# Patient Record
Sex: MALE | Race: WHITE | Employment: OTHER
[De-identification: names, ages, dates, MRNs, and addresses within clinical notes are randomized per-mention and may not be internally consistent; named-entity substitution may affect disease eponyms.]

---

## 2017-01-05 ENCOUNTER — TELEPHONE (OUTPATIENT)
Dept: FAMILY MEDICINE CLINIC | Facility: CLINIC | Age: 57
End: 2017-01-05

## 2017-01-05 DIAGNOSIS — F43.20 ADJUSTMENT DISORDER, UNSPECIFIED TYPE: ICD-10-CM

## 2017-01-05 RX ORDER — LORAZEPAM 0.5 MG/1
0.5 TABLET ORAL EVERY 8 HOURS PRN
Qty: 30 TABLET | Refills: 0 | Status: SHIPPED | OUTPATIENT
Start: 2017-01-05 | End: 2017-09-29 | Stop reason: ALTCHOICE

## 2017-02-03 DIAGNOSIS — I10 ESSENTIAL HYPERTENSION: ICD-10-CM

## 2017-02-03 RX ORDER — AMLODIPINE BESYLATE 10 MG/1
TABLET ORAL
Qty: 30 TABLET | Refills: 0 | Status: SHIPPED | OUTPATIENT
Start: 2017-02-03 | End: 2017-03-31 | Stop reason: SDUPTHER

## 2017-03-31 DIAGNOSIS — I10 ESSENTIAL HYPERTENSION: ICD-10-CM

## 2017-03-31 RX ORDER — AMLODIPINE BESYLATE 10 MG/1
TABLET ORAL
Qty: 30 TABLET | Refills: 0 | Status: SHIPPED | OUTPATIENT
Start: 2017-03-31 | End: 2017-05-17 | Stop reason: SDUPTHER

## 2017-04-04 ENCOUNTER — OFFICE VISIT (OUTPATIENT)
Dept: FAMILY MEDICINE CLINIC | Age: 57
End: 2017-04-04
Payer: COMMERCIAL

## 2017-04-04 VITALS
SYSTOLIC BLOOD PRESSURE: 161 MMHG | WEIGHT: 277 LBS | HEART RATE: 85 BPM | RESPIRATION RATE: 16 BRPM | DIASTOLIC BLOOD PRESSURE: 114 MMHG | BODY MASS INDEX: 38.63 KG/M2

## 2017-04-04 DIAGNOSIS — F41.9 ANXIETY AND DEPRESSION: Primary | ICD-10-CM

## 2017-04-04 DIAGNOSIS — F32.A ANXIETY AND DEPRESSION: Primary | ICD-10-CM

## 2017-04-04 DIAGNOSIS — I10 ESSENTIAL HYPERTENSION: ICD-10-CM

## 2017-04-04 DIAGNOSIS — E11.9 TYPE 2 DIABETES MELLITUS WITHOUT COMPLICATION, WITHOUT LONG-TERM CURRENT USE OF INSULIN (HCC): ICD-10-CM

## 2017-04-04 PROCEDURE — 99214 OFFICE O/P EST MOD 30 MIN: CPT | Performed by: FAMILY MEDICINE

## 2017-04-04 PROCEDURE — G8417 CALC BMI ABV UP PARAM F/U: HCPCS | Performed by: FAMILY MEDICINE

## 2017-04-04 PROCEDURE — 3044F HG A1C LEVEL LT 7.0%: CPT | Performed by: FAMILY MEDICINE

## 2017-04-04 PROCEDURE — G8427 DOCREV CUR MEDS BY ELIG CLIN: HCPCS | Performed by: FAMILY MEDICINE

## 2017-04-04 PROCEDURE — 3017F COLORECTAL CA SCREEN DOC REV: CPT | Performed by: FAMILY MEDICINE

## 2017-04-04 PROCEDURE — 4004F PT TOBACCO SCREEN RCVD TLK: CPT | Performed by: FAMILY MEDICINE

## 2017-04-04 RX ORDER — FLUOXETINE HYDROCHLORIDE 20 MG/1
20 CAPSULE ORAL DAILY
Qty: 30 CAPSULE | Refills: 1 | Status: SHIPPED | OUTPATIENT
Start: 2017-04-04 | End: 2017-04-26 | Stop reason: ALTCHOICE

## 2017-04-25 ENCOUNTER — TELEPHONE (OUTPATIENT)
Dept: FAMILY MEDICINE CLINIC | Age: 57
End: 2017-04-25

## 2017-04-26 ENCOUNTER — HOSPITAL ENCOUNTER (OUTPATIENT)
Age: 57
Discharge: HOME | End: 2017-04-26
Payer: COMMERCIAL

## 2017-04-26 ENCOUNTER — HOSPITAL ENCOUNTER (OUTPATIENT)
Dept: ULTRASOUND IMAGING | Age: 57
Discharge: HOME | End: 2017-04-26
Payer: COMMERCIAL

## 2017-04-26 ENCOUNTER — HOSPITAL ENCOUNTER (OUTPATIENT)
Age: 57
Setting detail: SPECIMEN
Discharge: HOME | End: 2017-04-26
Payer: COMMERCIAL

## 2017-04-26 ENCOUNTER — OFFICE VISIT (OUTPATIENT)
Dept: FAMILY MEDICINE CLINIC | Age: 57
End: 2017-04-26
Payer: COMMERCIAL

## 2017-04-26 VITALS
BODY MASS INDEX: 36.68 KG/M2 | DIASTOLIC BLOOD PRESSURE: 85 MMHG | RESPIRATION RATE: 16 BRPM | SYSTOLIC BLOOD PRESSURE: 115 MMHG | HEART RATE: 76 BPM | WEIGHT: 263 LBS

## 2017-04-26 DIAGNOSIS — R82.2 BILIRUBINURIA: ICD-10-CM

## 2017-04-26 DIAGNOSIS — Z13.31 POSITIVE DEPRESSION SCREENING: ICD-10-CM

## 2017-04-26 DIAGNOSIS — E11.9 TYPE 2 DIABETES MELLITUS WITHOUT COMPLICATION, WITHOUT LONG-TERM CURRENT USE OF INSULIN (HCC): ICD-10-CM

## 2017-04-26 DIAGNOSIS — F32.A ANXIETY AND DEPRESSION: Primary | ICD-10-CM

## 2017-04-26 DIAGNOSIS — Z87.442 HISTORY OF KIDNEY STONES: ICD-10-CM

## 2017-04-26 DIAGNOSIS — R39.9 SYMPTOMS INVOLVING URINARY SYSTEM: ICD-10-CM

## 2017-04-26 DIAGNOSIS — R31.9 HEMATURIA: ICD-10-CM

## 2017-04-26 DIAGNOSIS — Z11.59 NEED FOR HEPATITIS C SCREENING TEST: ICD-10-CM

## 2017-04-26 DIAGNOSIS — Z11.4 SCREENING FOR HIV (HUMAN IMMUNODEFICIENCY VIRUS): ICD-10-CM

## 2017-04-26 DIAGNOSIS — F41.9 ANXIETY AND DEPRESSION: Primary | ICD-10-CM

## 2017-04-26 LAB
-: ABNORMAL
ALBUMIN SERPL-MCNC: 4.2 G/DL (ref 3.5–5.2)
ALBUMIN/GLOBULIN RATIO: 1.6 (ref 1–2.5)
ALP BLD-CCNC: 85 U/L (ref 40–129)
ALT SERPL-CCNC: 29 U/L (ref 5–41)
AMORPHOUS: ABNORMAL
AST SERPL-CCNC: 23 U/L
BACTERIA: ABNORMAL
BILIRUB SERPL-MCNC: 0.46 MG/DL (ref 0.3–1.2)
BILIRUBIN DIRECT: 0.12 MG/DL
BILIRUBIN URINE: NEGATIVE
BILIRUBIN, INDIRECT: 0.34 MG/DL (ref 0–1)
BILIRUBIN, POC: NORMAL
BLOOD URINE, POC: NORMAL
CASTS UA: ABNORMAL /LPF (ref 0–2)
CLARITY, POC: NORMAL
COLOR, POC: NORMAL
COLOR: ABNORMAL
COMMENT UA: ABNORMAL
CREATININE URINE: 326.9 MG/DL (ref 39–259)
CRYSTALS, UA: ABNORMAL /HPF
EPITHELIAL CELLS UA: ABNORMAL /HPF (ref 0–5)
GLOBULIN: NORMAL G/DL (ref 1.5–3.8)
GLUCOSE URINE, POC: NORMAL
GLUCOSE URINE: NEGATIVE
HEPATITIS C ANTIBODY: NONREACTIVE
HIV AG/AB: NONREACTIVE
KETONES, POC: NORMAL
KETONES, URINE: ABNORMAL
LEUKOCYTE EST, POC: NORMAL
LEUKOCYTE ESTERASE, URINE: ABNORMAL
MICROALBUMIN/CREAT 24H UR: 55 MG/L
MICROALBUMIN/CREAT UR-RTO: 17 MCG/MG CREAT
MUCUS: ABNORMAL
NITRITE, POC: NORMAL
NITRITE, URINE: NEGATIVE
OTHER OBSERVATIONS UA: ABNORMAL
PH UA: 5.5 (ref 5–8)
PH, POC: 5.5
PROSTATE SPECIFIC ANTIGEN: 0.59 UG/L
PROTEIN UA: ABNORMAL
PROTEIN, POC: 30
RBC UA: ABNORMAL /HPF (ref 0–2)
RENAL EPITHELIAL, UA: ABNORMAL /HPF
SPECIFIC GRAVITY UA: 1.03 (ref 1–1.03)
SPECIFIC GRAVITY, POC: 1.03
TOTAL PROTEIN: 6.9 G/DL (ref 6.4–8.3)
TRICHOMONAS: ABNORMAL
TURBIDITY: ABNORMAL
URINE HGB: ABNORMAL
UROBILINOGEN, POC: 0.2
UROBILINOGEN, URINE: NORMAL
WBC UA: ABNORMAL /HPF (ref 0–5)
YEAST: ABNORMAL

## 2017-04-26 PROCEDURE — 80076 HEPATIC FUNCTION PANEL: CPT

## 2017-04-26 PROCEDURE — 99214 OFFICE O/P EST MOD 30 MIN: CPT | Performed by: NURSE PRACTITIONER

## 2017-04-26 PROCEDURE — 81002 URINALYSIS NONAUTO W/O SCOPE: CPT | Performed by: NURSE PRACTITIONER

## 2017-04-26 PROCEDURE — G8427 DOCREV CUR MEDS BY ELIG CLIN: HCPCS | Performed by: NURSE PRACTITIONER

## 2017-04-26 PROCEDURE — G8431 POS CLIN DEPRES SCRN F/U DOC: HCPCS | Performed by: NURSE PRACTITIONER

## 2017-04-26 PROCEDURE — G8417 CALC BMI ABV UP PARAM F/U: HCPCS | Performed by: NURSE PRACTITIONER

## 2017-04-26 PROCEDURE — 4004F PT TOBACCO SCREEN RCVD TLK: CPT | Performed by: NURSE PRACTITIONER

## 2017-04-26 PROCEDURE — 36415 COLL VENOUS BLD VENIPUNCTURE: CPT

## 2017-04-26 PROCEDURE — 83036 HEMOGLOBIN GLYCOSYLATED A1C: CPT

## 2017-04-26 PROCEDURE — 76770 US EXAM ABDO BACK WALL COMP: CPT

## 2017-04-26 PROCEDURE — 86803 HEPATITIS C AB TEST: CPT

## 2017-04-26 PROCEDURE — 82043 UR ALBUMIN QUANTITATIVE: CPT

## 2017-04-26 PROCEDURE — 87389 HIV-1 AG W/HIV-1&-2 AB AG IA: CPT

## 2017-04-26 PROCEDURE — 82570 ASSAY OF URINE CREATININE: CPT

## 2017-04-26 PROCEDURE — 84153 ASSAY OF PSA TOTAL: CPT

## 2017-04-26 PROCEDURE — 3017F COLORECTAL CA SCREEN DOC REV: CPT | Performed by: NURSE PRACTITIONER

## 2017-04-26 RX ORDER — GABAPENTIN 300 MG/1
300 CAPSULE ORAL 2 TIMES DAILY
COMMUNITY
Start: 2017-04-21 | End: 2018-01-09 | Stop reason: ALTCHOICE

## 2017-04-26 RX ORDER — ALBUTEROL SULFATE 90 UG/1
2 AEROSOL, METERED RESPIRATORY (INHALATION) EVERY 6 HOURS PRN
COMMUNITY
End: 2017-06-09 | Stop reason: SDUPTHER

## 2017-04-26 RX ORDER — ESCITALOPRAM OXALATE 10 MG/1
10 TABLET ORAL DAILY
Qty: 30 TABLET | Refills: 1 | Status: SHIPPED | OUTPATIENT
Start: 2017-04-26 | End: 2018-01-09 | Stop reason: ALTCHOICE

## 2017-04-26 ASSESSMENT — PATIENT HEALTH QUESTIONNAIRE - PHQ9
3. TROUBLE FALLING OR STAYING ASLEEP: 3
1. LITTLE INTEREST OR PLEASURE IN DOING THINGS: 3
10. IF YOU CHECKED OFF ANY PROBLEMS, HOW DIFFICULT HAVE THESE PROBLEMS MADE IT FOR YOU TO DO YOUR WORK, TAKE CARE OF THINGS AT HOME, OR GET ALONG WITH OTHER PEOPLE: 2
4. FEELING TIRED OR HAVING LITTLE ENERGY: 3
5. POOR APPETITE OR OVEREATING: 3
2. FEELING DOWN, DEPRESSED OR HOPELESS: 3
8. MOVING OR SPEAKING SO SLOWLY THAT OTHER PEOPLE COULD HAVE NOTICED. OR THE OPPOSITE, BEING SO FIGETY OR RESTLESS THAT YOU HAVE BEEN MOVING AROUND A LOT MORE THAN USUAL: 3
6. FEELING BAD ABOUT YOURSELF - OR THAT YOU ARE A FAILURE OR HAVE LET YOURSELF OR YOUR FAMILY DOWN: 0
SUM OF ALL RESPONSES TO PHQ9 QUESTIONS 1 & 2: 6
9. THOUGHTS THAT YOU WOULD BE BETTER OFF DEAD, OR OF HURTING YOURSELF: 0
7. TROUBLE CONCENTRATING ON THINGS, SUCH AS READING THE NEWSPAPER OR WATCHING TELEVISION: 3
SUM OF ALL RESPONSES TO PHQ QUESTIONS 1-9: 21

## 2017-04-26 ASSESSMENT — ENCOUNTER SYMPTOMS
VOMITING: 0
NAUSEA: 1
DIARRHEA: 0
SHORTNESS OF BREATH: 0
ABDOMINAL PAIN: 1
CONSTIPATION: 0

## 2017-04-27 ENCOUNTER — TELEPHONE (OUTPATIENT)
Dept: FAMILY MEDICINE CLINIC | Age: 57
End: 2017-04-27

## 2017-04-27 DIAGNOSIS — N39.0 URINARY TRACT INFECTION WITH HEMATURIA, SITE UNSPECIFIED: Primary | ICD-10-CM

## 2017-04-27 DIAGNOSIS — R80.9 MICROALBUMINURIA: ICD-10-CM

## 2017-04-27 DIAGNOSIS — R31.9 URINARY TRACT INFECTION WITH HEMATURIA, SITE UNSPECIFIED: Primary | ICD-10-CM

## 2017-04-27 LAB
CULTURE: NORMAL
CULTURE: NORMAL
ESTIMATED AVERAGE GLUCOSE: 128 MG/DL
HBA1C MFR BLD: 6.1 % (ref 4–6)
Lab: NORMAL
SPECIMEN DESCRIPTION: NORMAL
STATUS: NORMAL

## 2017-04-27 RX ORDER — CIPROFLOXACIN 500 MG/1
500 TABLET, FILM COATED ORAL 2 TIMES DAILY
Qty: 14 TABLET | Refills: 0 | Status: SHIPPED | OUTPATIENT
Start: 2017-04-27 | End: 2017-05-04

## 2017-04-30 ENCOUNTER — APPOINTMENT (OUTPATIENT)
Dept: CT IMAGING | Age: 57
End: 2017-04-30
Payer: COMMERCIAL

## 2017-04-30 ENCOUNTER — HOSPITAL ENCOUNTER (EMERGENCY)
Age: 57
Discharge: HOME | End: 2017-04-30
Attending: EMERGENCY MEDICINE
Payer: COMMERCIAL

## 2017-04-30 VITALS
SYSTOLIC BLOOD PRESSURE: 100 MMHG | HEART RATE: 65 BPM | OXYGEN SATURATION: 96 % | RESPIRATION RATE: 18 BRPM | DIASTOLIC BLOOD PRESSURE: 82 MMHG | TEMPERATURE: 97.8 F | HEIGHT: 71 IN | BODY MASS INDEX: 37.1 KG/M2 | WEIGHT: 265 LBS

## 2017-04-30 DIAGNOSIS — R10.9 RIGHT FLANK PAIN: Primary | ICD-10-CM

## 2017-04-30 LAB
-: ABNORMAL
ABSOLUTE EOS #: 0.2 K/UL (ref 0–0.4)
ABSOLUTE LYMPH #: 1.4 K/UL (ref 1–4.8)
ABSOLUTE MONO #: 0.5 K/UL (ref 0.1–1.2)
AMORPHOUS: ABNORMAL
ANION GAP SERPL CALCULATED.3IONS-SCNC: 13 MMOL/L (ref 9–17)
BACTERIA: ABNORMAL
BASOPHILS # BLD: 1 %
BASOPHILS ABSOLUTE: 0 K/UL (ref 0–0.2)
BILIRUBIN URINE: ABNORMAL
BUN BLDV-MCNC: 11 MG/DL (ref 6–20)
BUN/CREAT BLD: ABNORMAL (ref 9–20)
CALCIUM SERPL-MCNC: 9.1 MG/DL (ref 8.6–10.4)
CASTS UA: ABNORMAL /LPF
CHLORIDE BLD-SCNC: 105 MMOL/L (ref 98–107)
CO2: 24 MMOL/L (ref 20–31)
COLOR: YELLOW
COMMENT UA: ABNORMAL
CREAT SERPL-MCNC: 0.78 MG/DL (ref 0.7–1.2)
CRYSTALS, UA: ABNORMAL /HPF
DIFFERENTIAL TYPE: NORMAL
EOSINOPHILS RELATIVE PERCENT: 4 %
EPITHELIAL CELLS UA: ABNORMAL /HPF (ref 0–5)
GFR AFRICAN AMERICAN: >60 ML/MIN
GFR NON-AFRICAN AMERICAN: >60 ML/MIN
GFR SERPL CREATININE-BSD FRML MDRD: ABNORMAL ML/MIN/{1.73_M2}
GFR SERPL CREATININE-BSD FRML MDRD: ABNORMAL ML/MIN/{1.73_M2}
GLUCOSE BLD-MCNC: 123 MG/DL (ref 70–99)
GLUCOSE URINE: NEGATIVE
HCT VFR BLD CALC: 47.7 % (ref 41–53)
HEMOGLOBIN: 16.1 G/DL (ref 13.5–17.5)
KETONES, URINE: ABNORMAL
LEUKOCYTE ESTERASE, URINE: NEGATIVE
LYMPHOCYTES # BLD: 25 %
MCH RBC QN AUTO: 30.1 PG (ref 26–34)
MCHC RBC AUTO-ENTMCNC: 33.8 G/DL (ref 31–37)
MCV RBC AUTO: 88.8 FL (ref 80–100)
MONOCYTES # BLD: 8 %
MUCUS: ABNORMAL
NITRITE, URINE: NEGATIVE
OTHER OBSERVATIONS UA: ABNORMAL
PDW BLD-RTO: 13.5 % (ref 12.5–15.4)
PH UA: 5 (ref 5–8)
PLATELET # BLD: 183 K/UL (ref 140–450)
PLATELET ESTIMATE: NORMAL
PMV BLD AUTO: 9.6 FL (ref 6–12)
POTASSIUM SERPL-SCNC: 4 MMOL/L (ref 3.7–5.3)
PROTEIN UA: ABNORMAL
RBC # BLD: 5.37 M/UL (ref 4.5–5.9)
RBC # BLD: NORMAL 10*6/UL
RBC UA: ABNORMAL /HPF (ref 0–2)
RENAL EPITHELIAL, UA: ABNORMAL /HPF
SEG NEUTROPHILS: 62 %
SEGMENTED NEUTROPHILS ABSOLUTE COUNT: 3.4 K/UL (ref 1.8–7.7)
SODIUM BLD-SCNC: 142 MMOL/L (ref 135–144)
SPECIFIC GRAVITY UA: 1.03 (ref 1–1.03)
TRICHOMONAS: ABNORMAL
TURBIDITY: ABNORMAL
URINE HGB: ABNORMAL
UROBILINOGEN, URINE: NORMAL
WBC # BLD: 5.4 K/UL (ref 3.5–11)
WBC # BLD: NORMAL 10*3/UL
WBC UA: ABNORMAL /HPF (ref 0–5)
YEAST: ABNORMAL

## 2017-04-30 PROCEDURE — 81001 URINALYSIS AUTO W/SCOPE: CPT

## 2017-04-30 PROCEDURE — 99284 EMERGENCY DEPT VISIT MOD MDM: CPT

## 2017-04-30 PROCEDURE — 36415 COLL VENOUS BLD VENIPUNCTURE: CPT

## 2017-04-30 PROCEDURE — 80048 BASIC METABOLIC PNL TOTAL CA: CPT

## 2017-04-30 PROCEDURE — 74176 CT ABD & PELVIS W/O CONTRAST: CPT

## 2017-04-30 PROCEDURE — 85025 COMPLETE CBC W/AUTO DIFF WBC: CPT

## 2017-04-30 RX ORDER — HYDROCODONE BITARTRATE AND ACETAMINOPHEN 5; 325 MG/1; MG/1
1 TABLET ORAL EVERY 6 HOURS PRN
Qty: 10 TABLET | Refills: 0 | Status: SHIPPED | OUTPATIENT
Start: 2017-04-30 | End: 2017-05-07

## 2017-04-30 ASSESSMENT — PAIN SCALES - GENERAL: PAINLEVEL_OUTOF10: 4

## 2017-04-30 ASSESSMENT — PAIN DESCRIPTION - DESCRIPTORS: DESCRIPTORS: SHARP;PRESSURE

## 2017-05-03 ENCOUNTER — OFFICE VISIT (OUTPATIENT)
Dept: FAMILY MEDICINE CLINIC | Age: 57
End: 2017-05-03
Payer: COMMERCIAL

## 2017-05-03 VITALS
WEIGHT: 266 LBS | HEART RATE: 74 BPM | DIASTOLIC BLOOD PRESSURE: 84 MMHG | SYSTOLIC BLOOD PRESSURE: 125 MMHG | BODY MASS INDEX: 37.1 KG/M2 | RESPIRATION RATE: 16 BRPM

## 2017-05-03 DIAGNOSIS — R80.9 MICROALBUMINURIA: ICD-10-CM

## 2017-05-03 DIAGNOSIS — R10.9 RIGHT FLANK PAIN: ICD-10-CM

## 2017-05-03 DIAGNOSIS — R31.9 HEMATURIA: Primary | ICD-10-CM

## 2017-05-03 PROCEDURE — 3017F COLORECTAL CA SCREEN DOC REV: CPT | Performed by: NURSE PRACTITIONER

## 2017-05-03 PROCEDURE — 4004F PT TOBACCO SCREEN RCVD TLK: CPT | Performed by: NURSE PRACTITIONER

## 2017-05-03 PROCEDURE — G8417 CALC BMI ABV UP PARAM F/U: HCPCS | Performed by: NURSE PRACTITIONER

## 2017-05-03 PROCEDURE — G8427 DOCREV CUR MEDS BY ELIG CLIN: HCPCS | Performed by: NURSE PRACTITIONER

## 2017-05-03 PROCEDURE — 99214 OFFICE O/P EST MOD 30 MIN: CPT | Performed by: NURSE PRACTITIONER

## 2017-05-17 DIAGNOSIS — L70.9 ACNE, UNSPECIFIED ACNE TYPE: ICD-10-CM

## 2017-05-17 DIAGNOSIS — I10 ESSENTIAL HYPERTENSION: ICD-10-CM

## 2017-05-17 RX ORDER — AMLODIPINE BESYLATE 10 MG/1
TABLET ORAL
Qty: 30 TABLET | Refills: 2 | Status: SHIPPED | OUTPATIENT
Start: 2017-05-17 | End: 2017-10-11 | Stop reason: SDUPTHER

## 2017-05-17 RX ORDER — MINOCYCLINE HYDROCHLORIDE 50 MG/1
CAPSULE ORAL
Qty: 60 CAPSULE | Refills: 0 | Status: SHIPPED | OUTPATIENT
Start: 2017-05-17 | End: 2017-08-14 | Stop reason: SDUPTHER

## 2017-08-07 ENCOUNTER — HOSPITAL ENCOUNTER (EMERGENCY)
Age: 57
Discharge: HOME | End: 2017-08-08
Attending: EMERGENCY MEDICINE
Payer: COMMERCIAL

## 2017-08-07 ENCOUNTER — APPOINTMENT (OUTPATIENT)
Dept: CT IMAGING | Age: 57
End: 2017-08-07
Payer: COMMERCIAL

## 2017-08-07 DIAGNOSIS — R10.31 ABDOMINAL PAIN, RIGHT LOWER QUADRANT: Primary | ICD-10-CM

## 2017-08-07 PROCEDURE — 99284 EMERGENCY DEPT VISIT MOD MDM: CPT

## 2017-08-07 PROCEDURE — 85025 COMPLETE CBC W/AUTO DIFF WBC: CPT

## 2017-08-07 PROCEDURE — 83690 ASSAY OF LIPASE: CPT

## 2017-08-07 PROCEDURE — 80053 COMPREHEN METABOLIC PANEL: CPT

## 2017-08-07 PROCEDURE — 36415 COLL VENOUS BLD VENIPUNCTURE: CPT

## 2017-08-07 PROCEDURE — 74177 CT ABD & PELVIS W/CONTRAST: CPT

## 2017-08-07 RX ORDER — SODIUM CHLORIDE 0.9 % (FLUSH) 0.9 %
10 SYRINGE (ML) INJECTION PRN
Status: DISCONTINUED | OUTPATIENT
Start: 2017-08-07 | End: 2017-08-08 | Stop reason: HOSPADM

## 2017-08-07 RX ORDER — 0.9 % SODIUM CHLORIDE 0.9 %
100 INTRAVENOUS SOLUTION INTRAVENOUS ONCE
Status: COMPLETED | OUTPATIENT
Start: 2017-08-08 | End: 2017-08-08

## 2017-08-07 ASSESSMENT — PAIN DESCRIPTION - LOCATION: LOCATION: ABDOMEN

## 2017-08-07 ASSESSMENT — PAIN DESCRIPTION - DESCRIPTORS: DESCRIPTORS: STABBING;SHARP

## 2017-08-07 ASSESSMENT — PAIN DESCRIPTION - PAIN TYPE: TYPE: ACUTE PAIN

## 2017-08-07 ASSESSMENT — PAIN DESCRIPTION - ORIENTATION: ORIENTATION: RIGHT;LOWER

## 2017-08-07 ASSESSMENT — PAIN SCALES - GENERAL: PAINLEVEL_OUTOF10: 8

## 2017-08-08 VITALS
RESPIRATION RATE: 16 BRPM | DIASTOLIC BLOOD PRESSURE: 118 MMHG | OXYGEN SATURATION: 97 % | SYSTOLIC BLOOD PRESSURE: 160 MMHG | WEIGHT: 265 LBS | HEIGHT: 71 IN | BODY MASS INDEX: 37.1 KG/M2 | TEMPERATURE: 98.8 F | HEART RATE: 84 BPM

## 2017-08-08 LAB
ABSOLUTE EOS #: 0.2 K/UL (ref 0–0.4)
ABSOLUTE LYMPH #: 1.9 K/UL (ref 1–4.8)
ABSOLUTE MONO #: 0.4 K/UL (ref 0.1–1.2)
ALBUMIN SERPL-MCNC: 4.1 G/DL (ref 3.5–5.2)
ALBUMIN/GLOBULIN RATIO: 1.4 (ref 1–2.5)
ALP BLD-CCNC: 95 U/L (ref 40–129)
ALT SERPL-CCNC: 37 U/L (ref 5–41)
ANION GAP SERPL CALCULATED.3IONS-SCNC: 18 MMOL/L (ref 9–17)
AST SERPL-CCNC: 25 U/L
BASOPHILS # BLD: 1 %
BASOPHILS ABSOLUTE: 0.1 K/UL (ref 0–0.2)
BILIRUB SERPL-MCNC: 0.33 MG/DL (ref 0.3–1.2)
BILIRUBIN URINE: NEGATIVE
BUN BLDV-MCNC: 18 MG/DL (ref 6–20)
BUN/CREAT BLD: ABNORMAL (ref 9–20)
CALCIUM SERPL-MCNC: 9.4 MG/DL (ref 8.6–10.4)
CHLORIDE BLD-SCNC: 104 MMOL/L (ref 98–107)
CO2: 21 MMOL/L (ref 20–31)
COLOR: YELLOW
COMMENT UA: ABNORMAL
CREAT SERPL-MCNC: 0.85 MG/DL (ref 0.7–1.2)
DIFFERENTIAL TYPE: NORMAL
EOSINOPHILS RELATIVE PERCENT: 2 %
GFR AFRICAN AMERICAN: >60 ML/MIN
GFR NON-AFRICAN AMERICAN: >60 ML/MIN
GFR SERPL CREATININE-BSD FRML MDRD: ABNORMAL ML/MIN/{1.73_M2}
GFR SERPL CREATININE-BSD FRML MDRD: ABNORMAL ML/MIN/{1.73_M2}
GLUCOSE BLD-MCNC: 170 MG/DL (ref 70–99)
GLUCOSE URINE: NEGATIVE
HCT VFR BLD CALC: 49.9 % (ref 41–53)
HEMOGLOBIN: 16.6 G/DL (ref 13.5–17.5)
KETONES, URINE: NEGATIVE
LEUKOCYTE ESTERASE, URINE: NEGATIVE
LIPASE: 20 U/L (ref 13–60)
LYMPHOCYTES # BLD: 23 %
MCH RBC QN AUTO: 30 PG (ref 26–34)
MCHC RBC AUTO-ENTMCNC: 33.3 G/DL (ref 31–37)
MCV RBC AUTO: 90.1 FL (ref 80–100)
MONOCYTES # BLD: 5 %
NITRITE, URINE: NEGATIVE
PDW BLD-RTO: 14 % (ref 12.5–15.4)
PH UA: 5.5 (ref 5–8)
PLATELET # BLD: 192 K/UL (ref 140–450)
PLATELET ESTIMATE: NORMAL
PMV BLD AUTO: 8.5 FL (ref 6–12)
POTASSIUM SERPL-SCNC: 3.5 MMOL/L (ref 3.7–5.3)
PROTEIN UA: NEGATIVE
RBC # BLD: 5.54 M/UL (ref 4.5–5.9)
RBC # BLD: NORMAL 10*6/UL
SEG NEUTROPHILS: 69 %
SEGMENTED NEUTROPHILS ABSOLUTE COUNT: 5.7 K/UL (ref 1.8–7.7)
SODIUM BLD-SCNC: 143 MMOL/L (ref 135–144)
SPECIFIC GRAVITY UA: 1.03 (ref 1–1.03)
TOTAL PROTEIN: 7 G/DL (ref 6.4–8.3)
TURBIDITY: CLEAR
URINE HGB: NEGATIVE
UROBILINOGEN, URINE: NORMAL
WBC # BLD: 8.3 K/UL (ref 3.5–11)
WBC # BLD: NORMAL 10*3/UL

## 2017-08-08 PROCEDURE — 6360000004 HC RX CONTRAST MEDICATION: Performed by: EMERGENCY MEDICINE

## 2017-08-08 PROCEDURE — 2580000003 HC RX 258: Performed by: EMERGENCY MEDICINE

## 2017-08-08 RX ORDER — 0.9 % SODIUM CHLORIDE 0.9 %
1000 INTRAVENOUS SOLUTION INTRAVENOUS ONCE
Status: COMPLETED | OUTPATIENT
Start: 2017-08-08 | End: 2017-08-08

## 2017-08-08 RX ADMIN — SODIUM CHLORIDE 1000 ML: 9 INJECTION, SOLUTION INTRAVENOUS at 00:13

## 2017-08-08 RX ADMIN — SODIUM CHLORIDE 100 ML: 9 INJECTION, SOLUTION INTRAVENOUS at 00:20

## 2017-08-08 RX ADMIN — Medication 10 ML: at 00:25

## 2017-08-08 RX ADMIN — IOVERSOL 130 ML: 741 INJECTION INTRA-ARTERIAL; INTRAVENOUS at 00:24

## 2017-08-08 ASSESSMENT — ENCOUNTER SYMPTOMS
CHEST TIGHTNESS: 0
EYE PAIN: 0
COUGH: 0
CONSTIPATION: 0
SORE THROAT: 0
RHINORRHEA: 0
NAUSEA: 0
ABDOMINAL PAIN: 1
EYE REDNESS: 0
WHEEZING: 0
SHORTNESS OF BREATH: 0
DIARRHEA: 1
COLOR CHANGE: 0
EYE DISCHARGE: 0
BACK PAIN: 0

## 2017-08-14 DIAGNOSIS — L70.9 ACNE, UNSPECIFIED ACNE TYPE: ICD-10-CM

## 2017-08-14 RX ORDER — MINOCYCLINE HYDROCHLORIDE 50 MG/1
CAPSULE ORAL
Qty: 60 CAPSULE | Refills: 0 | Status: SHIPPED | OUTPATIENT
Start: 2017-08-14 | End: 2017-10-27 | Stop reason: SDUPTHER

## 2017-08-29 ENCOUNTER — HOSPITAL ENCOUNTER (OUTPATIENT)
Age: 57
Discharge: HOME | End: 2017-08-29
Payer: COMMERCIAL

## 2017-08-29 ENCOUNTER — OFFICE VISIT (OUTPATIENT)
Dept: FAMILY MEDICINE CLINIC | Age: 57
End: 2017-08-29
Payer: COMMERCIAL

## 2017-08-29 VITALS
HEART RATE: 79 BPM | WEIGHT: 254 LBS | RESPIRATION RATE: 16 BRPM | DIASTOLIC BLOOD PRESSURE: 92 MMHG | SYSTOLIC BLOOD PRESSURE: 139 MMHG | BODY MASS INDEX: 35.43 KG/M2

## 2017-08-29 DIAGNOSIS — R41.3 MEMORY DIFFICULTIES: Primary | ICD-10-CM

## 2017-08-29 DIAGNOSIS — R41.3 MEMORY DIFFICULTIES: ICD-10-CM

## 2017-08-29 LAB
TSH SERPL DL<=0.05 MIU/L-ACNC: 2.06 MIU/L (ref 0.3–5)
VITAMIN B-12: 321 PG/ML (ref 211–946)

## 2017-08-29 PROCEDURE — 4004F PT TOBACCO SCREEN RCVD TLK: CPT | Performed by: NURSE PRACTITIONER

## 2017-08-29 PROCEDURE — 3017F COLORECTAL CA SCREEN DOC REV: CPT | Performed by: NURSE PRACTITIONER

## 2017-08-29 PROCEDURE — G8427 DOCREV CUR MEDS BY ELIG CLIN: HCPCS | Performed by: NURSE PRACTITIONER

## 2017-08-29 PROCEDURE — 99213 OFFICE O/P EST LOW 20 MIN: CPT | Performed by: NURSE PRACTITIONER

## 2017-08-29 PROCEDURE — 36415 COLL VENOUS BLD VENIPUNCTURE: CPT

## 2017-08-29 PROCEDURE — G8417 CALC BMI ABV UP PARAM F/U: HCPCS | Performed by: NURSE PRACTITIONER

## 2017-08-29 PROCEDURE — 84443 ASSAY THYROID STIM HORMONE: CPT

## 2017-08-29 PROCEDURE — 82607 VITAMIN B-12: CPT

## 2017-08-29 RX ORDER — DIAZEPAM 5 MG/1
TABLET ORAL
Refills: 0 | COMMUNITY
Start: 2017-08-16 | End: 2017-08-29 | Stop reason: ALTCHOICE

## 2017-08-29 RX ORDER — TRAMADOL HYDROCHLORIDE 50 MG/1
TABLET ORAL
Refills: 0 | COMMUNITY
Start: 2017-08-16 | End: 2017-09-29 | Stop reason: ALTCHOICE

## 2017-08-29 ASSESSMENT — ENCOUNTER SYMPTOMS
COUGH: 0
SWOLLEN GLANDS: 0
NAUSEA: 0
SORE THROAT: 0
ABDOMINAL PAIN: 0
VISUAL CHANGE: 0

## 2017-08-30 ENCOUNTER — HOSPITAL ENCOUNTER (OUTPATIENT)
Dept: MRI IMAGING | Age: 57
Discharge: HOME | End: 2017-08-30
Payer: COMMERCIAL

## 2017-08-30 DIAGNOSIS — M54.16 LUMBAR RADICULITIS: ICD-10-CM

## 2017-08-30 DIAGNOSIS — R41.3 MEMORY DIFFICULTIES: ICD-10-CM

## 2017-08-30 PROCEDURE — 72148 MRI LUMBAR SPINE W/O DYE: CPT

## 2017-08-30 PROCEDURE — 70551 MRI BRAIN STEM W/O DYE: CPT

## 2017-08-31 ENCOUNTER — TELEPHONE (OUTPATIENT)
Dept: FAMILY MEDICINE CLINIC | Age: 57
End: 2017-08-31

## 2017-09-24 ENCOUNTER — HOSPITAL ENCOUNTER (EMERGENCY)
Age: 57
Discharge: HOME | End: 2017-09-24
Attending: EMERGENCY MEDICINE
Payer: COMMERCIAL

## 2017-09-24 VITALS
OXYGEN SATURATION: 96 % | SYSTOLIC BLOOD PRESSURE: 146 MMHG | WEIGHT: 253 LBS | HEIGHT: 70 IN | RESPIRATION RATE: 18 BRPM | BODY MASS INDEX: 36.22 KG/M2 | TEMPERATURE: 97.5 F | HEART RATE: 79 BPM | DIASTOLIC BLOOD PRESSURE: 105 MMHG

## 2017-09-24 DIAGNOSIS — R25.2 MUSCLE CRAMP: Primary | ICD-10-CM

## 2017-09-24 PROCEDURE — 99282 EMERGENCY DEPT VISIT SF MDM: CPT

## 2017-09-29 ENCOUNTER — OFFICE VISIT (OUTPATIENT)
Dept: NEUROLOGY | Age: 57
End: 2017-09-29
Payer: COMMERCIAL

## 2017-09-29 VITALS
HEIGHT: 70 IN | HEART RATE: 74 BPM | WEIGHT: 257.2 LBS | BODY MASS INDEX: 36.82 KG/M2 | SYSTOLIC BLOOD PRESSURE: 156 MMHG | DIASTOLIC BLOOD PRESSURE: 102 MMHG

## 2017-09-29 DIAGNOSIS — R41.3 MEMORY DIFFICULTIES: Primary | ICD-10-CM

## 2017-09-29 PROCEDURE — 3017F COLORECTAL CA SCREEN DOC REV: CPT | Performed by: PSYCHIATRY & NEUROLOGY

## 2017-09-29 PROCEDURE — G8417 CALC BMI ABV UP PARAM F/U: HCPCS | Performed by: PSYCHIATRY & NEUROLOGY

## 2017-09-29 PROCEDURE — 99204 OFFICE O/P NEW MOD 45 MIN: CPT | Performed by: PSYCHIATRY & NEUROLOGY

## 2017-09-29 PROCEDURE — 4004F PT TOBACCO SCREEN RCVD TLK: CPT | Performed by: PSYCHIATRY & NEUROLOGY

## 2017-09-29 PROCEDURE — G8427 DOCREV CUR MEDS BY ELIG CLIN: HCPCS | Performed by: PSYCHIATRY & NEUROLOGY

## 2017-10-11 DIAGNOSIS — I10 ESSENTIAL HYPERTENSION: ICD-10-CM

## 2017-10-11 RX ORDER — AMLODIPINE BESYLATE 10 MG/1
TABLET ORAL
Qty: 30 TABLET | Refills: 3 | Status: SHIPPED | OUTPATIENT
Start: 2017-10-11 | End: 2018-01-09 | Stop reason: ALTCHOICE

## 2017-10-13 ENCOUNTER — HOSPITAL ENCOUNTER (OUTPATIENT)
Dept: NEUROLOGY | Age: 57
Discharge: HOME | End: 2017-10-13
Payer: COMMERCIAL

## 2017-10-13 DIAGNOSIS — R41.3 MEMORY DIFFICULTIES: ICD-10-CM

## 2017-10-13 PROCEDURE — 95816 EEG AWAKE AND DROWSY: CPT

## 2017-10-27 DIAGNOSIS — L70.9 ACNE, UNSPECIFIED ACNE TYPE: ICD-10-CM

## 2017-10-27 RX ORDER — MINOCYCLINE HYDROCHLORIDE 50 MG/1
CAPSULE ORAL
Qty: 60 CAPSULE | Refills: 0 | Status: SHIPPED | OUTPATIENT
Start: 2017-10-27 | End: 2017-11-27 | Stop reason: SDUPTHER

## 2017-11-27 DIAGNOSIS — L70.9 ACNE, UNSPECIFIED ACNE TYPE: ICD-10-CM

## 2017-11-27 RX ORDER — MINOCYCLINE HYDROCHLORIDE 50 MG/1
CAPSULE ORAL
Qty: 60 CAPSULE | Refills: 0 | Status: SHIPPED | OUTPATIENT
Start: 2017-11-27 | End: 2018-01-09 | Stop reason: ALTCHOICE

## 2017-12-06 ENCOUNTER — OFFICE VISIT (OUTPATIENT)
Dept: NEUROLOGY | Age: 57
End: 2017-12-06
Payer: COMMERCIAL

## 2017-12-06 VITALS
DIASTOLIC BLOOD PRESSURE: 96 MMHG | HEIGHT: 70 IN | SYSTOLIC BLOOD PRESSURE: 135 MMHG | WEIGHT: 266 LBS | HEART RATE: 72 BPM | BODY MASS INDEX: 38.08 KG/M2

## 2017-12-06 DIAGNOSIS — F41.9 ANXIETY AND DEPRESSION: ICD-10-CM

## 2017-12-06 DIAGNOSIS — G47.00 INSOMNIA, UNSPECIFIED TYPE: ICD-10-CM

## 2017-12-06 DIAGNOSIS — M79.604 DIFFUSE PAIN IN RIGHT LOWER EXTREMITY: ICD-10-CM

## 2017-12-06 DIAGNOSIS — R41.3 MEMORY DIFFICULTIES: Primary | ICD-10-CM

## 2017-12-06 DIAGNOSIS — R20.8 DYSESTHESIA: ICD-10-CM

## 2017-12-06 DIAGNOSIS — F32.A ANXIETY AND DEPRESSION: ICD-10-CM

## 2017-12-06 PROCEDURE — G8417 CALC BMI ABV UP PARAM F/U: HCPCS | Performed by: PSYCHIATRY & NEUROLOGY

## 2017-12-06 PROCEDURE — 3017F COLORECTAL CA SCREEN DOC REV: CPT | Performed by: PSYCHIATRY & NEUROLOGY

## 2017-12-06 PROCEDURE — 4004F PT TOBACCO SCREEN RCVD TLK: CPT | Performed by: PSYCHIATRY & NEUROLOGY

## 2017-12-06 PROCEDURE — 99214 OFFICE O/P EST MOD 30 MIN: CPT | Performed by: PSYCHIATRY & NEUROLOGY

## 2017-12-06 PROCEDURE — G8484 FLU IMMUNIZE NO ADMIN: HCPCS | Performed by: PSYCHIATRY & NEUROLOGY

## 2017-12-06 PROCEDURE — G8427 DOCREV CUR MEDS BY ELIG CLIN: HCPCS | Performed by: PSYCHIATRY & NEUROLOGY

## 2017-12-06 RX ORDER — TIZANIDINE HYDROCHLORIDE 2 MG/1
1 CAPSULE, GELATIN COATED ORAL DAILY
COMMUNITY
Start: 2017-12-04 | End: 2020-12-03

## 2017-12-06 RX ORDER — AMITRIPTYLINE HYDROCHLORIDE 10 MG/1
TABLET, FILM COATED ORAL
Qty: 70 TABLET | Refills: 1 | Status: SHIPPED | OUTPATIENT
Start: 2017-12-06 | End: 2018-01-10 | Stop reason: ALTCHOICE

## 2017-12-06 RX ORDER — OXYCODONE HYDROCHLORIDE AND ACETAMINOPHEN 5; 325 MG/1; MG/1
1 TABLET ORAL 3 TIMES DAILY PRN
Refills: 0 | COMMUNITY
Start: 2017-11-27 | End: 2018-01-09 | Stop reason: ALTCHOICE

## 2017-12-06 NOTE — PROGRESS NOTES
NEUROLOGY FOLLOW-UP  Patient Name:  Hugo Salinas  :   1960  Clinic Visit Date: 2017    I saw Mr. Hugo Salinas in follow-up in the office today in continuation of neurologic care. He is a 65 y.o.  male evaluated on 17 for evaluation of memory loss. Today he came to the clinic to follow up on EEG testing and also stated \"trouble falling asleep; loss of appetite and irritability with getting agitated for trivial reasons\" he is also requesting \"give me something to help my attention problems. During initial visit; most of the history is obtained from the patient and also from his son who accompanied the patient to the clinic. Patient stated that he has been having memory problems \"for many years\". He is concerned that this memory loss might affect his function at work. He has family history of dementia in his mom. He is much concerned about it also. He reports that he has been having increasing forgetfulness and trouble remembering recent conversations. He is repeating himself for \"more and more\" and increasing problems with recall. At times it takes \"10-15 minutes to recall\". He is getting extremely frustrated with this. He has been having difficulties remembering things occurred in remote past also. He also stated that he is having some problems with \"getting anger outbursts for trivial reasons\" while dealing with his colleagues and subordinates at work. He further added \"I misplace items such as watch, etc and it takes at least few minutes to find them\". He also has trouble finding his parked car stating \" Where did I parkc my car\". It takes \"some time\" to find his parked car. His son also stated that he has noticed his dad repeating some of the conversations again. Patient has trouble remembering names. Patient also stated that he had history of \"mini stroke\" 3-4 years ago with transient right-sided numbness.   He has had IV TPA at [facility] in the Sig Dispense Refill    minocycline (MINOCIN;DYNACIN) 50 MG capsule TAKE 1 CAPSULE BY MOUTH TWICE DAILY 60 capsule 0    amLODIPine (NORVASC) 10 MG tablet TAKE 1 TABLET BY MOUTH EVERY DAY 30 tablet 3    PROAIR  (90 BASE) MCG/ACT inhaler INHALE 1-2 PUFFS BY INHALATION ROUTE EVERY 4-6 HOURS FOR 72 HOURS, THEN AS NEEDED. 8.5 g 3    gabapentin (NEURONTIN) 300 MG capsule Take 300 mg by mouth 2 times daily       escitalopram (LEXAPRO) 10 MG tablet Take 1 tablet by mouth daily 30 tablet 1    sildenafil (VIAGRA) 100 MG tablet Take 1 tablet by mouth as needed for Erectile Dysfunction 6 tablet 2    metFORMIN (GLUCOPHAGE XR) 500 MG extended release tablet Take 1 tablet by mouth 2 times daily (with meals) 60 tablet 2    carvedilol (COREG) 25 MG tablet 25 mg 2 times daily   6    aspirin 81 MG tablet Take 81 mg by mouth daily. No current facility-administered medications on file prior to visit. Allergies: Charl Lefort is allergic to lisinopril and ibuprofen. Past Medical History:   Diagnosis Date    ADHD (attention deficit hyperactivity disorder)     Arthritis     knees, hands, back.  Asthma     as child.  Back pain     CAD (coronary artery disease)     Cataract     Depression     pt. denies.  ED (erectile dysfunction)     Heart attack     2011    Hypertension     Mini stroke ([facility] [address]     2014    Osteoarthritis     Seasonal allergies     Sinus problem        Past Surgical History:   Procedure Laterality Date    ABDOMEN SURGERY      CHOLECYSTECTOMY      2016    COLONOSCOPY      EYE SURGERY      HERNIA REPAIR      1995, rt. inguinal    INGUINAL HERNIA REPAIR Left 08/19/2016    UPPER GASTROINTESTINAL ENDOSCOPY      VASECTOMY       Social History: Charl Lefort  reports that he has been smoking. He has a 7.00 pack-year smoking history. He has never used smokeless tobacco. He reports that he drinks alcohol. He reports that he does not use drugs.     Family History Problem Relation Age of Onset    Cancer Father      throat    Cirrhosis Father     High Blood Pressure Sister     Asthma Sister     Arthritis Sister     High Blood Pressure Mother     Heart Disease Mother     Alzheimer's Disease Mother        On exam: Blood pressure (!) 135/96, pulse 72, height 5' 10\" (1.778 m), weight 266 lb (120.7 kg). GENERAL  Appears comfortable and in no distress   HEENT  NC/ AT   NECK & cardiovascular  Supple and no bruits heard; S1 and S2 heard; radial pulse intact   MENTAL STATUS:  Alert, oriented, intact memory, no confusion, normal speech, normal language, no hallucination or delusion   CRANIAL NERVES: II     -      VA 20/20 OU; fundi reveal intact venous pulsations; Visual fields intact to confrontation  III,IV,VI -  EOMs full, no afferent defect, no KATIA, no ptosis  V     -     Normal facial sensation  VII    -     Normal facial symmetry  VIII   -     Intact hearing  IX,X -     Symmetrical palate  XI    -     Symmetrical shoulder shrug  XII   -     Midline tongue, no atrophy    MOTOR FUNCTION:  significant for good strength of grade 5/5 in bilateral proximal and distal muscle groups of both upper and lower extremities with normal bulk, normal tone and no involuntary movements, no tremor   SENSORY FUNCTION:  Normal touch, normal pin, normal vibration, normal proprioception   CEREBELLAR FUNCTION:  Intact fine motor control over upper limbs   REFLEX FUNCTION:  Symmetric, no perverted reflex, no Babinski sign   STATION and GAIT  Normal station, normal gait        12/6/2017  Maximum score 5 Score 5 What is the year, season, date, day, month   Maximum score 5 Score 5 Where are we: State, county, town, hospital, floor? Maximum score 3 Score 3 Name 3 objects: ball, pen, car. Ask patient to repeat 3 objects. Maximum score 5 Score 5 Serial sevens from 100:93, 86, 79, 72, 65   Maximum score 3 Score 3 Recall 3 objects   Maximum score 2 Score 2 Name a pencil and watch.        Maximum score 1 Score 1 Repeat the following: No ifs ands or buts.      Maximum score 3 Score 3 Follow 3 stage command take a paper in your hand, fold it in half, and put it on the floor. Maximum score 1  Score 1 Read and obey the following: Close your eyes     Maximum score 1 Score 1 Write a sentence. Maximum score 1 Score 1 Copy a design below. Max 30   Patients score 30            Diagnostic data reviewed with the patient:   MRI Brain (8/30/17): No acute abnormality. ,  It demonstrated minimal nonspecific white matter disease. No results found for: SEDRATE  Lab Results   Component Value Date    [id number] 520 08/29/2017      No results found for: FOLATE  No results found for: ANUSHKA  Lab Results   Component Value Date    TSH 2.06 08/29/2017       No results found for: RPR   No components found for: [facility]  Lab Results   Component Value Date    LABA1C 6.1 (H) 04/26/2017     EEG (10/21/17): Unremarkable. Impression and Plan: Mr. Héctor Selby is a 65 y.o. male with   Subjective memory loss with normal MMSE;  Possible pseudodementia; normal EEG; patient refuses neuro pscych testing at this point of time  Regarding depression: on PHQ-9; Patient Health questionnaire; patient admits to having little interest or pleasure in doing things on nearly every day and trouble falling asleep on a nightly basis for the past couple of weeks. Denies snoring. Admits to feeling tired or having little energy on nearly every day. Also with poor appetite. Denies suicidal ideations. Admits to having trouble concentrating on more than half of the days. He also feels fidgety and restless. He scored 14-15. These are reviewed in detail with the patient. Patient was on Lexapro as per list of medications. But patient has never started taking it. Therefore discussion done about initiation of amitriptyline and he is agreeable to take nightly with 10 mg increments.   Medication Counseling:

## 2018-01-08 ENCOUNTER — TELEPHONE (OUTPATIENT)
Dept: NEUROLOGY | Age: 58
End: 2018-01-08

## 2018-01-08 NOTE — TELEPHONE ENCOUNTER
Mr. Yobani Bahena called in today. He apparently has been waiting for a hernia surgery to be scheduled and they called him and told him they can do it tomorrow if he can get clearance. I told him Dr. Deric Becerra is not in the office and I most likely can not get an answer and I won't be able to have anything signed by Dr Deric Becerra even if he does say he is clear.  I told him I would write up a message but I was not at all sure I would get an answer

## 2018-01-09 ENCOUNTER — HOSPITAL ENCOUNTER (OUTPATIENT)
Dept: PREADMISSION TESTING | Age: 58
Discharge: HOME | End: 2018-01-09
Payer: COMMERCIAL

## 2018-01-09 VITALS
WEIGHT: 253 LBS | BODY MASS INDEX: 35.42 KG/M2 | HEIGHT: 71 IN | TEMPERATURE: 98.4 F | RESPIRATION RATE: 16 BRPM | DIASTOLIC BLOOD PRESSURE: 94 MMHG | SYSTOLIC BLOOD PRESSURE: 117 MMHG | HEART RATE: 86 BPM | OXYGEN SATURATION: 97 %

## 2018-01-09 LAB
ABSOLUTE EOS #: 0.1 K/UL (ref 0–0.4)
ABSOLUTE IMMATURE GRANULOCYTE: ABNORMAL K/UL (ref 0–0.3)
ABSOLUTE LYMPH #: 1.4 K/UL (ref 1–4.8)
ABSOLUTE MONO #: 0.4 K/UL (ref 0.1–1.3)
ANION GAP SERPL CALCULATED.3IONS-SCNC: 14 MMOL/L (ref 9–17)
BASOPHILS # BLD: 1 % (ref 0–2)
BASOPHILS ABSOLUTE: 0.1 K/UL (ref 0–0.2)
BUN BLDV-MCNC: 13 MG/DL (ref 6–20)
BUN/CREAT BLD: ABNORMAL (ref 9–20)
CALCIUM SERPL-MCNC: 9.1 MG/DL (ref 8.6–10.4)
CHLORIDE BLD-SCNC: 104 MMOL/L (ref 98–107)
CO2: 23 MMOL/L (ref 20–31)
CREAT SERPL-MCNC: 0.76 MG/DL (ref 0.7–1.2)
DIFFERENTIAL TYPE: ABNORMAL
EKG ATRIAL RATE: 79 BPM
EKG P AXIS: 58 DEGREES
EKG P-R INTERVAL: 154 MS
EKG Q-T INTERVAL: 386 MS
EKG QRS DURATION: 88 MS
EKG QTC CALCULATION (BAZETT): 442 MS
EKG R AXIS: 31 DEGREES
EKG T AXIS: 66 DEGREES
EKG VENTRICULAR RATE: 79 BPM
EOSINOPHILS RELATIVE PERCENT: 3 % (ref 0–4)
GFR AFRICAN AMERICAN: >60 ML/MIN
GFR NON-AFRICAN AMERICAN: >60 ML/MIN
GFR SERPL CREATININE-BSD FRML MDRD: ABNORMAL ML/MIN/{1.73_M2}
GFR SERPL CREATININE-BSD FRML MDRD: ABNORMAL ML/MIN/{1.73_M2}
GLUCOSE BLD-MCNC: 141 MG/DL (ref 70–99)
HCT VFR BLD CALC: 49.1 % (ref 41–53)
HEMOGLOBIN: 16.4 G/DL (ref 13.5–17.5)
IMMATURE GRANULOCYTES: ABNORMAL %
LYMPHOCYTES # BLD: 28 % (ref 24–44)
MCH RBC QN AUTO: 30.6 PG (ref 26–34)
MCHC RBC AUTO-ENTMCNC: 33.4 G/DL (ref 31–37)
MCV RBC AUTO: 91.5 FL (ref 80–100)
MONOCYTES # BLD: 9 % (ref 1–7)
PDW BLD-RTO: 12.8 % (ref 11.5–14.9)
PLATELET # BLD: 222 K/UL (ref 150–450)
PLATELET ESTIMATE: ABNORMAL
PMV BLD AUTO: 9.1 FL (ref 6–12)
POTASSIUM SERPL-SCNC: 3.6 MMOL/L (ref 3.7–5.3)
RBC # BLD: 5.37 M/UL (ref 4.5–5.9)
RBC # BLD: ABNORMAL 10*6/UL
SEG NEUTROPHILS: 59 % (ref 36–66)
SEGMENTED NEUTROPHILS ABSOLUTE COUNT: 2.9 K/UL (ref 1.3–9.1)
SODIUM BLD-SCNC: 141 MMOL/L (ref 135–144)
WBC # BLD: 4.8 K/UL (ref 3.5–11)
WBC # BLD: ABNORMAL 10*3/UL

## 2018-01-09 PROCEDURE — 85025 COMPLETE CBC W/AUTO DIFF WBC: CPT

## 2018-01-09 PROCEDURE — 36415 COLL VENOUS BLD VENIPUNCTURE: CPT

## 2018-01-09 PROCEDURE — 93005 ELECTROCARDIOGRAM TRACING: CPT

## 2018-01-09 PROCEDURE — 80048 BASIC METABOLIC PNL TOTAL CA: CPT

## 2018-01-09 ASSESSMENT — PAIN DESCRIPTION - PAIN TYPE: TYPE: CHRONIC PAIN

## 2018-01-09 ASSESSMENT — PAIN SCALES - GENERAL: PAINLEVEL_OUTOF10: 7

## 2018-01-09 ASSESSMENT — PAIN DESCRIPTION - LOCATION: LOCATION: BACK;ABDOMEN

## 2018-01-09 ASSESSMENT — PAIN DESCRIPTION - ORIENTATION: ORIENTATION: LEFT;RIGHT

## 2018-01-09 NOTE — H&P
History Main Topics    Smoking status: Current Every Day Smoker     Packs/day: 0.25     Years: 28.00    Smokeless tobacco: Never Used    Alcohol use Yes      Comment: Social    Drug use: No    Sexual activity: Not Asked     Other Topics Concern    None     Social History Narrative    None           REVIEW OF SYSTEMS      Allergies   Allergen Reactions    Lisinopril      States he is allergic to the generic forms of medication, can take lisinopril    Ibuprofen Other (See Comments)     \"hurts my stomach. \"       Current Outpatient Prescriptions on File Prior to Encounter   Medication Sig Dispense Refill    tiZANidine (ZANAFLEX) 2 MG capsule Take 1 capsule by mouth daily      amitriptyline (ELAVIL) 10 MG tablet Wk 1- one tab nightly; Wk 2- two tab nightly; Wk 3- three tab nightly; Wk 4- four tab nightly 70 tablet 1    PROAIR  (90 BASE) MCG/ACT inhaler INHALE 1-2 PUFFS BY INHALATION ROUTE EVERY 4-6 HOURS FOR 72 HOURS, THEN AS NEEDED. 8.5 g 3    metFORMIN (GLUCOPHAGE XR) 500 MG extended release tablet Take 1 tablet by mouth 2 times daily (with meals) 60 tablet 2    carvedilol (COREG) 25 MG tablet 25 mg 2 times daily   6    aspirin 81 MG tablet Take 81 mg by mouth daily.  sildenafil (VIAGRA) 100 MG tablet Take 1 tablet by mouth as needed for Erectile Dysfunction 6 tablet 2     No current facility-administered medications on file prior to encounter. General health:  Fairly good. No fever or chills. Skin:  No itching, redness or rash. HEENT:  No headache, epistaxis or sore throat. Glasses,              Neck:  No pain, stiffness or masses. Cardiovascular/Respiratory system:  No chest pain, palpitation or shortness of breath. Gastrointestinal tract: No abdominal pain, nausea, vomiting, diarrhea or constipation. Genitourinary:  No burning on micturition.   No hesitancy, urgency, frequency or discoloration of

## 2018-01-10 ENCOUNTER — ANESTHESIA EVENT (OUTPATIENT)
Dept: OPERATING ROOM | Age: 58
End: 2018-01-10
Payer: COMMERCIAL

## 2018-01-10 ENCOUNTER — OFFICE VISIT (OUTPATIENT)
Dept: FAMILY MEDICINE CLINIC | Age: 58
End: 2018-01-10
Payer: COMMERCIAL

## 2018-01-10 VITALS
DIASTOLIC BLOOD PRESSURE: 88 MMHG | HEIGHT: 71 IN | TEMPERATURE: 98.4 F | BODY MASS INDEX: 34.54 KG/M2 | SYSTOLIC BLOOD PRESSURE: 146 MMHG | OXYGEN SATURATION: 97 % | WEIGHT: 246.7 LBS | HEART RATE: 85 BPM

## 2018-01-10 DIAGNOSIS — Z01.818 PRE-OPERATIVE CLEARANCE: ICD-10-CM

## 2018-01-10 DIAGNOSIS — K42.9 UMBILICAL HERNIA WITHOUT OBSTRUCTION AND WITHOUT GANGRENE: Primary | ICD-10-CM

## 2018-01-10 DIAGNOSIS — E11.9 TYPE 2 DIABETES MELLITUS WITHOUT COMPLICATION, WITHOUT LONG-TERM CURRENT USE OF INSULIN (HCC): ICD-10-CM

## 2018-01-10 PROCEDURE — G8417 CALC BMI ABV UP PARAM F/U: HCPCS | Performed by: NURSE PRACTITIONER

## 2018-01-10 PROCEDURE — G8484 FLU IMMUNIZE NO ADMIN: HCPCS | Performed by: NURSE PRACTITIONER

## 2018-01-10 PROCEDURE — 4004F PT TOBACCO SCREEN RCVD TLK: CPT | Performed by: NURSE PRACTITIONER

## 2018-01-10 PROCEDURE — G8427 DOCREV CUR MEDS BY ELIG CLIN: HCPCS | Performed by: NURSE PRACTITIONER

## 2018-01-10 PROCEDURE — 3017F COLORECTAL CA SCREEN DOC REV: CPT | Performed by: NURSE PRACTITIONER

## 2018-01-10 PROCEDURE — 3046F HEMOGLOBIN A1C LEVEL >9.0%: CPT | Performed by: NURSE PRACTITIONER

## 2018-01-10 PROCEDURE — 99213 OFFICE O/P EST LOW 20 MIN: CPT | Performed by: NURSE PRACTITIONER

## 2018-01-10 RX ORDER — METFORMIN HYDROCHLORIDE 500 MG/1
500 TABLET, EXTENDED RELEASE ORAL 2 TIMES DAILY WITH MEALS
Qty: 60 TABLET | Refills: 2 | Status: SHIPPED | OUTPATIENT
Start: 2018-01-10 | End: 2018-11-06 | Stop reason: ALTCHOICE

## 2018-01-10 RX ORDER — SODIUM CHLORIDE, SODIUM LACTATE, POTASSIUM CHLORIDE, CALCIUM CHLORIDE 600; 310; 30; 20 MG/100ML; MG/100ML; MG/100ML; MG/100ML
INJECTION, SOLUTION INTRAVENOUS CONTINUOUS
Status: CANCELLED | OUTPATIENT
Start: 2018-01-10

## 2018-01-10 RX ORDER — ISOTRETINOIN 40 MG/1
CAPSULE ORAL
Refills: 0 | COMMUNITY
Start: 2017-12-11 | End: 2018-02-26

## 2018-01-10 RX ORDER — LIDOCAINE HYDROCHLORIDE 10 MG/ML
1 INJECTION, SOLUTION EPIDURAL; INFILTRATION; INTRACAUDAL; PERINEURAL
Status: CANCELLED | OUTPATIENT
Start: 2018-01-10 | End: 2018-01-10

## 2018-01-10 RX ORDER — AMITRIPTYLINE HYDROCHLORIDE 10 MG/1
10 TABLET, FILM COATED ORAL NIGHTLY
COMMUNITY
Start: 2017-12-06 | End: 2018-02-03 | Stop reason: SDUPTHER

## 2018-01-10 RX ORDER — SODIUM CHLORIDE 0.9 % (FLUSH) 0.9 %
10 SYRINGE (ML) INJECTION PRN
Status: CANCELLED | OUTPATIENT
Start: 2018-01-10

## 2018-01-10 RX ORDER — SODIUM CHLORIDE 0.9 % (FLUSH) 0.9 %
10 SYRINGE (ML) INJECTION EVERY 12 HOURS SCHEDULED
Status: CANCELLED | OUTPATIENT
Start: 2018-01-10

## 2018-01-10 NOTE — PROGRESS NOTES
Subjective:    Chief Complaint:     Ankit Henley is a 65 y.o. male who presents for a preoperative physical examination. He is scheduled to have hernia repair on the left and scoping the right inguinal area done by Dr. Robert Cornejo at [address] [place] [address] on 1/16/18. History of Present Illness:      Previously had bilateral inguinal hernias with repair. 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 He states he is scheduled to have the left side repaired and there is an issue with the right side he states will be addressed in the OR. Any recent illnesses that will interfere with the upcoming surgery? No  Last surgical procedure- left hernia repair 2016. Type of anesthesia used general.    Any problems with the procedure or anesthesia? No    Conditioning (equivalent to 4 METs) --  1. Can you walk up 2 flights of stairs? Yes  2. Can you run a 'short distance'? Yes  3. Can you walk up a hill 1-2 blocks? yes  4. Can you denis 2 bags of groceries up 1 flight of stairs? Yes  5. Can you walk 2-4 blocks (flat)? Yes        Past Medical History:   Diagnosis Date    ADHD (attention deficit hyperactivity disorder)     Arthritis     knees, hands, back.  Asthma     as child.  Back pain     CAD (coronary artery disease)     Cataract     Depression     pt. denies.     Diabetes mellitus ([address]     Heart attack     2011    Hypertension     Kidney stones     Mini stroke ([facility] [address]     2014    Osteoarthritis     Seasonal allergies     Sinus problem            Past Surgical History:   Procedure Laterality Date    CHOLECYSTECTOMY      2016    COLONOSCOPY      HERNIA REPAIR      1995, rt. inguinal    INGUINAL HERNIA REPAIR Left 08/19/2016    UPPER GASTROINTESTINAL ENDOSCOPY      VASECTOMY                                                     Current Outpatient Prescriptions   Medication Sig Dispense Refill    AMNESTEEM 40 MG chemo capsule TK 2 CS PO QD  0    amitriptyline (ELAVIL) 10 MG tablet Take 10 mg by mouth nightly       tapentadol (NUCYNTA) 75 MG TABS Take 75 mg by mouth three times daily.  tiZANidine (ZANAFLEX) 2 MG capsule Take 1 capsule by mouth daily      PROAIR  (90 BASE) MCG/ACT inhaler INHALE 1-2 PUFFS BY INHALATION ROUTE EVERY 4-6 HOURS FOR 72 HOURS, THEN AS NEEDED. 8.5 g 3    carvedilol (COREG) 25 MG tablet 25 mg 2 times daily   6    sildenafil (VIAGRA) 100 MG tablet Take 1 tablet by mouth as needed for Erectile Dysfunction 6 tablet 2    metFORMIN (GLUCOPHAGE XR) 500 MG extended release tablet Take 1 tablet by mouth 2 times daily (with meals) 60 tablet 2    aspirin 81 MG tablet Take 81 mg by mouth daily. No current facility-administered medications for this visit. Allergies   Allergen Reactions    Lisinopril      States he is allergic to the generic forms of medication, can take lisinopril    Ibuprofen Other (See Comments)     \"hurts my stomach. \"       Social History   Substance Use Topics    Smoking status: Current Every Day Smoker     Packs/day: 0.25     Years: 28.00    Smokeless tobacco: Never Used    Alcohol use Yes      Comment: Social        Family History   Problem Relation Age of Onset    Cancer Father      throat    Cirrhosis Father     High Blood Pressure Sister     Asthma Sister     Arthritis Sister     High Blood Pressure Mother     Heart Disease Mother     Alzheimer's Disease Mother         Review of Systems:  · General: no significant weight changes. · Respiratory: no cough, pleuritic chest pain, dyspnea, or wheezing  · Cardiovascular: no pain, REESE, orthopnea, palpitations, or claudication  · Gastrointestinal: no chronic nausea, vomiting, heartburn, diarrhea, constipation, bloating, or abdominal pain. No bloody or black stools. · Genitourinary: no urinary urgency, frequency, dysuria, nocturia, hesitancy, incontinence, or pain. No hematuria. · Hematologic/Lymphatic/Immunologic: no abnormal bleeding/bruising;  No recurrent fever, chills, night sweats or swollen glands. Objective:   Physical Exam   Vitals: Blood pressure (!) 141/94, pulse 85, temperature 98.4 °F (36.9 °C), temperature source Oral, height 5' 11\" (1.803 m), weight 246 lb 11.2 oz (111.9 kg), SpO2 97 %. Constitutional: He is oriented to person, place, and time. He appears well-developed and well-nourished and in no acute distress. Answers all my questions appropriately. Head: Normocephalic and atraumatic. Eyes:   Conjunctiva appear normal.  Right Ear: External ear normal. TM is clear  Left Ear: External ear normal. TM view is obstructed by cerumen. Nose: pink, non-edematous mucosa. No polyps. No septal deviation  Throat: no erythema, tonsillar hypertrophy or exudate. No ulcerations noted. Lips/Teeth/Gums all appear normal.  Neck: Normal range of motion. Neck supple. No tracheal deviation present. No abnormal lymphadenopathy. No JVD noted. Carotids are clear bilaterally. No thyroid masses noted. Heart: RRR without murmur. No S3, S4, or gallop noted. Chest: Clear to auscultation bilaterally. Good breath sounds noted. No rales, wheezes, or rhonchi noted. No respiratory retractions noted. Wall has symmetrical movement with respirations. Abdomen: No distension noted.  + bowel sounds in all quadrants which are normoactive. Musculoskeletal: Normal gait and station  Extremities: no clubbing, cyanosis, erythema, muscle atrophy, deformity or edema noted. Skin: Skin is warm and dry. No obvious lesion on exposed skin. Psychiatric: mood appears euthymic, affect appears normal.         Assessment and Plan:   1. Umbilical hernia without obstruction and without gangrene    2. Pre-operative clearance    3. Type 2 diabetes mellitus without complication, without long-term current use of insulin (HCC)  - metFORMIN (GLUCOPHAGE XR) 500 MG extended release tablet; Take 1 tablet by mouth 2 times daily (with meals)  Dispense: 60 tablet;  Refill: 2

## 2018-01-10 NOTE — PROGRESS NOTES
Visit Information    Have you changed or started any medications since your last visit including any over-the-counter medicines, vitamins, or herbal medicines? yes - Elavil, Nucenta   Have you stopped taking any of your medications? Is so, why? -  no  Are you having any side effects from any of your medications? - no    Have you seen any other physician or provider since your last visit? yes - [facility] testing   Have you had any other diagnostic tests since your last visit? yes - EKG and labs   Have you been seen in the emergency room and/or had an admission in a hospital since we last saw you?  no   Have you had your routine dental cleaning in the past 6 months?  no     Do you have an active MyChart account? If no, what is the barrier?   No: pt declined    Patient Care Team:  Saritha Franco MD as PCP - General    Medical History Review  Past Medical, Family, and Social History reviewed and does contribute to the patient presenting condition    Health Maintenance   Topic Date Due    Lipid screen  09/14/1971    Colon cancer screen colonoscopy  09/14/2011    Flu vaccine (1) 09/01/2017    Diabetic foot exam  11/17/2017    Diabetic retinal exam  11/18/2017    A1C test (Diabetic or Prediabetic)  04/26/2018    Diabetic microalbuminuria test  04/26/2018    DTaP/Tdap/Td vaccine (2 - Td) 08/02/2026    Pneumococcal med risk  Completed    Hepatitis C screen  Completed    HIV screen  Completed

## 2018-01-10 NOTE — PATIENT INSTRUCTIONS
Patient Education        Hernia: Care Instructions  Your Care Instructions    A hernia develops when tissue bulges through a weak spot in the wall of your belly. The groin area and the navel are common areas for a hernia. A hernia can also develop near the area of a surgery you had before. Pressure from lifting, straining, or coughing can tear the weak area, causing the hernia to bulge and be painful. If you cannot push a hernia back into place, the tissue may become trapped outside the belly wall. If the hernia gets twisted and loses its blood supply, it will swell and die. This is called a strangulated hernia. It usually causes a lot of pain. It needs treatment right away. Some hernias need to be repaired to prevent a strangulated hernia. If your hernia causes symptoms or is large, you may need surgery. Follow-up care is a key part of your treatment and safety. Be sure to make and go to all appointments, and call your doctor if you are having problems. It's also a good idea to know your test results and keep a list of the medicines you take. How can you care for yourself at home? · Take care when lifting heavy objects. · Stay at a healthy weight. · Do not smoke. Smoking can cause coughing, which can cause your hernia to bulge. If you need help quitting, talk to your doctor about stop-smoking programs and medicines. These can increase your chances of quitting for good. · Talk with your doctor before wearing a corset or truss for a hernia. These devices are not recommended for treating hernias and sometimes can do more harm than good. There may be certain situations when your doctor thinks a truss would work, but these are rare. When should you call for help? Call your doctor now or seek immediate medical care if:  ? · You have new or worse belly pain. ? · You are vomiting. ? · You cannot pass stools or gas. ? · You cannot push the hernia back into place with gentle pressure when you are lying down.

## 2018-01-16 ENCOUNTER — HOSPITAL ENCOUNTER (OUTPATIENT)
Age: 58
Setting detail: OUTPATIENT SURGERY
Discharge: HOME | End: 2018-01-16
Attending: SURGERY | Admitting: SURGERY
Payer: COMMERCIAL

## 2018-01-16 ENCOUNTER — ANESTHESIA (OUTPATIENT)
Dept: OPERATING ROOM | Age: 58
End: 2018-01-16
Payer: COMMERCIAL

## 2018-01-16 VITALS
HEART RATE: 65 BPM | SYSTOLIC BLOOD PRESSURE: 152 MMHG | TEMPERATURE: 98.2 F | DIASTOLIC BLOOD PRESSURE: 98 MMHG | BODY MASS INDEX: 34.02 KG/M2 | WEIGHT: 243 LBS | HEIGHT: 71 IN | RESPIRATION RATE: 16 BRPM | OXYGEN SATURATION: 95 %

## 2018-01-16 VITALS — TEMPERATURE: 96.1 F | DIASTOLIC BLOOD PRESSURE: 114 MMHG | SYSTOLIC BLOOD PRESSURE: 193 MMHG | OXYGEN SATURATION: 95 %

## 2018-01-16 DIAGNOSIS — R10.31 GROIN PAIN, CHRONIC, RIGHT: Primary | ICD-10-CM

## 2018-01-16 DIAGNOSIS — G89.29 GROIN PAIN, CHRONIC, RIGHT: Primary | ICD-10-CM

## 2018-01-16 LAB — GLUCOSE BLD-MCNC: 95 MG/DL (ref 75–110)

## 2018-01-16 PROCEDURE — 3700000000 HC ANESTHESIA ATTENDED CARE: Performed by: SURGERY

## 2018-01-16 PROCEDURE — 2580000003 HC RX 258: Performed by: NURSE ANESTHETIST, CERTIFIED REGISTERED

## 2018-01-16 PROCEDURE — 3600000002 HC SURGERY LEVEL 2 BASE: Performed by: SURGERY

## 2018-01-16 PROCEDURE — 7100000000 HC PACU RECOVERY - FIRST 15 MIN: Performed by: SURGERY

## 2018-01-16 PROCEDURE — 7100000001 HC PACU RECOVERY - ADDTL 15 MIN: Performed by: SURGERY

## 2018-01-16 PROCEDURE — 2580000003 HC RX 258: Performed by: ANESTHESIOLOGY

## 2018-01-16 PROCEDURE — 2500000003 HC RX 250 WO HCPCS: Performed by: SURGERY

## 2018-01-16 PROCEDURE — 2580000003 HC RX 258: Performed by: SURGERY

## 2018-01-16 PROCEDURE — 3700000001 HC ADD 15 MINUTES (ANESTHESIA): Performed by: SURGERY

## 2018-01-16 PROCEDURE — 6360000002 HC RX W HCPCS: Performed by: NURSE ANESTHETIST, CERTIFIED REGISTERED

## 2018-01-16 PROCEDURE — 7100000030 HC ASPR PHASE II RECOVERY - FIRST 15 MIN: Performed by: SURGERY

## 2018-01-16 PROCEDURE — 6360000002 HC RX W HCPCS: Performed by: SURGERY

## 2018-01-16 PROCEDURE — 6360000002 HC RX W HCPCS: Performed by: ANESTHESIOLOGY

## 2018-01-16 PROCEDURE — 3600000012 HC SURGERY LEVEL 2 ADDTL 15MIN: Performed by: SURGERY

## 2018-01-16 PROCEDURE — 7100000031 HC ASPR PHASE II RECOVERY - ADDTL 15 MIN: Performed by: SURGERY

## 2018-01-16 PROCEDURE — 6370000000 HC RX 637 (ALT 250 FOR IP): Performed by: ANESTHESIOLOGY

## 2018-01-16 PROCEDURE — 82947 ASSAY GLUCOSE BLOOD QUANT: CPT

## 2018-01-16 PROCEDURE — 2500000003 HC RX 250 WO HCPCS: Performed by: NURSE ANESTHETIST, CERTIFIED REGISTERED

## 2018-01-16 RX ORDER — DEXAMETHASONE SODIUM PHOSPHATE 4 MG/ML
INJECTION, SOLUTION INTRA-ARTICULAR; INTRALESIONAL; INTRAMUSCULAR; INTRAVENOUS; SOFT TISSUE PRN
Status: DISCONTINUED | OUTPATIENT
Start: 2018-01-16 | End: 2018-01-16 | Stop reason: SDUPTHER

## 2018-01-16 RX ORDER — PROPOFOL 10 MG/ML
INJECTION, EMULSION INTRAVENOUS PRN
Status: DISCONTINUED | OUTPATIENT
Start: 2018-01-16 | End: 2018-01-16 | Stop reason: SDUPTHER

## 2018-01-16 RX ORDER — DIPHENHYDRAMINE HYDROCHLORIDE 50 MG/ML
12.5 INJECTION INTRAMUSCULAR; INTRAVENOUS
Status: DISCONTINUED | OUTPATIENT
Start: 2018-01-16 | End: 2018-01-16 | Stop reason: HOSPADM

## 2018-01-16 RX ORDER — MORPHINE SULFATE 8 MG/ML
2 INJECTION, SOLUTION INTRAMUSCULAR; INTRAVENOUS EVERY 5 MIN PRN
Status: DISCONTINUED | OUTPATIENT
Start: 2018-01-16 | End: 2018-01-16 | Stop reason: HOSPADM

## 2018-01-16 RX ORDER — NEOSTIGMINE METHYLSULFATE 1 MG/ML
INJECTION, SOLUTION INTRAVENOUS PRN
Status: DISCONTINUED | OUTPATIENT
Start: 2018-01-16 | End: 2018-01-16 | Stop reason: SDUPTHER

## 2018-01-16 RX ORDER — CEPHALEXIN 500 MG/1
CAPSULE ORAL
Qty: 21 CAPSULE | Refills: 0 | Status: SHIPPED | OUTPATIENT
Start: 2018-01-16 | End: 2018-02-26

## 2018-01-16 RX ORDER — PROMETHAZINE HYDROCHLORIDE 25 MG/ML
6.25 INJECTION, SOLUTION INTRAMUSCULAR; INTRAVENOUS
Status: DISCONTINUED | OUTPATIENT
Start: 2018-01-16 | End: 2018-01-16 | Stop reason: HOSPADM

## 2018-01-16 RX ORDER — FENTANYL CITRATE 50 UG/ML
25 INJECTION, SOLUTION INTRAMUSCULAR; INTRAVENOUS EVERY 5 MIN PRN
Status: DISCONTINUED | OUTPATIENT
Start: 2018-01-16 | End: 2018-01-16 | Stop reason: HOSPADM

## 2018-01-16 RX ORDER — GLYCOPYRROLATE 0.2 MG/ML
INJECTION INTRAMUSCULAR; INTRAVENOUS PRN
Status: DISCONTINUED | OUTPATIENT
Start: 2018-01-16 | End: 2018-01-16 | Stop reason: SDUPTHER

## 2018-01-16 RX ORDER — SODIUM CHLORIDE, SODIUM LACTATE, POTASSIUM CHLORIDE, CALCIUM CHLORIDE 600; 310; 30; 20 MG/100ML; MG/100ML; MG/100ML; MG/100ML
INJECTION, SOLUTION INTRAVENOUS CONTINUOUS
Status: DISCONTINUED | OUTPATIENT
Start: 2018-01-16 | End: 2018-01-16 | Stop reason: HOSPADM

## 2018-01-16 RX ORDER — OXYCODONE HYDROCHLORIDE AND ACETAMINOPHEN 5; 325 MG/1; MG/1
1 TABLET ORAL EVERY 6 HOURS PRN
Qty: 28 TABLET | Refills: 0 | Status: SHIPPED | OUTPATIENT
Start: 2018-01-16 | End: 2018-01-23

## 2018-01-16 RX ORDER — OXYCODONE HYDROCHLORIDE AND ACETAMINOPHEN 5; 325 MG/1; MG/1
2 TABLET ORAL ONCE
Status: COMPLETED | OUTPATIENT
Start: 2018-01-16 | End: 2018-01-16

## 2018-01-16 RX ORDER — HYDROCODONE BITARTRATE AND ACETAMINOPHEN 5; 325 MG/1; MG/1
1 TABLET ORAL
Status: DISCONTINUED | OUTPATIENT
Start: 2018-01-16 | End: 2018-01-16 | Stop reason: HOSPADM

## 2018-01-16 RX ORDER — HYDRALAZINE HYDROCHLORIDE 20 MG/ML
5 INJECTION INTRAMUSCULAR; INTRAVENOUS EVERY 10 MIN PRN
Status: DISCONTINUED | OUTPATIENT
Start: 2018-01-16 | End: 2018-01-16 | Stop reason: HOSPADM

## 2018-01-16 RX ORDER — ONDANSETRON 4 MG/1
TABLET, FILM COATED ORAL
Qty: 20 TABLET | Refills: 0 | Status: SHIPPED | OUTPATIENT
Start: 2018-01-16 | End: 2018-02-26

## 2018-01-16 RX ORDER — LIDOCAINE HYDROCHLORIDE 10 MG/ML
1 INJECTION, SOLUTION EPIDURAL; INFILTRATION; INTRACAUDAL; PERINEURAL
Status: DISCONTINUED | OUTPATIENT
Start: 2018-01-16 | End: 2018-01-16 | Stop reason: HOSPADM

## 2018-01-16 RX ORDER — LIDOCAINE HYDROCHLORIDE 10 MG/ML
INJECTION, SOLUTION EPIDURAL; INFILTRATION; INTRACAUDAL; PERINEURAL PRN
Status: DISCONTINUED | OUTPATIENT
Start: 2018-01-16 | End: 2018-01-16 | Stop reason: SDUPTHER

## 2018-01-16 RX ORDER — SODIUM CHLORIDE 0.9 % (FLUSH) 0.9 %
10 SYRINGE (ML) INJECTION EVERY 12 HOURS SCHEDULED
Status: DISCONTINUED | OUTPATIENT
Start: 2018-01-16 | End: 2018-01-16 | Stop reason: HOSPADM

## 2018-01-16 RX ORDER — MEPERIDINE HYDROCHLORIDE 50 MG/ML
12.5 INJECTION INTRAMUSCULAR; INTRAVENOUS; SUBCUTANEOUS EVERY 5 MIN PRN
Status: DISCONTINUED | OUTPATIENT
Start: 2018-01-16 | End: 2018-01-16 | Stop reason: HOSPADM

## 2018-01-16 RX ORDER — SODIUM CHLORIDE 0.9 % (FLUSH) 0.9 %
10 SYRINGE (ML) INJECTION PRN
Status: DISCONTINUED | OUTPATIENT
Start: 2018-01-16 | End: 2018-01-16 | Stop reason: HOSPADM

## 2018-01-16 RX ORDER — MIDAZOLAM HYDROCHLORIDE 1 MG/ML
INJECTION INTRAMUSCULAR; INTRAVENOUS PRN
Status: DISCONTINUED | OUTPATIENT
Start: 2018-01-16 | End: 2018-01-16 | Stop reason: SDUPTHER

## 2018-01-16 RX ORDER — SODIUM CHLORIDE, SODIUM LACTATE, POTASSIUM CHLORIDE, CALCIUM CHLORIDE 600; 310; 30; 20 MG/100ML; MG/100ML; MG/100ML; MG/100ML
INJECTION, SOLUTION INTRAVENOUS CONTINUOUS PRN
Status: DISCONTINUED | OUTPATIENT
Start: 2018-01-16 | End: 2018-01-16 | Stop reason: SDUPTHER

## 2018-01-16 RX ORDER — ROCURONIUM BROMIDE 10 MG/ML
INJECTION, SOLUTION INTRAVENOUS PRN
Status: DISCONTINUED | OUTPATIENT
Start: 2018-01-16 | End: 2018-01-16 | Stop reason: SDUPTHER

## 2018-01-16 RX ORDER — 0.9 % SODIUM CHLORIDE 0.9 %
500 INTRAVENOUS SOLUTION INTRAVENOUS
Status: DISCONTINUED | OUTPATIENT
Start: 2018-01-16 | End: 2018-01-16 | Stop reason: HOSPADM

## 2018-01-16 RX ORDER — BUPIVACAINE HYDROCHLORIDE 2.5 MG/ML
INJECTION, SOLUTION EPIDURAL; INFILTRATION; INTRACAUDAL PRN
Status: DISCONTINUED | OUTPATIENT
Start: 2018-01-16 | End: 2018-01-16 | Stop reason: HOSPADM

## 2018-01-16 RX ORDER — FENTANYL CITRATE 50 UG/ML
INJECTION, SOLUTION INTRAMUSCULAR; INTRAVENOUS PRN
Status: DISCONTINUED | OUTPATIENT
Start: 2018-01-16 | End: 2018-01-16 | Stop reason: SDUPTHER

## 2018-01-16 RX ORDER — ONDANSETRON 2 MG/ML
INJECTION INTRAMUSCULAR; INTRAVENOUS PRN
Status: DISCONTINUED | OUTPATIENT
Start: 2018-01-16 | End: 2018-01-16 | Stop reason: SDUPTHER

## 2018-01-16 RX ADMIN — SODIUM CHLORIDE, POTASSIUM CHLORIDE, SODIUM LACTATE AND CALCIUM CHLORIDE: 600; 310; 30; 20 INJECTION, SOLUTION INTRAVENOUS at 12:34

## 2018-01-16 RX ADMIN — FENTANYL CITRATE 50 MCG: 50 INJECTION, SOLUTION INTRAMUSCULAR; INTRAVENOUS at 13:05

## 2018-01-16 RX ADMIN — MORPHINE SULFATE 2 MG: 8 INJECTION, SOLUTION INTRAMUSCULAR; INTRAVENOUS at 14:30

## 2018-01-16 RX ADMIN — ROCURONIUM BROMIDE 10 MG: 10 INJECTION INTRAVENOUS at 13:05

## 2018-01-16 RX ADMIN — SODIUM CHLORIDE, POTASSIUM CHLORIDE, SODIUM LACTATE AND CALCIUM CHLORIDE: 600; 310; 30; 20 INJECTION, SOLUTION INTRAVENOUS at 12:51

## 2018-01-16 RX ADMIN — DEXAMETHASONE SODIUM PHOSPHATE 4 MG: 4 INJECTION, SOLUTION INTRAMUSCULAR; INTRAVENOUS at 13:40

## 2018-01-16 RX ADMIN — MIDAZOLAM 2 MG: 1 INJECTION INTRAMUSCULAR; INTRAVENOUS at 12:51

## 2018-01-16 RX ADMIN — LIDOCAINE HYDROCHLORIDE 40 MG: 10 INJECTION, SOLUTION EPIDURAL; INFILTRATION; INTRACAUDAL; PERINEURAL at 12:56

## 2018-01-16 RX ADMIN — CEFAZOLIN 2 G: 1 INJECTION, POWDER, FOR SOLUTION INTRAMUSCULAR; INTRAVENOUS at 12:51

## 2018-01-16 RX ADMIN — NEOSTIGMINE METHYLSULFATE 5 MG: 1 INJECTION, SOLUTION INTRAVENOUS at 13:47

## 2018-01-16 RX ADMIN — ONDANSETRON 4 MG: 2 INJECTION INTRAMUSCULAR; INTRAVENOUS at 13:40

## 2018-01-16 RX ADMIN — MORPHINE SULFATE 2 MG: 8 INJECTION, SOLUTION INTRAMUSCULAR; INTRAVENOUS at 14:20

## 2018-01-16 RX ADMIN — GLYCOPYRROLATE 0.6 MG: 0.2 INJECTION, SOLUTION INTRAMUSCULAR; INTRAVENOUS at 13:47

## 2018-01-16 RX ADMIN — MORPHINE SULFATE 2 MG: 8 INJECTION, SOLUTION INTRAMUSCULAR; INTRAVENOUS at 14:40

## 2018-01-16 RX ADMIN — FENTANYL CITRATE 50 MCG: 50 INJECTION, SOLUTION INTRAMUSCULAR; INTRAVENOUS at 13:56

## 2018-01-16 RX ADMIN — OXYCODONE HYDROCHLORIDE AND ACETAMINOPHEN 2 TABLET: 5; 325 TABLET ORAL at 15:37

## 2018-01-16 RX ADMIN — ROCURONIUM BROMIDE 50 MG: 10 INJECTION INTRAVENOUS at 12:56

## 2018-01-16 RX ADMIN — FENTANYL CITRATE 100 MCG: 50 INJECTION, SOLUTION INTRAMUSCULAR; INTRAVENOUS at 12:56

## 2018-01-16 RX ADMIN — SODIUM CHLORIDE, POTASSIUM CHLORIDE, SODIUM LACTATE AND CALCIUM CHLORIDE: 600; 310; 30; 20 INJECTION, SOLUTION INTRAVENOUS at 13:25

## 2018-01-16 RX ADMIN — PROPOFOL 150 MG: 10 INJECTION, EMULSION INTRAVENOUS at 12:56

## 2018-01-16 ASSESSMENT — PAIN DESCRIPTION - FREQUENCY
FREQUENCY: CONTINUOUS

## 2018-01-16 ASSESSMENT — PAIN SCALES - GENERAL
PAINLEVEL_OUTOF10: 8
PAINLEVEL_OUTOF10: 5
PAINLEVEL_OUTOF10: 8
PAINLEVEL_OUTOF10: 7
PAINLEVEL_OUTOF10: 6
PAINLEVEL_OUTOF10: 7
PAINLEVEL_OUTOF10: 7

## 2018-01-16 ASSESSMENT — PULMONARY FUNCTION TESTS
PIF_VALUE: 18
PIF_VALUE: 17
PIF_VALUE: 20
PIF_VALUE: 13
PIF_VALUE: 20
PIF_VALUE: 17
PIF_VALUE: 16
PIF_VALUE: 16
PIF_VALUE: 34
PIF_VALUE: 2
PIF_VALUE: 17
PIF_VALUE: 16
PIF_VALUE: 18
PIF_VALUE: 31
PIF_VALUE: 20
PIF_VALUE: 27
PIF_VALUE: 1
PIF_VALUE: 15
PIF_VALUE: 16
PIF_VALUE: 1
PIF_VALUE: 16
PIF_VALUE: 21
PIF_VALUE: 1
PIF_VALUE: 18
PIF_VALUE: 18
PIF_VALUE: 17
PIF_VALUE: 22
PIF_VALUE: 18
PIF_VALUE: 17
PIF_VALUE: 17
PIF_VALUE: 21
PIF_VALUE: 17
PIF_VALUE: 18
PIF_VALUE: 16
PIF_VALUE: 17
PIF_VALUE: 3
PIF_VALUE: 16
PIF_VALUE: 16
PIF_VALUE: 18
PIF_VALUE: 16
PIF_VALUE: 17
PIF_VALUE: 21
PIF_VALUE: 28
PIF_VALUE: 17
PIF_VALUE: 32
PIF_VALUE: 1
PIF_VALUE: 16
PIF_VALUE: 1
PIF_VALUE: 18
PIF_VALUE: 17
PIF_VALUE: 19
PIF_VALUE: 17
PIF_VALUE: 30
PIF_VALUE: 18
PIF_VALUE: 18
PIF_VALUE: 16
PIF_VALUE: 16
PIF_VALUE: 1
PIF_VALUE: 27
PIF_VALUE: 19
PIF_VALUE: 19
PIF_VALUE: 31
PIF_VALUE: 20

## 2018-01-16 ASSESSMENT — PAIN DESCRIPTION - PAIN TYPE
TYPE: SURGICAL PAIN

## 2018-01-16 ASSESSMENT — PAIN DESCRIPTION - PROGRESSION
CLINICAL_PROGRESSION: GRADUALLY IMPROVING
CLINICAL_PROGRESSION: NOT CHANGED
CLINICAL_PROGRESSION: NOT CHANGED

## 2018-01-16 ASSESSMENT — PAIN DESCRIPTION - ONSET
ONSET: AWAKENED FROM SLEEP

## 2018-01-16 ASSESSMENT — PAIN DESCRIPTION - LOCATION
LOCATION: ABDOMEN;UMBILICUS

## 2018-01-16 ASSESSMENT — PAIN DESCRIPTION - DESCRIPTORS
DESCRIPTORS: ACHING;CONSTANT;JABBING
DESCRIPTORS: JABBING
DESCRIPTORS: ACHING;CONSTANT
DESCRIPTORS: ACHING;CONSTANT;JABBING

## 2018-01-16 ASSESSMENT — PAIN - FUNCTIONAL ASSESSMENT: PAIN_FUNCTIONAL_ASSESSMENT: 0-10

## 2018-01-16 NOTE — H&P
Intravenous Q5 Min PRN Hernan Cota MD        fentaNYL (SUBLIMAZE) injection 25 mcg  25 mcg Intravenous Q5 Min PRCRISTOBAL Cota MD        morphine injection 2 mg  2 mg Intravenous Q5 Min PRCRISTOBAL Cota MD        HYDROcodone-acetaminophen (NORCO) 5-325 MG per tablet 1 tablet  1 tablet Oral Once PRN Hernan Cota MD        diphenhydrAMINE (BENADRYL) injection 12.5 mg  12.5 mg Intravenous Once PRN Hernan Cota MD        0.9 % sodium chloride bolus  500 mL Intravenous Once PRN Hernan Cota MD        promethazine (PHENERGAN) injection 6.25 mg  6.25 mg Intramuscular Once PRN Hernan Cota MD        hydrALAZINE (APRESOLINE) injection 5 mg  5 mg Intravenous Q10 Min PRCRISTOBAL Cota MD        meperidine (DEMEROL) injection 12.5 mg  12.5 mg Intravenous Q5 Min PRN Hernan Cota MD           Allergies: Allergies   Allergen Reactions    Lisinopril      States he is allergic to the generic forms of medication, can take lisinopril    Ibuprofen Other (See Comments)     \"hurts my stomach. \"               Problems with Sedation/Anesthesia in the past? no    REVIEW OF SYSTEMS:  12 point review of systems negative other than mentioned above.       PHYSICAL EXAM:    Vitals:  BP (!) 141/99   Pulse 81   Temp 97.2 °F (36.2 °C) (Oral)   Resp 18   Ht 5' 11\" (1.803 m)   Wt 243 lb (110.2 kg)   SpO2 98%   BMI 33.89 kg/m²     Focused Exam related to procedure:    General appearance: NAD, conversant   Eyes: anicteric sclerae, moist conjunctivae; no lid-lag; PERRLA   Lungs: CTA, with normal respiratory effort and no intercostal retractions   CV: RRR, no MRGs   Abdomen: Soft, non-tender; no masses or HSM No definite clinical evidence of hernia   Skin: Normal temperature, turgor and texture; no rash, ulcers or subcutaneous nodules     DATA:  CBC:   Lab Results   Component Value Date    WBC 4.8 01/09/2018    HGB 16.4 01/09/2018    HCT 49.1 01/09/2018    MCV 91.5 01/09/2018     01/09/2018     BUN/Cr:   Lab
INHALE 1-2 PUFFS BY INHALATION ROUTE EVERY 4-6 HOURS FOR 72 HOURS, THEN AS NEEDED. 8.5 g 3    metFORMIN (GLUCOPHAGE XR) 500 MG extended release tablet Take 1 tablet by mouth 2 times daily (with meals) 60 tablet 2    carvedilol (COREG) 25 MG tablet 25 mg 2 times daily    6    aspirin 81 MG tablet Take 81 mg by mouth daily.        sildenafil (VIAGRA) 100 MG tablet Take 1 tablet by mouth as needed for Erectile Dysfunction 6 tablet 2      No current facility-administered medications on file prior to encounter.          General health:  Fairly good. No fever or chills. Skin:  No itching, redness or rash. HEENT:  No headache, epistaxis or sore throat. Glasses,              Neck:  No pain, stiffness or masses. Cardiovascular/Respiratory system:  No chest pain, palpitation or shortness of breath. Gastrointestinal tract: No abdominal pain, nausea, vomiting, diarrhea or constipation. Genitourinary:  No burning on micturition. No hesitancy, urgency, frequency or discoloration of urine. Locomotor:  No bone or joint pains. No swelling. Joint pain R/T weather change. Torn right bicep                Neuropsychiatric:  No referable complaints. TIA 2014, no residual           See HPI.     GENERAL PHYSICAL EXAM:      Vitals: BP (!) 117/94   Pulse 86   Temp 98.4 °F (36.9 °C) (Oral)   Resp 16   Ht 5' 11\" (1.803 m)   Wt 253 lb (114.8 kg)   SpO2 97%   BMI 35.29 kg/m²  Body mass index is 35.29 kg/m².         GENERAL APPEARANCE:   Raya Calix is 65 y.o.,  male, mildly obese, nourished, conscious, alert. Does not appear to be distress or pain at this time. SKIN:  Warm, dry, no cyanosis or jaundice. HEAD:  Normocephalic, atraumatic, no swelling or tenderness. EYES:  Pupils equal, reactive to light.            EARS:  No discharge, no marked hearing

## 2018-01-16 NOTE — OP NOTE
Preoperative diagnosis: Persistent right groin pain previous history of open right inguinal hernia repair with mesh/no clinical evidence of right inguinal hernia  Postoperative diagnosis: No definite evidence of a right recurrent inguinal hernia  Procedure: Diagnostic laparoscopy  Surgeon: Dr. Pawel Rust  Asst.: None  Anesthesia: Gen. Preparation: ChloraPrep  EBL: Less than 5 ML  Specimen: None  Procedure: 81-year-old male with persistent right groin pain had extensive workup including clinical examination ultrasound and a CT scan. No definite evidence of recurrence seen. Patient was scheduled for a diagnostic laparoscopy possible robotic laparoscopic right recurrent inguinal hernia repair with mesh if a hernia was found. Informed consent was obtained. No guarantees were given in terms of surgery with the outcome. Patient wanted to proceed. Preoperative antibiotics were given. Informed consent was obtained. He was taken to the operating room. Gen. anesthesia was given. Bond catheter was placed. Abdomen was prepped and draped usual sterile fashion. Timeout was done. Subumbilical incision was made. Irina Marydel port was introduced using the open technique without any difficulty. Patient was placed in the Trendelenburg position. Scope was introduced. I did not see any obvious evidence of recurrence in the right groin. Patient's right inguinal hernia repair was done in the past by another surgeon. I have been patient's left inguinal hernia repair. I did not see any obvious recurrence on either side. At this point scope was removed. Fascia and the skin was approximated in usual fashion. Local anesthetic was infiltrated. Clean dressing was applied. Patient tolerated procedure well and was transferred to the recovery room in a stable condition.     Recommendations: Findings are discussed with the patient's family at length. Postoperative course recovery restrictions follow-up that all discussed.

## 2018-01-16 NOTE — ANESTHESIA PRE PROCEDURE
Department of Anesthesiology  Preprocedure Note       Name:  Ines Boss   Age:  65 y.o.  :  1960                                          MRN:  [id number]         Date:  2018      Surgeon: Pérez Schwartz):  Shanon Beasley MD    Procedure: Procedure(s):  DIAGNOSTIC LAPAROSCOPY POSSIBLE ROBOTIC  LAPAROSCOPIC RIGHT RECURRENT INGUINAL HERNIA W/MESH    Medications prior to admission:   Prior to Admission medications    Medication Sig Start Date End Date Taking? Authorizing Provider   AMNESTEEM 40 MG chemo capsule TK 2 CS PO QD 17   Historical Provider, MD   metFORMIN (GLUCOPHAGE XR) 500 MG extended release tablet Take 1 tablet by mouth 2 times daily (with meals) 1/10/18   Obed Good CNP   amitriptyline (ELAVIL) 10 MG tablet Take 10 mg by mouth nightly  17   Historical Provider, MD   tapentadol (NUCYNTA) 75 MG TABS Take 75 mg by mouth three times daily. Historical Provider, MD   tiZANidine (ZANAFLEX) 2 MG capsule Take 1 capsule by mouth daily 17   Historical Provider, MD   PROAIR  (90 BASE) MCG/ACT inhaler INHALE 1-2 PUFFS BY INHALATION ROUTE EVERY 4-6 HOURS FOR 72 HOURS, THEN AS NEEDED. 17   Poli Crooks CNP   sildenafil (VIAGRA) 100 MG tablet Take 1 tablet by mouth as needed for Erectile Dysfunction 16   Estella Snyder MD   carvedilol (COREG) 25 MG tablet 25 mg 2 times daily  3/10/16   Historical Provider, MD   aspirin 81 MG tablet Take 81 mg by mouth daily. Historical Provider, MD       Current medications:    No current facility-administered medications for this encounter. Allergies: Allergies   Allergen Reactions    Lisinopril      States he is allergic to the generic forms of medication, can take lisinopril    Ibuprofen Other (See Comments)     \"hurts my stomach. \"       Problem List:    Patient Active Problem List   Diagnosis Code    Hypertension I10    Anxiety and depression F41.8    Attention deficit disorder F98.8    Left

## 2018-01-17 PROBLEM — K40.90 RIGHT INGUINAL HERNIA: Status: ACTIVE | Noted: 2018-01-17

## 2018-02-07 RX ORDER — AMITRIPTYLINE HYDROCHLORIDE 10 MG/1
TABLET, FILM COATED ORAL
Qty: 120 TABLET | Refills: 1 | Status: SHIPPED | OUTPATIENT
Start: 2018-02-07 | End: 2018-04-11 | Stop reason: SDUPTHER

## 2018-02-26 ENCOUNTER — OFFICE VISIT (OUTPATIENT)
Dept: FAMILY MEDICINE CLINIC | Age: 58
End: 2018-02-26
Payer: MEDICAID

## 2018-02-26 VITALS
RESPIRATION RATE: 16 BRPM | WEIGHT: 242 LBS | DIASTOLIC BLOOD PRESSURE: 89 MMHG | HEART RATE: 78 BPM | BODY MASS INDEX: 33.75 KG/M2 | SYSTOLIC BLOOD PRESSURE: 136 MMHG | TEMPERATURE: 98.4 F

## 2018-02-26 DIAGNOSIS — R10.9 FLANK PAIN: ICD-10-CM

## 2018-02-26 DIAGNOSIS — F32.A ANXIETY AND DEPRESSION: Primary | ICD-10-CM

## 2018-02-26 DIAGNOSIS — F41.9 ANXIETY AND DEPRESSION: Primary | ICD-10-CM

## 2018-02-26 DIAGNOSIS — I10 ESSENTIAL HYPERTENSION: ICD-10-CM

## 2018-02-26 DIAGNOSIS — R31.9 HEMATURIA, UNSPECIFIED TYPE: ICD-10-CM

## 2018-02-26 LAB
BILIRUBIN, POC: NORMAL
BLOOD URINE, POC: NORMAL
CLARITY, POC: CLEAR
COLOR, POC: YELLOW
GLUCOSE URINE, POC: NORMAL
KETONES, POC: NORMAL
LEUKOCYTE EST, POC: NORMAL
NITRITE, POC: NORMAL
PH, POC: 6.5
PROTEIN, POC: NORMAL
SPECIFIC GRAVITY, POC: 1.02
UROBILINOGEN, POC: 0.2

## 2018-02-26 PROCEDURE — 81002 URINALYSIS NONAUTO W/O SCOPE: CPT | Performed by: FAMILY MEDICINE

## 2018-02-26 PROCEDURE — 99214 OFFICE O/P EST MOD 30 MIN: CPT | Performed by: FAMILY MEDICINE

## 2018-02-26 RX ORDER — DULOXETIN HYDROCHLORIDE 30 MG/1
30 CAPSULE, DELAYED RELEASE ORAL DAILY
Qty: 30 CAPSULE | Refills: 1 | Status: SHIPPED | OUTPATIENT
Start: 2018-02-26 | End: 2018-04-23 | Stop reason: SDUPTHER

## 2018-02-26 RX ORDER — ALBUTEROL SULFATE 90 UG/1
2 AEROSOL, METERED RESPIRATORY (INHALATION) EVERY 4 HOURS PRN
Qty: 8.5 G | Refills: 3 | Status: SHIPPED | OUTPATIENT
Start: 2018-02-26 | End: 2018-09-13

## 2018-02-26 NOTE — PATIENT INSTRUCTIONS
side effects if you stop taking this medicine suddenly. Store at room temperature away from moisture and heat. What happens if I miss a dose? Take the missed dose as soon as you remember. Skip the missed dose if it is almost time for your next scheduled dose. Do not  take extra medicine to make up the missed dose. What happens if I overdose? Seek emergency medical attention or call the Poison Help line at 1-617.225.4698. What should I avoid while taking duloxetine? Avoid drinking alcohol. It may increase your risk of liver damage. Ask your doctor before taking a nonsteroidal anti-inflammatory drug (NSAID) for pain, arthritis, fever, or swelling. This includes aspirin, ibuprofen (Advil, Motrin), naproxen (Aleve), celecoxib (Celebrex), diclofenac, indomethacin, meloxicam, and others. Using an NSAID with duloxetine may cause you to bruise or bleed easily. Duloxetine may impair your thinking or reactions. Be careful if you drive or do anything that requires you to be alert. Avoid getting up too fast from a sitting or lying position, or you may feel dizzy. Get up slowly and steady yourself to prevent a fall. Severe dizziness or fainting can cause falls, accidents, or severe injuries. What are the possible side effects of duloxetine? Get emergency medical help if you have signs of an allergic reaction: skin rash or hives; difficulty breathing; swelling of your face, lips, tongue, or throat. Report any new or worsening symptoms to your doctor, such as: mood or behavior changes, anxiety, panic attacks, trouble sleeping, or if you feel impulsive, irritable, agitated, hostile, aggressive, restless, hyperactive (mentally or physically), more depressed, or have thoughts about suicide or hurting yourself.   Call your doctor at once if you have:  · a light-headed feeling, like you might pass out;  · vision changes, eye pain or swelling, eye redness;  · easy bruising, unusual bleeding;  · painful or difficult urination;  · a seizure;  · a manic episode --racing thoughts, increased energy, reckless behavior, feeling extremely happy or irritable, talking more than usual, severe problems with sleep;  · liver problems --right-sided upper stomach pain, itching, dark urine, jaundice (yellowing of the skin or eyes);  · low levels of sodium in the body --headache, confusion, slurred speech, severe weakness, vomiting, loss of coordination, feeling unsteady; or  · severe skin reaction --fever, sore throat, swelling in your face or tongue, burning in your eyes, skin pain, followed by a red or purple skin rash that spreads (especially in the face or upper body) and causes blistering and peeling. Seek medical attention right away if you have symptoms of serotonin syndrome, such as: agitation, hallucinations, fever, sweating, shivering, fast heart rate, muscle stiffness, twitching, loss of coordination, nausea, vomiting, or diarrhea. Older adults may be more sensitive to the side effects of this medicine. Common side effects may include:  · dry mouth;  · drowsiness, dizziness;  · tired feeling;  · nausea, constipation, loss of appetite, weight loss; or  · increased sweating. This is not a complete list of side effects and others may occur. Call your doctor for medical advice about side effects. You may report side effects to FDA at 5-640-FDA-0034. What other drugs will affect duloxetine? Many drugs can interact with duloxetine. Not all possible interactions are listed here.  Tell your doctor about all your current medicines and any you start or stop using, especially:  · any other antidepressant;  · cimetidine;  · Central's wort;  · theophylline;  · tryptophan (sometimes called L-tryptophan);  · an amphetamine --Adderall, Focalin, Vyvanse, Ritalin, Concerta, Strattera, and others;  · an antibiotic --ciprofloxacin, enoxacin;  · a blood thinner --warfarin, Coumadin, Jantoven;  · heart rhythm medication --flecainide, propafenone, administered with the aid of information [employer] provides. The information contained herein is not intended to cover all possible uses, directions, precautions, warnings, drug interactions, allergic reactions, or adverse effects. If you have questions about the drugs you are taking, check with your doctor, nurse or pharmacist.  Copyright 7957-9630 [address]: 11.01. Revision date: 7/10/2017. Care instructions adapted under license by [facility] ([facility]). If you have questions about a medical condition or this instruction, always ask your healthcare professional. [place] [address] any warranty or liability for your use of this information.

## 2018-02-26 NOTE — PROGRESS NOTES
COLORU Yellow 02/26/2018    COLORU YELLOW 08/07/2017    NITRU NEGATIVE 08/07/2017    GLUCOSEU Neg 02/26/2018    GLUCOSEU NEGATIVE 08/07/2017    KETUA Trace 02/26/2018    KETUA NEGATIVE 08/07/2017    UROBILINOGEN Normal 08/07/2017    BILIRUBINUR Neg 02/26/2018         Assessment/ Plan / Medical Decision Making  1. Anxiety and depression  DULoxetine (CYMBALTA) 30 MG extended release capsule   2. Essential hypertension     3. Flank pain  POCT Urinalysis no Micro    US RENAL COMPLETE    Chyna Smith MD, [facility] [place]*    Basic Metabolic Panel   4. Hematuria, unspecified type  [address], MD, [facility] [place]*    Basic Metabolic Panel       Medications, laboratory testing, imaging, consultation, and follow up as documented in this encounter. I have recommended starting Cymbalta and advised him of potential adverse effects associated with these medications. I've indicated that I like to see him back in approximately 4 weeks and see how he is doing. His blood pressure is well controlled. Continue current treatment. With respect to his flank pain and hematuria, check renal ultrasound and refer to urology. Follow up in our office in approximately one month or as needed. Dev Palma received counseling on the following healthy behaviors: medication adherence    Patient given educational materials on duloxetine. Was a self-tracking handout given in paper form or via [employer]? No  If yes, see orders or list here. Discussed use, benefit, and side effects of prescribed medications. Barriers to medication compliance addressed. All patient questions answered. Pt voiced understanding. This note is created with the assistance of a speech-recognition program.  While intending to generate a document that actually reflects the content of the visit, no guarantees can be provided that every mistake has been identified and corrected by editing.

## 2018-03-01 ENCOUNTER — HOSPITAL ENCOUNTER (OUTPATIENT)
Age: 58
Discharge: HOME | End: 2018-03-01
Payer: MEDICAID

## 2018-03-01 ENCOUNTER — HOSPITAL ENCOUNTER (OUTPATIENT)
Dept: ULTRASOUND IMAGING | Age: 58
Discharge: HOME | End: 2018-03-03
Payer: MEDICAID

## 2018-03-01 DIAGNOSIS — R10.9 FLANK PAIN: ICD-10-CM

## 2018-03-01 DIAGNOSIS — R31.9 HEMATURIA, UNSPECIFIED TYPE: ICD-10-CM

## 2018-03-01 LAB
ANION GAP SERPL CALCULATED.3IONS-SCNC: 12 MMOL/L (ref 9–17)
BUN BLDV-MCNC: 21 MG/DL (ref 6–20)
BUN/CREAT BLD: ABNORMAL (ref 9–20)
CALCIUM SERPL-MCNC: 9.3 MG/DL (ref 8.6–10.4)
CHLORIDE BLD-SCNC: 101 MMOL/L (ref 98–107)
CO2: 28 MMOL/L (ref 20–31)
CREAT SERPL-MCNC: 0.86 MG/DL (ref 0.7–1.2)
GFR AFRICAN AMERICAN: >60 ML/MIN
GFR NON-AFRICAN AMERICAN: >60 ML/MIN
GFR SERPL CREATININE-BSD FRML MDRD: ABNORMAL ML/MIN/{1.73_M2}
GFR SERPL CREATININE-BSD FRML MDRD: ABNORMAL ML/MIN/{1.73_M2}
GLUCOSE BLD-MCNC: 97 MG/DL (ref 70–99)
POTASSIUM SERPL-SCNC: 3.7 MMOL/L (ref 3.7–5.3)
SODIUM BLD-SCNC: 141 MMOL/L (ref 135–144)

## 2018-03-01 PROCEDURE — 76770 US EXAM ABDO BACK WALL COMP: CPT

## 2018-03-01 PROCEDURE — 36415 COLL VENOUS BLD VENIPUNCTURE: CPT

## 2018-03-01 PROCEDURE — 80048 BASIC METABOLIC PNL TOTAL CA: CPT

## 2018-03-09 ENCOUNTER — TELEPHONE (OUTPATIENT)
Dept: UROLOGY | Age: 58
End: 2018-03-09

## 2018-03-16 DIAGNOSIS — I10 ESSENTIAL HYPERTENSION: ICD-10-CM

## 2018-03-16 RX ORDER — AMLODIPINE BESYLATE 10 MG/1
TABLET ORAL
Qty: 30 TABLET | Refills: 0 | Status: SHIPPED | OUTPATIENT
Start: 2018-03-16 | End: 2018-04-12 | Stop reason: SDUPTHER

## 2018-03-26 ENCOUNTER — OFFICE VISIT (OUTPATIENT)
Dept: FAMILY MEDICINE CLINIC | Age: 58
End: 2018-03-26
Payer: MEDICAID

## 2018-03-26 VITALS
DIASTOLIC BLOOD PRESSURE: 92 MMHG | BODY MASS INDEX: 33.45 KG/M2 | SYSTOLIC BLOOD PRESSURE: 128 MMHG | RESPIRATION RATE: 16 BRPM | WEIGHT: 239.8 LBS | HEART RATE: 81 BPM

## 2018-03-26 DIAGNOSIS — F41.9 ANXIETY AND DEPRESSION: Primary | ICD-10-CM

## 2018-03-26 DIAGNOSIS — F32.A ANXIETY AND DEPRESSION: Primary | ICD-10-CM

## 2018-03-26 PROCEDURE — 99214 OFFICE O/P EST MOD 30 MIN: CPT | Performed by: FAMILY MEDICINE

## 2018-03-26 NOTE — PATIENT INSTRUCTIONS
Patient Education        Recovering From Depression: Care Instructions  Your Care Instructions    Taking good care of yourself is important as you recover from depression. In time, your symptoms will fade as your treatment takes hold. Do not give up. Instead, focus your energy on getting better. Your mood will improve. It just takes some time. Focus on things that can help you feel better, such as being with friends and family, eating well, and getting enough rest. But take things slowly. Do not do too much too soon. You will begin to feel better gradually. Follow-up care is a key part of your treatment and safety. Be sure to make and go to all appointments, and call your doctor if you are having problems. It's also a good idea to know your test results and keep a list of the medicines you take. How can you care for yourself at home? Be realistic  · If you have a large task to do, break it up into smaller steps you can handle, and just do what you can. · You may want to put off important decisions until your depression has lifted. If you have plans that will have a major impact on your life, such as marriage, divorce, or a job change, try to wait a bit. Talk it over with friends and loved ones who can help you look at the overall picture first.  · Reaching out to people for help is important. Do not isolate yourself. Let your family and friends help you. Find someone you can trust and confide in, and talk to that person. · Be patient, and be kind to yourself. Remember that depression is not your fault and is not something you can overcome with willpower alone. Treatment is necessary for depression, just like for any other illness. Feeling better takes time, and your mood will improve little by little. Stay active  · Stay busy and get outside. Take a walk, or try some other light exercise. · Talk with your doctor about an exercise program. Exercise can help with mild depression. · Go to a movie or concert.

## 2018-04-12 DIAGNOSIS — I10 ESSENTIAL HYPERTENSION: ICD-10-CM

## 2018-04-12 RX ORDER — AMITRIPTYLINE HYDROCHLORIDE 10 MG/1
TABLET, FILM COATED ORAL
Qty: 120 TABLET | Refills: 1 | Status: SHIPPED | OUTPATIENT
Start: 2018-04-12 | End: 2018-08-04 | Stop reason: ALTCHOICE

## 2018-04-12 RX ORDER — AMLODIPINE BESYLATE 10 MG/1
TABLET ORAL
Qty: 30 TABLET | Refills: 3 | Status: SHIPPED | OUTPATIENT
Start: 2018-04-12 | End: 2018-08-10 | Stop reason: SDUPTHER

## 2018-04-23 DIAGNOSIS — F41.9 ANXIETY AND DEPRESSION: ICD-10-CM

## 2018-04-23 DIAGNOSIS — F32.A ANXIETY AND DEPRESSION: ICD-10-CM

## 2018-04-24 RX ORDER — DULOXETIN HYDROCHLORIDE 30 MG/1
30 CAPSULE, DELAYED RELEASE ORAL DAILY
Qty: 30 CAPSULE | Refills: 3 | Status: SHIPPED | OUTPATIENT
Start: 2018-04-24 | End: 2018-09-10 | Stop reason: SDUPTHER

## 2018-05-08 ENCOUNTER — TELEPHONE (OUTPATIENT)
Dept: PRIMARY CARE CLINIC | Age: 58
End: 2018-05-08

## 2018-05-08 DIAGNOSIS — K62.5 RECTAL BLEEDING: Primary | ICD-10-CM

## 2018-05-10 ENCOUNTER — TELEPHONE (OUTPATIENT)
Dept: NEUROLOGY | Age: 58
End: 2018-05-10

## 2018-05-22 ENCOUNTER — OFFICE VISIT (OUTPATIENT)
Dept: NEUROLOGY | Age: 58
End: 2018-05-22
Payer: COMMERCIAL

## 2018-05-22 ENCOUNTER — TELEPHONE (OUTPATIENT)
Dept: NEUROLOGY | Age: 58
End: 2018-05-22

## 2018-05-22 VITALS
HEIGHT: 70 IN | HEART RATE: 62 BPM | WEIGHT: 242 LBS | DIASTOLIC BLOOD PRESSURE: 84 MMHG | BODY MASS INDEX: 34.65 KG/M2 | SYSTOLIC BLOOD PRESSURE: 129 MMHG

## 2018-05-22 DIAGNOSIS — G47.00 INSOMNIA, UNSPECIFIED TYPE: ICD-10-CM

## 2018-05-22 DIAGNOSIS — R41.3 MEMORY LOSS: Primary | ICD-10-CM

## 2018-05-22 DIAGNOSIS — R20.8 DYSESTHESIA: ICD-10-CM

## 2018-05-22 DIAGNOSIS — F32.A DEPRESSION, UNSPECIFIED DEPRESSION TYPE: ICD-10-CM

## 2018-05-22 PROCEDURE — G8427 DOCREV CUR MEDS BY ELIG CLIN: HCPCS | Performed by: PSYCHIATRY & NEUROLOGY

## 2018-05-22 PROCEDURE — 99214 OFFICE O/P EST MOD 30 MIN: CPT | Performed by: PSYCHIATRY & NEUROLOGY

## 2018-05-22 PROCEDURE — 4004F PT TOBACCO SCREEN RCVD TLK: CPT | Performed by: PSYCHIATRY & NEUROLOGY

## 2018-05-22 PROCEDURE — G8417 CALC BMI ABV UP PARAM F/U: HCPCS | Performed by: PSYCHIATRY & NEUROLOGY

## 2018-05-22 PROCEDURE — 3017F COLORECTAL CA SCREEN DOC REV: CPT | Performed by: PSYCHIATRY & NEUROLOGY

## 2018-06-15 RX ORDER — AMITRIPTYLINE HYDROCHLORIDE 10 MG/1
TABLET, FILM COATED ORAL
Qty: 120 TABLET | Refills: 0 | OUTPATIENT
Start: 2018-06-15

## 2018-06-15 RX ORDER — AMITRIPTYLINE HYDROCHLORIDE 50 MG/1
50 TABLET, FILM COATED ORAL NIGHTLY
Qty: 30 TABLET | Refills: 3 | Status: SHIPPED | OUTPATIENT
Start: 2018-06-15 | End: 2018-09-07 | Stop reason: ALTCHOICE

## 2018-07-10 ENCOUNTER — TELEPHONE (OUTPATIENT)
Dept: NEUROLOGY | Age: 58
End: 2018-07-10

## 2018-07-24 ENCOUNTER — OFFICE VISIT (OUTPATIENT)
Dept: PRIMARY CARE CLINIC | Age: 58
End: 2018-07-24
Payer: COMMERCIAL

## 2018-07-24 VITALS
DIASTOLIC BLOOD PRESSURE: 84 MMHG | HEIGHT: 70 IN | SYSTOLIC BLOOD PRESSURE: 126 MMHG | OXYGEN SATURATION: 96 % | WEIGHT: 242 LBS | HEART RATE: 99 BPM | BODY MASS INDEX: 34.65 KG/M2

## 2018-07-24 DIAGNOSIS — I10 ESSENTIAL HYPERTENSION: Primary | ICD-10-CM

## 2018-07-24 DIAGNOSIS — R53.83 FATIGUE, UNSPECIFIED TYPE: ICD-10-CM

## 2018-07-24 PROCEDURE — 99213 OFFICE O/P EST LOW 20 MIN: CPT | Performed by: NURSE PRACTITIONER

## 2018-07-24 RX ORDER — SILDENAFIL 100 MG/1
100 TABLET, FILM COATED ORAL PRN
COMMUNITY
End: 2018-11-06 | Stop reason: ALTCHOICE

## 2018-07-24 RX ORDER — CARVEDILOL 6.25 MG/1
6.25 TABLET ORAL 2 TIMES DAILY WITH MEALS
Qty: 60 TABLET | Refills: 5 | Status: SHIPPED | OUTPATIENT
Start: 2018-07-24 | End: 2018-09-04 | Stop reason: ALTCHOICE

## 2018-07-24 RX ORDER — DIPHENHYDRAMINE HCL 25 MG
25 CAPSULE ORAL EVERY 6 HOURS PRN
COMMUNITY
End: 2019-09-24 | Stop reason: ALTCHOICE

## 2018-07-24 RX ORDER — CARVEDILOL 25 MG/1
25 TABLET ORAL 2 TIMES DAILY
Qty: 60 TABLET | Refills: 5 | Status: SHIPPED | OUTPATIENT
Start: 2018-07-24 | End: 2018-07-24 | Stop reason: DRUGHIGH

## 2018-07-24 ASSESSMENT — ENCOUNTER SYMPTOMS
CHEST TIGHTNESS: 0
SHORTNESS OF BREATH: 0

## 2018-07-24 NOTE — PATIENT INSTRUCTIONS
at least 30 minutes of exercise on most days of the week. Walking is a good choice. You also may want to do other activities, such as running, swimming, cycling, or playing tennis or team sports. · Avoid or limit alcohol. Talk to your doctor about whether you can drink any alcohol. · Eat plenty of fruits, vegetables, and low-fat dairy products. Eat less saturated and total fats. · Learn how to check your blood pressure at home. When should you call for help? Call your doctor now or seek immediate medical care if:    · Your blood pressure is much higher than normal (such as 180/110 or higher).     · You think high blood pressure is causing symptoms such as:  ¨ Severe headache. ¨ Blurry vision.    Watch closely for changes in your health, and be sure to contact your doctor if:    · You do not get better as expected. Where can you learn more? Go to https://SulfagenixpeiSSimpleeb[employer]. org and sign in to your [employer] account. Enter W721 in the [employer] box to learn more about \"Elevated Blood Pressure: Care Instructions. \"     If you do not have an account, please click on the \"Sign Up Now\" link. Current as of: May 10, 2017  Content Version: 11.6  © 7587-7483 [employer]. Care instructions adapted under license by [facility] ([facility]). If you have questions about a medical condition or this instruction, always ask your healthcare professional. [place] [address] any warranty or liability for your use of this information. carvedilol  Pronunciation:  BEATRIZ ve dil ole  Brand:  Coreg, Coreg CR  What is the most important information I should know about carvedilol? You should not take carvedilol if you have asthma, bronchitis, emphysema, severe liver disease, or a serious heart condition such as heart block, \"sick sinus syndrome,\" or slow heart rate (unless you have a pacemaker). What is carvedilol? Carvedilol is a beta-blocker.  Beta-blockers affect the heart and this and all other medicines out of the reach of children, never share your medicines with others, and use this medication only for the indication prescribed. Every effort has been made to ensure that the information provided by [address] is accurate, up-to-date, and complete, but no guarantee is made to that effect. Drug information contained herein may be time sensitive. [facility] information has been compiled for use by healthcare practitioners and consumers in the [place] and therefore [facility] does not warrant that uses outside of the [place] are appropriate, unless specifically indicated otherwise. [facility]'s drug information does not endorse drugs, diagnose patients or recommend therapy. [facility]'s drug information is an informational resource designed to assist licensed healthcare practitioners in caring for their patients and/or to serve consumers viewing this service as a supplement to, and not a substitute for, the expertise, skill, knowledge and judgment of healthcare practitioners. The absence of a warning for a given drug or drug combination in no way should be construed to indicate that the drug or drug combination is safe, effective or appropriate for any given patient. [facility] does not assume any responsibility for any aspect of healthcare administered with the aid of information [facility] provides. The information contained herein is not intended to cover all possible uses, directions, precautions, warnings, drug interactions, allergic reactions, or adverse effects. If you have questions about the drugs you are taking, check with your doctor, nurse or pharmacist.  Copyright 3057-6893 [address]: 15.01. Revision date: 1/20/2014. Care instructions adapted under license by [facility] ([facility]).  If you have questions about a medical condition or this instruction, always ask your healthcare professional. [place] [address] any warranty or liability for your

## 2018-07-24 NOTE — PROGRESS NOTES
Viridiana Quinn is a 65 y.o. male who presents today for his medical conditions/complaints as noted below. Viridiana Quinn is here today c/o Hypertension (pt states woke up this AM with BP at 201/114. Took amlodopine )      HPI:     HPI    Kisha Zhang presents today with complaints of increased BP and fatigue. He states he is working a highly stressful job and states he can feel tingling in his fingertips and \"feel like my blood is boiling\". He states when he gets the feeling of high blood pressure he will check his BP and take a few baby aspirin and an additional BP medication. He states this morning 201/114 so he took an additional amlodipine 10mg to total 20mg and states he feels better. He states he is unsure how or why his coreg was discontinued. He states he called for a refill and was told he needed to be seen. He states he gets so exhausted and he is not getting restful sleep. He states he is having a really hard time focusing and he states it is starting to affect his job. He is requesting a letter from this office stating he cannot work third shift due to his medical conditions. He continues to work with neurology for memory difficulties. Subjective:   Review of Systems   Constitutional: Positive for fatigue. Negative for chills and fever. Eyes: Negative for visual disturbance. Respiratory: Negative for chest tightness and shortness of breath. Cardiovascular: Positive for chest pain. Negative for palpitations. Neurological: Negative for dizziness. Psychiatric/Behavioral: Positive for decreased concentration. The patient is nervous/anxious. Objective:   /84 (Site: Left Arm, Position: Sitting, Cuff Size: Medium Adult)   Pulse 99   Ht 5' 10\" (1.778 m)   Wt 242 lb (109.8 kg)   SpO2 96%   BMI 34.72 kg/m²     Physical Exam   Constitutional: He is oriented to person, place, and time. He appears well-developed and well-nourished. No distress.    HENT:   Head:

## 2018-07-31 ENCOUNTER — OFFICE VISIT (OUTPATIENT)
Dept: NEUROLOGY | Age: 58
End: 2018-07-31
Payer: COMMERCIAL

## 2018-07-31 VITALS
BODY MASS INDEX: 33.74 KG/M2 | WEIGHT: 241 LBS | HEIGHT: 71 IN | HEART RATE: 74 BPM | SYSTOLIC BLOOD PRESSURE: 128 MMHG | DIASTOLIC BLOOD PRESSURE: 89 MMHG

## 2018-07-31 DIAGNOSIS — R41.3 MEMORY LOSS: Primary | ICD-10-CM

## 2018-07-31 DIAGNOSIS — R73.09 ELEVATED HEMOGLOBIN A1C: ICD-10-CM

## 2018-07-31 DIAGNOSIS — R20.8 DYSESTHESIA: ICD-10-CM

## 2018-07-31 DIAGNOSIS — F32.A ANXIETY AND DEPRESSION: ICD-10-CM

## 2018-07-31 DIAGNOSIS — F41.9 ANXIETY AND DEPRESSION: ICD-10-CM

## 2018-07-31 PROCEDURE — 99214 OFFICE O/P EST MOD 30 MIN: CPT | Performed by: PSYCHIATRY & NEUROLOGY

## 2018-07-31 RX ORDER — DONEPEZIL HYDROCHLORIDE 5 MG/1
5 TABLET, FILM COATED ORAL NIGHTLY
Qty: 30 TABLET | Refills: 0 | Status: SHIPPED | OUTPATIENT
Start: 2018-07-31 | End: 2018-11-06 | Stop reason: ALTCHOICE

## 2018-07-31 RX ORDER — DONEPEZIL HYDROCHLORIDE 10 MG/1
10 TABLET, FILM COATED ORAL NIGHTLY
Qty: 30 TABLET | Refills: 3 | Status: SHIPPED | OUTPATIENT
Start: 2018-07-31 | End: 2020-05-19 | Stop reason: SDUPTHER

## 2018-07-31 NOTE — PROGRESS NOTES
NEUROLOGY FOLLOW-UP  Patient Name:  Bhargav Novoa  :   1960  Clinic Visit Date: 2018    I saw Mr. Bhargav Novoa in follow-up in the office today in continuation of neurologic care. He is a 65 y.o.  male  with complaints of memory loss and difficulty organizing due to memory difficulties. He has upcoming appointment with the neuropsychiatrists because he could not get any sooner appointment due to lack of insurance coverage. He has neuropsych testing scheduled in 6 months. He does not want to wait until then. He wants to be initiated on medication to help for his memory difficulties. He has had MRI brain (17) -ve. EEG (10/21/17) -ve. B12, TSH ()  and those tests are unremarkable. He also stated that he was on amitriptyline and his depression has been stable but his memory problems have not improved. Amitriptyline also has been helping for diabetic neuropathy pain. Patient feels that it helps for sleep and pain and depression. He also stated that he was started on Cymbalta in January. He did not like Cymbalta and he wants to stop it but he does not want to stop amitriptyline. He also stated that as discussed int he past; he is supposed to take Elavil 50 mg nightly from  onwards. He further added that amitriptyline has helped for agitation and irritability. Able to sleep much better on amitriptyline. During prior visits; he stated that he has been having memory problems \"for many years\". He has family history of dementia in his mom. He is much concerned about it also. He reports that he has been having increasing forgetfulness and trouble remembering recent conversations. He is repeating himself for \"more and more\" and increasing problems with recall. At times it takes \"10-15 minutes to recall\". He is getting extremely frustrated with this. He has been having difficulties remembering things occurred in remote past also.    He further added \"I misplace items such as watch, etc and it takes at least few minutes to find them\". He also has trouble finding his parked car stating \" Where did I parkc my car\". It takes \"some time\" to find his parked car. His son also stated that he has noticed his dad repeating some of the conversations again. Patient has trouble remembering names. Patient also stated that he had history of \"mini stroke\" 3-4 years ago with transient right-sided numbness. He has had IV TPA at [facility] in the past.  He also has a history of myocardial infarction in 2011. Regarding functional status; patient has been completely independent of all ADLs. He also has been able to do the shopping, driving, managing checkbook etc. without any difficulties. He admits to having occasional depression. He also stated that \"I have OCD\". He elaborates it as \"I'm so concerned about this appointment and I came so early and I get so much frustrated that you didn't come to see me right away\". He also stated that he might be repeating some stuff repeatedly and not feeling quite until he repeats those. He also is concerned about obsessions and compulsions such as checking on appointment time \"again and again\". He always fidgety stating \"I want to get it just right\". Review of systems done by staff, Brittany Arthur reviewed and pertinent positives include memory problems as stated above.          Current Outpatient Prescriptions on File Prior to Visit   Medication Sig Dispense Refill    diphenhydrAMINE (BENADRYL) 25 MG capsule Take 25 mg by mouth every 6 hours as needed for Itching      sildenafil (VIAGRA) 100 MG tablet Take 100 mg by mouth as needed for Erectile Dysfunction      carvedilol (COREG) 6.25 MG tablet Take 1 tablet by mouth 2 times daily (with meals) 60 tablet 5    amitriptyline (ELAVIL) 50 MG tablet Take 1 tablet by mouth nightly 30 tablet 3    DULoxetine (CYMBALTA) 30 MG extended release capsule TAKE 1 CAPSULE BY MOUTH DAILY 30 capsule 3    amitriptyline (ELAVIL) 10 MG tablet TAKE 1 TABLET BY MOUTH NIGHTLY FOR WEEK 1, TAKE 2 NIGHTLY FOR WEEK 2, TAKE 3 NIGHTLY FOR WEEK 3, THEN TAKE 4 TABLETS NIGHTLY 120 tablet 1    amLODIPine (NORVASC) 10 MG tablet TAKE 1 TABLET BY MOUTH EVERY DAY 30 tablet 3    albuterol sulfate HFA (PROAIR HFA) 108 (90 Base) MCG/ACT inhaler Inhale 2 puffs into the lungs every 4 hours as needed for Wheezing 8.5 g 3    metFORMIN (GLUCOPHAGE XR) 500 MG extended release tablet Take 1 tablet by mouth 2 times daily (with meals) 60 tablet 2    tiZANidine (ZANAFLEX) 2 MG capsule Take 1 capsule by mouth daily      aspirin 81 MG tablet Take 81 mg by mouth daily. No current facility-administered medications on file prior to visit. Allergies: Lance Armendariz is allergic to lisinopril and ibuprofen. Past Medical History:   Diagnosis Date    ADHD (attention deficit hyperactivity disorder)     Arthritis     knees, hands, back.  Asthma     as child.  Back pain     CAD (coronary artery disease)     Cataract     Depression     pt. denies.  Diabetes mellitus ([facility] [facility]     Heart attack     2011    Hypertension     Kidney stones     Mini stroke ([facility] [facility]     2014    Osteoarthritis     Seasonal allergies     Sinus problem        Past Surgical History:   Procedure Laterality Date    CHOLECYSTECTOMY      2016    COLONOSCOPY      HERNIA REPAIR      1995, rt. inguinal    HERNIA REPAIR Left 01/2018    repair    HERNIA REPAIR Right 01/2018    looked at at same time they did left repair    INGUINAL HERNIA REPAIR Left 08/19/2016    LAPAROSCOPY N/A 1/16/2018    DIAGNOSTIC LAPAROSCOPY performed by Dennis Russo MD at [address] [place] [address],[address]       Social History: Lance Armendariz  reports that he has been smoking. He has a 7.00 pack-year smoking history. He has never used smokeless tobacco. He reports that he drinks alcohol.  He reports that he does not use drugs. Family History   Problem Relation Age of Onset    Cancer Father         throat    Cirrhosis Father     High Blood Pressure Sister     Asthma Sister     Arthritis Sister     High Blood Pressure Mother     Heart Disease Mother     Alzheimer's Disease Mother        On exam: Blood pressure 128/89, pulse 74, height 5' 11\" (1.803 m), weight 241 lb (109.3 kg). GENERAL  Appears comfortable and in no distress   HEENT  NC/ AT   NECK & cardiovascular  Supple and no bruits heard; S1 and S2 heard; radial pulse intact   MENTAL STATUS:  Alert, oriented, intact memory, no confusion, normal speech, normal language, no hallucination or delusion   CRANIAL NERVES: II     -      VA 20/20 OU; fundi reveal intact venous pulsations; Visual fields intact to confrontation  III,IV,VI -  EOMs full, no afferent defect, no KATIA, no ptosis  V     -     Normal facial sensation  VII    -     Normal facial symmetry  VIII   -     Intact hearing  IX,X -     Symmetrical palate  XI    -     Symmetrical shoulder shrug  XII   -     Midline tongue, no atrophy    MOTOR FUNCTION:  significant for good strength of grade 5/5 in bilateral proximal and distal muscle groups of both upper and lower extremities with normal bulk, normal tone and no involuntary movements, no tremor   SENSORY FUNCTION:  Normal touch, normal pin, normal vibration, normal proprioception   CEREBELLAR FUNCTION:  Intact fine motor control over upper limbs   REFLEX FUNCTION:  Symmetric, no perverted reflex, no Babinski sign   STATION and GAIT  Normal station, normal gait        7/31/2018  Maximum score 5 Score 5 What is the year, season, date, day, month   Maximum score 5 Score 5 Where are we: State, county, town, hospital, floor? Maximum score 3 Score 3 Name 3 objects: ball, pen, car. Ask patient to repeat 3 objects.    Maximum score 5 Score 5 Serial sevens from 100:93, 86, 79, 72, 65   Maximum score 3 Score 3 Recall 3 objects   Maximum score 2 Score 2 Name a pencil above and he scored 20 out of 30. That indicates mild cognitive impairment. Will start him on cholinergic enhancer therapy. He will take Aricept 5 mg every evening during the month of August and then Aricept 10 mg every evening starting September 2018. Comorbid depression: He has upcoming appt with psychiatrist.   Comorbid diabetic neuropathy: Amitriptyline has been helpful. Comorbid hypertension and diabetes  Family history of dementia (mom in her 76s)  Hx of \"mini stroke\" 3-4 years ago    Follow-up in 4 months or sooner for further questions and concerns.

## 2018-08-04 ENCOUNTER — HOSPITAL ENCOUNTER (EMERGENCY)
Age: 58
Discharge: HOME | End: 2018-08-04
Attending: EMERGENCY MEDICINE
Payer: COMMERCIAL

## 2018-08-04 VITALS
TEMPERATURE: 98.3 F | HEIGHT: 71 IN | HEART RATE: 78 BPM | BODY MASS INDEX: 33.74 KG/M2 | RESPIRATION RATE: 16 BRPM | WEIGHT: 241 LBS | DIASTOLIC BLOOD PRESSURE: 87 MMHG | SYSTOLIC BLOOD PRESSURE: 131 MMHG | OXYGEN SATURATION: 98 %

## 2018-08-04 DIAGNOSIS — I10 HYPERTENSION, UNSPECIFIED TYPE: Primary | ICD-10-CM

## 2018-08-04 DIAGNOSIS — R31.9 HEMATURIA, UNSPECIFIED TYPE: ICD-10-CM

## 2018-08-04 LAB
ANION GAP SERPL CALCULATED.3IONS-SCNC: 13 MMOL/L (ref 9–17)
BILIRUBIN URINE: NEGATIVE
BUN BLDV-MCNC: 17 MG/DL (ref 6–20)
BUN/CREAT BLD: NORMAL (ref 9–20)
CALCIUM SERPL-MCNC: 9.1 MG/DL (ref 8.6–10.4)
CHLORIDE BLD-SCNC: 101 MMOL/L (ref 98–107)
CO2: 26 MMOL/L (ref 20–31)
COLOR: YELLOW
COMMENT UA: NORMAL
CREAT SERPL-MCNC: 0.85 MG/DL (ref 0.7–1.2)
GFR AFRICAN AMERICAN: >60 ML/MIN
GFR NON-AFRICAN AMERICAN: >60 ML/MIN
GFR SERPL CREATININE-BSD FRML MDRD: NORMAL ML/MIN/{1.73_M2}
GFR SERPL CREATININE-BSD FRML MDRD: NORMAL ML/MIN/{1.73_M2}
GLUCOSE BLD-MCNC: 86 MG/DL (ref 70–99)
GLUCOSE URINE: NEGATIVE
KETONES, URINE: NEGATIVE
LEUKOCYTE ESTERASE, URINE: NEGATIVE
NITRITE, URINE: NEGATIVE
PH UA: 5.5 (ref 5–8)
POTASSIUM SERPL-SCNC: 3.7 MMOL/L (ref 3.7–5.3)
PROTEIN UA: NEGATIVE
SODIUM BLD-SCNC: 140 MMOL/L (ref 135–144)
SPECIFIC GRAVITY UA: 1.03 (ref 1–1.03)
TURBIDITY: CLEAR
URINE HGB: NEGATIVE
UROBILINOGEN, URINE: NORMAL

## 2018-08-04 PROCEDURE — 87591 N.GONORRHOEAE DNA AMP PROB: CPT

## 2018-08-04 PROCEDURE — 80048 BASIC METABOLIC PNL TOTAL CA: CPT

## 2018-08-04 PROCEDURE — 93005 ELECTROCARDIOGRAM TRACING: CPT

## 2018-08-04 PROCEDURE — 87491 CHLMYD TRACH DNA AMP PROBE: CPT

## 2018-08-04 PROCEDURE — 81003 URINALYSIS AUTO W/O SCOPE: CPT

## 2018-08-04 PROCEDURE — 99283 EMERGENCY DEPT VISIT LOW MDM: CPT

## 2018-08-04 PROCEDURE — 36415 COLL VENOUS BLD VENIPUNCTURE: CPT

## 2018-08-04 ASSESSMENT — ENCOUNTER SYMPTOMS
SHORTNESS OF BREATH: 0
EYE PAIN: 0
CONSTIPATION: 0
COUGH: 0
ABDOMINAL PAIN: 0
SORE THROAT: 0
NAUSEA: 0
VOMITING: 0
DIARRHEA: 0
BACK PAIN: 0
CHEST TIGHTNESS: 0

## 2018-08-04 ASSESSMENT — PAIN DESCRIPTION - PAIN TYPE: TYPE: CHRONIC PAIN

## 2018-08-04 ASSESSMENT — PAIN DESCRIPTION - ORIENTATION: ORIENTATION: LOWER

## 2018-08-04 ASSESSMENT — PAIN DESCRIPTION - LOCATION: LOCATION: BACK

## 2018-08-04 ASSESSMENT — PAIN SCALES - GENERAL: PAINLEVEL_OUTOF10: 5

## 2018-08-04 ASSESSMENT — PAIN - FUNCTIONAL ASSESSMENT: PAIN_FUNCTIONAL_ASSESSMENT: 0-10

## 2018-08-05 NOTE — ED PROVIDER NOTES
(memory issues, follows with neuro ). PAST MEDICAL HISTORY     Past Medical History:   Diagnosis Date    ADHD (attention deficit hyperactivity disorder)     Arthritis     knees, hands, back.  Asthma     as child.  Back pain     CAD (coronary artery disease)     Cataract     Depression     pt. denies.  Diabetes mellitus ([address]     Heart attack ([address]     2011    Hypertension     Kidney stones     [facility] stroke ([facility] [address]     2014    Osteoarthritis     Seasonal allergies     Sinus problem      SURGICAL HISTORY       Past Surgical History:   Procedure Laterality Date    CHOLECYSTECTOMY      2016    COLONOSCOPY      HERNIA REPAIR      1995, rt. inguinal    HERNIA REPAIR Left 01/2018    repair    HERNIA REPAIR Right 01/2018    looked at at same time they did left repair    INGUINAL HERNIA REPAIR Left 08/19/2016    LAPAROSCOPY N/A 1/16/2018    DIAGNOSTIC LAPAROSCOPY performed by Matthew Rodriguez MD at [address]       Previous Medications    ALBUTEROL SULFATE HFA (PROAIR HFA) 108 (90 BASE) MCG/ACT INHALER    Inhale 2 puffs into the lungs every 4 hours as needed for Wheezing    AMITRIPTYLINE (ELAVIL) 50 MG TABLET    Take 1 tablet by mouth nightly    AMLODIPINE (NORVASC) 10 MG TABLET    TAKE 1 TABLET BY MOUTH EVERY DAY    ASPIRIN 81 MG TABLET    Take 81 mg by mouth daily.     CARVEDILOL (COREG) 6.25 MG TABLET    Take 1 tablet by mouth 2 times daily (with meals)    DIPHENHYDRAMINE (BENADRYL) 25 MG CAPSULE    Take 25 mg by mouth every 6 hours as needed for Itching    DONEPEZIL (ARICEPT) 10 MG TABLET    Take 1 tablet by mouth nightly    DONEPEZIL (ARICEPT) 5 MG TABLET    Take 1 tablet by mouth nightly    DULOXETINE (CYMBALTA) 30 MG EXTENDED RELEASE CAPSULE    TAKE 1 CAPSULE BY MOUTH DAILY    METFORMIN (GLUCOPHAGE XR) 500 MG EXTENDED RELEASE TABLET    Take 1 tablet by mouth 2 times daily (with meals)    SILDENAFIL (VIAGRA) 89 MG y.o. male who presents with high blood pressure and hypertension. Differential Diagnosis: essential htn, anxiety, uti, urinary malignancy, ckd     Plan:  Chem 7  EKG  UA and GC/chlamydia     ED Course/MDM:   Patient arrived hemodynamically stable and in no acute distress. Blood pressure only mildly elevated here, wnl on repeat measurement. Patient's previous imaging and studies reviewed. Established with PCP regarding HTN, recent office visit with dose adjustment and sees Neurology for memory issues. Regarding patient's blood pressure, I discussed with him that while his blood pressure is slightly above goal at this time, it is not at a dangerous level requiring acute intervention. I would defer dose adjustment to his PCP and encouraged him to follow up with them next week and bring his blood pressure diary with him. He feels comfortable with that plan. The patient refused EKG, as he has no chest pain or shortness of breath I do not think that is unreasonable. He is requesting discharge at this time and plans to follow up with his PCP. Regarding hematuria, no signs of UTI and no bloo din UA here. Urine GC/chlamydia pending but patient has low level of concern for STD exposure. I discussed extensively with him the importance of follow up with Urology given chronic intermittent hematuria and he understands this and will make an appointment with them as an outpatient. CRITICAL CARE:       PROCEDURES:    Procedures    DIAGNOSTIC RESULTS   EKG: All EKG's are interpreted by the Emergency Department Physician who either signs or Co-signs this chart in the absence of a cardiologist.      RADIOLOGY:All plain film, CT, MRI, and formal ultrasound images (except ED bedside ultrasound) are read by the radiologist, see reports below, unless otherwise noted in MDM or here. No orders to display     LABS: All lab results were reviewed by myself, and all abnormals are listed below.   Labs Reviewed C. TRACHOMATIS N.GONORRHOEAE DNA, URINE   BASIC METABOLIC PANEL   URINALYSIS     EMERGENCY DEPARTMENT COURSE:   Vitals:    Vitals:    08/04/18 1900 08/04/18 2000   BP: (!) 148/95 131/87   Pulse: 76 78   Resp: 16 16   Temp: 98.3 °F (36.8 °C)    TempSrc: Oral    SpO2: 97% 98%   Weight: 241 lb (109.3 kg)    Height: 5' 11\" (1.803 m)        The patient was given the following medications while in the emergency department:  No orders of the defined types were placed in this encounter. CONSULTS:  None    FINAL IMPRESSION      1. Hypertension, unspecified type    2.  Hematuria, unspecified type          DISPOSITION/PLAN   DISPOSITION Decision To Discharge 08/04/2018 08:13:46 PM      PATIENT REFERRED TO:  Flaco Huffman MD  [address] [address]  [phone]          [facility], [address] [place], [place] [id number] [phone]    Schedule an appointment as soon as possible for a visit   For painless recurrent hematuria    DISCHARGE MEDICATIONS:  New Prescriptions    No medications on file     Gomes MD Magaly  Attending Emergency Physician                       Coni Anaya MD  08/04/18 [id number]

## 2018-08-06 ENCOUNTER — OFFICE VISIT (OUTPATIENT)
Dept: PRIMARY CARE CLINIC | Age: 58
End: 2018-08-06
Payer: COMMERCIAL

## 2018-08-06 ENCOUNTER — TELEPHONE (OUTPATIENT)
Dept: PRIMARY CARE CLINIC | Age: 58
End: 2018-08-06

## 2018-08-06 VITALS
WEIGHT: 242 LBS | SYSTOLIC BLOOD PRESSURE: 156 MMHG | HEART RATE: 76 BPM | BODY MASS INDEX: 33.88 KG/M2 | DIASTOLIC BLOOD PRESSURE: 92 MMHG | OXYGEN SATURATION: 96 % | HEIGHT: 71 IN

## 2018-08-06 DIAGNOSIS — I10 ESSENTIAL HYPERTENSION: Primary | ICD-10-CM

## 2018-08-06 DIAGNOSIS — R53.83 FATIGUE, UNSPECIFIED TYPE: ICD-10-CM

## 2018-08-06 LAB
C. TRACHOMATIS DNA ,URINE: NEGATIVE
N. GONORRHOEAE DNA, URINE: NEGATIVE

## 2018-08-06 PROCEDURE — 99213 OFFICE O/P EST LOW 20 MIN: CPT | Performed by: NURSE PRACTITIONER

## 2018-08-06 RX ORDER — CLONIDINE HYDROCHLORIDE 0.1 MG/1
0.1 TABLET ORAL 2 TIMES DAILY
Qty: 60 TABLET | Refills: 0 | Status: SHIPPED | OUTPATIENT
Start: 2018-08-06 | End: 2018-09-10 | Stop reason: SDUPTHER

## 2018-08-06 ASSESSMENT — ENCOUNTER SYMPTOMS
SHORTNESS OF BREATH: 0
CHEST TIGHTNESS: 1

## 2018-08-06 NOTE — PROGRESS NOTES
Viridiana Quinn is a 65 y.o. male who presents today for his medical conditions/complaints as noted below. Viridiana Quinn is here today c/o Hypertension (pt states extremely fatigued after working. BP has been high went to the ED on Saturday. Increase in meds have not helped. Having headache and blurred vision.)      HPI:     NATHALY Zhang presents today for an ER follow-up for hypertension and hematuria. He went to the [facility]/4/18 for high blood pressure and hematuria. He was provided reassurance that while his BP was high it was not alarmingly high and was told to follow up in this office. He was told also in the ER that hematuria is abnormal and he should be following urology for that complaint. HTN- he states he has extreme fatigue that is inhibiting his work. He is taking carvedilol daily he is unsure the dosage btu believes he is taking 6.25mg TID. He denies following up with cardiology regularly, he has previously see Dr. Liu Adair in . He states he is allergic to lisinopril and losartan was discontinued by cardiology some time ago. He was previously on a diuretic as well but that was discontinued by cardiology. He states he has nitroglycerin at home and when he has chest tightness and chest pain he takes a few baby aspirin and checks his BP. He is very concerned about his BP. He states his BP machine at home has similar readings to the ER and our office readings. He states his symptoms of high BP are affecting his working and he is so fatigued he cannot function. Hematuria- he continues to have hematuria with every urination and states he has had hematuria for the past few months. He was previously scheuled to see urology but cancelled his appointment because his symptoms had improved. His recent urine for GC/CHL was negative. He states he is not sexually active. Subjective:   Review of Systems   Constitutional: Positive for fatigue.  Negative for chills and fever.   Respiratory: Positive for chest tightness. Negative for shortness of breath. Cardiovascular: Positive for chest pain. Negative for palpitations and leg swelling. Genitourinary: Positive for hematuria. Negative for difficulty urinating, dysuria and flank pain. Neurological: Positive for dizziness, light-headedness and headaches. Objective:   BP (!) 156/92 (Site: Right Arm, Position: Sitting, Cuff Size: Medium Adult)   Pulse 76   Ht 5' 11\" (1.803 m)   Wt 242 lb (109.8 kg)   SpO2 96%   BMI 33.75 kg/m²     Physical Exam   Constitutional: He is oriented to person, place, and time. He appears well-developed and well-nourished. No distress. HENT:   Head: Normocephalic. Eyes: Conjunctivae are normal.   Neck: Normal range of motion. Neck supple. Cardiovascular: Normal rate, regular rhythm and normal heart sounds. Pulmonary/Chest: Effort normal and breath sounds normal.   Abdominal: Soft. Bowel sounds are normal.   Neurological: He is alert and oriented to person, place, and time. No cranial nerve deficit. Skin: Skin is warm and dry. He is not diaphoretic. Psychiatric: His speech is normal. His mood appears anxious. Vitals reviewed. Assessment & Plan:      1. Essential hypertension  Advised to return to cardiology for BP management. Will treat with clonidine while awaiting cardiology consult. Treat with clonidine if BP >140/90. He would like referral to different cardiologist.   - cloNIDine (CATAPRES) 0.1 MG tablet; Take 1 tablet by mouth 2 times daily If BP reading is greater than 140/90  Dispense: 60 tablet; Refill: 0  - AFL Timothy Aviles M.D., Kathryn Mccarty MD    2. Fatigue, unspecified type  Most likely fatigue related to carvedilol. Will provide few days off work to recover and schedule an appointment with cardiology. Return in 4 weeks or sooner if needed. Silvano Flores is agreeable to this plan of care.   Electronically signed by ASHLEY Parker - CNP on 8/6/2018 at 4:16 PM

## 2018-08-06 NOTE — PATIENT INSTRUCTIONS
Patient Education   Patient Education        Elevated Blood Pressure: Care Instructions  Your Care Instructions    Blood pressure is a measure of how hard the blood pushes against the walls of your arteries. It's normal for blood pressure to go up and down throughout the day. But if it stays up over time, you have high blood pressure. Two numbers tell you your blood pressure. The first number is the systolic pressure. It shows how hard the blood pushes when your heart is pumping. The second number is the diastolic pressure. It shows how hard the blood pushes between heartbeats, when your heart is relaxed and filling with blood. An ideal blood pressure in adults is less than 120/80 (say \"120 over 80\"). High blood pressure is 140/90 or higher. You have high blood pressure if your top number is 140 or higher or your bottom number is 90 or higher, or both. The main test for high blood pressure is simple, fast, and painless. To diagnose high blood pressure, your doctor will test your blood pressure at different times. After testing your blood pressure, your doctor may ask you to test it again when you are home. If you are diagnosed with high blood pressure, you can work with your doctor to make a long-term plan to manage it. Follow-up care is a key part of your treatment and safety. Be sure to make and go to all appointments, and call your doctor if you are having problems. It's also a good idea to know your test results and keep a list of the medicines you take. How can you care for yourself at home? · Do not smoke. Smoking increases your risk for heart attack and stroke. If you need help quitting, talk to your doctor about stop-smoking programs and medicines. These can increase your chances of quitting for good. · Stay at a healthy weight. · Try to limit how much sodium you eat to less than 2,300 milligrams (mg) a day. Your doctor may ask you to try to eat less than 1,500 mg a day. · Be physically active.  Get vessels to relax and your heart to beat more slowly and easily. Clonidine is used to treat hypertension (high blood pressure). The Kapvay brand of clonidine is used to treat attention deficit hyperactivity disorder (ADHD). Clonidine is sometimes given with other medications. Clonidine may also be used for purposes not listed in this medication guide. What should I discuss with my healthcare provider before taking clonidine? You should not take this medicine if you are allergic to clonidine. To make sure clonidine is safe for you, tell your doctor if you have:  · heart disease or severe coronary artery disease;  · heart rhythm disorder, slow heartbeats;  · low blood pressure, or a history of fainting spells;  · a history of heart attack or stroke;  · pheochromocytoma (tumor of the adrenal gland);  · kidney disease; or  · if you have ever had an allergic reaction to a clonidine transdermal skin patch (Catapres TTS). Older adults may be more sensitive to the effects of this medicine. It is not known whether this medicine will harm an unborn baby. Tell your doctor if you are pregnant or plan to become pregnant while taking clonidine. Clonidine can pass into breast milk and may harm a nursing baby. Tell your doctor if you are breast-feeding a baby. Catapres is not approved for use by anyone younger than 25years old. Do not give Shannon Shubham to a child younger than 10years old. How should I take clonidine? Follow all directions on your prescription label. Your doctor may occasionally change your dose to make sure you get the best results. Do not take this medicine in larger or smaller amounts or for longer than recommended. Clonidine is usually taken in the morning and at bedtime. If you take different doses of this medicine at each dosing time, it may be best to take the larger dose at bedtime. Clonidine may be taken with or without food. Do not use two forms of clonidine at the same time.  This medicine is also available as a transdermal patch worn on the skin. Do not crush, chew, or break an extended-release tablet. Swallow it whole. Tell your doctor if you have trouble swallowing the tablet. If you need surgery, tell the surgeon ahead of time that you are using clonidine. You may need to stop using the medicine for a short time. Do not stop using clonidine suddenly, or you could have unpleasant withdrawal symptoms. Ask your doctor how to safely stop using clonidine. Call your doctor if you are sick with vomiting. Prolonged illness can make it harder for your body to absorb clonidine, which may lead to withdrawal symptoms. This is especially important for a child taking clonidine. If you are being treated for high blood pressure, keep using this medication even if you feel well. High blood pressure often has no symptoms. You may need to use blood pressure medication for the rest of your life. Store at room temperature away from moisture, heat, and light. What happens if I miss a dose? Take the missed dose as soon as you remember. Skip the missed dose if it is almost time for your next scheduled dose. Do not take extra medicine to make up the missed dose. What happens if I overdose? Seek emergency medical attention or call the Poison Help line at 1-934.224.7090. Overdose symptoms may include dangerously high blood pressure (severe headache, pounding in your neck or ears, nosebleed, anxiety, chest pain, shortness of breath) followed by low blood pressure (feeling like you might pass out). Other overdose symptoms may include feeling cold, extreme weakness or drowsiness, weak or shallow breathing, pinpoint pupils, fainting, or seizure (convulsions). What should I avoid while taking clonidine? Avoid drinking alcohol. It may increase certain side effects of clonidine. Clonidine may impair your thinking or reactions. Avoid driving or operating machinery until you know how this medicine will affect you.  Dizziness or severe drowsiness can cause falls or other accidents. What are the possible side effects of clonidine? Get emergency medical help if you have signs of an allergic reaction: hives; difficult breathing; swelling of your face, lips, tongue, or throat. Call your doctor at once if you have:  · severe chest pain, shortness of breath, irregular heartbeats;  · a very slow heart rate;  · severe headache, pounding in your neck or ears, blurred vision;  · nosebleeds;  · anxiety, confusion; or  · a light-headed feeling, like you might pass out. Serious side effects may be more likely in older adults. Common side effects may include:  · drowsiness, dizziness;  · feeling tired or irritable;  · dry mouth, loss of appetite;  · constipation;  · dry eyes, contact lens discomfort; or  · sleep problems (insomnia), nightmares. This is not a complete list of side effects and others may occur. Call your doctor for medical advice about side effects. You may report side effects to FDA at 4-993-FDA-2530. What other drugs will affect clonidine? Taking this medicine with other drugs that make you sleepy can worsen this effect. Ask your doctor before taking clonidine with a sleeping pill, narcotic pain medicine, muscle relaxer, or medicine for anxiety, depression, or seizures. Tell your doctor about all your current medicines and any you start or stop using, especially:  · other heart or blood pressure medications;  · an antidepressant; or  · any other medicine that contains clonidine. This list is not complete. Other drugs may interact with clonidine, including prescription and over-the-counter medicines, vitamins, and herbal products. Not all possible interactions are listed in this medication guide. Where can I get more information? Your pharmacist can provide more information about clonidine.     Remember, keep this and all other medicines out of the reach of children, never share your medicines with others, and use this medication only for the indication prescribed. Every effort has been made to ensure that the information provided by [address] is accurate, up-to-date, and complete, but no guarantee is made to that effect. Drug information contained herein may be time sensitive. [facility] information has been compiled for use by healthcare practitioners and consumers in the [place] and therefore [facility] does not warrant that uses outside of the [place] are appropriate, unless specifically indicated otherwise. [facility]'s drug information does not endorse drugs, diagnose patients or recommend therapy. [facility] drug information is an informational resource designed to assist licensed healthcare practitioners in caring for their patients and/or to serve consumers viewing this service as a supplement to, and not a substitute for, the expertise, skill, knowledge and judgment of healthcare practitioners. The absence of a warning for a given drug or drug combination in no way should be construed to indicate that the drug or drug combination is safe, effective or appropriate for any given patient. [facility] does not assume any responsibility for any aspect of healthcare administered with the aid of information [facility] provides. The information contained herein is not intended to cover all possible uses, directions, precautions, warnings, drug interactions, allergic reactions, or adverse effects. If you have questions about the drugs you are taking, check with your doctor, nurse or pharmacist.  Copyright 1112-2268 [address]. Version: 9.01. Revision date: 3/23/2016. Care instructions adapted under license by [facility] [place] ([facility]). If you have questions about a medical condition or this instruction, always ask your healthcare professional. [place] [address] any warranty or liability for your use of this information.      Patient Education        Blood in the Urine: Care Instructions  Your Care Instructions    Blood in the urine, or hematuria, may make the urine look red, brown, or pink. There may be blood every time you urinate or just from time to time. You cannot always see blood in the urine, but it will show up in a urine test.  Blood in the urine may be serious. It should always be checked by a doctor. Your doctor may recommend more tests, including an X-ray, a CT scan, or a cystoscopy (which lets a doctor look inside the urethra and bladder). Blood in the urine can be a sign of another problem. Common causes are bladder infections and kidney stones. An injury to your groin or your genital area can also cause bleeding in the urinary tract. Very hard exercise-such as running a marathon-can cause blood in the urine. Blood in the urine can also be a sign of kidney disease or cancer in the bladder or kidney. Many cases of blood in the urine are caused by a harmless condition that runs in families. This is called benign familial hematuria. It does not need any treatment. Sometimes your urine may look red or brown even though it does not contain blood. For example, not getting enough fluids (dehydration), taking certain medicines, or having a liver problem can change the color of your urine. Eating foods such as beets, rhubarb, or blackberries or foods with red food coloring can make your urine look red or pink. Follow-up care is a key part of your treatment and safety. Be sure to make and go to all appointments, and call your doctor if you are having problems. It's also a good idea to know your test results and keep a list of the medicines you take. When should you call for help? Call your doctor now or seek immediate medical care if:    · You have symptoms of a urinary infection. For example:  ¨ You have pus in your urine. ¨ You have pain in your back just below your rib cage. This is called flank pain. ¨ You have a fever, chills, or body aches. ¨ It hurts to urinate.   ¨ You have groin or belly pain.     · You have more blood in your urine.    Watch closely for changes in your health, and be sure to contact your doctor if:    · You have new urination problems.     · You do not get better as expected. Where can you learn more? Go to [email or web address] and sign in to your [employer] account. Enter E116 in the [employer] box to learn more about \"Blood in the Urine: Care Instructions. \"     If you do not have an account, please click on the \"Sign Up Now\" link. Current as of: May 12, 2017  Content Version: 11.6  © 6841-2554 [employer]. Care instructions adapted under license by [facility] ([facility]). If you have questions about a medical condition or this instruction, always ask your healthcare professional. [address] any warranty or liability for your use of this information.

## 2018-08-13 ENCOUNTER — OFFICE VISIT (OUTPATIENT)
Dept: PRIMARY CARE CLINIC | Age: 58
End: 2018-08-13
Payer: COMMERCIAL

## 2018-08-13 VITALS
SYSTOLIC BLOOD PRESSURE: 116 MMHG | OXYGEN SATURATION: 94 % | HEIGHT: 71 IN | DIASTOLIC BLOOD PRESSURE: 80 MMHG | HEART RATE: 76 BPM | WEIGHT: 243 LBS | BODY MASS INDEX: 34.02 KG/M2

## 2018-08-13 DIAGNOSIS — R53.83 FATIGUE, UNSPECIFIED TYPE: ICD-10-CM

## 2018-08-13 DIAGNOSIS — I10 ESSENTIAL HYPERTENSION: Primary | ICD-10-CM

## 2018-08-13 PROCEDURE — 99213 OFFICE O/P EST LOW 20 MIN: CPT | Performed by: NURSE PRACTITIONER

## 2018-08-13 ASSESSMENT — ENCOUNTER SYMPTOMS
SHORTNESS OF BREATH: 0
CHEST TIGHTNESS: 0
CONSTIPATION: 0

## 2018-08-13 NOTE — PROGRESS NOTES
Marisa Greer is a 65 y.o. male who presents today for his medical conditions/complaints as noted below. Marisa Greer is here today c/o Hypertension (pt states took bp this morning was 160/118. Cardiology would like to do an Echocardiogram.) and Fatigue (extremely tired all of the time. Saw Pain Management rx'ed Morphine patches. Insurance would not approve, cardiology said do not take due to BP. )      HPI:     HPI    Raj Capellan presents today for a follow-up on hypertension and fatigue. HTN- He was seen last week by Dr. Daria Willingham, cardiology. He states some of his medications were adjusted and he was started on atorvastatin but states the pharmacy called him and informed him he has an allergy/intolerance to atorvastatin and he has not started that medication. He continues to get high blood pressure readings at home with normal readings in office. He is to have an echo and follow-up with cardiology. Fatigue- He has seen pain management and is having issues getting morphine patches due to insurance, he states he most likely will go back to [facility] for pain control. Cardiology told him not to sleep with the morphine patches on. He states neurology told him the fatigue is related to his depression. He states he does not feel depressed and denies any suicidal or homicidal ideations. He states he has gotten off work early and spends most of his free time sleeping. He states the fatigue is debilitating. He states he has been told that he snores loudly and has been told that he has apneic episodes. He denies sleep study in the past. He states he often tries to push himself to do things around the house but ultimately wants to sleep all of the time. He continues to smoke. Subjective:   Review of Systems   Constitutional: Positive for activity change and fatigue. Respiratory: Negative for chest tightness and shortness of breath. Cardiovascular: Negative for chest pain and palpitations. Gastrointestinal: Negative for constipation. Endocrine: Negative for cold intolerance and heat intolerance. Psychiatric/Behavioral: Negative for dysphoric mood, sleep disturbance and suicidal ideas. Objective:   /80 (Site: Left Arm, Position: Sitting, Cuff Size: Medium Adult)   Pulse 76   Ht 5' 11\" (1.803 m)   Wt 243 lb (110.2 kg)   SpO2 94%   BMI 33.89 kg/m²     Physical Exam   Constitutional: He is oriented to person, place, and time. He appears well-developed and well-nourished. No distress. HENT:   Head: Normocephalic. Eyes: Conjunctivae are normal.   Neck: Normal range of motion. Cardiovascular: Normal rate, regular rhythm and normal heart sounds. Pulmonary/Chest: Effort normal and breath sounds normal.   Abdominal: Soft. Bowel sounds are normal.   Neurological: He is alert and oriented to person, place, and time. No cranial nerve deficit. Skin: Skin is warm and dry. He is not diaphoretic. Psychiatric: He has a normal mood and affect. His behavior is normal.   Vitals reviewed. Assessment & Plan:      1. Essential hypertension  Stable; his BP reading in office is well WNL. Continue medications as prescribed and follow-up with cardiology. 2. Fatigue, unspecified type  Discussed sleep apnea as the leading cause of excessive daytime sleepiness. Will obtain labs and sleep link study.   - TSH with Reflex; Future  - Vitamin D 25 Hydroxy; Future    Comorbidities: hypertension, heart disease, depression, obesity/overweight, snoring and witnessed apneic events    [place]. Do you snore often (3 or more nights per week)? yes yes = 1  2. Is your snoring loud enough to be heard through a closed door or annoy any people? yes yes = 1  3. Have you noticed or been told that during sleep, you frequently stop breathing or gasp for air? yes yes = 2    Sum of answers above: 4    [place] Sleepiness Scale:   The following questions are answered as never, slight chance, moderate chance, or high chance:  1. Do you get sleepy, or doze off, while sitting reading? high chance of dozing=3  2. Do you get sleepy, or doze off, while watching TV? high chance of dozing off=3  3. While sitting or inactive in a public place such as a meeting or theater do you ever doze? high chance of dozing=3  4. As a passenger in a car for an hour without a break do you doze? high chance of dozing=3  5. Lying down to rest in the afternoon how likely are you to doze off? high chance of dozing=3  6. Sitting and talking to someone how likely are you to doze off? never would doze off=0  7. Sitting quietly after lunch without alcohol how likely are you to doze off? moderate chance of dozing=2  8. In a car, while stopped for a few minutes at a traffic light how likely are you to doze off? never would doze off=0    Total score for [place] Sleepiness Scale: 17    CPAP:   Are you currently using a CPAP, BiPAP, or VPAP device? no If yes for how long? NA    Return in 3 months or sooner if needed. Will get results and order further testing if needed. Bhargav Novoa is agreeable to this plan of care.   Electronically signed by ASHLEY Haile CNP on 8/13/2018 at 8:51 AM

## 2018-08-13 NOTE — PATIENT INSTRUCTIONS
Patient Education   Patient Education        Elevated Blood Pressure: Care Instructions  Your Care Instructions    Blood pressure is a measure of how hard the blood pushes against the walls of your arteries. It's normal for blood pressure to go up and down throughout the day. But if it stays up over time, you have high blood pressure. Two numbers tell you your blood pressure. The first number is the systolic pressure. It shows how hard the blood pushes when your heart is pumping. The second number is the diastolic pressure. It shows how hard the blood pushes between heartbeats, when your heart is relaxed and filling with blood. An ideal blood pressure in adults is less than 120/80 (say \"120 over 80\"). High blood pressure is 140/90 or higher. You have high blood pressure if your top number is 140 or higher or your bottom number is 90 or higher, or both. The main test for high blood pressure is simple, fast, and painless. To diagnose high blood pressure, your doctor will test your blood pressure at different times. After testing your blood pressure, your doctor may ask you to test it again when you are home. If you are diagnosed with high blood pressure, you can work with your doctor to make a long-term plan to manage it. Follow-up care is a key part of your treatment and safety. Be sure to make and go to all appointments, and call your doctor if you are having problems. It's also a good idea to know your test results and keep a list of the medicines you take. How can you care for yourself at home? · Do not smoke. Smoking increases your risk for heart attack and stroke. If you need help quitting, talk to your doctor about stop-smoking programs and medicines. These can increase your chances of quitting for good. · Stay at a healthy weight. · Try to limit how much sodium you eat to less than 2,300 milligrams (mg) a day. Your doctor may ask you to try to eat less than 1,500 mg a day. · Be physically active.  Get

## 2018-08-21 ENCOUNTER — APPOINTMENT (OUTPATIENT)
Dept: CT IMAGING | Age: 58
End: 2018-08-21
Payer: COMMERCIAL

## 2018-08-21 ENCOUNTER — HOSPITAL ENCOUNTER (EMERGENCY)
Age: 58
Discharge: HOME | End: 2018-08-22
Attending: EMERGENCY MEDICINE
Payer: COMMERCIAL

## 2018-08-21 DIAGNOSIS — R42 DIZZINESS: Primary | ICD-10-CM

## 2018-08-21 PROCEDURE — 2580000003 HC RX 258: Performed by: STUDENT IN AN ORGANIZED HEALTH CARE EDUCATION/TRAINING PROGRAM

## 2018-08-21 PROCEDURE — 6360000004 HC RX CONTRAST MEDICATION: Performed by: STUDENT IN AN ORGANIZED HEALTH CARE EDUCATION/TRAINING PROGRAM

## 2018-08-21 PROCEDURE — 70496 CT ANGIOGRAPHY HEAD: CPT

## 2018-08-21 PROCEDURE — 6370000000 HC RX 637 (ALT 250 FOR IP): Performed by: EMERGENCY MEDICINE

## 2018-08-21 PROCEDURE — 70498 CT ANGIOGRAPHY NECK: CPT

## 2018-08-21 PROCEDURE — 99284 EMERGENCY DEPT VISIT MOD MDM: CPT

## 2018-08-21 PROCEDURE — 6360000002 HC RX W HCPCS: Performed by: EMERGENCY MEDICINE

## 2018-08-21 PROCEDURE — 2580000003 HC RX 258: Performed by: EMERGENCY MEDICINE

## 2018-08-21 PROCEDURE — 96374 THER/PROPH/DIAG INJ IV PUSH: CPT

## 2018-08-21 RX ORDER — 0.9 % SODIUM CHLORIDE 0.9 %
1000 INTRAVENOUS SOLUTION INTRAVENOUS ONCE
Status: COMPLETED | OUTPATIENT
Start: 2018-08-21 | End: 2018-08-22

## 2018-08-21 RX ORDER — ACETAMINOPHEN 325 MG/1
650 TABLET ORAL ONCE
Status: COMPLETED | OUTPATIENT
Start: 2018-08-21 | End: 2018-08-21

## 2018-08-21 RX ORDER — OXYCODONE HYDROCHLORIDE AND ACETAMINOPHEN 5; 325 MG/1; MG/1
1 TABLET ORAL 3 TIMES DAILY
COMMUNITY
End: 2019-06-26 | Stop reason: ALTCHOICE

## 2018-08-21 RX ORDER — 0.9 % SODIUM CHLORIDE 0.9 %
80 INTRAVENOUS SOLUTION INTRAVENOUS ONCE
Status: COMPLETED | OUTPATIENT
Start: 2018-08-21 | End: 2018-08-21

## 2018-08-21 RX ORDER — SODIUM CHLORIDE 0.9 % (FLUSH) 0.9 %
10 SYRINGE (ML) INJECTION PRN
Status: DISCONTINUED | OUTPATIENT
Start: 2018-08-21 | End: 2018-08-22 | Stop reason: HOSPADM

## 2018-08-21 RX ADMIN — Medication 10 ML: at 21:31

## 2018-08-21 RX ADMIN — ACETAMINOPHEN 650 MG: 325 TABLET, FILM COATED ORAL at 22:06

## 2018-08-21 RX ADMIN — SODIUM CHLORIDE 80 ML: 9 INJECTION, SOLUTION INTRAVENOUS at 21:30

## 2018-08-21 RX ADMIN — SODIUM CHLORIDE 1000 ML: 9 INJECTION, SOLUTION INTRAVENOUS at 22:06

## 2018-08-21 RX ADMIN — IOPAMIDOL 75 ML: 755 INJECTION, SOLUTION INTRAVENOUS at 21:31

## 2018-08-21 RX ADMIN — PROCHLORPERAZINE EDISYLATE 10 MG: 5 INJECTION INTRAMUSCULAR; INTRAVENOUS at 22:10

## 2018-08-21 ASSESSMENT — PAIN DESCRIPTION - PAIN TYPE: TYPE: CHRONIC PAIN

## 2018-08-21 ASSESSMENT — PAIN DESCRIPTION - LOCATION: LOCATION: BACK

## 2018-08-21 ASSESSMENT — PAIN SCALES - GENERAL
PAINLEVEL_OUTOF10: 10
PAINLEVEL_OUTOF10: 10

## 2018-08-22 ENCOUNTER — OFFICE VISIT (OUTPATIENT)
Dept: PRIMARY CARE CLINIC | Age: 58
End: 2018-08-22
Payer: COMMERCIAL

## 2018-08-22 VITALS
RESPIRATION RATE: 16 BRPM | TEMPERATURE: 98.3 F | SYSTOLIC BLOOD PRESSURE: 156 MMHG | HEART RATE: 67 BPM | BODY MASS INDEX: 34.36 KG/M2 | OXYGEN SATURATION: 97 % | HEIGHT: 70 IN | DIASTOLIC BLOOD PRESSURE: 99 MMHG | WEIGHT: 240 LBS

## 2018-08-22 VITALS
BODY MASS INDEX: 34.01 KG/M2 | RESPIRATION RATE: 18 BRPM | HEART RATE: 74 BPM | OXYGEN SATURATION: 94 % | SYSTOLIC BLOOD PRESSURE: 110 MMHG | DIASTOLIC BLOOD PRESSURE: 78 MMHG | WEIGHT: 237 LBS

## 2018-08-22 DIAGNOSIS — I95.1 ORTHOSTATIC HYPOTENSION: ICD-10-CM

## 2018-08-22 DIAGNOSIS — R42 DIZZINESS: Primary | ICD-10-CM

## 2018-08-22 PROCEDURE — 99213 OFFICE O/P EST LOW 20 MIN: CPT | Performed by: FAMILY MEDICINE

## 2018-08-22 ASSESSMENT — ENCOUNTER SYMPTOMS
ABDOMINAL PAIN: 0
EYE ITCHING: 0
RHINORRHEA: 0
NAUSEA: 0
CONSTIPATION: 0
SHORTNESS OF BREATH: 0
COUGH: 0
EYE REDNESS: 0
VOMITING: 0
NAUSEA: 0
DIARRHEA: 0
CHEST TIGHTNESS: 0
BACK PAIN: 0
SHORTNESS OF BREATH: 0
VOMITING: 0

## 2018-08-22 NOTE — ED PROVIDER NOTES
range of motion. Neck supple. Cardiovascular: Normal rate, regular rhythm and normal heart sounds. Pulmonary/Chest: Effort normal and breath sounds normal. No respiratory distress. He has no wheezes. Abdominal: Soft. Bowel sounds are normal. He exhibits no distension. There is no tenderness. Musculoskeletal: Normal range of motion. He exhibits no edema or deformity. Neurological: He is alert and oriented to person, place, and time. Coordination normal.   Normal HINTS exam without nystagmus, normal test of skew, normal head impulse testing   Skin: Skin is warm and dry. No rash noted. DIFFERENTIAL  DIAGNOSIS     PLAN (LABS / IMAGING / EKG):  Orders Placed This Encounter   Procedures    CTA NECK W CONTRAST    CTA HEAD W [address] [facility] Inpatient consult to Vascular Surgery       MEDICATIONS ORDERED:  Orders Placed This Encounter   Medications    iopamidol (ISOVUE-370) 76 % injection 75 mL    0.9 % sodium chloride bolus    sodium chloride flush 0.9 % injection 10 mL    prochlorperazine (COMPAZINE) injection 10 mg    0.9 % sodium chloride bolus    acetaminophen (TYLENOL) tablet 650 mg       DDX: posterior stroke, migraine, BPPV, vestibular neuritis    DIAGNOSTIC RESULTS / EMERGENCY DEPARTMENT COURSE / MDM     LABS:  No results found for this visit on 08/21/18. IMPRESSION: Ruiz Abbott is a 65 y.o. presenting with dizziness upon waking up this morning. Patient is outside the TPA window, no stroke alert has been called. Patient transferred to our hospital for CT scan of the head and neck. Patient will receive a CTA of the head and neck for evaluation of posterior stroke. Patient's vital signs indicate slight hypertension with otherwise normal signs. Patient does not have abnormal cerebellar testing, no abnormal or focal neurologic signs.   We'll treat patient's migraine with Compazine and Benadryl, likely discharge if negative CTA and symptomatically improvement of with a voice recognition program.  Efforts were made to edit the dictations but occasionally words are mis-transcribed.)       Tiffanie Luque MD  08/22/18 0106

## 2018-08-22 NOTE — PROGRESS NOTES
[address] PRIMARY CARE  [address] [facility] [address]  [place] [address]  Dept: [phone]  Dept Fax: [phone]    Karsten Baca is a 65 y.o. male who presents today for his medical conditions/complaints as noted below. Karsten Baca is c/o of   Chief Complaint   Patient presents with    Hypertension     patient thought his BP was high    Dizziness     patient said he went to the ER yesterday because he was dizzy, patient is feeling the same way today    Discuss Labs     CT    Other     patient said he had a hard time driving here        HPI:     HPI     Pt is a 66 y/o male here due to dizziness. Woke up yesterday with dizziness and a headache and went to [facility] ED at that time. Was later transferred to [facility] due to malfunctioning CT scanner. Pt has hx of stroke and MI. Denies any chest pain, SOB, fever, chills, nausea, vomiting, numbness, tingling, weakness. Feels best when he is sitting or laying down, but worse when he stands up. 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 Treated with compazine and benadryl. It was also recommended he follow up with vascular surgery/neurology. States he got out of bed today feeling the dizziness again. Does have some nasal congestion but states this is not new. No ear pain or hearing loss, or recent cold symptoms. Sees cardiology for his HTN and saw one recently. Had ECHO ordered as well for murmur/SOB. Hemoglobin A1C (%)   Date Value   04/26/2017 6.1 (H)   10/18/2016 6.8             ( goal A1C is < 7)   Microalb/Crt.  Ratio (mcg/mg creat)   Date Value   04/26/2017 17 (H)     No results found for: LDLCHOLESTEROL, LDLCALC    (goal LDL is <100)   AST (U/L)   Date Value   08/07/2017 25     ALT (U/L)   Date Value   08/07/2017 37 BUN (mg/dL)   Date Value   08/04/2018 17     BP Readings from Last 3 Encounters:   08/22/18 110/78   08/22/18 (!) 156/99   08/13/18 116/80          (goal 120/80)    Past Medical History:   Diagnosis Date    ADHD (attention deficit hyperactivity disorder)     Arthritis     knees, hands, back.  Asthma     as child.  Back pain     CAD (coronary artery disease)     Cataract     Depression     pt. denies.  Diabetes [facility]     Heart attack ([facility]     2011    Hypertension     Kidney stones     Mini stroke ([facility]     2014    Osteoarthritis     Seasonal allergies     Sinus problem       Past Surgical History:   Procedure Laterality Date    CHOLECYSTECTOMY      2016    COLONOSCOPY      HERNIA REPAIR      1995, rt. inguinal    HERNIA REPAIR Left 01/2018    repair    HERNIA REPAIR Right 01/2018    looked at at same time they did left repair    INGUINAL HERNIA REPAIR Left 08/19/2016    LAPAROSCOPY N/A 1/16/2018    DIAGNOSTIC LAPAROSCOPY performed by Ronnie Weaver MD at [address] [place] [address],[address]         Family History   Problem Relation Age of Onset    Cancer Father         throat    Cirrhosis Father     High Blood Pressure Sister     Asthma Sister     Arthritis Sister     High Blood Pressure Mother     Heart Disease Mother     Alzheimer's Disease Mother        Social History   Substance Use Topics    Smoking status: Current Every Day Smoker     Packs/day: 0.25     Years: 28.00    Smokeless tobacco: Never Used    Alcohol use Yes      Comment: Social, rarely      Current Outpatient Prescriptions   Medication Sig Dispense Refill    oxyCODONE-acetaminophen (PERCOCET) 5-325 MG per tablet Take 1 tablet by mouth 3 times daily. Orval Opitz amLODIPine (NORVASC) 5 MG tablet Take 1 tablet by mouth daily 90 tablet 3    atorvastatin (LIPITOR) 40 MG tablet Take 1 tablet by mouth daily 30 tablet 3    cloNIDine (CATAPRES) 0.1 MG tablet Take 1 tablet by mouth 2 seeing vascular surgery, and rec neurology follow up too. Neuro exam normal today. Dizziness could possibly be due to orthostatic hypotension as his vitals were + and he gets dizzy when he gets up from laying down or sitting. Recommend to cut amlodipine dose to 2.5 mg and follow up with cardiology. Also recommend compression stockings and getting up slowly and staying hydrated. Recommend he get someone to drive him home. Work note provided. 2. Orthostatic hypotension  As noted above. Plan:      Return if symptoms worsen or fail to improve. No orders of the defined types were placed in this encounter. No orders of the defined types were placed in this encounter.         Electronically signed by Jhon Bond DO on 8/22/2018 at 11:09 AM

## 2018-08-22 NOTE — LETTER
[address]  [place] [address]  Phone: [phone]  Fax: [phone] [facility] Ninfa Jeffery DO        August 22, 2018     Patient: Lance Armendariz   YOB: 1960   Date of Visit: 8/22/2018       To Whom It May Concern:    Please excuse  Lance Armendariz from work until further notice. He was seen in our clinic today. If you have any questions or concerns, please don't hesitate to call.     Sincerely,        [place]-Lindsay Squibb, DO

## 2018-08-29 DIAGNOSIS — R53.83 FATIGUE, UNSPECIFIED TYPE: Primary | ICD-10-CM

## 2018-08-31 ENCOUNTER — TELEPHONE (OUTPATIENT)
Dept: NEUROLOGY | Age: 58
End: 2018-08-31

## 2018-09-04 ENCOUNTER — OFFICE VISIT (OUTPATIENT)
Dept: NEUROLOGY | Age: 58
End: 2018-09-04
Payer: MEDICAID

## 2018-09-04 ENCOUNTER — TELEPHONE (OUTPATIENT)
Dept: PRIMARY CARE CLINIC | Age: 58
End: 2018-09-04

## 2018-09-04 VITALS
DIASTOLIC BLOOD PRESSURE: 87 MMHG | HEART RATE: 65 BPM | HEIGHT: 69 IN | WEIGHT: 238.4 LBS | SYSTOLIC BLOOD PRESSURE: 128 MMHG | BODY MASS INDEX: 35.31 KG/M2

## 2018-09-04 DIAGNOSIS — I67.1 CEREBRAL ANEURYSM, NONRUPTURED: Primary | ICD-10-CM

## 2018-09-04 DIAGNOSIS — R41.3 MEMORY LOSS: ICD-10-CM

## 2018-09-04 DIAGNOSIS — M79.604 DIFFUSE PAIN IN RIGHT LOWER EXTREMITY: ICD-10-CM

## 2018-09-04 DIAGNOSIS — F32.A ANXIETY AND DEPRESSION: ICD-10-CM

## 2018-09-04 DIAGNOSIS — F41.9 ANXIETY AND DEPRESSION: ICD-10-CM

## 2018-09-04 PROCEDURE — G8417 CALC BMI ABV UP PARAM F/U: HCPCS | Performed by: PSYCHIATRY & NEUROLOGY

## 2018-09-04 PROCEDURE — 99214 OFFICE O/P EST MOD 30 MIN: CPT | Performed by: PSYCHIATRY & NEUROLOGY

## 2018-09-04 PROCEDURE — G8427 DOCREV CUR MEDS BY ELIG CLIN: HCPCS | Performed by: PSYCHIATRY & NEUROLOGY

## 2018-09-04 PROCEDURE — G8598 ASA/ANTIPLAT THER USED: HCPCS | Performed by: PSYCHIATRY & NEUROLOGY

## 2018-09-04 PROCEDURE — 4004F PT TOBACCO SCREEN RCVD TLK: CPT | Performed by: PSYCHIATRY & NEUROLOGY

## 2018-09-04 PROCEDURE — 3017F COLORECTAL CA SCREEN DOC REV: CPT | Performed by: PSYCHIATRY & NEUROLOGY

## 2018-09-04 NOTE — PROGRESS NOTES
NEUROLOGY FOLLOW-UP  Patient Name:  Viridiana Quinn  :   1960  Clinic Visit Date: 2018    I saw Mr. Viridiana Quinn in follow-up in the office today in continuation of neurologic care. REVIEW OF SYSTEMS  Constitutional Weight changes: present, Fevers : present, Fatigue: present; Any recent hospitalizations:  absent.    HEENT Ears: normal, Eyes: glasses, Visual disturbance: absent   Reespiratory Shortness of breath: present, Cough: absent   Cardivascular Chest pain: absent, Leg swelling :absent   GI Constipation: absent, Diarrhea: absent, Swallowing change: absent    Urinary frequency: present, Urinary urgency: absent, Urinary incontinence: absent   Musculoskeletal Neck pain: absent, Back pain: present, Stiffness: absent, Muscle pain: present, Joint pain: present, Restless legs: present   Dermatological Hair loss: absent, Skin changes: absent   Neurological Memory loss: present, Confusion: absent, Seizures: absent; Trouble walking or imbalance: present, Dizziness: present, Weakness: absent, Numbness absent, Tremor: absent, Spasm: absent, Speech difficulty: absent, Headache: present, Light sensitivity: absent   Psychiatric Anxiety: present, Depression  present, Suicidal ideations absent   Hematologic Abnormal bleeding: absent, Anemia: absent, Clotting disorder: absent, Lymph gland changes: absent

## 2018-09-07 PROBLEM — E78.2 MIXED HYPERLIPIDEMIA: Status: ACTIVE | Noted: 2018-09-07

## 2018-09-10 DIAGNOSIS — F41.9 ANXIETY AND DEPRESSION: ICD-10-CM

## 2018-09-10 DIAGNOSIS — F32.A ANXIETY AND DEPRESSION: ICD-10-CM

## 2018-09-10 RX ORDER — DULOXETIN HYDROCHLORIDE 30 MG/1
30 CAPSULE, DELAYED RELEASE ORAL DAILY
Qty: 30 CAPSULE | Refills: 0 | Status: SHIPPED | OUTPATIENT
Start: 2018-09-10 | End: 2018-11-06 | Stop reason: SDUPTHER

## 2018-09-13 ENCOUNTER — INITIAL CONSULT (OUTPATIENT)
Dept: VASCULAR SURGERY | Age: 58
End: 2018-09-13
Payer: COMMERCIAL

## 2018-09-13 VITALS — HEART RATE: 65 BPM | OXYGEN SATURATION: 95 % | DIASTOLIC BLOOD PRESSURE: 92 MMHG | SYSTOLIC BLOOD PRESSURE: 131 MMHG

## 2018-09-13 DIAGNOSIS — I70.90 ARTERIAL ATHEROSCLEROSIS: Primary | ICD-10-CM

## 2018-09-13 PROCEDURE — 3017F COLORECTAL CA SCREEN DOC REV: CPT | Performed by: SURGERY

## 2018-09-13 PROCEDURE — G8427 DOCREV CUR MEDS BY ELIG CLIN: HCPCS | Performed by: SURGERY

## 2018-09-13 PROCEDURE — 4004F PT TOBACCO SCREEN RCVD TLK: CPT | Performed by: SURGERY

## 2018-09-13 PROCEDURE — 99204 OFFICE O/P NEW MOD 45 MIN: CPT | Performed by: SURGERY

## 2018-09-13 PROCEDURE — G8598 ASA/ANTIPLAT THER USED: HCPCS | Performed by: SURGERY

## 2018-09-13 PROCEDURE — G8417 CALC BMI ABV UP PARAM F/U: HCPCS | Performed by: SURGERY

## 2018-09-13 RX ORDER — ALBUTEROL SULFATE 90 UG/1
2 AEROSOL, METERED RESPIRATORY (INHALATION) EVERY 6 HOURS PRN
Qty: 1 INHALER | Refills: 3 | Status: SHIPPED | OUTPATIENT
Start: 2018-09-13 | End: 2018-10-25 | Stop reason: SDUPTHER

## 2018-09-14 ENCOUNTER — OFFICE VISIT (OUTPATIENT)
Dept: PRIMARY CARE CLINIC | Age: 58
End: 2018-09-14
Payer: MEDICAID

## 2018-09-14 VITALS
SYSTOLIC BLOOD PRESSURE: 130 MMHG | HEART RATE: 68 BPM | OXYGEN SATURATION: 98 % | DIASTOLIC BLOOD PRESSURE: 86 MMHG | WEIGHT: 239.8 LBS | BODY MASS INDEX: 35.41 KG/M2

## 2018-09-14 DIAGNOSIS — I10 ESSENTIAL HYPERTENSION: ICD-10-CM

## 2018-09-14 DIAGNOSIS — I70.90 ATHEROSCLEROSIS: Primary | ICD-10-CM

## 2018-09-14 DIAGNOSIS — Z13.1 DIABETES MELLITUS SCREENING: ICD-10-CM

## 2018-09-14 DIAGNOSIS — E78.5 DYSLIPIDEMIA: ICD-10-CM

## 2018-09-14 LAB
CREATININE URINE POCT: 300
HBA1C MFR BLD: 5.7 %
MICROALBUMIN/CREAT 24H UR: 30 MG/G{CREAT}
MICROALBUMIN/CREAT UR-RTO: >30

## 2018-09-14 PROCEDURE — 82044 UR ALBUMIN SEMIQUANTITATIVE: CPT | Performed by: FAMILY MEDICINE

## 2018-09-14 PROCEDURE — G8598 ASA/ANTIPLAT THER USED: HCPCS | Performed by: FAMILY MEDICINE

## 2018-09-14 PROCEDURE — 3017F COLORECTAL CA SCREEN DOC REV: CPT | Performed by: FAMILY MEDICINE

## 2018-09-14 PROCEDURE — 4004F PT TOBACCO SCREEN RCVD TLK: CPT | Performed by: FAMILY MEDICINE

## 2018-09-14 PROCEDURE — 99214 OFFICE O/P EST MOD 30 MIN: CPT | Performed by: FAMILY MEDICINE

## 2018-09-14 PROCEDURE — G8417 CALC BMI ABV UP PARAM F/U: HCPCS | Performed by: FAMILY MEDICINE

## 2018-09-14 PROCEDURE — G8427 DOCREV CUR MEDS BY ELIG CLIN: HCPCS | Performed by: FAMILY MEDICINE

## 2018-09-14 PROCEDURE — 83036 HEMOGLOBIN GLYCOSYLATED A1C: CPT | Performed by: FAMILY MEDICINE

## 2018-09-25 ENCOUNTER — TELEPHONE (OUTPATIENT)
Dept: PRIMARY CARE CLINIC | Age: 58
End: 2018-09-25

## 2018-09-25 NOTE — TELEPHONE ENCOUNTER
Patient called stating letter that was written and faxed on Sept 14th stated no restrictions but he states needs to say he cannot work 3rd shift due to his medications cause sleepiness. Fax to [employer] #[phone]. Please advise if letter will be completed.

## 2018-10-04 ENCOUNTER — HOSPITAL ENCOUNTER (OUTPATIENT)
Dept: SLEEP CENTER | Age: 58
Discharge: HOME | End: 2018-10-06
Payer: MEDICAID

## 2018-10-04 VITALS
DIASTOLIC BLOOD PRESSURE: 96 MMHG | HEIGHT: 71 IN | SYSTOLIC BLOOD PRESSURE: 144 MMHG | WEIGHT: 239 LBS | BODY MASS INDEX: 33.46 KG/M2

## 2018-10-04 DIAGNOSIS — R53.83 FATIGUE, UNSPECIFIED TYPE: ICD-10-CM

## 2018-10-04 PROCEDURE — 95810 POLYSOM 6/> YRS 4/> PARAM: CPT

## 2018-10-04 ASSESSMENT — SLEEP AND FATIGUE QUESTIONNAIRES
HOW LIKELY ARE YOU TO NOD OFF OR FALL ASLEEP WHILE SITTING INACTIVE IN A PUBLIC PLACE: 3
HOW LIKELY ARE YOU TO NOD OFF OR FALL ASLEEP WHILE SITTING QUIETLY AFTER LUNCH WITHOUT ALCOHOL: 2
HOW LIKELY ARE YOU TO NOD OFF OR FALL ASLEEP WHILE SITTING AND TALKING TO SOMEONE: 0
HOW LIKELY ARE YOU TO NOD OFF OR FALL ASLEEP WHILE WATCHING TV: 3
HOW LIKELY ARE YOU TO NOD OFF OR FALL ASLEEP WHILE SITTING AND READING: 3
HOW LIKELY ARE YOU TO NOD OFF OR FALL ASLEEP WHILE LYING DOWN TO REST IN THE AFTERNOON WHEN CIRCUMSTANCES PERMIT: 3
HOW LIKELY ARE YOU TO NOD OFF OR FALL ASLEEP IN A CAR, WHILE STOPPED FOR A FEW MINUTES IN TRAFFIC: 0
ESS TOTAL SCORE: 17
HOW LIKELY ARE YOU TO NOD OFF OR FALL ASLEEP WHEN YOU ARE A PASSENGER IN A CAR FOR AN HOUR WITHOUT A BREAK: 3

## 2018-10-23 ENCOUNTER — TELEPHONE (OUTPATIENT)
Dept: PRIMARY CARE CLINIC | Age: 58
End: 2018-10-23

## 2018-10-23 NOTE — TELEPHONE ENCOUNTER
[employer] called, they have questions regarding patient's actual return to work status. One note states she can return 09/14/18. Another dated for 09/25/18 states patient cannot work 3rd shift. They would like someone to call from the office to verify.     [phone] claim number [id number]    Dione bruner to speak to any member in the call center if Austin Solis is not available

## 2018-10-25 ENCOUNTER — OFFICE VISIT (OUTPATIENT)
Dept: PRIMARY CARE CLINIC | Age: 58
End: 2018-10-25
Payer: MEDICAID

## 2018-10-25 VITALS
HEART RATE: 77 BPM | BODY MASS INDEX: 35.37 KG/M2 | WEIGHT: 253.6 LBS | SYSTOLIC BLOOD PRESSURE: 126 MMHG | DIASTOLIC BLOOD PRESSURE: 82 MMHG | OXYGEN SATURATION: 96 % | RESPIRATION RATE: 18 BRPM

## 2018-10-25 DIAGNOSIS — Z23 NEED FOR INFLUENZA VACCINATION: ICD-10-CM

## 2018-10-25 DIAGNOSIS — E78.5 DYSLIPIDEMIA: ICD-10-CM

## 2018-10-25 DIAGNOSIS — F98.8 ATTENTION DEFICIT DISORDER, UNSPECIFIED HYPERACTIVITY PRESENCE: Primary | ICD-10-CM

## 2018-10-25 DIAGNOSIS — R35.1 NOCTURIA: ICD-10-CM

## 2018-10-25 DIAGNOSIS — R41.3 MEMORY LOSS: ICD-10-CM

## 2018-10-25 PROCEDURE — 99214 OFFICE O/P EST MOD 30 MIN: CPT | Performed by: FAMILY MEDICINE

## 2018-10-25 PROCEDURE — G8598 ASA/ANTIPLAT THER USED: HCPCS | Performed by: FAMILY MEDICINE

## 2018-10-25 PROCEDURE — 4004F PT TOBACCO SCREEN RCVD TLK: CPT | Performed by: FAMILY MEDICINE

## 2018-10-25 PROCEDURE — G8482 FLU IMMUNIZE ORDER/ADMIN: HCPCS | Performed by: FAMILY MEDICINE

## 2018-10-25 PROCEDURE — G8417 CALC BMI ABV UP PARAM F/U: HCPCS | Performed by: FAMILY MEDICINE

## 2018-10-25 PROCEDURE — 3017F COLORECTAL CA SCREEN DOC REV: CPT | Performed by: FAMILY MEDICINE

## 2018-10-25 PROCEDURE — G8427 DOCREV CUR MEDS BY ELIG CLIN: HCPCS | Performed by: FAMILY MEDICINE

## 2018-10-25 PROCEDURE — 90471 IMMUNIZATION ADMIN: CPT | Performed by: FAMILY MEDICINE

## 2018-10-25 PROCEDURE — 90688 IIV4 VACCINE SPLT 0.5 ML IM: CPT | Performed by: FAMILY MEDICINE

## 2018-10-25 RX ORDER — ALBUTEROL SULFATE 90 UG/1
2 AEROSOL, METERED RESPIRATORY (INHALATION) EVERY 6 HOURS PRN
Qty: 1 INHALER | Refills: 3 | Status: SHIPPED | OUTPATIENT
Start: 2018-10-25 | End: 2019-05-29 | Stop reason: SDUPTHER

## 2018-10-25 NOTE — PATIENT INSTRUCTIONS
Take part in a Jain activity or other social gathering. Go to a [employer] game. · Ask a friend to have dinner with you. Take care of yourself  · Eat a balanced diet with plenty of fresh fruits and vegetables, whole grains, and lean protein. If you have lost your appetite, eat small snacks rather than large meals. · Avoid drinking alcohol or using illegal drugs. Do not take medicines that have not been prescribed for you. They may interfere with medicines you may be taking for depression, or they may make your depression worse. · Take your medicines exactly as they are prescribed. You may start to feel better within 1 to 3 weeks of taking antidepressant medicine. But it can take as many as 6 to 8 weeks to see more improvement. If you have questions or concerns about your medicines, or if you do not notice any improvement by 3 weeks, talk to your doctor. · If you have any side effects from your medicine, tell your doctor. Antidepressants can make you feel tired, dizzy, or nervous. Some people have dry mouth, constipation, headaches, sexual problems, or diarrhea. Many of these side effects are mild and will go away on their own after you have been taking the medicine for a few weeks. Some may last longer. Talk to your doctor if side effects are bothering you too much. You might be able to try a different medicine. · Get enough sleep. If you have problems sleeping:  ¨ Go to bed at the same time every night, and get up at the same time every morning. ¨ Keep your bedroom dark and quiet. ¨ Do not exercise after 5:00 p.m. ¨ Avoid drinks with caffeine after 5:00 p.m. · Avoid sleeping pills unless they are prescribed by the doctor treating your depression. Sleeping pills may make you groggy during the day, and they may interact with other medicine you are taking. · If you have any other illnesses, such as diabetes, heart disease, or high blood pressure, make sure to continue with your treatment.  Tell your doctor about

## 2018-10-26 NOTE — PROGRESS NOTES
Vaccine Information Sheet, \"Influenza - Inactivated\"  given to Ovi Mosqueda, or parent/legal guardian of  Ovi Mosqueda and verbalized understanding. Patient responses:    Have you ever had a reaction to a flu vaccine? No  Are you able to eat eggs without adverse effects? No  Do you have any current illness? No  Have you ever had [place] Syndrome? No    Flu vaccine given per order. Please see immunization tab.
agitation      Assessment/ Plan / Medical Decision Making   Diagnosis Orders   1. Attention deficit disorder, unspecified hyperactivity presence  [facility] Locke, PhD   2. Dyslipidemia     3. Memory loss     4. Nocturia  Psa screening    Hemoglobin A1C   5. Need for influenza vaccination  INFLUENZA, QUADV, 3 YRS AND OLDER, IM, MDV, 0.5ML (FLUZONE QUADV)       Medications, laboratory testing, imaging, consultation, and follow up as documented in this encounter. Refer to psychology for psychometric testing. Continue current lipid-lowering treatment. Follow-up with neurology for continued evaluation and management of memory loss. With respect to his nocturia, check labs as indicated above. Update immunizations. Follow up in our office in approximately one month or as needed. Rosie Arboleda received counseling on the following healthy behaviors: medication adherence    Patient given educational materials on depression self care. Was a self-tracking handout given in paper form or via [employer]? No  If yes, see orders or list here. Discussed use, benefit, and side effects of prescribed medications. Barriers to medication compliance addressed. All patient questions answered. Pt voiced understanding. This note is created with the assistance of a speech-recognition program.  While intending to generate a document that actually reflects the content of the visit, no guarantees can be provided that every mistake has been identified and corrected by editing.

## 2018-10-29 ENCOUNTER — TELEPHONE (OUTPATIENT)
Dept: PRIMARY CARE CLINIC | Age: 58
End: 2018-10-29

## 2018-11-02 ENCOUNTER — TELEPHONE (OUTPATIENT)
Dept: PRIMARY CARE CLINIC | Age: 58
End: 2018-11-02

## 2018-11-06 ENCOUNTER — OFFICE VISIT (OUTPATIENT)
Dept: NEUROLOGY | Age: 58
End: 2018-11-06
Payer: MEDICAID

## 2018-11-06 VITALS
WEIGHT: 256 LBS | DIASTOLIC BLOOD PRESSURE: 92 MMHG | BODY MASS INDEX: 36.65 KG/M2 | HEIGHT: 70 IN | SYSTOLIC BLOOD PRESSURE: 131 MMHG | HEART RATE: 67 BPM

## 2018-11-06 DIAGNOSIS — R41.3 MEMORY LOSS: Primary | ICD-10-CM

## 2018-11-06 DIAGNOSIS — F32.A ANXIETY AND DEPRESSION: ICD-10-CM

## 2018-11-06 DIAGNOSIS — F41.9 ANXIETY AND DEPRESSION: ICD-10-CM

## 2018-11-06 DIAGNOSIS — M79.604 DIFFUSE PAIN IN RIGHT LOWER EXTREMITY: ICD-10-CM

## 2018-11-06 DIAGNOSIS — I67.1 CEREBRAL ANEURYSM, NONRUPTURED: ICD-10-CM

## 2018-11-06 DIAGNOSIS — R20.8 DYSESTHESIA: ICD-10-CM

## 2018-11-06 PROCEDURE — 4004F PT TOBACCO SCREEN RCVD TLK: CPT | Performed by: PSYCHIATRY & NEUROLOGY

## 2018-11-06 PROCEDURE — G8427 DOCREV CUR MEDS BY ELIG CLIN: HCPCS | Performed by: PSYCHIATRY & NEUROLOGY

## 2018-11-06 PROCEDURE — 3017F COLORECTAL CA SCREEN DOC REV: CPT | Performed by: PSYCHIATRY & NEUROLOGY

## 2018-11-06 PROCEDURE — G8417 CALC BMI ABV UP PARAM F/U: HCPCS | Performed by: PSYCHIATRY & NEUROLOGY

## 2018-11-06 PROCEDURE — G8598 ASA/ANTIPLAT THER USED: HCPCS | Performed by: PSYCHIATRY & NEUROLOGY

## 2018-11-06 PROCEDURE — G8482 FLU IMMUNIZE ORDER/ADMIN: HCPCS | Performed by: PSYCHIATRY & NEUROLOGY

## 2018-11-06 PROCEDURE — 99214 OFFICE O/P EST MOD 30 MIN: CPT | Performed by: PSYCHIATRY & NEUROLOGY

## 2018-11-06 RX ORDER — DULOXETINE 40 MG/1
CAPSULE, DELAYED RELEASE ORAL
Qty: 30 CAPSULE | Refills: 0 | Status: SHIPPED | OUTPATIENT
Start: 2018-11-06 | End: 2018-12-27 | Stop reason: SDUPTHER

## 2018-11-06 NOTE — PROGRESS NOTES
MOUTH DAILY 30 capsule 0    carvedilol (COREG) 25 MG tablet Take 25 mg by mouth 2 times daily (with meals)      oxyCODONE-acetaminophen (PERCOCET) 5-325 MG per tablet Take 1 tablet by mouth 3 times daily. Allegra Salvage amLODIPine (NORVASC) 5 MG tablet Take 1 tablet by mouth daily 90 tablet 3    donepezil (ARICEPT) 10 MG tablet Take 1 tablet by mouth nightly 30 tablet 3    diphenhydrAMINE (BENADRYL) 25 MG capsule Take 25 mg by mouth every 6 hours as needed for Itching      tiZANidine (ZANAFLEX) 2 MG capsule Take 1 capsule by mouth daily      aspirin 81 MG tablet Take 81 mg by mouth daily. No current facility-administered medications on file prior to visit. Allergies: Krystyna Arana is allergic to lisinopril; ibuprofen; and lipitor [atorvastatin]. Past Medical History:   Diagnosis Date    ADHD (attention deficit hyperactivity disorder)     Arthritis     knees, hands, back.  Asthma     as child.  Back pain     CAD (coronary artery disease)     Cataract     Depression     pt. denies.  Diabetes mellitus ([address]     Heart attack ([address]     2011    Hypertension     Kidney stones     Mini stroke ([facility]     2014    Osteoarthritis     Seasonal allergies     Sinus problem        Past Surgical History:   Procedure Laterality Date    CHOLECYSTECTOMY      2016    COLONOSCOPY      HERNIA REPAIR      1995, rt. inguinal    HERNIA REPAIR Left 01/2018    repair    HERNIA REPAIR Right 01/2018    looked at at same time they did left repair    INGUINAL HERNIA REPAIR Left 08/19/2016    LAPAROSCOPY N/A 1/16/2018    DIAGNOSTIC LAPAROSCOPY performed by Kwame Jackson MD at [address] [place] [address],[address]       Social History: Krsytyna Arana  reports that he has been smoking. He has a 7.00 pack-year smoking history. He has never used smokeless tobacco. He reports that he drinks alcohol. He reports that he does not use drugs.     Family History   Problem Relation

## 2018-11-08 ENCOUNTER — APPOINTMENT (OUTPATIENT)
Dept: GENERAL RADIOLOGY | Age: 58
End: 2018-11-08
Payer: MEDICAID

## 2018-11-08 ENCOUNTER — OFFICE VISIT (OUTPATIENT)
Dept: NEUROLOGY | Age: 58
End: 2018-11-08
Payer: MEDICAID

## 2018-11-08 ENCOUNTER — TELEPHONE (OUTPATIENT)
Dept: NEUROLOGY | Age: 58
End: 2018-11-08

## 2018-11-08 ENCOUNTER — HOSPITAL ENCOUNTER (OUTPATIENT)
Age: 58
Setting detail: OBSERVATION
Discharge: HOME | End: 2018-11-10
Attending: EMERGENCY MEDICINE | Admitting: EMERGENCY MEDICINE
Payer: MEDICAID

## 2018-11-08 VITALS
HEART RATE: 71 BPM | SYSTOLIC BLOOD PRESSURE: 139 MMHG | WEIGHT: 258 LBS | DIASTOLIC BLOOD PRESSURE: 89 MMHG | HEIGHT: 70 IN | BODY MASS INDEX: 36.94 KG/M2

## 2018-11-08 DIAGNOSIS — I67.1 CEREBRAL ANEURYSM, NONRUPTURED: Primary | ICD-10-CM

## 2018-11-08 DIAGNOSIS — R07.9 CHEST PAIN, UNSPECIFIED TYPE: Primary | ICD-10-CM

## 2018-11-08 DIAGNOSIS — I67.1 ANEURYSM OF INTRACRANIAL PORTION OF LEFT INTERNAL CAROTID ARTERY: ICD-10-CM

## 2018-11-08 LAB
ABSOLUTE EOS #: 0.36 K/UL (ref 0–0.44)
ABSOLUTE IMMATURE GRANULOCYTE: <0.03 K/UL (ref 0–0.3)
ABSOLUTE LYMPH #: 3.13 K/UL (ref 1.1–3.7)
ABSOLUTE MONO #: 0.63 K/UL (ref 0.1–1.2)
ANION GAP SERPL CALCULATED.3IONS-SCNC: 8 MMOL/L (ref 9–17)
BASOPHILS # BLD: 0 % (ref 0–2)
BASOPHILS ABSOLUTE: 0.03 K/UL (ref 0–0.2)
BUN BLDV-MCNC: 13 MG/DL (ref 6–20)
BUN/CREAT BLD: ABNORMAL (ref 9–20)
CALCIUM SERPL-MCNC: 9 MG/DL (ref 8.6–10.4)
CHLORIDE BLD-SCNC: 100 MMOL/L (ref 98–107)
CO2: 29 MMOL/L (ref 20–31)
CREAT SERPL-MCNC: 0.98 MG/DL (ref 0.7–1.2)
DIFFERENTIAL TYPE: ABNORMAL
EOSINOPHILS RELATIVE PERCENT: 5 % (ref 1–4)
GFR AFRICAN AMERICAN: >60 ML/MIN
GFR NON-AFRICAN AMERICAN: >60 ML/MIN
GFR SERPL CREATININE-BSD FRML MDRD: ABNORMAL ML/MIN/{1.73_M2}
GFR SERPL CREATININE-BSD FRML MDRD: ABNORMAL ML/MIN/{1.73_M2}
GLUCOSE BLD-MCNC: 113 MG/DL (ref 70–99)
HCT VFR BLD CALC: 47.3 % (ref 40.7–50.3)
HEMOGLOBIN: 15.7 G/DL (ref 13–17)
IMMATURE GRANULOCYTES: 0 %
LYMPHOCYTES # BLD: 41 % (ref 24–43)
MAGNESIUM: 2 MG/DL (ref 1.6–2.6)
MCH RBC QN AUTO: 30.3 PG (ref 25.2–33.5)
MCHC RBC AUTO-ENTMCNC: 33.2 G/DL (ref 28.4–34.8)
MCV RBC AUTO: 91.3 FL (ref 82.6–102.9)
MONOCYTES # BLD: 8 % (ref 3–12)
NRBC AUTOMATED: 0 PER 100 WBC
PDW BLD-RTO: 12.5 % (ref 11.8–14.4)
PLATELET # BLD: 209 K/UL (ref 138–453)
PLATELET ESTIMATE: ABNORMAL
PMV BLD AUTO: 10.8 FL (ref 8.1–13.5)
POC TROPONIN I: 0 NG/ML (ref 0–0.1)
POC TROPONIN I: 0 NG/ML (ref 0–0.1)
POC TROPONIN INTERP: NORMAL
POC TROPONIN INTERP: NORMAL
POTASSIUM SERPL-SCNC: 4 MMOL/L (ref 3.7–5.3)
RBC # BLD: 5.18 M/UL (ref 4.21–5.77)
RBC # BLD: ABNORMAL 10*6/UL
SEG NEUTROPHILS: 46 % (ref 36–65)
SEGMENTED NEUTROPHILS ABSOLUTE COUNT: 3.43 K/UL (ref 1.5–8.1)
SODIUM BLD-SCNC: 137 MMOL/L (ref 135–144)
WBC # BLD: 7.6 K/UL (ref 3.5–11.3)
WBC # BLD: ABNORMAL 10*3/UL

## 2018-11-08 PROCEDURE — 3017F COLORECTAL CA SCREEN DOC REV: CPT | Performed by: PSYCHIATRY & NEUROLOGY

## 2018-11-08 PROCEDURE — 99285 EMERGENCY DEPT VISIT HI MDM: CPT

## 2018-11-08 PROCEDURE — 71046 X-RAY EXAM CHEST 2 VIEWS: CPT

## 2018-11-08 PROCEDURE — 84484 ASSAY OF TROPONIN QUANT: CPT

## 2018-11-08 PROCEDURE — 4004F PT TOBACCO SCREEN RCVD TLK: CPT | Performed by: PSYCHIATRY & NEUROLOGY

## 2018-11-08 PROCEDURE — 99204 OFFICE O/P NEW MOD 45 MIN: CPT | Performed by: PSYCHIATRY & NEUROLOGY

## 2018-11-08 PROCEDURE — 93005 ELECTROCARDIOGRAM TRACING: CPT

## 2018-11-08 PROCEDURE — G0378 HOSPITAL OBSERVATION PER HR: HCPCS

## 2018-11-08 PROCEDURE — 83735 ASSAY OF MAGNESIUM: CPT

## 2018-11-08 PROCEDURE — 85025 COMPLETE CBC W/AUTO DIFF WBC: CPT

## 2018-11-08 PROCEDURE — 80048 BASIC METABOLIC PNL TOTAL CA: CPT

## 2018-11-08 PROCEDURE — G8598 ASA/ANTIPLAT THER USED: HCPCS | Performed by: PSYCHIATRY & NEUROLOGY

## 2018-11-08 PROCEDURE — G8427 DOCREV CUR MEDS BY ELIG CLIN: HCPCS | Performed by: PSYCHIATRY & NEUROLOGY

## 2018-11-08 PROCEDURE — G8482 FLU IMMUNIZE ORDER/ADMIN: HCPCS | Performed by: PSYCHIATRY & NEUROLOGY

## 2018-11-08 PROCEDURE — G8417 CALC BMI ABV UP PARAM F/U: HCPCS | Performed by: PSYCHIATRY & NEUROLOGY

## 2018-11-08 PROCEDURE — 2580000003 HC RX 258: Performed by: STUDENT IN AN ORGANIZED HEALTH CARE EDUCATION/TRAINING PROGRAM

## 2018-11-08 RX ORDER — DULOXETIN HYDROCHLORIDE 30 MG/1
30 CAPSULE, DELAYED RELEASE ORAL DAILY
Status: DISCONTINUED | OUTPATIENT
Start: 2018-11-09 | End: 2018-11-10 | Stop reason: HOSPADM

## 2018-11-08 RX ORDER — DIPHENHYDRAMINE HCL 25 MG
25 TABLET ORAL EVERY 6 HOURS PRN
Status: DISCONTINUED | OUTPATIENT
Start: 2018-11-08 | End: 2018-11-10 | Stop reason: HOSPADM

## 2018-11-08 RX ORDER — CARVEDILOL 25 MG/1
25 TABLET ORAL 2 TIMES DAILY WITH MEALS
Status: DISCONTINUED | OUTPATIENT
Start: 2018-11-09 | End: 2018-11-10 | Stop reason: HOSPADM

## 2018-11-08 RX ORDER — ACETAMINOPHEN 325 MG/1
650 TABLET ORAL EVERY 4 HOURS PRN
Status: DISCONTINUED | OUTPATIENT
Start: 2018-11-08 | End: 2018-11-10 | Stop reason: HOSPADM

## 2018-11-08 RX ORDER — TIZANIDINE 2 MG/1
2 TABLET ORAL DAILY
Status: DISCONTINUED | OUTPATIENT
Start: 2018-11-09 | End: 2018-11-10 | Stop reason: HOSPADM

## 2018-11-08 RX ORDER — AMLODIPINE BESYLATE 5 MG/1
5 TABLET ORAL DAILY
Status: DISCONTINUED | OUTPATIENT
Start: 2018-11-09 | End: 2018-11-10 | Stop reason: HOSPADM

## 2018-11-08 RX ORDER — SIMVASTATIN 20 MG
20 TABLET ORAL NIGHTLY
Status: DISCONTINUED | OUTPATIENT
Start: 2018-11-08 | End: 2018-11-10 | Stop reason: HOSPADM

## 2018-11-08 RX ORDER — ALBUTEROL SULFATE 90 UG/1
2 AEROSOL, METERED RESPIRATORY (INHALATION) EVERY 6 HOURS PRN
Status: DISCONTINUED | OUTPATIENT
Start: 2018-11-08 | End: 2018-11-10 | Stop reason: HOSPADM

## 2018-11-08 RX ORDER — SODIUM CHLORIDE 0.9 % (FLUSH) 0.9 %
10 SYRINGE (ML) INJECTION EVERY 12 HOURS SCHEDULED
Status: DISCONTINUED | OUTPATIENT
Start: 2018-11-08 | End: 2018-11-10 | Stop reason: HOSPADM

## 2018-11-08 RX ORDER — CLONIDINE HYDROCHLORIDE 0.1 MG/1
0.1 TABLET ORAL 2 TIMES DAILY PRN
Status: DISCONTINUED | OUTPATIENT
Start: 2018-11-08 | End: 2018-11-10 | Stop reason: HOSPADM

## 2018-11-08 RX ORDER — SIMVASTATIN 20 MG
20 TABLET ORAL NIGHTLY
Status: DISCONTINUED | OUTPATIENT
Start: 2018-11-08 | End: 2018-11-08

## 2018-11-08 RX ORDER — ASPIRIN 81 MG/1
81 TABLET ORAL DAILY
Status: DISCONTINUED | OUTPATIENT
Start: 2018-11-09 | End: 2018-11-10 | Stop reason: HOSPADM

## 2018-11-08 RX ORDER — SODIUM CHLORIDE 0.9 % (FLUSH) 0.9 %
10 SYRINGE (ML) INJECTION PRN
Status: DISCONTINUED | OUTPATIENT
Start: 2018-11-08 | End: 2018-11-10 | Stop reason: HOSPADM

## 2018-11-08 RX ORDER — DONEPEZIL HYDROCHLORIDE 10 MG/1
10 TABLET, FILM COATED ORAL NIGHTLY
Status: DISCONTINUED | OUTPATIENT
Start: 2018-11-08 | End: 2018-11-10 | Stop reason: HOSPADM

## 2018-11-08 RX ADMIN — Medication 10 ML: at 23:19

## 2018-11-08 ASSESSMENT — ENCOUNTER SYMPTOMS
EYE REDNESS: 0
VOMITING: 0
NAUSEA: 0
VOMITING: 0
SHORTNESS OF BREATH: 0
EYE DISCHARGE: 0
COUGH: 0
COLOR CHANGE: 0
VOICE CHANGE: 0
NAUSEA: 0
COLOR CHANGE: 0
SHORTNESS OF BREATH: 0
CHEST TIGHTNESS: 0
PHOTOPHOBIA: 0
ABDOMINAL PAIN: 0

## 2018-11-08 ASSESSMENT — PAIN SCALES - GENERAL: PAINLEVEL_OUTOF10: 5

## 2018-11-08 ASSESSMENT — PAIN DESCRIPTION - ONSET: ONSET: ON-GOING

## 2018-11-08 ASSESSMENT — PAIN DESCRIPTION - FREQUENCY: FREQUENCY: CONTINUOUS

## 2018-11-08 ASSESSMENT — PAIN DESCRIPTION - LOCATION: LOCATION: CHEST

## 2018-11-08 ASSESSMENT — PAIN DESCRIPTION - ORIENTATION: ORIENTATION: LEFT;MID

## 2018-11-08 ASSESSMENT — HEART SCORE
ECG: 0
ECG: 1

## 2018-11-08 ASSESSMENT — PAIN DESCRIPTION - PROGRESSION: CLINICAL_PROGRESSION: GRADUALLY WORSENING

## 2018-11-08 ASSESSMENT — PAIN DESCRIPTION - DESCRIPTORS: DESCRIPTORS: ACHING;SHARP

## 2018-11-08 ASSESSMENT — PAIN DESCRIPTION - PAIN TYPE: TYPE: ACUTE PAIN

## 2018-11-08 NOTE — PROGRESS NOTES
Upper Arm, Position: Sitting, Cuff Size: Large Adult)   Pulse 71   Ht 5' 10\" (1.778 m)   Wt 258 lb (117 kg)   BMI 37.02 kg/m²   Physical Exam  Gen: Lying in bed   CV;RRR  NEURO: alert and oriented x 3 intact language attention and knowledge  CN: eomi, perrl, v1-v3, midline tongue, nl ss, vff  MOTOR 5/5 bue/ble  Sensory: intact lt  COORD: no dysmetria    Right subclavian soft plaque   and       Assessment:        Melisa Cabezas is a 63 y.o. male who had incidental left ica supraclinoid blister aneurysm. Diagnosis Orders   1. Cerebral aneurysm, nonruptured  MRA Head WO Contrast   2. 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 Orders Placed This Encounter   Procedures    MRA Head WO Contrast     Standing Status:   Future     Standing Expiration Date:   2020     Scheduling Instructions:      One year followup     Order Specific Question:   Reason for exam:     Answer:   left ica aneurysm       1. Smoking cessation  2. Annual followup with mra head of the left ica blister aneurysm. 3. Cont aspirin and statins due to right subclavian soft plaque with stenosis    New Patient Visit Time:  45 minutes  Time Spent in Counselin minutes  Greater than 50% of the time in this visit was spent counseling and coordinating care of this patient. Patient given educational materials - see patient instructions. Discussed use, benefit, and side effects of prescribed medications. Personally reviewed imagingwith patients and all questions answered. Pt voiced understanding. Patient agreedwith treatment plan. Follow up as directed above. If you have any questions, please do not hesitate to call me.   I look forward [address]      Sincerely,        Christoph Zapata MD  Electronically signed by My Hernandez MD on 2018 at

## 2018-11-09 ENCOUNTER — APPOINTMENT (OUTPATIENT)
Dept: NUCLEAR MEDICINE | Age: 58
End: 2018-11-09
Payer: MEDICAID

## 2018-11-09 LAB
TROPONIN INTERP: NORMAL
TROPONIN INTERP: NORMAL
TROPONIN T: <0.03 NG/ML
TROPONIN T: <0.03 NG/ML

## 2018-11-09 PROCEDURE — 6370000000 HC RX 637 (ALT 250 FOR IP): Performed by: STUDENT IN AN ORGANIZED HEALTH CARE EDUCATION/TRAINING PROGRAM

## 2018-11-09 PROCEDURE — G0378 HOSPITAL OBSERVATION PER HR: HCPCS

## 2018-11-09 PROCEDURE — 78452 HT MUSCLE IMAGE SPECT MULT: CPT

## 2018-11-09 PROCEDURE — 2580000003 HC RX 258: Performed by: STUDENT IN AN ORGANIZED HEALTH CARE EDUCATION/TRAINING PROGRAM

## 2018-11-09 PROCEDURE — 3430000000 HC RX DIAGNOSTIC RADIOPHARMACEUTICAL: Performed by: INTERNAL MEDICINE

## 2018-11-09 PROCEDURE — 84484 ASSAY OF TROPONIN QUANT: CPT

## 2018-11-09 PROCEDURE — 96372 THER/PROPH/DIAG INJ SC/IM: CPT

## 2018-11-09 PROCEDURE — 93005 ELECTROCARDIOGRAM TRACING: CPT

## 2018-11-09 PROCEDURE — 2580000003 HC RX 258: Performed by: INTERNAL MEDICINE

## 2018-11-09 PROCEDURE — 6360000002 HC RX W HCPCS: Performed by: STUDENT IN AN ORGANIZED HEALTH CARE EDUCATION/TRAINING PROGRAM

## 2018-11-09 PROCEDURE — 36415 COLL VENOUS BLD VENIPUNCTURE: CPT

## 2018-11-09 PROCEDURE — A9500 TC99M SESTAMIBI: HCPCS | Performed by: INTERNAL MEDICINE

## 2018-11-09 RX ORDER — OXYCODONE HYDROCHLORIDE AND ACETAMINOPHEN 5; 325 MG/1; MG/1
1 TABLET ORAL EVERY 6 HOURS PRN
Status: COMPLETED | OUTPATIENT
Start: 2018-11-09 | End: 2018-11-10

## 2018-11-09 RX ADMIN — CARVEDILOL 25 MG: 25 TABLET, FILM COATED ORAL at 09:52

## 2018-11-09 RX ADMIN — CARVEDILOL 25 MG: 25 TABLET, FILM COATED ORAL at 18:07

## 2018-11-09 RX ADMIN — Medication 10 ML: at 22:04

## 2018-11-09 RX ADMIN — Medication 10 ML: at 09:53

## 2018-11-09 RX ADMIN — AMLODIPINE BESYLATE 5 MG: 5 TABLET ORAL at 09:52

## 2018-11-09 RX ADMIN — DONEPEZIL HYDROCHLORIDE 10 MG: 10 TABLET, FILM COATED ORAL at 22:04

## 2018-11-09 RX ADMIN — TIZANIDINE 2 MG: 2 TABLET ORAL at 09:51

## 2018-11-09 RX ADMIN — TETRAKIS(2-METHOXYISOBUTYLISOCYANIDE)COPPER(I) TETRAFLUOROBORATE 29.2 MILLICURIE: 1 INJECTION, POWDER, LYOPHILIZED, FOR SOLUTION INTRAVENOUS at 15:14

## 2018-11-09 RX ADMIN — SODIUM CHLORIDE, PRESERVATIVE FREE 10 ML: 5 INJECTION INTRAVENOUS at 15:14

## 2018-11-09 RX ADMIN — DULOXETINE HYDROCHLORIDE 30 MG: 30 CAPSULE, DELAYED RELEASE ORAL at 09:52

## 2018-11-09 RX ADMIN — ACETAMINOPHEN 650 MG: 325 TABLET ORAL at 09:51

## 2018-11-09 RX ADMIN — ASPIRIN 81 MG: 81 TABLET ORAL at 09:52

## 2018-11-09 RX ADMIN — OXYCODONE HYDROCHLORIDE AND ACETAMINOPHEN 1 TABLET: 5; 325 TABLET ORAL at 04:49

## 2018-11-09 RX ADMIN — ENOXAPARIN SODIUM 40 MG: 40 INJECTION SUBCUTANEOUS at 09:51

## 2018-11-09 RX ADMIN — CLONIDINE HYDROCHLORIDE 0.1 MG: 0.1 TABLET ORAL at 22:04

## 2018-11-09 RX ADMIN — OXYCODONE HYDROCHLORIDE AND ACETAMINOPHEN 1 TABLET: 5; 325 TABLET ORAL at 18:08

## 2018-11-09 ASSESSMENT — PAIN DESCRIPTION - PROGRESSION
CLINICAL_PROGRESSION: NOT CHANGED
CLINICAL_PROGRESSION: NOT CHANGED
CLINICAL_PROGRESSION: GRADUALLY WORSENING
CLINICAL_PROGRESSION: NOT CHANGED

## 2018-11-09 ASSESSMENT — PAIN SCALES - GENERAL
PAINLEVEL_OUTOF10: 10
PAINLEVEL_OUTOF10: 10
PAINLEVEL_OUTOF10: 7
PAINLEVEL_OUTOF10: 3
PAINLEVEL_OUTOF10: 6

## 2018-11-09 ASSESSMENT — PAIN DESCRIPTION - ONSET: ONSET: ON-GOING

## 2018-11-09 ASSESSMENT — PAIN DESCRIPTION - ORIENTATION
ORIENTATION: RIGHT;LEFT;LOWER
ORIENTATION: LEFT
ORIENTATION: RIGHT

## 2018-11-09 ASSESSMENT — PAIN DESCRIPTION - DESCRIPTORS
DESCRIPTORS: SORE;SHARP
DESCRIPTORS: HEADACHE

## 2018-11-09 ASSESSMENT — PAIN DESCRIPTION - LOCATION
LOCATION: ARM;HEAD
LOCATION: ARM;LEG;HEAD
LOCATION: HEAD
LOCATION: BACK;LEG

## 2018-11-09 ASSESSMENT — PAIN DESCRIPTION - DIRECTION: RADIATING_TOWARDS: TO LEGS

## 2018-11-09 ASSESSMENT — PAIN DESCRIPTION - PAIN TYPE
TYPE: ACUTE PAIN

## 2018-11-09 ASSESSMENT — PAIN DESCRIPTION - FREQUENCY
FREQUENCY: CONTINUOUS
FREQUENCY: CONTINUOUS

## 2018-11-09 NOTE — ED NOTES
Second troponin in process. Report called to 3c RN, Ritchie Davey. Pt to floor after second troponin resulted.      Fanta Miles RN  11/08/18 5640

## 2018-11-09 NOTE — FLOWSHEET NOTE
Visit: Cathi Natarajan in room and asked if a short visit would be welcomed. Patient said yes. Patient seemed calm and relaxing in bed. When  asked what caused him to be here he said chest pains. Said he had experienced these symptoms before and a couple of nitro pills cleared it up. This time the nitro did nothing, so he came to the hospital.    Talked to patient about his family, four grown boys and two grown girls.  asked if they would be available to help patient and he said yes. Patient said he was anxious to be discharged.  said that was not an unusual feeling. Explained that the [address] [place] [address] is here to help in any way we can. Offered prayer which was accepted.  prayed with patient and excused himself. Chaplains will remain available to offer spiritual and emotional support as needed. 11/09/18 1155   Encounter Summary   Services provided to: Patient   Referral/Consult From: [address] [facility] (none)   Contact [facility] No   Continue Visiting (11/09)   Complexity of Encounter Low   Spiritual Assessment Completed Yes   Spiritual/Protestant   Type Spiritual support   Assessment Calm; Approachable   Intervention Active listening;Prayer;Discussed illness/injury and it's impact   Outcome Comfort;Expressed gratitude

## 2018-11-09 NOTE — ED NOTES
Labeled blood specimen collected and sent to lab via tube system.      Julieta Dodson RN  11/08/18 0570

## 2018-11-09 NOTE — PROGRESS NOTES
[address] [facility]  CDU / OBSERVATION eNCOUnter  Attending NOte       I performed a history and physical examination of the patient and discussed management with the resident. I reviewed the residents note and agree with the documented findings and plan of care. Any areas of disagreement are noted on the chart. I was personally present for the key portions of any procedures. I have documented in the chart those procedures where I was not present during the key portions. I have reviewed the nurses notes. I agree with the chief complaint, past medical history, past surgical history, allergies, medications, social and family history as documented unless otherwise noted below. The Family history, social history, and ROS are effectively unchanged since admission unless noted elsewhere in the chart. 60-year-old male admitted for chest pain. Heart score is 6. EKG unchanged from prior study. Patient smokes 5 cigarettes per day for 28 years. Smoking cessation counseling for 3 minutes. Discussed the effects of smoking in regards to cardiac disease. I explained how this negatively effects their condition, will contribute to increased recovery time, and possible lead to further health problems in the future. The patient is diabetic. I have reviewed the patient's chart and found the most recent A1c is 5.7. This indicates reasonable control.  Will continue to follow-up with PCP          Justino Siddiqui MD  Attending Emergency  Physician

## 2018-11-09 NOTE — CONSULTS
Consults- Cardiac    Cardiac evaluation and management. Admitted with chest pain radiating to left arm and SOB. Nitro did help some. Past History:   Hypertension  Hyperlipidemia  Diabetes mellitus    On multiple medication. No smoking  ETOH- socialy  F/H- +ve    ROS- as above    P/E:    VS- stable  HEENT-ok  Heart-Reg. Lung-Clear  Abd-obese  Ext-Ok  Neuro-WNL    IMPRESSION:  1. Chest pain-Troponin and EKG wnl  2. H/O-HBP,DM,Hyperlipidemia    PLAN:     NM stress test  Continue present meds    Thanks.

## 2018-11-09 NOTE — ED PROVIDER NOTES
cardiomediastinal silhouette is within normal limits. There is no consolidation, pneumothorax or evidence for edema. No evidence for effusion. No acute osseous abnormality is identified. No acute airspace disease identified. RECENT VITALS:     Temp: 98.3 °F (36.8 °C),  Pulse: 66, Resp: 12, BP: 113/82, SpO2: 96 %    This patient is a 63 y.o. Male with chest pain starting today. History of MI a few years ago, cardiac cath w/o stent placement. No interval improvement in symptoms following SL nitro. Work up negative. Awaiting second trop. Heart score of 5. Admitted to OBS for Cardiology consultation. OUTSTANDING TASKS / RECOMMENDATIONS:    1. Monitor second troponin, remainder of lab results  2. Monitor until transport to the floor. FINAL IMPRESSION:     1. Chest pain, unspecified type        DISPOSITION:         DISPOSITION:  []  Discharge   []  Transfer -    [x]  Admission -  OBS   []  Against Medical Advice   []  Eloped   FOLLOW-UP: No follow-up provider specified.    DISCHARGE MEDICATIONS: New Prescriptions    No medications on file          Susanne Olson MD  Emergency Medicine Resident  [facility]     Susanne Olson MD  11/09/18 4585
limit  Heart Score Total: 6    Score 0 - 3 = 2.5%  MACE over next 6 wks = Discharge home  Score 4 - 6 = 20.3%  MACE over next 6 wks = Obs admit  Score 7 - 10 = 72.7%  MACE over next 6 wks = Early invasive Rx    EMERGENCY DEPARTMENT COURSE:  Laboratory testing as well as imaging and 2 troponins were negative in the emergency room. Patient will be admitted to the observation unit for cardiology consultation the morning as well as stress test.  Patient is understanding of plan and agreeable to stay. Patient is awaiting bed in ETU signout was given to Dr. Roderick Zamorano. PROCEDURES:  None    CONSULTS:  IP CONSULT TO CARDIOLOGY    CRITICAL CARE:  Please seeattending note    FINAL IMPRESSION      1.  Chest pain, unspecified type          DISPOSITION / PLAN     DISPOSITION Admitted 11/08/2018 10:16:22 PM    Admitted to [address] [place] [address]:  Chaim Andrew MD  [place] [address] [address]  [phone]            DISCHARGE MEDICATIONS:  Current Discharge Medication List          Mariella Campa DO  EmergencyMedicine Resident    (Please note that portions of this note were completed with a voice recognition program.  Efforts were made to edit the dictations but occasionally words are mis-transcribed.)     Mariella Campa DO  Resident  11/08/18 62 Trish Reeder DO  Resident  11/08/18 8311

## 2018-11-09 NOTE — CONSULTS
Cataract; Depression; Diabetes mellitus ([facility] [address] Heart attack ([facility] [address] Hypertension; Kidney stones; Mini stroke ([facility] [address] Osteoarthritis; Seasonal allergies; and Sinus problem. Past Surgical History:   has a past surgical history that includes Cholecystectomy; Vasectomy; Colonoscopy; Upper gastrointestinal endoscopy; laparoscopy (N/A, 1/16/2018); hernia repair; Inguinal hernia repair (Left, 08/19/2016); hernia repair (Left, 01/2018); and hernia repair (Right, 01/2018). Home Medications:    Prior to Admission medications    Medication Sig Start Date End Date Taking? Authorizing Provider   DULoxetine 40 MG CPEP Take one cap qpm 11/6/18 12/6/18 Yes Aletha Oritz MD   albuterol sulfate HFA (VENTOLIN HFA) 108 (90 Base) MCG/ACT inhaler Inhale 2 puffs into the lungs every 6 hours as needed for Wheezing 10/25/18  Yes Shaina Finn MD   lovastatin (MEVACOR) 40 MG tablet Take 1 tablet by mouth daily 10/12/18  Yes Bonifacio Cabrera MD   cloNIDine (CATAPRES) 0.1 MG tablet TAKE 1 TABLET BY MOUTH TWICE DAILY IF BLOOD PRESSURE READING IS GREATER THAN 140/90 9/11/18  Yes ASHLEY Reed - CNP   carvedilol (COREG) 25 MG tablet Take 25 mg by mouth 2 times daily (with meals)   Yes Historical Provider, MD   oxyCODONE-acetaminophen (PERCOCET) 5-325 MG per tablet Take 1 tablet by mouth 3 times daily. .   Yes Historical Provider, MD   amLODIPine (NORVASC) 5 MG tablet Take 1 tablet by mouth daily 8/10/18  Yes Bonifacio Cabrera MD   donepezil (ARICEPT) 10 MG tablet Take 1 tablet by mouth nightly 7/31/18  Yes Aletha Ortiz MD   diphenhydrAMINE (BENADRYL) 25 MG capsule Take 25 mg by mouth every 6 hours as needed for Itching   Yes Historical Provider, MD   tiZANidine (ZANAFLEX) 2 MG capsule Take 1 capsule by mouth daily 12/4/17  Yes Historical Provider, MD   aspirin 81 MG tablet Take 81 mg by mouth daily.    Yes Historical Provider, MD     Scheduled Meds:   aspirin  81 mg Oral Daily    tiZANidine  2 mg Oral cooperative  · HEENT: Head: Normocephalic, no lesions, without obvious abnormality. · Lungs: clear to auscultation bilaterally  · Heart: regular rate and rhythm, S1, S2 normal, no murmur  · Abdomen: soft, non-tender; bowel sounds normal; no masses,  no organomegaly  · Extremities: extremities normal, atraumatic, no cyanosis or edema  · Neurological:  Awake, alert, oriented to name, place and time. Cranial nerves II-XII are grossly intact. Reflexes normal and symmetric. Sensation grossly normal    DATA:    Diagnostics:    EKG: . Normal sinus rhythm  Normal ECG  When compared with ECG of 09-NOV-2018 01:07,  No significant change was found    ECHO:Last one done in 9/17/2013     Stress Test: 10/1/2013      Labs:     CBC:   Recent Labs      11/08/18 2050   WBC  7.6   HGB  15.7   HCT  47.3   PLT  209     BMP: Recent Labs      11/08/18 2050   NA  137   K  4.0   CO2  29   BUN  13   CREATININE  0.98   LABGLOM  >60   GLUCOSE  113*     BNP: No results for input(s): BNP in the last 72 hours. PT/INR: No results for input(s): PROTIME, INR in the last 72 hours. APTT:No results for input(s): APTT in the last 72 hours. CARDIAC ENZYMES:  Recent Labs      11/08/18 2043 11/08/18 2230   TROPONINI  0.00  0.00     FASTING LIPID PANEL:No results found for: HDL, LDLDIRECT, LDLCALC, TRIG  LIVER PROFILE:No results for input(s): AST, ALT, LABALBU in the last 72 hours. IMPRESSION:    1. Chest pain-unspecified type  2. Hypertension  3. DM  4. Heart attack- 2011  5. Depression  6. CAD  7. Asthma  8. ADHD  9. Mini stroke- 2014      RECOMMENDATIONS:  1. Continue monitoring vitals  2.  EKG  3. Stress test

## 2018-11-10 VITALS
DIASTOLIC BLOOD PRESSURE: 89 MMHG | BODY MASS INDEX: 36.58 KG/M2 | WEIGHT: 255.5 LBS | SYSTOLIC BLOOD PRESSURE: 142 MMHG | TEMPERATURE: 98.1 F | OXYGEN SATURATION: 97 % | HEIGHT: 70 IN | HEART RATE: 71 BPM | RESPIRATION RATE: 18 BRPM

## 2018-11-10 LAB
EKG ATRIAL RATE: 61 BPM
EKG ATRIAL RATE: 65 BPM
EKG P AXIS: 56 DEGREES
EKG P AXIS: 58 DEGREES
EKG P-R INTERVAL: 160 MS
EKG P-R INTERVAL: 168 MS
EKG Q-T INTERVAL: 436 MS
EKG Q-T INTERVAL: 436 MS
EKG QRS DURATION: 88 MS
EKG QRS DURATION: 90 MS
EKG QTC CALCULATION (BAZETT): 438 MS
EKG QTC CALCULATION (BAZETT): 453 MS
EKG R AXIS: 41 DEGREES
EKG R AXIS: 41 DEGREES
EKG T AXIS: 58 DEGREES
EKG T AXIS: 62 DEGREES
EKG VENTRICULAR RATE: 61 BPM
EKG VENTRICULAR RATE: 65 BPM
LV EF: 64 %
LVEF MODALITY: NORMAL

## 2018-11-10 PROCEDURE — 78452 HT MUSCLE IMAGE SPECT MULT: CPT

## 2018-11-10 PROCEDURE — 6370000000 HC RX 637 (ALT 250 FOR IP): Performed by: STUDENT IN AN ORGANIZED HEALTH CARE EDUCATION/TRAINING PROGRAM

## 2018-11-10 PROCEDURE — 3430000000 HC RX DIAGNOSTIC RADIOPHARMACEUTICAL: Performed by: INTERNAL MEDICINE

## 2018-11-10 PROCEDURE — A9500 TC99M SESTAMIBI: HCPCS | Performed by: INTERNAL MEDICINE

## 2018-11-10 PROCEDURE — 6360000002 HC RX W HCPCS: Performed by: INTERNAL MEDICINE

## 2018-11-10 PROCEDURE — 2580000003 HC RX 258: Performed by: INTERNAL MEDICINE

## 2018-11-10 PROCEDURE — G0378 HOSPITAL OBSERVATION PER HR: HCPCS

## 2018-11-10 PROCEDURE — 96372 THER/PROPH/DIAG INJ SC/IM: CPT

## 2018-11-10 PROCEDURE — 93017 CV STRESS TEST TRACING ONLY: CPT

## 2018-11-10 PROCEDURE — 6360000002 HC RX W HCPCS: Performed by: STUDENT IN AN ORGANIZED HEALTH CARE EDUCATION/TRAINING PROGRAM

## 2018-11-10 RX ORDER — SODIUM CHLORIDE 0.9 % (FLUSH) 0.9 %
10 SYRINGE (ML) INJECTION PRN
Status: DISCONTINUED | OUTPATIENT
Start: 2018-11-10 | End: 2018-11-10 | Stop reason: HOSPADM

## 2018-11-10 RX ORDER — METOPROLOL TARTRATE 5 MG/5ML
2.5 INJECTION INTRAVENOUS PRN
Status: DISCONTINUED | OUTPATIENT
Start: 2018-11-10 | End: 2018-11-10 | Stop reason: ALTCHOICE

## 2018-11-10 RX ORDER — SODIUM CHLORIDE 0.9 % (FLUSH) 0.9 %
10 SYRINGE (ML) INJECTION PRN
Status: DISCONTINUED | OUTPATIENT
Start: 2018-11-10 | End: 2018-11-10 | Stop reason: ALTCHOICE

## 2018-11-10 RX ORDER — SODIUM CHLORIDE 9 MG/ML
INJECTION, SOLUTION INTRAVENOUS ONCE
Status: COMPLETED | OUTPATIENT
Start: 2018-11-10 | End: 2018-11-10

## 2018-11-10 RX ORDER — NITROGLYCERIN 0.4 MG/1
0.4 TABLET SUBLINGUAL EVERY 5 MIN PRN
Status: DISCONTINUED | OUTPATIENT
Start: 2018-11-10 | End: 2018-11-10 | Stop reason: ALTCHOICE

## 2018-11-10 RX ORDER — OXYCODONE HYDROCHLORIDE AND ACETAMINOPHEN 5; 325 MG/1; MG/1
1 TABLET ORAL EVERY 8 HOURS PRN
Status: DISCONTINUED | OUTPATIENT
Start: 2018-11-10 | End: 2018-11-10 | Stop reason: HOSPADM

## 2018-11-10 RX ADMIN — SODIUM CHLORIDE, PRESERVATIVE FREE 10 ML: 5 INJECTION INTRAVENOUS at 11:02

## 2018-11-10 RX ADMIN — TETRAKIS(2-METHOXYISOBUTYLISOCYANIDE)COPPER(I) TETRAFLUOROBORATE 29 MILLICURIE: 1 INJECTION, POWDER, LYOPHILIZED, FOR SOLUTION INTRAVENOUS at 11:02

## 2018-11-10 RX ADMIN — TIZANIDINE 2 MG: 2 TABLET ORAL at 08:56

## 2018-11-10 RX ADMIN — CARVEDILOL 25 MG: 25 TABLET, FILM COATED ORAL at 08:55

## 2018-11-10 RX ADMIN — ENOXAPARIN SODIUM 40 MG: 40 INJECTION SUBCUTANEOUS at 08:55

## 2018-11-10 RX ADMIN — OXYCODONE HYDROCHLORIDE AND ACETAMINOPHEN 1 TABLET: 5; 325 TABLET ORAL at 04:11

## 2018-11-10 RX ADMIN — AMLODIPINE BESYLATE 5 MG: 5 TABLET ORAL at 08:56

## 2018-11-10 RX ADMIN — DULOXETINE HYDROCHLORIDE 30 MG: 30 CAPSULE, DELAYED RELEASE ORAL at 08:55

## 2018-11-10 RX ADMIN — Medication 10 ML: at 10:55

## 2018-11-10 RX ADMIN — SODIUM CHLORIDE: 9 INJECTION, SOLUTION INTRAVENOUS at 08:59

## 2018-11-10 RX ADMIN — ASPIRIN 81 MG: 81 TABLET ORAL at 08:55

## 2018-11-10 RX ADMIN — OXYCODONE HYDROCHLORIDE AND ACETAMINOPHEN 1 TABLET: 5; 325 TABLET ORAL at 14:18

## 2018-11-10 RX ADMIN — REGADENOSON 0.4 MG: 0.08 INJECTION, SOLUTION INTRAVENOUS at 11:01

## 2018-11-10 ASSESSMENT — PAIN SCALES - GENERAL
PAINLEVEL_OUTOF10: 10
PAINLEVEL_OUTOF10: 5
PAINLEVEL_OUTOF10: 8
PAINLEVEL_OUTOF10: 8

## 2018-11-10 ASSESSMENT — PAIN DESCRIPTION - PROGRESSION

## 2018-11-10 NOTE — PROCEDURES
[address] [facility] [address]                              CARDIAC STRESS TEST    PATIENT NAME: Tino Ortiz              :        1960  MED REC NO:   [id number]                             ROOM:       [id number]  ACCOUNT NO:   [de-identified]                           ADMIT DATE: 2018  PROVIDER:     Dariusz Garcia      CARDIOLITE TREADMILL STRESS (with Lexiscan)    DATE OF STUDY:  11/10/2018  ORDERING PROVIDER:  SUKH Vinson  PRIMARY CARE PROVIDER:  Max Keller  INDICATION: Chest pain  CONSENT:  The test was explained and consent was signed. PROTOCOL: Anton, 13.16 METS, duration of 13:16 minutes. RESTING EKG:  Normal.  RESTING HR:  71 bpm, maximum HR, 122 bpm which is 74% of the age  predicted HR. HR response to exercise/Lexiscan was Normal.  RESTING BP: 122/90 mmHg, infusion BP, 122/90 mmHg. BP response to  Lexiscan was appropriate. CHEST PAIN:  No chest discomfort with exercise/Lexiscan nor in recovery. EXERCISE/LEXISCAN EKG: m Normal.  ISCHEMIC EKG CHANGES: None. IMPRESSION:  Electrocardiographically negative Lexiscan stress study.   **Cardiolite report issued from the department of Nuclear Medicine**      Audrey Betancur    D: 11/10/2018 13:42:15       T: 11/10/2018 13:44:28     AURELIA/ERICH  Job#: [id number]     Doc#: Unknown

## 2018-11-12 LAB
EKG ATRIAL RATE: 76 BPM
EKG P AXIS: 68 DEGREES
EKG P-R INTERVAL: 154 MS
EKG Q-T INTERVAL: 402 MS
EKG QRS DURATION: 90 MS
EKG QTC CALCULATION (BAZETT): 452 MS
EKG R AXIS: 58 DEGREES
EKG T AXIS: 66 DEGREES
EKG VENTRICULAR RATE: 76 BPM

## 2018-11-20 NOTE — TELEPHONE ENCOUNTER
Insurance denied the 40 mg dose of Cymbalta stating formulary is the 20 mg, 30 mg or 60 mg. Please advise, thanks.

## 2018-11-27 ENCOUNTER — OFFICE VISIT (OUTPATIENT)
Dept: PRIMARY CARE CLINIC | Age: 58
End: 2018-11-27
Payer: MEDICAID

## 2018-11-27 VITALS
OXYGEN SATURATION: 98 % | DIASTOLIC BLOOD PRESSURE: 84 MMHG | WEIGHT: 260.6 LBS | BODY MASS INDEX: 37.39 KG/M2 | RESPIRATION RATE: 16 BRPM | SYSTOLIC BLOOD PRESSURE: 128 MMHG | HEART RATE: 85 BPM

## 2018-11-27 DIAGNOSIS — F41.9 ANXIETY AND DEPRESSION: ICD-10-CM

## 2018-11-27 DIAGNOSIS — F32.A ANXIETY AND DEPRESSION: ICD-10-CM

## 2018-11-27 DIAGNOSIS — I67.1 ANEURYSM OF INTRACRANIAL PORTION OF LEFT INTERNAL CAROTID ARTERY: Primary | ICD-10-CM

## 2018-11-27 DIAGNOSIS — R41.3 MEMORY DIFFICULTIES: ICD-10-CM

## 2018-11-27 PROCEDURE — G8417 CALC BMI ABV UP PARAM F/U: HCPCS | Performed by: FAMILY MEDICINE

## 2018-11-27 PROCEDURE — 99214 OFFICE O/P EST MOD 30 MIN: CPT | Performed by: FAMILY MEDICINE

## 2018-11-27 PROCEDURE — G8482 FLU IMMUNIZE ORDER/ADMIN: HCPCS | Performed by: FAMILY MEDICINE

## 2018-11-27 PROCEDURE — G8427 DOCREV CUR MEDS BY ELIG CLIN: HCPCS | Performed by: FAMILY MEDICINE

## 2018-11-27 PROCEDURE — G8598 ASA/ANTIPLAT THER USED: HCPCS | Performed by: FAMILY MEDICINE

## 2018-11-27 PROCEDURE — 4004F PT TOBACCO SCREEN RCVD TLK: CPT | Performed by: FAMILY MEDICINE

## 2018-11-27 PROCEDURE — 3017F COLORECTAL CA SCREEN DOC REV: CPT | Performed by: FAMILY MEDICINE

## 2018-11-27 RX ORDER — PRAVASTATIN SODIUM 20 MG
20 TABLET ORAL DAILY
Qty: 90 TABLET | Refills: 1 | Status: SHIPPED | OUTPATIENT
Start: 2018-11-27 | End: 2019-06-19

## 2018-11-27 NOTE — TELEPHONE ENCOUNTER
Shelbie  Please let the patient know that insurance company is denying cymbalta 40 mg dose. Therefore we need to send 60 mg Cymbalta dose prescription. Before; I send that script; I want to know how is he doing; he can use up his present supply of Cymbalta 30mg by taking two of them every evening and let us know in couple of days. If he is tolerating it; then I can send a new script for it.      Thank you.   -dr. Radha Luz

## 2018-11-27 NOTE — PROGRESS NOTES
agitation    Correspondence from Dr. Santy Cuellar reviewed and discussed with the patient. Correspondence from Dr. Javier Syed reviewed and discussed with the patient. Assessment/ Plan / Medical Decision Making   Diagnosis Orders   1. 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 Memory difficulties     3. Anxiety and depression         Medications, laboratory testing, imaging, consultation, and follow up as documented in this encounter. Discontinue lovastatin. Start pravastatin. Recheck transaminases in 6 weeks. Follow up with neuropsychiatry for psychometric testing. Follow up with neurology. Increased dose of duloxetine as recommended by neurology. Follow up in our office in approximately 3 months or as needed. Ricky Byers received counseling on the following healthy behaviors: medication adherence    Patient given educational materials on lovastatin. Was a self-tracking handout given in paper form or via [employer]? No  If yes, see orders or list here. Discussed use, benefit, and side effects of prescribed medications. Barriers to medication compliance addressed. All patient questions answered. Pt voiced understanding. This note is created with the assistance of a speech-recognition program.  While intending to generate a document that actually reflects the content of the visit, no guarantees can be provided that every mistake has been identified and corrected by editing.

## 2018-11-27 NOTE — PATIENT INSTRUCTIONS
Patient Education          pravastatin  Pronunciation:  PRAV a STAT in  Brand:  Pravachol  What is the most important information I should know about pravastatin? You should not take pravastatin if you are pregnant or breast-feeding, or if you have liver disease. Stop taking this medication and tell your doctor right away if you become pregnant. Serious drug interactions can occur when certain medicines are used together with pravastatin. Tell each of your healthcare providers about all medicines you use now, and any medicine you start or stop using. In rare cases, pravastatin can cause a condition that results in the breakdown of skeletal muscle tissue, leading to kidney failure. Call your doctor right away if you have unexplained muscle pain, tenderness, or weakness especially if you also have fever, unusual tiredness, and dark colored urine. What is pravastatin? Pravastatin is in a group of drugs called HMG CoA reductase inhibitors, or \"statins. \" Pravastatin reduces levels of \"bad\" cholesterol (low-density lipoprotein, or LDL) and triglycerides in the blood, while increasing levels of \"good\" cholesterol (high-density lipoprotein, or HDL). Pravastatin is used to lower cholesterol and triglycerides (types of fat) in the blood. Pravastatin is also used to lower the risk of stroke, heart attack, and other heart complications in people with or without coronary heart disease or other risk factors. Pravastatin is used in adults and children who are at least 6years old. Pravastatin may also be used for purposes not listed in this medication guide. What should I discuss with my healthcare provider before taking pravastatin? You should not use pravastatin if you are allergic to it, or if you have:  · liver disease; or  · if you are pregnant or breast-feeding.   To make sure pravastatin is safe for you, tell your doctor if you have:  · kidney disease;  · abnormal liver function tests;  · a thyroid disorder; construed to indicate that the drug or drug combination is safe, effective or appropriate for any given patient. [facility] does not assume any responsibility for any aspect of healthcare administered with the aid of information [facility] provides. The information contained herein is not intended to cover all possible uses, directions, precautions, warnings, drug interactions, allergic reactions, or adverse effects. If you have questions about the drugs you are taking, check with your doctor, nurse or pharmacist.  Copyright 1066-0291 [address]: 13.01. Revision date: 8/17/2016. Care instructions adapted under license by [facility] ([facility]). If you have questions about a medical condition or this instruction, always ask your healthcare professional. [place] [address] any warranty or liability for your use of this information.

## 2018-12-03 ENCOUNTER — OFFICE VISIT (OUTPATIENT)
Dept: NEUROLOGY | Age: 58
End: 2018-12-03
Payer: MEDICAID

## 2018-12-03 VITALS
HEART RATE: 72 BPM | WEIGHT: 258 LBS | BODY MASS INDEX: 36.94 KG/M2 | HEIGHT: 70 IN | DIASTOLIC BLOOD PRESSURE: 91 MMHG | SYSTOLIC BLOOD PRESSURE: 138 MMHG

## 2018-12-03 DIAGNOSIS — R20.8 DYSESTHESIA: ICD-10-CM

## 2018-12-03 DIAGNOSIS — I67.1 ANEURYSM OF INTRACRANIAL PORTION OF LEFT INTERNAL CAROTID ARTERY: ICD-10-CM

## 2018-12-03 DIAGNOSIS — F41.9 ANXIETY AND DEPRESSION: ICD-10-CM

## 2018-12-03 DIAGNOSIS — F32.A ANXIETY AND DEPRESSION: ICD-10-CM

## 2018-12-03 DIAGNOSIS — R41.3 MEMORY LOSS: Primary | ICD-10-CM

## 2018-12-03 PROCEDURE — G8482 FLU IMMUNIZE ORDER/ADMIN: HCPCS | Performed by: PSYCHIATRY & NEUROLOGY

## 2018-12-03 PROCEDURE — G8417 CALC BMI ABV UP PARAM F/U: HCPCS | Performed by: PSYCHIATRY & NEUROLOGY

## 2018-12-03 PROCEDURE — G8427 DOCREV CUR MEDS BY ELIG CLIN: HCPCS | Performed by: PSYCHIATRY & NEUROLOGY

## 2018-12-03 PROCEDURE — 3017F COLORECTAL CA SCREEN DOC REV: CPT | Performed by: PSYCHIATRY & NEUROLOGY

## 2018-12-03 PROCEDURE — G8598 ASA/ANTIPLAT THER USED: HCPCS | Performed by: PSYCHIATRY & NEUROLOGY

## 2018-12-03 PROCEDURE — 99214 OFFICE O/P EST MOD 30 MIN: CPT | Performed by: PSYCHIATRY & NEUROLOGY

## 2018-12-03 PROCEDURE — 4004F PT TOBACCO SCREEN RCVD TLK: CPT | Performed by: PSYCHIATRY & NEUROLOGY

## 2018-12-03 NOTE — PROGRESS NOTES
limbs   REFLEX FUNCTION:  Symmetric, no perverted reflex, no Babinski sign   STATION and GAIT  Normal station, normal gait        12/3/2018  Maximum score 5 Score 5 What is the year, season, date, day, month   Maximum score 5 Score 5 Where are we: State, county, town, hospital, floor? Maximum score 3 Score 3 Name 3 objects: ball, pen, car. Ask patient to repeat 3 objects. Maximum score 5 Score 5 Serial sevens from 100:93, 86, 79, 72, 65   Maximum score 3 Score 2 Recall 3 objects   Maximum score 2 Score 2 Name a pencil and watch. Maximum score 1 Score 1 Repeat the following: No ifs ands or buts.      Maximum score 3 Score 3 Follow 3 stage command take a paper in your hand, fold it in half, and put it on the floor. Maximum score 1  Score 1 Read and obey the following: Close your eyes     Maximum score 1 Score 1 Write a sentence. Maximum score 1 Score 1 Copy a design below. Max 30   Patients score 29            Diagnostic data reviewed with the patient:   MRI Brain (8/30/17): No acute abnormality. ,  It demonstrated minimal nonspecific white matter disease. EEG (10/21/17): Unremarkable.     No results found for: SEDRATE  Lab Results   Component Value Date    [id number] 654 08/29/2017      No results found for: FOLATE  No results found for: ANUSHKA  Lab Results   Component Value Date    TSH 2.06 08/29/2017       No results found for: RPR   No components found for: TREPONEMAPALLIDUM  Lab Results   Component Value Date    LABA1C 5.7 09/14/2018       MOCA testing (7/31/18):   patient scored 20/30.,   Patient is unable to do the trail making test, able to copy the cube, able to draw the clock and put the numbers and able to put the hands; able to name 3 out of 3 animals; able to do digit backwards but not digit forward; unable to do target detection;  unable to do the serial subtraction; scored 2/2 on verbal abstraction; could not do phonemic fluency; able to repeat only 1 out of 2 sentences;

## 2018-12-05 ENCOUNTER — TELEPHONE (OUTPATIENT)
Dept: PRIMARY CARE CLINIC | Age: 58
End: 2018-12-05

## 2018-12-05 RX ORDER — BENZONATATE 100 MG/1
100 CAPSULE ORAL 2 TIMES DAILY PRN
Qty: 20 CAPSULE | Refills: 0 | Status: SHIPPED | OUTPATIENT
Start: 2018-12-05 | End: 2018-12-11 | Stop reason: SDUPTHER

## 2018-12-05 RX ORDER — FLUTICASONE PROPIONATE 50 MCG
2 SPRAY, SUSPENSION (ML) NASAL DAILY
Qty: 1 BOTTLE | Refills: 5 | Status: SHIPPED | OUTPATIENT
Start: 2018-12-05 | End: 2019-09-25 | Stop reason: SDUPTHER

## 2018-12-11 RX ORDER — BENZONATATE 100 MG/1
CAPSULE ORAL
Qty: 20 CAPSULE | Refills: 0 | Status: SHIPPED | OUTPATIENT
Start: 2018-12-11 | End: 2018-12-23 | Stop reason: SDUPTHER

## 2018-12-24 RX ORDER — BENZONATATE 100 MG/1
CAPSULE ORAL
Qty: 20 CAPSULE | Refills: 0 | Status: SHIPPED | OUTPATIENT
Start: 2018-12-24 | End: 2019-01-10 | Stop reason: SDUPTHER

## 2018-12-27 ENCOUNTER — TELEPHONE (OUTPATIENT)
Dept: NEUROLOGY | Age: 58
End: 2018-12-27

## 2018-12-27 DIAGNOSIS — F41.9 ANXIETY AND DEPRESSION: ICD-10-CM

## 2018-12-27 DIAGNOSIS — M79.604 DIFFUSE PAIN IN RIGHT LOWER EXTREMITY: ICD-10-CM

## 2018-12-27 DIAGNOSIS — F32.A ANXIETY AND DEPRESSION: ICD-10-CM

## 2018-12-27 RX ORDER — DULOXETINE 40 MG/1
CAPSULE, DELAYED RELEASE ORAL
Qty: 30 CAPSULE | Refills: 0 | Status: SHIPPED | OUTPATIENT
Start: 2018-12-27 | End: 2019-06-26 | Stop reason: ALTCHOICE

## 2018-12-27 RX ORDER — AMITRIPTYLINE HYDROCHLORIDE 50 MG/1
50 TABLET, FILM COATED ORAL NIGHTLY
Qty: 30 TABLET | Refills: 3 | Status: SHIPPED | OUTPATIENT
Start: 2018-12-27 | End: 2019-04-28 | Stop reason: SDUPTHER

## 2018-12-27 RX ORDER — DULOXETIN HYDROCHLORIDE 20 MG/1
40 CAPSULE, DELAYED RELEASE ORAL DAILY
Qty: 30 CAPSULE | Refills: 2 | Status: SHIPPED | OUTPATIENT
Start: 2018-12-27 | End: 2018-12-27 | Stop reason: SDUPTHER

## 2019-01-10 RX ORDER — BENZONATATE 100 MG/1
CAPSULE ORAL
Qty: 20 CAPSULE | Refills: 0 | Status: SHIPPED | OUTPATIENT
Start: 2019-01-10 | End: 2019-01-21 | Stop reason: SDUPTHER

## 2019-01-22 RX ORDER — BENZONATATE 100 MG/1
CAPSULE ORAL
Qty: 20 CAPSULE | Refills: 0 | Status: SHIPPED | OUTPATIENT
Start: 2019-01-22 | End: 2019-02-03 | Stop reason: SDUPTHER

## 2019-02-04 RX ORDER — BENZONATATE 100 MG/1
CAPSULE ORAL
Qty: 20 CAPSULE | Refills: 0 | Status: SHIPPED | OUTPATIENT
Start: 2019-02-04 | End: 2019-03-18 | Stop reason: SDUPTHER

## 2019-02-04 RX ORDER — DULOXETIN HYDROCHLORIDE 20 MG/1
CAPSULE, DELAYED RELEASE ORAL
Qty: 60 CAPSULE | Refills: 1 | Status: SHIPPED | OUTPATIENT
Start: 2019-02-04 | End: 2019-04-15 | Stop reason: SDUPTHER

## 2019-02-14 DIAGNOSIS — I10 ESSENTIAL HYPERTENSION: ICD-10-CM

## 2019-02-14 RX ORDER — CLONIDINE HYDROCHLORIDE 0.1 MG/1
TABLET ORAL
Qty: 60 TABLET | Refills: 3 | Status: SHIPPED | OUTPATIENT
Start: 2019-02-14 | End: 2019-09-19 | Stop reason: SDUPTHER

## 2019-02-14 RX ORDER — CARVEDILOL 25 MG/1
25 TABLET ORAL 2 TIMES DAILY WITH MEALS
Qty: 60 TABLET | Refills: 3 | Status: SHIPPED | OUTPATIENT
Start: 2019-02-14 | End: 2019-06-18 | Stop reason: SDUPTHER

## 2019-03-19 RX ORDER — BENZONATATE 100 MG/1
CAPSULE ORAL
Qty: 20 CAPSULE | Refills: 0 | Status: SHIPPED | OUTPATIENT
Start: 2019-03-19 | End: 2019-06-21 | Stop reason: SDUPTHER

## 2019-03-20 ENCOUNTER — TELEPHONE (OUTPATIENT)
Dept: PRIMARY CARE CLINIC | Age: 59
End: 2019-03-20

## 2019-03-20 RX ORDER — AZITHROMYCIN 250 MG/1
TABLET, FILM COATED ORAL
Qty: 6 TABLET | Refills: 0 | Status: SHIPPED | OUTPATIENT
Start: 2019-03-20 | End: 2019-03-30

## 2019-04-18 RX ORDER — DULOXETIN HYDROCHLORIDE 20 MG/1
CAPSULE, DELAYED RELEASE ORAL
Qty: 60 CAPSULE | Refills: 0 | Status: SHIPPED | OUTPATIENT
Start: 2019-04-18 | End: 2019-05-17 | Stop reason: SDUPTHER

## 2019-04-18 NOTE — TELEPHONE ENCOUNTER
Please let the patient know that she needs to be followed up to continue her meds. Please call the patient and make sure that she does have follow-up appointment.   Thank you.   -dr. Karen Dias

## 2019-04-18 NOTE — TELEPHONE ENCOUNTER
A call was placed to the patient. It went to voice mail. A message was left asking for a return call, letting him know that he needs to call an make an appointment.

## 2019-04-29 RX ORDER — AMITRIPTYLINE HYDROCHLORIDE 50 MG/1
TABLET, FILM COATED ORAL
Qty: 30 TABLET | Refills: 3 | Status: SHIPPED | OUTPATIENT
Start: 2019-04-29 | End: 2019-08-25 | Stop reason: SDUPTHER

## 2019-05-07 ENCOUNTER — TELEPHONE (OUTPATIENT)
Dept: PRIMARY CARE CLINIC | Age: 59
End: 2019-05-07

## 2019-05-17 RX ORDER — DULOXETIN HYDROCHLORIDE 20 MG/1
CAPSULE, DELAYED RELEASE ORAL
Qty: 60 CAPSULE | Refills: 0 | Status: SHIPPED | OUTPATIENT
Start: 2019-05-17 | End: 2019-06-26 | Stop reason: ALTCHOICE

## 2019-05-30 RX ORDER — ALBUTEROL SULFATE 90 UG/1
2 AEROSOL, METERED RESPIRATORY (INHALATION) EVERY 6 HOURS PRN
Qty: 1 INHALER | Refills: 0 | Status: SHIPPED | OUTPATIENT
Start: 2019-05-30 | End: 2019-08-16 | Stop reason: SDUPTHER

## 2019-05-30 NOTE — TELEPHONE ENCOUNTER
Last ov 11-27-18  Health Maintenance   Topic Date Due    Shingles Vaccine (1 of 2) 09/14/2011    Colon cancer screen colonoscopy  09/14/2011    A1C test (Diabetic or Prediabetic)  09/14/2019    Lipid screen  12/07/2022    DTaP/Tdap/Td vaccine (2 - Td) 08/02/2026    Flu vaccine  Completed    Pneumococcal 0-64 years Vaccine  Completed    Hepatitis C screen  Completed    HIV screen  Completed             (applicable per patient's age: Cancer Screenings, Depression Screening, Fall Risk Screening, Immunizations)    Hemoglobin A1C (%)   Date Value   09/14/2018 5.7   04/26/2017 6.1 (H)   10/18/2016 6.8     Microalb/Crt.  Ratio (mcg/mg creat)   Date Value   04/26/2017 17 (H)     AST (U/L)   Date Value   08/07/2017 25     ALT (U/L)   Date Value   08/07/2017 37     BUN (mg/dL)   Date Value   11/08/2018 13      (goal A1C is < 7)   (goal LDL is <100) need 30-50% reduction from baseline     BP Readings from Last 3 Encounters:   12/03/18 (!) 138/91   11/27/18 128/84   11/21/18 110/84    (goal /80)      All Future Testing planned in [facility]:  Lab Frequency Next Occurrence   Comprehensive Metabolic Panel Once 19/43/0775   Lipid Panel Once 08/10/2018   Hepatic Function Panel Once 08/10/2018   Magnesium Once 08/10/2018   CBC Auto Differential Once 08/10/2018   Lipid Panel Once 09/07/2018   Magnesium Once 09/07/2018   ALT Once 09/07/2018   CBC Auto Differential Once 09/07/2018   MRA Head WO Contrast Once 10/09/2019   Lipid Panel Once 11/21/2018   ALT Once 69/72/9068   Basic Metabolic Panel Once 32/65/4151   CBC Auto Differential Once 11/21/2018   Magnesium Once 11/21/2018   AST Once 01/08/2019   ALT Once 01/08/2019       Next Visit Date:  Future Appointments   Date Time Provider Colleen Reece   10/10/2019  1:00 PM Solis Bowman MD Neuro Endo [id number]            Patient Active Problem List:     Hypertension     Anxiety and depression     Attention deficit disorder     Left ventricular hypertrophy     Low back pain

## 2019-06-19 ENCOUNTER — TELEPHONE (OUTPATIENT)
Dept: PRIMARY CARE CLINIC | Age: 59
End: 2019-06-19

## 2019-06-19 ENCOUNTER — OFFICE VISIT (OUTPATIENT)
Dept: PRIMARY CARE CLINIC | Age: 59
End: 2019-06-19
Payer: MEDICARE

## 2019-06-19 VITALS
BODY MASS INDEX: 40.03 KG/M2 | HEART RATE: 89 BPM | SYSTOLIC BLOOD PRESSURE: 130 MMHG | RESPIRATION RATE: 16 BRPM | OXYGEN SATURATION: 98 % | WEIGHT: 279 LBS | DIASTOLIC BLOOD PRESSURE: 88 MMHG

## 2019-06-19 DIAGNOSIS — G89.29 CHRONIC BILATERAL LOW BACK PAIN, WITH SCIATICA PRESENCE UNSPECIFIED: ICD-10-CM

## 2019-06-19 DIAGNOSIS — F32.1 CURRENT MODERATE EPISODE OF MAJOR DEPRESSIVE DISORDER WITHOUT PRIOR EPISODE (HCC): Primary | ICD-10-CM

## 2019-06-19 DIAGNOSIS — M54.5 CHRONIC BILATERAL LOW BACK PAIN, WITH SCIATICA PRESENCE UNSPECIFIED: ICD-10-CM

## 2019-06-19 PROCEDURE — 4004F PT TOBACCO SCREEN RCVD TLK: CPT | Performed by: INTERNAL MEDICINE

## 2019-06-19 PROCEDURE — 99214 OFFICE O/P EST MOD 30 MIN: CPT | Performed by: INTERNAL MEDICINE

## 2019-06-19 PROCEDURE — 3017F COLORECTAL CA SCREEN DOC REV: CPT | Performed by: INTERNAL MEDICINE

## 2019-06-19 PROCEDURE — G8417 CALC BMI ABV UP PARAM F/U: HCPCS | Performed by: INTERNAL MEDICINE

## 2019-06-19 PROCEDURE — 96160 PT-FOCUSED HLTH RISK ASSMT: CPT | Performed by: INTERNAL MEDICINE

## 2019-06-19 PROCEDURE — G8427 DOCREV CUR MEDS BY ELIG CLIN: HCPCS | Performed by: INTERNAL MEDICINE

## 2019-06-19 RX ORDER — CARVEDILOL 25 MG/1
TABLET ORAL
Qty: 60 TABLET | Refills: 2 | Status: SHIPPED | OUTPATIENT
Start: 2019-06-19 | End: 2019-09-19 | Stop reason: SDUPTHER

## 2019-06-19 RX ORDER — FLUOXETINE HYDROCHLORIDE 20 MG/1
20 CAPSULE ORAL DAILY
Qty: 30 CAPSULE | Refills: 3 | Status: SHIPPED | OUTPATIENT
Start: 2019-06-19 | End: 2019-07-19

## 2019-06-19 RX ORDER — LOVASTATIN 40 MG/1
40 TABLET ORAL NIGHTLY
COMMUNITY
End: 2019-10-23

## 2019-06-19 ASSESSMENT — ENCOUNTER SYMPTOMS
ABDOMINAL PAIN: 0
SHORTNESS OF BREATH: 0
SORE THROAT: 0
EYE REDNESS: 0
VOMITING: 0
BACK PAIN: 1
COUGH: 0
NAUSEA: 0
EYE DISCHARGE: 0
TROUBLE SWALLOWING: 0

## 2019-06-19 ASSESSMENT — PATIENT HEALTH QUESTIONNAIRE - PHQ9
SUM OF ALL RESPONSES TO PHQ QUESTIONS 1-9: 19
7. TROUBLE CONCENTRATING ON THINGS, SUCH AS READING THE NEWSPAPER OR WATCHING TELEVISION: 3
SUM OF ALL RESPONSES TO PHQ QUESTIONS 1-9: 19
1. LITTLE INTEREST OR PLEASURE IN DOING THINGS: 3
6. FEELING BAD ABOUT YOURSELF - OR THAT YOU ARE A FAILURE OR HAVE LET YOURSELF OR YOUR FAMILY DOWN: 1
4. FEELING TIRED OR HAVING LITTLE ENERGY: 3
2. FEELING DOWN, DEPRESSED OR HOPELESS: 3
9. THOUGHTS THAT YOU WOULD BE BETTER OFF DEAD, OR OF HURTING YOURSELF: 0
SUM OF ALL RESPONSES TO PHQ9 QUESTIONS 1 & 2: 6
8. MOVING OR SPEAKING SO SLOWLY THAT OTHER PEOPLE COULD HAVE NOTICED. OR THE OPPOSITE, BEING SO FIGETY OR RESTLESS THAT YOU HAVE BEEN MOVING AROUND A LOT MORE THAN USUAL: 0
10. IF YOU CHECKED OFF ANY PROBLEMS, HOW DIFFICULT HAVE THESE PROBLEMS MADE IT FOR YOU TO DO YOUR WORK, TAKE CARE OF THINGS AT HOME, OR GET ALONG WITH OTHER PEOPLE: 2
5. POOR APPETITE OR OVEREATING: 3
3. TROUBLE FALLING OR STAYING ASLEEP: 3

## 2019-06-19 NOTE — PATIENT INSTRUCTIONS
Patient Education        fluoxetine  Pronunciation:  jonathon UNDERWOOD e teen  Brand:  PROzac, PROzac Weekly, Sarafem  What is the most important information I should know about fluoxetine? You should not use fluoxetine if you also take pimozide or thioridazine, or if you are being treated with methylene blue injection. Do not use this medicine if you have used an MAO inhibitor in the past 14 days, such as isocarboxazid, linezolid, methylene blue injection, phenelzine, rasagiline, selegiline, or tranylcypromine. You must wait at least 14 days after stopping an MAO inhibitor before you can take fluoxetine. You must wait 5 weeks after stopping fluoxetine before you can take thioridazine or an MAOI. Some young people have thoughts about suicide when first taking an antidepressant. Stay alert to changes in your mood or symptoms. Report any new or worsening symptoms to your doctor. What is fluoxetine? Fluoxetine is a selective serotonin reuptake inhibitors (SSRI) antidepressant. Fluoxetine affects chemicals in the brain that may be unbalanced in people with depression, panic, anxiety, or obsessive-compulsive symptoms. Fluoxetine is used to treat major depressive disorder, bulimia nervosa (an eating disorder) obsessive-compulsive disorder, panic disorder, and premenstrual dysphoric disorder (PMDD). Fluoxetine is sometimes used together with another medication called olanzapine (Zyprexa) to treat manic depression caused by bipolar disorder. This combination is also used to treat depression after at least 2 other medications have been tried without successful treatment of symptoms. If you also take olanzapine (Zyprexa), read the Zyprexa medication guide and all patient warnings and instructions provided with that medication. Fluoxetine may also be used for purposes not listed in this medication guide. What should I discuss with my healthcare provider before taking fluoxetine?   Do not use fluoxetine if you have taken an MAO inhibitor in the past 14 days. A dangerous drug interaction could occur. MAO inhibitors include isocarboxazid, linezolid, phenelzine, rasagiline, selegiline, and tranylcypromine. You must wait at least 14 days after stopping an MAO inhibitor before you can take fluoxetine. You must wait 5 weeks after stopping fluoxetine before you can take thioridazine or an MAOI. You should not use fluoxetine if you are allergic to it, if you also take pimozide or thioridazine, or if you are being treated with methylene blue injection. Tell your doctor about all other antidepressants you take, especially Celexa, Cymbalta, Desyrel, Effexor, Lexapro, Luvox, Oleptro, Paxil, Pexeva, Symbyax, Viibryd, or Zoloft. Some medicines can interact with fluoxetine and cause a serious condition called serotonin syndrome. Be sure your doctor knows about all other medicines you use. Ask your doctor before making any changes in how or when you take your medications. To make sure fluoxetine is safe for you, tell your doctor if you have:  · cirrhosis of the liver;  · kidney disease;  · diabetes;  · narrow-angle glaucoma;  · seizures or epilepsy;  · bipolar disorder (manic depression);  · a history of drug abuse or suicidal thoughts; or  · if you are being treated with electroconvulsive therapy (ECT). Some young people have thoughts about suicide when first taking an antidepressant. Your doctor should check your progress at regular visits. Your family or other caregivers should also be alert to changes in your mood or symptoms. Taking an SSRI antidepressant during pregnancy may cause serious lung problems or other complications in the baby. However, you may have a relapse of depression if you stop taking your antidepressant. Tell your doctor right away if you become pregnant. Do not start or stop taking this medicine during pregnancy without your doctor's advice. Fluoxetine can pass into breast milk and may harm a nursing baby.  Tell your doctor if you are breast-feeding a baby. Fluoxetine is not approved for use by anyone younger than 25years old. How should I take fluoxetine? Follow all directions on your prescription label. Your doctor may occasionally change your dose. Do not take this medicine in larger or smaller amounts or for longer than recommended. Do not crush, chew, break, or open a delayed-release capsule. Swallow it whole. Measure liquid medicine with the dosing syringe provided, or with a special dose-measuring spoon or medicine cup. If you do not have a dose-measuring device, ask your pharmacist for one. To treat premenstrual dysphoric disorder, the usual dose of fluoxetine is once daily while you are having your period, or 14 days before you expect your period to start. Follow your doctor's instructions. It may take up to 4 weeks before your symptoms improve. Keep using the medication as directed and tell your doctor if your symptoms do not improve. Do not stop using fluoxetine suddenly, or you could have unpleasant withdrawal symptoms. Ask your doctor how to safely stop using fluoxetine. Store at room temperature away from moisture and heat. What happens if I miss a dose? Take the missed dose as soon as you remember. Skip the missed dose if it is almost time for your next scheduled dose. Do not take extra medicine to make up the missed dose. If you miss a dose of Prozac Weekly, take the missed dose as soon as you remember and take the next dose 7 days later. However, if it is almost time for the next regularly scheduled weekly dose, skip the missed dose and take the next one as directed. Do not take extra medicine to make up the missed dose. What happens if I overdose? Seek emergency medical attention or call the Poison Help line at 1-124.267.1758. What should I avoid while taking fluoxetine? Drinking alcohol can increase certain side effects of fluoxetine.   Ask your doctor before taking a nonsteroidal anti-inflammatory drug (NSAID) for pain, arthritis, fever, or swelling. This includes aspirin, ibuprofen (Advil, Motrin), naproxen (Aleve), celecoxib (Celebrex), diclofenac, indomethacin, meloxicam, and others. Using an NSAID with fluoxetine may cause you to bruise or bleed easily. This medication may impair your thinking or reactions. Be careful if you drive or do anything that requires you to be alert. What are the possible side effects of fluoxetine? Get emergency medical help if you have signs of an allergic reaction: skin rash or hives; difficulty breathing; swelling of your face, lips, tongue, or throat. Report any new or worsening symptoms to your doctor, such as: mood or behavior changes, anxiety, panic attacks, trouble sleeping, or if you feel impulsive, irritable, agitated, hostile, aggressive, restless, hyperactive (mentally or physically), more depressed, or have thoughts about suicide or hurting yourself. Call your doctor at once if you have:  · blurred vision, tunnel vision, eye pain or swelling, or seeing halos around lights;  · high levels of serotonin in the body --agitation, hallucinations, fever, fast heart rate, overactive reflexes, nausea, vomiting, diarrhea, loss of coordination, fainting;  · low levels of sodium in the body --headache, confusion, slurred speech, severe weakness, vomiting, loss of coordination, feeling unsteady;  · severe nervous system reaction --very stiff (rigid) muscles, high fever, sweating, confusion, fast or uneven heartbeats, tremors, feeling like you might pass out; or  · severe skin reaction --fever, sore throat, swelling in your face or tongue, burning in your eyes, skin pain, followed by a red or purple skin rash that spreads (especially in the face or upper body) and causes blistering and peeling.   Common side effects may include:  · sleep problems (insomnia), strange dreams;  · headache, dizziness, vision changes;  · tremors or shaking, feeling anxious or nervous;  · pain, weakness, yawning, tired feeling;  · upset stomach, loss of appetite, nausea, vomiting, diarrhea;  · dry mouth, sweating, hot flashes;  · changes in weight or appetite;  · stuffy nose, sinus pain, sore throat, flu symptoms; or  · decreased sex drive, impotence, or difficulty having an orgasm. This is not a complete list of side effects and others may occur. Call your doctor for medical advice about side effects. You may report side effects to FDA at 7-164-FDA-0261. What other drugs will affect fluoxetine? Taking fluoxetine with other drugs that make you sleepy or slow your breathing can cause dangerous side effects or death. Ask your doctor before taking a sleeping pill, narcotic pain medicine, prescription cough medicine, a muscle relaxer, or medicine for anxiety, depression, or seizures. Many drugs can interact with fluoxetine. Not all possible interactions are listed here. Tell your doctor about all your current medicines and any you start or stop using, especially:  · any other antidepressant;  · Kellyton's Wort;  · tryptophan (sometimes called L-tryptophan);  · a blood thinner --warfarin, Coumadin, Debbi Drape;  · medicine to treat anxiety, mood disorders, thought disorders, or mental illness --amitriptyline, buspirone, desipramine, lithium, nortriptyline, and many others;  · medicine to treat ADHD or narcolepsy --Adderall, Concerta, Ritalin, Vyvanse, Zenzedi, and others;  · migraine headache medicine --rizatriptan, sumatriptan, zolmitriptan, and others; or  · narcotic pain medicine --fentanyl, tramadol. This list is not complete and many other drugs can interact with fluoxetine. This includes prescription and over-the-counter medicines, vitamins, and herbal products. Give a list of all your medicines to any healthcare provider who treats you. Where can I get more information? Your pharmacist can provide more information about fluoxetine.   Remember, keep this and all other medicines out of the reach of

## 2019-06-19 NOTE — PROGRESS NOTES
[facility] PRIMARY CARE  [address] [place] [address] [address]  [place] [address]  Dept: [phone]  Dept Fax: [phone]    Pelon Garcias is a 63 y.o. male who presents today for his medical conditions/complaints as noted below. Pelon Garcias is c/o of  Chief Complaint   Patient presents with    Follow-up     pt c/o depression getting worse     HPI:     HPI  1st visit with me   He had 2 concerns today     1. Major depression - he feels less motivated to do things , feels depressed . Lays on couch all the time . Non suicidal . Low dose cymbalta not working well. 2. Chronic onset low back pain on percocets - getting  from Pain management . He asks tramadol fill today as his appointment with them on July 17th . He denied focal muscle weakness , loss of bladder /bowel control . Hemoglobin A1C (%)   Date Value   09/14/2018 5.7   04/26/2017 6.1 (H)   10/18/2016 6.8             ( goal A1C is < 7)   Microalb/Crt. Ratio (mcg/mg creat)   Date Value   04/26/2017 17 (H)     No results found for: LDLCHOLESTEROL, LDLCALC    (goal LDL is <100)   AST (U/L)   Date Value   08/07/2017 25     ALT (U/L)   Date Value   08/07/2017 37     BUN (mg/dL)   Date Value   11/08/2018 13     BP Readings from Last 3 Encounters:   06/19/19 130/88   12/03/18 (!) 138/91   11/27/18 128/84          (goal 120/80)    Past Medical History:   Diagnosis Date    ADHD (attention deficit hyperactivity disorder)     Arthritis     knees, hands, back.  Asthma     as child.  Back pain     CAD (coronary artery disease)     Cataract     Depression     pt. denies.  Diabetes mellitus ([address]     Heart attack ([address]     2011    Hypertension     Kidney stones     Mini stroke ([address]     2014    Osteoarthritis     Seasonal allergies     Sinus problem       Past Surgical History:   Procedure Laterality Date    CHOLECYSTECTOMY      2016    COLONOSCOPY      HERNIA REPAIR      1995, rt. inguinal    HERNIA REPAIR Left 01/2018    repair    HERNIA REPAIR Right 01/2018    looked at at same time they did left repair    INGUINAL HERNIA REPAIR Left 08/19/2016    LAPAROSCOPY N/A 1/16/2018    DIAGNOSTIC LAPAROSCOPY performed by Rachel Trejo MD at [address] [place] [address],[address]         Family History   Problem Relation Age of Onset    Cancer Father         throat    Cirrhosis Father     High Blood Pressure Sister     Asthma Sister     Arthritis Sister     High Blood Pressure Mother     Heart Disease Mother     Alzheimer's Disease Mother        Social History     Tobacco Use    Smoking status: Current Every Day Smoker     Packs/day: 0.25     Years: 28.00     Pack years: 7.00    Smokeless tobacco: Never Used    Tobacco comment: less than quarter pack   Substance Use Topics    Alcohol use: Yes     Comment: rarely      Current Outpatient Medications   Medication Sig Dispense Refill    carvedilol (COREG) 25 MG tablet TAKE ONE TABLET BY MOUTH TWICE A DAY WITH MEALS 60 tablet 2    lovastatin (MEVACOR) 40 MG tablet Take 40 mg by mouth nightly      FLUoxetine (PROZAC) 20 MG capsule Take 1 capsule by mouth daily 30 capsule 3    albuterol sulfate  (90 Base) MCG/ACT inhaler Inhale 2 puffs into the lungs every 6 hours as needed for Wheezing 1 Inhaler 0    DULoxetine (CYMBALTA) 20 MG extended release capsule TAKE TWO CAPSULES BY MOUTH DAILY **MUST MAKE AN APPOINTMENT** 60 capsule 0    amLODIPine (NORVASC) 5 MG tablet TAKE ONE TABLET BY MOUTH DAILY 90 tablet 1    amitriptyline (ELAVIL) 50 MG tablet TAKE ONE TABLET BY MOUTH EVERY NIGHT AT BEDTIME 30 tablet 3    benzonatate (TESSALON) 100 MG capsule TAKE ONE CAPSULE BY MOUTH TWICE A DAY IF NEEDED FOR COUGH 20 capsule 0    cloNIDine (CATAPRES) 0.1 MG tablet TAKE 1 TABLET BY MOUTH TWICE DAILY IF BLOOD PRESSURE READING IS GREATER THAN 140/90 60 tablet 3    DULoxetine HCl 40 MG CPEP Take one cap qpm 30 capsule 0  fluticasone (FLONASE) 50 MCG/ACT nasal spray 2 sprays by Nasal route daily 1 Bottle 5    nitroGLYCERIN (NITROSTAT) 0.3 MG SL tablet place 1 tablet under the tongue if needed every 5 minutes for chest pain for 3 doses IF NO RELIEF AF 25 tablet 3    oxyCODONE-acetaminophen (PERCOCET) 5-325 MG per tablet Take 1 tablet by mouth 3 times daily. Ophelia Glatter diphenhydrAMINE (BENADRYL) 25 MG capsule Take 25 mg by mouth every 6 hours as needed for Itching      tiZANidine (ZANAFLEX) 2 MG capsule Take 1 capsule by mouth daily      aspirin 81 MG tablet Take 81 mg by mouth daily.  donepezil (ARICEPT) 10 MG tablet Take 1 tablet by mouth nightly 30 tablet 3     No current facility-administered medications for this visit. Allergies   Allergen Reactions    Lisinopril      States he is allergic to the generic forms of medication, can take lisinopril    Ibuprofen Other (See Comments)     \"hurts my stomach. \"    Lipitor [Atorvastatin] Hives       Health Maintenance   Topic Date Due    Shingles Vaccine (1 of 2) 09/14/2011    Colon cancer screen colonoscopy  09/14/2011    A1C test (Diabetic or Prediabetic)  09/14/2019    Lipid screen  12/07/2022    DTaP/Tdap/Td vaccine (2 - Td) 08/02/2026    Flu vaccine  Completed    Pneumococcal 0-64 years Vaccine  Completed    Hepatitis C screen  Completed    HIV screen  Completed       Subjective:      Review of Systems   Constitutional: Negative for activity change, appetite change, fatigue, fever and unexpected weight change. HENT: Negative for postnasal drip, sore throat and trouble swallowing. Eyes: Negative for discharge and redness. Respiratory: Negative for cough and shortness of breath. Cardiovascular: Negative for chest pain and palpitations. Gastrointestinal: Negative for abdominal pain, nausea and vomiting. Endocrine: Negative for cold intolerance and heat intolerance. Genitourinary: Negative for difficulty urinating and dysuria.    Musculoskeletal: symptoms worsen or fail to improve, for hypertension. Encouraged healthy diet and Physically active life style. Orders Placed This Encounter   Medications    FLUoxetine (PROZAC) 20 MG capsule     Sig: Take 1 capsule by mouth daily     Dispense:  30 capsule     Refill:  3        Patient given educational materials - see patient instructions. Discussed use, benefit, and side effects of prescribed medications. All patient questions answered. Pt voiced understanding. Reviewed healthmaintenance. Instructed to continue current medications, diet and exercise. Patient agreed with treatment plan. Follow up as directed.      Electronically signed by Usama Burnett MD on 6/20/2019 at 8:34 AM

## 2019-06-19 NOTE — TELEPHONE ENCOUNTER
Patient states he just saw your for an appointment, and he forgot to tell you that he would like to switch his inhaler. He states the one he is currently using makes his wheezing worse, he would like to try something different. Would also like a script sent to his pharmacy for [address] for his cough. Please advise.

## 2019-06-21 ENCOUNTER — OFFICE VISIT (OUTPATIENT)
Dept: PRIMARY CARE CLINIC | Age: 59
End: 2019-06-21
Payer: MEDICARE

## 2019-06-21 VITALS
SYSTOLIC BLOOD PRESSURE: 128 MMHG | DIASTOLIC BLOOD PRESSURE: 88 MMHG | HEIGHT: 70 IN | HEART RATE: 80 BPM | RESPIRATION RATE: 16 BRPM | OXYGEN SATURATION: 98 % | BODY MASS INDEX: 39.96 KG/M2 | WEIGHT: 279.1 LBS

## 2019-06-21 DIAGNOSIS — J45.40 MODERATE PERSISTENT ASTHMA, UNSPECIFIED WHETHER COMPLICATED: Primary | ICD-10-CM

## 2019-06-21 DIAGNOSIS — H53.9 VISION CHANGES: ICD-10-CM

## 2019-06-21 PROCEDURE — 99214 OFFICE O/P EST MOD 30 MIN: CPT | Performed by: FAMILY MEDICINE

## 2019-06-21 PROCEDURE — G8417 CALC BMI ABV UP PARAM F/U: HCPCS | Performed by: FAMILY MEDICINE

## 2019-06-21 PROCEDURE — 4004F PT TOBACCO SCREEN RCVD TLK: CPT | Performed by: FAMILY MEDICINE

## 2019-06-21 PROCEDURE — G8427 DOCREV CUR MEDS BY ELIG CLIN: HCPCS | Performed by: FAMILY MEDICINE

## 2019-06-21 PROCEDURE — 3017F COLORECTAL CA SCREEN DOC REV: CPT | Performed by: FAMILY MEDICINE

## 2019-06-21 RX ORDER — PREDNISONE 20 MG/1
40 TABLET ORAL DAILY
Qty: 10 TABLET | Refills: 0 | Status: SHIPPED | OUTPATIENT
Start: 2019-06-21 | End: 2019-06-26

## 2019-06-21 RX ORDER — BENZONATATE 100 MG/1
100 CAPSULE ORAL 3 TIMES DAILY PRN
Qty: 30 CAPSULE | Refills: 0 | Status: SHIPPED | OUTPATIENT
Start: 2019-06-21 | End: 2019-07-15 | Stop reason: SDUPTHER

## 2019-06-21 ASSESSMENT — ENCOUNTER SYMPTOMS
EYE PAIN: 0
NAUSEA: 0
PHOTOPHOBIA: 0
WHEEZING: 1
SHORTNESS OF BREATH: 1
COUGH: 1

## 2019-06-21 NOTE — PROGRESS NOTES
[facility] PRIMARY CARE  [address] [place] [address] Dr Shahbaz Hanson [address] [place] [id number]  Dept: [phone]  Dept Fax: [phone]    Tianna Saleh is a 63 y.o. male who presents today for his medical conditions/complaints as noted below. Tianna Saleh is c/o of  Chief Complaint   Patient presents with    Discuss Medications     inhaler    Other     thinks he has something in his eye       HPI:     HPI    Pt here due to cough and vision change. Has hx of asthma and states over past six months noted more coughing and gets wheezing as well, some SOB. Uses albuterol and states has not helped and sometimes feels it makes his wheezing worse. Cough is productive clear phlegm, but not more than usual and denies fevers or chills. Had few episodes over past 6 months of passing out few seconds after coughing fit. Tessalon perles have helped. For past three weeks sees a black spot in his vision . Denies any pain or discharge. Black spot has not changed in size, states it is very small. Hemoglobin A1C (%)   Date Value   09/14/2018 5.7   04/26/2017 6.1 (H)   10/18/2016 6.8             ( goal A1C is < 7)   Microalb/Crt. Ratio (mcg/mg creat)   Date Value   04/26/2017 17 (H)     No results found for: LDLCHOLESTEROL, LDLCALC    (goal LDL is <100)   AST (U/L)   Date Value   08/07/2017 25     ALT (U/L)   Date Value   08/07/2017 37     BUN (mg/dL)   Date Value   11/08/2018 13     BP Readings from Last 3 Encounters:   06/21/19 128/88   06/19/19 130/88   12/03/18 (!) 138/91          (goal 120/80)    Past Medical History:   Diagnosis Date    ADHD (attention deficit hyperactivity disorder)     Arthritis     knees, hands, back.  Asthma     as child.  Back pain     CAD (coronary artery disease)     Cataract     Depression     pt. denies.     Diabetes mellitus ([address]     Heart attack ([address]     2011    Hypertension     Kidney stones     Mini stroke ([address]     2014    Osteoarthritis     Seasonal allergies     Sinus problem       Past Surgical History:   Procedure Laterality Date    CHOLECYSTECTOMY      2016    COLONOSCOPY      HERNIA REPAIR      1995, rt. inguinal    HERNIA REPAIR Left 01/2018    repair    HERNIA REPAIR Right 01/2018    looked at at same time they did left repair    INGUINAL HERNIA REPAIR Left 08/19/2016    LAPAROSCOPY N/A 1/16/2018    DIAGNOSTIC LAPAROSCOPY performed by Meryl Reese MD at [address] [place] [address],[address]         Family History   Problem Relation Age of Onset    Cancer Father         throat    Cirrhosis Father     High Blood Pressure Sister     Asthma Sister     Arthritis Sister     High Blood Pressure Mother     Heart Disease Mother     Alzheimer's Disease Mother        Social History     Tobacco Use    Smoking status: Current Every Day Smoker     Packs/day: 0.25     Years: 28.00     Pack years: 7.00    Smokeless tobacco: Never Used    Tobacco comment: less than quarter pack   Substance Use Topics    Alcohol use: Yes     Comment: rarely      Current Outpatient Medications   Medication Sig Dispense Refill    predniSONE (DELTASONE) 20 MG tablet Take 2 tablets by mouth daily for 5 days 10 tablet 0    budesonide (PULMICORT FLEXHALER) 90 MCG/ACT AEPB inhaler Inhale 2 puffs into the lungs 2 times daily 1 each 3    benzonatate (TESSALON) 100 MG capsule Take 1 capsule by mouth 3 times daily as needed for Cough 30 capsule 0    carvedilol (COREG) 25 MG tablet TAKE ONE TABLET BY MOUTH TWICE A DAY WITH MEALS 60 tablet 2    lovastatin (MEVACOR) 40 MG tablet Take 40 mg by mouth nightly      FLUoxetine (PROZAC) 20 MG capsule Take 1 capsule by mouth daily 30 capsule 3    albuterol sulfate  (90 Base) MCG/ACT inhaler Inhale 2 puffs into the lungs every 6 hours as needed for Wheezing 1 Inhaler 0    DULoxetine (CYMBALTA) 20 MG extended release capsule TAKE TWO CAPSULES BY MOUTH DAILY **MUST MAKE AN APPOINTMENT** 60 capsule 0    amLODIPine (NORVASC) 5 MG tablet TAKE ONE TABLET BY MOUTH DAILY 90 tablet 1    amitriptyline (ELAVIL) 50 MG tablet TAKE ONE TABLET BY MOUTH EVERY NIGHT AT BEDTIME 30 tablet 3    cloNIDine (CATAPRES) 0.1 MG tablet TAKE 1 TABLET BY MOUTH TWICE DAILY IF BLOOD PRESSURE READING IS GREATER THAN 140/90 60 tablet 3    fluticasone (FLONASE) 50 MCG/ACT nasal spray 2 sprays by Nasal route daily 1 Bottle 5    nitroGLYCERIN (NITROSTAT) 0.3 MG SL tablet place 1 tablet under the tongue if needed every 5 minutes for chest pain for 3 doses IF NO RELIEF AF 25 tablet 3    oxyCODONE-acetaminophen (PERCOCET) 5-325 MG per tablet Take 1 tablet by mouth 3 times daily. .      donepezil (ARICEPT) 10 MG tablet Take 1 tablet by mouth nightly 30 tablet 3    diphenhydrAMINE (BENADRYL) 25 MG capsule Take 25 mg by mouth every 6 hours as needed for Itching      tiZANidine (ZANAFLEX) 2 MG capsule Take 1 capsule by mouth daily      aspirin 81 MG tablet Take 81 mg by mouth daily.  DULoxetine HCl 40 MG CPEP Take one cap qpm 30 capsule 0     No current facility-administered medications for this visit. Allergies   Allergen Reactions    Lisinopril      States he is allergic to the generic forms of medication, can take lisinopril    Ibuprofen Other (See Comments)     \"hurts my stomach. \"    Lipitor [Atorvastatin] Hives       Health Maintenance   Topic Date Due    Shingles Vaccine (1 of 2) 09/14/2011    Colon cancer screen colonoscopy  09/14/2011    A1C test (Diabetic or Prediabetic)  09/14/2019    Lipid screen  12/07/2022    DTaP/Tdap/Td vaccine (2 - Td) 08/02/2026    Flu vaccine  Completed    Pneumococcal 0-64 years Vaccine  Completed    Hepatitis C screen  Completed    HIV screen  Completed       Subjective:      Review of Systems   Constitutional: Negative for chills and fever. Eyes: Positive for visual disturbance. Negative for photophobia and pain.    Respiratory: Positive for cough, shortness of breath and wheezing. Gastrointestinal: Negative for nausea. Objective:     Physical Exam   Constitutional: He is oriented to person, place, and time. He appears well-developed and well-nourished. No distress. HENT:   Head: Normocephalic and atraumatic. Mouth/Throat: Oropharynx is clear and moist.   Eyes: Pupils are equal, round, and reactive to light. Conjunctivae and EOM are normal. Right eye exhibits no discharge. Left eye exhibits no discharge. Neck: Neck supple. Cardiovascular: Normal rate, regular rhythm and normal heart sounds. No murmur heard. Pulmonary/Chest: Effort normal and breath sounds normal. No stridor. No respiratory distress. He has no wheezes. Neurological: He is alert and oriented to person, place, and time. Skin: Skin is warm and dry. He is not diaphoretic. Psychiatric: He has a normal mood and affect. His behavior is normal.     /88 (Site: Left Upper Arm, Position: Sitting, Cuff Size: Large Adult)   Pulse 80   Resp 16   Ht 5' 10\" (1.778 m)   Wt 279 lb 1.6 oz (126.6 kg)   SpO2 98%   BMI 40.05 kg/m²     Assessment:      1. Moderate persistent asthma, unspecified whether complicated  - recommend starting maintenance inhaler and also prednisone burst. Tessalon perles refilled as this has helped his cough as well before. Follow up in two months as previously scheduled. - budesonide (PULMICORT FLEXHALER) 90 MCG/ACT AEPB inhaler; Inhale 2 puffs into the lungs 2 times daily  Dispense: 1 each; Refill: 3    2. Vision changes  - has small spot in his vision R eye but no pain. Recommend seeing ophthalmology. - Jono Pelayo MD, Ophthalmology, [facility]:      Return in about 2 months (around 8/21/2019) for follow up.     Orders Placed This Encounter   Procedures   Berhane Olivares MD, Ophthalmology, [facility]     Referral Priority:   Routine     Referral Type:   Eval and Treat     Referral Reason:   Specialty Services Required Referred to Provider:   Irvin Sanchez MD     Requested Specialty:   Ophthalmology     Number of Visits Requested:   1     Orders Placed This Encounter   Medications    predniSONE (DELTASONE) 20 MG tablet     Sig: Take 2 tablets by mouth daily for 5 days     Dispense:  10 tablet     Refill:  0    budesonide (PULMICORT FLEXHALER) 90 MCG/ACT AEPB inhaler     Sig: Inhale 2 puffs into the lungs 2 times daily     Dispense:  1 each     Refill:  3    benzonatate (TESSALON) 100 MG capsule     Sig: Take 1 capsule by mouth 3 times daily as needed for Cough     Dispense:  30 capsule     Refill:  0            Electronically signed by Lia Breaux DO on 6/21/2019 at 11:24 PM

## 2019-06-26 ENCOUNTER — OFFICE VISIT (OUTPATIENT)
Dept: NEUROLOGY | Age: 59
End: 2019-06-26
Payer: MEDICARE

## 2019-06-26 VITALS
SYSTOLIC BLOOD PRESSURE: 137 MMHG | DIASTOLIC BLOOD PRESSURE: 98 MMHG | BODY MASS INDEX: 40.69 KG/M2 | WEIGHT: 284.2 LBS | HEIGHT: 70 IN | HEART RATE: 71 BPM

## 2019-06-26 DIAGNOSIS — I67.1 ANEURYSM OF INTRACRANIAL PORTION OF LEFT INTERNAL CAROTID ARTERY: ICD-10-CM

## 2019-06-26 DIAGNOSIS — R41.3 MEMORY LOSS: Primary | ICD-10-CM

## 2019-06-26 DIAGNOSIS — R20.8 DYSESTHESIA: ICD-10-CM

## 2019-06-26 DIAGNOSIS — F32.A DEPRESSION, UNSPECIFIED DEPRESSION TYPE: ICD-10-CM

## 2019-06-26 PROCEDURE — G8417 CALC BMI ABV UP PARAM F/U: HCPCS | Performed by: PSYCHIATRY & NEUROLOGY

## 2019-06-26 PROCEDURE — 4004F PT TOBACCO SCREEN RCVD TLK: CPT | Performed by: PSYCHIATRY & NEUROLOGY

## 2019-06-26 PROCEDURE — 3017F COLORECTAL CA SCREEN DOC REV: CPT | Performed by: PSYCHIATRY & NEUROLOGY

## 2019-06-26 PROCEDURE — G8427 DOCREV CUR MEDS BY ELIG CLIN: HCPCS | Performed by: PSYCHIATRY & NEUROLOGY

## 2019-06-26 PROCEDURE — 99214 OFFICE O/P EST MOD 30 MIN: CPT | Performed by: PSYCHIATRY & NEUROLOGY

## 2019-06-26 NOTE — PROGRESS NOTES
NEUROLOGY FOLLOW-UP  Patient Name:  Monica Gastelum  :   1960  Clinic Visit Date: 2019        REVIEW OF SYSTEMS  Constitutional Weight changes: present, Fevers : absent, Fatigue: present; Any recent hospitalizations:  Absent.  Change in Appetite: present   HEENT Ears: normal, Eyes: no correction, Visual disturbance: present   Reespiratory Shortness of breath: present, Cough: present   Cardivascular Chest pain: absent, Leg swelling :absent   GI Constipation: absent, Diarrhea: absent, Swallowing change: absent    Urinary frequency: present, Urinary urgency: absent, Urinary incontinence: absent   Musculoskeletal Neck pain: absent, Back pain: present, Stiffness: present, Muscle pain: absent, Joint pain: present  Restless Legs: present   Dermatological Hair loss: present, Skin changes: absent   Neurological Memory loss: present, Confusion: present, Seizures: absent; Trouble walking or imbalance: absent, Dizziness: present, Weakness: absent, Numbness absent, Tremor: absent, Spasm: absent, Speech difficulty: absent, Headache: present, Light sensitivity: absent   Psychiatric Anxiety: present, Depression  present, Suicidal ideations absent   Hematologic Abnormal bleeding: absent, Anemia: absent, Clotting disorder: absent, Lymph gland changes: absent

## 2019-06-26 NOTE — PROGRESS NOTES
NEUROLOGY FOLLOW-UP  Patient Name:  Rand Pringle  :   1960  Clinic Visit Date: 2019    I saw Mr. Rand Pringle in follow-up in the office today in continuation of neurologic care. He is a 63 y.o.  male  with complaints of memory loss and depression. He comes to clinic stating that he was on Seroquel and it caused significant weight gain. He has been feeling extremely depressed but he denies suicidal ideations. He is presently on Prozac. He also stated that he could not get consultation with neuropsychologist,  Dr. Marbella Hu due to insurance reasons. Now he had a new insurance and he wants a referral to neuropsychologist.  His memory loss has been getting worse along with the depression. He could not tolerate Aricept \" because extreme loss of appetite\" and he does not want any medication until neuropsychology testing done. His family members and friends told him that he has been repeating multiple times forgetting what he told earlier. Regarding cerebral aneurysm; he was evaluated by Dr Maikel Zarate and he was given a yearly follow up visit and he has upcoming follow-up visit with him in 2019. He also stated that he has had one recent ED visit for chest pain and then he went to see his cardiologist and afterwards \"feeling okay\". Since the last visit he has not had any episodes of dizziness with orthostatic hypotension and his antihypertensive meds were adjusted. With that, dizziness is \"better\" with orthostatic changes. He has been having neuropathic pain in lower extremities with the pins and needles and he wason Cymbalta but it was stopped and he is presently on amitriptyline. Denies any medication related side effects. Review of systems done by staff reviewed and pertinent positives include confusion, memory loss, occasional shaking, depression, back pain, stiffness, joint pain, gait difficulties restless legs, etc. as above.   Denies suicidal ideations. Current Outpatient Medications on File Prior to Visit   Medication Sig Dispense Refill    predniSONE (DELTASONE) 20 MG tablet Take 2 tablets by mouth daily for 5 days 10 tablet 0    budesonide (PULMICORT FLEXHALER) 90 MCG/ACT AEPB inhaler Inhale 2 puffs into the lungs 2 times daily 1 each 3    benzonatate (TESSALON) 100 MG capsule Take 1 capsule by mouth 3 times daily as needed for Cough 30 capsule 0    carvedilol (COREG) 25 MG tablet TAKE ONE TABLET BY MOUTH TWICE A DAY WITH MEALS 60 tablet 2    lovastatin (MEVACOR) 40 MG tablet Take 40 mg by mouth nightly      FLUoxetine (PROZAC) 20 MG capsule Take 1 capsule by mouth daily 30 capsule 3    albuterol sulfate  (90 Base) MCG/ACT inhaler Inhale 2 puffs into the lungs every 6 hours as needed for Wheezing 1 Inhaler 0    amLODIPine (NORVASC) 5 MG tablet TAKE ONE TABLET BY MOUTH DAILY 90 tablet 1    amitriptyline (ELAVIL) 50 MG tablet TAKE ONE TABLET BY MOUTH EVERY NIGHT AT BEDTIME 30 tablet 3    cloNIDine (CATAPRES) 0.1 MG tablet TAKE 1 TABLET BY MOUTH TWICE DAILY IF BLOOD PRESSURE READING IS GREATER THAN 140/90 60 tablet 3    fluticasone (FLONASE) 50 MCG/ACT nasal spray 2 sprays by Nasal route daily 1 Bottle 5    nitroGLYCERIN (NITROSTAT) 0.3 MG SL tablet place 1 tablet under the tongue if needed every 5 minutes for chest pain for 3 doses IF NO RELIEF AF 25 tablet 3    donepezil (ARICEPT) 10 MG tablet Take 1 tablet by mouth nightly 30 tablet 3    diphenhydrAMINE (BENADRYL) 25 MG capsule Take 25 mg by mouth every 6 hours as needed for Itching      tiZANidine (ZANAFLEX) 2 MG capsule Take 1 capsule by mouth daily      aspirin 81 MG tablet Take 81 mg by mouth daily. No current facility-administered medications on file prior to visit. Allergies: Karyn Haile is allergic to lisinopril; ibuprofen; and lipitor [atorvastatin].     Past Medical History:   Diagnosis Date    ADHD (attention deficit hyperactivity disorder)     Arthritis     knees, hands, back.  Asthma     as child.  Back pain     CAD (coronary artery disease)     Cataract     Depression     pt. denies.  Diabetes mellitus ([address]     Heart attack ([address]     2011    Hypertension     Kidney stones     [facility] stroke [facility]     2014    Osteoarthritis     Seasonal allergies     Sinus problem        Past Surgical History:   Procedure Laterality Date    CHOLECYSTECTOMY      2016    COLONOSCOPY      HERNIA REPAIR      1995, rt. inguinal    HERNIA REPAIR Left 01/2018    repair    HERNIA REPAIR Right 01/2018    looked at at same time they did left repair    INGUINAL HERNIA REPAIR Left 08/19/2016    LAPAROSCOPY N/A 1/16/2018    DIAGNOSTIC LAPAROSCOPY performed by Sandhya Alexander MD at [address] [place] [address],[address]       Social History: Precious Gupta  reports that he has been smoking cigarettes. He has a 14.00 pack-year smoking history. He has never used smokeless tobacco. He reports that he drinks alcohol. He reports that he does not use drugs. Family History   Problem Relation Age of Onset    Cancer Father         throat    Cirrhosis Father     High Blood Pressure Sister     Asthma Sister     Arthritis Sister     High Blood Pressure Mother     Heart Disease Mother     Alzheimer's Disease Mother        On exam: Blood pressure (!) 137/98, pulse 71, height 5' 10\" (1.778 m), weight 284 lb 3.2 oz (128.9 kg). GENERAL  Appears comfortable and in no distress   HEENT  NC/ AT   NECK & cardiovascular  Supple and no bruits heard; S1 and S2 heard; radial pulse intact   MENTAL STATUS:  Alert, oriented, intact memory, no confusion, normal speech, normal language, no hallucination or delusion. ,  He scored 29/30 on MMSE. CRANIAL NERVES: II     -      VA 20/20 OU; fundi reveal intact venous pulsations;  Visual fields intact to confrontation  III,IV,VI -  EOMs full, no afferent defect, no KATIA, no ptosis  V -     Normal facial sensation  VII    -     Normal facial symmetry  VIII   -     Intact hearing  IX,X -     Symmetrical palate  XI    -     Symmetrical shoulder shrug  XII   -     Midline tongue, no atrophy    MOTOR FUNCTION:  significant for good strength of grade 5/5 in bilateral proximal and distal muscle groups of both upper and lower extremities with normal bulk, normal tone and no involuntary movements, no tremor   SENSORY FUNCTION:  Normal touch, normal pin, normal vibration, normal proprioception   CEREBELLAR FUNCTION:  Intact fine motor control over upper limbs   REFLEX FUNCTION:  Symmetric, no perverted reflex, no Babinski sign   STATION and GAIT  Normal station, normal gait        6/26/2019  Maximum score 5 Score 5 What is the year, season, date, day, month   Maximum score 5 Score 5 Where are we: State, county, town, hospital, floor? Maximum score 3 Score 3 Name 3 objects: ball, pen, car. Ask patient to repeat 3 objects. Maximum score 5 Score 5 Serial sevens from 100:93, 86, 79, 72, 65   Maximum score 3 Score 2 Recall 3 objects   Maximum score 2 Score 2 Name a pencil and watch. Maximum score 1 Score 1 Repeat the following: No ifs ands or buts.      Maximum score 3 Score 3 Follow 3 stage command take a paper in your hand, fold it in half, and put it on the floor. Maximum score 1  Score 1 Read and obey the following: Close your eyes     Maximum score 1 Score 1 Write a sentence. Maximum score 1 Score 1 Copy a design below. Max 30   Patients score 29            Diagnostic data reviewed with the patient:   MRI Brain (8/30/17): No acute abnormality. ,  It demonstrated minimal nonspecific white matter disease. EEG (10/21/17): Unremarkable.     No results found for: Neena Double  Lab Results   Component Value Date    [id number] 749 08/29/2017      No results found for: FOLATE  No results found for: ANUSHKA  Lab Results   Component Value Date    TSH 2.06 08/29/2017       No results found for: RPR   No components found for: TREPONEMAPALLIDUM  Lab Results   Component Value Date    LABA1C 5.7 09/14/2018       MOCA testing (7/31/18):   patient scored 20/30.,   Patient is unable to do the trail making test, able to copy the cube, able to draw the clock and put the numbers and able to put the hands; able to name 3 out of 3 animals; able to do digit backwards but not digit forward; unable to do target detection;  unable to do the serial subtraction; scored 2/2 on verbal abstraction; could not do phonemic fluency; able to repeat only 1 out of 2 sentences; scored 2 out of 5 on delayed recall and oriented to place and city but not to the date, day of the week month and year. MRI Brain (8/30/17): No acute abnormality. 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 Rt Subclavian artery origin occlusive plaque versus intramural hematoma measuring 11 mm resulting in mild stenosis. Impression and Plan: Mr. Karyn Haile is a 63 y.o. 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 Soo Zavala. On 11/4/18; consultation done; patient was advised smoking cessation and was given yearly follow-up after MRA head. Patient has upcoming follow up appointment on 10/10/19. Dizziness with orthostatic hypotension: resolved. Subjective memory loss with depression: Neuropsych testing with Dr. Mike Starks ; earlier could not be done due to insurance reasons. Patient was referred [facility]; he could not see them for similar reasons; now he had new insurance, hopefully, he can have the testing locally. Hx of Aricept intolerance with loss of appetite; he stopped it on 11/6/18. Depression: Patient was on amitriptyline and it was later switched to Cymbalta 30mg qpm as per his PCP, Dr. Tavo Francois.  Then later switched to Prozac because of weight gain and uncontrolled depression. Comorbid diabetic neuropathy: presently tolerable on Elavil; has upcoming appt with pain clinic. Used to be on Cymbalta in the past for neuropathic pain relief but it was stopped due to weight gain. Hx of statin intolerance: Stopped atorvastatin. Presently on Lovastatin. Comorbid hypertension and diabetes  Family history of dementia (mom in her 76s)  Hx of \"mini stroke\" 3-4 years ago    Follow-up in 3-4 months. Please note that portions of this note were completed with a voice recognition program.  Although every effort was made to ensure the accuracy of this  automated transcription, some errors in transcription may have occurred, occasionally words are mis-transcribed.

## 2019-07-15 RX ORDER — BENZONATATE 100 MG/1
CAPSULE ORAL
Qty: 30 CAPSULE | Refills: 0 | Status: SHIPPED | OUTPATIENT
Start: 2019-07-15 | End: 2019-09-13 | Stop reason: SDUPTHER

## 2019-07-15 NOTE — TELEPHONE ENCOUNTER
depression     Attention deficit disorder     Left ventricular hypertrophy     Low back pain     Fatigue     Microalbuminuria     Memory loss     Memory difficulties     Right inguinal hernia     Elevated hemoglobin A1c     Dysesthesia     Cerebral aneurysm, nonruptured     Dyslipidemia     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

## 2019-07-18 ENCOUNTER — TELEPHONE (OUTPATIENT)
Dept: PRIMARY CARE CLINIC | Age: 59
End: 2019-07-18

## 2019-07-19 ENCOUNTER — OFFICE VISIT (OUTPATIENT)
Dept: PRIMARY CARE CLINIC | Age: 59
End: 2019-07-19
Payer: MEDICARE

## 2019-07-19 VITALS
DIASTOLIC BLOOD PRESSURE: 88 MMHG | OXYGEN SATURATION: 97 % | WEIGHT: 284.17 LBS | SYSTOLIC BLOOD PRESSURE: 130 MMHG | RESPIRATION RATE: 18 BRPM | BODY MASS INDEX: 40.68 KG/M2 | HEART RATE: 85 BPM | HEIGHT: 70 IN

## 2019-07-19 DIAGNOSIS — N52.9 ERECTILE DYSFUNCTION, UNSPECIFIED ERECTILE DYSFUNCTION TYPE: ICD-10-CM

## 2019-07-19 DIAGNOSIS — Z12.11 SCREENING FOR COLON CANCER: ICD-10-CM

## 2019-07-19 DIAGNOSIS — F32.A ANXIETY AND DEPRESSION: Primary | ICD-10-CM

## 2019-07-19 DIAGNOSIS — F41.9 ANXIETY AND DEPRESSION: Primary | ICD-10-CM

## 2019-07-19 PROCEDURE — G8428 CUR MEDS NOT DOCUMENT: HCPCS | Performed by: FAMILY MEDICINE

## 2019-07-19 PROCEDURE — G8417 CALC BMI ABV UP PARAM F/U: HCPCS | Performed by: FAMILY MEDICINE

## 2019-07-19 PROCEDURE — 99213 OFFICE O/P EST LOW 20 MIN: CPT | Performed by: FAMILY MEDICINE

## 2019-07-19 PROCEDURE — 3017F COLORECTAL CA SCREEN DOC REV: CPT | Performed by: FAMILY MEDICINE

## 2019-07-19 PROCEDURE — 4004F PT TOBACCO SCREEN RCVD TLK: CPT | Performed by: FAMILY MEDICINE

## 2019-07-19 RX ORDER — TADALAFIL 10 MG/1
10 TABLET ORAL DAILY PRN
Qty: 10 TABLET | Refills: 1 | Status: SHIPPED | OUTPATIENT
Start: 2019-07-19 | End: 2019-09-24 | Stop reason: ALTCHOICE

## 2019-07-19 RX ORDER — FLUOXETINE HYDROCHLORIDE 40 MG/1
40 CAPSULE ORAL DAILY
Qty: 30 CAPSULE | Refills: 3 | Status: SHIPPED | OUTPATIENT
Start: 2019-07-19 | End: 2019-10-23

## 2019-07-19 ASSESSMENT — ENCOUNTER SYMPTOMS
VOMITING: 0
SHORTNESS OF BREATH: 0
NAUSEA: 0

## 2019-07-19 NOTE — PROGRESS NOTES
nitroGLYCERIN (NITROSTAT) 0.3 MG SL tablet place 1 tablet under the tongue if needed every 5 minutes for chest pain for 3 doses IF NO RELIEF AF 25 tablet 3    donepezil (ARICEPT) 10 MG tablet Take 1 tablet by mouth nightly 30 tablet 3    diphenhydrAMINE (BENADRYL) 25 MG capsule Take 25 mg by mouth every 6 hours as needed for Itching      tiZANidine (ZANAFLEX) 2 MG capsule Take 1 capsule by mouth daily      aspirin 81 MG tablet Take 81 mg by mouth daily. No current facility-administered medications for this visit. Allergies   Allergen Reactions    Lisinopril      States he is allergic to the generic forms of medication, can take lisinopril    Ibuprofen Other (See Comments)     \"hurts my stomach. \"    Lipitor [Atorvastatin] Hives       Health Maintenance   Topic Date Due    Shingles Vaccine (1 of 2) 09/14/2011    Colon cancer screen colonoscopy  09/14/2011    Flu vaccine (1) 09/01/2019    A1C test (Diabetic or Prediabetic)  09/14/2019    Lipid screen  12/07/2022    DTaP/Tdap/Td vaccine (2 - Td) 08/02/2026    Pneumococcal 0-64 years Vaccine  Completed    Hepatitis C screen  Completed    HIV screen  Completed       Subjective:      Review of Systems   Constitutional: Negative for chills and fever. Respiratory: Negative for shortness of breath. Cardiovascular: Negative for chest pain. Gastrointestinal: Negative for nausea and vomiting. Genitourinary:        Erectile dysfunction   Psychiatric/Behavioral: Positive for dysphoric mood. Negative for suicidal ideas. Objective:     Physical Exam   Constitutional: He is oriented to person, place, and time. He appears well-developed and well-nourished. No distress. HENT:   Head: Normocephalic and atraumatic. Mouth/Throat: Oropharynx is clear and moist. No oropharyngeal exudate. Eyes: Pupils are equal, round, and reactive to light. Neck: Neck supple. Cardiovascular: Normal rate, regular rhythm and normal heart sounds.    No murmur

## 2019-07-22 ENCOUNTER — TELEPHONE (OUTPATIENT)
Dept: PRIMARY CARE CLINIC | Age: 59
End: 2019-07-22

## 2019-07-31 ENCOUNTER — TELEPHONE (OUTPATIENT)
Dept: PRIMARY CARE CLINIC | Age: 59
End: 2019-07-31

## 2019-08-14 ENCOUNTER — TELEPHONE (OUTPATIENT)
Dept: NEUROLOGY | Age: 59
End: 2019-08-14

## 2019-08-16 ENCOUNTER — OFFICE VISIT (OUTPATIENT)
Dept: PRIMARY CARE CLINIC | Age: 59
End: 2019-08-16
Payer: MEDICARE

## 2019-08-16 VITALS
BODY MASS INDEX: 40.68 KG/M2 | SYSTOLIC BLOOD PRESSURE: 116 MMHG | WEIGHT: 284.17 LBS | OXYGEN SATURATION: 98 % | HEART RATE: 88 BPM | DIASTOLIC BLOOD PRESSURE: 84 MMHG | RESPIRATION RATE: 18 BRPM | HEIGHT: 70 IN

## 2019-08-16 DIAGNOSIS — M54.5 CHRONIC BILATERAL LOW BACK PAIN, WITH SCIATICA PRESENCE UNSPECIFIED: ICD-10-CM

## 2019-08-16 DIAGNOSIS — F41.9 ANXIETY AND DEPRESSION: Primary | ICD-10-CM

## 2019-08-16 DIAGNOSIS — F32.A ANXIETY AND DEPRESSION: Primary | ICD-10-CM

## 2019-08-16 DIAGNOSIS — R41.3 MEMORY DIFFICULTIES: ICD-10-CM

## 2019-08-16 DIAGNOSIS — G89.29 CHRONIC BILATERAL LOW BACK PAIN, WITH SCIATICA PRESENCE UNSPECIFIED: ICD-10-CM

## 2019-08-16 PROCEDURE — 3017F COLORECTAL CA SCREEN DOC REV: CPT | Performed by: FAMILY MEDICINE

## 2019-08-16 PROCEDURE — G8427 DOCREV CUR MEDS BY ELIG CLIN: HCPCS | Performed by: FAMILY MEDICINE

## 2019-08-16 PROCEDURE — 4004F PT TOBACCO SCREEN RCVD TLK: CPT | Performed by: FAMILY MEDICINE

## 2019-08-16 PROCEDURE — 99214 OFFICE O/P EST MOD 30 MIN: CPT | Performed by: FAMILY MEDICINE

## 2019-08-16 PROCEDURE — G8417 CALC BMI ABV UP PARAM F/U: HCPCS | Performed by: FAMILY MEDICINE

## 2019-08-16 RX ORDER — ALBUTEROL SULFATE 90 UG/1
AEROSOL, METERED RESPIRATORY (INHALATION)
Qty: 18 G | Refills: 3 | Status: SHIPPED | OUTPATIENT
Start: 2019-08-16 | End: 2020-01-31

## 2019-08-16 RX ORDER — FLUOXETINE 10 MG/1
10 CAPSULE ORAL DAILY
Qty: 30 CAPSULE | Refills: 3 | Status: SHIPPED | OUTPATIENT
Start: 2019-08-16 | End: 2019-10-23

## 2019-08-16 NOTE — PROGRESS NOTES
[facility] CARE  [facility] [address]  [address] [address]  Dept: [phone]  Dept Fax: [phone]    Uche Christianson is a 63 y.o. male who presents today for his medical conditions/complaints as noted below. Uche Christianson is c/o of  Chief Complaint   Patient presents with   [facility]     2 mon fu - previously seen by Dr. Margi Jauregui Other     fill out paperwork       HPI:     HPI       Pt here for follow up on depression/ anxiety . Previous visit prozac dose was increased to 40 mg. States when he was initially restarted on prozac in June he noticed improvement and felt more motivation. Denies any suicidal or homicidal ideation. However has not seen improvement with increased dose. Pt in process of setting up appt with a therapist. He also agreeable to seeing a psychiatrist. Silvano Lui like still has lack of motivation, and states has been sleeping more. Pt also states he suffers from memory issues, has been following up with neurology. Will be trying to also set up neuro psych testing, was unable before due to insurance. Pt also follows up with pain management for his back pain. Hemoglobin A1C (%)   Date Value   09/14/2018 5.7   04/26/2017 6.1 (H)   10/18/2016 6.8             ( goal A1C is < 7)   Microalb/Crt. Ratio (mcg/mg creat)   Date Value   04/26/2017 17 (H)     No results found for: LDLCHOLESTEROL, LDLCALC    (goal LDL is <100)   AST (U/L)   Date Value   08/07/2017 25     ALT (U/L)   Date Value   08/07/2017 37     BUN (mg/dL)   Date Value   11/08/2018 13     BP Readings from Last 3 Encounters:   08/16/19 116/84   07/19/19 130/88   06/26/19 (!) 137/98          (goal 120/80)    Past Medical History:   Diagnosis Date    ADHD (attention deficit hyperactivity disorder)     Arthritis     knees, hands, back.  Asthma     as child.     Back pain     CAD (coronary artery disease)     Cataract     Depression pt. denies.     Diabetes mellitus [facility]     Heart attack ([facility]     2011    Hypertension     Kidney stones     Mini stroke ([facility]     2014    Osteoarthritis     Seasonal allergies     Sinus problem       Past Surgical History:   Procedure Laterality Date    CHOLECYSTECTOMY      2016    COLONOSCOPY      HERNIA REPAIR      1995, rt. inguinal    HERNIA REPAIR Left 01/2018    repair    HERNIA REPAIR Right 01/2018    looked at at same time they did left repair    INGUINAL HERNIA REPAIR Left 08/19/2016    LAPAROSCOPY N/A 1/16/2018    DIAGNOSTIC LAPAROSCOPY performed by Kathi Grover MD at [address] [place] [address],[address]         Family History   Problem Relation Age of Onset    Cancer Father         throat    Cirrhosis Father     High Blood Pressure Sister     Asthma Sister     Arthritis Sister     High Blood Pressure Mother     Heart Disease Mother     Alzheimer's Disease Mother        Social History     Tobacco Use    Smoking status: Current Every Day Smoker     Packs/day: 0.50     Years: 28.00     Pack years: 14.00     Types: Cigarettes    Smokeless tobacco: Never Used   Substance Use Topics    Alcohol use: Yes     Comment: rarely      Current Outpatient Medications   Medication Sig Dispense Refill    FLUoxetine (PROZAC) 10 MG capsule Take 1 capsule by mouth daily 30 capsule 3    FLUoxetine (PROZAC) 40 MG capsule Take 1 capsule by mouth daily 30 capsule 3    tadalafil (CIALIS) 10 MG tablet Take 1 tablet by mouth daily as needed for Erectile Dysfunction 10 tablet 1    benzonatate (TESSALON) 100 MG capsule take 1 capsule by mouth three times a day if needed for cough 30 capsule 0    budesonide (PULMICORT FLEXHALER) 90 MCG/ACT AEPB inhaler Inhale 2 puffs into the lungs 2 times daily 1 each 3    carvedilol (COREG) 25 MG tablet TAKE ONE TABLET BY MOUTH TWICE A DAY WITH MEALS 60 tablet 2    lovastatin (MEVACOR) 40 MG tablet Take 40 mg by mouth nightly     

## 2019-08-18 ASSESSMENT — ENCOUNTER SYMPTOMS
BACK PAIN: 1
VOMITING: 0
NAUSEA: 0
ABDOMINAL PAIN: 0

## 2019-08-30 ENCOUNTER — TELEPHONE (OUTPATIENT)
Dept: PRIMARY CARE CLINIC | Age: 59
End: 2019-08-30

## 2019-08-30 RX ORDER — FEXOFENADINE HCL 180 MG/1
180 TABLET ORAL DAILY
Qty: 30 TABLET | Refills: 5 | Status: SHIPPED | OUTPATIENT
Start: 2019-08-30 | End: 2020-07-07 | Stop reason: SDUPTHER

## 2019-09-13 RX ORDER — BENZONATATE 100 MG/1
CAPSULE ORAL
Qty: 30 CAPSULE | Refills: 0 | Status: SHIPPED | OUTPATIENT
Start: 2019-09-13 | End: 2019-10-08 | Stop reason: SDUPTHER

## 2019-09-19 ENCOUNTER — TELEPHONE (OUTPATIENT)
Dept: PRIMARY CARE CLINIC | Age: 59
End: 2019-09-19

## 2019-09-24 ENCOUNTER — OFFICE VISIT (OUTPATIENT)
Dept: NEUROLOGY | Age: 59
End: 2019-09-24
Payer: MEDICARE

## 2019-09-24 VITALS
HEART RATE: 81 BPM | DIASTOLIC BLOOD PRESSURE: 89 MMHG | WEIGHT: 274.4 LBS | SYSTOLIC BLOOD PRESSURE: 136 MMHG | BODY MASS INDEX: 39.28 KG/M2 | HEIGHT: 70 IN

## 2019-09-24 DIAGNOSIS — R20.8 DYSESTHESIA: ICD-10-CM

## 2019-09-24 DIAGNOSIS — G44.221 CHRONIC TENSION-TYPE HEADACHE, INTRACTABLE: Primary | ICD-10-CM

## 2019-09-24 DIAGNOSIS — F32.A DEPRESSION, UNSPECIFIED DEPRESSION TYPE: ICD-10-CM

## 2019-09-24 DIAGNOSIS — I67.1 ANEURYSM OF INTRACRANIAL PORTION OF LEFT INTERNAL CAROTID ARTERY: ICD-10-CM

## 2019-09-24 DIAGNOSIS — R41.3 MEMORY LOSS: ICD-10-CM

## 2019-09-24 PROCEDURE — 99214 OFFICE O/P EST MOD 30 MIN: CPT | Performed by: PSYCHIATRY & NEUROLOGY

## 2019-09-24 PROCEDURE — 3017F COLORECTAL CA SCREEN DOC REV: CPT | Performed by: PSYCHIATRY & NEUROLOGY

## 2019-09-24 PROCEDURE — G8417 CALC BMI ABV UP PARAM F/U: HCPCS | Performed by: PSYCHIATRY & NEUROLOGY

## 2019-09-24 PROCEDURE — G8427 DOCREV CUR MEDS BY ELIG CLIN: HCPCS | Performed by: PSYCHIATRY & NEUROLOGY

## 2019-09-24 PROCEDURE — 4004F PT TOBACCO SCREEN RCVD TLK: CPT | Performed by: PSYCHIATRY & NEUROLOGY

## 2019-09-24 RX ORDER — TOPIRAMATE 25 MG/1
TABLET ORAL
Qty: 60 TABLET | Refills: 1 | Status: SHIPPED | OUTPATIENT
Start: 2019-09-24 | End: 2019-11-30 | Stop reason: SDUPTHER

## 2019-09-24 NOTE — PROGRESS NOTES
25 MG tablet take 1 tablet by mouth twice a day with meals (Patient not taking: Reported on 9/24/2019) 60 tablet 0     No current facility-administered medications on file prior to visit. Allergies: Claudina Bernheim is allergic to lisinopril; ibuprofen; and lipitor [atorvastatin]. Past Medical History:   Diagnosis Date    ADHD (attention deficit hyperactivity disorder)     Arthritis     knees, hands, back.  Asthma     as child.  Back pain     CAD (coronary artery disease)     Cataract     Depression     pt. denies.  Diabetes [facility] ([facility]     Heart attack ([facility]     2011    Hypertension     Kidney stones     Mini stroke ([facility]     2014    Osteoarthritis     Seasonal allergies     Sinus problem        Past Surgical History:   Procedure Laterality Date    CHOLECYSTECTOMY      2016    COLONOSCOPY      HERNIA REPAIR      1995, rt. inguinal    HERNIA REPAIR Left 01/2018    repair    HERNIA REPAIR Right 01/2018    looked at at same time they did left repair    INGUINAL HERNIA REPAIR Left 08/19/2016    LAPAROSCOPY N/A 1/16/2018    DIAGNOSTIC LAPAROSCOPY performed by Angela Caballero MD at [address] [place] [address],[address]       Social History: Claudina Bernheim  reports that he has been smoking cigarettes. He has a 14.00 pack-year smoking history. He has never used smokeless tobacco. He reports that he drinks alcohol. He reports that he does not use drugs. Family History   Problem Relation Age of Onset    Cancer Father         throat    Cirrhosis Father     High Blood Pressure Sister     Asthma Sister     Arthritis Sister     High Blood Pressure Mother     Heart Disease Mother     Alzheimer's Disease Mother        On exam: Blood pressure 136/89, pulse 81, height 5' 10\" (1.778 m), weight 274 lb 6.4 oz (124.5 kg).     GENERAL  Appears comfortable and in no distress   HEENT  NC/ AT   NECK & cardiovascular  Supple and no bruits heard; S1 and S2 heard; radial pulse intact   MENTAL STATUS:  Alert, oriented, intact memory, no confusion, normal speech, normal language, no hallucination or delusion. ,  He scored 29/30 on MMSE. CRANIAL NERVES: II     -      VA 20/20 OU; fundi reveal intact venous pulsations; Visual fields intact to confrontation  III,IV,VI -  EOMs full, no afferent defect, no KATIA, no ptosis  V     -     Normal facial sensation  VII    -     Normal facial symmetry  VIII   -     Intact hearing  IX,X -     Symmetrical palate  XI    -     Symmetrical shoulder shrug  XII   -     Midline tongue, no atrophy    MOTOR FUNCTION:  significant for good strength of grade 5/5 in bilateral proximal and distal muscle groups of both upper and lower extremities with normal bulk, normal tone and no involuntary movements, no tremor   SENSORY FUNCTION:  Normal touch, normal pin, normal vibration, normal proprioception   CEREBELLAR FUNCTION:  Intact fine motor control over upper limbs   REFLEX FUNCTION:  Symmetric, no perverted reflex, no Babinski sign   STATION and GAIT  Normal station, normal gait        9/24/2019  Maximum score 5 Score 5 What is the year, season, date, day, month   Maximum score 5 Score 5 Where are we: State, county, town, hospital, floor? Maximum score 3 Score 3 Name 3 objects: ball, pen, car. Ask patient to repeat 3 objects. Maximum score 5 Score 5 Serial sevens from 100:93, 86, 79, 72, 65   Maximum score 3 Score 2 Recall 3 objects   Maximum score 2 Score 2 Name a pencil and watch. Maximum score 1 Score 1 Repeat the following: No ifs ands or buts.      Maximum score 3 Score 3 Follow 3 stage command take a paper in your hand, fold it in half, and put it on the floor. Maximum score 1  Score 1 Read and obey the following: Close your eyes     Maximum score 1 Score 1 Write a sentence. Maximum score 1 Score 1 Copy a design below.        Max 30   Patients score 29            Diagnostic data reviewed with the patient:   MRI

## 2019-09-24 NOTE — PROGRESS NOTES
NEUROLOGY FOLLOW-UP  Patient Name:  Severiano Russell  :   1960  Clinic Visit Date: 2019        REVIEW OF SYSTEMS  Constitutional Weight changes: absent, Fevers : absent, Fatigue: present; Any recent hospitalizations:  Absent.    Change in Appetite: Present   HEENT Ears: normal, Eyes: N/A, Visual disturbance: absent   Reespiratory Shortness of breath: present, Cough: present   Cardivascular Chest pain: absent, Leg swelling :absent   GI Constipation: absent, Diarrhea: absent, Swallowing change: absent    Urinary frequency: present, Urinary urgency: absent, Urinary incontinence: absent   Musculoskeletal Neck pain: absent, Back pain: present, Stiffness: present, Muscle pain: present, Joint pain: present   Dermatological Hair loss: absent, Skin changes: absent   Neurological Memory loss: present, Confusion: absent, Seizures: absent; Trouble walking or imbalance: present, Dizziness: absent, Weakness: present, Numbness absent, Tremor: absent, Spasm: absent, Speech difficulty: absent, Headache: present, Light sensitivity: absent   Psychiatric Anxiety: absent, Depression  present, Suicidal ideations absent   Hematologic Abnormal bleeding: absent, Anemia: absent, Clotting disorder: absent, Lymph gland changes: absent

## 2019-09-25 ENCOUNTER — OFFICE VISIT (OUTPATIENT)
Dept: PRIMARY CARE CLINIC | Age: 59
End: 2019-09-25
Payer: MEDICARE

## 2019-09-25 VITALS
HEART RATE: 83 BPM | BODY MASS INDEX: 39.22 KG/M2 | RESPIRATION RATE: 18 BRPM | WEIGHT: 274 LBS | SYSTOLIC BLOOD PRESSURE: 122 MMHG | DIASTOLIC BLOOD PRESSURE: 98 MMHG | OXYGEN SATURATION: 97 % | HEIGHT: 70 IN

## 2019-09-25 DIAGNOSIS — F41.9 ANXIETY AND DEPRESSION: Primary | ICD-10-CM

## 2019-09-25 DIAGNOSIS — Z13.220 SCREENING FOR HYPERLIPIDEMIA: ICD-10-CM

## 2019-09-25 DIAGNOSIS — R73.03 PREDIABETES: ICD-10-CM

## 2019-09-25 DIAGNOSIS — F32.A ANXIETY AND DEPRESSION: Primary | ICD-10-CM

## 2019-09-25 DIAGNOSIS — J45.40 MODERATE PERSISTENT ASTHMA, UNSPECIFIED WHETHER COMPLICATED: ICD-10-CM

## 2019-09-25 DIAGNOSIS — I10 ESSENTIAL HYPERTENSION: ICD-10-CM

## 2019-09-25 DIAGNOSIS — Z23 FLU VACCINE NEED: ICD-10-CM

## 2019-09-25 PROCEDURE — G8417 CALC BMI ABV UP PARAM F/U: HCPCS | Performed by: FAMILY MEDICINE

## 2019-09-25 PROCEDURE — 3017F COLORECTAL CA SCREEN DOC REV: CPT | Performed by: FAMILY MEDICINE

## 2019-09-25 PROCEDURE — 99214 OFFICE O/P EST MOD 30 MIN: CPT | Performed by: FAMILY MEDICINE

## 2019-09-25 PROCEDURE — 4004F PT TOBACCO SCREEN RCVD TLK: CPT | Performed by: FAMILY MEDICINE

## 2019-09-25 PROCEDURE — G8427 DOCREV CUR MEDS BY ELIG CLIN: HCPCS | Performed by: FAMILY MEDICINE

## 2019-09-25 PROCEDURE — 96160 PT-FOCUSED HLTH RISK ASSMT: CPT | Performed by: FAMILY MEDICINE

## 2019-09-25 RX ORDER — DIPHENHYDRAMINE HCL 25 MG
25 TABLET ORAL EVERY 6 HOURS PRN
COMMUNITY
End: 2021-07-12

## 2019-09-25 RX ORDER — PREDNISONE 20 MG/1
40 TABLET ORAL DAILY
Qty: 10 TABLET | Refills: 0 | Status: SHIPPED | OUTPATIENT
Start: 2019-09-25 | End: 2019-09-30

## 2019-09-25 RX ORDER — AZITHROMYCIN 250 MG/1
250 TABLET, FILM COATED ORAL SEE ADMIN INSTRUCTIONS
Qty: 6 TABLET | Refills: 0 | Status: SHIPPED | OUTPATIENT
Start: 2019-09-25 | End: 2019-11-25 | Stop reason: SDUPTHER

## 2019-09-25 RX ORDER — VENLAFAXINE HYDROCHLORIDE 75 MG/1
75 CAPSULE, EXTENDED RELEASE ORAL DAILY
Qty: 30 CAPSULE | Refills: 3 | Status: SHIPPED | OUTPATIENT
Start: 2019-09-25 | End: 2019-12-23 | Stop reason: SDUPTHER

## 2019-09-25 RX ORDER — AMLODIPINE BESYLATE 10 MG/1
10 TABLET ORAL DAILY
Qty: 90 TABLET | Refills: 1 | Status: SHIPPED | OUTPATIENT
Start: 2019-09-25 | End: 2020-02-25

## 2019-09-25 RX ORDER — FLUTICASONE PROPIONATE 110 UG/1
2 AEROSOL, METERED RESPIRATORY (INHALATION) 2 TIMES DAILY
Qty: 1 INHALER | Refills: 3 | Status: SHIPPED | OUTPATIENT
Start: 2019-09-25 | End: 2020-06-17

## 2019-09-25 RX ORDER — FLUTICASONE PROPIONATE 50 MCG
2 SPRAY, SUSPENSION (ML) NASAL DAILY
Qty: 1 BOTTLE | Refills: 5 | Status: SHIPPED | OUTPATIENT
Start: 2019-09-25 | End: 2020-11-06

## 2019-09-25 ASSESSMENT — PATIENT HEALTH QUESTIONNAIRE - PHQ9
8. MOVING OR SPEAKING SO SLOWLY THAT OTHER PEOPLE COULD HAVE NOTICED. OR THE OPPOSITE, BEING SO FIGETY OR RESTLESS THAT YOU HAVE BEEN MOVING AROUND A LOT MORE THAN USUAL: 3
2. FEELING DOWN, DEPRESSED OR HOPELESS: 2
SUM OF ALL RESPONSES TO PHQ QUESTIONS 1-9: 19
7. TROUBLE CONCENTRATING ON THINGS, SUCH AS READING THE NEWSPAPER OR WATCHING TELEVISION: 3
3. TROUBLE FALLING OR STAYING ASLEEP: 2
9. THOUGHTS THAT YOU WOULD BE BETTER OFF DEAD, OR OF HURTING YOURSELF: 0
SUM OF ALL RESPONSES TO PHQ9 QUESTIONS 1 & 2: 5
5. POOR APPETITE OR OVEREATING: 3
4. FEELING TIRED OR HAVING LITTLE ENERGY: 3
10. IF YOU CHECKED OFF ANY PROBLEMS, HOW DIFFICULT HAVE THESE PROBLEMS MADE IT FOR YOU TO DO YOUR WORK, TAKE CARE OF THINGS AT HOME, OR GET ALONG WITH OTHER PEOPLE: 3
SUM OF ALL RESPONSES TO PHQ QUESTIONS 1-9: 19
1. LITTLE INTEREST OR PLEASURE IN DOING THINGS: 3
6. FEELING BAD ABOUT YOURSELF - OR THAT YOU ARE A FAILURE OR HAVE LET YOURSELF OR YOUR FAMILY DOWN: 0

## 2019-09-25 NOTE — PROGRESS NOTES
 Heart attack ([facility]     2011    Hypertension     Kidney stones     Mini stroke ([facility]     2014    Osteoarthritis     Seasonal allergies     Sinus problem       Past Surgical History:   Procedure Laterality Date    CHOLECYSTECTOMY      2016    COLONOSCOPY      HERNIA REPAIR      1995, rt. inguinal    HERNIA REPAIR Left 01/2018    repair    HERNIA REPAIR Right 01/2018    looked at at same time they did left repair    INGUINAL HERNIA REPAIR Left 08/19/2016    LAPAROSCOPY N/A 1/16/2018    DIAGNOSTIC LAPAROSCOPY performed by Viktor Knight MD at [address] [place] [address],[address]         Family History   Problem Relation Age of Onset    Cancer Father         throat    Cirrhosis Father     High Blood Pressure Sister     Asthma Sister     Arthritis Sister     High Blood Pressure Mother     Heart Disease Mother     Alzheimer's Disease Mother        Social History     Tobacco Use    Smoking status: Current Every Day Smoker     Packs/day: 0.50     Years: 28.00     Pack years: 14.00     Types: Cigarettes    Smokeless tobacco: Never Used   Substance Use Topics    Alcohol use: Yes     Comment: rarely      Current Outpatient Medications   Medication Sig Dispense Refill    diphenhydrAMINE (BENADRYL) 25 MG tablet Take 25 mg by mouth every 6 hours as needed for Itching      fluticasone (FLONASE) 50 MCG/ACT nasal spray 2 sprays by Nasal route daily 1 Bottle 5    azithromycin (ZITHROMAX) 250 MG tablet Take 1 tablet by mouth See Admin Instructions for 5 days 500mg on day 1 followed by 250mg on days 2 - 5 6 tablet 0    predniSONE (DELTASONE) 20 MG tablet Take 2 tablets by mouth daily for 5 days 10 tablet 0    venlafaxine (EFFEXOR XR) 75 MG extended release capsule Take 1 capsule by mouth daily 30 capsule 3    fluticasone (FLOVENT HFA) 110 MCG/ACT inhaler Inhale 2 puffs into the lungs 2 times daily 1 Inhaler 3    amLODIPine (NORVASC) 10 MG tablet Take 1 tablet by mouth daily 90 daily     Dispense:  1 Inhaler     Refill:  3    amLODIPine (NORVASC) 10 MG tablet     Sig: Take 1 tablet by mouth daily     Dispense:  90 tablet     Refill:  1        Patient given educational materials - see patient instructions. Discussed use, benefit, and side effects of prescribed medications. All patient questions answered. Pt voiced understanding. Reviewed healthmaintenance. Instructed to continue current medications, diet and exercise. Patient agreed with treatment plan. Follow up as directed.      Electronically signed by Claudine Olszewski, DO on 9/26/2019 at 1:19 PM

## 2019-09-26 ASSESSMENT — ENCOUNTER SYMPTOMS
NAUSEA: 0
VOMITING: 0
WHEEZING: 1
SHORTNESS OF BREATH: 1

## 2019-09-30 PROCEDURE — 90686 IIV4 VACC NO PRSV 0.5 ML IM: CPT | Performed by: FAMILY MEDICINE

## 2019-09-30 PROCEDURE — 90471 IMMUNIZATION ADMIN: CPT | Performed by: FAMILY MEDICINE

## 2019-10-09 RX ORDER — BENZONATATE 100 MG/1
CAPSULE ORAL
Qty: 30 CAPSULE | Refills: 0 | Status: SHIPPED | OUTPATIENT
Start: 2019-10-09 | End: 2019-10-20 | Stop reason: SDUPTHER

## 2019-10-21 RX ORDER — BENZONATATE 100 MG/1
100 CAPSULE ORAL 3 TIMES DAILY PRN
Qty: 30 CAPSULE | Refills: 0 | Status: SHIPPED | OUTPATIENT
Start: 2019-10-21 | End: 2019-11-04 | Stop reason: SDUPTHER

## 2019-10-23 ENCOUNTER — OFFICE VISIT (OUTPATIENT)
Dept: PRIMARY CARE CLINIC | Age: 59
End: 2019-10-23
Payer: MEDICARE

## 2019-10-23 VITALS
WEIGHT: 263 LBS | OXYGEN SATURATION: 99 % | HEIGHT: 70 IN | SYSTOLIC BLOOD PRESSURE: 120 MMHG | RESPIRATION RATE: 18 BRPM | BODY MASS INDEX: 37.65 KG/M2 | DIASTOLIC BLOOD PRESSURE: 88 MMHG | HEART RATE: 83 BPM

## 2019-10-23 DIAGNOSIS — I10 ESSENTIAL HYPERTENSION: ICD-10-CM

## 2019-10-23 DIAGNOSIS — F41.9 ANXIETY AND DEPRESSION: Primary | ICD-10-CM

## 2019-10-23 DIAGNOSIS — J45.40 MODERATE PERSISTENT ASTHMA, UNSPECIFIED WHETHER COMPLICATED: ICD-10-CM

## 2019-10-23 DIAGNOSIS — F32.A ANXIETY AND DEPRESSION: Primary | ICD-10-CM

## 2019-10-23 PROCEDURE — G8482 FLU IMMUNIZE ORDER/ADMIN: HCPCS | Performed by: FAMILY MEDICINE

## 2019-10-23 PROCEDURE — 99214 OFFICE O/P EST MOD 30 MIN: CPT | Performed by: FAMILY MEDICINE

## 2019-10-23 PROCEDURE — G8427 DOCREV CUR MEDS BY ELIG CLIN: HCPCS | Performed by: FAMILY MEDICINE

## 2019-10-23 PROCEDURE — 4004F PT TOBACCO SCREEN RCVD TLK: CPT | Performed by: FAMILY MEDICINE

## 2019-10-23 PROCEDURE — 3017F COLORECTAL CA SCREEN DOC REV: CPT | Performed by: FAMILY MEDICINE

## 2019-10-23 PROCEDURE — G8417 CALC BMI ABV UP PARAM F/U: HCPCS | Performed by: FAMILY MEDICINE

## 2019-10-23 ASSESSMENT — ENCOUNTER SYMPTOMS
VOMITING: 0
SHORTNESS OF BREATH: 0
NAUSEA: 0

## 2019-10-30 ENCOUNTER — TELEPHONE (OUTPATIENT)
Dept: PRIMARY CARE CLINIC | Age: 59
End: 2019-10-30

## 2019-11-01 ENCOUNTER — TELEPHONE (OUTPATIENT)
Dept: PRIMARY CARE CLINIC | Age: 59
End: 2019-11-01

## 2019-11-01 LAB
AVERAGE GLUCOSE: 128
CHOLESTEROL, TOTAL: 198 MG/DL
CHOLESTEROL/HDL RATIO: 6.6
HBA1C MFR BLD: 6.1 %
HDLC SERPL-MCNC: 30 MG/DL (ref 35–70)
LDL CHOLESTEROL CALCULATED: 95 MG/DL (ref 0–160)
TRIGL SERPL-MCNC: 366 MG/DL
VLDLC SERPL CALC-MCNC: 73 MG/DL

## 2019-11-04 DIAGNOSIS — I10 ESSENTIAL HYPERTENSION: ICD-10-CM

## 2019-11-04 DIAGNOSIS — R73.03 PREDIABETES: ICD-10-CM

## 2019-11-04 DIAGNOSIS — Z13.220 SCREENING FOR HYPERLIPIDEMIA: ICD-10-CM

## 2019-11-04 RX ORDER — BENZONATATE 100 MG/1
CAPSULE ORAL
Qty: 30 CAPSULE | Refills: 0 | Status: SHIPPED | OUTPATIENT
Start: 2019-11-04 | End: 2019-11-18 | Stop reason: SDUPTHER

## 2019-11-18 RX ORDER — BENZONATATE 100 MG/1
CAPSULE ORAL
Qty: 30 CAPSULE | Refills: 0 | Status: SHIPPED | OUTPATIENT
Start: 2019-11-18 | End: 2019-11-25 | Stop reason: SDUPTHER

## 2019-11-25 RX ORDER — AZITHROMYCIN 250 MG/1
250 TABLET, FILM COATED ORAL SEE ADMIN INSTRUCTIONS
Qty: 6 TABLET | Refills: 0 | Status: SHIPPED | OUTPATIENT
Start: 2019-11-25 | End: 2019-11-30

## 2019-11-25 RX ORDER — BENZONATATE 100 MG/1
CAPSULE ORAL
Qty: 30 CAPSULE | Refills: 0 | Status: SHIPPED | OUTPATIENT
Start: 2019-11-25 | End: 2019-12-13 | Stop reason: SDUPTHER

## 2019-11-30 DIAGNOSIS — G44.221 CHRONIC TENSION-TYPE HEADACHE, INTRACTABLE: ICD-10-CM

## 2019-12-02 RX ORDER — AMITRIPTYLINE HYDROCHLORIDE 50 MG/1
TABLET, FILM COATED ORAL
Qty: 30 TABLET | Refills: 2 | Status: SHIPPED | OUTPATIENT
Start: 2019-12-02 | End: 2020-01-06 | Stop reason: SDUPTHER

## 2019-12-05 RX ORDER — TOPIRAMATE 25 MG/1
TABLET ORAL
Qty: 60 TABLET | Refills: 1 | Status: SHIPPED | OUTPATIENT
Start: 2019-12-05 | End: 2020-01-06 | Stop reason: SDUPTHER

## 2019-12-13 RX ORDER — BENZONATATE 100 MG/1
CAPSULE ORAL
Qty: 30 CAPSULE | Refills: 0 | Status: SHIPPED | OUTPATIENT
Start: 2019-12-13 | End: 2019-12-23 | Stop reason: SDUPTHER

## 2019-12-23 RX ORDER — VENLAFAXINE HYDROCHLORIDE 75 MG/1
75 CAPSULE, EXTENDED RELEASE ORAL DAILY
Qty: 30 CAPSULE | Refills: 5 | Status: SHIPPED | OUTPATIENT
Start: 2019-12-23 | End: 2020-05-11 | Stop reason: SDUPTHER

## 2019-12-23 RX ORDER — BENZONATATE 100 MG/1
100 CAPSULE ORAL 3 TIMES DAILY PRN
Qty: 30 CAPSULE | Refills: 0 | Status: SHIPPED | OUTPATIENT
Start: 2019-12-23 | End: 2019-12-23

## 2020-01-01 NOTE — TELEPHONE ENCOUNTER
Viji Brush  Yes please if he can't comment 8:30 AM on Tuesday, 9/4/18;  I can see him in the clinic and then go to [facility] [address]  Please let the patient know that   Thank you.   -dr. Farzad Toro 37.2

## 2020-01-03 RX ORDER — BENZONATATE 100 MG/1
CAPSULE ORAL
Qty: 30 CAPSULE | Refills: 0 | Status: SHIPPED | OUTPATIENT
Start: 2020-01-03 | End: 2020-01-17

## 2020-01-06 ENCOUNTER — OFFICE VISIT (OUTPATIENT)
Dept: NEUROLOGY | Age: 60
End: 2020-01-06
Payer: MEDICARE

## 2020-01-06 VITALS
DIASTOLIC BLOOD PRESSURE: 89 MMHG | SYSTOLIC BLOOD PRESSURE: 135 MMHG | BODY MASS INDEX: 38.02 KG/M2 | WEIGHT: 265.6 LBS | HEART RATE: 82 BPM | HEIGHT: 70 IN

## 2020-01-06 PROCEDURE — 99214 OFFICE O/P EST MOD 30 MIN: CPT | Performed by: PSYCHIATRY & NEUROLOGY

## 2020-01-06 PROCEDURE — G8417 CALC BMI ABV UP PARAM F/U: HCPCS | Performed by: PSYCHIATRY & NEUROLOGY

## 2020-01-06 PROCEDURE — 3017F COLORECTAL CA SCREEN DOC REV: CPT | Performed by: PSYCHIATRY & NEUROLOGY

## 2020-01-06 PROCEDURE — G8427 DOCREV CUR MEDS BY ELIG CLIN: HCPCS | Performed by: PSYCHIATRY & NEUROLOGY

## 2020-01-06 PROCEDURE — 4004F PT TOBACCO SCREEN RCVD TLK: CPT | Performed by: PSYCHIATRY & NEUROLOGY

## 2020-01-06 PROCEDURE — G8482 FLU IMMUNIZE ORDER/ADMIN: HCPCS | Performed by: PSYCHIATRY & NEUROLOGY

## 2020-01-06 RX ORDER — TOPIRAMATE 50 MG/1
TABLET, FILM COATED ORAL
Qty: 60 TABLET | Refills: 3 | Status: SHIPPED | OUTPATIENT
Start: 2020-01-06 | End: 2020-05-19

## 2020-01-06 RX ORDER — AMITRIPTYLINE HYDROCHLORIDE 50 MG/1
TABLET, FILM COATED ORAL
Qty: 30 TABLET | Refills: 4 | Status: SHIPPED | OUTPATIENT
Start: 2020-01-06 | End: 2020-12-03 | Stop reason: CLARIF

## 2020-01-06 NOTE — PROGRESS NOTES
mouth at bedtime 30 tablet 2    cloNIDine (CATAPRES) 0.1 MG tablet take 1 tablet by mouth twice a day IF BLOOD PRESSURE IS GREATER THAN 140/90 30 tablet 0    diphenhydrAMINE (BENADRYL) 25 MG tablet Take 25 mg by mouth every 6 hours as needed for Itching      fluticasone (FLONASE) 50 MCG/ACT nasal spray 2 sprays by Nasal route daily 1 Bottle 5    fluticasone (FLOVENT HFA) 110 MCG/ACT inhaler Inhale 2 puffs into the lungs 2 times daily 1 Inhaler 3    amLODIPine (NORVASC) 10 MG tablet Take 1 tablet by mouth daily 90 tablet 1    albuterol sulfate  (90 Base) MCG/ACT inhaler inhale 2 puffs by mouth INTO THE LUNGS every 6 hours if needed for wheezing 18 g 3    nitroGLYCERIN (NITROSTAT) 0.3 MG SL tablet place 1 tablet under the tongue if needed every 5 minutes for chest pain for 3 doses IF NO RELIEF AF 25 tablet 3    donepezil (ARICEPT) 10 MG tablet Take 1 tablet by mouth nightly 30 tablet 3    tiZANidine (ZANAFLEX) 2 MG capsule Take 1 capsule by mouth daily      aspirin 81 MG tablet Take 81 mg by mouth daily.  fexofenadine (ALLEGRA) 180 MG tablet Take 1 tablet by mouth daily (Patient not taking: Reported on 1/6/2020) 30 tablet 5     No current facility-administered medications on file prior to visit. Allergies: Terry Nelson is allergic to lisinopril; ibuprofen; and lipitor [atorvastatin]. Past Medical History:   Diagnosis Date    ADHD (attention deficit hyperactivity disorder)     Arthritis     knees, hands, back.  Asthma     as child.  Back pain     CAD (coronary artery disease)     Cataract     Depression     pt. denies.     Diabetes mellitus ([facility]     Heart attack ([facility]     2011    Hypertension     Kidney stones     Mini stroke ([facility]     2014    Osteoarthritis     Seasonal allergies     Sinus problem        Past Surgical History:   Procedure Laterality Date    CHOLECYSTECTOMY      2016    COLONOSCOPY      HERNIA REPAIR      1995, rt. inguinal    HERNIA REPAIR Left 01/2018    repair    HERNIA REPAIR Right 01/2018    looked at at same time they did left repair    INGUINAL HERNIA REPAIR Left 08/19/2016    LAPAROSCOPY N/A 1/16/2018    DIAGNOSTIC LAPAROSCOPY performed by Juan Bob MD at [address] [place] [address],[address]       Social History: Carrol Ernst  reports that he has been smoking cigarettes. He has a 14.00 pack-year smoking history. He has never used smokeless tobacco. He reports previous alcohol use. He reports that he does not use drugs. Family History   Problem Relation Age of Onset    Cancer Father         throat    Cirrhosis Father     High Blood Pressure Sister     Asthma Sister     Arthritis Sister     High Blood Pressure Mother     Heart Disease Mother     Alzheimer's Disease Mother        On exam: Blood pressure 135/89, pulse 82, height 5' 10\" (1.778 m), weight 265 lb 9.6 oz (120.5 kg). GENERAL  Appears comfortable and in no distress   HEENT  NC/ AT   NECK & cardiovascular  Supple and no bruits heard; S1 and S2 heard; radial pulse intact   MENTAL STATUS:  Alert, oriented, intact memory, no confusion, normal speech, normal language, no hallucination or delusion. ,  He scored 29/30 on MMSE. CRANIAL NERVES: II     -      VA 20/20 OU; fundi reveal intact venous pulsations;  Visual fields intact to confrontation  III,IV,VI -  EOMs full, no afferent defect, no KATIA, no ptosis  V     -     Normal facial sensation  VII    -     Normal facial symmetry  VIII   -     Intact hearing  IX,X -     Symmetrical palate  XI    -     Symmetrical shoulder shrug  XII   -     Midline tongue, no atrophy    MOTOR FUNCTION:  significant for good strength of grade 5/5 in bilateral proximal and distal muscle groups of both upper and lower extremities with normal bulk, normal tone and no involuntary movements, no tremor   SENSORY FUNCTION:  Normal touch, normal pin, normal vibration, normal proprioception   CEREBELLAR FUNCTION:

## 2020-01-17 RX ORDER — BENZONATATE 100 MG/1
CAPSULE ORAL
Qty: 30 CAPSULE | Refills: 0 | Status: SHIPPED | OUTPATIENT
Start: 2020-01-17 | End: 2020-02-06 | Stop reason: SDUPTHER

## 2020-01-27 ENCOUNTER — TELEPHONE (OUTPATIENT)
Dept: UROLOGY | Age: 60
End: 2020-01-27

## 2020-01-27 ENCOUNTER — HOSPITAL ENCOUNTER (OUTPATIENT)
Age: 60
Setting detail: SPECIMEN
Discharge: HOME | End: 2020-01-27
Payer: MEDICARE

## 2020-01-27 ENCOUNTER — OFFICE VISIT (OUTPATIENT)
Dept: UROLOGY | Age: 60
End: 2020-01-27
Payer: MEDICARE

## 2020-01-27 VITALS
WEIGHT: 267.6 LBS | TEMPERATURE: 98.3 F | BODY MASS INDEX: 38.31 KG/M2 | HEIGHT: 70 IN | DIASTOLIC BLOOD PRESSURE: 74 MMHG | SYSTOLIC BLOOD PRESSURE: 104 MMHG

## 2020-01-27 LAB
BILIRUBIN, POC: ABNORMAL
BLOOD URINE, POC: ABNORMAL
CLARITY, POC: CLEAR
COLOR, POC: ABNORMAL
GLUCOSE URINE, POC: ABNORMAL
KETONES, POC: ABNORMAL
LEUKOCYTE EST, POC: ABNORMAL
NITRITE, POC: ABNORMAL
PH, POC: ABNORMAL
PROTEIN, POC: ABNORMAL
SPECIFIC GRAVITY, POC: ABNORMAL
UROBILINOGEN, POC: ABNORMAL

## 2020-01-27 PROCEDURE — G8482 FLU IMMUNIZE ORDER/ADMIN: HCPCS | Performed by: UROLOGY

## 2020-01-27 PROCEDURE — 81002 URINALYSIS NONAUTO W/O SCOPE: CPT | Performed by: UROLOGY

## 2020-01-27 PROCEDURE — 4004F PT TOBACCO SCREEN RCVD TLK: CPT | Performed by: UROLOGY

## 2020-01-27 PROCEDURE — G8417 CALC BMI ABV UP PARAM F/U: HCPCS | Performed by: UROLOGY

## 2020-01-27 PROCEDURE — 99204 OFFICE O/P NEW MOD 45 MIN: CPT | Performed by: UROLOGY

## 2020-01-27 PROCEDURE — G8427 DOCREV CUR MEDS BY ELIG CLIN: HCPCS | Performed by: UROLOGY

## 2020-01-27 PROCEDURE — 3017F COLORECTAL CA SCREEN DOC REV: CPT | Performed by: UROLOGY

## 2020-01-27 RX ORDER — DIAZEPAM 5 MG/1
TABLET ORAL
Refills: 0 | COMMUNITY
Start: 2019-11-04 | End: 2020-10-21 | Stop reason: ALTCHOICE

## 2020-01-27 RX ORDER — DULOXETIN HYDROCHLORIDE 30 MG/1
30 CAPSULE, DELAYED RELEASE ORAL
COMMUNITY
End: 2021-04-05 | Stop reason: ALTCHOICE

## 2020-01-27 RX ORDER — OXYCODONE HYDROCHLORIDE AND ACETAMINOPHEN 5; 325 MG/1; MG/1
TABLET ORAL
Status: ON HOLD | COMMUNITY
Start: 2019-12-19 | End: 2020-02-05 | Stop reason: SDUPTHER

## 2020-01-27 RX ORDER — TAMSULOSIN HYDROCHLORIDE 0.4 MG/1
0.4 CAPSULE ORAL
Status: ON HOLD | COMMUNITY
Start: 2020-01-23 | End: 2020-02-05 | Stop reason: SDUPTHER

## 2020-01-27 RX ORDER — TIZANIDINE 2 MG/1
TABLET ORAL
Refills: 0 | COMMUNITY
Start: 2019-12-06 | End: 2020-01-27 | Stop reason: SDUPTHER

## 2020-01-27 ASSESSMENT — ENCOUNTER SYMPTOMS
BACK PAIN: 1
NAUSEA: 0
VOMITING: 0
CONSTIPATION: 0
ABDOMINAL PAIN: 0
COUGH: 0
SHORTNESS OF BREATH: 1
DIARRHEA: 0
EYE REDNESS: 0
WHEEZING: 1
EYE PAIN: 0

## 2020-01-27 NOTE — TELEPHONE ENCOUNTER
Cysto, (RT) URS, HLL, (RT) stent placement @ ST 2/5/20 3:00pm   PAT same day             Spoke with patient in office, procedure info given to patient.

## 2020-01-27 NOTE — PROGRESS NOTES
Review of Systems   Constitutional: Positive for appetite change. Negative for chills and fatigue. Eyes: Positive for visual disturbance. Negative for pain and redness. Respiratory: Positive for shortness of breath and wheezing. Negative for cough. Cardiovascular: Negative for chest pain and leg swelling. Gastrointestinal: Negative for abdominal pain, constipation, diarrhea, nausea and vomiting. Genitourinary: Positive for hematuria. Negative for difficulty urinating, dysuria, flank pain, frequency and urgency. Musculoskeletal: Positive for back pain, joint swelling and myalgias. Skin: Negative for rash and wound. Neurological: Negative for dizziness, weakness and numbness. Hematological: Does not bruise/bleed easily.
urs, hll, stent st v's in one week (I suggested trial of passage; pt would like stone treated due to disruption in his routine). Prescriptions Ordered:  No orders of the defined types were placed in this encounter. Orders Placed:  Orders Placed This Encounter   Procedures    POCT Urinalysis no Micro           So Couch MD    Agree with the ROS entered by the MA.

## 2020-01-28 LAB
CULTURE: NORMAL
Lab: NORMAL
SPECIMEN DESCRIPTION: NORMAL

## 2020-01-31 NOTE — TELEPHONE ENCOUNTER
Last OV 10/23/2019    Health Maintenance   Topic Date Due    Shingles Vaccine (1 of 2) 09/14/2011    Colon cancer screen colonoscopy  09/14/2011    A1C test (Diabetic or Prediabetic)  11/01/2020    Lipid screen  11/01/2020    DTaP/Tdap/Td vaccine (2 - Td) 08/02/2026    Flu vaccine  Completed    Pneumococcal 0-64 years Vaccine  Completed    Hepatitis C screen  Completed    HIV screen  Completed             (applicable per patient's age: Cancer Screenings, Depression Screening, Fall Risk Screening, Immunizations)    Hemoglobin A1C (%)   Date Value   11/01/2019 6.1   09/14/2018 5.7   04/26/2017 6.1 (H)     Microalb/Crt.  Ratio (mcg/mg creat)   Date Value   04/26/2017 17 (H)     LDL Calculated (mg/dL)   Date Value   11/01/2019 95     AST (U/L)   Date Value   08/07/2017 25     ALT (U/L)   Date Value   08/07/2017 37     BUN (mg/dL)   Date Value   11/08/2018 13      (goal A1C is < 7)   (goal LDL is <100) need 30-50% reduction from baseline     BP Readings from Last 3 Encounters:   01/27/20 104/74   01/06/20 135/89   12/23/19 128/72    (goal /80)      All Future Testing planned in [facility]:  Lab Frequency Next Occurrence   Lipid Panel Once 12/23/2019   ALT Once 12/23/2019   CBC Auto Differential Once 12/23/2019       Next Visit Date:  Future Appointments   Date Time Provider Colleen Reece   4/21/2020 12:30 PM Carmine Hyde MD [facility] RADHA Malone   5/19/2020  9:40 AM Stef Cortez MD Neuro Spec [facility]            Patient Active Problem List:     Hypertension     Anxiety and depression     Attention deficit disorder     Left ventricular hypertrophy     Low back pain     Fatigue     Microalbuminuria     Memory loss     Memory difficulties     Right inguinal hernia     Elevated hemoglobin A1c     Dysesthesia     Cerebral aneurysm, nonruptured     Dyslipidemia     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

## 2020-02-04 ENCOUNTER — ANESTHESIA EVENT (OUTPATIENT)
Dept: OPERATING ROOM | Age: 60
End: 2020-02-04
Payer: MEDICARE

## 2020-02-04 ENCOUNTER — TELEPHONE (OUTPATIENT)
Dept: UROLOGY | Age: 60
End: 2020-02-04

## 2020-02-05 ENCOUNTER — ANESTHESIA (OUTPATIENT)
Dept: OPERATING ROOM | Age: 60
End: 2020-02-05
Payer: MEDICARE

## 2020-02-05 ENCOUNTER — HOSPITAL ENCOUNTER (OUTPATIENT)
Age: 60
Setting detail: OUTPATIENT SURGERY
Discharge: HOME | End: 2020-02-05
Attending: UROLOGY | Admitting: UROLOGY
Payer: MEDICARE

## 2020-02-05 ENCOUNTER — APPOINTMENT (OUTPATIENT)
Dept: GENERAL RADIOLOGY | Age: 60
End: 2020-02-05
Attending: UROLOGY
Payer: MEDICARE

## 2020-02-05 VITALS
BODY MASS INDEX: 37.22 KG/M2 | OXYGEN SATURATION: 96 % | HEIGHT: 70 IN | RESPIRATION RATE: 16 BRPM | SYSTOLIC BLOOD PRESSURE: 148 MMHG | TEMPERATURE: 97.9 F | HEART RATE: 78 BPM | WEIGHT: 260 LBS | DIASTOLIC BLOOD PRESSURE: 90 MMHG

## 2020-02-05 VITALS — TEMPERATURE: 97 F | DIASTOLIC BLOOD PRESSURE: 113 MMHG | OXYGEN SATURATION: 100 % | SYSTOLIC BLOOD PRESSURE: 160 MMHG

## 2020-02-05 LAB — GLUCOSE BLD-MCNC: 108 MG/DL (ref 75–110)

## 2020-02-05 PROCEDURE — C2617 STENT, NON-COR, TEM W/O DEL: HCPCS | Performed by: UROLOGY

## 2020-02-05 PROCEDURE — 3600000003 HC SURGERY LEVEL 3 BASE: Performed by: UROLOGY

## 2020-02-05 PROCEDURE — 7100000010 HC PHASE II RECOVERY - FIRST 15 MIN: Performed by: UROLOGY

## 2020-02-05 PROCEDURE — 2580000003 HC RX 258: Performed by: UROLOGY

## 2020-02-05 PROCEDURE — 3600000013 HC SURGERY LEVEL 3 ADDTL 15MIN: Performed by: UROLOGY

## 2020-02-05 PROCEDURE — 2500000003 HC RX 250 WO HCPCS: Performed by: NURSE ANESTHETIST, CERTIFIED REGISTERED

## 2020-02-05 PROCEDURE — C1758 CATHETER, URETERAL: HCPCS | Performed by: UROLOGY

## 2020-02-05 PROCEDURE — 6360000004 HC RX CONTRAST MEDICATION: Performed by: UROLOGY

## 2020-02-05 PROCEDURE — 3700000001 HC ADD 15 MINUTES (ANESTHESIA): Performed by: UROLOGY

## 2020-02-05 PROCEDURE — 6360000002 HC RX W HCPCS: Performed by: ANESTHESIOLOGY

## 2020-02-05 PROCEDURE — 2580000003 HC RX 258: Performed by: NURSE ANESTHETIST, CERTIFIED REGISTERED

## 2020-02-05 PROCEDURE — 3700000000 HC ANESTHESIA ATTENDED CARE: Performed by: UROLOGY

## 2020-02-05 PROCEDURE — 6360000002 HC RX W HCPCS: Performed by: NURSE ANESTHETIST, CERTIFIED REGISTERED

## 2020-02-05 PROCEDURE — 7100000000 HC PACU RECOVERY - FIRST 15 MIN: Performed by: UROLOGY

## 2020-02-05 PROCEDURE — 74018 RADEX ABDOMEN 1 VIEW: CPT

## 2020-02-05 PROCEDURE — 82947 ASSAY GLUCOSE BLOOD QUANT: CPT

## 2020-02-05 PROCEDURE — 7100000001 HC PACU RECOVERY - ADDTL 15 MIN: Performed by: UROLOGY

## 2020-02-05 PROCEDURE — 6370000000 HC RX 637 (ALT 250 FOR IP): Performed by: ANESTHESIOLOGY

## 2020-02-05 PROCEDURE — 7100000011 HC PHASE II RECOVERY - ADDTL 15 MIN: Performed by: UROLOGY

## 2020-02-05 PROCEDURE — 2709999900 HC NON-CHARGEABLE SUPPLY: Performed by: UROLOGY

## 2020-02-05 PROCEDURE — C1769 GUIDE WIRE: HCPCS | Performed by: UROLOGY

## 2020-02-05 DEVICE — STENT URET 6FR L26CM PERCFLX HYDR+ TAPR TIP GRAD: Type: IMPLANTABLE DEVICE | Status: FUNCTIONAL

## 2020-02-05 RX ORDER — SODIUM CHLORIDE, SODIUM LACTATE, POTASSIUM CHLORIDE, CALCIUM CHLORIDE 600; 310; 30; 20 MG/100ML; MG/100ML; MG/100ML; MG/100ML
INJECTION, SOLUTION INTRAVENOUS CONTINUOUS PRN
Status: DISCONTINUED | OUTPATIENT
Start: 2020-02-05 | End: 2020-02-05 | Stop reason: SDUPTHER

## 2020-02-05 RX ORDER — ONDANSETRON 2 MG/ML
INJECTION INTRAMUSCULAR; INTRAVENOUS PRN
Status: DISCONTINUED | OUTPATIENT
Start: 2020-02-05 | End: 2020-02-05 | Stop reason: SDUPTHER

## 2020-02-05 RX ORDER — FENTANYL CITRATE 50 UG/ML
50 INJECTION, SOLUTION INTRAMUSCULAR; INTRAVENOUS EVERY 5 MIN PRN
Status: DISCONTINUED | OUTPATIENT
Start: 2020-02-05 | End: 2020-02-05 | Stop reason: HOSPADM

## 2020-02-05 RX ORDER — FENTANYL CITRATE 50 UG/ML
100 INJECTION, SOLUTION INTRAMUSCULAR; INTRAVENOUS ONCE
Status: DISCONTINUED | OUTPATIENT
Start: 2020-02-05 | End: 2020-02-05 | Stop reason: HOSPADM

## 2020-02-05 RX ORDER — FENTANYL CITRATE 50 UG/ML
INJECTION, SOLUTION INTRAMUSCULAR; INTRAVENOUS PRN
Status: DISCONTINUED | OUTPATIENT
Start: 2020-02-05 | End: 2020-02-05 | Stop reason: SDUPTHER

## 2020-02-05 RX ORDER — TAMSULOSIN HYDROCHLORIDE 0.4 MG/1
0.4 CAPSULE ORAL DAILY
Qty: 30 CAPSULE | Refills: 1 | Status: SHIPPED | OUTPATIENT
Start: 2020-02-05 | End: 2021-04-26 | Stop reason: ALTCHOICE

## 2020-02-05 RX ORDER — FENTANYL CITRATE 50 UG/ML
25 INJECTION, SOLUTION INTRAMUSCULAR; INTRAVENOUS EVERY 5 MIN PRN
Status: DISCONTINUED | OUTPATIENT
Start: 2020-02-05 | End: 2020-02-05 | Stop reason: HOSPADM

## 2020-02-05 RX ORDER — MAGNESIUM HYDROXIDE 1200 MG/15ML
LIQUID ORAL CONTINUOUS PRN
Status: COMPLETED | OUTPATIENT
Start: 2020-02-05 | End: 2020-02-05

## 2020-02-05 RX ORDER — CEFAZOLIN SODIUM 1 G/3ML
INJECTION, POWDER, FOR SOLUTION INTRAMUSCULAR; INTRAVENOUS PRN
Status: DISCONTINUED | OUTPATIENT
Start: 2020-02-05 | End: 2020-02-05 | Stop reason: SDUPTHER

## 2020-02-05 RX ORDER — MIDAZOLAM HYDROCHLORIDE 1 MG/ML
INJECTION INTRAMUSCULAR; INTRAVENOUS PRN
Status: DISCONTINUED | OUTPATIENT
Start: 2020-02-05 | End: 2020-02-05

## 2020-02-05 RX ORDER — CIPROFLOXACIN 500 MG/1
500 TABLET, FILM COATED ORAL 2 TIMES DAILY
Qty: 6 TABLET | Refills: 0 | Status: SHIPPED | OUTPATIENT
Start: 2020-02-05 | End: 2020-02-08

## 2020-02-05 RX ORDER — SODIUM CHLORIDE, SODIUM LACTATE, POTASSIUM CHLORIDE, CALCIUM CHLORIDE 600; 310; 30; 20 MG/100ML; MG/100ML; MG/100ML; MG/100ML
INJECTION, SOLUTION INTRAVENOUS CONTINUOUS
Status: CANCELLED | OUTPATIENT
Start: 2020-02-05

## 2020-02-05 RX ORDER — DOCUSATE SODIUM 100 MG/1
100 CAPSULE, LIQUID FILLED ORAL 2 TIMES DAILY
Qty: 60 CAPSULE | Refills: 0 | Status: SHIPPED | OUTPATIENT
Start: 2020-02-05 | End: 2020-02-09

## 2020-02-05 RX ORDER — HYDROCODONE BITARTRATE AND ACETAMINOPHEN 5; 325 MG/1; MG/1
1 TABLET ORAL PRN
Status: COMPLETED | OUTPATIENT
Start: 2020-02-05 | End: 2020-02-05

## 2020-02-05 RX ORDER — LIDOCAINE HYDROCHLORIDE 10 MG/ML
INJECTION, SOLUTION EPIDURAL; INFILTRATION; INTRACAUDAL; PERINEURAL PRN
Status: DISCONTINUED | OUTPATIENT
Start: 2020-02-05 | End: 2020-02-05 | Stop reason: SDUPTHER

## 2020-02-05 RX ORDER — METOPROLOL TARTRATE 5 MG/5ML
INJECTION INTRAVENOUS PRN
Status: DISCONTINUED | OUTPATIENT
Start: 2020-02-05 | End: 2020-02-05 | Stop reason: SDUPTHER

## 2020-02-05 RX ORDER — PROPOFOL 10 MG/ML
INJECTION, EMULSION INTRAVENOUS PRN
Status: DISCONTINUED | OUTPATIENT
Start: 2020-02-05 | End: 2020-02-05 | Stop reason: SDUPTHER

## 2020-02-05 RX ORDER — OXYCODONE HYDROCHLORIDE AND ACETAMINOPHEN 5; 325 MG/1; MG/1
1 TABLET ORAL EVERY 6 HOURS PRN
Qty: 16 TABLET | Refills: 0 | Status: SHIPPED | OUTPATIENT
Start: 2020-02-05 | End: 2020-02-10

## 2020-02-05 RX ORDER — MAGNESIUM HYDROXIDE 1200 MG/15ML
LIQUID ORAL PRN
Status: DISCONTINUED | OUTPATIENT
Start: 2020-02-05 | End: 2020-02-05 | Stop reason: ALTCHOICE

## 2020-02-05 RX ORDER — DEXAMETHASONE SODIUM PHOSPHATE 10 MG/ML
INJECTION INTRAMUSCULAR; INTRAVENOUS PRN
Status: DISCONTINUED | OUTPATIENT
Start: 2020-02-05 | End: 2020-02-05 | Stop reason: SDUPTHER

## 2020-02-05 RX ORDER — HYDROCODONE BITARTRATE AND ACETAMINOPHEN 5; 325 MG/1; MG/1
2 TABLET ORAL PRN
Status: COMPLETED | OUTPATIENT
Start: 2020-02-05 | End: 2020-02-05

## 2020-02-05 RX ADMIN — FENTANYL CITRATE 100 MCG: 50 INJECTION INTRAMUSCULAR; INTRAVENOUS at 09:40

## 2020-02-05 RX ADMIN — HYDROCODONE BITARTRATE AND ACETAMINOPHEN 2 TABLET: 5; 325 TABLET ORAL at 11:02

## 2020-02-05 RX ADMIN — FENTANYL CITRATE 50 MCG: 50 INJECTION, SOLUTION INTRAMUSCULAR; INTRAVENOUS at 10:45

## 2020-02-05 RX ADMIN — PROPOFOL 200 MG: 10 INJECTION, EMULSION INTRAVENOUS at 09:40

## 2020-02-05 RX ADMIN — DEXAMETHASONE SODIUM PHOSPHATE 5 MG: 10 INJECTION INTRAMUSCULAR; INTRAVENOUS at 10:05

## 2020-02-05 RX ADMIN — ONDANSETRON 4 MG: 2 INJECTION, SOLUTION INTRAMUSCULAR; INTRAVENOUS at 10:05

## 2020-02-05 RX ADMIN — CEFAZOLIN 2000 MG: 330 INJECTION, POWDER, FOR SOLUTION INTRAMUSCULAR; INTRAVENOUS at 09:49

## 2020-02-05 RX ADMIN — METOPROLOL TARTRATE 1 MG: 1 INJECTION, SOLUTION INTRAVENOUS at 10:00

## 2020-02-05 RX ADMIN — FENTANYL CITRATE 50 MCG: 50 INJECTION, SOLUTION INTRAMUSCULAR; INTRAVENOUS at 10:56

## 2020-02-05 RX ADMIN — METOPROLOL TARTRATE 1 MG: 1 INJECTION, SOLUTION INTRAVENOUS at 10:05

## 2020-02-05 RX ADMIN — LIDOCAINE HYDROCHLORIDE 50 MG: 10 INJECTION, SOLUTION EPIDURAL; INFILTRATION; INTRACAUDAL; PERINEURAL at 09:40

## 2020-02-05 RX ADMIN — SODIUM CHLORIDE, POTASSIUM CHLORIDE, SODIUM LACTATE AND CALCIUM CHLORIDE: 600; 310; 30; 20 INJECTION, SOLUTION INTRAVENOUS at 09:30

## 2020-02-05 RX ADMIN — FENTANYL CITRATE 50 MCG: 50 INJECTION, SOLUTION INTRAMUSCULAR; INTRAVENOUS at 08:46

## 2020-02-05 ASSESSMENT — PULMONARY FUNCTION TESTS
PIF_VALUE: 11
PIF_VALUE: 4
PIF_VALUE: 13
PIF_VALUE: 4
PIF_VALUE: 2
PIF_VALUE: 4
PIF_VALUE: 13
PIF_VALUE: 3
PIF_VALUE: 8
PIF_VALUE: 9
PIF_VALUE: 11
PIF_VALUE: 3
PIF_VALUE: 5
PIF_VALUE: 1
PIF_VALUE: 11
PIF_VALUE: 1
PIF_VALUE: 10
PIF_VALUE: 4
PIF_VALUE: 26
PIF_VALUE: 13
PIF_VALUE: 3
PIF_VALUE: 4
PIF_VALUE: 1
PIF_VALUE: 1
PIF_VALUE: 11
PIF_VALUE: 10
PIF_VALUE: 0
PIF_VALUE: 4
PIF_VALUE: 4
PIF_VALUE: 3
PIF_VALUE: 1
PIF_VALUE: 3
PIF_VALUE: 32
PIF_VALUE: 3
PIF_VALUE: 12
PIF_VALUE: 12
PIF_VALUE: 14

## 2020-02-05 ASSESSMENT — PAIN SCALES - GENERAL
PAINLEVEL_OUTOF10: 8
PAINLEVEL_OUTOF10: 7
PAINLEVEL_OUTOF10: 8
PAINLEVEL_OUTOF10: 0
PAINLEVEL_OUTOF10: 7
PAINLEVEL_OUTOF10: 0

## 2020-02-05 ASSESSMENT — PAIN - FUNCTIONAL ASSESSMENT: PAIN_FUNCTIONAL_ASSESSMENT: 0-10

## 2020-02-05 ASSESSMENT — PAIN DESCRIPTION - PAIN TYPE: TYPE: SURGICAL PAIN

## 2020-02-05 ASSESSMENT — PAIN DESCRIPTION - ORIENTATION: ORIENTATION: RIGHT

## 2020-02-05 ASSESSMENT — LIFESTYLE VARIABLES: SMOKING_STATUS: 1

## 2020-02-05 ASSESSMENT — PAIN DESCRIPTION - LOCATION: LOCATION: PELVIS

## 2020-02-05 NOTE — ANESTHESIA POSTPROCEDURE EVALUATION
Department of Anesthesiology  Postprocedure Note    Patient: Tari Soria  MRN: [id number]  YOB: 1960  Date of evaluation: 2/5/2020  Time:  12:37 PM     Procedure Summary     Date:  02/05/20 Room / Location:  [address]    Anesthesia Start:  0930 Anesthesia Stop:  9730    Procedure:  CYSTO, URETEROSCOPY, RIGHT URETERAL STENT PLACEMENT, RETROGRADE PYELOGRAM (Right ) Diagnosis:  (URETERAL STONE)    Surgeon:  Cal Anderson MD Responsible Provider:  Bhavani Galicia MD    Anesthesia Type:  general ASA Status:  3          Anesthesia Type: general    Ale Phase I: Ale Score: 10    Ale Phase II: Ale Score: 10    Last vitals: Reviewed and per EMR flowsheets.        Anesthesia Post Evaluation    Patient location during evaluation: PACU  Patient participation: complete - patient participated  Level of consciousness: awake and alert  Pain score: 2  Airway patency: patent  Nausea & Vomiting: no nausea and no vomiting  Complications: no  Cardiovascular status: hemodynamically stable  Respiratory status: acceptable  Hydration status: euvolemic

## 2020-02-05 NOTE — H&P
Pre-op History and Physical  Dinah Grady PA-C    Patient:  Ray Jarvis  MRN: [id number]  YOB: 1960    HISTORY OF PRESENT ILLNESS:     The patient is a 63 y.o. male who presents with proximal 1mm ureteral stone. Here for cystoscopy, right ureteroscopy, holmium laser lithotripsy, stent placement (right). Patient's old records, notes and chart reviewed and summarized above. Dinah Grady PA-C independently reviewed the images and verified the radiology reports from:    No results found. Past Medical History:    Past Medical History:   Diagnosis Date    ADHD (attention deficit hyperactivity disorder)     Arthritis     knees, hands, back.  Asthma     as child.  Back pain     CAD (coronary artery disease)     Cataract     Depression     pt. denies.  Diabetes mellitus ([address]     Heart attack ([address]     2011    Hypertension     Kidney stones     [facility] stroke ([facility]     2014    Osteoarthritis     Seasonal allergies     Sinus problem        Past Surgical History:    Past Surgical History:   Procedure Laterality Date    CHOLECYSTECTOMY      2016    COLONOSCOPY      HERNIA REPAIR      1995, rt. inguinal    HERNIA REPAIR Left 01/2018    repair    HERNIA REPAIR Right 01/2018    looked at at same time they did left repair    INGUINAL HERNIA REPAIR Left 08/19/2016    LAPAROSCOPY N/A 1/16/2018    DIAGNOSTIC LAPAROSCOPY performed by Shahbaz Perez MD at [address] [place] [address],[address]         Medications Prior to Admission:    Prior to Admission medications    Medication Sig Start Date End Date Taking?  Authorizing Provider   VENTOLIN  (90 Base) MCG/ACT inhaler inhale 2 puffs by mouth every 6 hours if needed for wheezing 1/31/20   Sandra Medsasha, DO   carvedilol (COREG) 25 MG tablet take 1 tablet by mouth twice a day with meals 1/31/20   Lola Dias MD   oxyCODONE-acetaminophen (PERCOCET) 5-325 MG per tablet  12/19/19 Historical Provider, MD   diazepam (VALIUM) 5 MG tablet take 1-2 tablets by mouth if needed 40 MINUTES PRIOR TO PROCEDURE 11/4/19   Historical Provider, MD   DULoxetine (CYMBALTA) 30 MG extended release capsule Take 30 mg by mouth    Historical Provider, MD   tamsulosin (FLOMAX) 0.4 MG capsule Take 0.4 mg by mouth 1/23/20 2/2/20  Historical Provider, MD   benzonatate (TESSALON) 100 MG capsule take 1 capsule by mouth three times a day if needed for cough 1/17/20   Sandra Roberson, DO   amitriptyline (ELAVIL) 50 MG tablet take 1 tablet by mouth at bedtime 1/6/20   Vero Bowden MD   topiramate (TOPAMAX) 50 MG tablet take 1 tablet by mouth twice a day 1/6/20   Vero Bowden MD   venlafaxine (EFFEXOR XR) 75 MG extended release capsule Take 1 capsule by mouth daily 12/23/19   Sandra Roberson, DO   atorvastatin (LIPITOR) 10 MG tablet Take 1 tablet by mouth daily 12/23/19   Pb Bustos MD   cloNIDine (CATAPRES) 0.1 MG tablet take 1 tablet by mouth twice a day IF BLOOD PRESSURE IS GREATER THAN 140/90 10/21/19   Pb Bustos MD   diphenhydrAMINE (BENADRYL) 25 MG tablet Take 25 mg by mouth every 6 hours as needed for Itching    Historical Provider, MD   fluticasone (FLONASE) 50 MCG/ACT nasal spray 2 sprays by Nasal route daily 9/25/19 9/24/20  Sandra Da, DO   fluticasone (FLOVENT HFA) 110 MCG/ACT inhaler Inhale 2 puffs into the lungs 2 times daily 9/25/19 9/24/20  Sandra Medhkoalma, DO   amLODIPine (NORVASC) 10 MG tablet Take 1 tablet by mouth daily 9/25/19   Sandra Medhkoalma, DO   fexofenadine (ALLEGRA) 180 MG tablet Take 1 tablet by mouth daily 8/30/19   Sandra Medhsandro, DO   nitroGLYCERIN (NITROSTAT) 0.3 MG SL tablet place 1 tablet under the tongue if needed every 5 minutes for chest pain for 3 doses IF NO RELIEF AF 11/28/18   Tiera Stanley MD   donepezil (ARICEPT) 10 MG tablet Take 1 tablet by mouth nightly 7/31/18   Vero Bowden MD   tiZANidine (ZANAFLEX) 2 MG capsule Take 1 capsule by mouth daily 12/4/17   Historical Provider, MD   aspirin 81 MG tablet Take 81 mg by mouth daily.     Historical Provider, MD       Allergies:  Lisinopril; Ibuprofen; and Lipitor [atorvastatin]    Social History:    Social History     Socioeconomic History    Marital status: Single     Spouse name: Not on file    Number of children: Not on file    Years of education: Not on file    Highest education level: Not on file   Occupational History    Not on file   Social Needs    Financial resource strain: Not on file    Food insecurity:     Worry: Not on file     Inability: Not on file    Transportation needs:     Medical: Not on file     Non-medical: Not on file   Tobacco Use    Smoking status: Current Every Day Smoker     Packs/day: 0.50     Years: 28.00     Pack years: 14.00     Types: Cigarettes    Smokeless tobacco: Never Used   Substance and Sexual Activity    Alcohol use: Not Currently     Comment: rarely    Drug use: No    Sexual activity: Not on file   Lifestyle    Physical activity:     Days per week: Not on file     Minutes per session: Not on file    Stress: Not on file   Relationships    Social connections:     Talks on phone: Not on file     Gets together: Not on file     Attends Yazidi service: Not on file     Active member of club or organization: Not on file     Attends meetings of clubs or organizations: Not on file     Relationship status: Not on file    Intimate partner violence:     Fear of current or ex partner: Not on file     Emotionally abused: Not on file     Physically abused: Not on file     Forced sexual activity: Not on file   Other Topics Concern    Not on file   Social History Narrative    Not on file       Family History:    Family History   Problem Relation Age of Onset    Cancer Father         throat    Cirrhosis Father     High Blood Pressure Sister     Asthma Sister     Arthritis Sister     High Blood Pressure Mother     Heart Disease Mother    Shearon August laser lithotripsy, stent placement (right) in OR today.     Consent obtained    Jessica Crabtree PA-C  7:42 AM 2/5/2020

## 2020-02-05 NOTE — ANESTHESIA PRE PROCEDURE
Historical Provider, MD   fluticasone Sherleen Sox) 50 MCG/ACT nasal spray 2 sprays by Nasal route daily 9/25/19 9/24/20  Sandra Medhkour, DO   fluticasone (FLOVENT HFA) 110 MCG/ACT inhaler Inhale 2 puffs into the lungs 2 times daily 9/25/19 9/24/20  Sandra Medhkour, DO   amLODIPine (NORVASC) 10 MG tablet Take 1 tablet by mouth daily 9/25/19   Sandra Medhkour, DO   fexofenadine (ALLEGRA) 180 MG tablet Take 1 tablet by mouth daily 8/30/19   Sandra Medhkour, DO   nitroGLYCERIN (NITROSTAT) 0.3 MG SL tablet place 1 tablet under the tongue if needed every 5 minutes for chest pain for 3 doses IF NO RELIEF AF 11/28/18   Yumiko Singer MD   donepezil (ARICEPT) 10 MG tablet Take 1 tablet by mouth nightly 7/31/18   Sedrick Rahman MD   tiZANidine (ZANAFLEX) 2 MG capsule Take 1 capsule by mouth daily 12/4/17   Historical Provider, MD   aspirin 81 MG tablet Take 81 mg by mouth daily. Historical Provider, MD       Current medications:    No current facility-administered medications for this visit. No current outpatient medications on file. Facility-Administered Medications Ordered in Other Visits   Medication Dose Route Frequency Provider Last Rate Last Dose    ceFAZolin (ANCEF) 3 g in dextrose 5 % 100 mL IVPB  3 g Intravenous On Call to [address] [facility],[address], [place]-           Allergies: Allergies   Allergen Reactions    Lisinopril      States he is allergic to the generic forms of medication, can take lisinopril    Ibuprofen Other (See Comments)     \"hurts my stomach. 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  Asthma     as child.  Back pain     CAD (coronary artery disease)     Cataract     Depression     pt. denies.  Diabetes mellitus ([facility]     Heart attack ([facility]     2011    Hypertension     Kidney stones     Mini stroke ([facility]     2014    Osteoarthritis     Seasonal allergies     Sinus problem        Past Surgical History:        Procedure Laterality Date    CHOLECYSTECTOMY      2016    COLONOSCOPY      HERNIA REPAIR      1995, rt. inguinal    HERNIA REPAIR Left 01/2018    repair    HERNIA REPAIR Right 01/2018    looked at at same time they did left repair    INGUINAL HERNIA REPAIR Left 08/19/2016    LAPAROSCOPY N/A 1/16/2018    DIAGNOSTIC LAPAROSCOPY performed by Remigio Herron MD at [address] [place] [address],[address]         Social History:    Social History     Tobacco Use    Smoking status: Current Every Day Smoker     Packs/day: 0.50     Years: 28.00     Pack years: 14.00     Types: Cigarettes    Smokeless tobacco: Never Used   Substance Use Topics    Alcohol use: Not Currently     Comment: rarely                                Ready to quit: Not Answered  Counseling given: Not Answered      Vital Signs (Current): There were no vitals filed for this visit.                                            BP Readings from Last 3 Encounters:   01/27/20 104/74   01/06/20 135/89   12/23/19 128/72       NPO Status:                                                                                 BMI:   Wt Readings from Last 3 Encounters:   01/27/20 267 lb 9.6 oz (121.4 kg)   01/06/20 265 lb 9.6 oz (120.5 kg)   12/23/19 272 lb (123.4 kg)     There is no height or weight on file to calculate BMI.    CBC:   Lab Results   Component Value Date    WBC 7.6 11/08/2018    RBC 5.18 11/08/2018    HGB 15.7 11/08/2018    HCT 47.3 11/08/2018    MCV 91.3 11/08/2018    RDW 12.5 11/08/2018     11/08/2018       CMP:   Lab Results   Component Value Date     11/08/2018    K 4.0 11/08/2018     11/08/2018    CO2 29 11/08/2018    BUN 13 11/08/2018    CREATININE 0.98 11/08/2018    GFRAA >60 11/08/2018    LABGLOM >60 11/08/2018    GLUCOSE 113 11/08/2018    PROT 7.0 08/07/2017    CALCIUM 9.0 11/08/2018    BILITOT 0.33 08/07/2017    ALKPHOS 95 08/07/2017    AST 25 08/07/2017    ALT 37 08/07/2017       POC Tests: No results for input(s): POCGLU, POCNA, POCK, POCCL, POCBUN, POCHEMO, POCHCT in the last 72 hours. Coags:   Lab Results   Component Value Date    PROTIME 10.3 10/07/2016    INR 1.0 10/07/2016    APTT 23.7 10/07/2016       HCG (If Applicable): No results found for: PREGTESTUR, PREGSERUM, HCG, HCGQUANT     ABGs: No results found for: PHART, PO2ART, SZQ9ERU, EWR1JMU, BEART, M6PXIONK     Type & Screen (If Applicable):  No results found for: LABABO, [address]    Anesthesia Evaluation  Patient summary reviewed and Nursing notes reviewed  Airway: Mallampati: II  TM distance: >3 FB   Neck ROM: limited  Mouth opening: > = 3 FB Dental: normal exam         Pulmonary:Negative Pulmonary ROS and normal exam    (+) current smoker    (-) asthma                           Cardiovascular:    (+) hypertension:, past MI: > 6 months, CAD:,       ECG reviewed        Stress test reviewed                Neuro/Psych:   (+) TIA, psychiatric history:depression/anxiety             GI/Hepatic/Renal:   (+) renal disease: kidney stones,           Endo/Other:    (+) Diabetes, . Abdominal:   (+) obese,         Vascular:                                        Anesthesia Plan      general     ASA 3       Induction: intravenous.     MIPS: Postoperative opioids intended and Prophylactic antiemetics administered. Anesthetic plan and risks discussed with patient. Plan discussed with CRNA.                   Melissa Avilez MD   2/5/2020

## 2020-02-05 NOTE — PROGRESS NOTES
Discharge instruction and prescriptions reviewed with patient and son, including stent string removal.  Both acknowledged understanding. Prescriptions for Flomax, Cipro, and colace called to [address] [place] [address] [address] per patient request.  Percocet script given to patient.

## 2020-02-06 ENCOUNTER — TELEPHONE (OUTPATIENT)
Dept: UROLOGY | Age: 60
End: 2020-02-06

## 2020-02-06 RX ORDER — BENZONATATE 100 MG/1
CAPSULE ORAL
Qty: 30 CAPSULE | Refills: 0 | Status: SHIPPED | OUTPATIENT
Start: 2020-02-06 | End: 2020-02-20

## 2020-02-06 NOTE — TELEPHONE ENCOUNTER
Last OV 10/23/2019    Health Maintenance   Topic Date Due    Shingles Vaccine (1 of 2) 09/14/2011    Colon cancer screen colonoscopy  09/14/2011    A1C test (Diabetic or Prediabetic)  11/01/2020    Lipid screen  11/01/2020    DTaP/Tdap/Td vaccine (2 - Td) 08/02/2026    Flu vaccine  Completed    Pneumococcal 0-64 years Vaccine  Completed    Hepatitis C screen  Completed    HIV screen  Completed    Hepatitis A vaccine  Aged Out    Hepatitis B vaccine  Aged Out    Hib vaccine  Aged Out    Meningococcal (ACWY) vaccine  Aged Out             (applicable per patient's age: Cancer Screenings, Depression Screening, Fall Risk Screening, Immunizations)    Hemoglobin A1C (%)   Date Value   11/01/2019 6.1   09/14/2018 5.7   04/26/2017 6.1 (H)     Microalb/Crt.  Ratio (mcg/mg creat)   Date Value   04/26/2017 17 (H)     LDL Calculated (mg/dL)   Date Value   11/01/2019 95     AST (U/L)   Date Value   08/07/2017 25     ALT (U/L)   Date Value   08/07/2017 37     BUN (mg/dL)   Date Value   11/08/2018 13      (goal A1C is < 7)   (goal LDL is <100) need 30-50% reduction from baseline     BP Readings from Last 3 Encounters:   02/05/20 (!) 160/113   02/05/20 (!) 148/90   01/27/20 104/74    (goal /80)      All Future Testing planned in [facility]:  Lab Frequency Next Occurrence   Lipid Panel Once 12/23/2019   ALT Once 12/23/2019   CBC Auto Differential Once 12/23/2019       Next Visit Date:  Future Appointments   Date Time Provider Colleen Reece   4/21/2020 12:30 PM Alisia Layton MD AFL R Tod Vidal   5/19/2020  9:40 AM Zarina Awan MD Neuro Spec Anjelica Guajardo            Patient Active Problem List:     Hypertension     Anxiety and depression     Attention deficit disorder     Left ventricular hypertrophy     Low back pain     Fatigue     Microalbuminuria     Memory loss     Memory difficulties     Right inguinal hernia     Elevated hemoglobin A1c     Dysesthesia     Cerebral aneurysm, nonruptured

## 2020-02-07 ENCOUNTER — TELEPHONE (OUTPATIENT)
Dept: UROLOGY | Age: 60
End: 2020-02-07

## 2020-02-09 ENCOUNTER — APPOINTMENT (OUTPATIENT)
Dept: GENERAL RADIOLOGY | Age: 60
End: 2020-02-09
Payer: MEDICARE

## 2020-02-09 ENCOUNTER — HOSPITAL ENCOUNTER (EMERGENCY)
Age: 60
Discharge: HOME | End: 2020-02-09
Attending: EMERGENCY MEDICINE
Payer: MEDICARE

## 2020-02-09 VITALS
HEIGHT: 71 IN | HEART RATE: 86 BPM | SYSTOLIC BLOOD PRESSURE: 137 MMHG | BODY MASS INDEX: 37.1 KG/M2 | WEIGHT: 265 LBS | RESPIRATION RATE: 16 BRPM | DIASTOLIC BLOOD PRESSURE: 94 MMHG | TEMPERATURE: 97.9 F | OXYGEN SATURATION: 98 %

## 2020-02-09 LAB
-: NORMAL
ABSOLUTE EOS #: 0.2 K/UL (ref 0–0.44)
ABSOLUTE IMMATURE GRANULOCYTE: 0.03 K/UL (ref 0–0.3)
ABSOLUTE LYMPH #: 2.24 K/UL (ref 1.1–3.7)
ABSOLUTE MONO #: 0.33 K/UL (ref 0.1–1.2)
AMORPHOUS: NORMAL
ANION GAP SERPL CALCULATED.3IONS-SCNC: 12 MMOL/L (ref 9–17)
BACTERIA: NORMAL
BASOPHILS # BLD: 1 % (ref 0–2)
BASOPHILS ABSOLUTE: 0.04 K/UL (ref 0–0.2)
BILIRUBIN URINE: ABNORMAL
BUN BLDV-MCNC: 12 MG/DL (ref 6–20)
BUN/CREAT BLD: ABNORMAL (ref 9–20)
CALCIUM SERPL-MCNC: 8.7 MG/DL (ref 8.6–10.4)
CASTS UA: NORMAL /LPF (ref 0–2)
CASTS UA: NORMAL /LPF (ref 0–2)
CHLORIDE BLD-SCNC: 104 MMOL/L (ref 98–107)
CO2: 22 MMOL/L (ref 20–31)
COLOR: ABNORMAL
COMMENT UA: ABNORMAL
CREAT SERPL-MCNC: 0.86 MG/DL (ref 0.7–1.2)
CRYSTALS, UA: NORMAL /HPF
DIFFERENTIAL TYPE: ABNORMAL
EOSINOPHILS RELATIVE PERCENT: 3 % (ref 1–4)
EPITHELIAL CELLS UA: NORMAL /HPF (ref 0–5)
GFR AFRICAN AMERICAN: >60 ML/MIN
GFR NON-AFRICAN AMERICAN: >60 ML/MIN
GFR SERPL CREATININE-BSD FRML MDRD: ABNORMAL ML/MIN/{1.73_M2}
GFR SERPL CREATININE-BSD FRML MDRD: ABNORMAL ML/MIN/{1.73_M2}
GLUCOSE BLD-MCNC: 161 MG/DL (ref 70–99)
GLUCOSE URINE: NEGATIVE
HCT VFR BLD CALC: 43.1 % (ref 40.7–50.3)
HEMOGLOBIN: 14.5 G/DL (ref 13–17)
IMMATURE GRANULOCYTES: 1 %
KETONES, URINE: NEGATIVE
LEUKOCYTE ESTERASE, URINE: ABNORMAL
LYMPHOCYTES # BLD: 35 % (ref 24–43)
MCH RBC QN AUTO: 31.2 PG (ref 25.2–33.5)
MCHC RBC AUTO-ENTMCNC: 33.6 G/DL (ref 28.4–34.8)
MCV RBC AUTO: 92.7 FL (ref 82.6–102.9)
MONOCYTES # BLD: 5 % (ref 3–12)
MUCUS: NORMAL
NITRITE, URINE: NEGATIVE
NRBC AUTOMATED: 0 PER 100 WBC
OTHER OBSERVATIONS UA: NORMAL
PDW BLD-RTO: 13.5 % (ref 11.8–14.4)
PH UA: 5.5 (ref 5–8)
PLATELET # BLD: 209 K/UL (ref 138–453)
PLATELET ESTIMATE: ABNORMAL
PMV BLD AUTO: 10.7 FL (ref 8.1–13.5)
POTASSIUM SERPL-SCNC: 3.6 MMOL/L (ref 3.7–5.3)
PROTEIN UA: ABNORMAL
RBC # BLD: 4.65 M/UL (ref 4.21–5.77)
RBC # BLD: ABNORMAL 10*6/UL
RBC UA: NORMAL /HPF (ref 0–2)
RENAL EPITHELIAL, UA: NORMAL /HPF
SEG NEUTROPHILS: 55 % (ref 36–65)
SEGMENTED NEUTROPHILS ABSOLUTE COUNT: 3.58 K/UL (ref 1.5–8.1)
SODIUM BLD-SCNC: 138 MMOL/L (ref 135–144)
SPECIFIC GRAVITY UA: 1.02 (ref 1–1.03)
TRICHOMONAS: NORMAL
TURBIDITY: ABNORMAL
URINE HGB: ABNORMAL
UROBILINOGEN, URINE: NORMAL
WBC # BLD: 6.4 K/UL (ref 3.5–11.3)
WBC # BLD: ABNORMAL 10*3/UL
WBC UA: NORMAL /HPF (ref 0–5)
YEAST: NORMAL

## 2020-02-09 PROCEDURE — 96374 THER/PROPH/DIAG INJ IV PUSH: CPT

## 2020-02-09 PROCEDURE — 6360000002 HC RX W HCPCS: Performed by: STUDENT IN AN ORGANIZED HEALTH CARE EDUCATION/TRAINING PROGRAM

## 2020-02-09 PROCEDURE — 81001 URINALYSIS AUTO W/SCOPE: CPT

## 2020-02-09 PROCEDURE — 99283 EMERGENCY DEPT VISIT LOW MDM: CPT

## 2020-02-09 PROCEDURE — 80048 BASIC METABOLIC PNL TOTAL CA: CPT

## 2020-02-09 PROCEDURE — 85025 COMPLETE CBC W/AUTO DIFF WBC: CPT

## 2020-02-09 PROCEDURE — 74018 RADEX ABDOMEN 1 VIEW: CPT

## 2020-02-09 RX ORDER — MORPHINE SULFATE 4 MG/ML
2 INJECTION, SOLUTION INTRAMUSCULAR; INTRAVENOUS ONCE
Status: COMPLETED | OUTPATIENT
Start: 2020-02-09 | End: 2020-02-09

## 2020-02-09 RX ORDER — HYOSCYAMINE SULFATE 0.125 MG
125 TABLET ORAL EVERY 6 HOURS PRN
Qty: 10 TABLET | Refills: 0 | Status: SHIPPED | OUTPATIENT
Start: 2020-02-09 | End: 2021-04-05 | Stop reason: ALTCHOICE

## 2020-02-09 RX ORDER — DOCUSATE SODIUM 100 MG/1
100 CAPSULE, LIQUID FILLED ORAL 2 TIMES DAILY
Qty: 20 CAPSULE | Refills: 0 | Status: SHIPPED | OUTPATIENT
Start: 2020-02-09 | End: 2021-03-29

## 2020-02-09 RX ADMIN — MORPHINE SULFATE 2 MG: 4 INJECTION INTRAVENOUS at 11:46

## 2020-02-09 ASSESSMENT — PAIN DESCRIPTION - LOCATION: LOCATION: ABDOMEN;GROIN

## 2020-02-09 ASSESSMENT — ENCOUNTER SYMPTOMS
SHORTNESS OF BREATH: 0
COUGH: 0
NAUSEA: 0
CONSTIPATION: 0
ABDOMINAL PAIN: 1
PHOTOPHOBIA: 0
RHINORRHEA: 0
DIARRHEA: 0
VOMITING: 0
BACK PAIN: 0

## 2020-02-09 ASSESSMENT — PAIN SCALES - GENERAL
PAINLEVEL_OUTOF10: 8
PAINLEVEL_OUTOF10: 8

## 2020-02-09 NOTE — ED PROVIDER NOTES
[facility]     Emergency Department     Faculty Attestation    I performed a history and physical examination of the patient and discussed management with the resident. I reviewed the residents note and agree with the documented findings including all diagnostic interpretations and plan of care. Any areas of disagreement are noted on the chart. I was personally present for the key portions of any procedures. I have documented in the chart those procedures where I was not present during the key portions. I have reviewed the emergency nurses triage note. I agree with the chief complaint, past medical history, past surgical history, allergies, medications, social and family history as documented unless otherwise noted below. Documentation of the HPI, Physical Exam and Medical Decision Making performed by scribliam is based on my personal performance of the HPI, PE and MDM. For Physician Assistant/ Nurse Practitioner cases/documentation I have personally evaluated this patient and have completed at least one if not all key elements of the E/M (history, physical exam, and MDM). Additional findings are as noted. Primary Care Physician: Michael Pritchard DO    History: This is a 63 y.o. male who presents to the Emergency Department with complaint of flank/abdom pain and hematuria. Recent lithotripsy and stent placement. Still has string in place. Physical:     height is 5' 11\" (1.803 m) and weight is 265 lb (120.2 kg). His oral temperature is 97.9 °F (36.6 °C). His blood pressure is 137/94 (abnormal) and his pulse is 86. His respiration is 16 and oxygen saturation is 98%.    63 y.o. male no acute distress, no CVA tenderness no abdominal pain. Urine in specimen cup does show blood tingeing however is not viscous to suggest pure blood    Impression: Post-op pain, hematuria    Plan: UA, basic labs, Upright abdom XR to check stent placement.  Discuss with urology.       Lily Alvarado MD, [facility]  Attending Emergency Physician         Jaspreet Norman MD  02/09/20 [id number]

## 2020-02-09 NOTE — ED NOTES
Labeled urine specimen collected and sent to lab via tube system.        Osmin Mackenzie RN  02/09/20 [id number]

## 2020-02-09 NOTE — PROGRESS NOTES
ADDENDUM:    Neuropsych evaluation (9/10/2019): Done at [facility] by Vanessa Plasencia:   Results were not valid for indication of any specific problem with memory or with psychiatric disorder. There was evidence of average intellectual ability, as expected, with no sign of loss of intellectual skill, and stronger validity to these results however. However memory measures were not valid and no diagnosis can be made in this regard. Recommendations: No recommendations can be made at this time due to lack of validity of results.

## 2020-02-09 NOTE — ED PROVIDER NOTES
Date Taking? Authorizing Provider   hyoscyamine (LEVSIN) 125 MCG tablet Take 1 tablet by mouth every 6 hours as needed for Cramping 2/9/20  Yes Quinn Ragsdale DO   docusate sodium (COLACE) 100 MG capsule Take 1 capsule by mouth 2 times daily 2/9/20  Yes Quinn Ragsdale DO   benzonatate (TESSALON) 100 MG capsule take 1 capsule by mouth three times a day if needed for cough 2/6/20   Sandra MedhkoDO alma   oxyCODONE-acetaminophen (PERCOCET) 5-325 MG per tablet Take 1 tablet by mouth every 6 hours as needed for Pain for up to 5 days.  2/5/20 2/10/20  Heaven Pappas MD   tamsulosin [facility]) 0.4 MG capsule Take 1 capsule by mouth daily 2/5/20 3/6/20  Heaven Pappas MD   VENTOLIN  (40 Base) MCG/ACT inhaler inhale 2 puffs by mouth every 6 hours if needed for wheezing 1/31/20   [facility] Medsasha,    carvedilol (COREG) 25 MG tablet take 1 tablet by mouth twice a day with meals 1/31/20   Barrington Pineda MD   diazepam (VALIUM) 5 MG tablet take 1-2 tablets by mouth if needed 40 MINUTES PRIOR TO PROCEDURE 11/4/19   Historical Provider, MD   DULoxetine (CYMBALTA) 30 MG extended release capsule Take 30 mg by mouth    Historical Provider, MD   amitriptyline (ELAVIL) 50 MG tablet take 1 tablet by mouth at bedtime 1/6/20 April MD Rosey   topiramate (TOPAMAX) 50 MG tablet take 1 tablet by mouth twice a day 1/6/20 April MD Rosey   venlafaxine (EFFEXOR XR) 75 MG extended release capsule Take 1 capsule by mouth daily 12/23/19   Sandra Roberson DO   atorvastatin (LIPITOR) 10 MG tablet Take 1 tablet by mouth daily 12/23/19   Barrington Pineda MD   cloNIDine (CATAPRES) 0.1 MG tablet take 1 tablet by mouth twice a day IF BLOOD PRESSURE IS GREATER THAN 140/90 10/21/19   Barrington Pineda MD   diphenhydrAMINE (BENADRYL) 25 MG tablet Take 25 mg by mouth every 6 hours as needed for Itching    Historical Provider, MD   fluticasone (FLONASE) 50 MCG/ACT nasal spray 2 sprays by Nasal route daily 9/25/19 9/24/20  Sandra Medhkour, DO   fluticasone (FLOVENT HFA) 110 MCG/ACT inhaler Inhale 2 puffs into the lungs 2 times daily 9/25/19 9/24/20  Sandra Medhkour, DO   amLODIPine (NORVASC) 10 MG tablet Take 1 tablet by mouth daily 9/25/19   Sandra Medhkour, DO   fexofenadine (ALLEGRA) 180 MG tablet Take 1 tablet by mouth daily 8/30/19   Sandra Medhkour, DO   nitroGLYCERIN (NITROSTAT) 0.3 MG SL tablet place 1 tablet under the tongue if needed every 5 minutes for chest pain for 3 doses IF NO RELIEF AF 11/28/18   Ronaldo Martines MD   donepezil (ARICEPT) 10 MG tablet Take 1 tablet by mouth nightly 7/31/18   Marly Forrest MD   tiZANidine (ZANAFLEX) 2 MG capsule Take 1 capsule by mouth daily 12/4/17   Historical Provider, MD   aspirin 81 MG tablet Take 81 mg by mouth daily. Historical Provider, MD       REVIEW OF SYSTEMS    (2-9 systems for level 4, 10 or more for level 5)      Review of Systems   Constitutional: Positive for chills. Negative for fever. HENT: Negative for congestion and rhinorrhea. Eyes: Negative for photophobia. Respiratory: Negative for cough and shortness of breath. Cardiovascular: Negative for chest pain. Gastrointestinal: Positive for abdominal pain. Negative for constipation, diarrhea, nausea and vomiting. Genitourinary: Positive for dysuria and hematuria. Musculoskeletal: Negative for back pain and myalgias. Skin: Negative for rash. Neurological: Negative for dizziness, weakness and headaches. PHYSICAL EXAM   (up to 7 for level 4, 8 or more for level 5)      INITIAL VITALS:   BP (!) 137/94 Comment: RN present  Pulse 86   Temp 97.9 °F (36.6 °C) (Oral)   Resp 16   Ht 5' 11\" (1.803 m)   Wt 265 lb (120.2 kg)   SpO2 98%   BMI 36.96 kg/m²     Physical Exam  Constitutional:       Appearance: He is well-developed. HENT:      Head: Normocephalic and atraumatic. Nose: Nose normal. No rhinorrhea.       Mouth/Throat:      Mouth: Mucous membranes are moist. unremarkable. Suspect postop pain and dysuria/hematuria. Will obtain BMP, CBC and urinalysis. Will contact urology with results. ED Course as of Feb 09 1807   Kellie Ernie Feb 09, 2020   1158 CBC showing no leukocytosis or anemia    [ZT]   1158 KUB showing: Right ureteral stent with proximal coil likely wedged at the UPJ junction. Large amount of stool with constipated appearance. [ZT]   [id number] with urologist and patient. Likely postop pain and dysuria. Recommend Levsin as needed for the next couple of days. Stent removal tomorrow. Follow-up with urology as outpatient. All questions answered. Patient is comfortable with plan. Return precautions provided. [ZT]      ED Course User Index  [ZT] Qiunn Ragsdale, DO     No evidence of urinary tract infection. No significant electrolyte abnormalities. No leukocytosis. No anemia. Urology has been down to evaluate patient. Recommends Levsin and follow-up as outpatient. Patient is agreeable with plan. All questions answered. Return precautions provided. CONSULTS:  IP CONSULT TO UROLOGY      FINAL IMPRESSION      1. Status post cystoscopy with ureteral stent placement    2.  Hematuria, unspecified type          DISPOSITION / PLAN     DISPOSITION Decision To Discharge 02/09/2020 12:36:49 PM      PATIENT REFERRED TO:  Teagan Bolanos, [address] [facility] [address]  [address]  [phone]    Schedule an appointment as soon as possible for a visit       [address] [facility]  [address] [facility] [address]  [phone]  Schedule an appointment as soon as possible for a visit         DISCHARGE MEDICATIONS:  Discharge Medication List as of 2/9/2020 12:36 PM      START taking these medications    Details   hyoscyamine (LEVSIN) 125 MCG tablet Take 1 tablet by mouth every 6 hours as needed for Cramping, Disp-10 tablet, R-0Print             Quinn Ragsdale DO  Emergency Medicine Resident    (Please note that portions

## 2020-02-20 RX ORDER — BENZONATATE 100 MG/1
CAPSULE ORAL
Qty: 30 CAPSULE | Refills: 0 | Status: SHIPPED | OUTPATIENT
Start: 2020-02-20 | End: 2020-03-14 | Stop reason: SDUPTHER

## 2020-02-25 RX ORDER — AMLODIPINE BESYLATE 10 MG/1
TABLET ORAL
Qty: 90 TABLET | Refills: 1 | Status: SHIPPED | OUTPATIENT
Start: 2020-02-25 | End: 2020-09-03

## 2020-02-25 NOTE — TELEPHONE ENCOUNTER
Last OV 10/23/2019    Health Maintenance   Topic Date Due    Shingles Vaccine (1 of 2) 09/14/2011    Colon cancer screen colonoscopy  09/14/2011    A1C test (Diabetic or Prediabetic)  11/01/2020    Lipid screen  11/01/2020    DTaP/Tdap/Td vaccine (2 - Td) 08/02/2026    Flu vaccine  Completed    Pneumococcal 0-64 years Vaccine  Completed    Hepatitis C screen  Completed    HIV screen  Completed    Hepatitis A vaccine  Aged Out    Hepatitis B vaccine  Aged Out    Hib vaccine  Aged Out    Meningococcal (ACWY) vaccine  Aged Out             (applicable per patient's age: Cancer Screenings, Depression Screening, Fall Risk Screening, Immunizations)    Hemoglobin A1C (%)   Date Value   11/01/2019 6.1   09/14/2018 5.7   04/26/2017 6.1 (H)     Microalb/Crt.  Ratio (mcg/mg creat)   Date Value   04/26/2017 17 (H)     LDL Calculated (mg/dL)   Date Value   11/01/2019 95     AST (U/L)   Date Value   08/07/2017 25     ALT (U/L)   Date Value   08/07/2017 37     BUN (mg/dL)   Date Value   02/09/2020 12      (goal A1C is < 7)   (goal LDL is <100) need 30-50% reduction from baseline     BP Readings from Last 3 Encounters:   02/09/20 (!) 137/94   02/05/20 (!) 160/113   02/05/20 (!) 148/90    (goal /80)      All Future Testing planned in [facility]:  Lab Frequency Next Occurrence   Lipid Panel Once 12/23/2019   ALT Once 12/23/2019   CBC Auto Differential Once 12/23/2019       Next Visit Date:  Future Appointments   Date Time Provider Colleen Reece   4/21/2020 12:30 PM MD NISREEN Brewer   5/19/2020  9:40 AM Lexy Argueta MD Neuro Spec [facility]            Patient Active Problem List:     Hypertension     Anxiety and depression     Attention deficit disorder     Left ventricular hypertrophy     Low back pain     Fatigue     Microalbuminuria     Memory loss     Memory difficulties     Right inguinal hernia     Elevated hemoglobin A1c     Dysesthesia     Cerebral aneurysm, nonruptured Dyslipidemia     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

## 2020-02-26 ENCOUNTER — TELEPHONE (OUTPATIENT)
Dept: UROLOGY | Age: 60
End: 2020-02-26

## 2020-02-26 NOTE — TELEPHONE ENCOUNTER
Patient is scheduled for 6 week follow up post URS on 3/30/2020. Per Dr. Sandor Contreras please place KUB order to get done prior to appointment.

## 2020-03-14 RX ORDER — BENZONATATE 100 MG/1
CAPSULE ORAL
Qty: 30 CAPSULE | Refills: 0 | Status: SHIPPED | OUTPATIENT
Start: 2020-03-14 | End: 2020-03-26

## 2020-03-14 NOTE — TELEPHONE ENCOUNTER
Health Maintenance   Topic Date Due    Shingles Vaccine (1 of 2) 09/14/2011    Colon cancer screen colonoscopy  09/14/2011    A1C test (Diabetic or Prediabetic)  11/01/2020    Lipid screen  11/01/2020    DTaP/Tdap/Td vaccine (2 - Td) 08/02/2026    Flu vaccine  Completed    Pneumococcal 0-64 years Vaccine  Completed    Hepatitis C screen  Completed    HIV screen  Completed    Hepatitis A vaccine  Aged Out    Hepatitis B vaccine  Aged Out    Hib vaccine  Aged Out    Meningococcal (ACWY) vaccine  Aged Out             (applicable per patient's age: Cancer Screenings, Depression Screening, Fall Risk Screening, Immunizations)    Hemoglobin A1C (%)   Date Value   11/01/2019 6.1   09/14/2018 5.7   04/26/2017 6.1 (H)     Microalb/Crt.  Ratio (mcg/mg creat)   Date Value   04/26/2017 17 (H)     LDL Calculated (mg/dL)   Date Value   11/01/2019 95     AST (U/L)   Date Value   08/07/2017 25     ALT (U/L)   Date Value   08/07/2017 37     BUN (mg/dL)   Date Value   02/09/2020 12      (goal A1C is < 7)   (goal LDL is <100) need 30-50% reduction from baseline     BP Readings from Last 3 Encounters:   02/09/20 (!) 137/94   02/05/20 (!) 160/113   02/05/20 (!) 148/90    (goal /80)      All Future Testing planned in [facility]:  Lab Frequency Next Occurrence   Lipid Panel Once 12/23/2019   ALT Once 12/23/2019   CBC Auto Differential Once 12/23/2019       Next Visit Date:  Future Appointments   Date Time Provider Colleen Reece   3/30/2020 10:50 AM Stephania Gentile MD [facility]   4/21/2020 12:30 PM Roseline Robles MD [facility]   5/19/2020  9:40 AM Akash Crisostomo MD Neuro Spec [facility]        Last Visit: 10/23/2019    Patient Active Problem List:     Hypertension     Anxiety and depression     Attention deficit disorder     Left ventricular hypertrophy     Low back pain     Fatigue     Microalbuminuria     Memory loss     Memory difficulties     Right inguinal hernia     Elevated

## 2020-04-01 ENCOUNTER — TELEPHONE (OUTPATIENT)
Dept: PRIMARY CARE CLINIC | Age: 60
End: 2020-04-01

## 2020-04-01 ENCOUNTER — TELEPHONE (OUTPATIENT)
Dept: ORTHOPEDIC SURGERY | Age: 60
End: 2020-04-01

## 2020-04-03 NOTE — TELEPHONE ENCOUNTER
Patient called and decided that he wants to try an evisit. Evisit was scheduled for next week 4/7.     I told him that we can give him a call next mon 4/6 to confirm that he is all set up for the evisit

## 2020-04-06 ENCOUNTER — TELEPHONE (OUTPATIENT)
Dept: PRIMARY CARE CLINIC | Age: 60
End: 2020-04-06

## 2020-04-06 ENCOUNTER — TELEMEDICINE (OUTPATIENT)
Dept: PRIMARY CARE CLINIC | Age: 60
End: 2020-04-06
Payer: MEDICARE

## 2020-04-06 PROCEDURE — G8427 DOCREV CUR MEDS BY ELIG CLIN: HCPCS | Performed by: FAMILY MEDICINE

## 2020-04-06 PROCEDURE — 3017F COLORECTAL CA SCREEN DOC REV: CPT | Performed by: FAMILY MEDICINE

## 2020-04-06 PROCEDURE — 4004F PT TOBACCO SCREEN RCVD TLK: CPT | Performed by: FAMILY MEDICINE

## 2020-04-06 PROCEDURE — 99213 OFFICE O/P EST LOW 20 MIN: CPT | Performed by: FAMILY MEDICINE

## 2020-04-06 PROCEDURE — G8417 CALC BMI ABV UP PARAM F/U: HCPCS | Performed by: FAMILY MEDICINE

## 2020-04-06 RX ORDER — AZITHROMYCIN 250 MG/1
250 TABLET, FILM COATED ORAL SEE ADMIN INSTRUCTIONS
Qty: 6 TABLET | Refills: 0 | Status: SHIPPED | OUTPATIENT
Start: 2020-04-06 | End: 2020-04-11

## 2020-04-06 RX ORDER — PREDNISONE 20 MG/1
40 TABLET ORAL DAILY
Qty: 10 TABLET | Refills: 0 | Status: SHIPPED | OUTPATIENT
Start: 2020-04-06 | End: 2020-04-11

## 2020-04-06 NOTE — PROGRESS NOTES
(EFFEXOR XR) 75 MG extended release capsule Take 1 capsule by mouth daily Yes Sandra Roberson DO   atorvastatin (LIPITOR) 10 MG tablet Take 1 tablet by mouth daily Yes Marika Duncan MD   cloNIDine (CATAPRES) 0.1 MG tablet take 1 tablet by mouth twice a day IF BLOOD PRESSURE IS GREATER THAN 140/90 Yes Marika Duncan MD   diphenhydrAMINE (BENADRYL) 25 MG tablet Take 25 mg by mouth every 6 hours as needed for Itching Yes Historical Provider, MD   fluticasone (FLONASE) 50 MCG/ACT nasal spray 2 sprays by Nasal route daily Yes Sandra Medhkour, DO   fluticasone (FLOVENT HFA) 110 MCG/ACT inhaler Inhale 2 puffs into the lungs 2 times daily Yes Sandra Medhkour, DO   fexofenadine (ALLEGRA) 180 MG tablet Take 1 tablet by mouth daily Yes Sandra Medhsandro DO   nitroGLYCERIN (NITROSTAT) 0.3 MG SL tablet place 1 tablet under the tongue if needed every 5 minutes for chest pain for 3 doses IF NO RELIEF AF Yes Viral Raines MD   donepezil (ARICEPT) 10 MG tablet Take 1 tablet by mouth nightly Yes Guadalupe Ewing MD   tiZANidine (ZANAFLEX) 2 MG capsule Take 1 capsule by mouth daily Yes Historical Provider, MD   aspirin 81 MG tablet Take 81 mg by mouth daily. Yes Historical Provider, MD   tamsulosin (FLOMAX) 0.4 MG capsule Take 1 capsule by mouth daily  Kei Ku MD       Social History     Tobacco Use    Smoking status: Current Every Day Smoker     Packs/day: 0.50     Years: 28.00     Pack years: 14.00     Types: Cigarettes    Smokeless tobacco: Never Used   Substance Use Topics    Alcohol use: Not Currently     Comment: rarely    Drug use:  No            PHYSICAL EXAMINATION:  [ INSTRUCTIONS:  \"[x]\" Indicates a positive item  \"[]\" Indicates a negative item  -- DELETE ALL ITEMS NOT EXAMINED]  Vital Signs: (As obtained by patient/caregiver or practitioner observation)        Constitutional: [x] Appears well-developed and well-nourished [x] No apparent distress      [] Abnormal-   Mental status  [x] Alert and

## 2020-04-07 ENCOUNTER — TELEMEDICINE (OUTPATIENT)
Dept: ORTHOPEDIC SURGERY | Age: 60
End: 2020-04-07
Payer: MEDICARE

## 2020-04-07 ENCOUNTER — TELEPHONE (OUTPATIENT)
Dept: ORTHOPEDIC SURGERY | Age: 60
End: 2020-04-07

## 2020-04-07 VITALS — HEIGHT: 71 IN | BODY MASS INDEX: 37.1 KG/M2 | WEIGHT: 265 LBS

## 2020-04-07 PROCEDURE — 99202 OFFICE O/P NEW SF 15 MIN: CPT | Performed by: ORTHOPAEDIC SURGERY

## 2020-04-07 PROCEDURE — G8427 DOCREV CUR MEDS BY ELIG CLIN: HCPCS | Performed by: ORTHOPAEDIC SURGERY

## 2020-04-07 PROCEDURE — 3017F COLORECTAL CA SCREEN DOC REV: CPT | Performed by: ORTHOPAEDIC SURGERY

## 2020-04-07 RX ORDER — OXYCODONE HYDROCHLORIDE AND ACETAMINOPHEN 5; 325 MG/1; MG/1
1 TABLET ORAL EVERY 4 HOURS PRN
Status: ON HOLD | COMMUNITY
End: 2021-11-25 | Stop reason: SDUPTHER
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
## 2020-05-11 ENCOUNTER — TELEPHONE (OUTPATIENT)
Dept: PRIMARY CARE CLINIC | Age: 60
End: 2020-05-11

## 2020-05-11 RX ORDER — VENLAFAXINE HYDROCHLORIDE 150 MG/1
150 CAPSULE, EXTENDED RELEASE ORAL DAILY
Qty: 30 CAPSULE | Refills: 2 | Status: SHIPPED | OUTPATIENT
Start: 2020-05-11 | End: 2020-08-04 | Stop reason: SDUPTHER

## 2020-05-11 NOTE — TELEPHONE ENCOUNTER
Called patient to verify medication. Said he got confused and it is the Effexor. Did let him know that you will increase it but, if he does not get better he will need a follow up visit.

## 2020-05-19 ENCOUNTER — OFFICE VISIT (OUTPATIENT)
Dept: NEUROLOGY | Age: 60
End: 2020-05-19
Payer: MEDICARE

## 2020-05-19 VITALS
HEIGHT: 70 IN | TEMPERATURE: 97.9 F | SYSTOLIC BLOOD PRESSURE: 139 MMHG | DIASTOLIC BLOOD PRESSURE: 76 MMHG | HEART RATE: 78 BPM | BODY MASS INDEX: 38.37 KG/M2 | WEIGHT: 268 LBS

## 2020-05-19 PROCEDURE — 99214 OFFICE O/P EST MOD 30 MIN: CPT | Performed by: PSYCHIATRY & NEUROLOGY

## 2020-05-19 PROCEDURE — 4004F PT TOBACCO SCREEN RCVD TLK: CPT | Performed by: PSYCHIATRY & NEUROLOGY

## 2020-05-19 PROCEDURE — G8427 DOCREV CUR MEDS BY ELIG CLIN: HCPCS | Performed by: PSYCHIATRY & NEUROLOGY

## 2020-05-19 PROCEDURE — G8417 CALC BMI ABV UP PARAM F/U: HCPCS | Performed by: PSYCHIATRY & NEUROLOGY

## 2020-05-19 PROCEDURE — 3017F COLORECTAL CA SCREEN DOC REV: CPT | Performed by: PSYCHIATRY & NEUROLOGY

## 2020-05-19 RX ORDER — TOPIRAMATE 50 MG/1
TABLET, FILM COATED ORAL
Qty: 90 TABLET | Refills: 6 | Status: SHIPPED | OUTPATIENT
Start: 2020-05-19 | End: 2020-11-19 | Stop reason: SDUPTHER

## 2020-05-19 RX ORDER — DONEPEZIL HYDROCHLORIDE 10 MG/1
10 TABLET, FILM COATED ORAL NIGHTLY
Qty: 30 TABLET | Refills: 6 | Status: SHIPPED | OUTPATIENT
Start: 2020-05-19 | End: 2020-11-19 | Stop reason: SINTOL

## 2020-05-19 NOTE — PROGRESS NOTES
NEUROLOGY FOLLOW-UP  Patient Name:  Aris Craig  :   1960  Clinic Visit Date: 2020    I saw Mr. Aris Craig in follow-up in the office today in continuation of neurologic care. He is a 63 y.o.  male  with complaints of memory loss and depression. He comes to clinic stating that headaches have not improved. Initially Topamax helped for headache but lately he is suffering from pressure-like headache located \"all over the head\" with photophobia and nausea. These headaches are lasting for several hours of the day and are occurring at least couple of times every week. He is presently taking Topamax 50 mg twice a day. He also takes amitriptyline and it is helping for depression. Upon further questioning; he stated \"I did not see Dr. Minerva Rossi for Lt Supraclinoid ICA aneurysm\". He also stated \"I will call Dr. Edgar Watts". His depression and headaches are much better on Amitriptyline 50 mg at 6 pm. He is not having any adverse effects from it. He also stated that his memory loss problems improved significantly with much less depression. He wants to continue donepezil. He is requesting for a prescription. He has been having neuropathic pain in lower extremities with the pins and needles and amitriptyline is helping for those also. Denies any medication related side effects. REVIEW OF SYSTEMS  Constitutional Weight changes: absent, Fevers : absent, Fatigue: absent; Any recent hospitalizations:  absent.    HEENT Ears: normal, Eyes: glasses, Visual disturbance: absent   Reespiratory Shortness of breath: absent, Cough: absent   Cardivascular Chest pain: absent, Leg swelling :absent   GI Constipation: absent, Diarrhea: absent, Swallowing change: absent    Urinary frequency: absent, Urinary urgency: absent, Urinary incontinence: absent   Musculoskeletal Neck pain: absent, Back pain: absent, Stiffness: absent, Muscle pain: absent, Joint pain: absent   Dermatological Hair loss: absent, Skin changes: absent   Neurological Memory loss: absent, Confusion: absent, Seizures: absent; Trouble walking or imbalance: absent, Dizziness: absent, Weakness: absent, Numbness absent, Tremor: absent, Spasm: absent, Speech difficulty: absent, Headache: present, Light sensitivity: absent   Psychiatric Anxiety: present, Depression  present, Suicidal ideations absent   Hematologic Abnormal bleeding: absent, Anemia: absent, Clotting disorder: absent, Lymph gland changes: absent       Current Outpatient Medications on File Prior to Visit   Medication Sig Dispense Refill    venlafaxine (EFFEXOR XR) 150 MG extended release capsule Take 1 capsule by mouth daily 30 capsule 2    oxyCODONE-acetaminophen (PERCOCET) 5-325 MG per tablet Take 1 tablet by mouth every 4 hours as needed for Pain.       benzonatate (TESSALON) 100 MG capsule take 1 capsule by mouth three times a day if needed for cough 30 capsule 3    amLODIPine (NORVASC) 10 MG tablet take 1 tablet by mouth once daily 90 tablet 1    hyoscyamine (LEVSIN) 125 MCG tablet Take 1 tablet by mouth every 6 hours as needed for Cramping 10 tablet 0    docusate sodium (COLACE) 100 MG capsule Take 1 capsule by mouth 2 times daily 20 capsule 0    tamsulosin (FLOMAX) 0.4 MG capsule Take 1 capsule by mouth daily 30 capsule 1    VENTOLIN  (90 Base) MCG/ACT inhaler inhale 2 puffs by mouth every 6 hours if needed for wheezing 18 g 3    carvedilol (COREG) 25 MG tablet take 1 tablet by mouth twice a day with meals 60 tablet 5    diazepam (VALIUM) 5 MG tablet take 1-2 tablets by mouth if needed 40 MINUTES PRIOR TO PROCEDURE  0    DULoxetine (CYMBALTA) 30 MG extended release capsule Take 30 mg by mouth      amitriptyline (ELAVIL) 50 MG tablet take 1 tablet by mouth at bedtime 30 tablet 4    topiramate (TOPAMAX) 50 MG tablet take 1 tablet by mouth twice a day 60 tablet 3    atorvastatin (LIPITOR) 10 MG tablet Take 1 tablet by mouth daily 90 tablet 3    cloNIDine out of 2 sentences; scored 2 out of 5 on delayed recall and oriented to place and city but not to the date, day of the week month and year. MRI Brain (8/30/17): No acute abnormality. 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 Rt Subclavian artery origin occlusive plaque versus intramural hematoma measuring 11 mm resulting in mild stenosis. Neuropsych evaluation (9/10/2019): Done at [facility] by Yves Lion:   Results were not valid for indication of any specific problem with memory or with psychiatric disorder. There was evidence of average intellectual ability, as expected, with no sign of loss of intellectual skill, and stronger validity to these results however. However memory measures were not valid and no diagnosis can be made in this regard. Recommendations: No recommendations can be made at this time due to lack of validity of results. Impression and Plan: Mr. Rodrigo Bermeo is a 63 y.o. male with   Chronic tension type headache; failed gabapentin; inadequately controlled on Topamax 50 mg bid and amitriptyline 50 mg nightly. Will increase the dose of Topamax to 75 mg bid and continue Amitriptyline as per primary. Subjective memory loss with depression: Much improved with improvement in depression. He wants to continue Donepezil as it is not causing any GI upset. Also stated \"appetite ok\". 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 Lenz. Advised to continue follow-up visits with him. He has phone numbers to call and make a follow-up appointment. Depression: stable on amitriptyline. Dizziness with orthostatic hypotension: resolved. Comorbid diabetic neuropathy: presently tolerable on Elavil. Hx of statin intolerance: Stopped atorvastatin. Presently on Lovastatin.      Comorbid hypertension and diabetes  Family history of dementia (mom in her

## 2020-06-08 RX ORDER — ALBUTEROL SULFATE 90 UG/1
AEROSOL, METERED RESPIRATORY (INHALATION)
Qty: 18 G | Refills: 3 | Status: SHIPPED | OUTPATIENT
Start: 2020-06-08 | End: 2020-10-21 | Stop reason: SDUPTHER

## 2020-06-08 NOTE — TELEPHONE ENCOUNTER
Health Maintenance   Topic Date Due    Shingles Vaccine (1 of 2) 09/14/2011    Colon cancer screen colonoscopy  09/14/2011    A1C test (Diabetic or Prediabetic)  11/01/2020    Lipid screen  11/01/2020    DTaP/Tdap/Td vaccine (2 - Td) 08/02/2026    Flu vaccine  Completed    Pneumococcal 0-64 years Vaccine  Completed    Hepatitis C screen  Completed    HIV screen  Completed    Hepatitis A vaccine  Aged Out    Hepatitis B vaccine  Aged Out    Hib vaccine  Aged Out    Meningococcal (ACWY) vaccine  Aged Out             (applicable per patient's age: Cancer Screenings, Depression Screening, Fall Risk Screening, Immunizations)    Hemoglobin A1C (%)   Date Value   11/01/2019 6.1   09/14/2018 5.7   04/26/2017 6.1 (H)     Microalb/Crt.  Ratio (mcg/mg creat)   Date Value   04/26/2017 17 (H)     LDL Calculated (mg/dL)   Date Value   11/01/2019 95     AST (U/L)   Date Value   08/07/2017 25     ALT (U/L)   Date Value   08/07/2017 37     BUN (mg/dL)   Date Value   02/09/2020 12      (goal A1C is < 7)   (goal LDL is <100) need 30-50% reduction from baseline     BP Readings from Last 3 Encounters:   05/19/20 139/76   02/09/20 (!) 137/94   02/05/20 (!) 160/113    (goal /80)      All Future Testing planned in [facility]:  Lab Frequency Next Occurrence   Lipid Panel Once 12/23/2019   ALT Once 12/23/2019   CBC Auto Differential Once 12/23/2019       Next Visit Date:  Future Appointments   Date Time Provider Colleen Popei   11/19/2020 10:00 AM Anna Whitaker MD Neuro Spec [facility]        Last visit: 4/6/2020    Patient Active Problem List:     Hypertension     Anxiety and depression     Attention deficit disorder     Left ventricular hypertrophy     Low back pain     Fatigue     Microalbuminuria     Memory loss     Memory difficulties     Right inguinal hernia     Elevated hemoglobin A1c     Dysesthesia     Cerebral aneurysm, nonruptured     Dyslipidemia     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

## 2020-06-17 RX ORDER — DEXAMETHASONE 4 MG/1
TABLET ORAL
Qty: 12 G | Refills: 3 | Status: SHIPPED | OUTPATIENT
Start: 2020-06-17 | End: 2020-10-21 | Stop reason: SDUPTHER

## 2020-06-17 NOTE — TELEPHONE ENCOUNTER
LOV 4/6/2020    Health Maintenance   Topic Date Due    Shingles Vaccine (1 of 2) 09/14/2011    Colon cancer screen colonoscopy  09/14/2011    A1C test (Diabetic or Prediabetic)  11/01/2020    Lipid screen  11/01/2020    DTaP/Tdap/Td vaccine (2 - Td) 08/02/2026    Flu vaccine  Completed    Pneumococcal 0-64 years Vaccine  Completed    Hepatitis C screen  Completed    HIV screen  Completed    Hepatitis A vaccine  Aged Out    Hepatitis B vaccine  Aged Out    Hib vaccine  Aged Out    Meningococcal (ACWY) vaccine  Aged Out             (applicable per patient's age: Cancer Screenings, Depression Screening, Fall Risk Screening, Immunizations)    Hemoglobin A1C (%)   Date Value   11/01/2019 6.1   09/14/2018 5.7   04/26/2017 6.1 (H)     Microalb/Crt.  Ratio (mcg/mg creat)   Date Value   04/26/2017 17 (H)     LDL Calculated (mg/dL)   Date Value   11/01/2019 95     AST (U/L)   Date Value   08/07/2017 25     ALT (U/L)   Date Value   08/07/2017 37     BUN (mg/dL)   Date Value   02/09/2020 12      (goal A1C is < 7)   (goal LDL is <100) need 30-50% reduction from baseline     BP Readings from Last 3 Encounters:   05/19/20 139/76   02/09/20 (!) 137/94   02/05/20 (!) 160/113    (goal /80)      All Future Testing planned in [facility]:  Lab Frequency Next Occurrence   Lipid Panel Once 12/23/2019   ALT Once 12/23/2019   CBC Auto Differential Once 12/23/2019       Next Visit Date:  Future Appointments   Date Time Provider Colleen Reece   11/19/2020 10:00 AM Romel Castellano MD Neuro Spec [id number]            Patient Active Problem List:     Hypertension     Anxiety and depression     Attention deficit disorder     Left ventricular hypertrophy     Low back pain     Fatigue     Microalbuminuria     Memory loss     Memory difficulties     Right inguinal hernia     Elevated hemoglobin A1c     Dysesthesia     Cerebral aneurysm, nonruptured     Dyslipidemia     Aneurysm of intracranial portion of left internal

## 2020-06-22 ENCOUNTER — TELEPHONE (OUTPATIENT)
Dept: PRIMARY CARE CLINIC | Age: 60
End: 2020-06-22

## 2020-06-22 ENCOUNTER — TELEMEDICINE (OUTPATIENT)
Dept: PRIMARY CARE CLINIC | Age: 60
End: 2020-06-22
Payer: MEDICARE

## 2020-06-22 PROCEDURE — 99213 OFFICE O/P EST LOW 20 MIN: CPT | Performed by: FAMILY MEDICINE

## 2020-06-22 PROCEDURE — 4004F PT TOBACCO SCREEN RCVD TLK: CPT | Performed by: FAMILY MEDICINE

## 2020-06-22 PROCEDURE — 3017F COLORECTAL CA SCREEN DOC REV: CPT | Performed by: FAMILY MEDICINE

## 2020-06-22 PROCEDURE — G8417 CALC BMI ABV UP PARAM F/U: HCPCS | Performed by: FAMILY MEDICINE

## 2020-06-22 PROCEDURE — G8427 DOCREV CUR MEDS BY ELIG CLIN: HCPCS | Performed by: FAMILY MEDICINE

## 2020-06-22 RX ORDER — PREDNISONE 20 MG/1
40 TABLET ORAL DAILY
Qty: 10 TABLET | Refills: 0 | Status: SHIPPED | OUTPATIENT
Start: 2020-06-22 | End: 2020-06-27

## 2020-06-22 RX ORDER — AZITHROMYCIN 250 MG/1
250 TABLET, FILM COATED ORAL SEE ADMIN INSTRUCTIONS
Qty: 6 TABLET | Refills: 0 | Status: SHIPPED | OUTPATIENT
Start: 2020-06-22 | End: 2020-06-27

## 2020-06-22 ASSESSMENT — ENCOUNTER SYMPTOMS
SORE THROAT: 0
SINUS PRESSURE: 0
NAUSEA: 0
COUGH: 1
WHEEZING: 1
SHORTNESS OF BREATH: 1
VOMITING: 0

## 2020-06-22 NOTE — PROGRESS NOTES
2020    TELEHEALTH EVALUATION -- Audio/Visual (During IESPR-43 public health emergency)    HPI:    Lion Zhou (:  1960) has requested an audio/video evaluation for the following concern(s):    For last week has noticed he has been using inhaler more than usual. Hears lots of wheezing more so at night. Has been coughing up more than his usual as well. Has some diarrhea but no fever. Has drainage and congestion that is about the same. No sinus pressure. Has been using flovent and albuterol. Pt also would like to be tested for covid. Review of Systems   Constitutional: Negative for chills and fever. HENT: Positive for congestion. Negative for sinus pressure and sore throat. Respiratory: Positive for cough, shortness of breath and wheezing. Gastrointestinal: Negative for nausea and vomiting. Prior to Visit Medications    Medication Sig Taking?  Authorizing Provider   azithromycin (ZITHROMAX) 250 MG tablet Take 1 tablet by mouth See Admin Instructions for 5 days 500mg on day 1 followed by 250mg on days 2 - 5  Sandra Roberson, DO   predniSONE (DELTASONE) 20 MG tablet Take 2 tablets by mouth daily for 5 days  Dutton-Lindsay Squibb, DO   FLOVENT  MCG/ACT inhaler inhale 2 puffs by mouth INTO THE LUNGS twice a day  Dutton-Lindsay Squibb, DO   benzonatate (TESSALON) 100 MG capsule take 1 capsule by mouth three times a day if needed for cough  Dutton-Lindsay Squibb, DO   albuterol sulfate  (90 Base) MCG/ACT inhaler inhale 2 puffs by mouth every 6 hours if needed for wheezing  Sandra Roberson DO   topiramate (TOPAMAX) 50 MG tablet take 1.5 tab by mouth twice a day  Ronita Hodgkins, MD   donepezil (ARICEPT) 10 MG tablet Take 1 tablet by mouth nightly  Ronita Hodgkins, MD   venlafaxine (EFFEXOR XR) 150 MG extended release capsule Take 1 capsule by mouth daily  Sandra Roberson DO   oxyCODONE-acetaminophen (PERCOCET) 5-325 MG per tablet Take 1 tablet by mouth every 4 hours as concerns as mentioned above. A caregiver was present when appropriate. Due to this being a TeleHealth encounter (During TDDZS-02 public health emergency), evaluation of the following organ systems was limited: Vitals/Constitutional/EENT/Resp/CV/GI//MS/Neuro/Skin/Heme-Lymph-Imm. Pursuant to the emergency declaration under the [address] [place] [address], [address] [place] [address] authority and the [employer] Act, this Virtual Visit was conducted with patient's (and/or legal guardian's) consent, to reduce the patient's risk of exposure to COVID-19 and provide necessary medical care. The patient (and/or legal guardian) has also been advised to contact this office for worsening conditions or problems, and seek emergency medical treatment and/or call 911 if deemed necessary. Patient identification was verified at the start of the visit: Yes    Total time spent on this encounter: Not billed by time    Services were provided through a video synchronous discussion virtually to substitute for in-person clinic visit. Patient and provider were located at their individual homes. --[facility], DO on 6/22/2020 at 9:29 PM    An electronic signature was used to authenticate this note.

## 2020-06-25 ENCOUNTER — OFFICE VISIT (OUTPATIENT)
Dept: PRIMARY CARE CLINIC | Age: 60
End: 2020-06-25

## 2020-06-25 ENCOUNTER — HOSPITAL ENCOUNTER (OUTPATIENT)
Age: 60
Setting detail: SPECIMEN
Discharge: HOME | End: 2020-06-25
Payer: MEDICARE

## 2020-06-25 RX ORDER — BENZONATATE 100 MG/1
CAPSULE ORAL
Qty: 30 CAPSULE | Refills: 0 | Status: SHIPPED | OUTPATIENT
Start: 2020-06-25 | End: 2020-07-07 | Stop reason: SDUPTHER

## 2020-06-28 LAB — SARS-COV-2, NAA: NOT DETECTED

## 2020-07-07 RX ORDER — FEXOFENADINE HCL 180 MG/1
TABLET ORAL
Qty: 30 TABLET | Refills: 5 | Status: SHIPPED | OUTPATIENT
Start: 2020-07-07 | End: 2021-01-11

## 2020-07-07 RX ORDER — BENZONATATE 100 MG/1
CAPSULE ORAL
Qty: 30 CAPSULE | Refills: 0 | Status: SHIPPED | OUTPATIENT
Start: 2020-07-07 | End: 2020-08-03

## 2020-07-07 NOTE — TELEPHONE ENCOUNTER
Health Maintenance   Topic Date Due    Shingles Vaccine (1 of 2) 09/14/2011    Colon cancer screen colonoscopy  09/14/2011    Flu vaccine (1) 09/01/2020    A1C test (Diabetic or Prediabetic)  11/01/2020    Lipid screen  11/01/2020    DTaP/Tdap/Td vaccine (2 - Td) 08/02/2026    Pneumococcal 0-64 years Vaccine  Completed    Hepatitis C screen  Completed    HIV screen  Completed    Hepatitis A vaccine  Aged Out    Hepatitis B vaccine  Aged Out    Hib vaccine  Aged Out    Meningococcal (ACWY) vaccine  Aged Out             (applicable per patient's age: Cancer Screenings, Depression Screening, Fall Risk Screening, Immunizations)    Hemoglobin A1C (%)   Date Value   11/01/2019 6.1   09/14/2018 5.7   04/26/2017 6.1 (H)     Microalb/Crt.  Ratio (mcg/mg creat)   Date Value   04/26/2017 17 (H)     LDL Calculated (mg/dL)   Date Value   11/01/2019 95     AST (U/L)   Date Value   08/07/2017 25     ALT (U/L)   Date Value   08/07/2017 37     BUN (mg/dL)   Date Value   02/09/2020 12      (goal A1C is < 7)   (goal LDL is <100) need 30-50% reduction from baseline     BP Readings from Last 3 Encounters:   05/19/20 139/76   02/09/20 (!) 137/94   02/05/20 (!) 160/113    (goal /80)      All Future Testing planned in [facility]:  Lab Frequency Next Occurrence   Lipid Panel Once 12/23/2019   ALT Once 12/23/2019   CBC Auto Differential Once 12/23/2019       Next Visit Date:  Future Appointments   Date Time Provider Colleen Reece   11/19/2020 10:00 AM Laura Rogers MD Neuro Spec [id number]        Last Visit: 6/22/2020    Patient Active Problem List:     Hypertension     Anxiety and depression     Attention deficit disorder     Left ventricular hypertrophy     Low back pain     Fatigue     Microalbuminuria     Memory loss     Memory difficulties     Right inguinal hernia     Elevated hemoglobin A1c     Dysesthesia     Cerebral aneurysm, nonruptured     Dyslipidemia     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

## 2020-07-13 ENCOUNTER — TELEPHONE (OUTPATIENT)
Dept: PRIMARY CARE CLINIC | Age: 60
End: 2020-07-13

## 2020-08-03 RX ORDER — BENZONATATE 100 MG/1
CAPSULE ORAL
Qty: 30 CAPSULE | Refills: 0 | Status: SHIPPED | OUTPATIENT
Start: 2020-08-03 | End: 2021-02-06

## 2020-08-03 NOTE — TELEPHONE ENCOUNTER
Pharmacy requesting refill of Elavil 50 mg .      Medication active on med list yes      Date last ordered: 1/6/2020  verified on 8/3/2020  verified by MIKI Cta LPN      Date of last appointment 5/19/2020    Next Visit Date:  11/19/2020

## 2020-08-04 RX ORDER — VENLAFAXINE HYDROCHLORIDE 150 MG/1
150 CAPSULE, EXTENDED RELEASE ORAL DAILY
Qty: 30 CAPSULE | Refills: 2 | Status: SHIPPED | OUTPATIENT
Start: 2020-08-04 | End: 2020-11-30 | Stop reason: SDUPTHER

## 2020-08-04 RX ORDER — AMITRIPTYLINE HYDROCHLORIDE 50 MG/1
TABLET, FILM COATED ORAL
Qty: 30 TABLET | Refills: 3 | OUTPATIENT
Start: 2020-08-04

## 2020-08-04 RX ORDER — BENZONATATE 100 MG/1
CAPSULE ORAL
Qty: 30 CAPSULE | Refills: 0 | OUTPATIENT
Start: 2020-08-04

## 2020-08-04 NOTE — TELEPHONE ENCOUNTER
Marcel Webb returned my call. I let him know that Dr. Ernie Quezada was not refilling the Elavil, and the reason behind it. He voiced understanding. He does not think he is taking the Duloxetine russell more, but he does take the Venlafaxine.

## 2020-08-04 NOTE — TELEPHONE ENCOUNTER
Last OV 06/22/2020    Health Maintenance   Topic Date Due    Shingles Vaccine (1 of 2) 09/14/2011    Colon cancer screen colonoscopy  09/14/2011    Flu vaccine (1) 09/01/2020    A1C test (Diabetic or Prediabetic)  11/01/2020    Lipid screen  11/01/2020    DTaP/Tdap/Td vaccine (2 - Td) 08/02/2026    Pneumococcal 0-64 years Vaccine  Completed    Hepatitis C screen  Completed    HIV screen  Completed    Hepatitis A vaccine  Aged Out    Hepatitis B vaccine  Aged Out    Hib vaccine  Aged Out    Meningococcal (ACWY) vaccine  Aged Out             (applicable per patient's age: Cancer Screenings, Depression Screening, Fall Risk Screening, Immunizations)    Hemoglobin A1C (%)   Date Value   11/01/2019 6.1   09/14/2018 5.7   04/26/2017 6.1 (H)     Microalb/Crt.  Ratio (mcg/mg creat)   Date Value   04/26/2017 17 (H)     LDL Calculated (mg/dL)   Date Value   11/01/2019 95     AST (U/L)   Date Value   08/07/2017 25     ALT (U/L)   Date Value   08/07/2017 37     BUN (mg/dL)   Date Value   02/09/2020 12      (goal A1C is < 7)   (goal LDL is <100) need 30-50% reduction from baseline     BP Readings from Last 3 Encounters:   05/19/20 139/76   02/09/20 (!) 137/94   02/05/20 (!) 160/113    (goal /80)      All Future Testing planned in [facility]:  Lab Frequency Next Occurrence   Lipid Panel Once 12/23/2019   ALT Once 12/23/2019   CBC Auto Differential Once 12/23/2019       Next Visit Date:  Future Appointments   Date Time Provider Colleen Reece   11/19/2020 10:00 AM Nathan Todd MD Neuro [id number]            Patient Active Problem List:     Hypertension     Anxiety and depression     Attention deficit disorder     Left ventricular hypertrophy     Low back pain     Fatigue     Microalbuminuria     Memory loss     Memory difficulties     Right inguinal hernia     Elevated hemoglobin A1c     Dysesthesia     Cerebral aneurysm, nonruptured     Dyslipidemia     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

## 2020-08-12 ENCOUNTER — TELEPHONE (OUTPATIENT)
Dept: PRIMARY CARE CLINIC | Age: 60
End: 2020-08-12

## 2020-08-12 ENCOUNTER — HOSPITAL ENCOUNTER (OUTPATIENT)
Age: 60
Setting detail: SPECIMEN
Discharge: HOME | End: 2020-08-12

## 2020-08-12 ENCOUNTER — OFFICE VISIT (OUTPATIENT)
Dept: PRIMARY CARE CLINIC | Age: 60
End: 2020-08-12
Payer: MEDICARE

## 2020-08-12 VITALS
HEART RATE: 72 BPM | SYSTOLIC BLOOD PRESSURE: 133 MMHG | RESPIRATION RATE: 16 BRPM | WEIGHT: 267.2 LBS | OXYGEN SATURATION: 97 % | TEMPERATURE: 98 F | DIASTOLIC BLOOD PRESSURE: 84 MMHG | HEIGHT: 70 IN | BODY MASS INDEX: 38.25 KG/M2

## 2020-08-12 PROCEDURE — G8417 CALC BMI ABV UP PARAM F/U: HCPCS | Performed by: NURSE PRACTITIONER

## 2020-08-12 PROCEDURE — 4004F PT TOBACCO SCREEN RCVD TLK: CPT | Performed by: NURSE PRACTITIONER

## 2020-08-12 PROCEDURE — 99213 OFFICE O/P EST LOW 20 MIN: CPT | Performed by: NURSE PRACTITIONER

## 2020-08-12 PROCEDURE — G8427 DOCREV CUR MEDS BY ELIG CLIN: HCPCS | Performed by: NURSE PRACTITIONER

## 2020-08-12 PROCEDURE — 3017F COLORECTAL CA SCREEN DOC REV: CPT | Performed by: NURSE PRACTITIONER

## 2020-08-12 ASSESSMENT — ENCOUNTER SYMPTOMS
ABDOMINAL PAIN: 0
SORE THROAT: 0
VOMITING: 0
CHANGE IN BOWEL HABIT: 1
NAUSEA: 0

## 2020-08-12 NOTE — TELEPHONE ENCOUNTER
PATRICIO  Patient called to check on phone call he states he made yesterday requesting Prednisone for muscle aches, advised patient to request for Prednisone in chart. When speaking with patient further, he states besides having muscle aches, he has body aches, diarrhea, and headache. Advised patient that he should be checked for Covid, he states he has placed himself in quarantine but will prob be checked. Gave patient clinic phone number.

## 2020-08-12 NOTE — PROGRESS NOTES
[facility]  [facility]  [address] [place]  [address] [address]  Dept: [phone]  Dept Fax: [phone]    Davian Grider is a 63 y.o. male who presents to the urgent care today for his medical conditions/complaints as notedbelow. Davian Grider is c/o of Headache; Diarrhea; Generalized Body Aches; and Other (Pt states that Sx started over the weekend)      HPI:     63 yr old male presents for covid testing. C/o HA, gen body aches and mild diarrhea. Called PCP office and they suggested he come in for covid testing. No known exposure but has been to family gatherings and parties. Generalized Body Aches   This is a new problem. The current episode started in the past 7 days. The problem occurs constantly. The problem has been unchanged. Associated symptoms include anorexia, a change in bowel habit, chills, congestion ( nasal), fatigue, headaches, myalgias and weakness. Pertinent negatives include no abdominal pain, fever, nausea, rash, sore throat or vomiting. Nothing aggravates the symptoms. He has tried acetaminophen for the symptoms. The treatment provided mild relief. Past Medical History:   Diagnosis Date    ADHD (attention deficit hyperactivity disorder)     Arthritis     knees, hands, back.  Asthma     as child.  Back pain     CAD (coronary artery disease)     Cataract     Depression     pt. denies.     Diabetes mellitus ([facility]     Heart attack ([facility]     2011    Hypertension     Kidney stones     Mini stroke ([facility] [facility]     2014    Osteoarthritis     Seasonal allergies     Sinus problem         Current Outpatient Medications   Medication Sig Dispense Refill    venlafaxine (EFFEXOR XR) 150 MG extended release capsule Take 1 capsule by mouth daily 30 capsule 2    benzonatate (TESSALON) 100 MG capsule take 1 capsule by mouth three times a day if needed for cough 30 capsule 0    carvedilol (COREG) 25 MG tablet take 1 tablet by mouth twice a day with meals 60 tablet 2    fexofenadine (ALLEGRA) 180 MG tablet take 1 tablet by mouth once daily 30 tablet 5    FLOVENT  MCG/ACT inhaler inhale 2 puffs by mouth INTO THE LUNGS twice a day 12 g 3    albuterol sulfate  (90 Base) MCG/ACT inhaler inhale 2 puffs by mouth every 6 hours if needed for wheezing 18 g 3    topiramate (TOPAMAX) 50 MG tablet take 1.5 tab by mouth twice a day 90 tablet 6    oxyCODONE-acetaminophen (PERCOCET) 5-325 MG per tablet Take 1 tablet by mouth every 4 hours as needed for Pain.  atorvastatin (LIPITOR) 10 MG tablet Take 1 tablet by mouth daily 90 tablet 3    cloNIDine (CATAPRES) 0.1 MG tablet take 1 tablet by mouth twice a day IF BLOOD PRESSURE IS GREATER THAN 140/90 30 tablet 0    fluticasone (FLONASE) 50 MCG/ACT nasal spray 2 sprays by Nasal route daily 1 Bottle 5    nitroGLYCERIN (NITROSTAT) 0.3 MG SL tablet place 1 tablet under the tongue if needed every 5 minutes for chest pain for 3 doses IF NO RELIEF AF 25 tablet 3    tiZANidine (ZANAFLEX) 2 MG capsule Take 1 capsule by mouth daily      aspirin 81 MG tablet Take 81 mg by mouth daily.       donepezil (ARICEPT) 10 MG tablet Take 1 tablet by mouth nightly (Patient not taking: Reported on 8/12/2020) 30 tablet 6    amLODIPine (NORVASC) 10 MG tablet take 1 tablet by mouth once daily 90 tablet 1    hyoscyamine (LEVSIN) 125 MCG tablet Take 1 tablet by mouth every 6 hours as needed for Cramping (Patient not taking: Reported on 5/19/2020) 10 tablet 0    docusate sodium (COLACE) 100 MG capsule Take 1 capsule by mouth 2 times daily (Patient not taking: Reported on 5/19/2020) 20 capsule 0    tamsulosin (FLOMAX) 0.4 MG capsule Take 1 capsule by mouth daily 30 capsule 1    diazepam (VALIUM) 5 MG tablet take 1-2 tablets by mouth if needed 40 MINUTES PRIOR TO PROCEDURE  0    DULoxetine (CYMBALTA) 30 MG extended release capsule Take 30 mg by mouth      amitriptyline (ELAVIL) 50 MG tablet take 1 tablet by mouth at bedtime (Patient not taking: Reported on 8/12/2020) 30 tablet 4    diphenhydrAMINE (BENADRYL) 25 MG tablet Take 25 mg by mouth every 6 hours as needed for Itching       No current facility-administered medications for this visit. Allergies   Allergen Reactions    Lisinopril      States he is allergic to the generic forms of medication, can take lisinopril    Aleve  [Naproxen Sodium] Nausea Only    Amitriptyline Hcl Other (See Comments)    Ibuprofen Other (See Comments)     \"hurts my stomach. \"    Lipitor [Atorvastatin] Hives       Subjective:      Review of Systems   Constitutional: Positive for chills and fatigue. Negative for fever. HENT: Positive for congestion ( nasal). Negative for sore throat. Gastrointestinal: Positive for anorexia and change in bowel habit. Negative for abdominal pain, nausea and vomiting. Musculoskeletal: Positive for myalgias. Skin: Negative for rash. Neurological: Positive for weakness and headaches. All other systems reviewed and are negative. 14 systems reviewed and negative except as listed in HPI. Objective:     Physical Exam  Vitals signs and nursing note reviewed. Constitutional:       General: He is not in acute distress. Appearance: Normal appearance. He is well-developed. He is not ill-appearing, toxic-appearing or diaphoretic. Comments: nontoxic   HENT:      Head: Normocephalic and atraumatic. Right Ear: External ear normal.      Left Ear: External ear normal.      Nose: Rhinorrhea present. Mouth/Throat:      Mouth: Mucous membranes are moist.      Pharynx: Oropharynx is clear. No oropharyngeal exudate or posterior oropharyngeal erythema. Comments: pnd - thin white  Eyes:      General: No scleral icterus. Right eye: No discharge. Left eye: No discharge. Conjunctiva/sclera: Conjunctivae normal.      Pupils: Pupils are equal, round, and reactive to light.    Neck:      Musculoskeletal: Normal range of motion and neck supple. Cardiovascular:      Rate and Rhythm: Normal rate and regular rhythm. Heart sounds: Normal heart sounds. No murmur. No friction rub. No gallop. Pulmonary:      Effort: Pulmonary effort is normal. No respiratory distress. Breath sounds: Normal breath sounds. No stridor. No wheezing, rhonchi or rales. Chest:      Chest wall: No tenderness. Abdominal:      General: Bowel sounds are normal. There is no distension. Palpations: Abdomen is soft. Tenderness: There is no abdominal tenderness. Musculoskeletal: Normal range of motion. General: No tenderness or deformity. Lymphadenopathy:      Cervical: No cervical adenopathy. Skin:     General: Skin is warm and dry. Findings: No rash ( no rash to visible skin). Neurological:      General: No focal deficit present. Mental Status: He is alert and oriented to person, place, and time. Motor: No abnormal muscle tone. Coordination: Coordination normal.   Psychiatric:         Mood and Affect: Mood normal.         Behavior: Behavior normal.       /84 (Site: Right Upper Arm, Position: Sitting, Cuff Size: Medium Adult)   Pulse 72   Temp 98 °F (36.7 °C) (Temporal)   Resp 16   Ht 5' 10\" (1.778 m)   Wt 267 lb 3.2 oz (121.2 kg)   SpO2 97%   BMI 38.34 kg/m²     Assessment:       Diagnosis Orders   1. Suspected COVID-19 virus infection  Covid-19 Ambulatory   2. Nasal congestion         Plan:      Tylenol for gen body aches  Muscle rubs  Heat/ice  Cont flonase and allegra for nasal congestion and drng. Will send out COVID19 testing. Possible treatment alterations based on the results. Patient instructed to self-quarantine until testing results are back. Patient instructed not to return to work until results are back. Tylenol as needed for fever/pain.   Encouraged adequate hydration and rest.  The patient indicates understanding of these issues and agrees with the plan.  Educational materials provided on AVS.  Follow up if symptoms do not improve/worsen. Discussed symptoms that will warrant urgent ED evaluation/treatment. Return for Make an Appt. with your Primary Care in 1 week. No orders of the defined types were placed in this encounter. Patient given educational materials - see patient instructions. Discussed use, benefit, and side effects of prescribed medications. All patient questions answered. Pt voicedunderstanding.     Electronically signed by ASHLEY Baldwin CNP on 8/12/2020 at 1:26 PM

## 2020-08-12 NOTE — PATIENT INSTRUCTIONS
home, except for getting medical care. Do not go to work, school, or public areas. Avoid using public transportation, ride-sharing, or taxis. Separate yourself from other people and animals in your home  People: As much as possible, you should stay in a specific room and away from other people in your home. Also, you should use a separate bathroom, if available. Animals: You should restrict contact with pets and other animals while you are sick with COVID-19, just like you would around other people. Although there have not been reports of pets or other animals becoming sick with COVID-19, it is still recommended that people sick with COVID-19 limit contact with animals until more information is known about the virus. When possible, have another member of your household care for your animals while you are sick. If you are sick with COVID-19, avoid contact with your pet, including petting, snuggling, being kissed or licked, and sharing food. If you must care for your pet or be around animals while you are sick, wash your hands before and after you interact with pets and wear a facemask. Call ahead before visiting your doctor  If you have a medical appointment, call the healthcare provider and tell them that you have or may have COVID-19. This will help the healthcare providers office take steps to keep other people from getting infected or exposed. Wear a facemask  You should wear a facemask when you are around other people (e.g., sharing a room or vehicle) or pets and before you enter a healthcare providers office. If you are not able to wear a facemask (for example, because it causes trouble breathing), then people who live with you should not stay in the same room with you; they should also wear a facemask if they enter your room. Cover your coughs and sneezes  Cover your mouth and nose with a tissue when you cough or sneeze. Throw used tissues in a lined trash can.  Immediately wash your hands with soap and water for at least 20 seconds or, if soap and water are not available, clean your hands with an alcohol-based hand  that contains at least 60% alcohol. Clean your hands often  Wash your hands often with soap and water for at least 20 seconds, especially after blowing your nose, coughing, or sneezing; going to the bathroom; and before eating or preparing food. If soap and water are not readily available, use an alcohol-based hand  with at least 60% alcohol, covering all surfaces of your hands and rubbing them together until they feel dry. Soap and water are the best option if hands are visibly dirty. Avoid touching your eyes, nose, and mouth with unwashed hands. Avoid sharing personal household items  You should not share dishes, drinking glasses, cups, eating utensils, towels, or bedding with other people or pets in your home. After using these items, they should be washed thoroughly with soap and water. Clean all high-touch surfaces everyday  High touch surfaces include counters, tabletops, doorknobs, bathroom fixtures, toilets, phones, keyboards, tablets, and bedside tables. Also, clean any surfaces that may have blood, stool, or body fluids on them. Use a household cleaning spray or wipe, according to the label instructions. Labels contain instructions for safe and effective use of the cleaning product including precautions you should take when applying the product, such as wearing gloves and making sure you have good ventilation during use of the product. Monitor your symptoms  Seek prompt medical attention if your illness is worsening (e.g., difficulty breathing). Before seeking care, call your healthcare provider and tell them that you have, or are being evaluated for, COVID-19. Put on a facemask before you enter the facility. These steps will help the healthcare providers office to keep other people in the office or waiting room from getting infected or exposed.  Persons who are [employer]  https://coronavirus. [place].gov/wps/portal/gov/covid-19/home/local-health-districts-and-providers/guidance-for-covid-19-exposure-management    Patient Education        Viral Infections: Care Instructions  Your Care Instructions     You don't feel well, but it's not clear what's causing it. You may have a viral infection. Viruses cause many illnesses, such as the common cold, influenza, fever, rashes, and the diarrhea, nausea, and vomiting that are often called \"stomach flu. \" You may wonder if antibiotic medicines could make you feel better. But antibiotics only treat infections caused by bacteria. They don't work on viruses. The good news is that viral infections usually aren't serious. Most will go away in a few days without medical treatment. In the meantime, there are a few things you can do to make yourself more comfortable. Follow-up care is a key part of your treatment and safety. Be sure to make and go to all appointments, and call your doctor if you are having problems. It's also a good idea to know your test results and keep a list of the medicines you take. How can you care for yourself at home? · Get plenty of rest if you feel tired. · Take an over-the-counter pain medicine if needed, such as acetaminophen (Tylenol), ibuprofen (Advil, Motrin), or naproxen (Aleve). Read and follow all instructions on the label. · Be careful when taking over-the-counter cold or flu medicines and Tylenol at the same time. Many of these medicines have acetaminophen, which is Tylenol. Read the labels to make sure that you are not taking more than the recommended dose. Too much acetaminophen (Tylenol) can be harmful. · Drink plenty of fluids, enough so that your urine is light yellow or clear like water.  If you have kidney, heart, or liver disease and have to limit fluids, talk with your doctor before you increase the amount of fluids you the virus when you cough or sneeze. Limit contact with people in your home. If possible, stay in a separate bedroom and use a separate bathroom. Avoid contact with pets and other animals. If possible, have a friend or family member care for them while you're sick. Cover your mouth and nose with a tissue when you cough or sneeze. Then throw the tissue in the trash right away. Wash your hands often, especially after you cough or sneeze. Use soap and water, and scrub for at least 20 seconds. If soap and water aren't available, use an alcohol-based hand . Don't share personal household items. These include bedding, towels, cups and glasses, and eating utensils. Clean and disinfect your home every day. Use household  or disinfectant wipes or sprays. Take special care to clean things that you grab with your hands. These include doorknobs, remote controls, phones, and handles on your refrigerator and microwave. And don't forget countertops, tabletops, bathrooms, and computer keyboards. Take acetaminophen (Tylenol) to relieve fever and body aches. Read and follow all instructions on the label. Current as of: May 8, 2020               Content Version: 12.5  © 6346-5584 Healthwise, Incorporated. Care instructions adapted under license by [facility] ([facility]). If you have questions about a medical condition or this instruction, always ask your healthcare professional. [place] [address] any warranty or liability for your use of this information. Patient Education        Learning About Coronavirus [phone])  Coronavirus [phone]): Overview  What is coronavirus ([id number])? The coronavirus disease (COVID-19) is caused by a virus. It is an illness that was first found in [place], [place], in December 2019. It has since spread worldwide. The virus can cause fever, cough, and trouble breathing.  In severe cases, it can cause pneumonia and make it hard to breathe the spread of the virus when you cough or sneeze. ? Clean and disinfect your home every day. Use household  and disinfectant wipes or sprays. Take special care to clean things that you grab with your hands. These include doorknobs, remote controls, phones, and handles on your refrigerator and microwave. And don't forget countertops, tabletops, bathrooms, and computer keyboards. When to call for help  [id number] anytime you think you may need emergency care. For example, call if:  · You have severe trouble breathing. (You can't talk at all.)  · You have constant chest pain or pressure. · You are severely dizzy or lightheaded. · You are confused or can't think clearly. · Your face and lips have a blue color. · You pass out (lose consciousness) or are very hard to wake up. Call your doctor now if you develop symptoms such as:  · Shortness of breath. · Fever. · Cough. If you need to get care, call ahead to the doctor's office for instructions before you go. Make sure you wear a face cover to prevent exposing other people to the virus. Where can you get the latest information? The following health organizations are tracking and studying this virus. Their websites contain the most up-to-date information. Solis Frazier also learn what to do if you think you may have been exposed to the virus. · U.[place]. Centers for Disease Control and Prevention (CDC): The CDC provides updated news about the disease and travel advice. The website also tells you how to prevent the spread of infection. www.cdc.gov  · World Health Organization [facility]): WHO offers information about the virus outbreaks. WHO also has travel advice. www.who.int  Current as of: May 8, 2020               Content Version: 12.5  © 6083-8561 Health[employer], Incorporated. Care instructions adapted under license by [facility] ([facility]).  If you have questions about a medical condition or this instruction, always ask your healthcare professional. Ellie Bustos disclaims any warranty or liability for your use of this information. Patient Education        Coronavirus ([id number]): Care Instructions  Overview  The coronavirus disease (COVID-19) is caused by a virus. Symptoms may include a fever, a cough, and shortness of breath. It mainly spreads person-to-person through droplets from coughing and sneezing. The virus also can spread when people are in close contact with someone who is infected. Most people have mild symptoms and can take care of themselves at home. If their symptoms get worse, they may need care in a hospital. There is no medicine to fight the virus. It's important to not spread the virus to others. If you have COVID-19, wear a face cover anytime you are around other people. You need to isolate yourself while you are sick. Your doctor or local public health official will tell you when you no longer need to be isolated. Leave your home only if you need to get medical care. Follow-up care is a key part of your treatment and safety. Be sure to make and go to all appointments, and call your doctor if you are having problems. It's also a good idea to know your test results and keep a list of the medicines you take. How can you care for yourself at home? · Get extra rest. It can help you feel better. · Drink plenty of fluids. This helps replace fluids lost from fever. Fluids also help ease a scratchy throat. Water, soup, fruit juice, and hot tea with lemon are good choices. · Take acetaminophen (such as Tylenol) to reduce a fever. It may also help with muscle aches. Read and follow all instructions on the label. · Sponge your body with lukewarm water to help with fever. Don't use cold water or ice. · Use petroleum jelly on sore skin. This can help if the skin around your nose and lips becomes sore from rubbing a lot with tissues. Tips for isolation  · Wear a cloth face cover when you are around other people.  It can help stop the spread of the virus when you cough or sneeze. · Limit contact with people in your home. If possible, stay in a separate bedroom and use a separate bathroom. · If you have to leave home, avoid crowds and try to stay at least 6 feet away from other people. · Avoid contact with pets and other animals. · Cover your mouth and nose with a tissue when you cough or sneeze. Then throw it in the trash right away. · Wash your hands often, especially after you cough or sneeze. Use soap and water, and scrub for at least 20 seconds. If soap and water aren't available, use an alcohol-based hand . · Don't share personal household items. These include bedding, towels, cups and glasses, and eating utensils. · [address] in the warmest water allowed for the fabric type, and dry it completely. It's okay to wash other people's laundry with yours. · Clean and disinfect your home every day. Use household  and disinfectant wipes or sprays. Take special care to clean things that you grab with your hands. These include doorknobs, remote controls, phones, and handles on your refrigerator and microwave. And don't forget countertops, tabletops, bathrooms, and computer keyboards. When should you call for help? [id number] anytime you think you may need emergency care. For example, call if you have life-threatening symptoms, such as:  · You have severe trouble breathing. (You can't talk at all.)  · You have constant chest pain or pressure. · You are severely dizzy or lightheaded. · You are confused or can't think clearly. · Your face and lips have a blue color. · You pass out (lose consciousness) or are very hard to wake up. Call your doctor now or seek immediate medical care if:  · You have moderate trouble breathing. (You can't speak a full sentence.)  · You are coughing up blood (more than about 1 teaspoon). · You have signs of low blood pressure.  These include feeling lightheaded; being too weak to stand; and having cold, pale,

## 2020-08-12 NOTE — TELEPHONE ENCOUNTER
Yes I agree I recommend he be seen at flu clinic and they can decide which medication needed if any.  Thanks

## 2020-08-14 LAB — SARS-COV-2, NAA: NOT DETECTED

## 2020-09-03 RX ORDER — AMLODIPINE BESYLATE 10 MG/1
TABLET ORAL
Qty: 90 TABLET | Refills: 1 | Status: SHIPPED | OUTPATIENT
Start: 2020-09-03 | End: 2021-03-01

## 2020-09-16 ENCOUNTER — TELEPHONE (OUTPATIENT)
Dept: NEUROLOGY | Age: 60
End: 2020-09-16

## 2020-09-16 NOTE — TELEPHONE ENCOUNTER
Supriya Montague called in. he thinks he might have narcolepsy. He wonders if Dr. Pati Bagley would order a sleep study. He said he doesn't sleep at night. Can't sleep during the day. he said that he will fall asleep after he eats something and then it is hard to wake up. During daytime he is tired. He said he still has the crazy headaches. Takes medication for the aneurysm,  but still has headaches that make him feel like his head might pop. Headaches worsening. He said \"for example\" in the morning he gets up and takes his medication and eats, sits down to watch the news and the next thing he is waking up and the news is over. His number is  [phone].

## 2020-09-17 NOTE — TELEPHONE ENCOUNTER
I called pt. and he is agreeable scheduled for 12/3/2020 next available and he is on the cancellation list.

## 2020-09-17 NOTE — TELEPHONE ENCOUNTER
Velma  Please let the patient know that I can refer him to Dr. Jody Savage and proceed accordingly.   Thank you.   -dr. Michael Mcmanus

## 2020-10-20 ENCOUNTER — TELEPHONE (OUTPATIENT)
Dept: PRIMARY CARE CLINIC | Age: 60
End: 2020-10-20

## 2020-10-20 NOTE — TELEPHONE ENCOUNTER
Patient called in complaining of some wheezing and sob for about a week, possibly bronchitis he says, denies any cough, fever or chills, states his kids can hear him wheezing. Advised patient he should come in for an appt to evaluated in case we need to order a chest xray for him. Patient asked for appt tomorrow with provider when he will have a ride. Scheduled patient for tomorrow and advised him if he gets worse he needs to go to the ER to be evaluated. Patient verbalized understanding.

## 2020-10-21 ENCOUNTER — HOSPITAL ENCOUNTER (OUTPATIENT)
Age: 60
Setting detail: SPECIMEN
Discharge: HOME | End: 2020-10-21
Payer: MEDICAID

## 2020-10-21 ENCOUNTER — OFFICE VISIT (OUTPATIENT)
Dept: PRIMARY CARE CLINIC | Age: 60
End: 2020-10-21
Payer: MEDICAID

## 2020-10-21 VITALS
OXYGEN SATURATION: 96 % | SYSTOLIC BLOOD PRESSURE: 116 MMHG | TEMPERATURE: 98.6 F | BODY MASS INDEX: 39.17 KG/M2 | RESPIRATION RATE: 16 BRPM | HEART RATE: 76 BPM | DIASTOLIC BLOOD PRESSURE: 78 MMHG | WEIGHT: 273 LBS

## 2020-10-21 PROCEDURE — 90688 IIV4 VACCINE SPLT 0.5 ML IM: CPT | Performed by: FAMILY MEDICINE

## 2020-10-21 PROCEDURE — G8427 DOCREV CUR MEDS BY ELIG CLIN: HCPCS | Performed by: FAMILY MEDICINE

## 2020-10-21 PROCEDURE — G8482 FLU IMMUNIZE ORDER/ADMIN: HCPCS | Performed by: FAMILY MEDICINE

## 2020-10-21 PROCEDURE — G8428 CUR MEDS NOT DOCUMENT: HCPCS | Performed by: NURSE PRACTITIONER

## 2020-10-21 PROCEDURE — G8417 CALC BMI ABV UP PARAM F/U: HCPCS | Performed by: NURSE PRACTITIONER

## 2020-10-21 PROCEDURE — G0008 ADMIN INFLUENZA VIRUS VAC: HCPCS | Performed by: FAMILY MEDICINE

## 2020-10-21 PROCEDURE — 99211 OFF/OP EST MAY X REQ PHY/QHP: CPT | Performed by: NURSE PRACTITIONER

## 2020-10-21 PROCEDURE — 99214 OFFICE O/P EST MOD 30 MIN: CPT | Performed by: FAMILY MEDICINE

## 2020-10-21 PROCEDURE — 3017F COLORECTAL CA SCREEN DOC REV: CPT | Performed by: FAMILY MEDICINE

## 2020-10-21 PROCEDURE — 4004F PT TOBACCO SCREEN RCVD TLK: CPT | Performed by: FAMILY MEDICINE

## 2020-10-21 PROCEDURE — G8417 CALC BMI ABV UP PARAM F/U: HCPCS | Performed by: FAMILY MEDICINE

## 2020-10-21 RX ORDER — ALBUTEROL SULFATE 90 UG/1
AEROSOL, METERED RESPIRATORY (INHALATION)
Qty: 18 G | Refills: 3 | Status: SHIPPED | OUTPATIENT
Start: 2020-10-21 | End: 2021-01-21 | Stop reason: SDUPTHER

## 2020-10-21 RX ORDER — PREDNISONE 20 MG/1
40 TABLET ORAL DAILY
Qty: 10 TABLET | Refills: 0 | Status: SHIPPED | OUTPATIENT
Start: 2020-10-21 | End: 2020-10-26

## 2020-10-21 RX ORDER — PREGABALIN 50 MG/1
CAPSULE ORAL
COMMUNITY
Start: 2020-09-01 | End: 2021-07-21 | Stop reason: ALTCHOICE

## 2020-10-21 RX ORDER — FLUTICASONE PROPIONATE 110 UG/1
AEROSOL, METERED RESPIRATORY (INHALATION)
Qty: 12 G | Refills: 3 | Status: SHIPPED | OUTPATIENT
Start: 2020-10-21 | End: 2021-11-10 | Stop reason: ALTCHOICE

## 2020-10-21 RX ORDER — NICOTINE 21 MG/24HR
1 PATCH, TRANSDERMAL 24 HOURS TRANSDERMAL DAILY
Qty: 42 PATCH | Refills: 0 | Status: SHIPPED | OUTPATIENT
Start: 2020-10-21 | End: 2020-11-23

## 2020-10-21 RX ORDER — AZITHROMYCIN 250 MG/1
250 TABLET, FILM COATED ORAL SEE ADMIN INSTRUCTIONS
Qty: 6 TABLET | Refills: 0 | Status: SHIPPED | OUTPATIENT
Start: 2020-10-21 | End: 2020-10-26

## 2020-10-21 ASSESSMENT — ENCOUNTER SYMPTOMS
VOMITING: 0
ABDOMINAL PAIN: 0
COUGH: 1
WHEEZING: 1
SHORTNESS OF BREATH: 1
BACK PAIN: 1
NAUSEA: 0

## 2020-10-21 NOTE — PROGRESS NOTES
[facility] PRIMARY CARE  [facility] [address] [address]  Dept: [phone]  Dept Fax: [phone]    Dago Hicks is a 62 y.o. male who presents today for his medical conditions/complaints as noted below. Dago Hicks is c/o of  Chief Complaint   Patient presents with    Wheezing    Shortness of Breath    Discuss Medications     wants patches to quit smoking, says he keeps swelling at his ankles also       HPI:     HPI     Pt here due to shortness of breath and wheezing    Pt feels like flare up for past few weeks. Has been using flovent and albuterol regularly lately. Family came over and noted he has been wheezing. Has been getting some shortness of breath. Has congestion and drainage that comes and goes. Has been taking allergy pills have not helped. He also has intermittent productive cough as well. He would like to quit smoking and would like to try nicotine patch which helped him before. States he smokes about 2 packs a week now. He has also noted some swelling in his ankles by the end of the day. Does have cardiologist last seen about end of last year. Hx of MI in past.       Hemoglobin A1C (%)   Date Value   11/01/2019 6.1   09/14/2018 5.7   04/26/2017 6.1 (H)             ( goal A1C is < 7)   Microalb/Crt. Ratio (mcg/mg creat)   Date Value   04/26/2017 17 (H)     LDL Calculated (mg/dL)   Date Value   11/01/2019 95       (goal LDL is <100)   AST (U/L)   Date Value   08/07/2017 25     ALT (U/L)   Date Value   08/07/2017 37     BUN (mg/dL)   Date Value   02/09/2020 12     BP Readings from Last 3 Encounters:   10/21/20 116/78   08/12/20 133/84   05/19/20 139/76          (goal 120/80)    Past Medical History:   Diagnosis Date    ADHD (attention deficit hyperactivity disorder)     Arthritis     knees, hands, back.  Asthma     as child.  Back pain     CAD (coronary artery disease)     Cataract     Depression     pt. denies.     Diabetes mellitus ([facility]     Heart attack ([facility] [facility]     2011    Hypertension     Kidney stones     Mini stroke ([facility] [address]     2014    Osteoarthritis     Seasonal allergies     Sinus problem       Past Surgical History:   Procedure Laterality Date    CHOLECYSTECTOMY      2016    COLONOSCOPY      CYSTO/URETERO/PYELOSCOPY, CALCULUS TX Right 2/5/2020    CYSTO, URETEROSCOPY, RIGHT URETERAL STENT PLACEMENT, RETROGRADE PYELOGRAM performed by Byron Mahoney MD at [facility], rt. inguinal    HERNIA REPAIR Left 01/2018    repair    HERNIA REPAIR Right 01/2018    looked at at same time they did left repair    INGUINAL HERNIA REPAIR Left 08/19/2016    LAPAROSCOPY N/A 1/16/2018    DIAGNOSTIC LAPAROSCOPY performed by Jareth Ruano MD at [address] URETEROSCOPY Right 02/05/2020    stent placement, cystoscopy, retrograde pyelogram    VASECTOMY         Family History   Problem Relation Age of Onset    Cancer Father         throat    Cirrhosis Father     High Blood Pressure Sister     Asthma Sister     Arthritis Sister     High Blood Pressure Mother     Heart Disease Mother     Alzheimer's Disease Mother        Social History     Tobacco Use    Smoking status: Current Every Day Smoker     Packs/day: 0.25     Years: 28.00     Pack years: 7.00     Types: Cigarettes    Smokeless tobacco: Never Used   Substance Use Topics    Alcohol use: Not Currently     Comment: rarely      Current Outpatient Medications   Medication Sig Dispense Refill    diclofenac sodium (VOLTAREN) 1 % GEL apply 4 grams to affected area four times a day AS NEEDED FOR PAIN      pregabalin (LYRICA) 50 MG capsule take 1 capsule by mouth twice a day      predniSONE (DELTASONE) 20 MG tablet Take 2 tablets by mouth daily for 5 days 10 tablet 0    azithromycin (ZITHROMAX) 250 MG tablet Take 1 tablet by mouth See Admin Instructions for 5 days 500mg on day 1 followed by 250mg on days 2 - 5 6 tablet 0    nicotine (NICODERM CQ) 14 MG/24HR Place 1 patch onto the skin daily 42 patch 0    fluticasone (FLOVENT HFA) 110 MCG/ACT inhaler inhale 2 puffs by mouth INTO THE LUNGS twice a day 12 g 3    albuterol sulfate  (90 Base) MCG/ACT inhaler inhale 2 puffs by mouth every 6 hours if needed for wheezing 18 g 3    amLODIPine (NORVASC) 10 MG tablet take 1 tablet by mouth once daily 90 tablet 1    venlafaxine (EFFEXOR XR) 150 MG extended release capsule Take 1 capsule by mouth daily 30 capsule 2    carvedilol (COREG) 25 MG tablet take 1 tablet by mouth twice a day with meals 60 tablet 2    fexofenadine (ALLEGRA) 180 MG tablet take 1 tablet by mouth once daily 30 tablet 5    topiramate (TOPAMAX) 50 MG tablet take 1.5 tab by mouth twice a day 90 tablet 6    oxyCODONE-acetaminophen (PERCOCET) 5-325 MG per tablet Take 1 tablet by mouth every 4 hours as needed for Pain.  DULoxetine (CYMBALTA) 30 MG extended release capsule Take 30 mg by mouth      atorvastatin (LIPITOR) 10 MG tablet Take 1 tablet by mouth daily 90 tablet 3    cloNIDine (CATAPRES) 0.1 MG tablet take 1 tablet by mouth twice a day IF BLOOD PRESSURE IS GREATER THAN 140/90 30 tablet 0    diphenhydrAMINE (BENADRYL) 25 MG tablet Take 25 mg by mouth every 6 hours as needed for Itching      fluticasone (FLONASE) 50 MCG/ACT nasal spray 2 sprays by Nasal route daily 1 Bottle 5    nitroGLYCERIN (NITROSTAT) 0.3 MG SL tablet place 1 tablet under the tongue if needed every 5 minutes for chest pain for 3 doses IF NO RELIEF AF 25 tablet 3    tiZANidine (ZANAFLEX) 2 MG capsule Take 1 capsule by mouth daily      aspirin 81 MG tablet Take 81 mg by mouth daily.       benzonatate (TESSALON) 100 MG capsule take 1 capsule by mouth three times a day if needed for cough (Patient not taking: Reported on 10/21/2020) 30 capsule 0    donepezil (ARICEPT) 10 MG tablet Take 1 tablet by mouth nightly (Patient not taking: Reported on 8/12/2020) 30 tablet 6    hyoscyamine (LEVSIN) 125 MCG tablet Take 1 tablet by mouth every 6 hours as needed for Cramping (Patient not taking: Reported on 5/19/2020) 10 tablet 0    docusate sodium (COLACE) 100 MG capsule Take 1 capsule by mouth 2 times daily (Patient not taking: Reported on 5/19/2020) 20 capsule 0    tamsulosin (FLOMAX) 0.4 MG capsule Take 1 capsule by mouth daily (Patient not taking: Reported on 10/21/2020) 30 capsule 1    amitriptyline (ELAVIL) 50 MG tablet take 1 tablet by mouth at bedtime (Patient not taking: Reported on 8/12/2020) 30 tablet 4     No current facility-administered medications for this visit. Allergies   Allergen Reactions    Lisinopril      States he is allergic to the generic forms of medication, can take lisinopril    Aleve  [Naproxen Sodium] Nausea Only    Amitriptyline Hcl Other (See Comments)    Ibuprofen Other (See Comments)     \"hurts my stomach. \"    Lipitor [Atorvastatin] Hives       Health Maintenance   Topic Date Due    Shingles Vaccine (1 of 2) 09/14/2011    Colon cancer screen colonoscopy  09/14/2011    A1C test (Diabetic or Prediabetic)  11/01/2020    Lipid screen  11/01/2020    DTaP/Tdap/Td vaccine (2 - Td) 08/02/2026    Flu vaccine  Completed    Pneumococcal 0-64 years Vaccine  Completed    Hepatitis C screen  Completed    HIV screen  Completed    Hepatitis A vaccine  Aged Out    Hepatitis B vaccine  Aged Out    Hib vaccine  Aged Out    Meningococcal (ACWY) vaccine  Aged Out       Subjective:      Review of Systems   Constitutional: Negative for chills and fever. HENT: Positive for congestion. Respiratory: Positive for cough, shortness of breath and wheezing. Gastrointestinal: Negative for abdominal pain, nausea and vomiting. Musculoskeletal: Positive for back pain. Objective:     Physical Exam  Constitutional:       General: He is not in acute distress. Appearance: He is well-developed. He is not diaphoretic.    HENT:      Head: Normocephalic and atraumatic. Eyes:      Pupils: Pupils are equal, round, and reactive to light. Neck:      Musculoskeletal: Neck supple. Cardiovascular:      Rate and Rhythm: Normal rate and regular rhythm. Heart sounds: Normal heart sounds. No murmur. Pulmonary:      Effort: Pulmonary effort is normal. No respiratory distress. Breath sounds: Normal breath sounds. No stridor. No wheezing. Musculoskeletal:      Right lower leg: No edema. Left lower leg: No edema. Skin:     General: Skin is warm and dry. Neurological:      Mental Status: He is alert and oriented to person, place, and time. Psychiatric:         Mood and Affect: Mood normal.         Behavior: Behavior normal.       /78 (Site: Left Upper Arm, Position: Sitting)   Pulse 76   Temp 98.6 °F (37 °C)   Resp 16   Wt 273 lb (123.8 kg)   SpO2 96%   BMI 39.17 kg/m²     Assessment:      1. Moderate persistent asthma with exacerbation  - recommend prednisone burst and zpak. Continue flovent and albuterol as well. Consider upgrade to advair or symbicort if not improving. I recommend pt let me know if symptoms don't improve and we can switch to them. - predniSONE (DELTASONE) 20 MG tablet; Take 2 tablets by mouth daily for 5 days  Dispense: 10 tablet; Refill: 0  - azithromycin (ZITHROMAX) 250 MG tablet; Take 1 tablet by mouth See Admin Instructions for 5 days 500mg on day 1 followed by 250mg on days 2 - 5  Dispense: 6 tablet; Refill: 0    2. Suspected COVID-19 virus infection  - COVID-19 Ambulatory; Future    3. Tobacco use  - pt would like to quit smoking using the patch which has helped him before. - nicotine (NICODERM CQ) 14 MG/24HR; Place 1 patch onto the skin daily  Dispense: 42 patch; Refill: 0    4. Need for influenza vaccination  - INFLUENZA, QUADV, 3 YRS AND OLDER, IM, MDV, 0.5ML (Gerhardt Bowling)    5. Ankle swelling, unspecified laterality  - notes past few weeks gets some swelling in BL ankles.  Mainly at end of day. No swelling currently. I recommend he wear compression stockings and follow up as well with his cardiologist as well. Plan:      Return in about 3 months (around 1/21/2021) for follow up. Orders Placed This Encounter   Procedures    INFLUENZA, QUADV, 3 YRS AND OLDER, IM, MDV, 0.5ML (Ceci See)    COVID-19 Ambulatory     Standing Status:   Future     Standing Expiration Date:   10/21/2021     Scheduling Instructions:      Saline media preferred given current shortage of viral transport media but both acceptable     Order Specific Question:   Is this test for diagnosis or screening? Answer:   Diagnosis of ill patient     Order Specific Question:   Symptomatic for COVID-19 as defined by CDC? Answer:   Unknown     Order Specific Question:   Date of Symptom Onset     Answer:   N/A     Order Specific Question:   Hospitalized for COVID-19? Answer:   No     Order Specific Question:   Admitted to ICU for COVID-19? Answer:   No     Order Specific Question:   Employed in healthcare setting? Answer:   No     Order Specific Question:   Resident in a congregate (group) care setting? Answer:   No     Order Specific Question:   Pregnant: Answer:   No     Order Specific Question:   Previously tested for COVID-19?      Answer:   Yes     Orders Placed This Encounter   Medications    predniSONE (DELTASONE) 20 MG tablet     Sig: Take 2 tablets by mouth daily for 5 days     Dispense:  10 tablet     Refill:  0    azithromycin (ZITHROMAX) 250 MG tablet     Sig: Take 1 tablet by mouth See Admin Instructions for 5 days 500mg on day 1 followed by 250mg on days 2 - 5     Dispense:  6 tablet     Refill:  0    nicotine (NICODERM CQ) 14 MG/24HR     Sig: Place 1 patch onto the skin daily     Dispense:  42 patch     Refill:  0    fluticasone (FLOVENT HFA) 110 MCG/ACT inhaler     Sig: inhale 2 puffs by mouth INTO THE LUNGS twice a day     Dispense:  12 g     Refill:  3    albuterol sulfate  (90 Base) MCG/ACT inhaler     Sig: inhale 2 puffs by mouth every 6 hours if needed for wheezing     Dispense:  18 g     Refill:  3        Patient given educational materials - see patient instructions. Discussed use, benefit, and side effects of prescribed medications. All patient questions answered. Pt voiced understanding. Reviewed healthmaintenance. Instructed to continue current medications, diet and exercise. Patient agreed with treatment plan. Follow up as directed.      Electronically signed by Lobito Alejandro DO on 10/21/2020 at 4:10 PM

## 2020-10-21 NOTE — PROGRESS NOTES
Pt presented with order from PCP for COVID testing. Sample collected by Colette Conroy MA and sent to the lab.

## 2020-10-24 LAB — SARS-COV-2, NAA: NOT DETECTED

## 2020-11-06 RX ORDER — FLUTICASONE PROPIONATE 50 MCG
SPRAY, SUSPENSION (ML) NASAL
Qty: 16 G | Refills: 1 | Status: SHIPPED | OUTPATIENT
Start: 2020-11-06 | Stop reason: SDUPTHER

## 2020-11-06 NOTE — TELEPHONE ENCOUNTER
LOV 10/21/20  NOV 11/19/20    Health Maintenance   Topic Date Due    Shingles Vaccine (1 of 2) 09/14/2011    Colon cancer screen colonoscopy  09/14/2011    A1C test (Diabetic or Prediabetic)  11/01/2020    Lipid screen  11/01/2020    DTaP/Tdap/Td vaccine (2 - Td) 08/02/2026    Flu vaccine  Completed    Pneumococcal 0-64 years Vaccine  Completed    Hepatitis C screen  Completed    HIV screen  Completed    Hepatitis A vaccine  Aged Out    Hepatitis B vaccine  Aged Out    Hib vaccine  Aged Out    Meningococcal (ACWY) vaccine  Aged Out             (applicable per patient's age: Cancer Screenings, Depression Screening, Fall Risk Screening, Immunizations)    Hemoglobin A1C (%)   Date Value   11/01/2019 6.1   09/14/2018 5.7   04/26/2017 6.1 (H)     Microalb/Crt.  Ratio (mcg/mg creat)   Date Value   04/26/2017 17 (H)     LDL Calculated (mg/dL)   Date Value   11/01/2019 95     AST (U/L)   Date Value   08/07/2017 25     ALT (U/L)   Date Value   08/07/2017 37     BUN (mg/dL)   Date Value   02/09/2020 12      (goal A1C is < 7)   (goal LDL is <100) need 30-50% reduction from baseline     BP Readings from Last 3 Encounters:   10/21/20 116/78   08/12/20 133/84   05/19/20 139/76    (goal /80)      All Future Testing planned in [facility]:  Lab Frequency Next Occurrence   Lipid Panel Once 12/23/2019   ALT Once 12/23/2019   CBC Auto Differential Once 12/23/2019   COVID-19 Ambulatory Once 10/21/2021       Next Visit Date:  Future Appointments   Date Time Provider Colleen Reece   11/19/2020 10:00 AM Madeleine Osorio MD [email or web address]   12/3/2020 10:20 AM Miles Verma MD [facility]   1/21/2021  1:00 PM Sandra Roberson DO [facility] [address] [email or web address] [facility]            Patient Active Problem List:     Hypertension     Anxiety and depression     Attention deficit disorder     Left ventricular hypertrophy     Low back pain     Fatigue     Microalbuminuria     Memory loss     Memory difficulties     Right inguinal hernia     Elevated hemoglobin A1c     Dysesthesia     Cerebral aneurysm, nonruptured     Dyslipidemia     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

## 2020-11-19 ENCOUNTER — OFFICE VISIT (OUTPATIENT)
Dept: NEUROLOGY | Age: 60
End: 2020-11-19
Payer: MEDICAID

## 2020-11-19 VITALS
HEART RATE: 93 BPM | SYSTOLIC BLOOD PRESSURE: 135 MMHG | TEMPERATURE: 97 F | BODY MASS INDEX: 40.37 KG/M2 | WEIGHT: 282 LBS | DIASTOLIC BLOOD PRESSURE: 86 MMHG | HEIGHT: 70 IN

## 2020-11-19 PROCEDURE — 99214 OFFICE O/P EST MOD 30 MIN: CPT | Performed by: PSYCHIATRY & NEUROLOGY

## 2020-11-19 PROCEDURE — G8427 DOCREV CUR MEDS BY ELIG CLIN: HCPCS | Performed by: PSYCHIATRY & NEUROLOGY

## 2020-11-19 PROCEDURE — 3017F COLORECTAL CA SCREEN DOC REV: CPT | Performed by: PSYCHIATRY & NEUROLOGY

## 2020-11-19 PROCEDURE — G8417 CALC BMI ABV UP PARAM F/U: HCPCS | Performed by: PSYCHIATRY & NEUROLOGY

## 2020-11-19 PROCEDURE — G8482 FLU IMMUNIZE ORDER/ADMIN: HCPCS | Performed by: PSYCHIATRY & NEUROLOGY

## 2020-11-19 PROCEDURE — 4004F PT TOBACCO SCREEN RCVD TLK: CPT | Performed by: PSYCHIATRY & NEUROLOGY

## 2020-11-19 RX ORDER — TOPIRAMATE 50 MG/1
TABLET, FILM COATED ORAL
Qty: 90 TABLET | Refills: 6 | Status: SHIPPED | OUTPATIENT
Start: 2020-11-19 | End: 2021-12-13

## 2020-11-19 RX ORDER — RIVASTIGMINE 4.6 MG/24H
1 PATCH, EXTENDED RELEASE TRANSDERMAL DAILY
Qty: 30 PATCH | Refills: 3 | Status: SHIPPED | OUTPATIENT
Start: 2020-11-19 | End: 2020-12-14

## 2020-11-19 NOTE — PROGRESS NOTES
NEUROLOGY FOLLOW-UP  Patient Name:  Mahin Donato  :   1960  Clinic Visit Date: 2020  Last visit: 20        I saw Mr. Mahin Donato in follow-up in the office today in continuation of neurologic care. He is a 62 y.o.  male  with complaints of memory loss and depression. He comes to clinic stating that he is living by himself and he got  with girlfriend. Since then his depression is completely resolved. Patient states \" I was under lot of stress with my girl friend\". Presently he does not feel anxious or depressed and he does not want to see any psychiatrist. He also stated that his headaches are slowly getting better on Topamax but also stated that he is not taking Topamax 1.5 tab bid and only taking Topamax 1 tab bid. He is also taking Lyrica and Amitriptyline 50 mg nightly. Memory loss is stable. Headaches were described as pressure-like headaches located \"all over\" with photophobia and occasional nausea. Headaches last for few hours. Further adds \"still occurring\". He is apologetic that he has not made appointment with Dr. Areli Mendoza. He has phone numbers and he stated \"I will call Dr. Gricelda Ramírez". His depression and headaches are much better on Amitriptyline 50 mg at 6 pm. He is not having any adverse effects from it. He also stated that his memory loss problems improved significantly with much less depression. He wants to continue donepezil. He is requesting for a prescription. He has been having neuropathic pain in lower extremities with the pins and needles and amitriptyline is helping for those also. Denies any medication related side effects. Review of systems done by staff reviewed and pertinent positives include Memory loss, sleep disturbance, headaches, anxiety/depression. Denies suicidal ideations.       Current Outpatient Medications on File Prior to Visit   Medication Sig Dispense Refill    carvedilol (COREG) 25 MG tablet Take 1 tablet by mouth 2 times daily 60 tablet 0    fluticasone (FLONASE) 50 MCG/ACT nasal spray instill 2 sprays into each nostril once daily 16 g 1    diclofenac sodium (VOLTAREN) 1 % GEL apply 4 grams to affected area four times a day AS NEEDED FOR PAIN      pregabalin (LYRICA) 50 MG capsule take 1 capsule by mouth twice a day      nicotine (NICODERM CQ) 14 MG/24HR Place 1 patch onto the skin daily 42 patch 0    fluticasone (FLOVENT HFA) 110 MCG/ACT inhaler inhale 2 puffs by mouth INTO THE LUNGS twice a day 12 g 3    albuterol sulfate  (90 Base) MCG/ACT inhaler inhale 2 puffs by mouth every 6 hours if needed for wheezing 18 g 3    amLODIPine (NORVASC) 10 MG tablet take 1 tablet by mouth once daily 90 tablet 1    venlafaxine (EFFEXOR XR) 150 MG extended release capsule Take 1 capsule by mouth daily 30 capsule 2    fexofenadine (ALLEGRA) 180 MG tablet take 1 tablet by mouth once daily 30 tablet 5    topiramate (TOPAMAX) 50 MG tablet take 1.5 tab by mouth twice a day 90 tablet 6    oxyCODONE-acetaminophen (PERCOCET) 5-325 MG per tablet Take 1 tablet by mouth every 4 hours as needed for Pain.  DULoxetine (CYMBALTA) 30 MG extended release capsule Take 30 mg by mouth      atorvastatin (LIPITOR) 10 MG tablet Take 1 tablet by mouth daily 90 tablet 3    cloNIDine (CATAPRES) 0.1 MG tablet take 1 tablet by mouth twice a day IF BLOOD PRESSURE IS GREATER THAN 140/90 30 tablet 0    diphenhydrAMINE (BENADRYL) 25 MG tablet Take 25 mg by mouth every 6 hours as needed for Itching      nitroGLYCERIN (NITROSTAT) 0.3 MG SL tablet place 1 tablet under the tongue if needed every 5 minutes for chest pain for 3 doses IF NO RELIEF AF 25 tablet 3    tiZANidine (ZANAFLEX) 2 MG capsule Take 1 capsule by mouth daily      aspirin 81 MG tablet Take 81 mg by mouth daily.       benzonatate (TESSALON) 100 MG capsule take 1 capsule by mouth three times a day if needed for cough (Patient not taking: Reported on 10/21/2020) 30 capsule 0 GASTROINTESTINAL ENDOSCOPY      URETEROSCOPY Right 02/05/2020    stent placement, cystoscopy, retrograde pyelogram    VASECTOMY       Social History: Anthony García  reports that he has been smoking cigarettes. He has a 7.00 pack-year smoking history. He has never used smokeless tobacco. He reports previous alcohol use. He reports that he does not use drugs. Family History   Problem Relation Age of Onset    Cancer Father         throat    Cirrhosis Father     High Blood Pressure Sister     Asthma Sister     Arthritis Sister     High Blood Pressure Mother     Heart Disease Mother     Alzheimer's Disease Mother      On exam: Blood pressure 135/86, pulse 93, temperature 97 °F (36.1 °C), temperature source Temporal, height 5' 10\" (1.778 m), weight 282 lb (127.9 kg). GENERAL  Appears comfortable and in no distress   HEENT  NC/ AT   NECK & cardiovascular  Supple and no bruits heard; S1 and S2 heard; radial pulse intact   MENTAL STATUS:  Alert, oriented, intact memory, no confusion, normal speech, normal language, no hallucination or delusion. ,  He scored 29/30 on MMSE. CRANIAL NERVES: II     -      VA 20/20 OU; fundi reveal intact venous pulsations;  Visual fields intact to confrontation  III,IV,VI -  EOMs full, no afferent defect, no KATIA, no ptosis  V     -     Normal facial sensation  VII    -     Normal facial symmetry  VIII   -     Intact hearing  IX,X -     Symmetrical palate  XI    -     Symmetrical shoulder shrug  XII   -     Midline tongue, no atrophy    MOTOR FUNCTION:  significant for good strength of grade 5/5 in bilateral proximal and distal muscle groups of both upper and lower extremities with normal bulk, normal tone and no involuntary movements, no tremor   SENSORY FUNCTION:  Normal touch, normal pin, normal vibration, normal proprioception   CEREBELLAR FUNCTION:  Intact fine motor control over upper limbs   REFLEX FUNCTION:  Symmetric, no perverted reflex, no Babinski sign STATION and GAIT  Normal station, normal gait        11/19/2020  Maximum score 5 Score 5 What is the year, season, date, day, month   Maximum score 5 Score 5 Where are we: State, county, town, hospital, floor? Maximum score 3 Score 3 Name 3 objects: clock, guitar, globe. Ask patient to repeat 3 objects. Maximum score 5 Score 5 Serial sevens from 100:93, 86, 79, 72, 65   Maximum score 3 Score 3 Recall 3 objects   Maximum score 2 Score 2 Name a pencil and watch. Maximum score 1 Score 1 Repeat the following: No ifs ands or buts.      Maximum score 3 Score 3 Follow 3 stage command take a paper in your hand, fold it in half, and put it on the floor. Maximum score 1  Score 1 Read and obey the following: Close your eyes     Maximum score 1 Score 1 Write a sentence. Maximum score 1 Score 1 Copy a design below. Max 30   Patients score 30            Diagnostic data reviewed with the patient:   MRI Brain (8/30/17): No acute abnormality. ,  It demonstrated minimal nonspecific white matter disease. EEG (10/21/17): Unremarkable.     No results found for: SEDRATE  Lab Results   Component Value Date    [id number] 217 08/29/2017      No results found for: FOLATE  No results found for: ANUSHKA  Lab Results   Component Value Date    TSH 2.06 08/29/2017       No results found for: RPR   No components found for: TREPONEMAPALLIDUM  Lab Results   Component Value Date    LABA1C 6.1 11/01/2019       MOCA testing (7/31/18):   patient scored 20/30.,   Patient is unable to do the trail making test, able to copy the cube, able to draw the clock and put the numbers and able to put the hands; able to name 3 out of 3 animals; able to do digit backwards but not digit forward; unable to do target detection;  unable to do the serial subtraction; scored 2/2 on verbal abstraction; could not do phonemic fluency; able to repeat only 1 out of 2 sentences; scored 2 out of 5 on delayed recall and oriented to place and city but ago\". Also discussed about smoking cessation. Follow-up in 6 months. Please note that portions of this note were completed with a voice recognition program.  Although every effort was made to ensure the accuracy of this  automated transcription, some errors in transcription may have occurred, occasionally words are mis-transcribed.

## 2020-11-19 NOTE — PROGRESS NOTES
HEENT [] Hearing Loss  [x] Visual Disturbance  [] Tinnitus  [] Eye pain   Respiratory [x] Shortness of Breath  [x] Cough  [x] Snoring   Cardiovascular [] Chest Pain  [] Palpitations  [] Lightheaded   GI [] Constipation  [x] Diarrhea  [] Swallowing change  [] Nausea/vomiting    [x] Urinary Frequency  [] Urinary Urgency   Musculoskeletal [] Neck pain  [x] Back pain  [x] Muscle pain  [x] Restless legs   Dermatologic [] Skin changes   Neurologic [x] Memory loss/confusion  [] Seizures  [x] Trouble walking or imbalance  [] Dizziness  [x] Sleep disturbance  [x] Weakness  [x] Numbness  [] Tremors  [] Speech Difficulty  [] Headaches  [] Light Sensitivity  [] Sound Sensitivity   Endocrinology [x]Excessive thirst  []Excessive hunger   Psychiatric [] Anxiety/Depression  [] Hallucination   Allergy/immunology []Hives/environmental allergies   Hematologic/lymph [] Abnormal bleeding  [x] Abnormal bruising

## 2020-11-23 ENCOUNTER — TELEPHONE (OUTPATIENT)
Dept: PRIMARY CARE CLINIC | Age: 60
End: 2020-11-23

## 2020-11-23 RX ORDER — PREDNISONE 20 MG/1
40 TABLET ORAL DAILY
Qty: 10 TABLET | Refills: 0 | Status: SHIPPED | OUTPATIENT
Start: 2020-11-23 | End: 2020-11-28

## 2020-11-23 RX ORDER — NICOTINE 21 MG/24HR
1 PATCH, TRANSDERMAL 24 HOURS TRANSDERMAL DAILY
Qty: 42 PATCH | Refills: 0 | Status: SHIPPED | OUTPATIENT
Start: 2020-11-23 | End: 2021-11-01

## 2020-11-23 NOTE — TELEPHONE ENCOUNTER
Patient calling asking for prednisone, states the albuterol is only working as a quick fix but feels like the prednisone works better for him. He also states that the nicotine patches are making him want to smoke more, wondering if he should be started on a higher dose. Please advise on all this.  Thank you

## 2020-11-25 ENCOUNTER — HOSPITAL ENCOUNTER (EMERGENCY)
Age: 60
Discharge: HOME | End: 2020-11-25
Attending: EMERGENCY MEDICINE
Payer: MEDICAID

## 2020-11-25 ENCOUNTER — APPOINTMENT (OUTPATIENT)
Dept: CT IMAGING | Age: 60
End: 2020-11-25
Payer: MEDICAID

## 2020-11-25 VITALS
BODY MASS INDEX: 37.8 KG/M2 | SYSTOLIC BLOOD PRESSURE: 120 MMHG | TEMPERATURE: 98.6 F | OXYGEN SATURATION: 95 % | RESPIRATION RATE: 18 BRPM | HEART RATE: 81 BPM | WEIGHT: 270 LBS | HEIGHT: 71 IN | DIASTOLIC BLOOD PRESSURE: 93 MMHG

## 2020-11-25 DIAGNOSIS — R55 SYNCOPE, UNSPECIFIED SYNCOPE TYPE: ICD-10-CM
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
## 2020-11-25 LAB
ETHANOL PERCENT: 0.15 %
ETHANOL: 154 MG/DL

## 2020-11-25 PROCEDURE — 70450 CT HEAD/BRAIN W/O DYE: CPT

## 2020-11-25 PROCEDURE — G0480 DRUG TEST DEF 1-7 CLASSES: HCPCS

## 2020-11-25 PROCEDURE — 99285 EMERGENCY DEPT VISIT HI MDM: CPT

## 2020-11-25 PROCEDURE — 36415 COLL VENOUS BLD VENIPUNCTURE: CPT

## 2020-11-25 ASSESSMENT — ENCOUNTER SYMPTOMS: SHORTNESS OF BREATH: 0

## 2020-11-25 ASSESSMENT — PAIN DESCRIPTION - ORIENTATION: ORIENTATION: UPPER
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
## 2020-11-30 ENCOUNTER — TELEPHONE (OUTPATIENT)
Dept: NEUROLOGY | Age: 60
End: 2020-11-30

## 2020-11-30 ENCOUNTER — VIRTUAL VISIT (OUTPATIENT)
Dept: PRIMARY CARE CLINIC | Age: 60
End: 2020-11-30
Payer: MEDICAID

## 2020-11-30 PROCEDURE — 99442 PR PHYS/QHP TELEPHONE EVALUATION 11-20 MIN: CPT | Performed by: FAMILY MEDICINE

## 2020-11-30 RX ORDER — VENLAFAXINE HYDROCHLORIDE 150 MG/1
150 CAPSULE, EXTENDED RELEASE ORAL DAILY
Qty: 30 CAPSULE | Refills: 2 | Status: SHIPPED | OUTPATIENT
Start: 2020-11-30 | End: 2020-12-21 | Stop reason: SDUPTHER
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
## 2020-12-01 NOTE — TELEPHONE ENCOUNTER
Rob Bettencourt  If he is feeling depressed with suicidal ideations; he should be seen in ED. I told the patient multiple times in the past, his depression needs to be treated by experts, ie., psychiatrist.   But anyway, he was seen by his PCP yesterday and he was started on Effexor yesterday and he should follow up as recommended.   Please let the patient know that   Thank you.   -dr. Juliano Pavon

## 2020-12-01 NOTE — TELEPHONE ENCOUNTER
I spoke with Bernrado and gave him all this information. He voice understanding. He is scheduled to see a therapist at [facility] on 12/3/2020.

## 2020-12-03 ENCOUNTER — APPOINTMENT (OUTPATIENT)
Dept: GENERAL RADIOLOGY | Age: 60
End: 2020-12-03
Payer: MEDICAID

## 2020-12-03 ENCOUNTER — HOSPITAL ENCOUNTER (EMERGENCY)
Age: 60
Discharge: HOME | End: 2020-12-03
Attending: EMERGENCY MEDICINE
Payer: MEDICAID

## 2020-12-03 ENCOUNTER — OFFICE VISIT (OUTPATIENT)
Dept: NEUROLOGY | Age: 60
End: 2020-12-03
Payer: MEDICAID

## 2020-12-03 VITALS
SYSTOLIC BLOOD PRESSURE: 132 MMHG | DIASTOLIC BLOOD PRESSURE: 93 MMHG | HEIGHT: 71 IN | BODY MASS INDEX: 40.18 KG/M2 | HEART RATE: 71 BPM | TEMPERATURE: 97.2 F | WEIGHT: 287 LBS

## 2020-12-03 VITALS
WEIGHT: 274 LBS | SYSTOLIC BLOOD PRESSURE: 145 MMHG | DIASTOLIC BLOOD PRESSURE: 98 MMHG | BODY MASS INDEX: 38.36 KG/M2 | OXYGEN SATURATION: 100 % | RESPIRATION RATE: 16 BRPM | HEIGHT: 71 IN | TEMPERATURE: 98.1 F | HEART RATE: 74 BPM

## 2020-12-03 PROCEDURE — 4004F PT TOBACCO SCREEN RCVD TLK: CPT | Performed by: PSYCHIATRY & NEUROLOGY

## 2020-12-03 PROCEDURE — 99283 EMERGENCY DEPT VISIT LOW MDM: CPT

## 2020-12-03 PROCEDURE — 99215 OFFICE O/P EST HI 40 MIN: CPT | Performed by: PSYCHIATRY & NEUROLOGY

## 2020-12-03 PROCEDURE — G8482 FLU IMMUNIZE ORDER/ADMIN: HCPCS | Performed by: PSYCHIATRY & NEUROLOGY

## 2020-12-03 PROCEDURE — 72100 X-RAY EXAM L-S SPINE 2/3 VWS: CPT

## 2020-12-03 PROCEDURE — 72220 X-RAY EXAM SACRUM TAILBONE: CPT

## 2020-12-03 PROCEDURE — 3017F COLORECTAL CA SCREEN DOC REV: CPT | Performed by: PSYCHIATRY & NEUROLOGY

## 2020-12-03 PROCEDURE — G8427 DOCREV CUR MEDS BY ELIG CLIN: HCPCS | Performed by: PSYCHIATRY & NEUROLOGY

## 2020-12-03 PROCEDURE — G8417 CALC BMI ABV UP PARAM F/U: HCPCS | Performed by: PSYCHIATRY & NEUROLOGY

## 2020-12-03 PROCEDURE — 6370000000 HC RX 637 (ALT 250 FOR IP): Performed by: EMERGENCY MEDICINE

## 2020-12-03 RX ORDER — OXYCODONE HYDROCHLORIDE AND ACETAMINOPHEN 5; 325 MG/1; MG/1
1 TABLET ORAL ONCE
Status: COMPLETED | OUTPATIENT
Start: 2020-12-03 | End: 2020-12-03

## 2020-12-03 RX ORDER — TRAZODONE HYDROCHLORIDE 50 MG/1
TABLET ORAL
Qty: 60 TABLET | Refills: 2 | Status: SHIPPED | OUTPATIENT
Start: 2020-12-03 | End: 2021-03-04

## 2020-12-03 RX ORDER — LIDOCAINE 4 G/G
1 PATCH TOPICAL ONCE
Status: DISCONTINUED | OUTPATIENT
Start: 2020-12-03 | End: 2020-12-03 | Stop reason: HOSPADM

## 2020-12-03 RX ORDER — LIDOCAINE 50 MG/G
1 PATCH TOPICAL DAILY
Qty: 30 PATCH | Refills: 0 | Status: SHIPPED | OUTPATIENT
Start: 2020-12-03 | End: 2021-01-02

## 2020-12-03 RX ORDER — TIZANIDINE 4 MG/1
4 TABLET ORAL 3 TIMES DAILY PRN
Qty: 30 TABLET | Refills: 0 | Status: SHIPPED | OUTPATIENT
Start: 2020-12-03 | End: 2021-07-28

## 2020-12-03 RX ADMIN — OXYCODONE HYDROCHLORIDE AND ACETAMINOPHEN 1 TABLET: 5; 325 TABLET ORAL at 19:39

## 2020-12-03 ASSESSMENT — PAIN DESCRIPTION - ORIENTATION: ORIENTATION: LOWER

## 2020-12-03 ASSESSMENT — ENCOUNTER SYMPTOMS
VOMITING: 0
ABDOMINAL PAIN: 0
DIARRHEA: 1
SORE THROAT: 0
BACK PAIN: 1
SHORTNESS OF BREATH: 0
CONSTIPATION: 0
NAUSEA: 0

## 2020-12-03 ASSESSMENT — PAIN DESCRIPTION - LOCATION: LOCATION: BACK

## 2020-12-03 ASSESSMENT — PAIN DESCRIPTION - PAIN TYPE: TYPE: ACUTE PAIN

## 2020-12-03 ASSESSMENT — PAIN SCALES - GENERAL
PAINLEVEL_OUTOF10: 10
PAINLEVEL_OUTOF10: 10
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
## 2020-12-03 NOTE — PROGRESS NOTES
Subjective:      Patient ID: Chin Carmichael is a 62 y.o. male. HPI  63 yo  male referred for sleep evaluation by Dr Moni Pickett followed for chronic tension headaches along with memory loss . The condition is he reports to have trouble sleeping falling and staying asleep which has been going on for one year . He is going to bed at 9 to 10 PM laying down to watch television . He will lye there until 6 AM when he takes morning medications falling asleep for 3 hours waking up from 9 to 10 AM to start his day . During waking daty there is fatigue with nodding sedentary . There is loud snoring with apnea noted in sleep . He did have sleep study at [facility] [address] a year ago by his report which did not show clear apnea . He reports that he used to work third shift for 7 years going to bed at [employer] . He stopped third shift in 2017 transitioning to second shift going sleep at 1 AM . There will be restless legs at night when laying in bed at night three nights per week . He has not taken sleeping aide in the past . He has sensory neuropathy on lyrica 50 mg po bid and cymbalta 60 mg po qd . For memory problems he is on exelon 4.6 mg po qd . He has depression feeling low on effexor over the past one year to see psychiatry . There is low back pain on percocet through pain clinic . Significant medications topamax 50 mg po tid , cymbalta 30 mg po qhs . Testing Head CT normal . 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  Asthma     as child.  Back pain     CAD (coronary artery disease)     Cataract     Depression     pt. denies.     Diabetes mellitus ([address]     Heart attack ([facility] [address]     2011    Hypertension     Kidney stones     Mini stroke ([facility] [address]     2014    Osteoarthritis     Seasonal allergies     Sinus problem        Past Surgical History: tablet by mouth at bedtime 30 tablet 4    atorvastatin (LIPITOR) 10 MG tablet Take 1 tablet by mouth daily 90 tablet 3    cloNIDine (CATAPRES) 0.1 MG tablet take 1 tablet by mouth twice a day IF BLOOD PRESSURE IS GREATER THAN 140/90 30 tablet 0    diphenhydrAMINE (BENADRYL) 25 MG tablet Take 25 mg by mouth every 6 hours as needed for Itching      nitroGLYCERIN (NITROSTAT) 0.3 MG SL tablet place 1 tablet under the tongue if needed every 5 minutes for chest pain for 3 doses IF NO RELIEF AF 25 tablet 3    tiZANidine (ZANAFLEX) 2 MG capsule Take 1 capsule by mouth daily      aspirin 81 MG tablet Take 81 mg by mouth daily.  carvedilol (COREG) 25 MG tablet Take 1 tablet by mouth 2 times daily (Patient not taking: Reported on 12/3/2020) 60 tablet 0    benzonatate (TESSALON) 100 MG capsule take 1 capsule by mouth three times a day if needed for cough (Patient not taking: Reported on 10/21/2020) 30 capsule 0    hyoscyamine (LEVSIN) 125 MCG tablet Take 1 tablet by mouth every 6 hours as needed for Cramping (Patient not taking: Reported on 5/19/2020) 10 tablet 0     No current facility-administered medications for this visit. Allergies   Allergen Reactions    Lisinopril      States he is allergic to the generic forms of medication, can take lisinopril    Aleve  [Naproxen Sodium] Nausea Only    Amitriptyline Hcl Other (See Comments)    Ibuprofen Other (See Comments)     \"hurts my stomach. \"    Lipitor [Atorvastatin] Hives       Review of Systems    Objective:   Physical Exam  Vitals:    12/03/20 1041   BP: (!) 128/90   Pulse: 71   Temp: 97.2 °F (36.2 °C)     weight: 287 lb (130.2 kg)    Neurological Examination  Constitutional .General exam well groomed   Head/Ears /Nose/Throat: external ear . Normal exam  Neck and thyroid . Normal size. No bruits  Respiratory . Breathsounds clear bilaterally  Cardiovascular:  Auscultation of heart with regular rate and rhythm  Musculoskeletal. Muscle bulk and tone normal Muscle strength 5/5 strength throughout                                                                                No dysmetria or dysdiadokinesis  No tremor   Normal fine motor  Gait normal   Orientation Alert and oriented x 3   Attention and concentration normal  Short term memory 2 words out of 3 in one minute  Language process and speech normal . No aphasia   Cranial nerve 2 normal acuety and visual fields  Cranial nerve 3, 4 and 6 . Extraocular muscles are intact . Pupils are equal and reactive   Cranial nerve 5 . Intact corneal reflex. Normal facial sensation  Cranial nerve 7 normal exam   Cranial nerve 8. Grossly intact hearing   Cranial nerve 9 and 10. Symmetric palate elevation   Cranial nerve 11 , 5 out of 5 strength   Cranial Nerve 12 midline tongue . No atrophy  Sensation . Decreasedd pinprick and light touch distal lower extremities   Deep Tendon Reflexes normal  Plantar response flexor bilaterally    Assessment:       Diagnosis Orders   1. Insomnia due to medical condition     2. Chronic tension-type headache, intractable     3. Memory loss     Patient has initiating insomnia to go on desyrel to consolidate sleep to increase to 100 mg po qhs . Will restrict bedtime from 11 PM to 7 AM with patient not watching televison or other activites in bed improving sleep hygeine . If can not fall asleep within 1 hour he is to get up and read a book at bedside until he is sleepy then go back to bed .  He is not to sleep during day       Plan:      As above         [address] MD
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
## 2020-12-14 ENCOUNTER — TELEPHONE (OUTPATIENT)
Dept: NEUROLOGY | Age: 60
End: 2020-12-14

## 2020-12-14 NOTE — TELEPHONE ENCOUNTER
Mr. Tyrone Hubbard called the office back. Patient confirmed that he went back to using the Donepezil. I reminded him that Dr. Quinten Payne had discontinued them because he was having diarrhea. Patient stated \"ya I know but I'm not going to use the patches anymore they burn my skin\". I told patient that I didn't want him to use the patch, that I would send this on to Dr. Quinten Payne. I suggested that he stop using the Donepezil and wait to hear back from our office. Patient verbally stated his understanding.

## 2020-12-14 NOTE — TELEPHONE ENCOUNTER
Mr. Fatemeh Rebollar called the office this morning and left a message on our VM. He stated that he stopped taking the Exelon patch. Patient states that it was leaving \"burn marks\" on his skin (the same size of the patch). He stated that he went back to taking the \"other pills\". Call placed back to Mr. Fatemeh Rebollar this morning. Message left on his VM asking for a return call.

## 2020-12-16 NOTE — TELEPHONE ENCOUNTER
Please let the patient know that he can stop using the patch if it is causing problems  We will talk more when he comes back for the follow-up.   Thank you.   -dr. Tino Vargas

## 2020-12-18 ENCOUNTER — TELEPHONE (OUTPATIENT)
Dept: PRIMARY CARE CLINIC | Age: 60
End: 2020-12-18

## 2020-12-18 RX ORDER — SILDENAFIL 50 MG/1
50 TABLET, FILM COATED ORAL PRN
Qty: 10 TABLET | Refills: 3 | Status: SHIPPED | OUTPATIENT
Start: 2020-12-18 | End: 2020-12-30 | Stop reason: SDUPTHER

## 2020-12-18 NOTE — TELEPHONE ENCOUNTER
Patient calling to tell you that he has found a counselor at [facility] and is doing well in counseling. He wants to have the Viagra refilled.

## 2020-12-21 RX ORDER — CARVEDILOL 25 MG/1
25 TABLET ORAL 2 TIMES DAILY
Qty: 60 TABLET | Refills: 3 | Status: SHIPPED | OUTPATIENT
Start: 2020-12-21 | Stop reason: SDUPTHER

## 2020-12-21 RX ORDER — VENLAFAXINE HYDROCHLORIDE 150 MG/1
150 CAPSULE, EXTENDED RELEASE ORAL DAILY
Qty: 30 CAPSULE | Refills: 2 | Status: SHIPPED | OUTPATIENT
Start: 2020-12-21 | End: 2021-07-28

## 2020-12-21 NOTE — TELEPHONE ENCOUNTER
PT called and asked if you could fill his Coreg, said Dr. Paul Soto he wasn't going to.  Please advice    Last OV: 11/30/2020  Next OV: 1/21/2021

## 2020-12-29 RX ORDER — FLUTICASONE PROPIONATE 50 MCG
SPRAY, SUSPENSION (ML) NASAL
Qty: 16 G | Refills: 1 | Status: SHIPPED | OUTPATIENT
Start: 2020-12-29 | End: 2021-03-01

## 2020-12-30 RX ORDER — SILDENAFIL 50 MG/1
50 TABLET, FILM COATED ORAL PRN
Qty: 20 TABLET | Refills: 3 | Status: SHIPPED | OUTPATIENT
Start: 2020-12-30 | End: 2021-01-21 | Stop reason: SDUPTHER

## 2021-01-06 ENCOUNTER — TELEMEDICINE (OUTPATIENT)
Dept: NEUROLOGY | Age: 61
End: 2021-01-06
Payer: MEDICAID

## 2021-01-06 DIAGNOSIS — R41.3 MEMORY LOSS: ICD-10-CM

## 2021-01-06 DIAGNOSIS — R51.9 CHRONIC DAILY HEADACHE: ICD-10-CM

## 2021-01-06 DIAGNOSIS — G47.01 INSOMNIA DUE TO MEDICAL CONDITION: Primary | ICD-10-CM

## 2021-01-06 DIAGNOSIS — F32.A DEPRESSION, UNSPECIFIED DEPRESSION TYPE: ICD-10-CM

## 2021-01-06 PROCEDURE — 3017F COLORECTAL CA SCREEN DOC REV: CPT | Performed by: PSYCHIATRY & NEUROLOGY

## 2021-01-06 PROCEDURE — 4004F PT TOBACCO SCREEN RCVD TLK: CPT | Performed by: PSYCHIATRY & NEUROLOGY

## 2021-01-06 PROCEDURE — G8482 FLU IMMUNIZE ORDER/ADMIN: HCPCS | Performed by: PSYCHIATRY & NEUROLOGY

## 2021-01-06 PROCEDURE — G8417 CALC BMI ABV UP PARAM F/U: HCPCS | Performed by: PSYCHIATRY & NEUROLOGY

## 2021-01-06 PROCEDURE — G8427 DOCREV CUR MEDS BY ELIG CLIN: HCPCS | Performed by: PSYCHIATRY & NEUROLOGY

## 2021-01-06 PROCEDURE — 99214 OFFICE O/P EST MOD 30 MIN: CPT | Performed by: PSYCHIATRY & NEUROLOGY

## 2021-01-06 ASSESSMENT — ENCOUNTER SYMPTOMS
RESPIRATORY NEGATIVE: 1
GASTROINTESTINAL NEGATIVE: 1
BACK PAIN: 1
EYES NEGATIVE: 1
ALLERGIC/IMMUNOLOGIC NEGATIVE: 1

## 2021-01-06 NOTE — PROGRESS NOTES
Subjective:      Patient ID: Guanako Alcocer is a 62 y.o. male. HPI  Active problem initiating insomnia to go on desyrel to consolidate sleep to increase to 100 mg po qhs . Will restrict bedtime from 11 PM to 7 AM with patient not watching televison or other activites in bed improving sleep hygeine . There are migraine , headaches , depression with memory complaints and sensory neuropathy followed by Dr Moriah Antonio . There is also chronic low back pain followed by pain clinic . The condition is he reports that he is doing a lot better on desyrel sleeping well . He will take 50 mg po qhs at 10 PM going to bed to watch television taking second tablet at 11PM turning off room lights falling asleep within 15 to 20 minutes . He will sleep to 6AM . There will be one awakening to go to bathroom able to fall back asleep . He is tolerating medication well. During waking day he is rested  . He stopped exelon for this was causing rash which he was taking for memory complaints  . There is depression on effexor . He has sensory neuropathy on lyrica 50 mg po bid and cymbalta 60 mg po qd . There is low back pain on percocet through pain clinic . He is on topamax as headache prophylaxis with headaches being 5 to 6 per week over vertex head area of grade 10 over 10 attenuated with tylenol using 3 per today and 2 percocet for low back . Significant medications desyrel 100 mg po qhs , topamax 50 mg po tid , cymbalta 30 mg po qhs . Testing Head CT normal . 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  Asthma     as child.  Back pain     CAD (coronary artery disease)     Cataract     Depression     pt. denies.     Diabetes mellitus ([facility] [address]     Heart attack ([facility] [address]     2011    Hypertension     Kidney stones     Mini stroke ([facility] [address]     2014    Osteoarthritis     Seasonal allergies     Sinus problem        Past Surgical History:   Procedure Laterality Date    CHOLECYSTECTOMY      2016    COLONOSCOPY      CYSTO/URETERO/PYELOSCOPY, CALCULUS TX Right 2/5/2020    CYSTO, URETEROSCOPY, RIGHT URETERAL STENT PLACEMENT, RETROGRADE PYELOGRAM performed by Jenifer Hearn MD [facility], rt. inguinal    HERNIA REPAIR Left 01/2018    repair    HERNIA REPAIR Right 01/2018    looked at at same time they did left repair    INGUINAL HERNIA REPAIR Left 08/19/2016    LAPAROSCOPY N/A 1/16/2018    DIAGNOSTIC LAPAROSCOPY performed by Dixon Alfred MD at [address] [place] [address] URETEROSCOPY Right 02/05/2020    stent placement, cystoscopy, retrograde pyelogram    VASECTOMY         Family History   Problem Relation Age of Onset    Cancer Father         throat    Cirrhosis Father     High Blood Pressure Sister     Asthma Sister     Arthritis Sister     High Blood Pressure Mother     Heart Disease Mother     Alzheimer's Disease Mother        Social History     Socioeconomic History    Marital status: Single     Spouse name: None    Number of children: None    Years of education: None    Highest education level: None   Occupational History    None   Social Needs    Financial resource strain: None    Food insecurity     Worry: None     Inability: None    Transportation needs     Medical: None     Non-medical: None   Tobacco Use    Smoking status: Current Every Day Smoker     Packs/day: 0.25     Years: 28.00     Pack years: 7.00     Types: Cigarettes    Smokeless tobacco: Never Used   Substance and Sexual Activity    Alcohol use: Not Currently     Comment: rarely    Drug use: No    Sexual activity: None   Lifestyle    Physical activity     Days per week: None     Minutes per session: None    Stress: None   Relationships    Social connections     Talks on phone: None     Gets together: None     Attends Gnosticist service: None     Active member of club or organization: None     Attends meetings of clubs or organizations: None     Relationship status: None    Intimate partner violence     Fear of current or ex partner: None     Emotionally abused: None     Physically abused: None     Forced sexual activity: None   Other Topics Concern    None   Social History Narrative    None       Current Outpatient Medications   Medication Sig Dispense Refill    sildenafil (VIAGRA) 50 MG tablet Take 1 tablet by mouth as needed for Erectile Dysfunction 20 tablet 3    fluticasone (FLONASE) 50 MCG/ACT nasal spray instill 2 sprays into each nostril once daily 16 g 1    carvedilol (COREG) 25 MG tablet Take 1 tablet by mouth 2 times daily 60 tablet 3    venlafaxine (EFFEXOR XR) 150 MG extended release capsule Take 1 capsule by mouth daily 30 capsule 2    traZODone (DESYREL) 50 MG tablet Take 1 po qhs x 2 days the 2po qhs 60 tablet 2    tiZANidine (ZANAFLEX) 4 MG tablet Take 1 tablet by mouth 3 times daily as needed (Back pain) 30 tablet 0    fluticasone-salmeterol (ADVAIR) 100-50 MCG/DOSE diskus inhaler Inhale 1 puff into the lungs every 12 hours 60 each 3    nicotine (NICODERM CQ) 21 MG/24HR Place 1 patch onto the skin daily 42 patch 0    topiramate (TOPAMAX) 50 MG tablet take 1.5 tab by mouth twice a day 90 tablet 6    diclofenac sodium (VOLTAREN) 1 % GEL apply 4 grams to affected area four times a day AS NEEDED FOR PAIN      pregabalin (LYRICA) 50 MG capsule take 1 capsule by mouth twice a day      fluticasone (FLOVENT HFA) 110 MCG/ACT inhaler inhale 2 puffs by mouth INTO THE LUNGS twice a day 12 g 3    albuterol sulfate  (90 Base) MCG/ACT inhaler inhale 2 puffs by mouth every 6 hours if needed for wheezing 18 g 3    amLODIPine (NORVASC) 10 MG tablet take 1 tablet by mouth once daily 90 tablet 1    fexofenadine (ALLEGRA) 180 MG tablet take 1 tablet by mouth once daily 30 tablet 5    oxyCODONE-acetaminophen (PERCOCET) 5-325 MG per tablet Take 1 tablet by mouth every 4 hours as needed for Pain.  hyoscyamine (LEVSIN) 125 MCG tablet Take 1 tablet by mouth every 6 hours as needed for Cramping 10 tablet 0    DULoxetine (CYMBALTA) 30 MG extended release capsule Take 30 mg by mouth      atorvastatin (LIPITOR) 10 MG tablet Take 1 tablet by mouth daily 90 tablet 3    cloNIDine (CATAPRES) 0.1 MG tablet take 1 tablet by mouth twice a day IF BLOOD PRESSURE IS GREATER THAN 140/90 30 tablet 0    diphenhydrAMINE (BENADRYL) 25 MG tablet Take 25 mg by mouth every 6 hours as needed for Itching      nitroGLYCERIN (NITROSTAT) 0.3 MG SL tablet place 1 tablet under the tongue if needed every 5 minutes for chest pain for 3 doses IF NO RELIEF AF 25 tablet 3    aspirin 81 MG tablet Take 81 mg by mouth daily.  benzonatate (TESSALON) 100 MG capsule take 1 capsule by mouth three times a day if needed for cough (Patient not taking: Reported on 10/21/2020) 30 capsule 0    docusate sodium (COLACE) 100 MG capsule Take 1 capsule by mouth 2 times daily (Patient not taking: Reported on 1/5/2021) 20 capsule 0    tamsulosin (FLOMAX) 0.4 MG capsule Take 1 capsule by mouth daily (Patient not taking: Reported on 1/5/2021) 30 capsule 1     No current facility-administered medications for this visit. Allergies   Allergen Reactions    Lisinopril      States he is allergic to the generic forms of medication, can take lisinopril    Aleve  [Naproxen Sodium] Nausea Only    Amitriptyline Hcl Other (See Comments)    Exelon [Rivastigmine] Other (See Comments)     Patient stated patch left a \"burn kathy\" on his skin     Ibuprofen Other (See Comments)     \"hurts my stomach. \"    Lipitor [Atorvastatin] Hives       Review of Systems   Constitutional: Negative. HENT: Negative. Eyes: Negative. Respiratory: Negative. Cardiovascular: Negative. Gastrointestinal: Negative. Endocrine: Negative. Genitourinary: Negative.     Musculoskeletal: Visit (video visit) encounter to address concerns as mentioned above. A caregiver was present when appropriate. Due to this being a TeleHealth encounter (During HRGKR-60 public health emergency), evaluation of the following organ systems was limited: Vitals/Constitutional/EENT/Resp/CV/GI//MS/Neuro/Skin/Heme-Lymph-Imm. Pursuant to the emergency declaration under the [address] [place] [address] and the [employer] and [employer] Act, this Virtual Visit was conducted with patient's (and/or legal guardian's) consent, to reduce the patient's risk of exposure to COVID-19 and provide necessary medical care. The patient (and/or legal guardian) has also been advised to contact this office for worsening conditions or problems, and seek emergency medical treatment and/or call 911 if deemed necessary. Patient identification was verified at the start of the visit: Yes    Total time spent for this encounter: 20 minutes     Services were provided through a video synchronous discussion virtually to substitute for in-person clinic visit. Patient and provider were located at their individual homes. --Ricki Perry MD on 1/6/2021 at 10:16 AM    An electronic signature was used to authenticate this note.   Ricki Perry MD

## 2021-01-11 RX ORDER — FEXOFENADINE HCL 180 MG
TABLET ORAL
Qty: 30 TABLET | Refills: 5 | Status: SHIPPED | OUTPATIENT
Start: 2021-01-11 | End: 2021-07-28

## 2021-01-11 NOTE — TELEPHONE ENCOUNTER
Last OV 01/06/2021    Next OV 01/21/2021    Health Maintenance   Topic Date Due    Shingles Vaccine (1 of 2) 09/14/2011    Colon cancer screen colonoscopy  09/14/2011    A1C test (Diabetic or Prediabetic)  11/01/2020    Lipid screen  11/01/2020    DTaP/Tdap/Td vaccine (2 - Td) 08/02/2026    Flu vaccine  Completed    Pneumococcal 0-64 years Vaccine  Completed    Hepatitis C screen  Completed    HIV screen  Completed    Hepatitis A vaccine  Aged Out    Hepatitis B vaccine  Aged Out    Hib vaccine  Aged Out    Meningococcal (ACWY) vaccine  Aged Out             (applicable per patient's age: Cancer Screenings, Depression Screening, Fall Risk Screening, Immunizations)    Hemoglobin A1C (%)   Date Value   11/01/2019 6.1   09/14/2018 5.7   04/26/2017 6.1 (H)     Microalb/Crt.  Ratio (mcg/mg creat)   Date Value   04/26/2017 17 (H)     LDL Calculated (mg/dL)   Date Value   11/01/2019 95     AST (U/L)   Date Value   08/07/2017 25     ALT (U/L)   Date Value   08/07/2017 37     BUN (mg/dL)   Date Value   02/09/2020 12      (goal A1C is < 7)   (goal LDL is <100) need 30-50% reduction from baseline     BP Readings from Last 3 Encounters:   12/03/20 (!) 145/98   12/03/20 (!) 132/93   11/25/20 (!) 120/93    (goal /80)      All Future Testing planned in [facility]:  Lab Frequency Next Occurrence   COVID-19 Ambulatory Once 10/21/2021       Next Visit Date:  Future Appointments   Date Time Provider Colleen Reece   1/21/2021  1:00 PM DO Sera Campos [id number]   3/29/2021 10:00 AM Maeve Yadav MD Neuro [facility]   7/14/2021  9:40 AM Rose Galloway MD Neuro Spec [facility]            Patient Active Problem List:     Hypertension     Anxiety and depression     Attention deficit disorder     Left ventricular hypertrophy     Low back pain     Fatigue     Microalbuminuria     Memory loss     Memory difficulties     Right inguinal hernia     Elevated hemoglobin A1c     Dysesthesia Cerebral aneurysm, nonruptured     Dyslipidemia     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

## 2021-01-21 ENCOUNTER — TELEMEDICINE (OUTPATIENT)
Dept: PRIMARY CARE CLINIC | Age: 61
End: 2021-01-21
Payer: MEDICAID

## 2021-01-21 DIAGNOSIS — F32.A DEPRESSION, UNSPECIFIED DEPRESSION TYPE: ICD-10-CM

## 2021-01-21 DIAGNOSIS — J45.40 MODERATE PERSISTENT ASTHMA WITHOUT COMPLICATION: ICD-10-CM

## 2021-01-21 DIAGNOSIS — R41.3 MEMORY LOSS: ICD-10-CM

## 2021-01-21 DIAGNOSIS — N52.9 ERECTILE DYSFUNCTION, UNSPECIFIED ERECTILE DYSFUNCTION TYPE: Primary | ICD-10-CM

## 2021-01-21 PROCEDURE — G8482 FLU IMMUNIZE ORDER/ADMIN: HCPCS | Performed by: FAMILY MEDICINE

## 2021-01-21 PROCEDURE — G8417 CALC BMI ABV UP PARAM F/U: HCPCS | Performed by: FAMILY MEDICINE

## 2021-01-21 PROCEDURE — 99214 OFFICE O/P EST MOD 30 MIN: CPT | Performed by: FAMILY MEDICINE

## 2021-01-21 PROCEDURE — 4004F PT TOBACCO SCREEN RCVD TLK: CPT | Performed by: FAMILY MEDICINE

## 2021-01-21 PROCEDURE — 3017F COLORECTAL CA SCREEN DOC REV: CPT | Performed by: FAMILY MEDICINE

## 2021-01-21 PROCEDURE — G8427 DOCREV CUR MEDS BY ELIG CLIN: HCPCS | Performed by: FAMILY MEDICINE

## 2021-01-21 RX ORDER — ATORVASTATIN CALCIUM 10 MG/1
10 TABLET, FILM COATED ORAL DAILY
Qty: 90 TABLET | Refills: 3 | Status: SHIPPED | OUTPATIENT
Start: 2021-01-21 | End: 2022-01-03

## 2021-01-21 RX ORDER — SILDENAFIL 100 MG/1
100 TABLET, FILM COATED ORAL PRN
Qty: 90 TABLET | Refills: 0 | Status: SHIPPED | OUTPATIENT
Start: 2021-01-21 | End: 2021-07-28

## 2021-01-21 RX ORDER — ALBUTEROL SULFATE 90 UG/1
AEROSOL, METERED RESPIRATORY (INHALATION)
Qty: 18 G | Refills: 3 | Status: SHIPPED | OUTPATIENT
Start: 2021-01-21 | End: 2021-04-19

## 2021-01-24 ASSESSMENT — ENCOUNTER SYMPTOMS
RHINORRHEA: 0
SORE THROAT: 0
SHORTNESS OF BREATH: 0
NAUSEA: 0
VOMITING: 0
CHEST TIGHTNESS: 0

## 2021-02-06 ENCOUNTER — NURSE TRIAGE (OUTPATIENT)
Dept: OTHER | Age: 61
End: 2021-02-06

## 2021-02-06 ENCOUNTER — TELEPHONE (OUTPATIENT)
Dept: PRIMARY CARE CLINIC | Age: 61
End: 2021-02-06

## 2021-02-06 DIAGNOSIS — J44.1 COPD EXACERBATION (HCC): Primary | ICD-10-CM

## 2021-02-06 RX ORDER — PREDNISONE 20 MG/1
40 TABLET ORAL DAILY
Qty: 10 TABLET | Refills: 0 | Status: SHIPPED | OUTPATIENT
Start: 2021-02-06 | End: 2021-02-11

## 2021-02-06 RX ORDER — BENZONATATE 100 MG/1
100 CAPSULE ORAL 3 TIMES DAILY PRN
Qty: 30 CAPSULE | Refills: 1 | Status: SHIPPED | OUTPATIENT
Start: 2021-02-06 | End: 2021-04-15 | Stop reason: SDUPTHER

## 2021-02-06 RX ORDER — AZITHROMYCIN 250 MG/1
250 TABLET, FILM COATED ORAL SEE ADMIN INSTRUCTIONS
Qty: 6 TABLET | Refills: 0 | Status: SHIPPED | OUTPATIENT
Start: 2021-02-06 | End: 2021-02-11

## 2021-02-06 NOTE — TELEPHONE ENCOUNTER
Received call from answering service. Pt with runny nose, congestion, cough and some shortness of breath for past two weeks and has been using his inhaler a little more frequently. I discussed with regi that I will send in prednisone and antibiotics for him. If he does not start to improve to call us  Monday or if he has worsening symptoms of shortness of breath he should be evaluated at the ER.

## 2021-02-06 NOTE — TELEPHONE ENCOUNTER
Patient calling concerned about symptoms of URI. Patient requesting antibiotic and prednisone. Patient has history of COPD. Symptoms of runny nose, congestion, intermittent couch, shortness of breath, and post nasal drainage. Patient states he has to use his inhalers more and more and then he feels chest tightness. Patient states symptoms do not interfere with ADL's and that he does not feel he needs to go to ER or urgent care. Patient states SOB is normal for COPD. Patient states chest tightness is related to breathing. Advice given per guidelines. Patient wishes to speak to PCP. Dr. Aliyah Trinidad on call. provider paged for further care advice. Dr. Aliyah Trinidad returned call and states she will send in antibiotic and prednisone to pharmacy ([facility] on [address] Dr. Aliyah Trinidad states that if patient symptoms do not improve call office to schedule appointment and if patient has increasing shortness of breath to go to ER. Information relayed to patient. Patient advised to call answering service back if symptoms worsen or so not improve. RN (Wagner Pepe) stressed that if he has increasing shortness of breath or chest pain he needs to go to ER. Patient agreeable to advice. All questions answered. Support provided. Reason for Disposition   [1] Sinus congestion (pressure, fullness) AND [2] present > 10 days    Answer Assessment - Initial Assessment Questions  1. ONSET: \"When did the nasal discharge start? \"       Started within past 2 weeks     2. AMOUNT: \"How much discharge is there? \"   A lot of nasal congestion    3. COUGH: \"Do you have a cough? \" If yes, ask: \"Describe the color of your sputum\" (clear, white, yellow, green)    Cough up a lot of mucus. 4. RESPIRATORY DISTRESS: \"Describe your breathing. \"   Slightly short of breath, but normal for COPD. 5. FEVER: \"Do you have a fever? \" If so, ask: \"What is your temperature, how was it measured, and when did it start? \"  No    6.  SEVERITY: \"Overall, how

## 2021-02-28 DIAGNOSIS — I10 ESSENTIAL HYPERTENSION: ICD-10-CM

## 2021-03-01 RX ORDER — AMLODIPINE BESYLATE 10 MG/1
TABLET ORAL
Qty: 90 TABLET | Refills: 1 | Status: SHIPPED | OUTPATIENT
Start: 2021-03-01 | End: 2021-08-26

## 2021-03-01 RX ORDER — FLUTICASONE PROPIONATE 50 MCG
SPRAY, SUSPENSION (ML) NASAL
Qty: 16 G | Refills: 1 | Status: SHIPPED | OUTPATIENT
Start: 2021-03-01 | End: 2021-11-01

## 2021-03-01 NOTE — TELEPHONE ENCOUNTER
Last OV 01/21/2021    Health Maintenance   Topic Date Due    Shingles Vaccine (1 of 2) 09/14/2011    Colon cancer screen colonoscopy  09/14/2011    A1C test (Diabetic or Prediabetic)  11/01/2020    Lipid screen  11/01/2020    DTaP/Tdap/Td vaccine (2 - Td) 08/02/2026    Flu vaccine  Completed    Pneumococcal 0-64 years Vaccine  Completed    Hepatitis C screen  Completed    HIV screen  Completed    Hepatitis A vaccine  Aged Out    Hepatitis B vaccine  Aged Out    Hib vaccine  Aged Out    Meningococcal (ACWY) vaccine  Aged Out             (applicable per patient's age: Cancer Screenings, Depression Screening, Fall Risk Screening, Immunizations)    Hemoglobin A1C (%)   Date Value   11/01/2019 6.1   09/14/2018 5.7   04/26/2017 6.1 (H)     Microalb/Crt.  Ratio (mcg/mg creat)   Date Value   04/26/2017 17 (H)     LDL Calculated (mg/dL)   Date Value   11/01/2019 95     AST (U/L)   Date Value   08/07/2017 25     ALT (U/L)   Date Value   08/07/2017 37     BUN (mg/dL)   Date Value   02/09/2020 12      (goal A1C is < 7)   (goal LDL is <100) need 30-50% reduction from baseline     BP Readings from Last 3 Encounters:   12/03/20 (!) 145/98   12/03/20 (!) 132/93   11/25/20 (!) 120/93    (goal /80)      All Future Testing planned in [facility]:  Lab Frequency Next Occurrence   COVID-19 Ambulatory Once 10/21/2021       Next Visit Date:  Future Appointments   Date Time Provider Colleen Reece   3/29/2021 10:00 AM Carlos Garcia MD Neuro Spec [address]   7/14/2021  9:40 AM Rodrigo Cervantes MD Neuro Spec [address]            Patient Active Problem List:     Hypertension     Anxiety and depression     Attention deficit disorder     Left ventricular hypertrophy     Low back pain     Fatigue     Microalbuminuria     Memory loss     Memory difficulties     Right inguinal hernia     Elevated hemoglobin A1c     Dysesthesia     Cerebral aneurysm, nonruptured     Dyslipidemia     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

## 2021-03-04 RX ORDER — TRAZODONE HYDROCHLORIDE 50 MG/1
TABLET ORAL
Qty: 60 TABLET | Refills: 4 | Status: SHIPPED | OUTPATIENT
Start: 2021-03-04 | End: 2021-07-13 | Stop reason: SDUPTHER

## 2021-03-28 NOTE — PROGRESS NOTES
MCG/DOSE diskus inhaler Inhale 1 puff into the lungs every 12 hours 60 each 3    nicotine (NICODERM CQ) 21 MG/24HR Place 1 patch onto the skin daily 42 patch 0    topiramate (TOPAMAX) 50 MG tablet take 1.5 tab by mouth twice a day 90 tablet 6    [DISCONTINUED] fluticasone (FLONASE) 50 MCG/ACT nasal spray instill 2 sprays into each nostril once daily 16 g 1    diclofenac sodium (VOLTAREN) 1 % GEL apply 4 grams to affected area four times a day AS NEEDED FOR PAIN      pregabalin (LYRICA) 50 MG capsule take 1 capsule by mouth twice a day      fluticasone (FLOVENT HFA) 110 MCG/ACT inhaler inhale 2 puffs by mouth INTO THE LUNGS twice a day 12 g 3    oxyCODONE-acetaminophen (PERCOCET) 5-325 MG per tablet Take 1 tablet by mouth every 4 hours as needed for Pain.  hyoscyamine (LEVSIN) 125 MCG tablet Take 1 tablet by mouth every 6 hours as needed for Cramping 10 tablet 0    docusate sodium (COLACE) 100 MG capsule Take 1 capsule by mouth 2 times daily (Patient not taking: Reported on 1/5/2021) 20 capsule 0    tamsulosin (FLOMAX) 0.4 MG capsule Take 1 capsule by mouth daily (Patient not taking: Reported on 1/5/2021) 30 capsule 1    DULoxetine (CYMBALTA) 30 MG extended release capsule Take 30 mg by mouth      cloNIDine (CATAPRES) 0.1 MG tablet take 1 tablet by mouth twice a day IF BLOOD PRESSURE IS GREATER THAN 140/90 (Patient not taking: Reported on 3/24/2021) 30 tablet 0    diphenhydrAMINE (BENADRYL) 25 MG tablet Take 25 mg by mouth every 6 hours as needed for Itching      nitroGLYCERIN (NITROSTAT) 0.3 MG SL tablet place 1 tablet under the tongue if needed every 5 minutes for chest pain for 3 doses IF NO RELIEF AF 25 tablet 3    aspirin 81 MG tablet Take 81 mg by mouth daily. No current facility-administered medications on file prior to visit. Allergies: Shyam Mclean is allergic to lisinopril; aleve  [naproxen sodium]; amitriptyline hcl; exelon [rivastigmine]; and ibuprofen.     Past Medical History:   Diagnosis Date    ADHD (attention deficit hyperactivity disorder)     Arthritis     knees, hands, back.  Asthma     as child.  Back pain     CAD (coronary artery disease)     Cataract     Depression     pt. denies.  Diabetes mellitus ([address]     Heart attack ([address]     2011    Hypertension     Kidney stones     Mini stroke ([address]     2014    Osteoarthritis     Seasonal allergies     Sinus problem        Past Surgical History:   Procedure Laterality Date    CHOLECYSTECTOMY      2016    COLONOSCOPY      CYSTO/URETERO/PYELOSCOPY, CALCULUS TX Right 2/5/2020    CYSTO, URETEROSCOPY, RIGHT URETERAL STENT PLACEMENT, RETROGRADE PYELOGRAM performed by Krishan Turner MD at [facility], rt. inguinal    HERNIA REPAIR Left 01/2018    repair    HERNIA REPAIR Right 01/2018    looked at at same time they did left repair    INGUINAL HERNIA REPAIR Left 08/19/2016    LAPAROSCOPY N/A 1/16/2018    DIAGNOSTIC LAPAROSCOPY performed by Derrell Lefort, MD at [address] [facility] URETEROSCOPY Right 02/05/2020    stent placement, cystoscopy, retrograde pyelogram    VASECTOMY       Social History: Jory Mccrary  reports that he has been smoking cigarettes. He has a 7.00 pack-year smoking history. He has never used smokeless tobacco. He reports previous alcohol use. He reports that he does not use drugs.     Family History   Problem Relation Age of Onset    Cancer Father         throat    Cirrhosis Father     High Blood Pressure Sister     Asthma Sister     Arthritis Sister     High Blood Pressure Mother     Heart Disease Mother     Alzheimer's Disease Mother      On exam; 0 LB, Ht 5'11\"    GENERAL  Appears comfortable and in no distress   HEENT  NC/ AT   NECK & cardiovascular  Supple and no bruits heard; S1 and S2 heard; radial pulse intact   MENTAL STATUS:  Alert, oriented, intact memory, no confusion, normal speech, normal Fanny Ashraf MD

## 2021-03-29 ENCOUNTER — TELEMEDICINE (OUTPATIENT)
Dept: NEUROLOGY | Age: 61
End: 2021-03-29
Payer: MEDICAID

## 2021-03-29 DIAGNOSIS — R51.9 CHRONIC DAILY HEADACHE: ICD-10-CM

## 2021-03-29 DIAGNOSIS — G44.221 CHRONIC TENSION-TYPE HEADACHE, INTRACTABLE: ICD-10-CM

## 2021-03-29 DIAGNOSIS — R41.89 PSEUDODEMENTIA: Primary | ICD-10-CM

## 2021-03-29 DIAGNOSIS — F32.A DEPRESSION, UNSPECIFIED DEPRESSION TYPE: ICD-10-CM

## 2021-03-29 DIAGNOSIS — I67.1 ANEURYSM OF INTRACRANIAL PORTION OF LEFT INTERNAL CAROTID ARTERY: ICD-10-CM

## 2021-03-29 DIAGNOSIS — R41.3 MEMORY LOSS: ICD-10-CM

## 2021-03-29 PROCEDURE — G8428 CUR MEDS NOT DOCUMENT: HCPCS | Performed by: PSYCHIATRY & NEUROLOGY

## 2021-03-29 PROCEDURE — 3017F COLORECTAL CA SCREEN DOC REV: CPT | Performed by: PSYCHIATRY & NEUROLOGY

## 2021-03-29 PROCEDURE — 99214 OFFICE O/P EST MOD 30 MIN: CPT | Performed by: PSYCHIATRY & NEUROLOGY

## 2021-03-29 RX ORDER — MEMANTINE HYDROCHLORIDE 5 MG/1
TABLET ORAL
Qty: 60 TABLET | Refills: 0 | Status: SHIPPED | OUTPATIENT
Start: 2021-03-29 | End: 2021-05-03

## 2021-04-05 ENCOUNTER — OFFICE VISIT (OUTPATIENT)
Dept: UROLOGY | Age: 61
End: 2021-04-05
Payer: MEDICAID

## 2021-04-05 VITALS — TEMPERATURE: 98.1 F | SYSTOLIC BLOOD PRESSURE: 128 MMHG | DIASTOLIC BLOOD PRESSURE: 87 MMHG | HEART RATE: 78 BPM

## 2021-04-05 DIAGNOSIS — N52.01 ERECTILE DYSFUNCTION DUE TO ARTERIAL INSUFFICIENCY: ICD-10-CM

## 2021-04-05 DIAGNOSIS — N20.0 KIDNEY STONE: Primary | ICD-10-CM

## 2021-04-05 DIAGNOSIS — R35.0 FREQUENCY OF URINATION: ICD-10-CM

## 2021-04-05 DIAGNOSIS — R10.31 RIGHT GROIN PAIN: ICD-10-CM

## 2021-04-05 DIAGNOSIS — R10.9 RIGHT FLANK PAIN: ICD-10-CM

## 2021-04-05 PROCEDURE — 99214 OFFICE O/P EST MOD 30 MIN: CPT | Performed by: UROLOGY

## 2021-04-05 PROCEDURE — G8427 DOCREV CUR MEDS BY ELIG CLIN: HCPCS | Performed by: UROLOGY

## 2021-04-05 PROCEDURE — 3017F COLORECTAL CA SCREEN DOC REV: CPT | Performed by: UROLOGY

## 2021-04-05 PROCEDURE — 4004F PT TOBACCO SCREEN RCVD TLK: CPT | Performed by: UROLOGY

## 2021-04-05 PROCEDURE — G8417 CALC BMI ABV UP PARAM F/U: HCPCS | Performed by: UROLOGY

## 2021-04-05 RX ORDER — OXYCODONE HYDROCHLORIDE AND ACETAMINOPHEN 325; 5 MG/5ML; MG/5ML
SOLUTION ORAL
COMMUNITY
Start: 2021-02-12 | End: 2021-07-12

## 2021-04-05 ASSESSMENT — ENCOUNTER SYMPTOMS
ABDOMINAL PAIN: 1
NAUSEA: 0
BACK PAIN: 0
COUGH: 0
VOMITING: 0
DIARRHEA: 0
SHORTNESS OF BREATH: 0
EYE PAIN: 0
CONSTIPATION: 0
WHEEZING: 0
EYE REDNESS: 0

## 2021-04-05 NOTE — PROGRESS NOTES
SCORE[de-identified] 7  How would you feel if you were to spend the rest of your life with your urinary condition?: Mixe    Last BUN and creatinine:  Lab Results   Component Value Date    BUN 12 02/09/2020     Lab Results   Component Value Date    CREATININE 0.86 02/09/2020       Additional Lab/Culture results: none    Imaging Reviewed during this Office Visit: none  (results were independently reviewed by physician and radiology report verified)    PAST MEDICAL, FAMILY AND SOCIAL HISTORY UPDATE:  Past Medical History:   Diagnosis Date    ADHD (attention deficit hyperactivity disorder)     Arthritis     knees, hands, back.  Asthma     as child.  Back pain     CAD (coronary artery disease)     Cataract     Depression     pt. denies.     [facility]     Heart attack ([facility]     2011    Hypertension     Kidney stones     Mini stroke ([facility]     2014    Osteoarthritis     Seasonal allergies     Sinus problem      Past Surgical History:   Procedure Laterality Date    CHOLECYSTECTOMY      2016    COLONOSCOPY      CYSTO/URETERO/PYELOSCOPY, CALCULUS TX Right 2/5/2020    CYSTO, URETEROSCOPY, RIGHT URETERAL STENT PLACEMENT, RETROGRADE PYELOGRAM performed by Armand Zhao MD at [place] [address], rt. inguinal    HERNIA REPAIR Left 01/2018    repair    HERNIA REPAIR Right 01/2018    looked at at same time they did left repair    INGUINAL HERNIA REPAIR Left 08/19/2016    LAPAROSCOPY N/A 1/16/2018    DIAGNOSTIC LAPAROSCOPY performed by Ngozi Gonzalez MD at [facility] URETEROSCOPY Right 02/05/2020    stent placement, cystoscopy, retrograde pyelogram    VASECTOMY       Family History   Problem Relation Age of Onset    Cancer Father         throat    Cirrhosis Father     High Blood Pressure Sister     Asthma Sister     Arthritis Sister     High Blood Pressure Mother     Heart Disease Mother     Alzheimer's Disease Mother      Outpatient Medications Marked as Taking for the 4/5/21 encounter (Office Visit) with Simona Pearce MD   Medication Sig Dispense Refill    oxyCODONE-acetaminophen (ROXICET) 5-325 MG/5ML solution take 1 tablet by mouth three times a day if needed      memantine (NAMENDA) 5 MG tablet Take one tab every evening for 2 wk, then one tab twice daily 60 tablet 0    traZODone (DESYREL) 50 MG tablet take 2 tablets by mouth at bedtime 60 tablet 4    fluticasone (FLONASE) 50 MCG/ACT nasal spray instill 2 sprays into each nostril once daily 16 g 1    amLODIPine (NORVASC) 10 MG tablet take 1 tablet by mouth once daily 90 tablet 1    benzonatate (TESSALON PERLES) 100 MG capsule Take 1 capsule by mouth 3 times daily as needed for Cough 30 capsule 1    albuterol sulfate  (90 Base) MCG/ACT inhaler inhale 2 puffs by mouth every 6 hours if needed for wheezing 18 g 3    sildenafil (VIAGRA) 100 MG tablet Take 1 tablet by mouth as needed for Erectile Dysfunction Do note take more than one tablet.  90 tablet 0    atorvastatin (LIPITOR) 10 MG tablet Take 1 tablet by mouth daily 90 tablet 3    RA ALLERGY RELIEF 180 MG tablet take 1 tablet by mouth once daily 30 tablet 5    carvedilol (COREG) 25 MG tablet Take 1 tablet by mouth 2 times daily 60 tablet 3    venlafaxine (EFFEXOR XR) 150 MG extended release capsule Take 1 capsule by mouth daily 30 capsule 2    tiZANidine (ZANAFLEX) 4 MG tablet Take 1 tablet by mouth 3 times daily as needed (Back pain) 30 tablet 0    fluticasone-salmeterol (ADVAIR) 100-50 MCG/DOSE diskus inhaler Inhale 1 puff into the lungs every 12 hours 60 each 3    topiramate (TOPAMAX) 50 MG tablet take 1.5 tab by mouth twice a day 90 tablet 6    diclofenac sodium (VOLTAREN) 1 % GEL apply 4 grams to affected area four times a day AS NEEDED FOR PAIN      pregabalin (LYRICA) 50 MG capsule take 1 capsule by mouth twice a day      fluticasone (FLOVENT HFA) 110 MCG/ACT inhaler inhale 2 puffs by mouth INTO THE LUNGS of the defined types were placed in this encounter. Orders Placed:  No orders of the defined types were placed in this encounter. Gentry Morales MD    Agree with the ROS entered by the MA.

## 2021-04-05 NOTE — PROGRESS NOTES
Review of Systems   Constitutional: Negative for appetite change, chills and fever. Eyes: Negative for pain, redness and visual disturbance. Respiratory: Negative for cough, shortness of breath and wheezing. Cardiovascular: Negative for chest pain and leg swelling. Gastrointestinal: Positive for abdominal pain (lower abd). Negative for constipation, diarrhea, nausea and vomiting. Genitourinary: Positive for frequency (but staying hydrated ). Negative for difficulty urinating, dysuria, flank pain, hematuria and urgency. Musculoskeletal: Negative for back pain, joint swelling and myalgias. Skin: Negative for rash and wound. Neurological: Negative for dizziness, tremors and numbness. Hematological: Does not bruise/bleed easily.

## 2021-04-09 ENCOUNTER — TELEPHONE (OUTPATIENT)
Dept: PRIMARY CARE CLINIC | Age: 61
End: 2021-04-09

## 2021-04-09 ENCOUNTER — HOSPITAL ENCOUNTER (OUTPATIENT)
Dept: CT IMAGING | Age: 61
Discharge: HOME | End: 2021-04-11
Payer: MEDICAID

## 2021-04-09 DIAGNOSIS — N20.0 KIDNEY STONE: ICD-10-CM

## 2021-04-09 PROCEDURE — 74176 CT ABD & PELVIS W/O CONTRAST: CPT

## 2021-04-09 NOTE — TELEPHONE ENCOUNTER
Pt states the Mucinex D is the only thing that is working to clear up his runny nose. Pt states that he is limited to buying 2 a month. Pt would like to know if you can send in the RX for him to us at night & during the day he will use the regular Mucinex. Please advise.

## 2021-04-11 RX ORDER — MOMETASONE FUROATE 50 UG/1
2 SPRAY, METERED NASAL DAILY
Qty: 1 INHALER | Refills: 3 | Status: SHIPPED | OUTPATIENT
Start: 2021-04-11

## 2021-04-12 ENCOUNTER — OFFICE VISIT (OUTPATIENT)
Dept: UROLOGY | Age: 61
End: 2021-04-12
Payer: MEDICAID

## 2021-04-12 VITALS
RESPIRATION RATE: 17 BRPM | DIASTOLIC BLOOD PRESSURE: 85 MMHG | SYSTOLIC BLOOD PRESSURE: 132 MMHG | HEART RATE: 87 BPM | BODY MASS INDEX: 40.18 KG/M2 | TEMPERATURE: 97 F | WEIGHT: 287 LBS | HEIGHT: 71 IN

## 2021-04-12 DIAGNOSIS — R10.9 RIGHT FLANK PAIN: ICD-10-CM

## 2021-04-12 DIAGNOSIS — N20.0 KIDNEY STONE: Primary | ICD-10-CM

## 2021-04-12 DIAGNOSIS — R68.82 DECREASED LIBIDO: ICD-10-CM

## 2021-04-12 DIAGNOSIS — R53.82 CHRONIC FATIGUE: ICD-10-CM

## 2021-04-12 DIAGNOSIS — N52.01 ERECTILE DYSFUNCTION DUE TO ARTERIAL INSUFFICIENCY: ICD-10-CM

## 2021-04-12 PROCEDURE — G8427 DOCREV CUR MEDS BY ELIG CLIN: HCPCS | Performed by: UROLOGY

## 2021-04-12 PROCEDURE — 99214 OFFICE O/P EST MOD 30 MIN: CPT | Performed by: UROLOGY

## 2021-04-12 PROCEDURE — G8417 CALC BMI ABV UP PARAM F/U: HCPCS | Performed by: UROLOGY

## 2021-04-12 PROCEDURE — 4004F PT TOBACCO SCREEN RCVD TLK: CPT | Performed by: UROLOGY

## 2021-04-12 PROCEDURE — 3017F COLORECTAL CA SCREEN DOC REV: CPT | Performed by: UROLOGY

## 2021-04-12 RX ORDER — LEVOCETIRIZINE DIHYDROCHLORIDE 5 MG/1
5 TABLET, FILM COATED ORAL NIGHTLY
Qty: 30 TABLET | Refills: 5 | Status: SHIPPED | OUTPATIENT
Start: 2021-04-12 | End: 2021-07-28

## 2021-04-12 ASSESSMENT — ENCOUNTER SYMPTOMS
VOMITING: 0
BACK PAIN: 0
SHORTNESS OF BREATH: 0
COUGH: 0
CONSTIPATION: 0
ABDOMINAL PAIN: 0
WHEEZING: 0
DIARRHEA: 0
NAUSEA: 0
EYE PAIN: 0

## 2021-04-12 NOTE — TELEPHONE ENCOUNTER
Left message for patient to call the office, please inform him he is to only take the xyzal and nasonex for allergy regimen, stop the allegra, zyrtec, and benadryl and see how he does on this.

## 2021-04-12 NOTE — PROGRESS NOTES
[address] [address]  Dept: [address] [facility] Urology Office Note - Established    Patient:  Fernando Bolivar  YOB: 1960  Date: 4/12/2021    The patient is a 62 y.o. male who presents todayfor evaluation of the following problems:   Chief Complaint   Patient presents with    Follow-up     1 wk ct results       HPI  Ida Ortiz is a 76-year-old gentleman who has a history of erectile dysfunction. He does also have low energy level and decreased libido. He does also have a history of stones. He was having right flank pain. I got a CT scan which does not show any stones. He is interested in exploring options to manage his tile dysfunction. He has never had a testosterone level checked. Summary of old records: N/A    Additional History: N/A    Procedures Today: N/A    Urinalysis today:  No results found for this visit on 04/12/21. Last several PSA's:  Lab Results   Component Value Date    PSA 0.59 04/26/2017     Last total testosterone:  No results found for: TESTOSTERONE    AUA Symptom Score (4/12/2021):                               Last BUN and creatinine:  Lab Results   Component Value Date    BUN 12 02/09/2020     Lab Results   Component Value Date    CREATININE 0.86 02/09/2020       Additional Lab/Culture results: none    Imaging Reviewed during this Office Visit: ct no stones  (results were independently reviewed by physician and radiology report verified)    PAST MEDICAL, FAMILY AND SOCIAL HISTORY UPDATE:  Past Medical History:   Diagnosis Date    ADHD (attention deficit hyperactivity disorder)     Arthritis     knees, hands, back.  Asthma     as child.  Back pain     CAD (coronary artery disease)     Cataract     Depression     pt. denies.     Diabetes mellitus ([address]     Heart [facility]     2011    Hypertension     Kidney stones     Mini stroke ([facility]     2014    Osteoarthritis     Seasonal allergies     Sinus problem      Past Surgical History:   Procedure Laterality Date    CHOLECYSTECTOMY      2016    COLONOSCOPY      CYSTO/URETERO/PYELOSCOPY, CALCULUS TX Right 2/5/2020    CYSTO, URETEROSCOPY, RIGHT URETERAL STENT PLACEMENT, RETROGRADE PYELOGRAM performed by Felicitas Wei MD at [facility], rt. inguinal    HERNIA REPAIR Left 01/2018    repair    HERNIA REPAIR Right 01/2018    looked at at same time they did left repair    INGUINAL HERNIA REPAIR Left 08/19/2016    LAPAROSCOPY N/A 1/16/2018    DIAGNOSTIC LAPAROSCOPY performed by Mike Harris MD at [address] [place] [address] URETEROSCOPY Right 02/05/2020    stent placement, cystoscopy, retrograde pyelogram    VASECTOMY       Family History   Problem Relation Age of Onset    Cancer Father         throat    Cirrhosis Father     High Blood Pressure Sister     Asthma Sister     Arthritis Sister     High Blood Pressure Mother     Heart Disease Mother     Alzheimer's Disease Mother      Outpatient Medications Marked as Taking for the 4/12/21 encounter (Office Visit) with Felicitas Wei MD   Medication Sig Dispense Refill    levocetirizine (XYZAL) 5 MG tablet Take 1 tablet by mouth nightly 30 tablet 5    mometasone (NASONEX) 50 MCG/ACT nasal spray 2 sprays by Nasal route daily 1 Inhaler 3    oxyCODONE-acetaminophen (ROXICET) 5-325 MG/5ML solution take 1 tablet by mouth three times a day if needed      memantine (NAMENDA) 5 MG tablet Take one tab every evening for 2 wk, then one tab twice daily 60 tablet 0    traZODone (DESYREL) 50 MG tablet take 2 tablets by mouth at bedtime 60 tablet 4    fluticasone (FLONASE) 50 MCG/ACT nasal spray instill 2 sprays into each nostril once daily 16 g 1    amLODIPine (NORVASC) 10 MG tablet take 1 tablet by mouth once daily 90 tablet 1    benzonatate (TESSALON PERLES) 100 MG capsule Take 1 capsule by mouth 3 times daily as needed for Cough 30 capsule 1    albuterol sulfate  (90 Base) MCG/ACT inhaler inhale 2 puffs by mouth every 6 hours if needed for wheezing 18 g 3    sildenafil (VIAGRA) 100 MG tablet Take 1 tablet by mouth as needed for Erectile Dysfunction Do note take more than one tablet. 90 tablet 0    atorvastatin (LIPITOR) 10 MG tablet Take 1 tablet by mouth daily 90 tablet 3    RA ALLERGY RELIEF 180 MG tablet take 1 tablet by mouth once daily 30 tablet 5    carvedilol (COREG) 25 MG tablet Take 1 tablet by mouth 2 times daily 60 tablet 3    venlafaxine (EFFEXOR XR) 150 MG extended release capsule Take 1 capsule by mouth daily 30 capsule 2    tiZANidine (ZANAFLEX) 4 MG tablet Take 1 tablet by mouth 3 times daily as needed (Back pain) 30 tablet 0    fluticasone-salmeterol (ADVAIR) 100-50 MCG/DOSE diskus inhaler Inhale 1 puff into the lungs every 12 hours 60 each 3    topiramate (TOPAMAX) 50 MG tablet take 1.5 tab by mouth twice a day 90 tablet 6    diclofenac sodium (VOLTAREN) 1 % GEL apply 4 grams to affected area four times a day AS NEEDED FOR PAIN      pregabalin (LYRICA) 50 MG capsule take 1 capsule by mouth twice a day      fluticasone (FLOVENT HFA) 110 MCG/ACT inhaler inhale 2 puffs by mouth INTO THE LUNGS twice a day 12 g 3    oxyCODONE-acetaminophen (PERCOCET) 5-325 MG per tablet Take 1 tablet by mouth every 4 hours as needed for Pain.  diphenhydrAMINE (BENADRYL) 25 MG tablet Take 25 mg by mouth every 6 hours as needed for Itching      nitroGLYCERIN (NITROSTAT) 0.3 MG SL tablet place 1 tablet under the tongue if needed every 5 minutes for chest pain for 3 doses IF NO RELIEF AF 25 tablet 3    aspirin 81 MG tablet Take 81 mg by mouth daily.          Lisinopril, Aleve  [naproxen sodium], Amitriptyline hcl, Exelon [rivastigmine], and Ibuprofen  Social History     Tobacco Use   Smoking Status Current Every Day Smoker    Packs/day: 0.25    Years: 28.00    Pack years: 7.00    Types: Cigarettes Smokeless Tobacco Never Used     (Ifpatient a smoker, smoking cessation counseling offered)    Social History     Substance and Sexual Activity   Alcohol Use Not Currently    Comment: rarely       REVIEW OF SYSTEMS:  Review of Systems    Physical Exam:      Vitals:    04/12/21 1308   BP: 132/85   Pulse: 87   Resp: 17   Temp: 97 °F (36.1 °C)     Body mass index is 40.03 kg/m². Patient is a 62 y.o. male in no acute distress and alert and oriented to person, place and time. Physical Exam  Constitutional: Patient in no acute distress. Neuro: Alert and oriented to person, place and time. Psych: Mood normal, affect normal  Skin: No rash noted      Assessment and Plan      1. Kidney stone    2. Right flank pain    3. Erectile dysfunction due to arterial insufficiency    4. Decreased libido    5. Chronic fatigue           Plan:   Serum T  Discussed Trimix and IPP  Pt not interested in trimix  Will check T and pt will consider IPP      Return in about 2 weeks (around 4/26/2021) for testosterone. Prescriptions Ordered:  No orders of the defined types were placed in this encounter. Orders Placed:  Orders Placed This Encounter   Procedures    Testosterone     Standing Status:   Future     Standing Expiration Date:   4/7/2022           Eriberto Rojas MD    Agree with the ROS entered by the MA.

## 2021-04-15 ENCOUNTER — TELEPHONE (OUTPATIENT)
Dept: PRIMARY CARE CLINIC | Age: 61
End: 2021-04-15

## 2021-04-15 RX ORDER — BENZONATATE 100 MG/1
100 CAPSULE ORAL 3 TIMES DAILY PRN
Qty: 30 CAPSULE | Refills: 1 | Status: SHIPPED | OUTPATIENT
Start: 2021-04-15 | End: 2021-04-21 | Stop reason: ALTCHOICE

## 2021-04-15 NOTE — TELEPHONE ENCOUNTER
Pt reports that he has an ongoing cough and in the past tessalon pearls works well for him. He is asking for a rx sent to [facility] on [de-identified] [address].   Please advise, thank you.

## 2021-04-19 RX ORDER — ALBUTEROL SULFATE 90 UG/1
AEROSOL, METERED RESPIRATORY (INHALATION)
Qty: 8.5 G | Refills: 3 | Status: SHIPPED | OUTPATIENT
Start: 2021-04-19 | End: 2021-07-19

## 2021-04-20 RX ORDER — CARVEDILOL 25 MG/1
TABLET ORAL
Qty: 180 TABLET | Refills: 2 | Status: SHIPPED | OUTPATIENT
Start: 2021-04-20 | End: 2022-01-08

## 2021-04-21 ENCOUNTER — TELEMEDICINE (OUTPATIENT)
Dept: PRIMARY CARE CLINIC | Age: 61
End: 2021-04-21
Payer: MEDICAID

## 2021-04-21 DIAGNOSIS — Z87.891 PERSONAL HISTORY OF TOBACCO USE, PRESENTING HAZARDS TO HEALTH: ICD-10-CM

## 2021-04-21 DIAGNOSIS — J40 BRONCHITIS: Primary | ICD-10-CM

## 2021-04-21 PROCEDURE — 99406 BEHAV CHNG SMOKING 3-10 MIN: CPT | Performed by: NURSE PRACTITIONER

## 2021-04-21 PROCEDURE — 99442 PR PHYS/QHP TELEPHONE EVALUATION 11-20 MIN: CPT | Performed by: NURSE PRACTITIONER

## 2021-04-21 RX ORDER — AZITHROMYCIN 250 MG/1
TABLET, FILM COATED ORAL
Qty: 1 PACKET | Refills: 0 | Status: SHIPPED | OUTPATIENT
Start: 2021-04-21 | End: 2021-05-01

## 2021-04-21 RX ORDER — BENZONATATE 200 MG/1
200 CAPSULE ORAL 3 TIMES DAILY PRN
Qty: 30 CAPSULE | Refills: 0 | Status: SHIPPED | OUTPATIENT
Start: 2021-04-21 | Stop reason: SDUPTHER

## 2021-04-21 RX ORDER — PREDNISONE 20 MG/1
TABLET ORAL
Qty: 18 TABLET | Refills: 0 | Status: SHIPPED | OUTPATIENT
Start: 2021-04-21 | End: 2021-05-01

## 2021-04-21 NOTE — PROGRESS NOTES
Lexx Salgado is a 62 y.o. male evaluated via telephone on 4/21/2021. Consent:  He and/or health care decision maker is aware that that he may receive a bill for this telephone service, depending on his insurance coverage, and has provided verbal consent to proceed: Yes      Documentation:  I communicated with the patient and/or health care decision maker about his care. Details of this discussion including any medical advice provided: to the patient     Pt presents for a telephone visit. Pt of dr. Roxi Yepez  bp Mikie Martinez  Weight tarik    C/o bronchitis really bad. He called on 4/15 requesting tessalon pearles. His throat was burning. Productive cough with green sputum. C/o wheezing and sob. He has been taking mucinex D which helps a little bit but only last for about 3 hours. He is a current smoker, he has not been smoking since he has been sick. No fever, chills or body aches. Denies any sick contact  C/o nasal congestion. No ear pain  Continues to have a sore throat. He rates his pain an 8/10. He has received both covid vaccines. He has cologuard at home. He needs to complete. Plans to get lab work done Monday    ROS: negative except for hpi  Exam: tarik d/t telephone visit  History and medications reviewed    A/p:   1.) bronchitis  -covid testing offered but declined  -rx for zpack, prednisone taper and tessalon pearles. -encouraged use of albuterol  -discussed smoking cessation  2.) tobacco abuse  -discussed smoking cessation. He understands he should quit. He doesn't smoke when he is sick. He is working on quitting. F/u PRN. -pt encouraged to complete cologuard. I affirm this is a Patient Initiated Episode with a Patient who has not had a related appointment within my department in the past 7 days or scheduled within the next 24 hours.     Patient identification was verified at the start of the visit: Yes    Total Time: minutes: 11-20 minutes    The visit was conducted pursuant

## 2021-04-21 NOTE — PATIENT INSTRUCTIONS
Stopping Smoking: Care Instructions  Your Care Instructions     Cigarette smokers crave the nicotine in cigarettes. Giving it up is much harder than simply changing a habit. Your body has to stop craving the nicotine. It is hard to quit, but you can do it. There are many tools that people use to quit smoking. You may find that combining tools works best for you. There are several steps to quitting. First you get ready to quit. Then you get support to help you. After that, you learn new skills and behaviors to become a nonsmoker. For many people, a necessary step is getting and using medicine. Your doctor will help you set up the plan that best meets your needs. You may want to attend a smoking cessation program to help you quit smoking. When you choose a program, look for one that has proven success. Ask your doctor for ideas. You will greatly increase your chances of success if you take medicine as well as get counseling or join a cessation program.  Some of the changes you feel when you first quit tobacco are uncomfortable. Your body will miss the nicotine at first, and you may feel short-tempered and grumpy. You may have trouble sleeping or concentrating. Medicine can help you deal with these symptoms. You may struggle with changing your smoking habits and rituals. The last step is the tricky one: Be prepared for the smoking urge to continue for a time. This is a lot to deal with, but keep at it. You will feel better. Follow-up care is a key part of your treatment and safety. Be sure to make and go to all appointments, and call your doctor if you are having problems. It's also a good idea to know your test results and keep a list of the medicines you take. How can you care for yourself at home? · Ask your family, friends, and coworkers for support. You have a better chance of quitting if you have help and support.   · Join a support group, such as Nicotine Anonymous, for people who are trying to quit smoking. · Consider signing up for a smoking cessation program, such as the American Lung Association's Freedom from Smoking program.  · Get text messaging support. Go to the website at www.smokefree. gov to sign up for the [facility] program.  · Set a quit date. Pick your date carefully so that it is not right in the middle of a big deadline or stressful time. Once you quit, do not even take a puff. Get rid of all ashtrays and lighters after your last cigarette. Clean your house and your clothes so that they do not smell of smoke. · Learn how to be a nonsmoker. Think about ways you can avoid those things that make you reach for a cigarette. ? Avoid situations that put you at greatest risk for smoking. For some people, it is hard to have a drink with friends without smoking. For others, they might skip a coffee break with coworkers who smoke. ? Change your daily routine. Take a different route to work or eat a meal in a different place. · Cut down on stress. Calm yourself or release tension by doing an activity you enjoy, such as reading a book, taking a hot bath, or gardening. · Talk to your doctor or pharmacist about nicotine replacement therapy, which replaces the nicotine in your body. You still get nicotine but you do not use tobacco. Nicotine replacement products help you slowly reduce the amount of nicotine you need. These products come in several forms, many of them available over-the-counter:  ? Nicotine patches  ? Nicotine gum and lozenges  ? Nicotine inhaler  · Ask your doctor about bupropion (Wellbutrin) or varenicline (Chantix), which are prescription medicines. They do not contain nicotine. They help you by reducing withdrawal symptoms, such as stress and anxiety. · Some people find hypnosis, acupuncture, and massage helpful for ending the smoking habit. · Eat a healthy diet and get regular exercise. Having healthy habits will help your body move past its craving for nicotine.   · Be prepared to keep trying. Most people are not successful the first few times they try to quit. Do not get mad at yourself if you smoke again. Make a list of things you learned and think about when you want to try again, such as next week, next month, or next year. Where can you learn more? Go to https://chpepicewliz.Repunch. org and sign in to your [employer] account. Enter R687 in the [employer] box to learn more about \"Stopping Smoking: Care Instructions. \"     If you do not have an account, please click on the \"Sign Up Now\" link. Current as of: March 12, 2020               Content Version: 12.8  © 2006-2021 [employer]wise, Incorporated. Care instructions adapted under license by [facility] ([facility]). If you have questions about a medical condition or this instruction, always ask your healthcare professional. [address] any warranty or liability for your use of this information.

## 2021-04-23 LAB
AVERAGE GLUCOSE: NORMAL
HBA1C MFR BLD: 6.8 %

## 2021-05-01 DIAGNOSIS — R41.3 MEMORY LOSS: ICD-10-CM

## 2021-05-01 DIAGNOSIS — R41.89 PSEUDODEMENTIA: ICD-10-CM

## 2021-05-01 DIAGNOSIS — F32.A DEPRESSION, UNSPECIFIED DEPRESSION TYPE: ICD-10-CM

## 2021-05-03 DIAGNOSIS — R73.09 ELEVATED HEMOGLOBIN A1C: ICD-10-CM

## 2021-05-03 NOTE — TELEPHONE ENCOUNTER
Pharmacy requesting refill of Memantine . Medication active on med list yes      Date of last fill: 3/29/21  with 0 refills verified on 5/3/21  verified by JHONATAN, RN  I verified with pt he is taking 1 bid now.     Date of last appointment 3/29/21    Next Visit Date:  7/14/2021

## 2021-05-04 ENCOUNTER — PATIENT MESSAGE (OUTPATIENT)
Dept: UROLOGY | Age: 61
End: 2021-05-04

## 2021-05-04 RX ORDER — MEMANTINE HYDROCHLORIDE 5 MG/1
TABLET ORAL
Qty: 60 TABLET | Refills: 2 | Status: SHIPPED | OUTPATIENT
Start: 2021-05-04 | End: 2021-08-02

## 2021-05-10 ENCOUNTER — OFFICE VISIT (OUTPATIENT)
Dept: UROLOGY | Age: 61
End: 2021-05-10
Payer: MEDICAID

## 2021-05-10 VITALS
HEART RATE: 80 BPM | BODY MASS INDEX: 40.6 KG/M2 | SYSTOLIC BLOOD PRESSURE: 115 MMHG | HEIGHT: 71 IN | DIASTOLIC BLOOD PRESSURE: 77 MMHG | WEIGHT: 290 LBS | TEMPERATURE: 97.9 F

## 2021-05-10 DIAGNOSIS — R79.89 LOW TESTOSTERONE: ICD-10-CM

## 2021-05-10 DIAGNOSIS — R53.82 CHRONIC FATIGUE: ICD-10-CM

## 2021-05-10 DIAGNOSIS — N52.01 ERECTILE DYSFUNCTION DUE TO ARTERIAL INSUFFICIENCY: Primary | ICD-10-CM

## 2021-05-10 DIAGNOSIS — R68.82 DECREASED LIBIDO: ICD-10-CM

## 2021-05-10 PROCEDURE — 4004F PT TOBACCO SCREEN RCVD TLK: CPT | Performed by: UROLOGY

## 2021-05-10 PROCEDURE — 3017F COLORECTAL CA SCREEN DOC REV: CPT | Performed by: UROLOGY

## 2021-05-10 PROCEDURE — 99214 OFFICE O/P EST MOD 30 MIN: CPT | Performed by: UROLOGY

## 2021-05-10 PROCEDURE — G8417 CALC BMI ABV UP PARAM F/U: HCPCS | Performed by: UROLOGY

## 2021-05-10 PROCEDURE — G8427 DOCREV CUR MEDS BY ELIG CLIN: HCPCS | Performed by: UROLOGY

## 2021-05-10 RX ORDER — GABAPENTIN 300 MG/1
300 CAPSULE ORAL DAILY
COMMUNITY
Start: 2021-05-06

## 2021-05-10 RX ORDER — TIZANIDINE 2 MG/1
TABLET ORAL
COMMUNITY
Start: 2021-05-06

## 2021-05-10 ASSESSMENT — ENCOUNTER SYMPTOMS
SHORTNESS OF BREATH: 0
VOMITING: 0
WHEEZING: 0
ABDOMINAL PAIN: 1
EYE PAIN: 0
EYE REDNESS: 0
NAUSEA: 0
CONSTIPATION: 0
COUGH: 0
DIARRHEA: 0
BACK PAIN: 0

## 2021-05-10 NOTE — PROGRESS NOTES
[address] [address]  Dept: [address] [facility] Urology Office Note - Established    Patient:  Aimee Mai  YOB: 1960  Date: 5/10/2021    The patient is a 62 y.o. male who presents todayfor evaluation of the following problems:   Chief Complaint   Patient presents with    Follow-up     T labs       HPI  Mr. Xin Artis is a 80-year-old gentleman who has sexual dysfunction. He does have decreased libido and erectile dysfunction. He has failed oral therapies. He is interested in a penile prosthesis. His testosterone level is 144. He is interested in pursuing therapies for his erectile dysfunction before talking about testosterone replacement. Summary of old records: N/A    Additional History: N/A    Procedures Today: N/A    Urinalysis today:  No results found for this visit on 05/10/21. Last several PSA's:  Lab Results   Component Value Date    PSA 0.59 04/26/2017     Last total testosterone:  No results found for: TESTOSTERONE    AUA Symptom Score (5/10/2021):   INCOMPLETE EMPTYING: How often have you had the sensation of not emptying your bladder?: Not at all  FREQUENCY: How often do you have to urinate less than every two hours?: Less than Half the time  INTERMITTENCY: How often have you found you stopped and started again several times when you urinated?: Not at all  URGENCY: How often have you found it difficult to postpone urination?: Not at all  WEAK STREAM: How often have you had a weak urinary stream?: Not at all  STRAINING: How often have you had to strain to start  urination?: Not at all  NOCTURIA: How many times did you typically get up at night to uriniate?: 2 Times  TOTAL I-PSS SCORE[de-identified] 4  How would you feel if you were to spend the rest of your life with your urinary condition?: Pleased    Last BUN and creatinine:  Lab Results   Component Value Date    BUN 12 02/09/2020 Lab Results   Component Value Date    CREATININE 0.86 02/09/2020       Additional Lab/Culture results: T 1.44    Imaging Reviewed during this Office Visit: none  (results were independently reviewed by physician and radiology report verified)    PAST MEDICAL, FAMILY AND SOCIAL HISTORY UPDATE:  Past Medical History:   Diagnosis Date    ADHD (attention deficit hyperactivity disorder)     Arthritis     knees, hands, back.  Asthma     as child.  Back pain     CAD (coronary artery disease)     Cataract     Depression     pt. denies.     Diabetes mellitus [facility]     Heart attack ([facility]     2011    Hypertension     Kidney stones     Mini stroke ([facility]     2014    Osteoarthritis     Seasonal allergies     Sinus problem      Past Surgical History:   Procedure Laterality Date    CHOLECYSTECTOMY      2016    COLONOSCOPY      CYSTO/URETERO/PYELOSCOPY, CALCULUS TX Right 2/5/2020    CYSTO, URETEROSCOPY, RIGHT URETERAL STENT PLACEMENT, RETROGRADE PYELOGRAM performed by Pat Jefferson MD at [place] [address], rt. inguinal    HERNIA REPAIR Left 01/2018    repair    HERNIA REPAIR Right 01/2018    looked at at same time they did left repair    INGUINAL HERNIA REPAIR Left 08/19/2016    LAPAROSCOPY N/A 1/16/2018    DIAGNOSTIC LAPAROSCOPY [facility] URETEROSCOPY Right 02/05/2020    stent placement, cystoscopy, retrograde pyelogram    VASECTOMY       Family History   Problem Relation Age of Onset    Cancer Father         throat    Cirrhosis Father     High Blood Pressure Sister     Asthma Sister     Arthritis Sister     High Blood Pressure Mother     Heart Disease Mother     Alzheimer's Disease Mother      Outpatient Medications Marked as Taking for the 5/10/21 encounter (Office Visit) with Pat Jefferson MD   Medication Sig Dispense Refill    diclofenac sodium (VOLTAREN) 1 % GEL APPLY 4 GRAMS TO RIGHT SHOULDER/BICEP FOUR TIMES A DAY IF NEEDED      gabapentin (NEURONTIN) 300 MG capsule take 1 capsule by mouth twice a day      tiZANidine (ZANAFLEX) 2 MG tablet take 1 tablet by mouth at bedtime      memantine (NAMENDA) 5 MG tablet 1 tablet twice a day 60 tablet 2    carvedilol (COREG) 25 MG tablet take 1 tablet by mouth twice a day 180 tablet 2    albuterol sulfate  (90 Base) MCG/ACT inhaler inhale 2 puffs by mouth and INTO THE LUNGS every 6 hours if needed for wheezing 8.5 g 3    levocetirizine (XYZAL) 5 MG tablet Take 1 tablet by mouth nightly 30 tablet 5    mometasone (NASONEX) 50 MCG/ACT nasal spray 2 sprays by Nasal route daily 1 Inhaler 3    oxyCODONE-acetaminophen (ROXICET) 5-325 MG/5ML solution take 1 tablet by mouth three times a day if needed      traZODone (DESYREL) 50 MG tablet take 2 tablets by mouth at bedtime 60 tablet 4    fluticasone (FLONASE) 50 MCG/ACT nasal spray instill 2 sprays into each nostril once daily 16 g 1    amLODIPine (NORVASC) 10 MG tablet take 1 tablet by mouth once daily 90 tablet 1    sildenafil (VIAGRA) 100 MG tablet Take 1 tablet by mouth as needed for Erectile Dysfunction Do note take more than one tablet.  90 tablet 0    atorvastatin (LIPITOR) 10 MG tablet Take 1 tablet by mouth daily 90 tablet 3    RA ALLERGY RELIEF 180 MG tablet take 1 tablet by mouth once daily 30 tablet 5    venlafaxine (EFFEXOR XR) 150 MG extended release capsule Take 1 capsule by mouth daily 30 capsule 2    tiZANidine (ZANAFLEX) 4 MG tablet Take 1 tablet by mouth 3 times daily as needed (Back pain) 30 tablet 0    fluticasone-salmeterol (ADVAIR) 100-50 MCG/DOSE diskus inhaler Inhale 1 puff into the lungs every 12 hours 60 each 3    topiramate (TOPAMAX) 50 MG tablet take 1.5 tab by mouth twice a day 90 tablet 6    diclofenac sodium (VOLTAREN) 1 % GEL apply 4 grams to affected area four times a day AS NEEDED FOR PAIN      pregabalin (LYRICA) 50 MG capsule take 1 capsule by mouth twice a day      fluticasone (FLOVENT HFA) 110 MCG/ACT inhaler inhale 2 puffs by mouth INTO THE LUNGS twice a day 12 g 3    oxyCODONE-acetaminophen (PERCOCET) 5-325 MG per tablet Take 1 tablet by mouth every 4 hours as needed for Pain.  diphenhydrAMINE (BENADRYL) 25 MG tablet Take 25 mg by mouth every 6 hours as needed for Itching      nitroGLYCERIN (NITROSTAT) 0.3 MG SL tablet place 1 tablet under the tongue if needed every 5 minutes for chest pain for 3 doses IF NO RELIEF AF 25 tablet 3    aspirin 81 MG tablet Take 81 mg by mouth daily. Lisinopril, Aleve  [naproxen sodium], Amitriptyline hcl, Exelon [rivastigmine], and Ibuprofen  Social History     Tobacco Use   Smoking Status Current Every Day Smoker    Packs/day: 0.25    Years: 28.00    Pack years: 7.00    Types: Cigarettes   Smokeless Tobacco Never Used     (Ifpatient a smoker, smoking cessation counseling offered)    Social History     Substance and Sexual Activity   Alcohol Use Not Currently    Comment: rarely       REVIEW OF SYSTEMS:  Review of Systems    Physical Exam:      Vitals:    05/10/21 0843   BP: 115/77   Pulse: 80   Temp: 97.9 °F (36.6 °C)     Body mass index is 40.45 kg/m². Patient is a 62 y.o. male in no acute distress and alert and oriented to person, place and time. Physical Exam  Constitutional: Patient in no acute distress. Neuro: Alert and oriented to person, place and time. Psych: Mood normal, affect normal  Skin: No rash noted  HEENT: Head: Normocephalic andatraumatic  Conjunctivae and EOM are normal. Pupils are equal, round  Nose:Normal      Assessment and Plan      1. Erectile dysfunction due to arterial insufficiency    2. Decreased libido    3. Chronic fatigue    4. Low testosterone           Plan:   ams 700 IPP st v's  Pt would like to hold off on TRT until after IPP      Return for surgery. Prescriptions Ordered:  No orders of the defined types were placed in this encounter.     Orders Placed:  No orders of the defined types were placed in this encounter. Bettye Epley, MD    Agree with the ROS entered by the MA.

## 2021-05-25 ENCOUNTER — TELEPHONE (OUTPATIENT)
Dept: PRIMARY CARE CLINIC | Age: 61
End: 2021-05-25

## 2021-05-25 DIAGNOSIS — L91.8 SKIN TAG: Primary | ICD-10-CM

## 2021-05-25 NOTE — TELEPHONE ENCOUNTER
Patient called again stating [facility] is not in his network, advised he check with his insurance and call office back. He agreed to do so.

## 2021-05-25 NOTE — TELEPHONE ENCOUNTER
Patient calling stating he has a large skin tag on his neck he would like removed, can we place referral to dermatology for patient?  Thank you

## 2021-06-03 ENCOUNTER — TELEPHONE (OUTPATIENT)
Dept: PRIMARY CARE CLINIC | Age: 61
End: 2021-06-03

## 2021-06-03 DIAGNOSIS — Z12.11 SCREENING FOR COLON CANCER: Primary | ICD-10-CM

## 2021-07-02 ENCOUNTER — TELEPHONE (OUTPATIENT)
Dept: PRIMARY CARE CLINIC | Age: 61
End: 2021-07-02

## 2021-07-02 DIAGNOSIS — Z12.11 SCREENING FOR COLON CANCER: ICD-10-CM

## 2021-07-05 RX ORDER — BENZONATATE 200 MG/1
CAPSULE ORAL
Qty: 30 CAPSULE | Refills: 0 | Status: SHIPPED | OUTPATIENT
Start: 2021-07-05 | End: 2021-08-02

## 2021-07-12 RX ORDER — VORTIOXETINE 20 MG/1
20 TABLET, FILM COATED ORAL
COMMUNITY
Start: 2021-05-10

## 2021-07-13 ENCOUNTER — TELEMEDICINE (OUTPATIENT)
Dept: NEUROLOGY | Age: 61
End: 2021-07-13
Payer: COMMERCIAL

## 2021-07-13 DIAGNOSIS — G47.01 INSOMNIA DUE TO MEDICAL CONDITION: Primary | ICD-10-CM

## 2021-07-13 DIAGNOSIS — G47.33 OBSTRUCTIVE SLEEP APNEA SYNDROME: ICD-10-CM

## 2021-07-13 PROCEDURE — 3017F COLORECTAL CA SCREEN DOC REV: CPT | Performed by: PSYCHIATRY & NEUROLOGY

## 2021-07-13 PROCEDURE — G8417 CALC BMI ABV UP PARAM F/U: HCPCS | Performed by: PSYCHIATRY & NEUROLOGY

## 2021-07-13 PROCEDURE — 4004F PT TOBACCO SCREEN RCVD TLK: CPT | Performed by: PSYCHIATRY & NEUROLOGY

## 2021-07-13 PROCEDURE — 99214 OFFICE O/P EST MOD 30 MIN: CPT | Performed by: PSYCHIATRY & NEUROLOGY

## 2021-07-13 PROCEDURE — G8427 DOCREV CUR MEDS BY ELIG CLIN: HCPCS | Performed by: PSYCHIATRY & NEUROLOGY

## 2021-07-13 RX ORDER — TRAZODONE HYDROCHLORIDE 50 MG/1
TABLET ORAL
Qty: 60 TABLET | Refills: 11 | Status: SHIPPED | OUTPATIENT
Start: 2021-07-13 | End: 2022-07-20 | Stop reason: SDUPTHER

## 2021-07-13 ASSESSMENT — ENCOUNTER SYMPTOMS
BACK PAIN: 1
ALLERGIC/IMMUNOLOGIC NEGATIVE: 1
EYES NEGATIVE: 1
RESPIRATORY NEGATIVE: 1
GASTROINTESTINAL NEGATIVE: 1

## 2021-07-13 NOTE — PROGRESS NOTES
Subjective:      Patient ID: Jean-Pierre Martinez is a 62 y.o. male. HPI  Active problem initiating insomnia  on desyrel to consolidate sleep . There are migraine headaches , depression with memory complaints and sensory neuropathy followed by Dr Robina Bentley . There is also chronic low back pain followed by pain clinic . The condition is he reports that desyrel is working well . He reports to be waking up with dry mouth catching himself gasping for breath. There is history  snoring having no bed partner . He is going to bed at 11 PM falling asleep within 30 minutes with desyrel 100 mg po qhs tolerating this well sleeping to 7AM . There will be one to two awakenings able to fall back asleep . He is rested on awakening . In afternoon if he sits down he will get sleepy taking nap . There is depression on effexor . He has sensory neuropathy on lyrica 50 mg po bid and cymbalta 60 mg po qd . There is low back pain on percocet through pain clinic . He is on topamax as headache prophylaxis with headaches being 2 to 3 per week over vertex head area of grade 10 over 10 attenuated with tylenol . He weighs 279 lbs for 5'11\" frame. Significant medications desyrel 100 mg po qhs , topamax 50 mg po tid , cymbalta 30 mg po qhs . Testing Head CT normal . 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  Asthma     as child.  Back pain     CAD (coronary artery disease)     Cataract     Depression     pt. denies.     Diabetes mellitus ([address]     Heart attack ([address]     2011    Hypertension     Kidney stones     Mini stroke ([address]     2014    Osteoarthritis     Seasonal allergies     Sinus problem        Past Surgical History:   Procedure Laterality Date    CHOLECYSTECTOMY      2016    COLONOSCOPY      CYSTO/URETERO/PYELOSCOPY, CALCULUS TX Right 2/5/2020    CYSTO, URETEROSCOPY, RIGHT URETERAL STENT PLACEMENT, RETROGRADE PYELOGRAM performed by Shaneka Gibbons MD at [facility], rt. inguinal    HERNIA REPAIR Left 01/2018    repair    HERNIA REPAIR Right 01/2018    looked at at same time they did left repair    INGUINAL HERNIA REPAIR Left 08/19/2016    LAPAROSCOPY N/A 1/16/2018    DIAGNOSTIC LAPAROSCOPY performed by Cherie Dupont MD at [address] [facility] URETEROSCOPY Right 02/05/2020    stent placement, cystoscopy, retrograde pyelogram    VASECTOMY         Family History   Problem Relation Age of Onset    Cancer Father         throat    Cirrhosis Father     High Blood Pressure Sister     Asthma Sister     Arthritis Sister     High Blood Pressure Mother     Heart Disease Mother     Alzheimer's Disease Mother        Social History     Socioeconomic History    Marital status: Single     Spouse name: None    Number of children: None    Years of education: None    Highest education level: None   Occupational History    None   Tobacco Use    Smoking status: Current Some Day Smoker     Packs/day: 0.25     Years: 28.00     Pack years: 7.00     Types: Cigarettes    Smokeless tobacco: Never Used   Vaping Use    Vaping Use: Never used   Substance and Sexual Activity    Alcohol use: Not Currently     Comment: rarely    Drug use: No    Sexual activity: None   Other Topics Concern    None   Social History Narrative    None     Social Determinants of Health     Financial Resource Strain:     Difficulty of Paying Living Expenses:    Food Insecurity:     Worried About Running Out of Food in the Last Year:     Ran Out of Food in the Last Year:    Transportation Needs:     Lack of Transportation (Medical):      Lack of Transportation (Non-Medical):    Physical Activity:     Days of Exercise per Week:     Minutes of Exercise per Session:    Stress:     Feeling of Stress :    Social Connections:     Frequency of Communication with Friends and Family:     Frequency of Social Gatherings with Friends and Family:     Attends Ca[employer] Services:     Active Member of Clubs or Organizations:     Attends Club or Organization Meetings:     Marital Status:    Intimate Partner Violence:     Fear of Current or Ex-Partner:     Emotionally Abused:     Physically Abused:     Sexually Abused:        Current Outpatient Medications   Medication Sig Dispense Refill    traZODone (DESYREL) 50 MG tablet take 2 tablets by mouth at bedtime 60 tablet 11    TRINTELLIX 20 MG TABS tablet 20 mg       benzonatate (TESSALON) 200 MG capsule take 1 capsule by mouth three times a day if needed for cough 30 capsule 0    diclofenac sodium (VOLTAREN) 1 % GEL APPLY 4 GRAMS TO RIGHT SHOULDER/BICEP FOUR TIMES A DAY IF NEEDED      gabapentin (NEURONTIN) 300 MG capsule take 1 capsule by mouth twice a day      tiZANidine (ZANAFLEX) 2 MG tablet take 1 tablet by mouth at bedtime      memantine (NAMENDA) 5 MG tablet 1 tablet twice a day 60 tablet 2    carvedilol (COREG) 25 MG tablet take 1 tablet by mouth twice a day 180 tablet 2    albuterol sulfate  (90 Base) MCG/ACT inhaler inhale 2 puffs by mouth and INTO THE LUNGS every 6 hours if needed for wheezing 8.5 g 3    levocetirizine (XYZAL) 5 MG tablet Take 1 tablet by mouth nightly 30 tablet 5    mometasone (NASONEX) 50 MCG/ACT nasal spray 2 sprays by Nasal route daily 1 Inhaler 3    fluticasone (FLONASE) 50 MCG/ACT nasal spray instill 2 sprays into each nostril once daily 16 g 1    amLODIPine (NORVASC) 10 MG tablet take 1 tablet by mouth once daily 90 tablet 1    atorvastatin (LIPITOR) 10 MG tablet Take 1 tablet by mouth daily 90 tablet 3    RA ALLERGY RELIEF 180 MG tablet take 1 tablet by mouth once daily 30 tablet 5    fluticasone-salmeterol (ADVAIR) 100-50 MCG/DOSE diskus inhaler Inhale 1 puff into the lungs every 12 hours 60 each 3    nicotine (NICODERM CQ) 21 MG/24HR Place 1 patch onto the skin daily 42 patch 0    topiramate (TOPAMAX) 50 MG tablet take 1.5 tab by mouth twice a day 90 tablet 6    diclofenac sodium (VOLTAREN) 1 % GEL apply 4 grams to affected area four times a day AS NEEDED FOR PAIN      pregabalin (LYRICA) 50 MG capsule take 1 capsule by mouth twice a day      fluticasone (FLOVENT HFA) 110 MCG/ACT inhaler inhale 2 puffs by mouth INTO THE LUNGS twice a day 12 g 3    oxyCODONE-acetaminophen (PERCOCET) 5-325 MG per tablet Take 1 tablet by mouth every 4 hours as needed for Pain.  nitroGLYCERIN (NITROSTAT) 0.3 MG SL tablet place 1 tablet under the tongue if needed every 5 minutes for chest pain for 3 doses IF NO RELIEF AF 25 tablet 3    aspirin 81 MG tablet Take 81 mg by mouth daily.  sildenafil (VIAGRA) 100 MG tablet Take 1 tablet by mouth as needed for Erectile Dysfunction Do note take more than one tablet. (Patient not taking: Reported on 7/12/2021) 90 tablet 0    venlafaxine (EFFEXOR XR) 150 MG extended release capsule Take 1 capsule by mouth daily (Patient not taking: Reported on 7/12/2021) 30 capsule 2    tiZANidine (ZANAFLEX) 4 MG tablet Take 1 tablet by mouth 3 times daily as needed (Back pain) (Patient not taking: Reported on 7/12/2021) 30 tablet 0     No current facility-administered medications for this visit. Allergies   Allergen Reactions    Lisinopril      States he is allergic to the generic forms of medication, can take lisinopril    Aleve  [Naproxen Sodium] Nausea Only    Amitriptyline Hcl Other (See Comments)    Exelon [Rivastigmine] Other (See Comments)     Patient stated patch left a \"burn kathy\" on his skin     Ibuprofen Other (See Comments)     \"hurts my stomach. \"       Review of Systems   Constitutional: Negative. HENT: Negative. Eyes: Negative. Respiratory: Negative. Cardiovascular: Negative. Gastrointestinal: Negative. Endocrine: Negative. Genitourinary: Negative.     Musculoskeletal: Positive for back pain. Skin: Negative. Allergic/Immunologic: Negative. Neurological: Negative. Hematological: Negative. Psychiatric/Behavioral: Negative. Objective:   Physical Exam  There were no vitals filed for this visit. weight:      Neurological Examination  Constitutional .General exam well groomed   Head/Ears /Nose/Throat: external ear . Normal exam  Neck and thyroid . Normal size. No bruits  Respiratory . Breathsounds clear bilaterally  Cardiovascular: Auscultation of heart with regular rate and rhythm  Musculoskeletal. Muscle bulk and tone normal                                                           Muscle strength 5/5 strength throughout                                                                                No dysmetria or dysdiadokinesis  No tremor   Normal fine motor  Gait normal   Orientation Alert and oriented x 3   Attention and concentration normal  Short term memory 2 words out of 3 in one minute  Language process and speech normal . No aphasia   Cranial nerve 2 normal acuety and visual fields  Cranial nerve 3, 4 and 6 . Extraocular muscles are intact . Pupils are equal and reactive   Cranial nerve 5 . Intact corneal reflex. Normal facial sensation  Cranial nerve 7 normal exam   Cranial nerve 8. Grossly intact hearing   Cranial nerve 9 and 10. Symmetric palate elevation   Cranial nerve 11 , 5 out of 5 strength   Cranial Nerve 12 midline tongue . No atrophy  Sensation . Decreasedd pinprick and light touch distal lower extremities   Deep Tendon Reflexes normal  Plantar response flexor bilaterally    Assessment:       Diagnosis Orders   1. Insomnia due to medical condition     2.  Obstructive sleep apnea syndrome  Baseline Diagnostic Sleep Study   There is suggestion of sleep apnea to undergo sleep study to remain on desyrel      Plan:      Orders Placed This Encounter   Procedures    Baseline Diagnostic Sleep Study     Standing Status:   Future     Standing Expiration Date: 7/13/2022     Order Specific Question:   Adult or Pediatric     Answer:   Adult Study (>7 Years)     Order Specific Question:   Location For Sleep Study     Answer: [facility] Specific Question:   Select Sleep Lab Location     Answer:   [employer]     Order Specific Question:   Pre-Study Patient Questions: Answer:   Complains of daytime sleepiness     Order Specific Question:   Pre-Study Patient Questions: Answer:   Snores loudly during sleep         Tequila Bryson is a 62 y.o. male being evaluated by a Virtual Visit (video visit) encounter to address concerns as mentioned above. A caregiver was present when appropriate. Due to this being a TeleHealth encounter ([facility]), evaluation of the following organ systems was limited: Vitals/Constitutional/EENT/Resp/CV/GI//MS/Neuro/Skin/Heme-Lymph-Imm. Pursuant to the emergency declaration under the [address] [place] [address] and the [employer] Act, this Virtual Visit was conducted with patient's (and/or legal guardian's) consent, to reduce the patient's risk of exposure to COVID-19 and provide necessary medical care. The patient (and/or legal guardian) has also been advised to contact this office for worsening conditions or problems, and seek emergency medical treatment and/or call 911 if deemed necessary. Patient identification was verified at the start of the visit: Yes    Total time spent for this encounter: 20 minutes     Services were provided through a video synchronous discussion virtually to substitute for in-person clinic visit. Patient and provider were located at their individual homes. --Ludin Reyes MD on 7/13/2021 at 11:56 AM    An electronic signature was used to authenticate this note.   Ludin Reyes MD

## 2021-07-19 RX ORDER — ALBUTEROL SULFATE 90 UG/1
AEROSOL, METERED RESPIRATORY (INHALATION)
Qty: 8.5 G | Refills: 3 | Status: SHIPPED | OUTPATIENT
Start: 2021-07-19 | End: 2021-10-18

## 2021-07-21 ENCOUNTER — TELEPHONE (OUTPATIENT)
Dept: PRIMARY CARE CLINIC | Age: 61
End: 2021-07-21

## 2021-07-21 RX ORDER — METHYLPREDNISOLONE 4 MG/1
TABLET ORAL
Qty: 1 KIT | Refills: 0 | Status: SHIPPED | OUTPATIENT
Start: 2021-07-21 | End: 2021-07-27

## 2021-07-28 ENCOUNTER — OFFICE VISIT (OUTPATIENT)
Dept: PRIMARY CARE CLINIC | Age: 61
End: 2021-07-28
Payer: COMMERCIAL

## 2021-07-28 ENCOUNTER — HOSPITAL ENCOUNTER (OUTPATIENT)
Age: 61
Setting detail: SPECIMEN
Discharge: HOME | End: 2021-07-28
Payer: MEDICAID

## 2021-07-28 VITALS — DIASTOLIC BLOOD PRESSURE: 78 MMHG | SYSTOLIC BLOOD PRESSURE: 120 MMHG | OXYGEN SATURATION: 93 % | HEART RATE: 73 BPM

## 2021-07-28 DIAGNOSIS — E78.5 DYSLIPIDEMIA: ICD-10-CM

## 2021-07-28 DIAGNOSIS — I10 ESSENTIAL HYPERTENSION: ICD-10-CM

## 2021-07-28 DIAGNOSIS — E87.6 HYPOKALEMIA: ICD-10-CM

## 2021-07-28 DIAGNOSIS — Z79.4 TYPE 2 DIABETES MELLITUS WITHOUT COMPLICATION, WITH LONG-TERM CURRENT USE OF INSULIN (HCC): Primary | ICD-10-CM

## 2021-07-28 DIAGNOSIS — R09.82 POST-NASAL DRIP: ICD-10-CM

## 2021-07-28 DIAGNOSIS — E11.9 TYPE 2 DIABETES MELLITUS WITHOUT COMPLICATION, WITH LONG-TERM CURRENT USE OF INSULIN (HCC): Primary | ICD-10-CM

## 2021-07-28 DIAGNOSIS — Z79.4 TYPE 2 DIABETES MELLITUS WITHOUT COMPLICATION, WITH LONG-TERM CURRENT USE OF INSULIN (HCC): ICD-10-CM

## 2021-07-28 DIAGNOSIS — E66.9 OBESITY (BMI 30-39.9): ICD-10-CM

## 2021-07-28 DIAGNOSIS — E11.9 TYPE 2 DIABETES MELLITUS WITHOUT COMPLICATION, WITH LONG-TERM CURRENT USE OF INSULIN (HCC): ICD-10-CM

## 2021-07-28 LAB
ABSOLUTE EOS #: 0.33 K/UL (ref 0–0.44)
ABSOLUTE IMMATURE GRANULOCYTE: 0.03 K/UL (ref 0–0.3)
ABSOLUTE LYMPH #: 1.98 K/UL (ref 1.1–3.7)
ABSOLUTE MONO #: 0.45 K/UL (ref 0.1–1.2)
ALT SERPL-CCNC: 18 U/L (ref 5–41)
ANION GAP SERPL CALCULATED.3IONS-SCNC: 14 MMOL/L (ref 9–17)
BASOPHILS # BLD: 1 % (ref 0–2)
BASOPHILS ABSOLUTE: 0.04 K/UL (ref 0–0.2)
BUN BLDV-MCNC: 16 MG/DL (ref 6–20)
BUN/CREAT BLD: ABNORMAL (ref 9–20)
CALCIUM SERPL-MCNC: 9.1 MG/DL (ref 8.6–10.4)
CHLORIDE BLD-SCNC: 109 MMOL/L (ref 98–107)
CHOLESTEROL/HDL RATIO: 4.7
CHOLESTEROL: 132 MG/DL
CO2: 19 MMOL/L (ref 20–31)
CREAT SERPL-MCNC: 0.97 MG/DL (ref 0.7–1.2)
DIFFERENTIAL TYPE: ABNORMAL
EOSINOPHILS RELATIVE PERCENT: 5 % (ref 1–4)
GFR AFRICAN AMERICAN: >60 ML/MIN
GFR NON-AFRICAN AMERICAN: >60 ML/MIN
GFR SERPL CREATININE-BSD FRML MDRD: ABNORMAL ML/MIN/{1.73_M2}
GFR SERPL CREATININE-BSD FRML MDRD: ABNORMAL ML/MIN/{1.73_M2}
GLUCOSE BLD-MCNC: 142 MG/DL (ref 70–99)
HCT VFR BLD CALC: 45.8 % (ref 40.7–50.3)
HDLC SERPL-MCNC: 28 MG/DL
HEMOGLOBIN: 14.6 G/DL (ref 13–17)
IMMATURE GRANULOCYTES: 0 %
LDL CHOLESTEROL: 51 MG/DL (ref 0–130)
LYMPHOCYTES # BLD: 28 % (ref 24–43)
MAGNESIUM: 2.2 MG/DL (ref 1.6–2.6)
MCH RBC QN AUTO: 29.6 PG (ref 25.2–33.5)
MCHC RBC AUTO-ENTMCNC: 31.9 G/DL (ref 28.4–34.8)
MCV RBC AUTO: 92.7 FL (ref 82.6–102.9)
MONOCYTES # BLD: 6 % (ref 3–12)
NRBC AUTOMATED: 0 PER 100 WBC
PDW BLD-RTO: 12.6 % (ref 11.8–14.4)
PLATELET # BLD: 204 K/UL (ref 138–453)
PLATELET ESTIMATE: ABNORMAL
PMV BLD AUTO: 11.3 FL (ref 8.1–13.5)
POTASSIUM SERPL-SCNC: 3.9 MMOL/L (ref 3.7–5.3)
RBC # BLD: 4.94 M/UL (ref 4.21–5.77)
RBC # BLD: ABNORMAL 10*6/UL
SEG NEUTROPHILS: 60 % (ref 36–65)
SEGMENTED NEUTROPHILS ABSOLUTE COUNT: 4.29 K/UL (ref 1.5–8.1)
SODIUM BLD-SCNC: 142 MMOL/L (ref 135–144)
TRIGL SERPL-MCNC: 263 MG/DL
VLDLC SERPL CALC-MCNC: ABNORMAL MG/DL (ref 1–30)
WBC # BLD: 7.1 K/UL (ref 3.5–11.3)
WBC # BLD: ABNORMAL 10*3/UL

## 2021-07-28 PROCEDURE — 4004F PT TOBACCO SCREEN RCVD TLK: CPT | Performed by: FAMILY MEDICINE

## 2021-07-28 PROCEDURE — 2022F DILAT RTA XM EVC RTNOPTHY: CPT | Performed by: FAMILY MEDICINE

## 2021-07-28 PROCEDURE — G8417 CALC BMI ABV UP PARAM F/U: HCPCS | Performed by: FAMILY MEDICINE

## 2021-07-28 PROCEDURE — G8427 DOCREV CUR MEDS BY ELIG CLIN: HCPCS | Performed by: FAMILY MEDICINE

## 2021-07-28 PROCEDURE — 99214 OFFICE O/P EST MOD 30 MIN: CPT | Performed by: FAMILY MEDICINE

## 2021-07-28 PROCEDURE — 3017F COLORECTAL CA SCREEN DOC REV: CPT | Performed by: FAMILY MEDICINE

## 2021-07-28 PROCEDURE — 3051F HG A1C>EQUAL 7.0%<8.0%: CPT | Performed by: FAMILY MEDICINE

## 2021-07-28 RX ORDER — PHENTERMINE HYDROCHLORIDE 37.5 MG/1
37.5 TABLET ORAL
Qty: 30 TABLET | Refills: 0 | Status: SHIPPED | OUTPATIENT
Start: 2021-07-28 | End: 2021-08-30 | Stop reason: SDUPTHER

## 2021-07-28 SDOH — ECONOMIC STABILITY: FOOD INSECURITY: WITHIN THE PAST 12 MONTHS, THE FOOD YOU BOUGHT JUST DIDN'T LAST AND YOU DIDN'T HAVE MONEY TO GET MORE.: NEVER TRUE

## 2021-07-28 SDOH — ECONOMIC STABILITY: FOOD INSECURITY: WITHIN THE PAST 12 MONTHS, YOU WORRIED THAT YOUR FOOD WOULD RUN OUT BEFORE YOU GOT MONEY TO BUY MORE.: NEVER TRUE

## 2021-07-28 ASSESSMENT — SOCIAL DETERMINANTS OF HEALTH (SDOH): HOW HARD IS IT FOR YOU TO PAY FOR THE VERY BASICS LIKE FOOD, HOUSING, MEDICAL CARE, AND HEATING?: NOT HARD AT ALL

## 2021-07-28 NOTE — PROGRESS NOTES
[facility] PRIMARY CARE  [facility] [address]  [address] [address]  Dept: [phone]  Dept Fax: [phone]    Fiorella Hernandez is a 62 y.o. male who presents today for his medical conditions/complaints as noted below. Fiorella Hernandez is c/o of  Chief Complaint   Patient presents with   Brenden Fay Other     discuss weight gain since starting gabapentin         HPI:     HPI      Pt here for follow up on chronic conditions. Since starting gabapentin in may noted weight gain and states has been having some difficulty losing weight. Stopped drinking soda, eating healthier, has been exercising . He has been keeping track of calories. He has cut back on smoking. A1c 6.8 in April when checked with cardiology. He would like to wait and get it checked again before considering if medication is needed. Post nasal drip- using generic form of xyzal, helped initially,. But now notes the post nasal drip is back. Asthma has been controlled. Hemoglobin A1C (%)   Date Value   07/28/2021 7.2 (H)   04/23/2021 6.8   11/01/2019 6.1             ( goal A1C is < 7)   Microalb/Crt. Ratio (mcg/mg creat)   Date Value   04/26/2017 17 (H)     LDL Cholesterol (mg/dL)   Date Value   07/28/2021 51     LDL Calculated (mg/dL)   Date Value   11/01/2019 95       (goal LDL is <100)   AST (U/L)   Date Value   08/07/2017 25     ALT (U/L)   Date Value   07/28/2021 18     BUN (mg/dL)   Date Value   07/28/2021 16     BP Readings from Last 3 Encounters:   07/28/21 120/78   07/21/21 110/82   05/10/21 115/77          (goal 120/80)    Past Medical History:   Diagnosis Date    ADHD (attention deficit hyperactivity disorder)     Arthritis     knees, hands, back.  Asthma     as child.  Back pain     CAD (coronary artery disease)     Cataract     Depression     pt. denies.     Diabetes mellitus ([address]     Heart attack ([facility]     2011    Hypertension     Kidney stones     Mini tablet 11    TRINTELLIX 20 MG TABS tablet 20 mg       benzonatate (TESSALON) 200 MG capsule take 1 capsule by mouth three times a day if needed for cough 30 capsule 0    diclofenac sodium (VOLTAREN) 1 % GEL APPLY 4 GRAMS TO RIGHT SHOULDER/BICEP FOUR TIMES A DAY IF NEEDED      gabapentin (NEURONTIN) 300 MG capsule take 1 capsule by mouth twice a day      tiZANidine (ZANAFLEX) 2 MG tablet take 1 tablet by mouth at bedtime      memantine (NAMENDA) 5 MG tablet 1 tablet twice a day 60 tablet 2    carvedilol (COREG) 25 MG tablet take 1 tablet by mouth twice a day 180 tablet 2    mometasone (NASONEX) 50 MCG/ACT nasal spray 2 sprays by Nasal route daily 1 Inhaler 3    fluticasone (FLONASE) 50 MCG/ACT nasal spray instill 2 sprays into each nostril once daily 16 g 1    amLODIPine (NORVASC) 10 MG tablet take 1 tablet by mouth once daily 90 tablet 1    atorvastatin (LIPITOR) 10 MG tablet Take 1 tablet by mouth daily 90 tablet 3    fluticasone-salmeterol (ADVAIR) 100-50 MCG/DOSE diskus inhaler Inhale 1 puff into the lungs every 12 hours 60 each 3    nicotine (NICODERM CQ) 21 MG/24HR Place 1 patch onto the skin daily 42 patch 0    topiramate (TOPAMAX) 50 MG tablet take 1.5 tab by mouth twice a day 90 tablet 6    diclofenac sodium (VOLTAREN) 1 % GEL apply 4 grams to affected area four times a day AS NEEDED FOR PAIN      fluticasone (FLOVENT HFA) 110 MCG/ACT inhaler inhale 2 puffs by mouth INTO THE LUNGS twice a day 12 g 3    oxyCODONE-acetaminophen (PERCOCET) 5-325 MG per tablet Take 1 tablet by mouth every 4 hours as needed for Pain.  nitroGLYCERIN (NITROSTAT) 0.3 MG SL tablet place 1 tablet under the tongue if needed every 5 minutes for chest pain for 3 doses IF NO RELIEF AF 25 tablet 3    aspirin 81 MG tablet Take 81 mg by mouth daily. No current facility-administered medications for this visit.      Allergies   Allergen Reactions    Lisinopril      States he is allergic to the generic forms of medication, can take lisinopril    Aleve  [Naproxen Sodium] Nausea Only    Amitriptyline Hcl Other (See Comments)    Exelon [Rivastigmine] Other (See Comments)     Patient stated patch left a \"burn kathy\" on his skin     Ibuprofen Other (See Comments)     \"hurts my stomach. \"       Health Maintenance   Topic Date Due    Hepatitis B vaccine (1 of 3 - Risk 3-dose series) Never done    Shingles Vaccine (1 of 2) Never done    Diabetic foot exam  11/17/2017    Diabetic retinal exam  11/18/2017    Diabetic microalbuminuria test  09/14/2019    Flu vaccine (1) 09/01/2021    A1C test (Diabetic or Prediabetic)  07/28/2022    Lipid screen  07/28/2022    Colon cancer screen fecal DNA test (Cologuard)  06/27/2024    DTaP/Tdap/Td vaccine (2 - Td or Tdap) 08/02/2026    Pneumococcal 0-64 years Vaccine (2 of 2 - PPSV23) 09/14/2026    COVID-19 Vaccine  Completed    Hepatitis C screen  Completed    HIV screen  Completed    Hepatitis A vaccine  Aged Out    Hib vaccine  Aged Out    Meningococcal (ACWY) vaccine  Aged Out       Subjective:      Review of Systems   Constitutional: Negative for chills and fever. HENT: Positive for postnasal drip. Respiratory: Negative for shortness of breath. Gastrointestinal: Negative for nausea and vomiting. Objective:     Physical Exam  Constitutional:       General: He is not in acute distress. Appearance: He is well-developed. He is not diaphoretic. HENT:      Head: Normocephalic and atraumatic. Eyes:      Pupils: Pupils are equal, round, and reactive to light. Cardiovascular:      Rate and Rhythm: Normal rate and regular rhythm. Heart sounds: Normal heart sounds. No murmur heard. Pulmonary:      Effort: Pulmonary effort is normal. No respiratory distress. Breath sounds: Normal breath sounds. No stridor. Musculoskeletal:      Cervical back: Neck supple. Skin:     General: Skin is warm and dry.    Neurological:      Mental Status: He is alert and oriented to person, place, and time. Psychiatric:         Mood and Affect: Mood normal.         Behavior: Behavior normal.       /78   Pulse 73   SpO2 93%     Assessment:      1. Type 2 diabetes mellitus without complication, with long-term current use of insulin (HCC)  - Pt has been eating healthier, he would like to recheck his A1c first to see if he needs to start medication. Continue healthy diet and exercise. - Hemoglobin A1C; Future    2. Post-nasal drip  - XYZAL 5 MG tablet; Take 1 tablet by mouth nightly  Dispense: 30 tablet; Refill: 3    3. Obesity (BMI 30-39.9)  - pt would like medication to help with his weight loss. Recommend trial of adipex. Discussed possible side effects. - phentermine (ADIPEX-P) 37.5 MG tablet; Take 1 tablet by mouth every morning (before breakfast) for 30 days. Dispense: 30 tablet; Refill: 0    4. Essential hypertension  - BP controlled, continue current medications. Plan:      Return in about 1 month (around 8/28/2021) for follow up weight. Orders Placed This Encounter   Procedures    Hemoglobin A1C     Standing Status:   Future     Number of Occurrences:   1     Standing Expiration Date:   7/28/2022     Orders Placed This Encounter   Medications    XYZAL 5 MG tablet     Sig: Take 1 tablet by mouth nightly     Dispense:  30 tablet     Refill:  3    phentermine (ADIPEX-P) 37.5 MG tablet     Sig: Take 1 tablet by mouth every morning (before breakfast) for 30 days. Dispense:  30 tablet     Refill:  0        Patient given educational materials - see patient instructions. Discussed use, benefit, and side effects of prescribed medications. All patient questions answered. Pt voiced understanding. Reviewed healthmaintenance. Instructed to continue current medications, diet and exercise. Patient agreed with treatment plan. Follow up as directed.      Electronically signed by Pb Thomson DO on 8/1/2021 at 9:38 PM

## 2021-07-29 LAB
ESTIMATED AVERAGE GLUCOSE: 160 MG/DL
HBA1C MFR BLD: 7.2 % (ref 4–6)

## 2021-07-30 ENCOUNTER — TELEPHONE (OUTPATIENT)
Dept: PRIMARY CARE CLINIC | Age: 61
End: 2021-07-30

## 2021-07-30 RX ORDER — METFORMIN HYDROCHLORIDE 500 MG/1
500 TABLET, EXTENDED RELEASE ORAL
Qty: 30 TABLET | Refills: 5 | Status: SHIPPED | OUTPATIENT
Start: 2021-07-30 | End: 2022-01-07 | Stop reason: SDUPTHER

## 2021-07-30 NOTE — RESULT ENCOUNTER NOTE
Please let pt know his a1c is elevated, it increased a little bit so he is still in diabetic range. I recommend we start him on metformin for him to take once a day in morning and continue healthy diet and exercise. I will send script to pharmacy.

## 2021-08-01 DIAGNOSIS — R41.89 PSEUDODEMENTIA: ICD-10-CM

## 2021-08-01 DIAGNOSIS — F32.A DEPRESSION, UNSPECIFIED DEPRESSION TYPE: ICD-10-CM

## 2021-08-01 DIAGNOSIS — R41.3 MEMORY LOSS: ICD-10-CM

## 2021-08-01 ASSESSMENT — ENCOUNTER SYMPTOMS
VOMITING: 0
NAUSEA: 0
SHORTNESS OF BREATH: 0

## 2021-08-02 RX ORDER — BENZONATATE 200 MG/1
CAPSULE ORAL
Qty: 30 CAPSULE | Refills: 0 | Status: SHIPPED | OUTPATIENT
Start: 2021-08-02 | End: 2021-08-26

## 2021-08-02 NOTE — TELEPHONE ENCOUNTER
Pharmacy requesting a  refill of: Memantine 5mg        Medication active on med list yes        Date of last prescription: 05/04/2021  with 2  refills verified on 08/02/2021    verified by BRIANNE COOK        Date of last appointment 07/13/2021     Next Visit Date: 08/03/2021

## 2021-08-03 ENCOUNTER — TELEMEDICINE (OUTPATIENT)
Dept: NEUROLOGY | Age: 61
End: 2021-08-03
Payer: MEDICAID

## 2021-08-03 DIAGNOSIS — F32.A DEPRESSION, UNSPECIFIED DEPRESSION TYPE: ICD-10-CM

## 2021-08-03 DIAGNOSIS — R41.3 MEMORY LOSS: Primary | ICD-10-CM

## 2021-08-03 DIAGNOSIS — R41.89 PSEUDODEMENTIA: ICD-10-CM

## 2021-08-03 DIAGNOSIS — G47.33 OBSTRUCTIVE SLEEP APNEA SYNDROME: ICD-10-CM

## 2021-08-03 DIAGNOSIS — G47.01 INSOMNIA DUE TO MEDICAL CONDITION: ICD-10-CM

## 2021-08-03 PROCEDURE — G8427 DOCREV CUR MEDS BY ELIG CLIN: HCPCS | Performed by: PSYCHIATRY & NEUROLOGY

## 2021-08-03 PROCEDURE — 3017F COLORECTAL CA SCREEN DOC REV: CPT | Performed by: PSYCHIATRY & NEUROLOGY

## 2021-08-03 PROCEDURE — 99214 OFFICE O/P EST MOD 30 MIN: CPT | Performed by: PSYCHIATRY & NEUROLOGY

## 2021-08-03 RX ORDER — MEMANTINE HYDROCHLORIDE 5 MG/1
TABLET ORAL
Qty: 60 TABLET | Refills: 5 | Status: SHIPPED | OUTPATIENT
Start: 2021-08-03 | End: 2021-08-03 | Stop reason: DRUGHIGH

## 2021-08-03 RX ORDER — MEMANTINE HYDROCHLORIDE 5 MG/1
TABLET ORAL
Qty: 120 TABLET | Refills: 5 | Status: SHIPPED | OUTPATIENT
Start: 2021-08-03 | End: 2021-11-03 | Stop reason: SDUPTHER

## 2021-08-03 NOTE — PROGRESS NOTES
NEUROLOGY FOLLOW-UP  Patient Name:  Dread Spence  :   1960  Clinic Visit Date: 8/3/2021  Last visit: 20      I saw Mr. Dread Spence in follow-up today in continuation of neurologic care. He is a 62 y.o.  male  with complaints of memory loss and depression. He comes to visit stating that his memory medication is helping and he does not want to stop taking the medication. He also wants to increase the medication dose. He also stated that he has been working on [employer] He is walking daily stating \"power walk for 6 blocks in 20 minutes even on weekends\". He is also trying to avoid processed food and \"very determined to lose weight\". He also states that he is not forgetting as he \"used to be\" and he is very pleased with the memantine medication. Of note; he could not tolerate Aricept medication. It caused \"severe diarrhea\". He tried patch and \"caused some skin burns\". He tried Exelon and could not tolerate the medication. He does not want to go for neuropsych testing. He was having forgetfulness with recent conversations and immediate recall difficulties. He also has been misplacing items of daily necessity. He goes to kitchen and forgets why he went there. He goes to a different room and forgets \"why I'm me here\". He has had sleep apnea and difficulties with the sleep. With trazodone, sleep has improved. Depression also has been improving with trazodone. He further added that Topamax helped for headaches \"a lot better\". Wants to continue Topamax at present dose of 1.5 tab twice daily. Headaches were described as pressure-like headaches located \"all over\" with photophobia and occasional nausea. Headaches last for few hours. Further adds \"still occurring\". He is apologetic that he has not made appointment with Dr. Kay Brown. He has phone numbers and he stated \"I will call Dr. Ynaick Jauregui". His depression and headaches are much better on trazodone.   He is not having any adverse effects from it. All items selected indicate a positive finding. Those items not selected are negative. Constitutional [] Weight loss/gain   [] Fatigue  [] Fever/Chills   HEENT [] Hearing Loss  [] Visual Disturbance  [] Tinnitus  [] Eye pain   Respiratory [] Shortness of Breath  [] Cough  [x] Snoring   Cardiovascular [] Chest Pain  [] Palpitations  [] Lightheaded   GI [] Constipation  [] Diarrhea  [] Swallowing change    [] Urinary Frequency  [] Urinary Urgency   Musculoskeletal [] Neck pain  [] Back pain  [] Muscle pain  [] Restless legs   Dermatologic [] Skin changes   Neurologic [x] Memory loss/confusion  [] Seizures  [] Trouble walking or imbalance  [] Dizziness  [] Weakness  [] Numbness  [] Tremors  [] Speech Difficulty  [x] Headaches  [] Light Sensitivity  [] Sound Sensitivity   Endocrinology []Excessive thirst  []Excessive hunger   Psychiatric [] Anxiety/Depression  [] Hallucination   Allergy/immunology []Hives/environmental allergies   Hematologic/lymph [] Abnormal bleeding  [] Abnormal bruising       Current Outpatient Medications on File Prior to Visit   Medication Sig Dispense Refill    memantine (NAMENDA) 5 MG tablet take 1 tablet by mouth twice a day 60 tablet 5    benzonatate (TESSALON) 200 MG capsule take 1 capsule by mouth three times a day if needed for cough 30 capsule 0    metFORMIN (GLUCOPHAGE-XR) 500 MG extended release tablet Take 1 tablet by mouth daily (with breakfast) 30 tablet 5    XYZAL 5 MG tablet Take 1 tablet by mouth nightly 30 tablet 3    phentermine (ADIPEX-P) 37.5 MG tablet Take 1 tablet by mouth every morning (before breakfast) for 30 days.  30 tablet 0    albuterol sulfate  (90 Base) MCG/ACT inhaler inhale 2 puffs by mouth and INTO THE LUNGS every 6 hours if needed for wheezing 8.5 g 3    traZODone (DESYREL) 50 MG tablet take 2 tablets by mouth at bedtime 60 tablet 11    TRINTELLIX 20 MG TABS tablet 20 mg       gabapentin (NEURONTIN) 300 MG capsule take 1 capsule by mouth twice a day      tiZANidine (ZANAFLEX) 2 MG tablet take 1 tablet by mouth at bedtime      carvedilol (COREG) 25 MG tablet take 1 tablet by mouth twice a day 180 tablet 2    mometasone (NASONEX) 50 MCG/ACT nasal spray 2 sprays by Nasal route daily 1 Inhaler 3    fluticasone (FLONASE) 50 MCG/ACT nasal spray instill 2 sprays into each nostril once daily 16 g 1    amLODIPine (NORVASC) 10 MG tablet take 1 tablet by mouth once daily 90 tablet 1    atorvastatin (LIPITOR) 10 MG tablet Take 1 tablet by mouth daily 90 tablet 3    fluticasone-salmeterol (ADVAIR) 100-50 MCG/DOSE diskus inhaler Inhale 1 puff into the lungs every 12 hours 60 each 3    nicotine (NICODERM CQ) 21 MG/24HR Place 1 patch onto the skin daily 42 patch 0    topiramate (TOPAMAX) 50 MG tablet take 1.5 tab by mouth twice a day 90 tablet 6    diclofenac sodium (VOLTAREN) 1 % GEL apply 4 grams to affected area four times a day AS NEEDED FOR PAIN      fluticasone (FLOVENT HFA) 110 MCG/ACT inhaler inhale 2 puffs by mouth INTO THE LUNGS twice a day 12 g 3    oxyCODONE-acetaminophen (PERCOCET) 5-325 MG per tablet Take 1 tablet by mouth every 4 hours as needed for Pain.  nitroGLYCERIN (NITROSTAT) 0.3 MG SL tablet place 1 tablet under the tongue if needed every 5 minutes for chest pain for 3 doses IF NO RELIEF AF 25 tablet 3    aspirin 81 MG tablet Take 81 mg by mouth daily.  [DISCONTINUED] benzonatate (TESSALON) 200 MG capsule Take 1 capsule by mouth 3 times daily as needed for Cough 30 capsule 0    [DISCONTINUED] carvedilol (COREG) 25 MG tablet Take 1 tablet by mouth 2 times daily 60 tablet 3    [DISCONTINUED] fluticasone (FLONASE) 50 MCG/ACT nasal spray instill 2 sprays into each nostril once daily 16 g 1     No current facility-administered medications on file prior to visit.      Allergies: Dina Douglas is allergic to lisinopril, aleve  [naproxen sodium], amitriptyline hcl, exelon [rivastigmine], and ibuprofen. Past Medical History:   Diagnosis Date    ADHD (attention deficit hyperactivity disorder)     Arthritis     knees, hands, back.  Asthma     as child.  Back pain     CAD (coronary artery disease)     Cataract     Depression     pt. denies.  Diabetes mellitus ([address]     Heart attack ([address]     2011    Hypertension     Kidney stones     [facility] stroke [facility]     2014    Osteoarthritis     Seasonal allergies     Sinus problem        Past Surgical History:   Procedure Laterality Date    CHOLECYSTECTOMY      2016    COLONOSCOPY      CYSTO/URETERO/PYELOSCOPY, CALCULUS TX Right 2/5/2020    CYSTO, URETEROSCOPY, RIGHT URETERAL STENT PLACEMENT, RETROGRADE PYELOGRAM performed by Stephenie Arenas MD at [facility], rt. inguinal    HERNIA REPAIR Left 01/2018    repair    HERNIA REPAIR Right 01/2018    looked at at same time they did left repair    INGUINAL HERNIA REPAIR Left 08/19/2016    LAPAROSCOPY N/A 1/16/2018    DIAGNOSTIC LAPAROSCOPY performed by Liss Dukes MD at [address] URETEROSCOPY Right 02/05/2020    stent placement, cystoscopy, retrograde pyelogram    VASECTOMY       Social History: Fiorella Hernandez  reports that he has been smoking cigarettes. He has a 7.00 pack-year smoking history. He has never used smokeless tobacco. He reports previous alcohol use. He reports that he does not use drugs. Family History   Problem Relation Age of Onset    Cancer Father         throat    Cirrhosis Father     High Blood Pressure Sister     Asthma Sister     Arthritis Sister     High Blood Pressure Mother     Heart Disease Mother     Alzheimer's Disease Mother      Checks bp at home and it was 120-130 or so.  Pulse 70s   On exam; 287 LB, Ht 5'11\"    GENERAL  Appears comfortable and in no distress   HEENT  NC/ AT   NECK & cardiovascular  Supple and no bruits heard; S1 and S2 heard; radial pulse intact   MENTAL STATUS:  Alert, oriented, intact memory, no confusion, normal speech, normal language, no hallucination or delusion. ,  He scored 26/30 on MMSE. CRANIAL NERVES: II     -      PERRLA   III,IV,VI -  EOMs full, no ptosis  V     -     Unable to perform  VII    -     Normal facial symmetry  VIII   -     Intact hearing  IX,X -     Symmetrical palate  XI    -     Symmetrical shoulder shrug  XII   -     Midline tongue, no atrophy    MOTOR FUNCTION:  significant for no visible weakness, normal bulk, no tremors noted   SENSORY FUNCTION:  unable to perform    CEREBELLAR FUNCTION:  Intact fine motor control over upper limbs   REFLEX FUNCTION:  unable to perform   STATION and GAIT  Normal station, normal gait        8/3/2021  Maximum score 5 Score 5 What is the year, season, date, day, month   Maximum score 5 Score 5 Where are we: State, county, town, hospital, floor? Maximum score 3 Score 3 Name 3 objects: chair, scissors, computer. Ask patient to repeat 3 objects. Maximum score 5 Score  2 Serial sevens from 100:93, 86, 79, 72, 65  Very slow   Maximum score 3 Score 2 Recall 3 objects   Maximum score 2 Score 2 Name a pencil and watch. Maximum score 1 Score 1 Repeat the following: No ifs ands or buts.      Maximum score 3 Score 3 Follow 3 stage command take a paper in your hand, fold it in half, and put it on the floor. Maximum score 1  Score 1 Read and obey the following: Close your eyes     Maximum score 1 Score 1 Write a sentence. Maximum score 1 Score 1 Copy a design below. Max 30   Patients score 30 --> 26            Diagnostic data reviewed with the patient:   MRI Brain (8/30/17): No acute abnormality. ,  It demonstrated minimal nonspecific white matter disease. EEG (10/21/17): Unremarkable.     No results found for: SEDRATE  Lab Results   Component Value Date    [id number] 995 08/29/2017      No results found for: FOLATE  No results found for: ANUSHKA  Lab Results Component Value Date    TSH 2.06 08/29/2017       No results found for: RPR   No components found for: TREPONEMAPALLIDUM  Lab Results   Component Value Date    LABA1C 7.2 (H) 07/28/2021       MOCA testing (7/31/18):   patient scored 20/30.,   Patient is unable to do the trail making test, able to copy the cube, able to draw the clock and put the numbers and able to put the hands; able to name 3 out of 3 animals; able to do digit backwards but not digit forward; unable to do target detection;  unable to do the serial subtraction; scored 2/2 on verbal abstraction; could not do phonemic fluency; able to repeat only 1 out of 2 sentences; scored 2 out of 5 on delayed recall and oriented to place and city but not to the date, day of the week month and year. MRI Brain (8/30/17): No acute abnormality. 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 Rt Subclavian artery origin occlusive plaque versus intramural hematoma measuring 11 mm resulting in mild stenosis. Neuropsych evaluation (9/10/2019): Done [facility] by Arie Rosales:   Results were not valid for indication of any specific problem with memory or with psychiatric disorder. There was evidence of average intellectual ability, as expected, with no sign of loss of intellectual skill, and stronger validity to these results however. However memory measures were not valid and no diagnosis can be made in this regard. Recommendations: No recommendations can be made at this time due to lack of validity of results. Impression and Plan: Mr. Aly Lloyd is a 62 y.o. male with   Progressive memory loss with depression: slowly improving on Memantine; at his request, will increase the dose from 5+5 to 5+10 for 2 wk, then onwards,  10+10. Donepezil intolerance \"diarrhea\". Transdermal exelon patch caused \"burning on skin\".     Doesn't want to go for neuropsych testing    Headaches and sleep apnea; following up with Dr. Roderick Samuel. Chronic tension type headache; failed gabapentin;well controlled on present dose of Topamax 50 mg 1.5 tab bid. 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 Trang Mccall. Advised to continue follow-up visits with him. He has phone numbers to call and make a follow-up appointment. Depression: stable on trazodone. Dizziness with orthostatic hypotension: resolved. Comorbid diabetic neuropathy: presently tolerable on Elavil. Hx of statin intolerance: Stopped atorvastatin. Presently on Lovastatin     Comorbid hypertension and diabetes  Family history of dementia (mom in her 76s)  Hx of \"mini stroke\" \"4 years ago\". Also discussed about smoking cessation. Follow-up in 6 months. Dina Douglas is a 62 y.o. male being evaluated by a Virtual Visit (video visit) encounter to address concerns as mentioned above. A caregiver was present when appropriate. Due to this being a TeleHealth encounter (During ODDYF-21 public health emergency), evaluation of the following organ systems was limited: Vitals/Constitutional/EENT/Resp/CV/GI//MS/Neuro/Skin/Heme-Lymph-Imm. Pursuant to the emergency declaration under the [address] [place] [address] authority and the [employer] Act, this Virtual Visit was conducted with patient's (and/or legal guardian's) consent, to reduce the patient's risk of exposure to COVID-19 and provide necessary medical care. The patient (and/or legal guardian) has also been advised to contact this office for worsening conditions or problems, and seek emergency medical treatment and/or call 911 if deemed necessary.      Patient identification was verified at the start of the visit: Yes    Total time spent for this encounter: 20 minutes     Services were provided through a video synchronous discussion virtually to substitute for in-person clinic visit. Patient and provider were located at their individual homes.     Lois Long MD

## 2021-08-03 NOTE — PROGRESS NOTES
Constitutional [] Weight loss/gain   [] Fatigue  [] Fever/Chills   HEENT [] Hearing Loss  [] Visual Disturbance  [] Tinnitus  [] Eye pain   Respiratory [] Shortness of Breath  [] Cough  [] Snoring   Cardiovascular [] Chest Pain  [] Palpitations  [] Lightheaded   GI [] Constipation  [] Diarrhea  [] Swallowing change  [] Nausea/vomiting    [] Urinary Frequency  [] Urinary Urgency   Musculoskeletal [] Neck pain  [] Back pain  [] Muscle pain  [] Restless legs   Dermatologic [] Skin changes   Neurologic [] Memory loss/confusion  [] Seizures  [] Trouble walking or imbalance  [] Dizziness  [] Sleep disturbance  [] Weakness  [] Numbness  [] Tremors  [] Speech Difficulty  [x] Headaches  [] Light Sensitivity  [] Sound Sensitivity   Endocrinology []Excessive thirst  []Excessive hunger   Psychiatric [] Anxiety/Depression  [] Hallucination   Allergy/immunology []Hives/environmental allergies   Hematologic/lymph [] Abnormal bleeding  [] Abnormal bruising

## 2021-08-10 ENCOUNTER — TELEPHONE (OUTPATIENT)
Dept: PRIMARY CARE CLINIC | Age: 61
End: 2021-08-10

## 2021-08-10 NOTE — TELEPHONE ENCOUNTER
Patient called in asking if Provider can contact his insurance company to do a Peer to Peer prior auth for the injectable weight loss medications that was discussed at his last visit. [phone] Please advise.

## 2021-08-11 ENCOUNTER — TELEPHONE (OUTPATIENT)
Dept: UROLOGY | Age: 61
End: 2021-08-11

## 2021-08-11 NOTE — TELEPHONE ENCOUNTER
Pt will like to have IPP Sx first. Pt called insurance and was told \" insurance will cover IPP if no other form of treatment. Ph.[phone]\" informed pt will call once date and time available.

## 2021-08-19 NOTE — TELEPHONE ENCOUNTER
I had spoken to pt that the medication (wegovy- injectable) is newer and not covered by medicare so we discussed trying adipex instead. If it is not covered by insurance, he can use a good Rx card or the [place] on his phone to help get a discount for the adipex. Did he ever get that medication? If not I can send the script again  for him for the adipex (phentermine).

## 2021-08-19 NOTE — TELEPHONE ENCOUNTER
Lili from [facility] called stating that patient is calling them asking if they received an injectable weight loss medication from our office. They did not have any records of this. Writer was able to see that during his last visit, he was placed on phentermine.  There was this encounter from 8/10/21 where patient was asking for a peer to peer PA to be completed on the medication , however, I am unable to see what medication he may be referring to    Shakira Lagunas states if its needed, we can contact pharmacy help desk at [phone]

## 2021-08-26 DIAGNOSIS — I10 ESSENTIAL HYPERTENSION: ICD-10-CM

## 2021-08-26 DIAGNOSIS — E66.9 OBESITY (BMI 30-39.9): ICD-10-CM

## 2021-08-26 RX ORDER — BENZONATATE 200 MG/1
CAPSULE ORAL
Qty: 30 CAPSULE | Refills: 0 | Status: SHIPPED | OUTPATIENT
Start: 2021-08-26 | End: 2021-08-30

## 2021-08-26 RX ORDER — PHENTERMINE HYDROCHLORIDE 37.5 MG/1
37.5 TABLET ORAL
Qty: 30 TABLET | Refills: 0 | OUTPATIENT
Start: 2021-08-26 | End: 2021-09-25

## 2021-08-26 RX ORDER — AMLODIPINE BESYLATE 10 MG/1
TABLET ORAL
Qty: 90 TABLET | Refills: 1 | Status: SHIPPED | OUTPATIENT
Start: 2021-08-26 | End: 2022-02-28

## 2021-08-26 RX ORDER — BENZONATATE 200 MG/1
CAPSULE ORAL
Qty: 30 CAPSULE | Refills: 0 | Status: SHIPPED | OUTPATIENT
Start: 2021-08-26 | End: 2021-08-26 | Stop reason: SDUPTHER

## 2021-08-30 ENCOUNTER — OFFICE VISIT (OUTPATIENT)
Dept: PRIMARY CARE CLINIC | Age: 61
End: 2021-08-30
Payer: MEDICAID

## 2021-08-30 VITALS
BODY MASS INDEX: 37.94 KG/M2 | HEART RATE: 82 BPM | SYSTOLIC BLOOD PRESSURE: 120 MMHG | OXYGEN SATURATION: 97 % | WEIGHT: 272 LBS | DIASTOLIC BLOOD PRESSURE: 80 MMHG

## 2021-08-30 DIAGNOSIS — E11.9 TYPE 2 DIABETES MELLITUS WITHOUT COMPLICATION, WITH LONG-TERM CURRENT USE OF INSULIN (HCC): ICD-10-CM

## 2021-08-30 DIAGNOSIS — I10 ESSENTIAL HYPERTENSION: Primary | ICD-10-CM

## 2021-08-30 DIAGNOSIS — Z79.4 TYPE 2 DIABETES MELLITUS WITHOUT COMPLICATION, WITH LONG-TERM CURRENT USE OF INSULIN (HCC): ICD-10-CM

## 2021-08-30 DIAGNOSIS — E66.9 OBESITY (BMI 30-39.9): ICD-10-CM

## 2021-08-30 PROCEDURE — 2022F DILAT RTA XM EVC RTNOPTHY: CPT | Performed by: FAMILY MEDICINE

## 2021-08-30 PROCEDURE — 3051F HG A1C>EQUAL 7.0%<8.0%: CPT | Performed by: FAMILY MEDICINE

## 2021-08-30 PROCEDURE — 99213 OFFICE O/P EST LOW 20 MIN: CPT | Performed by: FAMILY MEDICINE

## 2021-08-30 PROCEDURE — 3017F COLORECTAL CA SCREEN DOC REV: CPT | Performed by: FAMILY MEDICINE

## 2021-08-30 PROCEDURE — 4004F PT TOBACCO SCREEN RCVD TLK: CPT | Performed by: FAMILY MEDICINE

## 2021-08-30 PROCEDURE — G8427 DOCREV CUR MEDS BY ELIG CLIN: HCPCS | Performed by: FAMILY MEDICINE

## 2021-08-30 PROCEDURE — G8417 CALC BMI ABV UP PARAM F/U: HCPCS | Performed by: FAMILY MEDICINE

## 2021-08-30 RX ORDER — BENZONATATE 200 MG/1
CAPSULE ORAL
Qty: 30 CAPSULE | Refills: 0 | Status: CANCELLED | OUTPATIENT
Start: 2021-08-30

## 2021-08-30 RX ORDER — PHENTERMINE HYDROCHLORIDE 37.5 MG/1
37.5 TABLET ORAL
Qty: 30 TABLET | Refills: 0 | Status: SHIPPED | OUTPATIENT
Start: 2021-08-30 | End: 2021-09-29 | Stop reason: SDUPTHER

## 2021-08-30 RX ORDER — BENZONATATE 200 MG/1
CAPSULE ORAL
Qty: 30 CAPSULE | Refills: 0 | Status: SHIPPED | OUTPATIENT
Start: 2021-08-30 | End: 2021-09-30

## 2021-08-30 ASSESSMENT — ENCOUNTER SYMPTOMS
NAUSEA: 0
SHORTNESS OF BREATH: 0
VOMITING: 0

## 2021-08-30 NOTE — PROGRESS NOTES
[facility]"" PRIMARY CARE  [facility] [address]  [address] [address]  Dept: [phone]  Dept Fax: [phone]    Chin Carmichael is a 62 y.o. male who presents today for his medical conditions/complaints as noted below. Chin Carmichael is c/o of  Chief Complaint   Patient presents with    Weight Loss     1mo wt ck         HPI:     HPI     Patient here for follow-up on his weight. He lost 12 pounds since his last visit. He has been eating healthy. He denies any side effects from the Adipex. He also quit smoking! He was also started previously on Metformin for diabetes. He has tolerated it well. Hemoglobin A1C (%)   Date Value   07/28/2021 7.2 (H)   04/23/2021 6.8   11/01/2019 6.1             ( goal A1C is < 7)   Microalb/Crt. Ratio (mcg/mg creat)   Date Value   04/26/2017 17 (H)     LDL Cholesterol (mg/dL)   Date Value   07/28/2021 51     LDL Calculated (mg/dL)   Date Value   11/01/2019 95       (goal LDL is <100)   AST (U/L)   Date Value   08/07/2017 25     ALT (U/L)   Date Value   07/28/2021 18     BUN (mg/dL)   Date Value   07/28/2021 16     BP Readings from Last 3 Encounters:   08/30/21 120/80   07/28/21 120/78   07/21/21 110/82          (goal 120/80)    Past Medical History:   Diagnosis Date    ADHD (attention deficit hyperactivity disorder)     Arthritis     knees, hands, back.  Asthma     as child.  Back pain     CAD (coronary artery disease)     Cataract     Depression     pt. denies.     Diabetes mellitus ([address]     Heart attack ([address]     2011    Hypertension     Kidney stones     Mini stroke ([address]     2014    Osteoarthritis     Seasonal allergies     Sinus problem       Past Surgical History:   Procedure Laterality Date    CHOLECYSTECTOMY      2016    COLONOSCOPY      CYSTO/URETERO/PYELOSCOPY, CALCULUS TX Right 2/5/2020    CYSTO, URETEROSCOPY, RIGHT URETERAL STENT PLACEMENT, RETROGRADE PYELOGRAM performed by Baltazar Pollock MD at [facility], rt. inguinal    HERNIA REPAIR Left 01/2018    repair    HERNIA REPAIR Right 01/2018    looked at at same time they did left repair    INGUINAL HERNIA REPAIR Left 08/19/2016    LAPAROSCOPY N/A 1/16/2018    DIAGNOSTIC LAPAROSCOPY performed by Clay Holland MD at [address] URETEROSCOPY Right 02/05/2020    stent placement, cystoscopy, retrograde pyelogram    VASECTOMY         Family History   Problem Relation Age of Onset    Cancer Father         throat    Cirrhosis Father     High Blood Pressure Sister     Asthma Sister     Arthritis Sister     High Blood Pressure Mother     Heart Disease Mother     Alzheimer's Disease Mother        Social History     Tobacco Use    Smoking status: Current Every Day Smoker     Packs/day: 0.25     Years: 28.00     Pack years: 7.00     Types: Cigarettes    Smokeless tobacco: Never Used   Substance Use Topics    Alcohol use: Not Currently     Comment: rarely      Current Outpatient Medications   Medication Sig Dispense Refill    phentermine (ADIPEX-P) 37.5 MG tablet Take 1 tablet by mouth every morning (before breakfast) for 30 days.  30 tablet 0    benzonatate (TESSALON) 200 MG capsule take 1 capsule by mouth three times a day if needed for cough 30 capsule 0    amLODIPine (NORVASC) 10 MG tablet take 1 tablet by mouth once daily 90 tablet 1    memantine (NAMENDA) 5 MG tablet take 1 tablet in am and two tab in pm for 2 wk, then two tab twice daily 120 tablet 5    metFORMIN (GLUCOPHAGE-XR) 500 MG extended release tablet Take 1 tablet by mouth daily (with breakfast) 30 tablet 5    XYZAL 5 MG tablet Take 1 tablet by mouth nightly 30 tablet 3    albuterol sulfate  (90 Base) MCG/ACT inhaler inhale 2 puffs by mouth and INTO THE LUNGS every 6 hours if needed for wheezing 8.5 g 3    traZODone (DESYREL) 50 MG tablet take 2 tablets by mouth at bedtime 60 tablet 11    TRINTELLIX 20 MG TABS tablet 20 mg       gabapentin (NEURONTIN) 300 MG capsule take 1 capsule by mouth twice a day      tiZANidine (ZANAFLEX) 2 MG tablet take 1 tablet by mouth at bedtime      carvedilol (COREG) 25 MG tablet take 1 tablet by mouth twice a day 180 tablet 2    mometasone (NASONEX) 50 MCG/ACT nasal spray 2 sprays by Nasal route daily 1 Inhaler 3    fluticasone (FLONASE) 50 MCG/ACT nasal spray instill 2 sprays into each nostril once daily 16 g 1    atorvastatin (LIPITOR) 10 MG tablet Take 1 tablet by mouth daily 90 tablet 3    fluticasone-salmeterol (ADVAIR) 100-50 MCG/DOSE diskus inhaler Inhale 1 puff into the lungs every 12 hours 60 each 3    nicotine (NICODERM CQ) 21 MG/24HR Place 1 patch onto the skin daily 42 patch 0    topiramate (TOPAMAX) 50 MG tablet take 1.5 tab by mouth twice a day 90 tablet 6    diclofenac sodium (VOLTAREN) 1 % GEL apply 4 grams to affected area four times a day AS NEEDED FOR PAIN      fluticasone (FLOVENT HFA) 110 MCG/ACT inhaler inhale 2 puffs by mouth INTO THE LUNGS twice a day 12 g 3    oxyCODONE-acetaminophen (PERCOCET) 5-325 MG per tablet Take 1 tablet by mouth every 4 hours as needed for Pain.  nitroGLYCERIN (NITROSTAT) 0.3 MG SL tablet place 1 tablet under the tongue if needed every 5 minutes for chest pain for 3 doses IF NO RELIEF AF 25 tablet 3    aspirin 81 MG tablet Take 81 mg by mouth daily. No current facility-administered medications for this visit. Allergies   Allergen Reactions    Lisinopril      States he is allergic to the generic forms of medication, can take lisinopril    Aleve  [Naproxen Sodium] Nausea Only    Amitriptyline Hcl Other (See Comments)    Exelon [Rivastigmine] Other (See Comments)     Patient stated patch left a \"burn kathy\" on his skin     Ibuprofen Other (See Comments)     \"hurts my stomach. \"       Health Maintenance   Topic Date Due    Hepatitis B vaccine (1 of 3 - Risk 3-dose series) Never done    Shingles Vaccine (1 of 2) Never done    Diabetic foot exam  11/17/2017    Diabetic retinal exam  11/18/2017    Diabetic microalbuminuria test  09/14/2019    Flu vaccine (1) 09/01/2021    A1C test (Diabetic or Prediabetic)  07/28/2022    Lipid screen  07/28/2022    Colon cancer screen fecal DNA test (Cologuard)  06/27/2024    DTaP/Tdap/Td vaccine (2 - Td or Tdap) 08/02/2026    Pneumococcal 0-64 years Vaccine (2 of 2 - PPSV23) 09/14/2026    COVID-19 Vaccine  Completed    Hepatitis C screen  Completed    HIV screen  Completed    Hepatitis A vaccine  Aged Out    Hib vaccine  Aged Out    Meningococcal (ACWY) vaccine  Aged Out       Subjective:      Review of Systems   Constitutional: Negative for chills and fever. Respiratory: Negative for shortness of breath. Gastrointestinal: Negative for nausea and vomiting. Objective:     Physical Exam  Constitutional:       General: He is not in acute distress. Appearance: He is well-developed. He is not diaphoretic. HENT:      Head: Normocephalic and atraumatic. Eyes:      Pupils: Pupils are equal, round, and reactive to light. Cardiovascular:      Rate and Rhythm: Normal rate and regular rhythm. Heart sounds: Normal heart sounds. No murmur heard. Pulmonary:      Effort: Pulmonary effort is normal. No respiratory distress. Breath sounds: Normal breath sounds. No stridor. Musculoskeletal:      Cervical back: Neck supple. Skin:     General: Skin is warm and dry. Neurological:      Mental Status: He is alert and oriented to person, place, and time. Psychiatric:         Mood and Affect: Mood normal.         Behavior: Behavior normal.       /80   Pulse 82   Wt 272 lb (123.4 kg)   SpO2 97%   BMI 37.94 kg/m²     Assessment:      1. Essential hypertension  -Blood pressure is controlled. Continue current medication. 2. Obesity (BMI 30-39.9)  -Doing well on Adipex for weight loss. Continue healthy diet.   - phentermine (ADIPEX-P) 37.5 MG tablet; Take 1 tablet by mouth every morning (before breakfast) for 30 days. Dispense: 30 tablet; Refill: 0    3. Type 2 diabetes mellitus without complication, with long-term current use of insulin ([address]  -Doing well on Metformin. Continue current medication. Plan:      Return in about 1 month (around 9/30/2021) for follow up. No orders of the defined types were placed in this encounter. Orders Placed This Encounter   Medications    phentermine (ADIPEX-P) 37.5 MG tablet     Sig: Take 1 tablet by mouth every morning (before breakfast) for 30 days. Dispense:  30 tablet     Refill:  0    benzonatate (TESSALON) 200 MG capsule     Sig: take 1 capsule by mouth three times a day if needed for cough     Dispense:  30 capsule     Refill:  0        Patient given educational materials - see patient instructions. Discussed use, benefit, and side effects of prescribed medications. All patient questions answered. Pt voiced understanding. Reviewed healthmaintenance. Instructed to continue current medications, diet and exercise. Patient agreed with treatment plan. Follow up as directed.      Electronically signed by Isreal Velez DO on 8/30/2021 at 11:55 AM

## 2021-09-29 ENCOUNTER — OFFICE VISIT (OUTPATIENT)
Dept: PRIMARY CARE CLINIC | Age: 61
End: 2021-09-29
Payer: COMMERCIAL

## 2021-09-29 VITALS
OXYGEN SATURATION: 96 % | SYSTOLIC BLOOD PRESSURE: 122 MMHG | WEIGHT: 271 LBS | HEART RATE: 88 BPM | DIASTOLIC BLOOD PRESSURE: 80 MMHG | BODY MASS INDEX: 37.8 KG/M2

## 2021-09-29 DIAGNOSIS — Z79.4 TYPE 2 DIABETES MELLITUS WITHOUT COMPLICATION, WITH LONG-TERM CURRENT USE OF INSULIN (HCC): ICD-10-CM

## 2021-09-29 DIAGNOSIS — E11.9 TYPE 2 DIABETES MELLITUS WITHOUT COMPLICATION, WITH LONG-TERM CURRENT USE OF INSULIN (HCC): ICD-10-CM

## 2021-09-29 DIAGNOSIS — Z23 NEED FOR INFLUENZA VACCINATION: ICD-10-CM

## 2021-09-29 DIAGNOSIS — I67.1 ANEURYSM OF INTRACRANIAL PORTION OF LEFT INTERNAL CAROTID ARTERY: ICD-10-CM

## 2021-09-29 DIAGNOSIS — E66.9 OBESITY (BMI 30-39.9): ICD-10-CM

## 2021-09-29 DIAGNOSIS — I10 ESSENTIAL HYPERTENSION: Primary | ICD-10-CM

## 2021-09-29 PROCEDURE — 2022F DILAT RTA XM EVC RTNOPTHY: CPT | Performed by: FAMILY MEDICINE

## 2021-09-29 PROCEDURE — 4004F PT TOBACCO SCREEN RCVD TLK: CPT | Performed by: FAMILY MEDICINE

## 2021-09-29 PROCEDURE — 90674 CCIIV4 VAC NO PRSV 0.5 ML IM: CPT | Performed by: FAMILY MEDICINE

## 2021-09-29 PROCEDURE — 3017F COLORECTAL CA SCREEN DOC REV: CPT | Performed by: FAMILY MEDICINE

## 2021-09-29 PROCEDURE — G8428 CUR MEDS NOT DOCUMENT: HCPCS | Performed by: FAMILY MEDICINE

## 2021-09-29 PROCEDURE — 99214 OFFICE O/P EST MOD 30 MIN: CPT | Performed by: FAMILY MEDICINE

## 2021-09-29 PROCEDURE — G0008 ADMIN INFLUENZA VIRUS VAC: HCPCS | Performed by: FAMILY MEDICINE

## 2021-09-29 PROCEDURE — 3051F HG A1C>EQUAL 7.0%<8.0%: CPT | Performed by: FAMILY MEDICINE

## 2021-09-29 PROCEDURE — G8417 CALC BMI ABV UP PARAM F/U: HCPCS | Performed by: FAMILY MEDICINE

## 2021-09-29 RX ORDER — PHENTERMINE HYDROCHLORIDE 37.5 MG/1
37.5 TABLET ORAL
Qty: 30 TABLET | Refills: 0 | Status: SHIPPED | OUTPATIENT
Start: 2021-09-29 | End: 2021-10-29

## 2021-09-29 NOTE — PROGRESS NOTES
Vaccine Information Sheet, \"Influenza - Inactivated\"  given to Evans Bey, or parent/legal guardian of  Evans Bey and verbalized understanding. Patient responses:    Have you ever had a reaction to a flu vaccine? No  Are you able to eat eggs without adverse effects? Yes  Do you have any current illness? No  Have you ever had [place] Syndrome? No    Flu vaccine given per order. Please see immunization tab.

## 2021-09-29 NOTE — PROGRESS NOTES
[facility] PRIMARY CARE  [facility] [address] [facility] [address]  [address] [address]  Dept: [phone]  Dept Fax: [phone]    Yon Bush is a 62 y.o. male who presents today for his medical conditions/complaints as noted below. Yon Bush is c/o of  Chief Complaint   Patient presents with    Other     weight check, discuss kidney donation       HPI:     HPI     Pt here for weight follow up. He is currently on adipex and lost another pound since last visit. This will be his last month of adipex. He is doing well on metformin as well for his diabetes. HTN is well controlled on current medications. Pt states if it is ok for him to be a kidney donor . He knows someone in another state who is in need of one and he has same blood type. He has not had any other testing yet. Pt also with hx of left supraclinoid  Internal carotid artery aneurysm, has not followed up with Dr. Yesi Stanley since 2018. Hemoglobin A1C (%)   Date Value   2021 7.2 (H)   2021 6.8   2019 6.1             ( goal A1C is < 7)   Microalb/Crt. Ratio (mcg/mg creat)   Date Value   2017 17 (H)     LDL Cholesterol (mg/dL)   Date Value   2021 51     LDL Calculated (mg/dL)   Date Value   2019 95       (goal LDL is <100)   AST (U/L)   Date Value   2017 25     ALT (U/L)   Date Value   2021 18     BUN (mg/dL)   Date Value   2021 16     BP Readings from Last 3 Encounters:   21 122/80   21 120/80   21 120/78          (goal 120/80)    Past Medical History:   Diagnosis Date    ADHD (attention deficit hyperactivity disorder)     Arthritis     knees, hands, back.  Asthma     as child.  Back pain     CAD (coronary artery disease)     Cataract     Depression     pt. denies.     Diabetes mellitus ([address]     Heart attack ([address]         Hypertension     Kidney stones     Mini stroke ([address]         Osteoarthritis     Seasonal allergies     Sinus problem       Past Surgical History:   Procedure Laterality Date    CHOLECYSTECTOMY      2016    COLONOSCOPY      CYSTO/URETERO/PYELOSCOPY, CALCULUS TX Right 2/5/2020    CYSTO, URETEROSCOPY, RIGHT URETERAL STENT PLACEMENT, RETROGRADE PYELOGRAM [facility]    HERNIA REPAIR Left 01/2018    repair    HERNIA REPAIR Right 01/2018    looked at at same time they did left repair    INGUINAL HERNIA REPAIR Left 08/19/2016    LAPAROSCOPY N/A 1/16/2018    DIAGNOSTIC LAPAROSCOPY performed by Lino Mclaughlin MD at [facility] URETEROSCOPY Right 02/05/2020    stent placement, cystoscopy, retrograde pyelogram    VASECTOMY         Family History   Problem Relation Age of Onset    Cancer Father         throat    Cirrhosis Father     High Blood Pressure Sister     Asthma Sister     Arthritis Sister     High Blood Pressure Mother     Heart Disease Mother     Alzheimer's Disease Mother        Social History     Tobacco Use    Smoking status: Current Every Day Smoker     Packs/day: 0.25     Years: 28.00     Pack years: 7.00     Types: Cigarettes    Smokeless tobacco: Never Used   Substance Use Topics    Alcohol use: Not Currently     Comment: rarely      Current Outpatient Medications   Medication Sig Dispense Refill    phentermine (ADIPEX-P) 37.5 MG tablet Take 1 tablet by mouth every morning (before breakfast) for 30 days.  30 tablet 0    amLODIPine (NORVASC) 10 MG tablet take 1 tablet by mouth once daily 90 tablet 1    memantine (NAMENDA) 5 MG tablet take 1 tablet in am and two tab in pm for 2 wk, then two tab twice daily 120 tablet 5    metFORMIN (GLUCOPHAGE-XR) 500 MG extended release tablet Take 1 tablet by mouth daily (with breakfast) 30 tablet 5    XYZAL 5 MG tablet Take 1 tablet by mouth nightly 30 tablet 3    albuterol sulfate  (90 Base) MCG/ACT inhaler inhale 2 puffs by mouth and INTO THE LUNGS every 6 hours if needed for wheezing 8.5 g 3    traZODone (DESYREL) 50 MG tablet take 2 tablets by mouth at bedtime 60 tablet 11    TRINTELLIX 20 MG TABS tablet 20 mg       gabapentin (NEURONTIN) 300 MG capsule take 1 capsule by mouth twice a day      tiZANidine (ZANAFLEX) 2 MG tablet take 1 tablet by mouth at bedtime      carvedilol (COREG) 25 MG tablet take 1 tablet by mouth twice a day 180 tablet 2    mometasone (NASONEX) 50 MCG/ACT nasal spray 2 sprays by Nasal route daily 1 Inhaler 3    fluticasone (FLONASE) 50 MCG/ACT nasal spray instill 2 sprays into each nostril once daily 16 g 1    atorvastatin (LIPITOR) 10 MG tablet Take 1 tablet by mouth daily 90 tablet 3    fluticasone-salmeterol (ADVAIR) 100-50 MCG/DOSE diskus inhaler Inhale 1 puff into the lungs every 12 hours 60 each 3    nicotine (NICODERM CQ) 21 MG/24HR Place 1 patch onto the skin daily 42 patch 0    topiramate (TOPAMAX) 50 MG tablet take 1.5 tab by mouth twice a day 90 tablet 6    diclofenac sodium (VOLTAREN) 1 % GEL apply 4 grams to affected area four times a day AS NEEDED FOR PAIN      fluticasone (FLOVENT HFA) 110 MCG/ACT inhaler inhale 2 puffs by mouth INTO THE LUNGS twice a day 12 g 3    oxyCODONE-acetaminophen (PERCOCET) 5-325 MG per tablet Take 1 tablet by mouth every 4 hours as needed for Pain.  nitroGLYCERIN (NITROSTAT) 0.3 MG SL tablet place 1 tablet under the tongue if needed every 5 minutes for chest pain for 3 doses IF NO RELIEF AF 25 tablet 3    aspirin 81 MG tablet Take 81 mg by mouth daily. No current facility-administered medications for this visit.      Allergies   Allergen Reactions    Lisinopril      States he is allergic to the generic forms of medication, can take lisinopril    Aleve  [Naproxen Sodium] Nausea Only    Amitriptyline Hcl Other (See Comments)    Exelon [Rivastigmine] Other (See Comments)     Patient stated patch left a \"burn kathy\" on his skin  Ibuprofen Other (See Comments)     \"hurts my stomach. \"       Health Maintenance   Topic Date Due    Shingles Vaccine (1 of 2) Never done    Diabetic foot exam  11/17/2017    Diabetic retinal exam  11/18/2017    Diabetic microalbuminuria test  09/14/2019    A1C test (Diabetic or Prediabetic)  07/28/2022    Lipid screen  07/28/2022    Colon cancer screen fecal DNA test (Cologuard)  06/27/2024    DTaP/Tdap/Td vaccine (2 - Td or Tdap) 08/02/2026    Pneumococcal 0-64 years Vaccine (2 of 2 - PPSV23) 09/14/2026    Flu vaccine  Completed    COVID-19 Vaccine  Completed    Hepatitis C screen  Completed    HIV screen  Completed    Hepatitis A vaccine  Aged Out    Hib vaccine  Aged Out    Meningococcal (ACWY) vaccine  Aged Out       Subjective:      Review of Systems   Constitutional: Negative for chills and fever. HENT: Negative for sore throat. Respiratory: Negative for chest tightness and shortness of breath. Cardiovascular: Negative for chest pain. Gastrointestinal: Negative for nausea and vomiting. Genitourinary: Negative for dysuria and testicular pain. Objective:     Physical Exam  Constitutional:       General: He is not in acute distress. Appearance: He is well-developed. He is not diaphoretic. HENT:      Head: Normocephalic and atraumatic. Eyes:      Pupils: Pupils are equal, round, and reactive to light. Cardiovascular:      Rate and Rhythm: Normal rate and regular rhythm. Heart sounds: Normal heart sounds. No murmur heard. Pulmonary:      Effort: Pulmonary effort is normal. No respiratory distress. Breath sounds: Normal breath sounds. No stridor. Musculoskeletal:      Cervical back: Neck supple. Right lower leg: No edema. Left lower leg: No edema. Skin:     General: Skin is warm and dry. Neurological:      Mental Status: He is alert and oriented to person, place, and time.    Psychiatric:         Mood and Affect: Mood normal. Behavior: Behavior normal.       /80   Pulse 88   Wt 271 lb (122.9 kg)   SpO2 96%   BMI 37.80 kg/m²     Assessment:      1. Essential hypertension  - BP well controlled on current medications, continue. 2. Need for influenza vaccination  - INFLUENZA, MDCK QUADV, 2 YRS AND OLDER, IM, PF, PREFILL SYR OR SDV, 0.5ML (FLUCELVAX QUADV, PF)    3. Obesity (BMI 30-39.9)  - pt to continue last month of adipex to help with diet and exercise to help with weight loss. - phentermine (ADIPEX-P) 37.5 MG tablet; Take 1 tablet by mouth every morning (before breakfast) for 30 days. Dispense: 30 tablet; Refill: 0    4. 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 Reynolds Lefort , vascular neurologist. Provided pt with info to call. 5. Type 2 diabetes mellitus without complication, with long-term current use of insulin ([facility] [address]  - Recommend continue current medication and healthy diet and exercise. I had discussion with pt regarding him wanting to be a potential kidney donor. Discussed given his diabetes and other comorbidities it may be best that he does not donate it . States he has not had any formal testing done yet. Plan:      Return in about 1 month (around 10/29/2021) for diabetes follow up. Orders Placed This Encounter   Procedures    INFLUENZA, MDCK QUADV, 2 YRS AND OLDER, IM, PF, PREFILL SYR OR SDV, 0.5ML (FLUCELVAX QUADV, PF)     Orders Placed This Encounter   Medications    phentermine (ADIPEX-P) 37.5 MG tablet     Sig: Take 1 tablet by mouth every morning (before breakfast) for 30 days. Dispense:  30 tablet     Refill:  0        Patient given educational materials - see patient instructions. Discussed use, benefit, and side effects of prescribed medications. All patient questions answered. Pt voiced understanding. Reviewed healthmaintenance. Instructed to continue current medications, diet and exercise. Patient agreed with treatment plan. Follow up as directed.      Electronically signed by Leanna Whitfield DO on 10/2/2021 at 9:27 PM

## 2021-09-30 ENCOUNTER — TELEPHONE (OUTPATIENT)
Dept: NEUROSURGERY | Age: 61
End: 2021-09-30

## 2021-09-30 DIAGNOSIS — I72.0 ANEURYSM OF LEFT CAROTID ARTERY (HCC): Primary | ICD-10-CM

## 2021-09-30 NOTE — TELEPHONE ENCOUNTER
Patient is requesting new order for MRA of head be placed for upcoming appointment scheduled 12/29/21.

## 2021-10-02 ASSESSMENT — ENCOUNTER SYMPTOMS
SORE THROAT: 0
CHEST TIGHTNESS: 0
VOMITING: 0
SHORTNESS OF BREATH: 0
NAUSEA: 0

## 2021-10-12 ENCOUNTER — TELEMEDICINE (OUTPATIENT)
Dept: NEUROLOGY | Age: 61
End: 2021-10-12
Payer: COMMERCIAL

## 2021-10-12 DIAGNOSIS — F32.A DEPRESSION, UNSPECIFIED DEPRESSION TYPE: Primary | ICD-10-CM

## 2021-10-12 DIAGNOSIS — G43.009 MIGRAINE WITHOUT AURA AND WITHOUT STATUS MIGRAINOSUS, NOT INTRACTABLE: ICD-10-CM

## 2021-10-12 DIAGNOSIS — R41.3 MEMORY LOSS: ICD-10-CM

## 2021-10-12 DIAGNOSIS — G47.01 INSOMNIA DUE TO MEDICAL CONDITION: ICD-10-CM

## 2021-10-12 PROCEDURE — 99214 OFFICE O/P EST MOD 30 MIN: CPT | Performed by: PSYCHIATRY & NEUROLOGY

## 2021-10-12 PROCEDURE — G8417 CALC BMI ABV UP PARAM F/U: HCPCS | Performed by: PSYCHIATRY & NEUROLOGY

## 2021-10-12 PROCEDURE — 3017F COLORECTAL CA SCREEN DOC REV: CPT | Performed by: PSYCHIATRY & NEUROLOGY

## 2021-10-12 PROCEDURE — 4004F PT TOBACCO SCREEN RCVD TLK: CPT | Performed by: PSYCHIATRY & NEUROLOGY

## 2021-10-12 PROCEDURE — G8482 FLU IMMUNIZE ORDER/ADMIN: HCPCS | Performed by: PSYCHIATRY & NEUROLOGY

## 2021-10-12 PROCEDURE — G8427 DOCREV CUR MEDS BY ELIG CLIN: HCPCS | Performed by: PSYCHIATRY & NEUROLOGY

## 2021-10-12 ASSESSMENT — ENCOUNTER SYMPTOMS
BACK PAIN: 1
GASTROINTESTINAL NEGATIVE: 1
EYES NEGATIVE: 1
ALLERGIC/IMMUNOLOGIC NEGATIVE: 1
RESPIRATORY NEGATIVE: 1

## 2021-10-12 NOTE — PROGRESS NOTES
Subjective:      Patient ID: Estefana Rubinstein is a 62 y.o. male. HPI  Active problem initiating insomnia on desyrel to consolidate sleep . There are migraine headaches , depression with memory complaints and sensory neuropathy followed by Dr Sindhu Gomez . There is suggestion of sleep apnea to undergo sleep study . There is also chronic low back pain followed by pain clinic . The condition is he he never had sleep study not called from sleep lab . He reports that he is doing better sleeping sounder with addition of melatonin 5 mg to desyrel at night going to bed at 9 PM falling asleep within few minutes sleeping to 6AM sleeping straight with no awakening . During waking day he is rested . He has no gasping for breath at night  There is history snoring having no bed partner . here is depression on effexor . He has sensory neuropathy on neurontin 300 mg po bid . He has lost 15 lbs to 260 lbs There is low back pain on percocet through pain clinic . With better sleep he has had no headache for 3 to 4 months on topamax 75 mg po bid  . He was placed namenda by Dr Sindhu Gomez for memory complaints  Significant medications desyrel 100 mg po qhs , topamax 75 mg po tid , cymbalta 30 mg po qhs , neurontin 300 mg po bid , namenda 10 mg po bid . Testing Head CT normal . 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  Asthma     as child.  Back pain     CAD (coronary artery disease)     Cataract     Depression     pt. denies.     Diabetes mellitus ([address]     Heart attack ([facility]     2011    Hypertension     Kidney stones     Mini stroke ([facility]     2014    Osteoarthritis     Seasonal allergies     Sinus problem        Past Surgical History:   Procedure Laterality Date    CHOLECYSTECTOMY      2016    COLONOSCOPY      CYSTO/URETERO/PYELOSCOPY, CALCULUS TX Right 2/5/2020    CYSTO, URETEROSCOPY, RIGHT URETERAL STENT PLACEMENT, RETROGRADE PYELOGRAM performed by Malia Frey MD at [address], rt. inguinal    HERNIA REPAIR Left 01/2018    repair    HERNIA REPAIR Right 01/2018    looked at at same time they did left repair    INGUINAL HERNIA REPAIR Left 08/19/2016    LAPAROSCOPY N/A 1/16/2018    DIAGNOSTIC LAPAROSCOPY performed by Kizzy Pineda MD at [facility] URETEROSCOPY Right 02/05/2020    stent placement, cystoscopy, retrograde pyelogram    VASECTOMY         Family History   Problem Relation Age of Onset    Cancer Father         throat    Cirrhosis Father     High Blood Pressure Sister     Asthma Sister     Arthritis Sister     High Blood Pressure Mother     Heart Disease Mother     Alzheimer's Disease Mother        Social History     Socioeconomic History    Marital status: Single     Spouse name: None    Number of children: None    Years of education: None    Highest education level: None   Occupational History    None   Tobacco Use    Smoking status: Current Every Day Smoker     Packs/day: 0.25     Years: 28.00     Pack years: 7.00     Types: Cigarettes    Smokeless tobacco: Never Used   Vaping Use    Vaping Use: Never used   Substance and Sexual Activity    Alcohol use: Yes     Comment: rarely    Drug use: No    Sexual activity: None   Other Topics Concern    None   Social History Narrative    None     Social Determinants of Health     Financial Resource Strain: Low Risk     Difficulty of Paying Living Expenses: Not hard at all   Food Insecurity: No Food Insecurity    Worried About Running Out of Food in the Last Year: Never true    Mio of Food in the Last Year: Never true   Transportation Needs:     Lack of Transportation (Medical):      Lack of Transportation (Non-Medical):    Physical Activity:     Days of Exercise per Week:     Minutes of Exercise per Session:    Stress:     Feeling of Stress :    Social Connections:     Frequency of Communication with Friends and Family:     Frequency of Social Gatherings with Friends and Family:     Attends [employer] Services:     Active Member of Clubs or Organizations:     Attends Club or Organization Meetings:     Marital Status:    Intimate Partner Violence:     Fear of Current or Ex-Partner:     Emotionally Abused:     Physically Abused:     Sexually Abused:        Current Outpatient Medications   Medication Sig Dispense Refill    phentermine (ADIPEX-P) 37.5 MG tablet Take 1 tablet by mouth every morning (before breakfast) for 30 days.  30 tablet 0    amLODIPine (NORVASC) 10 MG tablet take 1 tablet by mouth once daily 90 tablet 1    memantine (NAMENDA) 5 MG tablet take 1 tablet in am and two tab in pm for 2 wk, then two tab twice daily 120 tablet 5    metFORMIN (GLUCOPHAGE-XR) 500 MG extended release tablet Take 1 tablet by mouth daily (with breakfast) 30 tablet 5    XYZAL 5 MG tablet Take 1 tablet by mouth nightly 30 tablet 3    albuterol sulfate  (90 Base) MCG/ACT inhaler inhale 2 puffs by mouth and INTO THE LUNGS every 6 hours if needed for wheezing 8.5 g 3    traZODone (DESYREL) 50 MG tablet take 2 tablets by mouth at bedtime 60 tablet 11    TRINTELLIX 20 MG TABS tablet 20 mg       gabapentin (NEURONTIN) 300 MG capsule take 1 capsule by mouth twice a day      tiZANidine (ZANAFLEX) 2 MG tablet take 1 tablet by mouth at bedtime      carvedilol (COREG) 25 MG tablet take 1 tablet by mouth twice a day 180 tablet 2    mometasone (NASONEX) 50 MCG/ACT nasal spray 2 sprays by Nasal route daily 1 Inhaler 3    fluticasone (FLONASE) 50 MCG/ACT nasal spray instill 2 sprays into each nostril once daily 16 g 1    atorvastatin (LIPITOR) 10 MG tablet Take 1 tablet by mouth daily 90 tablet 3    topiramate (TOPAMAX) 50 MG tablet take 1.5 tab by mouth twice a day 90 tablet 6    diclofenac sodium (VOLTAREN) 1 % GEL apply 4 grams to affected area four times a day AS NEEDED FOR PAIN      fluticasone (FLOVENT HFA) 110 MCG/ACT inhaler inhale 2 puffs by mouth INTO THE LUNGS twice a day 12 g 3    oxyCODONE-acetaminophen (PERCOCET) 5-325 MG per tablet Take 1 tablet by mouth every 4 hours as needed for Pain.  nitroGLYCERIN (NITROSTAT) 0.3 MG SL tablet place 1 tablet under the tongue if needed every 5 minutes for chest pain for 3 doses IF NO RELIEF AF 25 tablet 3    aspirin 81 MG tablet Take 81 mg by mouth daily.  fluticasone-salmeterol (ADVAIR) 100-50 MCG/DOSE diskus inhaler Inhale 1 puff into the lungs every 12 hours (Patient not taking: Reported on 10/12/2021) 60 each 3    nicotine (NICODERM CQ) 21 MG/24HR Place 1 patch onto the skin daily (Patient not taking: Reported on 10/12/2021) 42 patch 0     No current facility-administered medications for this visit. Allergies   Allergen Reactions    Lisinopril      States he is allergic to the generic forms of medication, can take lisinopril    Aleve  [Naproxen Sodium] Nausea Only    Amitriptyline Hcl Other (See Comments)    Exelon [Rivastigmine] Other (See Comments)     Patient stated patch left a \"burn kathy\" on his skin     Ibuprofen Other (See Comments)     \"hurts my stomach. \"       Review of Systems   Constitutional: Negative. HENT: Negative. Eyes: Negative. Respiratory: Negative. Cardiovascular: Negative. Gastrointestinal: Negative. Endocrine: Negative. Genitourinary: Negative. Musculoskeletal: Positive for back pain. Skin: Negative. Allergic/Immunologic: Negative. Neurological: Negative. Hematological: Negative. Psychiatric/Behavioral: Negative. Objective:   Physical Exam  There were no vitals filed for this visit. weight:      Neurological Examination  Constitutional .General exam well groomed   Head/Ears /Nose/Throat: external ear . Normal exam  Neck and thyroid . Normal size.  No bruits  Respiratory Lucille Chime Breathsounds clear bilaterally  Cardiovascular: Auscultation of heart with regular rate and rhythm  Musculoskeletal. Muscle bulk and tone normal                                                           Muscle strength 5/5 strength throughout                                                                                No dysmetria or dysdiadokinesis  No tremor   Normal fine motor  Gait normal   Orientation Alert and oriented x 3   Attention and concentration normal  Short term memory 2 words out of 3 in one minute  Language process and speech normal . No aphasia   Cranial nerve 2 normal acuety and visual fields  Cranial nerve 3, 4 and 6 . Extraocular muscles are intact . Pupils are equal and reactive   Cranial nerve 5 . Intact corneal reflex. Normal facial sensation  Cranial nerve 7 normal exam   Cranial nerve 8. Grossly intact hearing   Cranial nerve 9 and 10. Symmetric palate elevation   Cranial nerve 11 , 5 out of 5 strength   Cranial Nerve 12 midline tongue . No atrophy  Sensation . Decreasedd pinprick and light touch distal lower extremities   Deep Tendon Reflexes normal  Plantar response flexor bilaterally    Assessment:       Diagnosis Orders   1. Depression, unspecified depression type     2. Insomnia due to medical condition     3. Memory loss     4. Migraine without aura and without status migrainosus, not intractable     He is sleeping better being rested during day to stay on current regimen holding sleep study      Plan:      As above       Jose Manuel Whiteside is a 62 y.o. male being evaluated by a Virtual Visit (video visit) encounter to address concerns as mentioned above. A caregiver was present when appropriate. Due to this being a TeleHealth encounter (During RLEUS-22 public health emergency), evaluation of the following organ systems was limited: Vitals/Constitutional/EENT/Resp/CV/GI//MS/Neuro/Skin/Heme-Lymph-Imm.   Pursuant to the emergency declaration under the [address] Emergencies Act, 1135 waiver authority and the Coronavirus Preparedness and Response Supplemental Appropriations Act, this Virtual Visit was conducted with patient's (and/or legal guardian's) consent, to reduce the patient's risk of exposure to COVID-19 and provide necessary medical care. The patient (and/or legal guardian) has also been advised to contact this office for worsening conditions or problems, and seek emergency medical treatment and/or call 911 if deemed necessary. Services were provided through a video synchronous discussion virtually to substitute for in-person clinic visit. Patient and provider were located at their individual homes. --Shiraz Richmond MD on 10/12/2021 at 1:09 PM    An electronic signature was used to authenticate this note.   Shiraz Richmond MD

## 2021-10-18 RX ORDER — ALBUTEROL SULFATE 90 UG/1
AEROSOL, METERED RESPIRATORY (INHALATION)
Qty: 8.5 G | Refills: 3 | Status: SHIPPED | OUTPATIENT
Start: 2021-10-18 | End: 2022-01-18

## 2021-10-25 ENCOUNTER — TELEPHONE (OUTPATIENT)
Dept: UROLOGY | Age: 61
End: 2021-10-25

## 2021-10-25 NOTE — TELEPHONE ENCOUNTER
IPP Placement @ [facility] 11/24/21 12:00pm **STOP BLOOD THINNERS 11/17/21**   PAT @ [facility] 11/10/21 1:30pm   Vaccinated- moderna           Spoke with patient procedure info emailed.

## 2021-11-01 ENCOUNTER — OFFICE VISIT (OUTPATIENT)
Dept: PRIMARY CARE CLINIC | Age: 61
End: 2021-11-01
Payer: COMMERCIAL

## 2021-11-01 VITALS
SYSTOLIC BLOOD PRESSURE: 124 MMHG | OXYGEN SATURATION: 95 % | HEART RATE: 73 BPM | BODY MASS INDEX: 37.52 KG/M2 | WEIGHT: 269 LBS | RESPIRATION RATE: 15 BRPM | DIASTOLIC BLOOD PRESSURE: 86 MMHG

## 2021-11-01 DIAGNOSIS — Z79.4 TYPE 2 DIABETES MELLITUS WITHOUT COMPLICATION, WITH LONG-TERM CURRENT USE OF INSULIN (HCC): Primary | ICD-10-CM

## 2021-11-01 DIAGNOSIS — I72.0 ANEURYSM OF CAROTID ARTERY (HCC): ICD-10-CM

## 2021-11-01 DIAGNOSIS — J30.9 ALLERGIC RHINITIS, UNSPECIFIED SEASONALITY, UNSPECIFIED TRIGGER: ICD-10-CM

## 2021-11-01 DIAGNOSIS — I10 ESSENTIAL HYPERTENSION: ICD-10-CM

## 2021-11-01 DIAGNOSIS — E11.9 TYPE 2 DIABETES MELLITUS WITHOUT COMPLICATION, WITH LONG-TERM CURRENT USE OF INSULIN (HCC): Primary | ICD-10-CM

## 2021-11-01 LAB
CREATININE URINE POCT: 200
HBA1C MFR BLD: 6.3 %
MICROALBUMIN/CREAT 24H UR: 30 MG/G{CREAT}
MICROALBUMIN/CREAT UR-RTO: NORMAL

## 2021-11-01 PROCEDURE — 83036 HEMOGLOBIN GLYCOSYLATED A1C: CPT | Performed by: FAMILY MEDICINE

## 2021-11-01 PROCEDURE — G8417 CALC BMI ABV UP PARAM F/U: HCPCS | Performed by: FAMILY MEDICINE

## 2021-11-01 PROCEDURE — 3017F COLORECTAL CA SCREEN DOC REV: CPT | Performed by: FAMILY MEDICINE

## 2021-11-01 PROCEDURE — 82044 UR ALBUMIN SEMIQUANTITATIVE: CPT | Performed by: FAMILY MEDICINE

## 2021-11-01 PROCEDURE — G8427 DOCREV CUR MEDS BY ELIG CLIN: HCPCS | Performed by: FAMILY MEDICINE

## 2021-11-01 PROCEDURE — G8482 FLU IMMUNIZE ORDER/ADMIN: HCPCS | Performed by: FAMILY MEDICINE

## 2021-11-01 PROCEDURE — 3044F HG A1C LEVEL LT 7.0%: CPT | Performed by: FAMILY MEDICINE

## 2021-11-01 PROCEDURE — 4004F PT TOBACCO SCREEN RCVD TLK: CPT | Performed by: FAMILY MEDICINE

## 2021-11-01 PROCEDURE — 99214 OFFICE O/P EST MOD 30 MIN: CPT | Performed by: FAMILY MEDICINE

## 2021-11-01 PROCEDURE — 2022F DILAT RTA XM EVC RTNOPTHY: CPT | Performed by: FAMILY MEDICINE

## 2021-11-01 ASSESSMENT — ENCOUNTER SYMPTOMS
NAUSEA: 0
VOMITING: 0
SHORTNESS OF BREATH: 0

## 2021-11-01 NOTE — PROGRESS NOTES
attack [facility])     2011    Hypertension     Kidney stones     Mini stroke ([address]     2014    Osteoarthritis     Seasonal allergies     Sinus problem       Past Surgical History:   Procedure Laterality Date    CHOLECYSTECTOMY      2016    COLONOSCOPY      CYSTO/URETERO/PYELOSCOPY, CALCULUS TX Right 2/5/2020    CYSTO, URETEROSCOPY, RIGHT URETERAL STENT PLACEMENT, RETROGRADE PYELOGRAM performed by Shar Ewing MD at [facility], rt. inguinal    HERNIA REPAIR Left 01/2018    repair    HERNIA REPAIR Right 01/2018    looked at at same time they did left repair    INGUINAL HERNIA REPAIR Left 08/19/2016    LAPAROSCOPY N/A 1/16/2018    DIAGNOSTIC LAPAROSCOPY [facility]indra Harris [facility] URETEROSCOPY Right 02/05/2020    stent placement, cystoscopy, retrograde pyelogram    VASECTOMY         Family History   Problem Relation Age of Onset    Cancer Father         throat    Cirrhosis Father     High Blood Pressure Sister     Asthma Sister     Arthritis Sister     High Blood Pressure Mother     Heart Disease Mother     Alzheimer's Disease Mother        Social History     Tobacco Use    Smoking status: Current Every Day Smoker     Packs/day: 0.25     Years: 28.00     Pack years: 7.00     Types: Cigarettes    Smokeless tobacco: Never Used   Substance Use Topics    Alcohol use: Yes     Comment: rarely      Current Outpatient Medications   Medication Sig Dispense Refill    albuterol sulfate  (90 Base) MCG/ACT inhaler inhale 2 puffs by mouth and INTO THE LUNGS every 6 hours if needed for wheezing 8.5 g 3    amLODIPine (NORVASC) 10 MG tablet take 1 tablet by mouth once daily 90 tablet 1    memantine (NAMENDA) 5 MG tablet take 1 tablet in am and two tab in pm for 2 wk, then two tab twice daily 120 tablet 5    metFORMIN (GLUCOPHAGE-XR) 500 MG extended release tablet Take 1 tablet by mouth daily (with breakfast) 30 tablet 5  XYZAL 5 MG tablet Take 1 tablet by mouth nightly 30 tablet 3    traZODone (DESYREL) 50 MG tablet take 2 tablets by mouth at bedtime 60 tablet 11    TRINTELLIX 20 MG TABS tablet 20 mg       gabapentin (NEURONTIN) 300 MG capsule take 1 capsule by mouth twice a day      tiZANidine (ZANAFLEX) 2 MG tablet take 1 tablet by mouth at bedtime      carvedilol (COREG) 25 MG tablet take 1 tablet by mouth twice a day 180 tablet 2    mometasone (NASONEX) 50 MCG/ACT nasal spray 2 sprays by Nasal route daily 1 Inhaler 3    atorvastatin (LIPITOR) 10 MG tablet Take 1 tablet by mouth daily 90 tablet 3    fluticasone-salmeterol (ADVAIR) 100-50 MCG/DOSE diskus inhaler Inhale 1 puff into the lungs every 12 hours (Patient not taking: Reported on 10/12/2021) 60 each 3    topiramate (TOPAMAX) 50 MG tablet take 1.5 tab by mouth twice a day 90 tablet 6    diclofenac sodium (VOLTAREN) 1 % GEL apply 4 grams to affected area four times a day AS NEEDED FOR PAIN      fluticasone (FLOVENT HFA) 110 MCG/ACT inhaler inhale 2 puffs by mouth INTO THE LUNGS twice a day 12 g 3    oxyCODONE-acetaminophen (PERCOCET) 5-325 MG per tablet Take 1 tablet by mouth every 4 hours as needed for Pain.  nitroGLYCERIN (NITROSTAT) 0.3 MG SL tablet place 1 tablet under the tongue if needed every 5 minutes for chest pain for 3 doses IF NO RELIEF AF 25 tablet 3    aspirin 81 MG tablet Take 81 mg by mouth daily. No current facility-administered medications for this visit. Allergies   Allergen Reactions    Lisinopril      States he is allergic to the generic forms of medication, can take lisinopril    Aleve  [Naproxen Sodium] Nausea Only    Amitriptyline Hcl Other (See Comments)    Exelon [Rivastigmine] Other (See Comments)     Patient stated patch left a \"burn kathy\" on his skin     Ibuprofen Other (See Comments)     \"hurts my stomach. \"       Health Maintenance   Topic Date Due    Shingles Vaccine (1 of 2) Never done    Diabetic foot complication, with long-term current use of insulin (HCC)  -A1c controlled. Continue healthy diet and medication.  - POCT glycosylated hemoglobin (Hb A1C)  - POCT microalbumin    2. Essential hypertension  -Blood pressure well controlled. Continue current medications.  -He has upcoming surgery in November 24 with urology. Recommend he follow-up with his cardiologist for clearance. 3. Aneurysm of carotid artery ([address]  -He will be following up with Dr. Christophe Sanderson in December. 4. Allergic rhinitis, unspecified seasonality, unspecified trigger  -Recommend just using Nasonex and to stop Flonase. Remaking his nose too dry and that may be causing the tinge of blood in his phlegm. Recommend can try Xyzal in the morning and Benadryl at night. If his allergies do not improve I recommend seeing allergist again in the future. Plan:      Return in about 3 months (around 2/1/2022). Orders Placed This Encounter   Procedures    POCT glycosylated hemoglobin (Hb A1C)    POCT microalbumin     No orders of the defined types were placed in this encounter. Patient given educational materials - see patient instructions. Discussed use, benefit, and side effects of prescribed medications. All patient questions answered. Pt voiced understanding. Reviewed healthmaintenance. Instructed to continue current medications, diet and exercise. Patient agreed with treatment plan. Follow up as directed.      Electronically signed by Clay Mcguire DO on 11/1/2021 at 1:13 PM

## 2021-11-03 DIAGNOSIS — F32.A DEPRESSION, UNSPECIFIED DEPRESSION TYPE: ICD-10-CM

## 2021-11-03 DIAGNOSIS — R41.89 PSEUDODEMENTIA: ICD-10-CM

## 2021-11-03 DIAGNOSIS — R41.3 MEMORY LOSS: ICD-10-CM

## 2021-11-03 PROBLEM — E11.9 TYPE 2 DIABETES MELLITUS (HCC): Status: ACTIVE | Noted: 2021-11-03

## 2021-11-05 RX ORDER — MEMANTINE HYDROCHLORIDE 5 MG/1
5 TABLET ORAL 2 TIMES DAILY
Qty: 60 TABLET | Refills: 1 | Status: SHIPPED | OUTPATIENT
Start: 2021-11-05 | End: 2022-01-20 | Stop reason: DRUGHIGH

## 2021-11-10 ENCOUNTER — HOSPITAL ENCOUNTER (OUTPATIENT)
Dept: PREADMISSION TESTING | Age: 61
Discharge: HOME | End: 2021-11-14
Payer: MEDICARE

## 2021-11-10 VITALS
SYSTOLIC BLOOD PRESSURE: 130 MMHG | WEIGHT: 266 LBS | RESPIRATION RATE: 20 BRPM | OXYGEN SATURATION: 97 % | HEART RATE: 78 BPM | TEMPERATURE: 96.5 F | HEIGHT: 71 IN | DIASTOLIC BLOOD PRESSURE: 88 MMHG | BODY MASS INDEX: 37.24 KG/M2

## 2021-11-10 PROCEDURE — 87086 URINE CULTURE/COLONY COUNT: CPT

## 2021-11-10 RX ORDER — SODIUM CHLORIDE, SODIUM LACTATE, POTASSIUM CHLORIDE, CALCIUM CHLORIDE 600; 310; 30; 20 MG/100ML; MG/100ML; MG/100ML; MG/100ML
1000 INJECTION, SOLUTION INTRAVENOUS CONTINUOUS
Status: CANCELLED | OUTPATIENT
Start: 2021-11-10

## 2021-11-10 NOTE — PROGRESS NOTES
Anesthesia Focused Assessment    Hx of anesthesia complications:  no  Family hx of anesthesia complications:  no      Prior + Covid-19 test? no    STOP-BANG Sleep Apnea Questionnaire    SNORE loudly (heard through closed doors)? No  TIRED, fatigued, sleepy during daytime? No  OBSERVED stopping breathing during sleep? No  High blood PRESSURE or being treated? Yes    BMI over 35? Yes  AGE over 48? Yes  NECK circumference over 16\"? No  GENDER (male)? Yes             Total 4  High risk 5-8  Intermediate risk 3-4  Low risk 0-2    ----------------------------------------------------------------------------------------------------------------------  RAJANI                              No  If yes, machine? DM1                                            No  DM2                   Yes    Coronary Artery Disease      Yes  HTN         Yes  Defib/AICD/Pacemaker               No             Renal Failure                   No  If yes, on dialysis           Active smoker? Yes, less than 1/4 ppd  Drinks alcohol? Yes rarely  Illicit drugs? No  Dentition?        benign      Past Medical History:   Diagnosis Date    ADHD (attention deficit hyperactivity disorder)     no tx per pt.  Arthritis     knees, hands, back.  Asthma     as child. pcp manages    Back pain     [facility] .Last seen 10/26/2021. Goes again 11/17/2021    Brain aneurysm     Dr. Siomara Quinn at   [facility]  f/u 12/29/2021. No surgery at this time. Monitoring yearly.  CAD (coronary artery disease)     Dr. Shelly Gibbons, [place]  last visit 11/9/2021. Pt. states never had heart cath. MI 2011    Cataract     lt eye   no surgery    Cerebral artery occlusion with cerebral infarction [facility]) 2014    mini stroke went to [facility] and was treated. Dr. Della Rob.  Last seen summer 2021    Depression     pcp    Diabetes mellitus ([facility]     on rx   A1C high per pt    [facility]     [facility]   Last visit Dr. Tatiana Jeans 11/9/2021  [place], [address]   Received cardiology clearance    Hyperlipidemia     on rx    Hypertension     pcp manages    Kidney stones     Dr. Chani Meadows treats. Pt. states he currently has blood in his urine. Urine CS sent to lab today. No pain per pt.  Memory loss, long term     Pt states he has had it for years and follows w/ his neurologist Dr. Santiago Adams. Mother has Alzheimers.  Mini stroke [facility]     2014    Osteoarthritis     Seasonal allergies     Sinus problem     drainage from allergy    Snores     has not been tested for sleep apnea. Sees Dr. Sofía Stevenson. He provides rx for Trazodone.  Wears eyeglasses     Wellness examination     last visit 11/1/2021         Patient was evaluated in PAT & anesthesia guidelines were applied. NPO guidelines, medication instructions and scheduled arrival time were reviewed with patient. Anesthesia contacted:   no    Medical or cardiac clearance ordered: medical and cardiac clearances on file, along with most recent cardiac echo performed 11/9/2021 and cardiology office notes from 11/3/2021.     Cherise Esquivel, ASHLEY - CNP   11/10/21  2:37 PM

## 2021-11-10 NOTE — H&P
History and Physical    Pt Name: Chanelle Pascual  MRN: [id number]  YOB: 1960  Date of evaluation: 11/10/2021  Primary Care Physician: Rufus Sibley DO    SUBJECTIVE:   History of Chief Complaint:    Chanelle Pascual is a 62 y.o. male who presents for PAT appointment. Patient has a history of erectile dysfunction. He denies any urinary symptoms, including dysuria, frequency, urgency, nocturia, or abdominal pain. Patient has a history of arterial insufficiency and kidney stones. Patient has been scheduled for PENIS PROSTHESIS INSERTION- AMS INFLATABLE  Allergies  is allergic to lisinopril, aleve  [naproxen sodium], amitriptyline hcl, exelon [rivastigmine], and ibuprofen. Medications  Prior to Admission medications    Medication Sig Start Date End Date Taking? Authorizing Provider   memantine (NAMENDA) 5 MG tablet Take 1 tablet by mouth 2 times daily Patient states he is taking 1 tablet (5mg) twice daily 11/3/21 11/5/21  Yes Edward Sandoval MD   albuterol sulfate  (90 Base) MCG/ACT inhaler inhale 2 puffs by mouth and INTO THE LUNGS every 6 hours if needed for wheezing 10/18/21  Yes Sandra Medhkour, DO   amLODIPine (NORVASC) 10 MG tablet take 1 tablet by mouth once daily 8/26/21  Yes Sandra Medhkour, DO   metFORMIN (GLUCOPHAGE-XR) 500 MG extended release tablet Take 1 tablet by mouth daily (with breakfast) 7/30/21  Yes Sandra Medhkour, DO   XYZAL 5 MG tablet Take 1 tablet by mouth nightly 7/28/21  Yes Sandra Medhkour, DO   traZODone (DESYREL) 50 MG tablet take 2 tablets by mouth at bedtime 7/13/21  Yes Alexey Craig MD   TRINTELLIX 20 MG TABS tablet 20 mg  5/10/21  Yes Historical Provider, MD   gabapentin (NEURONTIN) 300 MG capsule Take 300 mg by mouth daily.  Takes at [facility] [place] 5/6/21  Yes Historical Provider, MD   tiZANidine (ZANAFLEX) 2 MG tablet take 1 tablet by mouth at bedtime 5/6/21  Yes Historical Provider, MD   carvedilol (COREG) 25 MG tablet take 1 tablet by mouth twice a day 4/20/21  Yes Sandra Medhkour, DO   mometasone (NASONEX) 50 MCG/ACT nasal spray 2 sprays by Nasal route daily 4/11/21  Yes Sandra Medhkour, DO   atorvastatin (LIPITOR) 10 MG tablet Take 1 tablet by mouth daily 1/21/21  Yes Sandra Medhkour, DO   fluticasone-salmeterol (ADVAIR) 100-50 MCG/DOSE diskus inhaler Inhale 1 puff into the lungs every 12 hours 11/23/20  Yes Sandra Medhkour, DO   topiramate (TOPAMAX) 50 MG tablet take 1.5 tab by mouth twice a day 11/19/20  Yes Marco Flowers MD   diclofenac sodium (VOLTAREN) 1 % GEL Stop 1 week prior to surgery 8/18/20  Yes Historical Provider, MD   oxyCODONE-acetaminophen (PERCOCET) 5-325 MG per tablet Take 1 tablet by mouth every 4 hours as needed for Pain. [place]   Yes Historical Provider, MD   nitroGLYCERIN (NITROSTAT) 0.3 MG SL tablet place 1 tablet under the tongue if needed every 5 minutes for chest pain for 3 doses IF NO RELIEF AF  Patient taking differently: Hasn't  Taken in years. 11/28/18  Yes Jodie Melchor MD   aspirin 81 MG tablet Take 81 mg by mouth daily Stop 11/17/2021 per surgeon. On asa per Dr. Vandana Roldan. Clearance was obtained.    Yes Historical Provider, MD   benzonatate (TESSALON) 200 MG capsule Take 1 capsule by mouth 3 times daily as needed for Cough 4/21/21   ASHLEY Do - CNP   carvedilol (COREG) 25 MG tablet Take 1 tablet by mouth 2 times daily 12/21/20   Sandra Medhkour, DO   fluticasone (FLONASE) 50 MCG/ACT nasal spray instill 2 sprays into each nostril once daily 11/6/20   Alivia Bunch DO     Past Medical History    has a past medical history of ADHD (attention deficit hyperactivity disorder), Arthritis, Asthma, Back pain, Brain aneurysm, CAD (coronary artery disease), Cataract, Cerebral artery occlusion with cerebral infarction ([facility] [address] [address] Depression, Diabetes mellitus ([facility] [address] Heart attack ([address] Hyperlipidemia, Hypertension, Kidney stones, Memory loss, long term, [facility] stroke [facility] Osteoarthritis, Seasonal allergies, Sinus problem, Snores, Wears eyeglasses, and Wellness examination. Past Surgical History   has a past surgical history that includes Cholecystectomy; Vasectomy; Upper gastrointestinal endoscopy; laparoscopy (N/A, 2018); hernia repair; Inguinal hernia repair (Left, 2016); hernia repair (Left, 2018); hernia repair (Right, 2018); Ureteroscopy (Right, 2020); cysto/uretero/pyeloscopy, calculus tx (Right, 2020); and Colonoscopy (). Social History   reports that he has been smoking cigarettes. He has a 7.00 pack-year smoking history. He has never used smokeless tobacco.    reports current alcohol use. reports no history of drug use. Marital Status single  Children 6  Occupation retired  Family History  Family Status   Relation Name Status    Father   at age 45    Sister  [address] Mother  [address] [facility]    Brother  Enma Quiros     family history includes Alzheimer's Disease in his mother; Arthritis in his sister; Asthma in his sister; Cancer in his father; Cirrhosis in his father; Heart Disease in his mother; High Blood Pressure in his mother and sister. Review of Systems:  CONSTITUTIONAL:   negative for fevers, chills, fatigue and malaise    EYES:   negative for double vision, blurred vision and photophobia    HEENT:   negative for tinnitus, epistaxis and sore throat     RESPIRATORY:   negative for cough, shortness of breath, wheezing     CARDIOVASCULAR:   negative for chest pain, palpitations, syncope, edema     GASTROINTESTINAL:   negative for nausea, vomiting     GENITOURINARY:   negative for incontinence     MUSCULOSKELETAL:   negative for neck or back pain     NEUROLOGICAL:   Negative for weakness and tingling  negative for headaches and dizziness     PSYCHIATRIC:   negative for anxiety       OBJECTIVE:   VITALS:  height is 5' 11\" (1.803 m) and weight is 266 lb (120.7 kg). His temporal temperature is 96.5 °F (35.8 °C).  His blood pressure is 130/88 and his pulse is 78. His respiration is 20 and oxygen saturation is 97%. CONSTITUTIONAL:alert & oriented x 3, no acute distress. Calm and pleasant. SKIN:  Warm and dry, no rashes to exposed areas of skin. HEAD:  Normocephalic, atraumatic. EYES: PERRL. EOMs intact. Wearing glasses. EARS:  Intact and equal bilaterally. No edema or thickening, without lumps, lesions, or discharge. Hearing grossly WNL. NOSE:  Nares patent. No rhinorrhea   MOUTH/THROAT:  Mucous membranes pink and moist, uvula midline, teeth appear to be intact. NECK:supple, no lymphadenopathy  LUNGS: Respirations even and non-labored. Clear to auscultation bilaterally, no wheezes, rales, or rhonchi. CARDIOVASCULAR: Regular rate and rhythm, no murmurs/rubs/gallops   ABDOMEN: soft, non-tender, rotund, bowel sounds active x 4   EXTREMITIES: No edema to bilateral lower extremities. No varicosities to bilateral lower extremities. NEUROLOGIC: CN II-XII are grossly intact. Gait is smooth, rhythmic and effortless   Testing:   EKG: on file from 11/3/2021  Labs on file from 11/8/2021  IMPRESSIONS:   Erectile dysfunction due to arterial insufficiency.   PLANS:   PENIS PROSTHESIS INSERTION- AMS ASHLEY Castrejon CNP  Electronically signed 11/10/2021 at 2:28 PM

## 2021-11-11 LAB
CULTURE: NO GROWTH
Lab: NORMAL
SPECIMEN DESCRIPTION: NORMAL

## 2021-11-15 ENCOUNTER — TELEPHONE (OUTPATIENT)
Dept: NEUROLOGY | Age: 61
End: 2021-11-15

## 2021-11-18 ENCOUNTER — HOSPITAL ENCOUNTER (OUTPATIENT)
Dept: MRI IMAGING | Facility: CLINIC | Age: 61
Discharge: HOME | End: 2021-11-20
Payer: MEDICARE

## 2021-11-18 DIAGNOSIS — I72.0 ANEURYSM OF LEFT CAROTID ARTERY (HCC): ICD-10-CM

## 2021-11-18 PROCEDURE — 70544 MR ANGIOGRAPHY HEAD W/O DYE: CPT

## 2021-11-23 ENCOUNTER — ANESTHESIA EVENT (OUTPATIENT)
Dept: OPERATING ROOM | Age: 61
End: 2021-11-23
Payer: MEDICARE

## 2021-11-24 ENCOUNTER — HOSPITAL ENCOUNTER (OUTPATIENT)
Age: 61
Setting detail: OBSERVATION
Discharge: HOME | End: 2021-11-25
Attending: UROLOGY | Admitting: UROLOGY
Payer: MEDICARE

## 2021-11-24 ENCOUNTER — ANESTHESIA (OUTPATIENT)
Dept: OPERATING ROOM | Age: 61
End: 2021-11-24
Payer: MEDICARE

## 2021-11-24 VITALS — TEMPERATURE: 79.3 F | SYSTOLIC BLOOD PRESSURE: 121 MMHG | DIASTOLIC BLOOD PRESSURE: 86 MMHG | OXYGEN SATURATION: 99 %

## 2021-11-24 DIAGNOSIS — G89.18 ACUTE POST-OPERATIVE PAIN: Primary | ICD-10-CM

## 2021-11-24 PROBLEM — N52.9 ERECTILE DYSFUNCTION: Status: ACTIVE | Noted: 2021-11-24

## 2021-11-24 LAB
ANION GAP SERPL CALCULATED.3IONS-SCNC: 11 MMOL/L (ref 9–17)
BUN BLDV-MCNC: 14 MG/DL (ref 8–23)
BUN/CREAT BLD: ABNORMAL (ref 9–20)
CALCIUM SERPL-MCNC: 9 MG/DL (ref 8.6–10.4)
CHLORIDE BLD-SCNC: 110 MMOL/L (ref 98–107)
CO2: 22 MMOL/L (ref 20–31)
CREAT SERPL-MCNC: 1.05 MG/DL (ref 0.7–1.2)
GFR AFRICAN AMERICAN: >60 ML/MIN
GFR NON-AFRICAN AMERICAN: >60 ML/MIN
GFR SERPL CREATININE-BSD FRML MDRD: ABNORMAL ML/MIN/{1.73_M2}
GFR SERPL CREATININE-BSD FRML MDRD: ABNORMAL ML/MIN/{1.73_M2}
GLUCOSE BLD-MCNC: 114 MG/DL (ref 75–110)
GLUCOSE BLD-MCNC: 121 MG/DL (ref 70–99)
GLUCOSE BLD-MCNC: 126 MG/DL (ref 75–110)
GLUCOSE BLD-MCNC: 151 MG/DL (ref 75–110)
GLUCOSE BLD-MCNC: 173 MG/DL (ref 75–110)
HCT VFR BLD CALC: 45.4 % (ref 40.7–50.3)
HEMOGLOBIN: 14.9 G/DL (ref 13–17)
MCH RBC QN AUTO: 30.6 PG (ref 25.2–33.5)
MCHC RBC AUTO-ENTMCNC: 32.8 G/DL (ref 28.4–34.8)
MCV RBC AUTO: 93.2 FL (ref 82.6–102.9)
NRBC AUTOMATED: 0 PER 100 WBC
PDW BLD-RTO: 13.3 % (ref 11.8–14.4)
PLATELET # BLD: 196 K/UL (ref 138–453)
PMV BLD AUTO: 10.8 FL (ref 8.1–13.5)
POTASSIUM SERPL-SCNC: 4.3 MMOL/L (ref 3.7–5.3)
RBC # BLD: 4.87 M/UL (ref 4.21–5.77)
SODIUM BLD-SCNC: 143 MMOL/L (ref 135–144)
WBC # BLD: 10.5 K/UL (ref 3.5–11.3)

## 2021-11-24 PROCEDURE — 3700000000 HC ANESTHESIA ATTENDED CARE: Performed by: UROLOGY

## 2021-11-24 PROCEDURE — 2709999900 HC NON-CHARGEABLE SUPPLY: Performed by: UROLOGY

## 2021-11-24 PROCEDURE — 6360000002 HC RX W HCPCS: Performed by: STUDENT IN AN ORGANIZED HEALTH CARE EDUCATION/TRAINING PROGRAM

## 2021-11-24 PROCEDURE — 6360000002 HC RX W HCPCS: Performed by: NURSE ANESTHETIST, CERTIFIED REGISTERED

## 2021-11-24 PROCEDURE — 2500000003 HC RX 250 WO HCPCS: Performed by: NURSE ANESTHETIST, CERTIFIED REGISTERED

## 2021-11-24 PROCEDURE — 2580000003 HC RX 258: Performed by: ANESTHESIOLOGY

## 2021-11-24 PROCEDURE — C1813 PROSTHESIS, PENILE, INFLATAB: HCPCS | Performed by: UROLOGY

## 2021-11-24 PROCEDURE — 80048 BASIC METABOLIC PNL TOTAL CA: CPT

## 2021-11-24 PROCEDURE — 7100000000 HC PACU RECOVERY - FIRST 15 MIN: Performed by: UROLOGY

## 2021-11-24 PROCEDURE — 85027 COMPLETE CBC AUTOMATED: CPT

## 2021-11-24 PROCEDURE — 6360000002 HC RX W HCPCS: Performed by: ANESTHESIOLOGY

## 2021-11-24 PROCEDURE — 6370000000 HC RX 637 (ALT 250 FOR IP): Performed by: UROLOGY

## 2021-11-24 PROCEDURE — 7100000001 HC PACU RECOVERY - ADDTL 15 MIN: Performed by: UROLOGY

## 2021-11-24 PROCEDURE — C1729 CATH, DRAINAGE: HCPCS | Performed by: UROLOGY

## 2021-11-24 PROCEDURE — 2580000003 HC RX 258: Performed by: UROLOGY

## 2021-11-24 PROCEDURE — 3600000004 HC SURGERY LEVEL 4 BASE: Performed by: UROLOGY

## 2021-11-24 PROCEDURE — 6370000000 HC RX 637 (ALT 250 FOR IP): Performed by: STUDENT IN AN ORGANIZED HEALTH CARE EDUCATION/TRAINING PROGRAM

## 2021-11-24 PROCEDURE — G0378 HOSPITAL OBSERVATION PER HR: HCPCS

## 2021-11-24 PROCEDURE — 82947 ASSAY GLUCOSE BLOOD QUANT: CPT

## 2021-11-24 PROCEDURE — 3600000014 HC SURGERY LEVEL 4 ADDTL 15MIN: Performed by: UROLOGY

## 2021-11-24 PROCEDURE — 3700000001 HC ADD 15 MINUTES (ANESTHESIA): Performed by: UROLOGY

## 2021-11-24 PROCEDURE — 87086 URINE CULTURE/COLONY COUNT: CPT

## 2021-11-24 DEVICE — AMS 700 PENILE PROSTHESIS, 1 LOW PROFILE RESERVOIR, UP TO 100 ML, INHIBIZONE TREATED
Type: IMPLANTABLE DEVICE | Site: PENIS | Status: FUNCTIONAL
Brand: CONCEAL

## 2021-11-24 DEVICE — INFLATABLE PENILE PROSTHESIS, INHIBIZONE/PRECONNECTED - PENOSCROTAL 1 PUMP 2 CYLINDERS WITH 10 CM LONG PRECONNECT TUBING
Type: IMPLANTABLE DEVICE | Site: PENIS | Status: FUNCTIONAL
Brand: AMS 700 CX MS PUMP

## 2021-11-24 DEVICE — INFLATABLE PENILE PROSTHESIS WITH MS PUMP ACCESSORY KIT
Type: IMPLANTABLE DEVICE | Site: PENIS | Status: FUNCTIONAL
Brand: AMS 700 ACCESSORY KIT

## 2021-11-24 DEVICE — NON-STACKABLE REAR TIP EXTENDERS FOR AMS 700 CX AND LGX CYLINDERS
Type: IMPLANTABLE DEVICE | Site: PENIS | Status: FUNCTIONAL
Brand: REAR TIP EXTENDER

## 2021-11-24 RX ORDER — SODIUM CHLORIDE 9 MG/ML
25 INJECTION, SOLUTION INTRAVENOUS PRN
Status: DISCONTINUED | OUTPATIENT
Start: 2021-11-24 | End: 2021-11-25 | Stop reason: HOSPADM

## 2021-11-24 RX ORDER — SODIUM CHLORIDE 0.9 % (FLUSH) 0.9 %
5-40 SYRINGE (ML) INJECTION EVERY 12 HOURS SCHEDULED
Status: DISCONTINUED | OUTPATIENT
Start: 2021-11-24 | End: 2021-11-25 | Stop reason: HOSPADM

## 2021-11-24 RX ORDER — NICOTINE POLACRILEX 4 MG
15 LOZENGE BUCCAL PRN
Status: DISCONTINUED | OUTPATIENT
Start: 2021-11-24 | End: 2021-11-25 | Stop reason: HOSPADM

## 2021-11-24 RX ORDER — VANCOMYCIN HYDROCHLORIDE 1 G/200ML
1000 INJECTION, SOLUTION INTRAVENOUS ONCE
Status: COMPLETED | OUTPATIENT
Start: 2021-11-25 | End: 2021-11-25

## 2021-11-24 RX ORDER — ONDANSETRON 2 MG/ML
INJECTION INTRAMUSCULAR; INTRAVENOUS PRN
Status: DISCONTINUED | OUTPATIENT
Start: 2021-11-24 | End: 2021-11-24 | Stop reason: SDUPTHER

## 2021-11-24 RX ORDER — MAGNESIUM HYDROXIDE 1200 MG/15ML
LIQUID ORAL CONTINUOUS PRN
Status: COMPLETED | OUTPATIENT
Start: 2021-11-24 | End: 2021-11-24

## 2021-11-24 RX ORDER — OXYCODONE HYDROCHLORIDE AND ACETAMINOPHEN 5; 325 MG/1; MG/1
1 TABLET ORAL EVERY 6 HOURS PRN
Status: DISCONTINUED | OUTPATIENT
Start: 2021-11-24 | End: 2021-11-25 | Stop reason: HOSPADM

## 2021-11-24 RX ORDER — TRAZODONE HYDROCHLORIDE 50 MG/1
50 TABLET ORAL NIGHTLY PRN
Status: DISCONTINUED | OUTPATIENT
Start: 2021-11-24 | End: 2021-11-25 | Stop reason: HOSPADM

## 2021-11-24 RX ORDER — MAGNESIUM HYDROXIDE 1200 MG/15ML
LIQUID ORAL PRN
Status: DISCONTINUED | OUTPATIENT
Start: 2021-11-24 | End: 2021-11-24 | Stop reason: HOSPADM

## 2021-11-24 RX ORDER — GLYCOPYRROLATE 1 MG/5 ML
SYRINGE (ML) INTRAVENOUS PRN
Status: DISCONTINUED | OUTPATIENT
Start: 2021-11-24 | End: 2021-11-24 | Stop reason: SDUPTHER

## 2021-11-24 RX ORDER — DEXAMETHASONE SODIUM PHOSPHATE 10 MG/ML
INJECTION INTRAMUSCULAR; INTRAVENOUS PRN
Status: DISCONTINUED | OUTPATIENT
Start: 2021-11-24 | End: 2021-11-24 | Stop reason: SDUPTHER

## 2021-11-24 RX ORDER — TOPIRAMATE 50 MG/1
50 TABLET, FILM COATED ORAL DAILY
Status: DISCONTINUED | OUTPATIENT
Start: 2021-11-24 | End: 2021-11-25

## 2021-11-24 RX ORDER — AMLODIPINE BESYLATE 10 MG/1
10 TABLET ORAL DAILY
Status: DISCONTINUED | OUTPATIENT
Start: 2021-11-24 | End: 2021-11-25 | Stop reason: HOSPADM

## 2021-11-24 RX ORDER — SODIUM CHLORIDE 0.9 % (FLUSH) 0.9 %
5-40 SYRINGE (ML) INJECTION PRN
Status: DISCONTINUED | OUTPATIENT
Start: 2021-11-24 | End: 2021-11-25 | Stop reason: HOSPADM

## 2021-11-24 RX ORDER — MEMANTINE HYDROCHLORIDE 5 MG/1
5 TABLET ORAL 2 TIMES DAILY
Status: DISCONTINUED | OUTPATIENT
Start: 2021-11-24 | End: 2021-11-25 | Stop reason: HOSPADM

## 2021-11-24 RX ORDER — ACETAMINOPHEN 325 MG/1
650 TABLET ORAL EVERY 4 HOURS PRN
Status: DISCONTINUED | OUTPATIENT
Start: 2021-11-24 | End: 2021-11-25 | Stop reason: HOSPADM

## 2021-11-24 RX ORDER — GENTAMICIN SULFATE 80 MG/50ML
80 INJECTION, SOLUTION INTRAVENOUS EVERY 8 HOURS
Status: DISCONTINUED | OUTPATIENT
Start: 2021-11-24 | End: 2021-11-25 | Stop reason: HOSPADM

## 2021-11-24 RX ORDER — POLYETHYLENE GLYCOL 3350 17 G/17G
17 POWDER, FOR SOLUTION ORAL DAILY PRN
Status: DISCONTINUED | OUTPATIENT
Start: 2021-11-24 | End: 2021-11-25 | Stop reason: HOSPADM

## 2021-11-24 RX ORDER — VANCOMYCIN HYDROCHLORIDE 1 G/200ML
1000 INJECTION, SOLUTION INTRAVENOUS
Status: COMPLETED | OUTPATIENT
Start: 2021-11-24 | End: 2021-11-24

## 2021-11-24 RX ORDER — ONDANSETRON 2 MG/ML
4 INJECTION INTRAMUSCULAR; INTRAVENOUS EVERY 6 HOURS PRN
Status: DISCONTINUED | OUTPATIENT
Start: 2021-11-24 | End: 2021-11-25 | Stop reason: HOSPADM

## 2021-11-24 RX ORDER — LEVOCETIRIZINE DIHYDROCHLORIDE 5 MG/1
5 TABLET, FILM COATED ORAL NIGHTLY
Status: DISCONTINUED | OUTPATIENT
Start: 2021-11-24 | End: 2021-11-24

## 2021-11-24 RX ORDER — ALBUTEROL SULFATE 90 UG/1
2 AEROSOL, METERED RESPIRATORY (INHALATION) EVERY 4 HOURS PRN
Status: DISCONTINUED | OUTPATIENT
Start: 2021-11-24 | End: 2021-11-25 | Stop reason: HOSPADM

## 2021-11-24 RX ORDER — SODIUM CHLORIDE, SODIUM LACTATE, POTASSIUM CHLORIDE, CALCIUM CHLORIDE 600; 310; 30; 20 MG/100ML; MG/100ML; MG/100ML; MG/100ML
1000 INJECTION, SOLUTION INTRAVENOUS CONTINUOUS
Status: DISCONTINUED | OUTPATIENT
Start: 2021-11-24 | End: 2021-11-25

## 2021-11-24 RX ORDER — CARVEDILOL 25 MG/1
25 TABLET ORAL 2 TIMES DAILY
Status: DISCONTINUED | OUTPATIENT
Start: 2021-11-24 | End: 2021-11-25 | Stop reason: HOSPADM

## 2021-11-24 RX ORDER — MOMETASONE FUROATE 50 UG/1
2 SPRAY, METERED NASAL DAILY
Status: DISCONTINUED | OUTPATIENT
Start: 2021-11-24 | End: 2021-11-24

## 2021-11-24 RX ORDER — ULTRASOUND COUPLING MEDIUM
GEL (GRAM) TOPICAL PRN
Status: DISCONTINUED | OUTPATIENT
Start: 2021-11-24 | End: 2021-11-24 | Stop reason: HOSPADM

## 2021-11-24 RX ORDER — MORPHINE SULFATE 4 MG/ML
4 INJECTION, SOLUTION INTRAMUSCULAR; INTRAVENOUS EVERY 4 HOURS PRN
Status: DISCONTINUED | OUTPATIENT
Start: 2021-11-24 | End: 2021-11-25 | Stop reason: HOSPADM

## 2021-11-24 RX ORDER — GABAPENTIN 300 MG/1
300 CAPSULE ORAL DAILY PRN
Status: DISCONTINUED | OUTPATIENT
Start: 2021-11-24 | End: 2021-11-25 | Stop reason: HOSPADM

## 2021-11-24 RX ORDER — OXYCODONE HYDROCHLORIDE 5 MG/1
5 TABLET ORAL EVERY 6 HOURS PRN
Status: DISCONTINUED | OUTPATIENT
Start: 2021-11-24 | End: 2021-11-25 | Stop reason: HOSPADM

## 2021-11-24 RX ORDER — ONDANSETRON 4 MG/1
4 TABLET, ORALLY DISINTEGRATING ORAL EVERY 8 HOURS PRN
Status: DISCONTINUED | OUTPATIENT
Start: 2021-11-24 | End: 2021-11-25 | Stop reason: HOSPADM

## 2021-11-24 RX ORDER — LIDOCAINE HYDROCHLORIDE 10 MG/ML
INJECTION, SOLUTION EPIDURAL; INFILTRATION; INTRACAUDAL; PERINEURAL PRN
Status: DISCONTINUED | OUTPATIENT
Start: 2021-11-24 | End: 2021-11-24 | Stop reason: SDUPTHER

## 2021-11-24 RX ORDER — FENTANYL CITRATE 50 UG/ML
INJECTION, SOLUTION INTRAMUSCULAR; INTRAVENOUS PRN
Status: DISCONTINUED | OUTPATIENT
Start: 2021-11-24 | End: 2021-11-24 | Stop reason: SDUPTHER

## 2021-11-24 RX ORDER — TIZANIDINE 2 MG/1
2 TABLET ORAL NIGHTLY
Status: DISCONTINUED | OUTPATIENT
Start: 2021-11-24 | End: 2021-11-25 | Stop reason: HOSPADM

## 2021-11-24 RX ORDER — GENTAMICIN SULFATE 80 MG/50ML
80 INJECTION, SOLUTION INTRAVENOUS
Status: COMPLETED | OUTPATIENT
Start: 2021-11-24 | End: 2021-11-24

## 2021-11-24 RX ORDER — PROPOFOL 10 MG/ML
INJECTION, EMULSION INTRAVENOUS PRN
Status: DISCONTINUED | OUTPATIENT
Start: 2021-11-24 | End: 2021-11-24 | Stop reason: SDUPTHER

## 2021-11-24 RX ADMIN — PROPOFOL 280 MG: 10 INJECTION, EMULSION INTRAVENOUS at 11:55

## 2021-11-24 RX ADMIN — HYDROMORPHONE HYDROCHLORIDE 0.25 MG: 1 INJECTION, SOLUTION INTRAMUSCULAR; INTRAVENOUS; SUBCUTANEOUS at 15:16

## 2021-11-24 RX ADMIN — VANCOMYCIN HYDROCHLORIDE 1000 MG: 1 INJECTION, SOLUTION INTRAVENOUS at 12:23

## 2021-11-24 RX ADMIN — Medication 0.2 MG: at 12:09

## 2021-11-24 RX ADMIN — MEMANTINE HYDROCHLORIDE 5 MG: 5 TABLET, FILM COATED ORAL at 21:55

## 2021-11-24 RX ADMIN — ONDANSETRON 4 MG: 2 INJECTION, SOLUTION INTRAMUSCULAR; INTRAVENOUS at 13:54

## 2021-11-24 RX ADMIN — INSULIN LISPRO 1 UNITS: 100 INJECTION, SOLUTION INTRAVENOUS; SUBCUTANEOUS at 22:03

## 2021-11-24 RX ADMIN — FENTANYL CITRATE 50 MCG: 50 INJECTION, SOLUTION INTRAMUSCULAR; INTRAVENOUS at 13:54

## 2021-11-24 RX ADMIN — HYDROMORPHONE HYDROCHLORIDE 0.5 MG: 1 INJECTION, SOLUTION INTRAMUSCULAR; INTRAVENOUS; SUBCUTANEOUS at 15:01

## 2021-11-24 RX ADMIN — TIZANIDINE 2 MG: 2 TABLET ORAL at 21:55

## 2021-11-24 RX ADMIN — FENTANYL CITRATE 50 MCG: 50 INJECTION, SOLUTION INTRAMUSCULAR; INTRAVENOUS at 12:26

## 2021-11-24 RX ADMIN — ACETAMINOPHEN 650 MG: 325 TABLET ORAL at 21:56

## 2021-11-24 RX ADMIN — MORPHINE SULFATE 4 MG: 4 INJECTION INTRAVENOUS at 18:49

## 2021-11-24 RX ADMIN — FENTANYL CITRATE 50 MCG: 50 INJECTION, SOLUTION INTRAMUSCULAR; INTRAVENOUS at 13:22

## 2021-11-24 RX ADMIN — SODIUM CHLORIDE, POTASSIUM CHLORIDE, SODIUM LACTATE AND CALCIUM CHLORIDE 1000 ML: 600; 310; 30; 20 INJECTION, SOLUTION INTRAVENOUS at 10:42

## 2021-11-24 RX ADMIN — OXYCODONE 5 MG: 5 TABLET ORAL at 21:56

## 2021-11-24 RX ADMIN — LIDOCAINE HYDROCHLORIDE 50 MG: 10 INJECTION, SOLUTION EPIDURAL; INFILTRATION; INTRACAUDAL; PERINEURAL at 11:55

## 2021-11-24 RX ADMIN — OXYCODONE 5 MG: 5 TABLET ORAL at 15:31

## 2021-11-24 RX ADMIN — BENZOCAINE AND MENTHOL 1 LOZENGE: 15; 3.6 LOZENGE ORAL at 20:26

## 2021-11-24 RX ADMIN — FENTANYL CITRATE 50 MCG: 50 INJECTION, SOLUTION INTRAMUSCULAR; INTRAVENOUS at 12:38

## 2021-11-24 RX ADMIN — GENTAMICIN SULFATE 80 MG: 80 INJECTION, SOLUTION INTRAVENOUS at 20:15

## 2021-11-24 RX ADMIN — DEXAMETHASONE SODIUM PHOSPHATE 10 MG: 10 INJECTION INTRAMUSCULAR; INTRAVENOUS at 12:00

## 2021-11-24 RX ADMIN — GENTAMICIN SULFATE 80 MG: 80 INJECTION, SOLUTION INTRAVENOUS at 12:02

## 2021-11-24 RX ADMIN — HYDROMORPHONE HYDROCHLORIDE 0.5 MG: 1 INJECTION, SOLUTION INTRAMUSCULAR; INTRAVENOUS; SUBCUTANEOUS at 14:43

## 2021-11-24 RX ADMIN — AMLODIPINE BESYLATE 10 MG: 10 TABLET ORAL at 21:56

## 2021-11-24 RX ADMIN — HYDROMORPHONE HYDROCHLORIDE 0.25 MG: 1 INJECTION, SOLUTION INTRAMUSCULAR; INTRAVENOUS; SUBCUTANEOUS at 15:11

## 2021-11-24 RX ADMIN — CARVEDILOL 25 MG: 25 TABLET, FILM COATED ORAL at 21:55

## 2021-11-24 ASSESSMENT — PULMONARY FUNCTION TESTS
PIF_VALUE: 15
PIF_VALUE: 17
PIF_VALUE: 16
PIF_VALUE: 16
PIF_VALUE: 17
PIF_VALUE: 13
PIF_VALUE: 16
PIF_VALUE: 17
PIF_VALUE: 15
PIF_VALUE: 17
PIF_VALUE: 8
PIF_VALUE: 17
PIF_VALUE: 0
PIF_VALUE: 16
PIF_VALUE: 15
PIF_VALUE: 17
PIF_VALUE: 1
PIF_VALUE: 17
PIF_VALUE: 17
PIF_VALUE: 5
PIF_VALUE: 17
PIF_VALUE: 6
PIF_VALUE: 16
PIF_VALUE: 17
PIF_VALUE: 16
PIF_VALUE: 15
PIF_VALUE: 17
PIF_VALUE: 17
PIF_VALUE: 15
PIF_VALUE: 15
PIF_VALUE: 16
PIF_VALUE: 17
PIF_VALUE: 16
PIF_VALUE: 17
PIF_VALUE: 15
PIF_VALUE: 14
PIF_VALUE: 7
PIF_VALUE: 14
PIF_VALUE: 15
PIF_VALUE: 15
PIF_VALUE: 14
PIF_VALUE: 15
PIF_VALUE: 17
PIF_VALUE: 17
PIF_VALUE: 14
PIF_VALUE: 17
PIF_VALUE: 15
PIF_VALUE: 17
PIF_VALUE: 16
PIF_VALUE: 17
PIF_VALUE: 17
PIF_VALUE: 15
PIF_VALUE: 16
PIF_VALUE: 7
PIF_VALUE: 17
PIF_VALUE: 16
PIF_VALUE: 17
PIF_VALUE: 17
PIF_VALUE: 16
PIF_VALUE: 17
PIF_VALUE: 15
PIF_VALUE: 17
PIF_VALUE: 16
PIF_VALUE: 17
PIF_VALUE: 15
PIF_VALUE: 17
PIF_VALUE: 15
PIF_VALUE: 17
PIF_VALUE: 17
PIF_VALUE: 4
PIF_VALUE: 15
PIF_VALUE: 17
PIF_VALUE: 17
PIF_VALUE: 18
PIF_VALUE: 0
PIF_VALUE: 17
PIF_VALUE: 14
PIF_VALUE: 17
PIF_VALUE: 14
PIF_VALUE: 1
PIF_VALUE: 17
PIF_VALUE: 16
PIF_VALUE: 15
PIF_VALUE: 17
PIF_VALUE: 16
PIF_VALUE: 17
PIF_VALUE: 15
PIF_VALUE: 2
PIF_VALUE: 17
PIF_VALUE: 17
PIF_VALUE: 16
PIF_VALUE: 17
PIF_VALUE: 0
PIF_VALUE: 17
PIF_VALUE: 15
PIF_VALUE: 3
PIF_VALUE: 14
PIF_VALUE: 2
PIF_VALUE: 17
PIF_VALUE: 14
PIF_VALUE: 17
PIF_VALUE: 3
PIF_VALUE: 17
PIF_VALUE: 16
PIF_VALUE: 17
PIF_VALUE: 20
PIF_VALUE: 14
PIF_VALUE: 17
PIF_VALUE: 15
PIF_VALUE: 1
PIF_VALUE: 17
PIF_VALUE: 1
PIF_VALUE: 0

## 2021-11-24 ASSESSMENT — PAIN SCALES - GENERAL
PAINLEVEL_OUTOF10: 8
PAINLEVEL_OUTOF10: 6
PAINLEVEL_OUTOF10: 7
PAINLEVEL_OUTOF10: 6
PAINLEVEL_OUTOF10: 8
PAINLEVEL_OUTOF10: 6
PAINLEVEL_OUTOF10: 6
PAINLEVEL_OUTOF10: 9
PAINLEVEL_OUTOF10: 10
PAINLEVEL_OUTOF10: 5
PAINLEVEL_OUTOF10: 10
PAINLEVEL_OUTOF10: 4

## 2021-11-24 ASSESSMENT — PAIN DESCRIPTION - LOCATION: LOCATION: ABDOMEN

## 2021-11-24 ASSESSMENT — PAIN DESCRIPTION - PAIN TYPE: TYPE: SURGICAL PAIN

## 2021-11-24 ASSESSMENT — PAIN - FUNCTIONAL ASSESSMENT: PAIN_FUNCTIONAL_ASSESSMENT: 0-10

## 2021-11-24 ASSESSMENT — PAIN SCALES - WONG BAKER: WONGBAKER_NUMERICALRESPONSE: 0

## 2021-11-24 NOTE — OP NOTE
OPERATIVE NOTE    Hospital: [address] [place] [address]    SURGEON:  Carol Villareal M.D.     ASSISTANT:  Ben Cartagena MD PGY3     PREOPERATIVE DIAGNOSIS:  Erectile dysfunction. POSTOPERATIVE DIAGNOSIS:  Erectile dysfunction. PROCEDURE:  Placement of three-piece inflatable penile prosthesis- [id number]. ANESTHESIA:  General.     COMPLICATIONS:  None. DRAINS:  A 16 Yi Bond catheter. 10 Yi inocencio drain     SPECIMEN:  None. ANTIBIOTICS:  1 gram of Vancomycin and 80 mg of gentamicin. EBL: 25 cc     INDICATIONS:  This is a 71-year-old gentleman with erectile dysfunction due to arterial insufficency refractory to medical therapy. Treatment options were discussed. He elected to undergo the above-mentioned procedure. The risks, benefits, and alternatives as well as possible complications of the procedure were explained to the patient and informed consent was obtained. PROCEDURE:  The patient was brought back to the operating room, was laid in the supine position. EPC cuffs were placed on and functioning prior to induction. Antibiotics were administered in the form of Gentamicin 80mg IV and Vancomycin 1gm IV. General anesthesia was induced. His genitals and lowerabdomen were shaved, prepped, and draped in sterile surgical fashion. A time out was performed. A  16 Yi Bond catheter was placed without issue. A penoscrotal incision was made and a [place] retractor was placed and the scrotal skin as well as the urethral meatus were retracted. The subcutaneous tissue was dissected down to the corporal bodies bilaterally. The urethra was visualized and protected. 2-0 PDS stay sutures were then placed in both the corporal bodies bilaterally. The corporal bodies were then incised bilaterally. The corpora were then dissected proximally and distally using curved mayos. The corporal bodies were then dilated with the Yankeur suction tip.  Proximal and distal measurements were then taken. Left Proximal: 12 cm   Left Distal: 8 cm   Right Proximal: 13 cm  Right Distal: 8 cm   The rear tip extender measurements were 3 cm on the right and 2 cm on the left. A subdartos pouch was made in the scrotum for the scrotal pump. Meanwhile the 700CX cylinders with inhibizone (rifampine and minocycline) were prepared on the back table. We attempted to feel and frye through the external inguinal ring to get to the space of retzius for the concealed reservoir however due to the patient's history of bilateral inguinal hernia repairs we couldn't definitively feel the external rings. We then decided to make a midline abdominal counter incision with our scalpel. We dissected down to the fascia and incised it careful not to get into the peritoneum and created a pre-rectus space and inserted our concealed reservoir with ease. We then filled the  reservoir with inhibizone (rifampine and minocycline) with 100 mL of normal saline. We then closed the fascia with 3-0 Vicryl x 5. We then focused our attention to the cylinders which were then placed through the corporotomies proximally and distally and fit in well after dilating with Hegar dilators bilaterally from 8 - 11 Sinhala without any crossover or any buckling or S-curve deformity noted. The cylinders were inflated and found to be in proper position up to the most distal aspect of the corporal bodies. Again, no deformities or crossover noted. The pump was then placed within the scrotum and 3-0 Vicryl sutures were placed to close a layer of dartos over the pump. A quick connect device was used to connect the reservoir tubing to the pump after tunneling the reservoir tubing through the abdomen down to the penoscrotal incision. A 10 Danish inocencio drain was placed in the scrotum and secured using 3-0 Nylon stitch.  At this point the dartos layer of the scrotum was closed with 3-0 Vicryl suture and then the skin with 4-0 monocryl in a baseball stitch and reinforced with Dermabond. Abdominal incision was closed using 3-0 Vicryl deep dermals and reinforced with 4-0 monocryl with Dermabond. Pressure dressing was placed over the scotum. The patient tolerated the procedure well and was sent to PACU for postoperative monitoring. Dr. Mylene Thomas was present and scrubbed for the duration of the procedure. Disposition:  He will be admitted for post operative IPP stay.  Any changes will be reflected in the DC summary

## 2021-11-24 NOTE — ANESTHESIA PRE PROCEDURE
Department of Anesthesiology  Preprocedure Note       Name:  Mercedes Brar   Age:  62 y.o.  :  1960                                          MRN:  [id number]         Date:  2021      Surgeon: Amy Feliz):  Mis Sterling MD    Procedure: Procedure(s):  PENIS PROSTHESIS INSERTION- AMS INFLATABLE, **SHORT STAY**    Medications prior to admission:   Prior to Admission medications    Medication Sig Start Date End Date Taking? Authorizing Provider   memantine (NAMENDA) 5 MG tablet Take 1 tablet by mouth 2 times daily Patient states he is taking 1 tablet (5mg) twice daily 11/3/21 11/5/21  Yes Ashwin Todd MD   albuterol sulfate  (90 Base) MCG/ACT inhaler inhale 2 puffs by mouth and INTO THE LUNGS every 6 hours if needed for wheezing 10/18/21  Yes Sandra Medhkour, DO   amLODIPine (NORVASC) 10 MG tablet take 1 tablet by mouth once daily 21  Yes Sandra Medhkour, DO   metFORMIN (GLUCOPHAGE-XR) 500 MG extended release tablet Take 1 tablet by mouth daily (with breakfast) 21  Yes Sandra Medhkour, DO   XYZAL 5 MG tablet Take 1 tablet by mouth nightly 21  Yes Sandra Medhkour, DO   traZODone (DESYREL) 50 MG tablet take 2 tablets by mouth at bedtime 21  Yes Negin Lara MD   TRINTELLIX 20 MG TABS tablet 20 mg  5/10/21  Yes Historical Provider, MD   gabapentin (NEURONTIN) 300 MG capsule Take 300 mg by mouth daily.  Takes at [facility] 21  Yes Historical Provider, MD   tiZANidine (ZANAFLEX) 2 MG tablet take 1 tablet by mouth at bedtime 21  Yes Historical Provider, MD   carvedilol (COREG) 25 MG tablet take 1 tablet by mouth twice a day 21  Yes Sandra Medhkour, DO   atorvastatin (LIPITOR) 10 MG tablet Take 1 tablet by mouth daily 21  Yes Sandra Medhkour, DO   fluticasone-salmeterol (ADVAIR) 100-50 MCG/DOSE diskus inhaler Inhale 1 puff into the lungs every 12 hours 20  Yes Sandra Medhkour, DO   topiramate (TOPAMAX) 50 MG tablet take 1.5 tab by mouth twice a day 11/19/20  Yes Kassie Newton MD   oxyCODONE-acetaminophen (PERCOCET) 5-325 MG per tablet Take 1 tablet by mouth every 4 hours as needed for Pain. [place]   Yes Historical Provider, MD   benzonatate (TESSALON) 200 MG capsule Take 1 capsule by mouth 3 times daily as needed for Cough 4/21/21   Kelli Guerrero APRN - CNP   mometasone (NASONEX) 50 MCG/ACT nasal spray 2 sprays by Nasal route daily 4/11/21   Sandra Medhkour, DO   carvedilol (COREG) 25 MG tablet Take 1 tablet by mouth 2 times daily 12/21/20   Sandra Medhkour, DO   fluticasone (FLONASE) 50 MCG/ACT nasal spray instill 2 sprays into each nostril once daily 11/6/20   Sandra Medhkour, DO   diclofenac sodium (VOLTAREN) 1 % GEL Stop 1 week prior to surgery 8/18/20   Historical Provider, MD   nitroGLYCERIN (NITROSTAT) 0.3 MG SL tablet place 1 tablet under the tongue if needed every 5 minutes for chest pain for 3 doses IF NO RELIEF AF  Patient taking differently: Hasn't  Taken in years. 11/28/18   Britany Nguyễn MD   aspirin 81 MG tablet Take 81 mg by mouth daily Stop 11/17/2021 per surgeon. On asa per Dr. Will Pugh. Clearance was obtained. Historical Provider, MD       Current medications:    Current Facility-Administered Medications   Medication Dose Route Frequency Provider Last Rate Last Admin    lactated ringers infusion 1,000 mL  1,000 mL IntraVENous Continuous Velma Terry MD           Allergies: Allergies   Allergen Reactions    Lisinopril      States he is allergic to the generic forms of medication, can take lisinopril    Aleve  [Naproxen Sodium] Nausea Only    Amitriptyline Hcl Other (See Comments)     rash    Exelon [Rivastigmine] Other (See Comments)     Patient stated patch left a \"burn kathy\" on his skin     Ibuprofen Other (See Comments)     \"hurts my stomach. \"       Problem List:    Patient Active Problem List   Diagnosis Code    Hypertension I10    Anxiety and depression F41.9, F32. 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  Arthritis     knees, hands, back.  Asthma     as child. pcp manages    Back pain     [facility] .Last seen 10/26/2021. Goes again 11/17/2021    Brain aneurysm     Dr. Sun Molina at   [address]  f/u 12/29/2021. No surgery at this time. Monitoring yearly.  CAD (coronary artery disease)     Dr. Arie Petit, [place]  last visit 11/9/2021. Pt. states never had heart cath. MI 2011    Cataract     lt eye   no surgery    Cerebral artery occlusion with cerebral infarction [facility]) 2014    mini stroke went to [facility] and was treated. Dr. Butch Amanda. Last seen summer 2021    Depression     pcp    Diabetes mellitus ([facility] [address]     on rx   A1C high per pt    Heart attack [facility])     2011   Last visit Dr. Vandana Roldan 11/9/2021  [place], [place]   Received cardiology clearance    Hyperlipidemia     on rx    Hypertension     pcp manages    Kidney stones     Dr. Selina Ansari treats. Pt. states he currently has blood in his urine. Urine CS sent to lab today. No pain per pt.  Memory loss, long term     Pt states he has had it for years and follows w/ his neurologist Dr. Aby Stern. Mother has Alzheimers.  Mini stroke ([facility] [address]     2014    Osteoarthritis     Seasonal allergies     Sinus problem     drainage from allergy    Snores     has not been tested for sleep apnea.  Sees Dr. Zak Gramajo. He provides rx for Trazodone.     Wears eyeglasses     Wellness examination     last visit 11/1/2021       Past Surgical History:        Procedure Laterality Date    CHOLECYSTECTOMY      2016    COLONOSCOPY  2021    had cologuard and pt. states it was negative    CYSTO/URETERO/PYELOSCOPY, CALCULUS TX Right 02/05/2020    CYSTO, URETEROSCOPY, RIGHT URETERAL STENT PLACEMENT, RETROGRADE PYELOGRAM performed by Gayathri Elizabeth MD at [facility], rt. inguinal    INGUINAL HERNIA REPAIR Left 08/19/2016    LAPAROSCOPY N/A 01/16/2018    DIAGNOSTIC LAPAROSCOPY performed by Kylie Nieto MD at [address] URETEROSCOPY Right 02/05/2020    stent placement, cystoscopy, retrograde pyelogram    VASECTOMY         Social History:    Social History     Tobacco Use    Smoking status: Current Every Day Smoker     Packs/day: 0.25     Years: 28.00     Pack years: 7.00     Types: Cigarettes    Smokeless tobacco: Never Used   Substance Use Topics    Alcohol use: Yes     Comment: rarely                                Ready to quit: Not Answered  Counseling given: Not Answered      Vital Signs (Current):   Vitals:    11/24/21 1029   BP: (!) 142/97   Pulse: 73   Resp: 18   Temp: 97.7 °F (36.5 °C)   TempSrc: Temporal   SpO2: 97%   Weight: 266 lb (120.7 kg)   Height: 5' 11\" (1.803 m)                                              BP Readings from Last 3 Encounters:   11/24/21 (!) 142/97   11/10/21 130/88   11/09/21 122/70       NPO Status: Time of last liquid consumption: 0700 (sip with meds)                        Time of last solid consumption: 1900                        Date of last liquid consumption: 11/24/21                        Date of last solid food consumption: 11/23/21    BMI:   Wt Readings from Last 3 Encounters:   11/24/21 266 lb (120.7 kg)   11/10/21 266 lb (120.7 kg)   11/09/21 270 lb (122.5 kg)     Body mass index is 37.1 kg/m².    CBC:   Lab Results   Component Value Date    WBC 7.1 07/28/2021    RBC 4.94 07/28/2021    HGB 14.6 07/28/2021    HCT 45.8 07/28/2021    MCV 92.7 07/28/2021    RDW 12.6 07/28/2021     07/28/2021       CMP:   Lab Results   Component Value Date     07/28/2021    K 3.9 07/28/2021     07/28/2021    CO2 19 07/28/2021    BUN 16 07/28/2021    CREATININE 0.97 07/28/2021    GFRAA >60 07/28/2021    LABGLOM >60 07/28/2021    GLUCOSE 142 07/28/2021    PROT 7.0 08/07/2017    CALCIUM 9.1 07/28/2021    BILITOT 0.33 08/07/2017    ALKPHOS 95 08/07/2017    AST 25 08/07/2017    ALT 18 07/28/2021       POC Tests: No results for input(s): POCGLU, POCNA, POCK, POCCL, POCBUN, POCHEMO, POCHCT in the last 72 hours.     Coags:   Lab Results   Component Value Date    PROTIME 10.3 10/07/2016    INR 1.0 10/07/2016    APTT 23.7 10/07/2016       HCG (If Applicable): No results found for: PREGTESTUR, PREGSERUM, HCG, HCGQUANT     ABGs: No results found for: PHART, PO2ART, JJH2YQS, WUU1ATJ, BEART, X2JWYGIC     Type & Screen (If Applicable):  No results found for: LABABO, LABRH    Drug/Infectious Status (If Applicable):  Lab Results   Component Value Date    HEPCAB NONREACTIVE 04/26/2017       COVID-19 Screening (If Applicable):   Lab Results   Component Value Date    COVID19 Not Detected 10/21/2020           Anesthesia Evaluation  Patient summary reviewed and Nursing notes reviewed no history of anesthetic complications:   Airway: Mallampati: II  TM distance: >3 FB   Neck ROM: full  Mouth opening: > = 3 FB Dental: normal exam         Pulmonary:normal exam    (+) asthma:                            Cardiovascular:  Exercise tolerance: good (>4 METS),   (+) hypertension:, past MI: > 6 months and no interval change, CAD:, hyperlipidemia    (-)  angina,  CHF and orthopnea    ECG reviewed  Rhythm: regular  Rate: normal  Echocardiogram reviewed  Stress test reviewed       Beta Blocker:  Not on Beta Blocker         Neuro/Psych: (+) CVA:, TIA, psychiatric history:            GI/Hepatic/Renal: Neg GI/Hepatic/Renal ROS            Endo/Other:    (+) DiabetesType II DM, using insulin, . Abdominal:             Vascular:   + PVD, aortic or cerebral, . Other Findings:           Anesthesia Plan      general     ASA 3     (LMA)  Induction: intravenous. MIPS: Postoperative opioids intended and Prophylactic antiemetics administered. Anesthetic plan and risks discussed with patient.       Plan discussed with CRNA and surgical team.                Dario Lopez MD   11/24/2021

## 2021-11-24 NOTE — H&P
Antonio Delgadillo, Prince Hammer, Joel Platt, Ce, & Alec   Urology Consultation      Patient:  Deja Degroot  MRN: [id number]  YOB: 1960    HISTORY OF PRESENT ILLNESS:   The patient is a 62 y.o. male who presents with ED. Desires IPP    Patient's old records, notes and chart reviewed and summarized above. Past Medical History:    Past Medical History:   Diagnosis Date    ADHD (attention deficit hyperactivity disorder)     no tx per pt.  Arthritis     knees, hands, back.  Asthma     as child. pcp manages    Back pain     [address] [facility] .Last seen 10/26/2021. Goes again 11/17/2021    Brain aneurysm     Dr. Saintclair Prey at   [facility]  f/u 12/29/2021. No surgery at this time. Monitoring yearly.  CAD (coronary artery disease)     Dr. Ramón Hauser [place]  last visit 11/9/2021. Pt. states never had heart cath. MI 2011    Cataract     lt eye   no surgery    Cerebral artery occlusion with cerebral infarction [facility]) 2014    mini stroke went to [facility] and was treated. Dr. Jayden Thrasher. Last seen summer 2021    Depression     pcp    Diabetes mellitus ([facility] [address]     on rx   A1C high per pt    Heart attack [facility])     2011   Last visit Dr. Lyndsey Connell 11/9/2021  [place], [place]   Received cardiology clearance    Hyperlipidemia     on rx    Hypertension     pcp manages    Kidney stones     Dr. Jordan Salguero treats. Pt. states he currently has blood in his urine. Urine CS sent to lab today. No pain per pt.  Memory loss, long term     Pt states he has had it for years and follows w/ his neurologist Dr. Selam Chand. Mother has Alzheimers.  Mini stroke ([address]     2014    Osteoarthritis     Seasonal allergies     Sinus problem     drainage from allergy    Snores     has not been tested for sleep apnea. Sees Dr. Susi Ortega. He provides rx for Trazodone.     Wears eyeglasses     Wellness examination     last visit 11/1/2021       Past Surgical History: Past Surgical History:   Procedure Laterality Date    CHOLECYSTECTOMY      2016    COLONOSCOPY  2021    had cologuard and pt. states it was negative    CYSTO/URETERO/PYELOSCOPY, CALCULUS TX Right 02/05/2020    CYSTO, URETEROSCOPY, RIGHT URETERAL STENT PLACEMENT, RETROGRADE PYELOGRAM performed by Fabrizio Ching MD at [address], rt. inguinal    INGUINAL HERNIA REPAIR Left 08/19/2016    LAPAROSCOPY N/A 01/16/2018    DIAGNOSTIC LAPAROSCOPY performed by Ganga Castro MD at [address] URETEROSCOPY Right 02/05/2020    stent placement, cystoscopy, retrograde pyelogram    VASECTOMY         Medications:      Current Facility-Administered Medications:     lactated ringers infusion 1,000 mL, 1,000 mL, IntraVENous, Continuous, Shanna Lima MD, Last Rate: 50 mL/hr at 11/24/21 1042, 1,000 mL at 11/24/21 1042    Allergies: Allergies   Allergen Reactions    Lisinopril      States he is allergic to the generic forms of medication, can take lisinopril    Aleve  [Naproxen Sodium] Nausea Only    Amitriptyline Hcl Other (See Comments)     rash    Exelon [Rivastigmine] Other (See Comments)     Patient stated patch left a \"burn kathy\" on his skin     Ibuprofen Other (See Comments)     \"hurts my stomach. \"       Social History:   Social History     Socioeconomic History    Marital status: Single     Spouse name: Not on file    Number of children: Not on file    Years of education: Not on file    Highest education level: Not on file   Occupational History    Not on file   Tobacco Use    Smoking status: Current Every Day Smoker     Packs/day: 0.25     Years: 28.00     Pack years: 7.00     Types: Cigarettes    Smokeless tobacco: Never Used   Vaping Use    Vaping Use: Never used   Substance and Sexual Activity    Alcohol use: Yes     Comment: rarely    Drug use: No    Sexual activity: Not on file   Other Topics Concern    Not on file   Social constipation. Genitourinary: No burning with urination  Integument/Skin: No rashes  Hematologic/Lymphatic: No easy bruising  Musculoskeletal: No muscle pains  Neurologic: No weakness in the extremities. Psychiatric: No depression or suicidal thoughts. Endocrine: No heat or cold intolerances. Allergic/Immunologic: No current seasonal allergies; no skin hives. Physical Exam:      This a 62 y.o. female   Vitals:    11/24/21 1029   BP: (!) 142/97   Pulse: 73   Resp: 18   Temp: 97.7 °F (36.5 °C)   SpO2: 97%     Constitutional: Patient in no acute distress. Neuro: alert and oriented to person place and time. Head: Atraumatic and normocephalic. Neck: Trachea midline. Ext: 2+ radial pulses bilaterally. Psych: Mood and affect normal.  Skin: No rashes or bruising present. Lungs: Respiratory effort normal.  Cardiovascular:  Regular rhythm. Abdomen: Soft, non-tender, non-distended. Neither side has CVA tenderness on exam.  Bladder non-tender and not distended. Lymphatics: no palpable lymphadenopathy  Pelvic exam: deferred. Rectal exam not indicated. Labs:  No results for input(s): WBC, HGB, HCT, MCV, PLT in the last 72 hours. No results for input(s): NA, K, CL, CO2, PHOS, BUN, CREATININE in the last 72 hours. Invalid input(s): CA    No results for input(s): COLORU, PHUR, LABCAST, WBCUA, RBCUA, MUCUS, TRICHOMONAS, YEAST, BACTERIA, CLARITYU, SPECGRAV, LEUKOCYTESUR, UROBILINOGEN, Marijane Eis in the last 72 hours. Invalid input(s): NITRATE, GLUCOSEUKETONESUAMORPHOUS        -----------------------------------------------------------------  Imaging Results:  MRA HEAD WO CONTRAST    Result Date: 11/18/2021  EXAMINATION: MRA OF THE HEAD WITHOUT CONTRAST 11/18/2021 4:08 pm TECHNIQUE: MRA of the head was performed utilizing time-of-flight imaging with MIP images. No intravenous contrast was administered.  COMPARISON: Head CT 08/21/2018 HISTORY: ORDERING SYSTEM PROVIDED HISTORY: Aneurysm of left

## 2021-11-25 VITALS
DIASTOLIC BLOOD PRESSURE: 88 MMHG | WEIGHT: 266 LBS | HEIGHT: 71 IN | BODY MASS INDEX: 37.24 KG/M2 | TEMPERATURE: 98.6 F | RESPIRATION RATE: 18 BRPM | SYSTOLIC BLOOD PRESSURE: 119 MMHG | OXYGEN SATURATION: 97 % | HEART RATE: 75 BPM

## 2021-11-25 LAB
ANION GAP SERPL CALCULATED.3IONS-SCNC: 11 MMOL/L (ref 9–17)
BUN BLDV-MCNC: 14 MG/DL (ref 8–23)
BUN/CREAT BLD: ABNORMAL (ref 9–20)
CALCIUM SERPL-MCNC: 8.4 MG/DL (ref 8.6–10.4)
CHLORIDE BLD-SCNC: 107 MMOL/L (ref 98–107)
CO2: 20 MMOL/L (ref 20–31)
CREAT SERPL-MCNC: 0.94 MG/DL (ref 0.7–1.2)
CULTURE: NO GROWTH
GFR AFRICAN AMERICAN: >60 ML/MIN
GFR NON-AFRICAN AMERICAN: >60 ML/MIN
GFR SERPL CREATININE-BSD FRML MDRD: ABNORMAL ML/MIN/{1.73_M2}
GFR SERPL CREATININE-BSD FRML MDRD: ABNORMAL ML/MIN/{1.73_M2}
GLUCOSE BLD-MCNC: 153 MG/DL (ref 75–110)
GLUCOSE BLD-MCNC: 156 MG/DL (ref 70–99)
HCT VFR BLD CALC: 39.8 % (ref 40.7–50.3)
HEMOGLOBIN: 12.9 G/DL (ref 13–17)
Lab: NORMAL
MCH RBC QN AUTO: 30.5 PG (ref 25.2–33.5)
MCHC RBC AUTO-ENTMCNC: 32.4 G/DL (ref 28.4–34.8)
MCV RBC AUTO: 94.1 FL (ref 82.6–102.9)
NRBC AUTOMATED: 0 PER 100 WBC
PDW BLD-RTO: 13 % (ref 11.8–14.4)
PLATELET # BLD: 193 K/UL (ref 138–453)
PMV BLD AUTO: 11.1 FL (ref 8.1–13.5)
POTASSIUM SERPL-SCNC: 3.6 MMOL/L (ref 3.7–5.3)
RBC # BLD: 4.23 M/UL (ref 4.21–5.77)
SODIUM BLD-SCNC: 138 MMOL/L (ref 135–144)
SPECIMEN DESCRIPTION: NORMAL
WBC # BLD: 12.2 K/UL (ref 3.5–11.3)

## 2021-11-25 PROCEDURE — 6370000000 HC RX 637 (ALT 250 FOR IP): Performed by: STUDENT IN AN ORGANIZED HEALTH CARE EDUCATION/TRAINING PROGRAM

## 2021-11-25 PROCEDURE — 80048 BASIC METABOLIC PNL TOTAL CA: CPT

## 2021-11-25 PROCEDURE — 6360000002 HC RX W HCPCS: Performed by: STUDENT IN AN ORGANIZED HEALTH CARE EDUCATION/TRAINING PROGRAM

## 2021-11-25 PROCEDURE — 82947 ASSAY GLUCOSE BLOOD QUANT: CPT

## 2021-11-25 PROCEDURE — 96374 THER/PROPH/DIAG INJ IV PUSH: CPT

## 2021-11-25 PROCEDURE — 36415 COLL VENOUS BLD VENIPUNCTURE: CPT

## 2021-11-25 PROCEDURE — 96376 TX/PRO/DX INJ SAME DRUG ADON: CPT

## 2021-11-25 PROCEDURE — 85027 COMPLETE CBC AUTOMATED: CPT

## 2021-11-25 PROCEDURE — G0378 HOSPITAL OBSERVATION PER HR: HCPCS

## 2021-11-25 RX ORDER — TOPIRAMATE 25 MG/1
25 TABLET ORAL 2 TIMES DAILY
Status: DISCONTINUED | OUTPATIENT
Start: 2021-11-25 | End: 2021-11-25 | Stop reason: HOSPADM

## 2021-11-25 RX ORDER — OXYCODONE HYDROCHLORIDE AND ACETAMINOPHEN 5; 325 MG/1; MG/1
1 TABLET ORAL EVERY 6 HOURS PRN
Qty: 20 TABLET | Refills: 0 | Status: SHIPPED | OUTPATIENT
Start: 2021-11-25 | End: 2021-11-30

## 2021-11-25 RX ORDER — DOCUSATE SODIUM 100 MG/1
100 CAPSULE, LIQUID FILLED ORAL 2 TIMES DAILY
Qty: 20 CAPSULE | Refills: 0 | Status: SHIPPED | OUTPATIENT
Start: 2021-11-25 | End: 2021-12-05

## 2021-11-25 RX ORDER — CEPHALEXIN 500 MG/1
500 CAPSULE ORAL 4 TIMES DAILY
Qty: 20 CAPSULE | Refills: 0 | Status: SHIPPED | OUTPATIENT
Start: 2021-11-25 | End: 2021-11-30

## 2021-11-25 RX ADMIN — MORPHINE SULFATE 4 MG: 4 INJECTION INTRAVENOUS at 04:10

## 2021-11-25 RX ADMIN — OXYCODONE HYDROCHLORIDE AND ACETAMINOPHEN 1 TABLET: 5; 325 TABLET ORAL at 10:54

## 2021-11-25 RX ADMIN — VANCOMYCIN HYDROCHLORIDE 1000 MG: 1 INJECTION, SOLUTION INTRAVENOUS at 00:15

## 2021-11-25 RX ADMIN — GENTAMICIN SULFATE 80 MG: 80 INJECTION, SOLUTION INTRAVENOUS at 04:10

## 2021-11-25 RX ADMIN — TOPIRAMATE 25 MG: 25 TABLET, FILM COATED ORAL at 02:27

## 2021-11-25 RX ADMIN — MORPHINE SULFATE 4 MG: 4 INJECTION INTRAVENOUS at 00:15

## 2021-11-25 RX ADMIN — OXYCODONE HYDROCHLORIDE AND ACETAMINOPHEN 1 TABLET: 5; 325 TABLET ORAL at 05:34

## 2021-11-25 ASSESSMENT — PAIN SCALES - GENERAL
PAINLEVEL_OUTOF10: 4
PAINLEVEL_OUTOF10: 4
PAINLEVEL_OUTOF10: 3
PAINLEVEL_OUTOF10: 10
PAINLEVEL_OUTOF10: 10
PAINLEVEL_OUTOF10: 6
PAINLEVEL_OUTOF10: 5

## 2021-11-25 NOTE — PLAN OF CARE
Problem: Pain:  Goal: Pain level will decrease  Description: Pain level will decrease  11/25/2021 0616 by Mayra Stanley RN  Outcome: Ongoing  11/24/2021 1823 by Pablo Helms RN  Outcome: Ongoing  Goal: Control of acute pain  Description: Control of acute pain  11/25/2021 0616 by Mayra Stanley RN  Outcome: Ongoing  11/24/2021 1823 by Pablo Helms RN  Outcome: Ongoing  Goal: Control of chronic pain  Description: Control of chronic pain  11/25/2021 0616 by Mayra Stanley RN  Outcome: Ongoing  11/24/2021 1823 by Pablo Helms RN  Outcome: Ongoing

## 2021-11-25 NOTE — PROGRESS NOTES
Ce Neri Floretta Cornish, Fritz Creed  Urology Progress Note     Subjective:   Diet: Regular  No acute events overnight  Denies nausea, vomiting, fevers and chills  Complains of lower abdominal pain and discomfort  Minimal pain in the incision site    Ambulation : Minimal  Flatus/ BM : Flatus+   Urine output 1050 cc, clear  Drain output 120 cc, serosanguineous    Patient Vitals for the past 24 hrs:   BP Temp Temp src Pulse Resp SpO2 Height Weight   11/25/21 0515 119/74 98.6 °F (37 °C) Oral 72 18 96 %     11/25/21 0015 106/78 98.1 °F (36.7 °C) Oral 78 19 94 %     11/24/21 2105 125/89 98.2 °F (36.8 °C) Oral 70 18 96 %     11/24/21 1710 (!) 134/92 97.8 °F (36.6 °C) Temporal 69 17 94 % 5' 11\" (1.803 m) 266 lb (120.7 kg)   11/24/21 1600 (!) 129/90 96.3 °F (35.7 °C) Temporal 63 19 96 %     11/24/21 1515 (!) 139/95   68 9 94 %     11/24/21 1509      98 %     11/24/21 1500 (!) 144/97   72 9 97 %     11/24/21 1445 (!) 145/97   78 11 100 %     11/24/21 1432      100 %     11/24/21 1430 (!) 141/107 97.7 °F (36.5 °C)  73 15 100 %     11/24/21 1415 (!) 153/107   76 9 98 %     11/24/21 1412 (!) 140/106 98.2 °F (36.8 °C) Temporal 70 18 100 %     11/24/21 1029 (!) 142/97 97.7 °F (36.5 °C) Temporal 73 18 97 % 5' 11\" (1.803 m) 266 lb (120.7 kg)       Intake/Output Summary (Last 24 hours) at 11/25/2021 0839  Last data filed at 11/25/2021 0536  Gross per 24 hour   Intake 756.7 ml   Output 1205 ml   Net -448. 3 ml       Recent Labs     11/24/21  1445 11/25/21  0653   WBC 10.5 12.2*   HGB 14.9 12.9*   HCT 45.4 39.8*   MCV 93.2 94.1    193     Recent Labs     11/24/21  1445 11/25/21  0653    138   K 4.3 3.6*   * 107   CO2 22 20   BUN 14 14   CREATININE 1.05 0.94       No results for input(s): COLORU, PHUR, LABCAST, WBCUA, RBCUA, MUCUS, TRICHOMONAS, YEAST, BACTERIA, CLARITYU, SPECGRAV, LEUKOCYTESUR, UROBILINOGEN, Morna Derrick in the last 72 hours.    Invalid input(s): NITRATE, GLUCOSEUKETONESUAMORPHOUS    Additional Lab/culture results:    Physical Exam:   AO X 3  Neck: Supple   Chest: bilateral symmetrical chest rise/ Non labored breathing   Circulatory: Peripheries warm , well perfused   P/A: soft, lower midline abdominal incision clean, dry, intact, penoscrotal incision -clean, dry, intact, minimal ecchymosis on the left side of penis/no induration, crepitus.   Drain with serosanguineous discharge  Bond in situ with clear yellow urine        Interval Imaging Findings:    Impression:    Miko Daniels is a 71-year-old male with erectile dysfunction, s/p 3 piece IPP placement on 11/24/2021  Hemoglobin 12.9 from 14.9  Creatinine 0.9 from 1.05    Plan:   Diet: Continue regular diet  IV fluids: Continue IV fluids  Antibiotics: IV antibiotics, will discharge on 5 days of oral Keflex  Pain control: Percocet as needed for pain control  Drain output : 120 cc, serosanguineous/removed this morning  Ambulation / IS 10 times per hour   Bond catheter removed  Annia Manley MD  8:39 AM 11/25/2021

## 2021-11-25 NOTE — DISCHARGE SUMMARY
DISCHARGE SUMMARY NOTE:      Patient Identification  PATIENT: Rick Durant is a 62 y.o. male. 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 Judy Jonas  Diet: As tolerated  Patient told to follow up with Dr. Judy Jonas in 1 week(s). Discharge Medications:      Medication List      START taking these medications    cephALEXin 500 MG capsule  Commonly known as: KEFLEX  Take 1 capsule by mouth 4 times daily for 5 days     docusate sodium 100 MG capsule  Commonly known as: COLACE  Take 1 capsule by mouth 2 times daily for 10 days        CHANGE how you take these medications    nitroGLYCERIN 0.3 MG SL tablet  Commonly known as: NITROSTAT  place 1 tablet under the tongue if needed every 5 minutes for chest pain for 3 doses IF NO RELIEF AF  What changed: See the new instructions. oxyCODONE-acetaminophen 5-325 MG per tablet  Commonly known as: PERCOCET  Take 1 tablet by mouth every 6 hours as needed for Pain for up to 5 days.  Flavia  What changed: when to take this        CONTINUE taking these medications    albuterol sulfate  (90 Base) MCG/ACT inhaler  inhale 2 puffs by mouth and INTO THE LUNGS every 6 hours if needed for wheezing     amLODIPine 10 MG tablet  Commonly known as: NORVASC  take 1 tablet by mouth once daily     aspirin 81 MG tablet     atorvastatin 10 MG tablet  Commonly known as: Lipitor  Take 1 tablet by mouth daily     carvedilol 25 MG tablet  Commonly known as: COREG  take 1 tablet by mouth twice a day     diclofenac sodium 1 % Gel  Commonly known as: VOLTAREN     fluticasone-salmeterol 100-50 MCG/DOSE diskus inhaler  Commonly known as: ADVAIR  Inhale 1 puff into the lungs every 12 hours     gabapentin 300 MG capsule  Commonly known as: NEURONTIN     memantine 5 MG tablet  Commonly known as: NAMENDA  Take 1 tablet by mouth 2 times daily Patient states he is taking 1 tablet (5mg) twice daily 11/3/21     metFORMIN 500 MG extended release tablet  Commonly known as: GLUCOPHAGE-XR  Take 1 tablet by mouth daily (with breakfast)     mometasone 50 MCG/ACT nasal spray  Commonly known as: Nasonex  2 sprays by Nasal route daily     tiZANidine 2 MG tablet  Commonly known as: ZANAFLEX     topiramate 50 MG tablet  Commonly known as: TOPAMAX  take 1.5 tab by mouth twice a day     traZODone 50 MG tablet  Commonly known as: DESYREL  take 2 tablets by mouth at bedtime     Trintellix 20 MG Tabs tablet  Generic drug: VORTIoxetine HBr     Xyzal 5 MG tablet  Generic drug: levocetirizine  Take 1 tablet by mouth nightly           Where to Get Your Medications      You can get these medications from any pharmacy    Bring a paper prescription for each of these medications  · cephALEXin 500 MG capsule  · docusate sodium 100 MG capsule  · oxyCODONE-acetaminophen 5-325 MG per tablet         Hospital course:   Mitesh Vargas is a 50-year-old gentleman with erectile dysfunction due to arterial insufficiency refractory to medical therapy.   He had insertion of 3 piece inflatable penile prosthesis-[facility] on 11/24/2021. Please see operative report for full details. He was admitted for routine postoperative monitoring and care. On first postoperative day, he was ambulating well, passed flatus, pain was well controlled, lower abdominal and penoscrotal incisions were clean, dry and intact. He had 120 cc serosanguineous drainage from CIRA drain. Bond catheter, dressings and drain were removed. He was encouraged to ambulate and continue regular diet on the first postoperative day. Hemoglobin was 12.9 from 14.9, creatinine was 0.9 from 1.05. He was afebrile throughout the course of his hospital stay, and met all appropriate discharge criteria prior to discharge on the first postoperative day.     Matt Spears MD  10:57 AM 11/25/2021

## 2021-11-26 ENCOUNTER — TELEPHONE (OUTPATIENT)
Dept: PRIMARY CARE CLINIC | Age: 61
End: 2021-11-26

## 2021-11-26 NOTE — ANESTHESIA POSTPROCEDURE EVALUATION
Department of Anesthesiology  Postprocedure Note    Patient: Sabrina Edmonds  MRN: [id number]  YOB: 1960  Date of evaluation: 11/26/2021  Time:  12:33 PM     Procedure Summary     Date: 11/24/21 Room / Location: [address]    Anesthesia Start: 8166 Anesthesia Stop: 5626    Procedure: PENIS PROSTHESIS INSERTION- AMS INFLATABLE, **SHORT STAY** (N/A ) Diagnosis: (ERECTILE DYSFUNCTION DUE TO ARTERIAL INSUFFICIENCY)    Surgeons: Daryle Poet, MD Responsible Provider: Marika Madison MD    Anesthesia Type: general ASA Status: 3          Anesthesia Type: general    Ale Phase I: Ale Score: 8    Ale Phase II:      Last vitals: Reviewed and per EMR flowsheets.        Anesthesia Post Evaluation    Patient location during evaluation: PACU  Patient participation: complete - patient participated  Level of consciousness: awake and alert  Pain score: 3  Airway patency: patent  Nausea & Vomiting: no nausea and no vomiting  Complications: no  Cardiovascular status: hemodynamically stable  Respiratory status: room air  Hydration status: euvolemic

## 2021-11-26 NOTE — TELEPHONE ENCOUNTER
Patient called wondering if provider could order a Home health aid for hm. He states that he just had surgery and is having difficulty taking care of things around the house and cooking for himself as he is struggling to get up and walk.

## 2021-11-29 NOTE — TELEPHONE ENCOUNTER
Please let him know that would not be a home health aid order that I could order,as they usually don't do cooking. but I can have social work reach out to him with any recommendations and resources for him. Thanks!

## 2021-11-30 ENCOUNTER — CARE COORDINATION (OUTPATIENT)
Dept: CARE COORDINATION | Age: 61
End: 2021-11-30

## 2021-11-30 NOTE — CARE COORDINATION
Writer called Patient to gauge his interest in working with our program.     Patient did not answer the phone, José Saleh left a voicemail asking for a call back. Plan: Follow up with Patient to see if he wants to work with our program and see if we can be of assistance to him.

## 2021-12-02 ENCOUNTER — CARE COORDINATION (OUTPATIENT)
Dept: CARE COORDINATION | Age: 61
End: 2021-12-02

## 2021-12-02 NOTE — CARE COORDINATION
Writer was able to contact Patient, he reported that he was doing better and a Aide was not necessary. Writer explained that he was entitled to have an Aide if he needed assistance with ADL'S, Patient reported that he was getting around better and he did not feel he needed an AIDE at this time. Writer expressed that if he had any other needs or concerns to reach out to me, I will look into signing off on his case if he does not have any additional needs, issues or concerns.

## 2021-12-08 ENCOUNTER — OFFICE VISIT (OUTPATIENT)
Dept: UROLOGY | Age: 61
End: 2021-12-08

## 2021-12-08 VITALS
DIASTOLIC BLOOD PRESSURE: 80 MMHG | WEIGHT: 262 LBS | BODY MASS INDEX: 36.68 KG/M2 | TEMPERATURE: 96.9 F | SYSTOLIC BLOOD PRESSURE: 115 MMHG | HEIGHT: 71 IN

## 2021-12-08 DIAGNOSIS — Z98.890 POST-OPERATIVE STATE: ICD-10-CM

## 2021-12-08 DIAGNOSIS — R31.0 GROSS HEMATURIA: ICD-10-CM

## 2021-12-08 DIAGNOSIS — N52.01 ERECTILE DYSFUNCTION DUE TO ARTERIAL INSUFFICIENCY: Primary | ICD-10-CM

## 2021-12-08 PROCEDURE — 99024 POSTOP FOLLOW-UP VISIT: CPT | Performed by: NURSE PRACTITIONER

## 2021-12-08 RX ORDER — OXYCODONE HYDROCHLORIDE AND ACETAMINOPHEN 5; 325 MG/1; MG/1
TABLET ORAL
COMMUNITY
Start: 2021-12-07

## 2021-12-08 ASSESSMENT — ENCOUNTER SYMPTOMS
DIARRHEA: 0
ABDOMINAL PAIN: 1
VOMITING: 0
EYE REDNESS: 0
EYE PAIN: 0
NAUSEA: 0
WHEEZING: 0
CONSTIPATION: 0
COUGH: 0
SHORTNESS OF BREATH: 0
BACK PAIN: 1

## 2021-12-08 NOTE — PROGRESS NOTES
Review of Systems   Constitutional: Negative for appetite change, chills and fatigue. Eyes: Negative for pain, redness and visual disturbance. Respiratory: Negative for cough, shortness of breath and wheezing. Cardiovascular: Negative for chest pain and leg swelling. Gastrointestinal: Positive for abdominal pain. Negative for constipation, diarrhea, nausea and vomiting. Genitourinary: Positive for hematuria. Negative for difficulty urinating, dysuria, flank pain, frequency and urgency. Musculoskeletal: Positive for back pain and joint swelling. Negative for myalgias. Skin: Positive for wound. Negative for rash. Neurological: Negative for dizziness, weakness and numbness. Hematological: Does not bruise/bleed easily.

## 2021-12-08 NOTE — LETTER
[address] [address]  Phone: [phone]  Fax: [phone]    ASHLEY Thompson CNP    December 8, 2021     Marleny Conroy, [address] [place] [address] [address]  [address]    Patient: Prince Combs   MR Number: [id number]   YOB: 1960   Date of Visit: 12/8/2021       Dear Marleny Conroy: Thank you for referring Prince Combs to me for evaluation/treatment. Below are the relevant portions of my assessment and plan of care. 1. Erectile dysfunction due to arterial insufficiency    2. Post-operative state    3. Gross hematuria      Plan:      1. Overall doing well- may wean off narcotics and take OTC pain meds PRN     2. keep incision clean and dry.  May use mild soap and warm water to clean area. Shower 1-2x daily      3. continue to ice and elevate, 20 mins on and 20 mins off     4. F/U 6 weeks with Dr. Kim Kaplan for IPP activation      5. Notify us if any signs of infection or new/worsening urinary symptoms.      6. Discussed gross hematuria with Dr. Kim Kaplan- would like to do cysto. OK to do cysto at IPP activation appt per Dr. Kim Kaplan. Patient notified and agreeable.         Return in about 6 weeks (around 1/19/2022) for IPP activation and cysto- ok per Dr. Kim Kaplan.        If you have questions, please do not hesitate to call me. I look forward to following Jay along with you.     Sincerely,      ASHLEY Thompson CNP

## 2021-12-08 NOTE — PROGRESS NOTES
at all  URGENCY: How often have you found it difficult to postpone urination?: Not at all  WEAK STREAM: How often have you had a weak urinary stream?: Not at all  STRAINING: How often have you had to strain to start  urination?: Not at all  NOCTURIA: How many times did you typically get up at night to uriniate?: 2 Times  TOTAL I-PSS SCORE[de-identified] 7  How would you feel if you were to spend the rest of your life with your urinary condition?: Pleased    Last BUN and creatinine:  Lab Results   Component Value Date    BUN 14 11/25/2021     Lab Results   Component Value Date    CREATININE 0.94 11/25/2021       Additional Lab/Culture results: none    Imaging Reviewed during this Office Visit: none  (results were independently reviewed by physician and radiology report verified)    PAST MEDICAL, FAMILY AND SOCIAL HISTORY UPDATE:  Past Medical History:   Diagnosis Date    ADHD (attention deficit hyperactivity disorder)     no tx per pt.  Arthritis     knees, hands, back.  Asthma     as child. pcp manages    Back pain     [address] [facility] .Last seen 10/26/2021. Goes again 11/17/2021    Brain aneurysm     Dr. Leesa Stanley at   [facility]  f/u 12/29/2021. No surgery at this time. Monitoring yearly.  CAD (coronary artery disease)     Dr. Soila Muñoz, [facility]  last visit 11/9/2021. Pt. states never had heart cath. MI 2011    Cataract     lt eye   no surgery    Cerebral artery occlusion with cerebral infarction [facility]) 2014    mini stroke went to [facility] and was treated. Dr. Phill Remy. Last seen summer 2021    Depression     pcp    Diabetes mellitus ([facility] [address]     on rx   A1C high per pt    Heart attack [facility])     2011   Last visit Dr. Stefanie Whitfield 11/9/2021  [facility]   Received cardiology clearance    Hyperlipidemia     on rx    Hypertension     pcp manages    Kidney stones     Dr. Josee Zamora treats. Pt. states he currently has blood in his urine. Urine CS sent to lab today. No pain per pt.      Memory loss, long term     Pt states he has had it for years and follows w/ his neurologist Dr. Bee Waterman. Mother has Alzheimers.  Mini stroke ([address]     2014    Osteoarthritis     Seasonal allergies     Sinus problem     drainage from allergy    Snores     has not been tested for sleep apnea. Sees Dr. Oralia Edward. He provides rx for Trazodone.     Wears eyeglasses     Wellness examination     last visit 11/1/2021     Past Surgical History:   Procedure Laterality Date    CHOLECYSTECTOMY      2016    COLONOSCOPY  2021    had cologuard and pt. states it was negative    CYSTO/URETERO/PYELOSCOPY, CALCULUS TX Right 02/05/2020    CYSTO, URETEROSCOPY, RIGHT URETERAL STENT PLACEMENT, RETROGRADE PYELOGRAM performed by Daryle Poet, MD at [facility], rt. inguinal    INGUINAL HERNIA REPAIR Left 08/19/2016    LAPAROSCOPY N/A 01/16/2018    DIAGNOSTIC LAPAROSCOPY performed by Vince Branham MD at [address] [place] [address]  11/24/2021    PENIS PROSTHESIS INSERTION    PENILE PROSTHESIS PLACEMENT N/A 11/24/2021    PENIS PROSTHESIS INSERTION- AMS INFLATABLE, **SHORT STAY** performed by Daryle Poet, MD at . [facility]       Family History   Problem Relation Age of Onset    Cancer Father         throat    Cirrhosis Father     High Blood Pressure Sister     Asthma Sister     Arthritis Sister     High Blood Pressure Mother     Heart Disease Mother     Alzheimer's Disease Mother      Outpatient Medications Marked as Taking for the 12/8/21 encounter (Office Visit) with ASHLEY Mueller - CNP   Medication Sig Dispense Refill    oxyCODONE-acetaminophen (PERCOCET) 5-325 MG per tablet       memantine (NAMENDA) 5 MG tablet Take 1 tablet by mouth 2 times daily Patient states he is taking 1 tablet (5mg) twice daily 11/3/21 60 tablet 1    albuterol sulfate  (90 Base) MCG/ACT inhaler inhale 2 puffs by mouth and INTO THE LUNGS every 6 hours if needed for wheezing 8.5 g 3    amLODIPine (NORVASC) 10 MG tablet take 1 tablet by mouth once daily 90 tablet 1    metFORMIN (GLUCOPHAGE-XR) 500 MG extended release tablet Take 1 tablet by mouth daily (with breakfast) 30 tablet 5    XYZAL 5 MG tablet Take 1 tablet by mouth nightly 30 tablet 3    traZODone (DESYREL) 50 MG tablet take 2 tablets by mouth at bedtime 60 tablet 11    TRINTELLIX 20 MG TABS tablet 20 mg       gabapentin (NEURONTIN) 300 MG capsule Take 300 mg by mouth daily. Takes at [address] [facility],  [address] tiZANidine (ZANAFLEX) 2 MG tablet take 1 tablet by mouth at bedtime      carvedilol (COREG) 25 MG tablet take 1 tablet by mouth twice a day 180 tablet 2    mometasone (NASONEX) 50 MCG/ACT nasal spray 2 sprays by Nasal route daily 1 Inhaler 3    atorvastatin (LIPITOR) 10 MG tablet Take 1 tablet by mouth daily 90 tablet 3    [DISCONTINUED] carvedilol (COREG) 25 MG tablet Take 1 tablet by mouth 2 times daily 60 tablet 3    fluticasone-salmeterol (ADVAIR) 100-50 MCG/DOSE diskus inhaler Inhale 1 puff into the lungs every 12 hours 60 each 3    topiramate (TOPAMAX) 50 MG tablet take 1.5 tab by mouth twice a day 90 tablet 6    diclofenac sodium (VOLTAREN) 1 % GEL Stop 1 week prior to surgery      nitroGLYCERIN (NITROSTAT) 0.3 MG SL tablet place 1 tablet under the tongue if needed every 5 minutes for chest pain for 3 doses IF NO RELIEF AF (Patient taking differently: Hasn't  Taken in years.) 25 tablet 3    aspirin 81 MG tablet Take 81 mg by mouth daily Stop 11/17/2021 per surgeon. On asa per Dr. Adam Colón. Clearance was obtained.          Lisinopril, Aleve  [naproxen sodium], Amitriptyline hcl, Exelon [rivastigmine], and Ibuprofen  Social History     Tobacco Use   Smoking Status Current Every Day Smoker    Packs/day: 0.25    Years: 28.00    Pack years: 7.00    Types: Cigarettes   Smokeless Tobacco Never Used     (Ifpatient a smoker, smoking cessation counseling offered)    Social History     Substance and Sexual Activity   Alcohol Use Yes    Comment: rarely       REVIEW OF SYSTEMS:  Review of Systems    Physical Exam:      Vitals:    12/08/21 1114   BP: 115/80   Temp: 96.9 °F (36.1 °C)     Body mass index is 36.54 kg/m². Patient is a 62 y.o. male in no acute distress and alert and oriented to person, place and time. Physical Exam  Constitutional: Patient in no acute distress. Neuro: Alert and oriented to person, place and time. Psych: Mood normal, affect normal  Lungs: Respiratory effort is normal  Cardiovascular: Warm & Pink  Abdomen: Soft, non-tender, non-distended   Bladder non-tender and not distended. Musculoskeletal: Normal gait and station  Genitourinary: Surgical incision to left scrotum well approximated. No erythema, warmth, or drainage. Small amount of swelling to left side. Surgical incision just inferior to umbilicus is clean, dry, and well approximated- glue is flaking off. Assessment and Plan      1. Erectile dysfunction due to arterial insufficiency    2. Post-operative state    3. Gross hematuria           Plan:      1. Overall doing well- may wean off narcotics and take OTC pain meds PRN     2. keep incision clean and dry. May use mild soap and warm water to clean area. Shower 1-2x daily      3. continue to ice and elevate, 20 mins on and 20 mins off     4. F/U 6 weeks with Dr. Diego Kirk for IPP activation      5. Notify us if any signs of infection or new/worsening urinary symptoms. 6. Discussed gross hematuria with Dr. Diego Kirk- would like to do cysto. OK to do cysto at IPP activation appt per Dr. Diego Kirk. Patient notified and agreeable. Return in about 6 weeks (around 1/19/2022) for IPP activation and cysto- ok per Dr. Diego Kirk. Prescriptions Ordered:  No orders of the defined types were placed in this encounter. Orders Placed:  No orders of the defined types were placed in this encounter.           ASHLEY Fernandez - CNP    Reviewed and agree with the ROS entered by the MA.

## 2021-12-09 ENCOUNTER — CARE COORDINATION (OUTPATIENT)
Dept: CARE COORDINATION | Age: 61
End: 2021-12-09

## 2021-12-09 NOTE — CARE COORDINATION
Writer called Patient to do a follow up Social service call, Patient did not answer the phone. Writer left a voicemail asking for a return call. Plan: Follow up with Patient at a later date and time.

## 2021-12-12 DIAGNOSIS — G44.221 CHRONIC TENSION-TYPE HEADACHE, INTRACTABLE: ICD-10-CM

## 2021-12-13 RX ORDER — TOPIRAMATE 50 MG/1
TABLET, FILM COATED ORAL
Qty: 90 TABLET | Refills: 6 | Status: SHIPPED | OUTPATIENT
Start: 2021-12-13 | End: 2022-07-20 | Stop reason: SDUPTHER

## 2021-12-13 NOTE — TELEPHONE ENCOUNTER
Pharmacy requesting refill of Topamax.       Medication active on med list yes      Date of last fill: 11/19/20  verified on 12/13/2021   verified by [facility] LPN      Date of last appointment 10/12/21 with Dr uY Draft    Next Visit Date:  2/3/22

## 2021-12-28 ENCOUNTER — TELEPHONE (OUTPATIENT)
Dept: PRIMARY CARE CLINIC | Age: 61
End: 2021-12-28

## 2021-12-28 ENCOUNTER — CARE COORDINATION (OUTPATIENT)
Dept: CARE COORDINATION | Age: 61
End: 2021-12-28

## 2021-12-28 NOTE — TELEPHONE ENCOUNTER
Patient called in stating that he is experiencing sharp pain/spasms above his left knee. He is having a hard time walking. He asked if he should go to the walk in clinic or the ER. Writer informed patient that he could come to the walk in clinic to be evaluated and then if any imaging needed ordered, he could get those completed that way. Patient will try to find transportation.     Dr Tawny Timmons was notified

## 2021-12-28 NOTE — CARE COORDINATION
Writer called Patient to do a follow up Social service call with him to see if he had any Social service needs and Patient said that he was doing fine in that Department. Patient reported no needs at this time, Juno Hunter will call Patient again one more time to see if any needs come up and if not will sign off on his case at that time.

## 2021-12-29 ENCOUNTER — TELEMEDICINE (OUTPATIENT)
Dept: NEUROLOGY | Age: 61
End: 2021-12-29
Payer: MEDICARE

## 2021-12-29 DIAGNOSIS — I67.1 ANEURYSM OF INTRACRANIAL PORTION OF LEFT INTERNAL CAROTID ARTERY: Primary | ICD-10-CM

## 2021-12-29 PROCEDURE — 3017F COLORECTAL CA SCREEN DOC REV: CPT | Performed by: PSYCHIATRY & NEUROLOGY

## 2021-12-29 PROCEDURE — 99214 OFFICE O/P EST MOD 30 MIN: CPT | Performed by: PSYCHIATRY & NEUROLOGY

## 2021-12-29 PROCEDURE — G8427 DOCREV CUR MEDS BY ELIG CLIN: HCPCS | Performed by: PSYCHIATRY & NEUROLOGY

## 2021-12-29 ASSESSMENT — ENCOUNTER SYMPTOMS
SHORTNESS OF BREATH: 0
VOICE CHANGE: 0
COLOR CHANGE: 0
NAUSEA: 0
VOMITING: 0
COUGH: 0
PHOTOPHOBIA: 0

## 2021-12-29 NOTE — PROGRESS NOTES
[address] Surgery Telehealth Followup Visit    Pt Name: Miko Daniels  MRN: [id number]  YOB: 1960  Date of evaluation: 12/29/2021  Primary Care Physician: Beatris Nelson DO  Reason for Evaluation: TELEHEALTH EVALUATION -- Audio/Visual (During HWKUC-20 public health emergency) regarding left ica fusiform aneurysm    Dear Dr. Belen Hawthorne is a 62 y.o. 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 He states he does have a family history of aneurysm that have recently required treatment. Since his last followup on 11-8-2018. He has continued to smoke but has greatly cut back. He designates one room of his house to smoking to resist the urge to smoke the cigarette. He has stable neuropathy and cont to have short term memory loss on namenda. He is retired but continues playing [employer] and music as a hobby. He has headaches treated with topamax and followed with Dr. Clay coello to stay   Allergies   Allergen Reactions    Lisinopril      States he is allergic to the generic forms of medication, can take lisinopril    Aleve  [Naproxen Sodium] Nausea Only    Amitriptyline Hcl Other (See Comments)     rash    Exelon [Rivastigmine] Other (See Comments)     Patient stated patch left a \"burn kathy\" on his skin     Ibuprofen Other (See Comments)     \"hurts my stomach. \"     Current Outpatient Medications   Medication Sig Dispense Refill    topiramate (TOPAMAX) 50 MG tablet take 1 AND 1/2 tablet by mouth twice a day 90 tablet 6    oxyCODONE-acetaminophen (PERCOCET) 5-325 MG per tablet       memantine (NAMENDA) 5 MG tablet Take 1 tablet by mouth 2 times daily Patient states he is taking 1 tablet (5mg) twice daily 11/3/21 60 tablet 1    albuterol sulfate  (90 Base) MCG/ACT inhaler inhale 2 puffs by mouth and INTO THE LUNGS every 6 hours if needed for wheezing 8.5 g 3    amLODIPine (NORVASC) 10 MG tablet take 1 tablet by mouth once daily 90 tablet 1    metFORMIN (GLUCOPHAGE-XR) 500 MG extended release tablet Take 1 tablet by mouth daily (with breakfast) 30 tablet 5    XYZAL 5 MG tablet Take 1 tablet by mouth nightly 30 tablet 3    traZODone (DESYREL) 50 MG tablet take 2 tablets by mouth at bedtime 60 tablet 11    TRINTELLIX 20 MG TABS tablet 20 mg       gabapentin (NEURONTIN) 300 MG capsule Take 300 mg by mouth daily. Takes at [address] [facility], [address] tiZANidine (ZANAFLEX) 2 MG tablet take 1 tablet by mouth at bedtime      carvedilol (COREG) 25 MG tablet take 1 tablet by mouth twice a day 180 tablet 2    mometasone (NASONEX) 50 MCG/ACT nasal spray 2 sprays by Nasal route daily 1 Inhaler 3    atorvastatin (LIPITOR) 10 MG tablet Take 1 tablet by mouth daily 90 tablet 3    fluticasone-salmeterol (ADVAIR) 100-50 MCG/DOSE diskus inhaler Inhale 1 puff into the lungs every 12 hours 60 each 3    diclofenac sodium (VOLTAREN) 1 % GEL Stop 1 week prior to surgery      nitroGLYCERIN (NITROSTAT) 0.3 MG SL tablet place 1 tablet under the tongue if needed every 5 minutes for chest pain for 3 doses IF NO RELIEF AF (Patient taking differently: Hasn't  Taken in years.) 25 tablet 3    aspirin 81 MG tablet Take 81 mg by mouth daily Stop 11/17/2021 per surgeon. On asa per Dr. Stefanie Whitfield. Clearance was obtained. No current facility-administered medications for this visit. Past Medical History:   Diagnosis Date    ADHD (attention deficit hyperactivity disorder)     no tx per pt.  Arthritis     knees, hands, back.  Asthma     as child. pcp manages    Back pain     [address] [facility] .Last seen 10/26/2021. Goes again 11/17/2021    Brain aneurysm     Dr. Leesa Stanley at   [facility]  f/u 12/29/2021. No surgery at this time. Monitoring yearly.  CAD (coronary artery disease)     Dr. Soila Muñoz [place]  last visit 11/9/2021. Pt. states never had heart cath.  MI 2011    Cataract     lt eye   no surgery    Cerebral artery occlusion with cerebral infarction [facility]) 2014    mini stroke went to [facility] and was treated. Dr. Raghu Palmer. Last seen summer 2021    Depression     pcp    Diabetes mellitus ([facility] [address]     on rx   A1C high per pt    Heart attack [facility])     2011   Last visit Dr. Tomas Dockery 11/9/2021  [place]   Received cardiology clearance    Hyperlipidemia     on rx    Hypertension     pcp manages    Kidney stones     Dr. Deidre Shultz treats. Pt. states he currently has blood in his urine. Urine CS sent to lab today. No pain per pt.  Memory loss, long term     Pt states he has had it for years and follows w/ his neurologist Dr. Katia Padilla. Mother has Alzheimers.  Mini stroke ([facility] [address]     2014    Osteoarthritis     Seasonal allergies     Sinus problem     drainage from allergy    Snores     has not been tested for sleep apnea. Sees Dr. Jessica Carbone. He provides rx for Trazodone.     Wears eyeglasses     Wellness examination     last visit 11/1/2021      Past Surgical History:   Procedure Laterality Date    CHOLECYSTECTOMY      2016    COLONOSCOPY  2021    had cologuard and pt. states it was negative    CYSTO/URETERO/PYELOSCOPY, CALCULUS TX Right 02/05/2020    CYSTO, URETEROSCOPY, RIGHT URETERAL STENT PLACEMENT, RETROGRADE PYELOGRAM performed by Facundo Goyal MD at [facility], rt. inguinal    INGUINAL HERNIA REPAIR Left 08/19/2016    LAPAROSCOPY N/A 01/16/2018    DIAGNOSTIC LAPAROSCOPY performed by Chemo Roque MD at [address] [place], [place] [address]  11/24/2021    PENIS PROSTHESIS INSERTION    PENILE PROSTHESIS PLACEMENT N/A 11/24/2021    PENIS PROSTHESIS INSERTION- AMS INFLATABLE, **SHORT STAY** performed by Facundo Goyal MD at . [facility] [address]       Family History   Problem Relation Age of Onset    Cancer Father         throat    Cirrhosis Father  High Blood Pressure Sister     Asthma Sister     Arthritis Sister     High Blood Pressure Mother     Heart Disease Mother     Alzheimer's Disease Mother      Social History     Tobacco Use    Smoking status: Current Every Day Smoker     Packs/day: 0.25     Years: 28.00     Pack years: 7.00     Types: Cigarettes    Smokeless tobacco: Never Used   Substance Use Topics    Alcohol use: Yes     Comment: rarely          Subjective:     Review of Systems   Constitutional: Negative for fever and unexpected weight change. HENT: Negative for voice change. Eyes: Negative for photophobia and visual disturbance. Respiratory: Negative for cough and shortness of breath. Cardiovascular: Negative for chest pain and palpitations. Gastrointestinal: Negative for nausea and vomiting. Musculoskeletal: Negative for neck stiffness. Skin: Negative for color change and pallor. Neurological: Positive for headaches. Negative for weakness. Psychiatric/Behavioral: Positive for decreased concentration. Negative for confusion.          Objective:   Physical Exam  General Appearance:  Alert, cooperative, no signs of distress, appears stated age   Skin: Skin color, texture normal, no rashes, no lesions                                     NEUROLOGIC EXAMINATION      Mental status    Alert and oriented x 3; able to follow commands, speech and language intact;   Cranial nerves    II - grossly intact  III, IV, VI - extra-ocular muscles full: no nystagmus, no ptosis   V - normal facial sensation                                                               VII - normal facial symmetry                                                             VIII - intact hearing                                                                             IX, X - symmetrical palate                                                                  XI - symmetrical shoulder shrug                                                       XII - tongue midline without atrophy      Motor function  Normal muscle bulk. Strength at least 5/5 on all 4 extremities, no pronator drift      Sensory function Unable to test      Cerebellar Intact fine motor movement. No involuntary movements or tremors. No ataxia or dysmetria on finger to nose      Reflex function Unable to test      Gait                   not tested       Imaging:  MRA head reviewed with stable left ica terminus broadbased aneurysm. No other evidence of discrete aneurysm      Assessment:        Lucian Colby is a 62 y.o. male who has been doing well cutting back on smoking and managing his short term memory concerns. Headaches are manageable. History of aneurysms in family   Diagnosis Orders   1. 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 Orders Placed This Encounter   Procedures    MRA HEAD WO CONTRAST     Standing Status:   Future     Standing Expiration Date:   2023     Scheduling Instructions:      Spring 2023 followup     Order Specific Question:   Reason for exam:     Answer:   followup up right ica terminius fusiform outpouching cerebral aneurysm     1. Recommend followup mra head and visit in spring 2023 due to small size and stability of mra compared to three years ago and cont to monitor due to new history of aneurysms in family. 2. Cont to strive for smoking cessation  3. Normotensive sbp    Established Patient Visit Time: 32 minutes  Time Spent in Counselin minutes  Greater than 50% of the time in this visit was spent counseling and coordinating care of this patient. Discussed use, benefit, and side effects of prescribed medications. Personally reviewed imaging with patients and all questions answered. Pt voiced understanding. Patient agreed with treatment plan. Follow up as directed above.        If you have any questions, please do not hesitate to call me. I look forward to following Sathish Simpson      Sincerely,    Hilaria Hawthorne MD  Electronically signed by Hilaria Hawthorne MD, MD on 12/29/2021 at 9:55 AM     This is a telehealth visit that was performed with the originating site at Patient Location: Patient Home and Provider Location of [place], [place]. Patient ID verified by me prior to start of this visit. Verbal consent to participate in video visit was obtained. Pursuant to the emergency declaration under the [address] [place] [address], [facility] waiver authority and the [employer] Act, this Virtual Visit was conducted, with patient's consent, to reduce the patient's risk of exposure to COVID-19 and provide continuity of care for an established/new patient. Services were provided through a video synchronous discussion virtually to substitute for in-person clinic visit. I discussed with the patient the nature of our telehealth visits via interactive/real-time audio/video that:  - I would evaluate the patient and recommend diagnostics and treatments based on my assessment  - Our sessions are not being recorded and that personal health information is protected  - Our team would provide follow up care in person if/when the patient needs it.

## 2022-01-03 RX ORDER — ATORVASTATIN CALCIUM 10 MG/1
TABLET, FILM COATED ORAL
Qty: 90 TABLET | Refills: 3 | Status: SHIPPED | OUTPATIENT
Start: 2022-01-03

## 2022-01-05 ENCOUNTER — CARE COORDINATION (OUTPATIENT)
Dept: CARE COORDINATION | Age: 62
End: 2022-01-05

## 2022-01-05 NOTE — CARE COORDINATION
Writer called Patient to do one last Social service follow up with him. Patient reported no issues at this time and writer is signing off on his case as no concerns were reported.

## 2022-01-07 RX ORDER — METFORMIN HYDROCHLORIDE 500 MG/1
TABLET, EXTENDED RELEASE ORAL
Qty: 30 TABLET | Refills: 5 | Status: SHIPPED | OUTPATIENT
Start: 2022-01-07 | End: 2022-06-25

## 2022-01-08 RX ORDER — CARVEDILOL 25 MG/1
TABLET ORAL
Qty: 180 TABLET | Refills: 2 | Status: SHIPPED | OUTPATIENT
Start: 2022-01-08 | End: 2022-07-11 | Stop reason: SDUPTHER

## 2022-01-18 RX ORDER — ALBUTEROL SULFATE 90 UG/1
AEROSOL, METERED RESPIRATORY (INHALATION)
Qty: 8.5 G | Refills: 3 | Status: SHIPPED | OUTPATIENT
Start: 2022-01-18 | End: 2022-04-25

## 2022-01-20 ENCOUNTER — TELEMEDICINE (OUTPATIENT)
Dept: NEUROLOGY | Age: 62
End: 2022-01-20
Payer: MEDICARE

## 2022-01-20 DIAGNOSIS — G44.229 CHRONIC TENSION-TYPE HEADACHE, NOT INTRACTABLE: ICD-10-CM

## 2022-01-20 DIAGNOSIS — G31.84 MCI (MILD COGNITIVE IMPAIRMENT): ICD-10-CM

## 2022-01-20 DIAGNOSIS — I67.1 ANEURYSM OF INTRACRANIAL PORTION OF LEFT INTERNAL CAROTID ARTERY: ICD-10-CM

## 2022-01-20 DIAGNOSIS — R41.3 MEMORY LOSS: Primary | ICD-10-CM

## 2022-01-20 DIAGNOSIS — F32.A DEPRESSION, UNSPECIFIED DEPRESSION TYPE: ICD-10-CM

## 2022-01-20 PROCEDURE — 99214 OFFICE O/P EST MOD 30 MIN: CPT | Performed by: PSYCHIATRY & NEUROLOGY

## 2022-01-20 PROCEDURE — 3017F COLORECTAL CA SCREEN DOC REV: CPT | Performed by: PSYCHIATRY & NEUROLOGY

## 2022-01-20 PROCEDURE — G8427 DOCREV CUR MEDS BY ELIG CLIN: HCPCS | Performed by: PSYCHIATRY & NEUROLOGY

## 2022-01-20 RX ORDER — MEMANTINE HYDROCHLORIDE 10 MG/1
10 TABLET ORAL 2 TIMES DAILY
Qty: 180 TABLET | Refills: 1 | Status: SHIPPED | OUTPATIENT
Start: 2022-02-01 | End: 2022-07-20 | Stop reason: SDUPTHER

## 2022-01-20 NOTE — PROGRESS NOTES
NEUROLOGY FOLLOW-UP  Patient Name:  Ewa Emmanuel  :   1960  Clinic Visit Date: 2022  Last visit: 8/3/2021        Dear Dr. Doris Chen, DO     I saw Mr. Ewa Emmanuel in follow-up today in continuation of neurologic care. As you know, he is a 62 y.o.  male  with complaints of memory loss and depression. He comes to clinic stating that he has been on Namenda 5 mg twice daily and tolerating it well without any adverse effects. He has been independent of all ADLs. He still has trouble recalling names and recent conversations but it's not getting worse. He is presently driving. He is independent of all basic ADLs. He is also independent of all instrumental ADLs. He is not having any other noncognitive symptoms associated with dementia including agitation or hallucinations, etc.     He comes to visit stating that his memory medication is helping and he does not want to stop taking the medication. He also wants to increase the medication dose. He also stated that he has been working on [employer] He is walking daily stating \"power walk for 6 blocks in 20 minutes even on weekends\". He is also trying to avoid processed food and \"very determined to lose weight\". He also states that he is not forgetting as he \"used to be\" and he is very pleased with the memantine medication. Of note; he could not tolerate Aricept medication. It caused \"severe diarrhea\". He tried patch and \"caused some skin burns\". He tried Exelon and could not tolerate the medication. He does not want to go for neuropsych testing. He was having forgetfulness with recent conversations and immediate recall difficulties. He also has been misplacing items of daily necessity. He goes to kitchen and forgets why he went there. He goes to a different room and forgets \"why I'm me here\". He has had sleep apnea and difficulties with the sleep. With trazodone, sleep has improved.   Depression also has been improving with trazodone. He further added that Topamax helped for headaches \"a lot better\". Wants to continue Topamax at present dose of 1.5 tab twice daily. Headaches were described as pressure-like headaches located \"all over\" with photophobia and occasional nausea. Headaches last for few hours. Further adds \"still occurring\". He is apologetic that he has not made appointment with Dr. Wilfredo Stauffer. He has phone numbers and he stated \"I will call Dr. Colton Mccurdy". His depression and headaches are much better on trazodone. He is not having any adverse effects from it. All items selected indicate a positive finding. Those items not selected are negative.   Constitutional [] Weight loss/gain   [] Fatigue  [] Fever/Chills   HEENT [] Hearing Loss  [] Visual Disturbance  [] Tinnitus  [] Eye pain   Respiratory [] Shortness of Breath  [] Cough  [x] Snoring   Cardiovascular [] Chest Pain  [] Palpitations  [] Lightheaded   GI [] Constipation  [] Diarrhea  [] Swallowing change    [] Urinary Frequency  [] Urinary Urgency   Musculoskeletal [] Neck pain  [] Back pain  [] Muscle pain  [] Restless legs   Dermatologic [] Skin changes   Neurologic [x] Memory loss/confusion  [] Seizures  [] Trouble walking or imbalance  [] Dizziness  [] Weakness  [] Numbness  [] Tremors  [] Speech Difficulty  [x] Headaches  [] Light Sensitivity  [] Sound Sensitivity   Endocrinology []Excessive thirst  []Excessive hunger   Psychiatric [] Anxiety/Depression  [] Hallucination   Allergy/immunology []Hives/environmental allergies   Hematologic/lymph [] Abnormal bleeding  [] Abnormal bruising       Current Outpatient Medications on File Prior to Visit   Medication Sig Dispense Refill    albuterol sulfate  (90 Base) MCG/ACT inhaler inhale 2 puffs by mouth and INTO THE LUNGS every 6 hours if needed for cough shortness of breath or wheezing 8.5 g 3    carvedilol (COREG) 25 MG tablet take 1 tablet by mouth twice a day 180 tablet 2    metFORMIN (GLUCOPHAGE-XR) 500 MG extended release tablet take 1 tablet by mouth once daily with breakfast 30 tablet 5    atorvastatin (LIPITOR) 10 MG tablet take 1 tablet by mouth once daily 90 tablet 3    topiramate (TOPAMAX) 50 MG tablet take 1 AND 1/2 tablet by mouth twice a day 90 tablet 6    oxyCODONE-acetaminophen (PERCOCET) 5-325 MG per tablet       memantine (NAMENDA) 5 MG tablet Take 1 tablet by mouth 2 times daily Patient states he is taking 1 tablet (5mg) twice daily 11/3/21 60 tablet 1    amLODIPine (NORVASC) 10 MG tablet take 1 tablet by mouth once daily 90 tablet 1    XYZAL 5 MG tablet Take 1 tablet by mouth nightly 30 tablet 3    traZODone (DESYREL) 50 MG tablet take 2 tablets by mouth at bedtime 60 tablet 11    TRINTELLIX 20 MG TABS tablet 20 mg       gabapentin (NEURONTIN) 300 MG capsule Take 300 mg by mouth daily. Takes at [address] [facility],  [address] tiZANidine (ZANAFLEX) 2 MG tablet take 1 tablet by mouth at bedtime      [DISCONTINUED] benzonatate (TESSALON) 200 MG capsule Take 1 capsule by mouth 3 times daily as needed for Cough 30 capsule 0    mometasone (NASONEX) 50 MCG/ACT nasal spray 2 sprays by Nasal route daily 1 Inhaler 3    [DISCONTINUED] carvedilol (COREG) 25 MG tablet Take 1 tablet by mouth 2 times daily 60 tablet 3    fluticasone-salmeterol (ADVAIR) 100-50 MCG/DOSE diskus inhaler Inhale 1 puff into the lungs every 12 hours 60 each 3    [DISCONTINUED] fluticasone (FLONASE) 50 MCG/ACT nasal spray instill 2 sprays into each nostril once daily 16 g 1    diclofenac sodium (VOLTAREN) 1 % GEL Stop 1 week prior to surgery      nitroGLYCERIN (NITROSTAT) 0.3 MG SL tablet place 1 tablet under the tongue if needed every 5 minutes for chest pain for 3 doses IF NO RELIEF AF (Patient taking differently: Hasn't  Taken in years.) 25 tablet 3    aspirin 81 MG tablet Take 81 mg by mouth daily Stop 11/17/2021 per surgeon. On asa per Dr. Kingsley Rocha. Clearance was obtained.        No current facility-administered medications on file prior to visit. Allergies: Tony Urena is allergic to lisinopril, aleve  [naproxen sodium], amitriptyline hcl, exelon [rivastigmine], and ibuprofen. Past Medical History:   Diagnosis Date    ADHD (attention deficit hyperactivity disorder)     no tx per pt.  Arthritis     knees, hands, back.  Asthma     as child. pcp manages    Back pain     [address] .Last seen 10/26/2021. Goes again 11/17/2021    Brain aneurysm     [facility] Tiffany Spaulding at   [facility]  f/u 12/29/2021. No surgery at this time. Monitoring yearly.  CAD (coronary artery disease)     [facility]. Pt. states never had heart cath. MI 2011    Cataract     lt eye   no surgery    Cerebral artery occlusion with cerebral infarction [facility]) 2014    mini stroke went to [facility] and was treated. Dr. Doris Bruce. Last seen summer 2021    Depression     pcp    Diabetes mellitus ([facility] [address]     on rx   A1C high per pt    Heart attack [facility])     2011   Last visit Dr. Yvan Scott 11/9/2021  [facility]   Received cardiology clearance    Hyperlipidemia     on rx    Hypertension     pcp manages    Kidney stones     Dr. Dinh Castellano treats. Pt. states he currently has blood in his urine. Urine CS sent to lab today. No pain per pt.  Memory loss, long term     Pt states he has had it for years and follows w/ his neurologist Dr. Ganga Lopez. Mother has Alzheimers.  Mini stroke ([facility] [address]     2014    Osteoarthritis     Seasonal allergies     Sinus problem     drainage from allergy    Snores     has not been tested for sleep apnea. Sees Dr. Leida Staton. He provides rx for Trazodone.     Wears eyeglasses     Wellness examination     last visit 11/1/2021       Past Surgical History:   Procedure Laterality Date    CHOLECYSTECTOMY      2016    COLONOSCOPY  2021    had cologuard and pt. states it was negative    CYSTO/URETERO/PYELOSCOPY, CALCULUS TX Right 02/05/2020    CYSTO, URETEROSCOPY, RIGHT URETERAL STENT PLACEMENT, RETROGRADE PYELOGRAM performed by Mireille Alamo MD at [address], rt. inguinal    INGUINAL HERNIA REPAIR Left 08/19/2016    LAPAROSCOPY N/A 01/16/2018    DIAGNOSTIC LAPAROSCOPY performed by Mitchell Quinones MD at [address] [place], [place] [address]  11/24/2021    PENIS PROSTHESIS INSERTION    PENILE PROSTHESIS PLACEMENT N/A 11/24/2021    PENIS PROSTHESIS INSERTION- AMS INFLATABLE, **SHORT STAY** [facility]       Social History: Elvia Nash  reports that he has been smoking cigarettes. He has a 7.00 pack-year smoking history. He has never used smokeless tobacco. He reports current alcohol use. He reports that he does not use drugs. Family History   Problem Relation Age of Onset    Cancer Father         throat    Cirrhosis Father     High Blood Pressure Sister     Asthma Sister     Arthritis Sister     High Blood Pressure Mother     Heart Disease Mother     Alzheimer's Disease Mother      Checks bp at home and it was 120-130 or so. Pulse 70s   On exam; 287 LB, Ht 5'11\"    GENERAL  Appears comfortable and in no distress   HEENT  NC/ AT   NECK & cardiovascular  Supple and no bruits heard; S1 and S2 heard; radial pulse intact   MENTAL STATUS:  Alert, oriented, intact memory, no confusion, normal speech, normal language, no hallucination or delusion. ,  He scored 26/30 on MMSE.   Appropriate affect   CRANIAL NERVES: II     -      PERRLA   III,IV,VI -  EOMs full, no ptosis  V     -     Unable to perform  VII    -     Normal facial symmetry  VIII   -     Intact hearing  IX,X -     Symmetrical palate  XI    -     Symmetrical shoulder shrug  XII   -     Midline tongue, no atrophy    MOTOR FUNCTION:  significant for no visible weakness, normal bulk, no tremors noted   SENSORY FUNCTION:  unable to perform    CEREBELLAR FUNCTION:  Intact fine motor control over upper limbs   REFLEX FUNCTION:  unable to perform   STATION and GAIT  Normal station, normal gait        1/20/2022  Maximum score 5 Score 5 What is the year, season, date, day, month   Maximum score 5 Score 5 Where are we: State, county, town, hospital, floor? Maximum score 3 Score 3 Name 3 objects: chair, scissors, computer. Ask patient to repeat 3 objects. Maximum score 5 Score  2 Serial sevens from 100:93, 86, 79, 72, 65  Very slow   Maximum score 3 Score 2 Recall 3 objects   Maximum score 2 Score 2 Name a pencil and watch. Maximum score 1 Score 1 Repeat the following: No ifs ands or buts.      Maximum score 3 Score 3 Follow 3 stage command take a paper in your hand, fold it in half, and put it on the floor. Maximum score 1  Score 1 Read and obey the following: Close your eyes     Maximum score 1 Score 1 Write a sentence. Maximum score 1 Score 1 Copy a design below. Max 30   Patients score 30 --> 26            Diagnostic data reviewed with the patient:   MRI Brain (8/30/17): No acute abnormality. ,  It demonstrated minimal nonspecific white matter disease. EEG (10/21/17): Unremarkable.     No results found for: SEDRATE  Lab Results   Component Value Date    [id number] 505 08/29/2017      No results found for: FOLATE  No results found for: ANUSHKA  Lab Results   Component Value Date    TSH 2.06 08/29/2017       No results found for: RPR   No components found for: TREPONEMAPALLIDUM  Lab Results   Component Value Date    LABA1C 6.3 11/01/2021       MOCA testing (7/31/18):   patient scored 20/30.,   Patient is unable to do the trail making test, able to copy the cube, able to draw the clock and put the numbers and able to put the hands; able to name 3 out of 3 animals; able to do digit backwards but not digit forward; unable to do target detection;  unable to do the serial subtraction; scored 2/2 on verbal abstraction; could not do phonemic fluency; able to repeat only 1 out of 2 sentences; scored 2 out of 5 on delayed recall and oriented to place and city but not to the date, day of the week month and year. MRI Brain (8/30/17): No acute abnormality. 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 Rt Subclavian artery origin occlusive plaque versus intramural hematoma measuring 11 mm resulting in mild stenosis. Neuropsych evaluation (9/10/2019): Done at [facility] by Kina Welch:   Results were not valid for indication of any specific problem with memory or with psychiatric disorder. There was evidence of average intellectual ability, as expected, with no sign of loss of intellectual skill, and stronger validity to these results however. However memory measures were not valid and no diagnosis can be made in this regard. Recommendations: No recommendations can be made at this time due to lack of validity of results. Impression and Plan: Mr. Leanna Love is a 62 y.o. male with   Progressive memory loss with depression: slowly improving on Memantine; at his request, will increase the dose from 5+5 to 5+10 for 2 wk, then onwards,  10+10. Donepezil intolerance \"diarrhea\". Salon patch intolerance \"burning on skin\"  Doesn't want to have repeat neuropsych testing. Headaches and sleep apnea; following up with Dr. Sallie Alvarado. Chronic tension type headache; failed gabapentin;well controlled on present dose of Topamax 50 mg 1.5 tab bid. 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 Leonardo Goode. Advised to continue follow-up visits with him. He has phone numbers to call and make a follow-up appointment. Depression: stable on trazodone. Dizziness with orthostatic hypotension: resolved. Comorbid diabetic neuropathy: presently tolerable on Elavil. Hx of statin intolerance: Stopped atorvastatin.  Presently on Lovastatin Comorbid hypertension and diabetes  Family history of dementia (mom in her 76s)  Hx of \"mini stroke\" \"4 years ago\". Also discussed about smoking cessation. Follow-up in 6 months. This is a telehealth visit that was performed with the originating site at Patient Location: Patient Home and Provider Location of [place], [place]. Patient ID verified by me prior to start of this visit. Verbal consent to participate in video visit was obtained. Complete and detailed physical examination is not feasible during this virtual video visit and patient is agreeable and understood. Due to this being a TeleHealth encounter (During XJESH-32 public health emergency),   evaluation of the following organ systems was limited:     vitals /Constitutional /EENT/ Resp/ CV/ GI/ / MS/Neuro/Skin/Heme-Lymph-Imm. Pursuant to the emergency declaration under the [address] [place] [address], [address] [place] [address] authority and the [employer] and [employer] Act, this Virtual Visit was conducted with patient's (and/or legal guardian's) consent, to reduce the patient's risk of exposure to COVID-19 and provide necessary medical care. The patient (and/or legal guardian) has also been advised to contact this office for worsening conditions or problems, and seek emergency medical treatment and/or call 911 if deemed necessary. Services were provided through a video synchronous discussion virtually to substitute for in-person clinic visit. I discussed with the patient the nature of our telehealth visits via interactive/real-time audio/video that:  - I would evaluate the patient and recommend diagnostics and treatments based on my assessment  - Our sessions are not being recorded and that personal health information is protected  - Our team would provide follow up care in person if/when the patient needs it.

## 2022-01-31 ENCOUNTER — PROCEDURE VISIT (OUTPATIENT)
Dept: UROLOGY | Age: 62
End: 2022-01-31
Payer: MEDICARE

## 2022-01-31 VITALS — BODY MASS INDEX: 36.68 KG/M2 | WEIGHT: 262 LBS | TEMPERATURE: 96.9 F | HEIGHT: 71 IN

## 2022-01-31 DIAGNOSIS — R31.0 GROSS HEMATURIA: Primary | ICD-10-CM

## 2022-01-31 DIAGNOSIS — E66.01 SEVERE OBESITY (BMI 35.0-35.9 WITH COMORBIDITY) (HCC): ICD-10-CM

## 2022-01-31 PROCEDURE — 52000 CYSTOURETHROSCOPY: CPT | Performed by: UROLOGY

## 2022-01-31 NOTE — PROGRESS NOTES
Cystoscopy Operative Note (1/31/22)  Surgeon: Tanya Hester MD  Anesthesia: Urethral 2% Xylocaine   Indications: Gross Hematuria  Position: Dorsal Lithotomy    Findings:   Risks and Benefits discussed with patient prior to procedure. The patient was prepped and draped in the usual sterile fashion. The flexible cystoscope was advanced through the urethra and into the bladder. The bladder was thoroughly inspected and the following was noted:    Residual Urine: none  Urethra: normal appearing urethra with no masses, tenderness or lesions  Prostate: partially obstructing lateral lobes of prostate; median lobe present? no.   Bladder: No tumors or CIS noted. No bladder diverticulum. There was none trabeculation noted. Ureters: Clear efflux from both ureters. Orifices with normal configuration and location. The cystoscope was removed. The patient tolerated the procedure well. Agree with the ROS entered by the MA. I taught patient how to cycle inflatable penile prosthesis. His cycles well. He was able to inflate and deflate it. I will have him follow-up with me in 3 months. No abnormality seen on cystoscopy to explain patient's hematuria.

## 2022-02-01 ENCOUNTER — OFFICE VISIT (OUTPATIENT)
Dept: PRIMARY CARE CLINIC | Age: 62
End: 2022-02-01
Payer: MEDICARE

## 2022-02-01 VITALS
SYSTOLIC BLOOD PRESSURE: 118 MMHG | BODY MASS INDEX: 36.32 KG/M2 | DIASTOLIC BLOOD PRESSURE: 80 MMHG | WEIGHT: 260.4 LBS | OXYGEN SATURATION: 98 % | HEART RATE: 79 BPM

## 2022-02-01 DIAGNOSIS — I72.0 ANEURYSM OF CAROTID ARTERY (HCC): ICD-10-CM

## 2022-02-01 DIAGNOSIS — I20.8 OTHER FORMS OF ANGINA PECTORIS (HCC): ICD-10-CM

## 2022-02-01 DIAGNOSIS — E11.9 TYPE 2 DIABETES MELLITUS WITHOUT COMPLICATION, WITH LONG-TERM CURRENT USE OF INSULIN (HCC): Primary | ICD-10-CM

## 2022-02-01 DIAGNOSIS — Z79.4 TYPE 2 DIABETES MELLITUS WITHOUT COMPLICATION, WITH LONG-TERM CURRENT USE OF INSULIN (HCC): Primary | ICD-10-CM

## 2022-02-01 DIAGNOSIS — I10 ESSENTIAL HYPERTENSION: ICD-10-CM

## 2022-02-01 PROBLEM — I20.89 OTHER FORMS OF ANGINA PECTORIS: Status: ACTIVE | Noted: 2018-11-08

## 2022-02-01 LAB — HBA1C MFR BLD: 5.7 %

## 2022-02-01 PROCEDURE — 2022F DILAT RTA XM EVC RTNOPTHY: CPT | Performed by: FAMILY MEDICINE

## 2022-02-01 PROCEDURE — 4004F PT TOBACCO SCREEN RCVD TLK: CPT | Performed by: FAMILY MEDICINE

## 2022-02-01 PROCEDURE — 83036 HEMOGLOBIN GLYCOSYLATED A1C: CPT | Performed by: FAMILY MEDICINE

## 2022-02-01 PROCEDURE — 3017F COLORECTAL CA SCREEN DOC REV: CPT | Performed by: FAMILY MEDICINE

## 2022-02-01 PROCEDURE — G8427 DOCREV CUR MEDS BY ELIG CLIN: HCPCS | Performed by: FAMILY MEDICINE

## 2022-02-01 PROCEDURE — G8417 CALC BMI ABV UP PARAM F/U: HCPCS | Performed by: FAMILY MEDICINE

## 2022-02-01 PROCEDURE — 3044F HG A1C LEVEL LT 7.0%: CPT | Performed by: FAMILY MEDICINE

## 2022-02-01 PROCEDURE — G8482 FLU IMMUNIZE ORDER/ADMIN: HCPCS | Performed by: FAMILY MEDICINE

## 2022-02-01 PROCEDURE — 99214 OFFICE O/P EST MOD 30 MIN: CPT | Performed by: FAMILY MEDICINE

## 2022-02-01 RX ORDER — GLUCOSAMINE HCL/CHONDROITIN SU 500-400 MG
CAPSULE ORAL
Qty: 120 STRIP | Refills: 3 | Status: SHIPPED | OUTPATIENT
Start: 2022-02-01

## 2022-02-01 RX ORDER — LANCETS 30 GAUGE
1 EACH MISCELLANEOUS DAILY
Qty: 100 EACH | Refills: 5 | Status: SHIPPED | OUTPATIENT
Start: 2022-02-01

## 2022-02-01 RX ORDER — BLOOD-GLUCOSE METER
1 KIT MISCELLANEOUS DAILY
Qty: 1 KIT | Refills: 0 | Status: SHIPPED | OUTPATIENT
Start: 2022-02-01

## 2022-02-01 ASSESSMENT — PATIENT HEALTH QUESTIONNAIRE - PHQ9
6. FEELING BAD ABOUT YOURSELF - OR THAT YOU ARE A FAILURE OR HAVE LET YOURSELF OR YOUR FAMILY DOWN: 0
7. TROUBLE CONCENTRATING ON THINGS, SUCH AS READING THE NEWSPAPER OR WATCHING TELEVISION: 0
SUM OF ALL RESPONSES TO PHQ QUESTIONS 1-9: 0
2. FEELING DOWN, DEPRESSED OR HOPELESS: 0
5. POOR APPETITE OR OVEREATING: 0
SUM OF ALL RESPONSES TO PHQ QUESTIONS 1-9: 0
4. FEELING TIRED OR HAVING LITTLE ENERGY: 0
8. MOVING OR SPEAKING SO SLOWLY THAT OTHER PEOPLE COULD HAVE NOTICED. OR THE OPPOSITE, BEING SO FIGETY OR RESTLESS THAT YOU HAVE BEEN MOVING AROUND A LOT MORE THAN USUAL: 0
3. TROUBLE FALLING OR STAYING ASLEEP: 0
SUM OF ALL RESPONSES TO PHQ QUESTIONS 1-9: 0
SUM OF ALL RESPONSES TO PHQ QUESTIONS 1-9: 0
SUM OF ALL RESPONSES TO PHQ9 QUESTIONS 1 & 2: 0
1. LITTLE INTEREST OR PLEASURE IN DOING THINGS: 0
9. THOUGHTS THAT YOU WOULD BE BETTER OFF DEAD, OR OF HURTING YOURSELF: 0
10. IF YOU CHECKED OFF ANY PROBLEMS, HOW DIFFICULT HAVE THESE PROBLEMS MADE IT FOR YOU TO DO YOUR WORK, TAKE CARE OF THINGS AT HOME, OR GET ALONG WITH OTHER PEOPLE: 0

## 2022-02-01 ASSESSMENT — ENCOUNTER SYMPTOMS
VOMITING: 0
BACK PAIN: 1
NAUSEA: 0
SHORTNESS OF BREATH: 0

## 2022-02-01 NOTE — PROGRESS NOTES
[facility] PRIMARY CARE  [address]  [address] [address]  Dept: [phone]  Dept Fax: [phone]    Isabel Joyce is a 62 y.o. male who presents today for his medical conditions/complaints as noted below. Isabel Joyce is c/o of  Chief Complaint   Patient presents with    Diabetes     3mo f/u    Back Pain       HPI:     HPI     Pt here today for his diabetes follow up    A1c improved to 5. 7! And he has continued to lose weight as well and eat healthy. He has also been cutting back on smoking as well. His last eye exam was last year. Woke up with back pain a little worse today. Started aquatic therapy recently and has been helpful. He follows with pain management. He is utd on his covid booster and also got his first shingles vaccine as well. Followed with Dr. Christophe Fenton for hx of small aneurysm and not due for follow up again until mid 2023. Hemoglobin A1C (%)   Date Value   02/01/2022 5.7   11/01/2021 6.3   07/28/2021 7.2 (H)             ( goal A1C is < 7)   Microalb/Crt. Ratio (mcg/mg creat)   Date Value   04/26/2017 17 (H)     LDL Cholesterol (mg/dL)   Date Value   07/28/2021 51     LDL Calculated (mg/dL)   Date Value   11/01/2019 95       (goal LDL is <100)   AST (U/L)   Date Value   08/07/2017 25     ALT (U/L)   Date Value   07/28/2021 18     BUN (mg/dL)   Date Value   11/25/2021 14     BP Readings from Last 3 Encounters:   02/01/22 118/80   12/08/21 115/80   11/25/21 119/88          (goal 120/80)    Past Medical History:   Diagnosis Date    ADHD (attention deficit hyperactivity disorder)     no tx per pt.  Arthritis     knees, hands, back.  Asthma     as child. pcp manages    Back pain     [address] [facility] .Last seen 10/26/2021. Goes again 11/17/2021    Brain aneurysm     Dr. Christophe Fenton at   [facility]  f/u 12/29/2021. No surgery at this time. Monitoring yearly.     CAD (coronary artery disease)     Dr. Josue Dial [place], [place]  last visit 11/9/2021. Pt. states never had heart cath. MI 2011    Cataract     lt eye   no surgery    Cerebral artery occlusion with cerebral infarction [facility]) 2014    mini stroke went to [facility] and was treated. Dr. Ana Laura Garrett. Last seen summer 2021    Depression     pcp    Diabetes mellitus ([facility]     on rx   A1C high per pt    Heart attack [facility])     2011   Last visit Dr. Bob Linares 11/9/2021  [place], [place]   Received cardiology clearance    Hyperlipidemia     on rx    Hypertension     pcp manages    Kidney stones     Dr. Lopez Ford treats. Pt. states he currently has blood in his urine. Urine CS sent to lab today. No pain per pt.  Memory loss, long term     Pt states he has had it for years and follows w/ his neurologist Dr. Darlene Nascimento. Mother has Alzheimers.  Mini stroke ([facility] [address]     2014    Osteoarthritis     Seasonal allergies     Sinus problem     drainage from allergy    Snores     has not been tested for sleep apnea. Sees Dr. Tabatha Mccann. He provides rx for Trazodone.     Wears eyeglasses     Wellness examination     last visit 11/1/2021      Past Surgical History:   Procedure Laterality Date    CHOLECYSTECTOMY      2016    COLONOSCOPY  2021    had cologuard and pt. states it was negative    CYSTO/URETERO/PYELOSCOPY, CALCULUS TX Right 02/05/2020    CYSTO, URETEROSCOPY, RIGHT URETERAL STENT PLACEMENT, RETROGRADE PYELOGRAM performed by Giacomo Carranza MD at [facility], rt. inguinal    INGUINAL HERNIA REPAIR Left 08/19/2016    LAPAROSCOPY N/A 01/16/2018    DIAGNOSTIC LAPAROSCOPY performed by Manuel Del Castillo MD at [address] [place] [address]  11/24/2021    PENIS PROSTHESIS INSERTION    PENILE PROSTHESIS PLACEMENT N/A 11/24/2021    PENIS PROSTHESIS INSERTION- AMS INFLATABLE, **SHORT STAY** performed by Giacomo Carranza MD at [address]         Family History Problem Relation Age of Onset    Cancer Father         throat    Cirrhosis Father     High Blood Pressure Sister     Asthma Sister     Arthritis Sister     High Blood Pressure Mother     Heart Disease Mother     Alzheimer's Disease Mother        Social History     Tobacco Use    Smoking status: Current Every Day Smoker     Packs/day: 0.25     Years: 28.00     Pack years: 7.00     Types: Cigarettes    Smokeless tobacco: Never Used   Substance Use Topics    Alcohol use: Yes     Comment: rarely      Current Outpatient Medications   Medication Sig Dispense Refill    glucose monitoring (FREESTYLE FREEDOM) kit 1 kit by Does not apply route daily Any brand covered by insurance 1 kit 0    Lancets MISC 1 each by Does not apply route daily 100 each 5    blood glucose monitor strips Check daily 120 strip 3    memantine (NAMENDA) 10 MG tablet Take 1 tablet by mouth 2 times daily Starting Feb 1st 2022 180 tablet 1    albuterol sulfate  (90 Base) MCG/ACT inhaler inhale 2 puffs by mouth and INTO THE LUNGS every 6 hours if needed for cough shortness of breath or wheezing 8.5 g 3    carvedilol (COREG) 25 MG tablet take 1 tablet by mouth twice a day 180 tablet 2    metFORMIN (GLUCOPHAGE-XR) 500 MG extended release tablet take 1 tablet by mouth once daily with breakfast 30 tablet 5    atorvastatin (LIPITOR) 10 MG tablet take 1 tablet by mouth once daily 90 tablet 3    topiramate (TOPAMAX) 50 MG tablet take 1 AND 1/2 tablet by mouth twice a day 90 tablet 6    oxyCODONE-acetaminophen (PERCOCET) 5-325 MG per tablet       amLODIPine (NORVASC) 10 MG tablet take 1 tablet by mouth once daily 90 tablet 1    XYZAL 5 MG tablet Take 1 tablet by mouth nightly 30 tablet 3    traZODone (DESYREL) 50 MG tablet take 2 tablets by mouth at bedtime 60 tablet 11    TRINTELLIX 20 MG TABS tablet 20 mg       gabapentin (NEURONTIN) 300 MG capsule Take 300 mg by mouth daily.  Takes at HS      tiZANidine (ZANAFLEX) 2 MG tablet take 1 tablet by mouth at bedtime      mometasone (NASONEX) 50 MCG/ACT nasal spray 2 sprays by Nasal route daily 1 Inhaler 3    fluticasone-salmeterol (ADVAIR) 100-50 MCG/DOSE diskus inhaler Inhale 1 puff into the lungs every 12 hours 60 each 3    diclofenac sodium (VOLTAREN) 1 % GEL Stop 1 week prior to surgery      nitroGLYCERIN (NITROSTAT) 0.3 MG SL tablet place 1 tablet under the tongue if needed every 5 minutes for chest pain for 3 doses IF NO RELIEF AF (Patient taking differently: Hasn't  Taken in years.) 25 tablet 3    aspirin 81 MG tablet Take 81 mg by mouth daily Stop 11/17/2021 per surgeon. On asa per Dr. Katey Flores. Clearance was obtained. No current facility-administered medications for this visit. Allergies   Allergen Reactions    Lisinopril      States he is allergic to the generic forms of medication, can take lisinopril    Aleve  [Naproxen Sodium] Nausea Only    Amitriptyline Hcl Other (See Comments)     rash    Exelon [Rivastigmine] Other (See Comments)     Patient stated patch left a \"burn kathy\" on his skin     Ibuprofen Other (See Comments)     \"hurts my stomach. \"       Health Maintenance   Topic Date Due    Depression Monitoring  Never done    Diabetic foot exam  11/17/2017    Diabetic retinal exam  11/18/2017    COVID-19 Vaccine (3 - Booster for Moderna series) 08/31/2021    Shingles Vaccine (2 of 2) 01/30/2022    Lipid screen  07/28/2022    Diabetic microalbuminuria test  11/01/2022    A1C test (Diabetic or Prediabetic)  02/01/2023    Colon cancer screen fecal DNA test (Cologuard)  06/27/2024    DTaP/Tdap/Td vaccine (2 - Td or Tdap) 08/02/2026    Pneumococcal 0-64 years Vaccine (2 of 2 - PPSV23) 09/14/2026    Flu vaccine  Completed    Hepatitis C screen  Completed    HIV screen  Completed    Hepatitis A vaccine  Aged Out    Hib vaccine  Aged Out    Meningococcal (ACWY) vaccine  Aged Out       Subjective:      Review of Systems   Constitutional: Negative for chills and fever. Respiratory: Negative for shortness of breath. Cardiovascular: Negative for chest pain. Gastrointestinal: Negative for nausea and vomiting. Musculoskeletal: Positive for back pain. Objective:     Physical Exam  Constitutional:       General: He is not in acute distress. Appearance: He is well-developed. He is not diaphoretic. HENT:      Head: Normocephalic and atraumatic. Eyes:      Pupils: Pupils are equal, round, and reactive to light. Cardiovascular:      Rate and Rhythm: Normal rate and regular rhythm. Heart sounds: Normal heart sounds. No murmur heard. Pulmonary:      Effort: Pulmonary effort is normal. No respiratory distress. Breath sounds: Normal breath sounds. No stridor. Musculoskeletal:      Cervical back: Neck supple. Skin:     General: Skin is warm and dry. Neurological:      Mental Status: He is alert and oriented to person, place, and time. Psychiatric:         Mood and Affect: Mood normal.         Behavior: Behavior normal.       /80   Pulse 79   Wt 260 lb 6.4 oz (118.1 kg)   SpO2 98%   BMI 36.32 kg/m²     Assessment:      1. Type 2 diabetes mellitus without complication, with long-term current use of insulin ([facility])  - improved to 5.7, continue metformin and healthy diet!  - POCT glycosylated hemoglobin (Hb A1C)    2. Aneurysm of carotid artery ([address]  - followed with DR. Ramon Jimenez, has been stable and small so will see him  Again in mid .    3. Other forms of angina pectoris ([address]  - denies any chest pain, states feels better since cutting back smoking. Follows with Dr. Arjun Braxton. 4. Essential hypertension  - BP controlled, continue current medications. Plan:      Return in about 3 months (around 2022) for diabetes follow up.     Orders Placed This Encounter   Procedures    POCT glycosylated hemoglobin (Hb A1C)     Orders Placed This Encounter   Medications    glucose monitoring (FREESTYLE FREEDOM) kit     Si kit by Does not apply route daily Any brand covered by insurance     Dispense:  1 kit     Refill:  0    Lancets MISC     Si each by Does not apply route daily     Dispense:  100 each     Refill:  5    blood glucose monitor strips     Sig: Check daily     Dispense:  120 strip     Refill:  3        Patient given educational materials - see patient instructions. Discussed use, benefit, and side effects of prescribed medications. All patient questions answered. Pt voiced understanding. Reviewed healthmaintenance. Instructed to continue current medications, diet and exercise. Patient agreed with treatment plan. Follow up as directed.      Electronically signed by Ethel Zeng DO on 2022 at 11:09 AM

## 2022-02-12 DIAGNOSIS — R09.82 POST-NASAL DRIP: ICD-10-CM

## 2022-02-14 RX ORDER — LEVOCETIRIZINE DIHYDROCHLORIDE 5 MG/1
TABLET, FILM COATED ORAL
Qty: 30 TABLET | Refills: 3 | Status: SHIPPED | OUTPATIENT
Start: 2022-02-14 | End: 2022-06-13

## 2022-02-18 ENCOUNTER — TELEPHONE (OUTPATIENT)
Dept: PRIMARY CARE CLINIC | Age: 62
End: 2022-02-18

## 2022-02-18 NOTE — TELEPHONE ENCOUNTER
Pt called in and states that his neurologist increased his \"memory\" medication and ever since he has had increased depression and feelings of being sad and down. He also has noticed a slight taste of sulfur in his mouth or a bad taste that he cant explain but is new since his increased medication.  He has attempted to call his psychiatrist who rx's his depression meds and attempted to kalen his Neurologist but he states \"they are not helping me\"  Pt requested appt w/ pcp to discuss  Open appt on Monday at 1:40 PM   Pt agreed and will come to appt on monday

## 2022-02-21 ENCOUNTER — OFFICE VISIT (OUTPATIENT)
Dept: PRIMARY CARE CLINIC | Age: 62
End: 2022-02-21
Payer: MEDICARE

## 2022-02-21 VITALS
DIASTOLIC BLOOD PRESSURE: 78 MMHG | SYSTOLIC BLOOD PRESSURE: 120 MMHG | WEIGHT: 255.2 LBS | HEART RATE: 80 BPM | BODY MASS INDEX: 35.59 KG/M2 | OXYGEN SATURATION: 95 %

## 2022-02-21 DIAGNOSIS — F32.A DEPRESSION, UNSPECIFIED DEPRESSION TYPE: Primary | ICD-10-CM

## 2022-02-21 PROCEDURE — G8482 FLU IMMUNIZE ORDER/ADMIN: HCPCS | Performed by: FAMILY MEDICINE

## 2022-02-21 PROCEDURE — 99213 OFFICE O/P EST LOW 20 MIN: CPT | Performed by: FAMILY MEDICINE

## 2022-02-21 PROCEDURE — G8417 CALC BMI ABV UP PARAM F/U: HCPCS | Performed by: FAMILY MEDICINE

## 2022-02-21 PROCEDURE — 4004F PT TOBACCO SCREEN RCVD TLK: CPT | Performed by: FAMILY MEDICINE

## 2022-02-21 PROCEDURE — G8427 DOCREV CUR MEDS BY ELIG CLIN: HCPCS | Performed by: FAMILY MEDICINE

## 2022-02-21 PROCEDURE — 3017F COLORECTAL CA SCREEN DOC REV: CPT | Performed by: FAMILY MEDICINE

## 2022-02-21 ASSESSMENT — ENCOUNTER SYMPTOMS
VOMITING: 0
SHORTNESS OF BREATH: 0

## 2022-02-21 NOTE — PROGRESS NOTES
[address] PRIMARY CARE  [address] [facility] [address]  [address] [address]  Dept: [phone]  Dept Fax: [phone]    Rivera Chris is a 62 y.o. male who presents today for his medical conditions/complaints as noted below. Rivera Chris is c/o of  Chief Complaint   Patient presents with    Depression     increase in depression since neuro increased pts memory medication, pt crs alot, can't get motivated    Other     bad taste in mouth, did see dentist this am, had teeth pulled in recent past, dentist states it is healing well         HPI:     HPI     Pt seen today due to depression. He does see a psychiatrist but states did not hear back from them yet. He notes some increase in his depression/lack of motivation since increase of his namenda from neurology. He does note it has helped his memory but feels he has been crying easily. Denies any suicidal ideation. He also noted bad taste in mouth, had teeth pulled recently and followed up with his dentist today, states was healing. Well states was prescribed mouthwash to help with the taste. Hemoglobin A1C (%)   Date Value   2022 5.7   2021 6.3   2021 7.2 (H)             ( goal A1C is < 7)   Microalb/Crt. Ratio (mcg/mg creat)   Date Value   2017 17 (H)     LDL Cholesterol (mg/dL)   Date Value   2021 51     LDL Calculated (mg/dL)   Date Value   2019 95       (goal LDL is <100)   AST (U/L)   Date Value   2017 25     ALT (U/L)   Date Value   2021 18     BUN (mg/dL)   Date Value   2021 14     BP Readings from Last 3 Encounters:   22 120/78   22 118/80   21 115/80          (goal 120/80)    Past Medical History:   Diagnosis Date    ADHD (attention deficit hyperactivity disorder)     no tx per pt.  Arthritis     knees, hands, back.  Asthma     as child. pcp manages    Back pain     [address] [facility] .Last seen 10/26/2021. Goes again 11/17/2021    Brain aneurysm     Dr. Hoang Steel at   [facility]  f/u 12/29/2021. No surgery at this time. Monitoring yearly.  CAD (coronary artery disease)     Dr. Syed Feliciano, [place]  last visit 11/9/2021. Pt. states never had heart cath. MI 2011    Cataract     lt eye   no surgery    Cerebral artery occlusion with cerebral infarction [facility]) 2014    mini stroke went to [facility] and was treated. Dr. Shelbie Cedeno. Last seen summer 2021    Depression     pcp    Diabetes mellitus ([facility]     on rx   A1C high per pt    Heart attack [facility])     2011   Last visit Dr. Dimitri Pringle 11/9/2021  [place], [place]   Received cardiology clearance    Hyperlipidemia     on rx    Hypertension     pcp manages    Kidney stones     Dr. Radha Montemayor treats. Pt. states he currently has blood in his urine. Urine CS sent to lab today. No pain per pt.  Memory loss, long term     Pt states he has had it for years and follows w/ his neurologist Dr. Abdulaziz Jernigan. Mother has Alzheimers.  Mini stroke ([facility] [address]     2014    Osteoarthritis     Seasonal allergies     Sinus problem     drainage from allergy    Snores     has not been tested for sleep apnea. Sees Dr. Soto Vaelrio. He provides rx for Trazodone.     Wears eyeglasses     Wellness examination     last visit 11/1/2021      Past Surgical History:   Procedure Laterality Date    CHOLECYSTECTOMY      2016    COLONOSCOPY  2021    had cologuard and pt. states it was negative    CYSTO/URETERO/PYELOSCOPY, CALCULUS TX Right 02/05/2020    CYSTO, URETEROSCOPY, RIGHT URETERAL STENT PLACEMENT, RETROGRADE PYELOGRAM performed by Alicia Monique MD at [facility], rt. inguinal    INGUINAL HERNIA REPAIR Left 08/19/2016    LAPAROSCOPY N/A 01/16/2018    DIAGNOSTIC LAPAROSCOPY performed by Jay Aguilar MD at [address] [place] [address]  11/24/2021    PENIS PROSTHESIS INSERTION    PENILE PROSTHESIS PLACEMENT N/A 11/24/2021    PENIS PROSTHESIS INSERTION- AMS INFLATABLE, **SHORT STAY** performed by Gentry Morales MD at . [address]         Family History   Problem Relation Age of Onset    Cancer Father         throat    Cirrhosis Father     High Blood Pressure Sister     Asthma Sister     Arthritis Sister     High Blood Pressure Mother     Heart Disease Mother     Alzheimer's Disease Mother        Social History     Tobacco Use    Smoking status: Current Every Day Smoker     Packs/day: 0.25     Years: 28.00     Pack years: 7.00     Types: Cigarettes    Smokeless tobacco: Never Used   Substance Use Topics    Alcohol use: Yes     Comment: rarely      Current Outpatient Medications   Medication Sig Dispense Refill    levocetirizine (XYZAL) 5 MG tablet take 1 tablet by mouth once daily 30 tablet 3    glucose monitoring (FREESTYLE FREEDOM) kit 1 kit by Does not apply route daily Any brand covered by insurance 1 kit 0    Lancets MISC 1 each by Does not apply route daily 100 each 5    blood glucose monitor strips Check daily 120 strip 3    memantine (NAMENDA) 10 MG tablet Take 1 tablet by mouth 2 times daily Starting Feb 1st 2022 180 tablet 1    albuterol sulfate  (90 Base) MCG/ACT inhaler inhale 2 puffs by mouth and INTO THE LUNGS every 6 hours if needed for cough shortness of breath or wheezing 8.5 g 3    carvedilol (COREG) 25 MG tablet take 1 tablet by mouth twice a day 180 tablet 2    metFORMIN (GLUCOPHAGE-XR) 500 MG extended release tablet take 1 tablet by mouth once daily with breakfast 30 tablet 5    atorvastatin (LIPITOR) 10 MG tablet take 1 tablet by mouth once daily 90 tablet 3    topiramate (TOPAMAX) 50 MG tablet take 1 AND 1/2 tablet by mouth twice a day 90 tablet 6    oxyCODONE-acetaminophen (PERCOCET) 5-325 MG per tablet       amLODIPine (NORVASC) 10 MG tablet take 1 tablet by mouth once daily 90 tablet 1    traZODone (DESYREL) 50 MG tablet take 2 tablets by mouth at bedtime 60 tablet 11    TRINTELLIX 20 MG TABS tablet 20 mg       gabapentin (NEURONTIN) 300 MG capsule Take 300 mg by mouth daily. Takes at [address] [facility]  [address] tiZANidine (ZANAFLEX) 2 MG tablet take 1 tablet by mouth at bedtime      mometasone (NASONEX) 50 MCG/ACT nasal spray 2 sprays by Nasal route daily 1 Inhaler 3    fluticasone-salmeterol (ADVAIR) 100-50 MCG/DOSE diskus inhaler Inhale 1 puff into the lungs every 12 hours 60 each 3    diclofenac sodium (VOLTAREN) 1 % GEL Stop 1 week prior to surgery      nitroGLYCERIN (NITROSTAT) 0.3 MG SL tablet place 1 tablet under the tongue if needed every 5 minutes for chest pain for 3 doses IF NO RELIEF AF (Patient taking differently: Hasn't  Taken in years.) 25 tablet 3    aspirin 81 MG tablet Take 81 mg by mouth daily Stop 11/17/2021 per surgeon. On asa per Dr. Yvan Scott. Clearance was obtained. No current facility-administered medications for this visit. Allergies   Allergen Reactions    Lisinopril      States he is allergic to the generic forms of medication, can take lisinopril    Aleve  [Naproxen Sodium] Nausea Only    Amitriptyline Hcl Other (See Comments)     rash    Exelon [Rivastigmine] Other (See Comments)     Patient stated patch left a \"burn kathy\" on his skin     Ibuprofen Other (See Comments)     \"hurts my stomach. \"       Health Maintenance   Topic Date Due    Diabetic foot exam  11/17/2017    Diabetic retinal exam  11/18/2017    COVID-19 Vaccine (3 - Booster for Moderna series) 08/31/2021    Shingles Vaccine (2 of 2) 01/30/2022    Lipid screen  07/28/2022    Diabetic microalbuminuria test  11/01/2022    A1C test (Diabetic or Prediabetic)  02/01/2023    Depression Monitoring  02/01/2023    Colorectal Cancer Screen  06/27/2024    DTaP/Tdap/Td vaccine (2 - Td or Tdap) 08/02/2026    Pneumococcal 0-64 years Vaccine (2 of 2 - PPSV23) 09/14/2026    Flu vaccine  Completed    Hepatitis C screen  Completed    HIV screen Completed    Hepatitis A vaccine  Aged Out    Hib vaccine  Aged Out    Meningococcal (ACWY) vaccine  Aged Out       Subjective:      Review of Systems   Constitutional: Negative for chills and fever. Respiratory: Negative for shortness of breath. Cardiovascular: Negative for chest pain. Gastrointestinal: Negative for vomiting. Psychiatric/Behavioral: Positive for dysphoric mood. Negative for suicidal ideas. Objective:     Physical Exam  Constitutional:       General: He is not in acute distress. Appearance: He is well-developed. He is not diaphoretic. HENT:      Head: Normocephalic and atraumatic. Eyes:      Pupils: Pupils are equal, round, and reactive to light. Cardiovascular:      Rate and Rhythm: Normal rate and regular rhythm. Heart sounds: Normal heart sounds. No murmur heard. Pulmonary:      Effort: Pulmonary effort is normal. No respiratory distress. Breath sounds: Normal breath sounds. No stridor. Musculoskeletal:      Cervical back: Neck supple. Skin:     General: Skin is warm and dry. Neurological:      Mental Status: He is alert and oriented to person, place, and time. Psychiatric:         Mood and Affect: Mood normal.         Behavior: Behavior normal.       /78   Pulse 80   Wt 255 lb 3.2 oz (115.8 kg)   SpO2 95%   BMI 35.59 kg/m²     Assessment:      1. Depression, unspecified depression type  - recommend cutting back dose of namenda  To 5 mg in Am and 10 mg PM. Also recommend follow up with his psychiatrist, he has upcoming appt early march. - denies any suicidal ideation. Plan:      Return for follow up. No orders of the defined types were placed in this encounter. No orders of the defined types were placed in this encounter. Patient given educational materials - see patient instructions. Discussed use, benefit, and side effects of prescribed medications. All patient questions answered. Pt voiced understanding. Reviewed healthmaintenance. Instructed to continue current medications, diet and exercise. Patient agreed with treatment plan. Follow up as directed.      Electronically signed by Raeann Alegria DO on 2/21/2022 at 1:44 PM

## 2022-02-27 DIAGNOSIS — I10 ESSENTIAL HYPERTENSION: ICD-10-CM

## 2022-02-28 RX ORDER — AMLODIPINE BESYLATE 10 MG/1
TABLET ORAL
Qty: 90 TABLET | Refills: 1 | Status: SHIPPED | OUTPATIENT
Start: 2022-02-28 | End: 2022-08-29 | Stop reason: SDUPTHER

## 2022-03-17 ENCOUNTER — TELEPHONE (OUTPATIENT)
Dept: FAMILY MEDICINE CLINIC | Age: 62
End: 2022-03-17

## 2022-03-17 DIAGNOSIS — M79.601 RIGHT ARM PAIN: Primary | ICD-10-CM

## 2022-03-17 NOTE — TELEPHONE ENCOUNTER
The patient called and requested a referral to [address] ( Ph: [phone] ) he did not know name of street or a fax #, he states it is for his right bicep/arm pain. He currently goes there for his back injury and feels he could benefit from this. Please advise he was last seen in office 2/21/22.

## 2022-03-18 NOTE — TELEPHONE ENCOUNTER
Guzman Green I have made a referral and please fax it. If he is not improving  with PT would recommend being seen in office for it too. Thanks!

## 2022-04-21 ENCOUNTER — TELEPHONE (OUTPATIENT)
Dept: PRIMARY CARE CLINIC | Age: 62
End: 2022-04-21

## 2022-04-25 RX ORDER — ALBUTEROL SULFATE 90 UG/1
AEROSOL, METERED RESPIRATORY (INHALATION)
Qty: 8.5 G | Refills: 3 | Status: SHIPPED | OUTPATIENT
Start: 2022-04-25 | End: 2022-08-11 | Stop reason: SDUPTHER

## 2022-05-03 ENCOUNTER — OFFICE VISIT (OUTPATIENT)
Dept: PRIMARY CARE CLINIC | Age: 62
End: 2022-05-03
Payer: MEDICARE

## 2022-05-03 VITALS
BODY MASS INDEX: 36.93 KG/M2 | DIASTOLIC BLOOD PRESSURE: 78 MMHG | SYSTOLIC BLOOD PRESSURE: 116 MMHG | OXYGEN SATURATION: 96 % | HEART RATE: 81 BPM | WEIGHT: 264.8 LBS

## 2022-05-03 DIAGNOSIS — Z79.4 TYPE 2 DIABETES MELLITUS WITHOUT COMPLICATION, WITH LONG-TERM CURRENT USE OF INSULIN (HCC): Primary | ICD-10-CM

## 2022-05-03 DIAGNOSIS — G43.009 MIGRAINE WITHOUT AURA AND WITHOUT STATUS MIGRAINOSUS, NOT INTRACTABLE: ICD-10-CM

## 2022-05-03 DIAGNOSIS — F32.A DEPRESSION, UNSPECIFIED DEPRESSION TYPE: ICD-10-CM

## 2022-05-03 DIAGNOSIS — I10 ESSENTIAL HYPERTENSION: ICD-10-CM

## 2022-05-03 DIAGNOSIS — E11.9 TYPE 2 DIABETES MELLITUS WITHOUT COMPLICATION, WITH LONG-TERM CURRENT USE OF INSULIN (HCC): Primary | ICD-10-CM

## 2022-05-03 LAB — HBA1C MFR BLD: 5.9 %

## 2022-05-03 PROCEDURE — 4004F PT TOBACCO SCREEN RCVD TLK: CPT | Performed by: FAMILY MEDICINE

## 2022-05-03 PROCEDURE — G8417 CALC BMI ABV UP PARAM F/U: HCPCS | Performed by: FAMILY MEDICINE

## 2022-05-03 PROCEDURE — G8427 DOCREV CUR MEDS BY ELIG CLIN: HCPCS | Performed by: FAMILY MEDICINE

## 2022-05-03 PROCEDURE — 3044F HG A1C LEVEL LT 7.0%: CPT | Performed by: FAMILY MEDICINE

## 2022-05-03 PROCEDURE — 3017F COLORECTAL CA SCREEN DOC REV: CPT | Performed by: FAMILY MEDICINE

## 2022-05-03 PROCEDURE — 83036 HEMOGLOBIN GLYCOSYLATED A1C: CPT | Performed by: FAMILY MEDICINE

## 2022-05-03 PROCEDURE — 2022F DILAT RTA XM EVC RTNOPTHY: CPT | Performed by: FAMILY MEDICINE

## 2022-05-03 PROCEDURE — 99214 OFFICE O/P EST MOD 30 MIN: CPT | Performed by: FAMILY MEDICINE

## 2022-05-03 ASSESSMENT — ENCOUNTER SYMPTOMS
VOMITING: 0
BACK PAIN: 1
SHORTNESS OF BREATH: 0

## 2022-05-03 NOTE — PROGRESS NOTES
[address] PRIMARY CARE  [address] [facility] [address]  [address]   Dept: [phone]  Dept Fax: [phone]    Manuel Hernandez is a 62 y.o. male who presents today for his medical conditions/complaints as noted below. Manuel Hernandez is c/o of  Chief Complaint   Patient presents with    Diabetes     3mo f/u         HPI:     HPI     Pt here for diabetes follow up    3 weeks ago had sharp left groin pain  And then felt sharp headache on left frontal part of head. Took two baby aspirin which helped and pain resolved. He does have follow up with urology next week as well. Notes has been getting migraines more so in front of head- feels like throbbing. they can occur a couple times a week and last about 5-10 minutes. Denies any vision changes, weakness, vomiting, or syncope with headaches. Follows with Dr. Nallely Oneil for tension headaches as well. He is taking his topamax regularly. He also did have eye exam- notes some cataracts so will be looking for an ophthalmologist.       Diabetic, a1c up to 5.9 from 5.7 last visit- did note he gained weight from last visit. Following with psychiatry for his depression, feels there is improvement in his symptoms and has good family support symptom. Hemoglobin A1C (%)   Date Value   2022 5.9   2022 5.7   2021 6.3             ( goal A1C is < 7)   Microalb/Crt.  Ratio (mcg/mg creat)   Date Value   2017 17 (H)     LDL Cholesterol (mg/dL)   Date Value   2021 51     LDL Calculated (mg/dL)   Date Value   2019 95       (goal LDL is <100)   AST (U/L)   Date Value   2017 25     ALT (U/L)   Date Value   2021 18     BUN (mg/dL)   Date Value   2021 14     BP Readings from Last 3 Encounters:   22 116/78   22 120/78   22 118/80          (goal 120/80)    Past Medical History:   Diagnosis Date    ADHD (attention deficit hyperactivity disorder) no tx per pt.  Arthritis     knees, hands, back.  Asthma     as child. pcp manages    Back pain     [address] .Last seen 10/26/2021. Goes again 11/17/2021    Brain aneurysm     Dr. Destiny Henriquez at   [facility]  f/u 12/29/2021. No surgery at this time. Monitoring yearly.  CAD (coronary artery disease)     Dr. Sun Ahmadi, [place]  last visit 11/9/2021. Pt. states never had heart cath. MI 2011    Cataract     lt eye   no surgery    Cerebral artery occlusion with cerebral infarction [facility]) 2014    mini stroke went to [facility] and was treated. Dr. Julian Echevarria. Last seen summer 2021    Depression     pcp    Diabetes mellitus ([facility]     on rx   A1C high per pt    Heart attack [facility])     2011   Last visit Dr. Hi Fitzgerald 11/9/2021  [place], [place]   Received cardiology clearance    Hyperlipidemia     on rx    Hypertension     pcp manages    Kidney stones     Dr. Jacoby Zavala treats. Pt. states he currently has blood in his urine. Urine CS sent to lab today. No pain per pt.  Memory loss, long term     Pt states he has had it for years and follows w/ his neurologist Dr. Storm Sandifer. Mother has Alzheimers.  Mini stroke ([facility] [address] [facility]     2014    Osteoarthritis     Seasonal allergies     Sinus problem     drainage from allergy    Snores     has not been tested for sleep apnea. Sees Dr. Miya Ibanez. He provides rx for Trazodone.     Wears eyeglasses     Wellness examination     last visit 11/1/2021      Past Surgical History:   Procedure Laterality Date    CHOLECYSTECTOMY      2016    COLONOSCOPY  2021    had cologuard and pt. states it was negative    CYSTO/URETERO/PYELOSCOPY, CALCULUS TX Right 02/05/2020    CYSTO, URETEROSCOPY, RIGHT URETERAL STENT PLACEMENT, RETROGRADE PYELOGRAM [facility] rt. inguinal    INGUINAL HERNIA REPAIR Left 08/19/2016    LAPAROSCOPY N/A 01/16/2018    DIAGNOSTIC LAPAROSCOPY performed by Tomas Carbone MD at [address]  11/24/2021    PENIS PROSTHESIS INSERTION    PENILE PROSTHESIS PLACEMENT N/A 11/24/2021    PENIS PROSTHESIS INSERTION- AMS INFLATABLE, **SHORT STAY** performed by Jaelyn Daniels MD at [address]         Family History   Problem Relation Age of Onset    Cancer Father         throat    Cirrhosis Father     High Blood Pressure Sister     Asthma Sister     Arthritis Sister     High Blood Pressure Mother     Heart Disease Mother     Alzheimer's Disease Mother        Social History     Tobacco Use    Smoking status: Current Every Day Smoker     Packs/day: 0.25     Years: 28.00     Pack years: 7.00     Types: Cigarettes    Smokeless tobacco: Never Used   Substance Use Topics    Alcohol use: Yes     Comment: rarely      Current Outpatient Medications   Medication Sig Dispense Refill    albuterol sulfate  (90 Base) MCG/ACT inhaler inhale 2 puffs by mouth and INTO THE LUNGS every 6 hours if needed for cough shortness of breath or wheezing 8.5 g 3    amLODIPine (NORVASC) 10 MG tablet take 1 tablet by mouth once daily 90 tablet 1    levocetirizine (XYZAL) 5 MG tablet take 1 tablet by mouth once daily 30 tablet 3    glucose monitoring (FREESTYLE FREEDOM) kit 1 kit by Does not apply route daily Any brand covered by insurance 1 kit 0    Lancets MISC 1 each by Does not apply route daily 100 each 5    blood glucose monitor strips Check daily 120 strip 3    memantine (NAMENDA) 10 MG tablet Take 1 tablet by mouth 2 times daily Starting Feb 1st 2022 180 tablet 1    carvedilol (COREG) 25 MG tablet take 1 tablet by mouth twice a day 180 tablet 2    metFORMIN (GLUCOPHAGE-XR) 500 MG extended release tablet take 1 tablet by mouth once daily with breakfast 30 tablet 5    atorvastatin (LIPITOR) 10 MG tablet take 1 tablet by mouth once daily 90 tablet 3    topiramate (TOPAMAX) 50 MG tablet take 1 AND 1/2 tablet by mouth twice a day 90 tablet 6    oxyCODONE-acetaminophen (PERCOCET) 5-325 MG per tablet       traZODone (DESYREL) 50 MG tablet take 2 tablets by mouth at bedtime 60 tablet 11    TRINTELLIX 20 MG TABS tablet 20 mg       gabapentin (NEURONTIN) 300 MG capsule Take 300 mg by mouth daily. Takes at [address] [facility], [address] tiZANidine (ZANAFLEX) 2 MG tablet take 1 tablet by mouth at bedtime      mometasone (NASONEX) 50 MCG/ACT nasal spray 2 sprays by Nasal route daily 1 Inhaler 3    fluticasone-salmeterol (ADVAIR) 100-50 MCG/DOSE diskus inhaler Inhale 1 puff into the lungs every 12 hours 60 each 3    diclofenac sodium (VOLTAREN) 1 % GEL Stop 1 week prior to surgery      nitroGLYCERIN (NITROSTAT) 0.3 MG SL tablet place 1 tablet under the tongue if needed every 5 minutes for chest pain for 3 doses IF NO RELIEF AF (Patient taking differently: Hasn't  Taken in years.) 25 tablet 3    aspirin 81 MG tablet Take 81 mg by mouth daily Stop 11/17/2021 per surgeon. On asa per Dr. Gary Barnett. Clearance was obtained. No current facility-administered medications for this visit. Allergies   Allergen Reactions    Lisinopril      States he is allergic to the generic forms of medication, can take lisinopril    Aleve  [Naproxen Sodium] Nausea Only    Amitriptyline Hcl Other (See Comments)     rash    Exelon [Rivastigmine] Other (See Comments)     Patient stated patch left a \"burn kathy\" on his skin     Ibuprofen Other (See Comments)     \"hurts my stomach. \"       Health Maintenance   Topic Date Due    Pneumococcal 0-64 years Vaccine (2 - PCV) 08/02/2017    Diabetic foot exam  11/17/2017    Diabetic retinal exam  11/18/2017    Shingles vaccine (2 of 2) 01/30/2022    Lipids  07/28/2022    Diabetic microalbuminuria test  11/01/2022    Depression Monitoring  02/01/2023    A1C test (Diabetic or Prediabetic)  05/03/2023    Colorectal Cancer Screen  06/27/2024    DTaP/Tdap/Td vaccine (2 - Td or Tdap) 08/02/2026    Flu vaccine  Completed    COVID-19 Vaccine  Completed    Hepatitis C screen  Completed    HIV screen  Completed    Hepatitis A vaccine  Aged Out    Hib vaccine  Aged Out    Meningococcal (ACWY) vaccine  Aged Out       Subjective:      Review of Systems   Constitutional: Negative for chills and fever. Respiratory: Negative for shortness of breath. Gastrointestinal: Negative for vomiting. Musculoskeletal: Positive for arthralgias and back pain. Neurological: Positive for headaches. Objective:     Physical Exam  Constitutional:       General: He is not in acute distress. Appearance: He is well-developed. He is not diaphoretic. HENT:      Head: Normocephalic and atraumatic. Eyes:      Extraocular Movements: Extraocular movements intact. Conjunctiva/sclera: Conjunctivae normal.      Pupils: Pupils are equal, round, and reactive to light. Cardiovascular:      Rate and Rhythm: Normal rate and regular rhythm. Heart sounds: Normal heart sounds. No murmur heard. Pulmonary:      Effort: Pulmonary effort is normal. No respiratory distress. Breath sounds: Normal breath sounds. No stridor. Musculoskeletal:      Cervical back: Neck supple. Skin:     General: Skin is warm and dry. Neurological:      Mental Status: He is alert and oriented to person, place, and time. Psychiatric:         Mood and Affect: Mood normal.         Behavior: Behavior normal.       /78   Pulse 81   Wt 264 lb 12.8 oz (120.1 kg)   SpO2 96%   BMI 36.93 kg/m²     Assessment:      1. Type 2 diabetes mellitus without complication, with long-term current use of insulin ([facility])  - A1c 5.9, discussed still controlled , recommend healthy diet , staying active when able to.  - POCT glycosylated hemoglobin (Hb A1C)    2. Essential hypertension  - controlled,contuinue current medications.      3. Depression, unspecified depression type  - seeing new psychiatrist, has noted good family support as well and feeling better. 4. Migraine without aura and without status migrainosus, not intractable  - continue topamax, recommend tylenol 1000 mg on onset of headache. Follows with Dr. Mariano Kanner in July, recommend he call and schedule for sooner appt . I also recommend he check his BP when he has headaches and if elevated we may need to adjust his BP meds. Plan:      Return in about 3 months (around 8/3/2022) for diabetes follow up. Orders Placed This Encounter   Procedures    POCT glycosylated hemoglobin (Hb A1C)     No orders of the defined types were placed in this encounter. Patient given educational materials - see patient instructions. Discussed use, benefit, and side effects of prescribed medications. All patient questions answered. Pt voiced understanding. Reviewed healthmaintenance. Instructed to continue current medications, diet and exercise. Patient agreed with treatment plan. Follow up as directed.      Electronically signed by Genesis Montana DO on 5/3/2022 at 2:53 PM

## 2022-05-09 ENCOUNTER — OFFICE VISIT (OUTPATIENT)
Dept: UROLOGY | Age: 62
End: 2022-05-09
Payer: MEDICARE

## 2022-05-09 VITALS
BODY MASS INDEX: 36.96 KG/M2 | OXYGEN SATURATION: 95 % | HEART RATE: 81 BPM | TEMPERATURE: 97.4 F | HEIGHT: 71 IN | WEIGHT: 264 LBS | SYSTOLIC BLOOD PRESSURE: 122 MMHG | DIASTOLIC BLOOD PRESSURE: 80 MMHG

## 2022-05-09 DIAGNOSIS — N52.01 ERECTILE DYSFUNCTION DUE TO ARTERIAL INSUFFICIENCY: Primary | ICD-10-CM

## 2022-05-09 DIAGNOSIS — R68.82 DECREASED LIBIDO: ICD-10-CM

## 2022-05-09 DIAGNOSIS — R35.0 FREQUENCY OF URINATION: ICD-10-CM

## 2022-05-09 DIAGNOSIS — Z12.5 PROSTATE CANCER SCREENING: ICD-10-CM

## 2022-05-09 PROCEDURE — 99213 OFFICE O/P EST LOW 20 MIN: CPT | Performed by: UROLOGY

## 2022-05-09 PROCEDURE — G8427 DOCREV CUR MEDS BY ELIG CLIN: HCPCS | Performed by: UROLOGY

## 2022-05-09 PROCEDURE — G8417 CALC BMI ABV UP PARAM F/U: HCPCS | Performed by: UROLOGY

## 2022-05-09 PROCEDURE — 3017F COLORECTAL CA SCREEN DOC REV: CPT | Performed by: UROLOGY

## 2022-05-09 PROCEDURE — 4004F PT TOBACCO SCREEN RCVD TLK: CPT | Performed by: UROLOGY

## 2022-05-09 RX ORDER — DIAZEPAM 5 MG/1
TABLET ORAL
COMMUNITY
Start: 2022-04-27

## 2022-05-09 ASSESSMENT — ENCOUNTER SYMPTOMS
RESPIRATORY NEGATIVE: 1
DIARRHEA: 1

## 2022-05-09 NOTE — PROGRESS NOTES
[address] [address]  Dept: [address] [facility] Urology Office Note - Established    Patient:  Manuel Hernandez  YOB: 1960  Date: 5/9/2022    The patient is a 62 y.o. male who presents todayfor evaluation of the following problems:   Chief Complaint   Patient presents with    3 Month Follow-Up       HPI  Is a very pleasant 79-year-old gentleman who has a history of erectile dysfunction. He had a penile implant placed 6 months ago. He says he has been too busy to use it but has no trouble cycling it. We had evaluated him for gross hematuria at his last visit about 4 months ago. His hematuria has resolved. Summary of old records: N/A    Additional History: N/A    Procedures Today: N/A    Urinalysis today:  No results found for this visit on 05/09/22. Last several PSA's:  Lab Results   Component Value Date    PSA 0.59 04/26/2017     Last total testosterone:  No results found for: TESTOSTERONE    AUA Symptom Score (5/9/2022): Last BUN and creatinine:  Lab Results   Component Value Date    BUN 14 11/25/2021     Lab Results   Component Value Date    CREATININE 0.94 11/25/2021       Additional Lab/Culture results: none    Imaging Reviewed during this Office Visit: none  (results were independently reviewed by physician and radiology report verified)    PAST MEDICAL, FAMILY AND SOCIAL HISTORY UPDATE:  Past Medical History:   Diagnosis Date    ADHD (attention deficit hyperactivity disorder)     no tx per pt.  Arthritis     knees, hands, back.  Asthma     as child. pcp manages    Back pain     [facility] .Last seen 10/26/2021. Goes again 11/17/2021    Brain aneurysm     Dr. Dank Bell at   [address]  f/u 12/29/2021. No surgery at this time. Monitoring yearly.     CAD (coronary artery disease)     Dr. Morgan Neri [place]  last visit 11/9/2021. Pt. states never had heart cath. MI 2011    Cataract     lt eye   no surgery    Cerebral artery occlusion with cerebral infarction [facility]) 2014    mini stroke went to [facility] and was treated. Dr. Del Nelson. Last seen summer 2021    Depression     pcp    Diabetes mellitus ([facility]     on rx   A1C high per pt    Heart attack [facility])     2011   Last visit Dr. Yasmany Rivera 11/9/2021  [place], [place]   Received cardiology clearance    Hyperlipidemia     on rx    Hypertension     pcp manages    Kidney stones     Dr. Julio Yip treats. Pt. states he currently has blood in his urine. Urine CS sent to lab today. No pain per pt.  Memory loss, long term     Pt states he has had it for years and follows w/ his neurologist Dr. Emeka Roca. Mother has Alzheimers.  Mini stroke ([facility]     2014    Osteoarthritis     Seasonal allergies     Sinus problem     drainage from allergy    Snores     has not been tested for sleep apnea. Sees Dr. Jorge Bragg. He provides rx for Trazodone.     Wears eyeglasses     Wellness examination     last visit 11/1/2021     Past Surgical History:   Procedure Laterality Date    CHOLECYSTECTOMY      2016    COLONOSCOPY  2021    had cologuard and pt. states it was negative    CYSTO/URETERO/PYELOSCOPY, CALCULUS TX Right 02/05/2020    CYSTO, URETEROSCOPY, RIGHT URETERAL STENT PLACEMENT, RETROGRADE PYELOGRAM performed by Chase Mccray MD at [facility], rt. inguinal    INGUINAL HERNIA REPAIR Left 08/19/2016    LAPAROSCOPY N/A 01/16/2018    DIAGNOSTIC LAPAROSCOPY performed by Jason Blanca MD at [address] [place] [address]  11/24/2021    PENIS PROSTHESIS INSERTION    PENILE PROSTHESIS PLACEMENT N/A 11/24/2021    PENIS PROSTHESIS INSERTION- AMS INFLATABLE, **SHORT STAY** performed by Chase Mccray MD at [address]       Family History   Problem Relation Age of Onset  Cancer Father         throat    Cirrhosis Father     High Blood Pressure Sister     Asthma Sister     Arthritis Sister     High Blood Pressure Mother     Heart Disease Mother     Alzheimer's Disease Mother      Outpatient Medications Marked as Taking for the 5/9/22 encounter (Office Visit) with Asha Dye MD   Medication Sig Dispense Refill    diazePAM (VALIUM) 5 MG tablet take 1-2 tablets by mouth 40 MINUTES prior to procedure if needed      albuterol sulfate  (90 Base) MCG/ACT inhaler inhale 2 puffs by mouth and INTO THE LUNGS every 6 hours if needed for cough shortness of breath or wheezing 8.5 g 3    amLODIPine (NORVASC) 10 MG tablet take 1 tablet by mouth once daily 90 tablet 1    levocetirizine (XYZAL) 5 MG tablet take 1 tablet by mouth once daily 30 tablet 3    glucose monitoring (FREESTYLE FREEDOM) kit 1 kit by Does not apply route daily Any brand covered by insurance 1 kit 0    Lancets MISC 1 each by Does not apply route daily 100 each 5    blood glucose monitor strips Check daily 120 strip 3    memantine (NAMENDA) 10 MG tablet Take 1 tablet by mouth 2 times daily Starting Feb 1st 2022 180 tablet 1    carvedilol (COREG) 25 MG tablet take 1 tablet by mouth twice a day 180 tablet 2    metFORMIN (GLUCOPHAGE-XR) 500 MG extended release tablet take 1 tablet by mouth once daily with breakfast 30 tablet 5    atorvastatin (LIPITOR) 10 MG tablet take 1 tablet by mouth once daily 90 tablet 3    topiramate (TOPAMAX) 50 MG tablet take 1 AND 1/2 tablet by mouth twice a day 90 tablet 6    oxyCODONE-acetaminophen (PERCOCET) 5-325 MG per tablet       traZODone (DESYREL) 50 MG tablet take 2 tablets by mouth at bedtime 60 tablet 11    TRINTELLIX 20 MG TABS tablet 20 mg       gabapentin (NEURONTIN) 300 MG capsule Take 300 mg by mouth daily.  Takes at [address],  [address] tiZANidine (ZANAFLEX) 2 MG tablet take 1 tablet by mouth at bedtime      mometasone (NASONEX) 50 MCG/ACT nasal spray 2 sprays by Nasal route daily 1 Inhaler 3    fluticasone-salmeterol (ADVAIR) 100-50 MCG/DOSE diskus inhaler Inhale 1 puff into the lungs every 12 hours 60 each 3    diclofenac sodium (VOLTAREN) 1 % GEL Stop 1 week prior to surgery      nitroGLYCERIN (NITROSTAT) 0.3 MG SL tablet place 1 tablet under the tongue if needed every 5 minutes for chest pain for 3 doses IF NO RELIEF AF (Patient taking differently: Hasn't  Taken in years.) 25 tablet 3    aspirin 81 MG tablet Take 81 mg by mouth daily Stop 11/17/2021 per surgeon. On asa per Dr. Herman Myers. Clearance was obtained. Lisinopril, Aleve  [naproxen sodium], Amitriptyline hcl, Exelon [rivastigmine], and Ibuprofen  Social History     Tobacco Use   Smoking Status Current Every Day Smoker    Packs/day: 0.25    Years: 28.00    Pack years: 7.00    Types: Cigarettes   Smokeless Tobacco Never Used     (Ifpatient a smoker, smoking cessation counseling offered)    Social History     Substance and Sexual Activity   Alcohol Use Yes    Comment: rarely       REVIEW OF SYSTEMS:  Review of Systems    Physical Exam:      Vitals:    05/09/22 1326   BP: 122/80   Pulse: 81   Temp: 97.4 °F (36.3 °C)   SpO2: 95%     Body mass index is 36.82 kg/m². Patient is a 62 y.o. male in no acute distress and alert and oriented to person, place and time. Physical Exam  Constitutional: Patient in no acute distress. Neuro: Alert and oriented to person, place and time. Psych: Mood normal, affect normal  Skin: No rash noted  HEENT: Head: Normocephalic andatraumatic      Assessment and Plan      1. Erectile dysfunction due to arterial insufficiency    2. Decreased libido    3. Frequency of urination    4. Prostate cancer screening           Plan:   IPP cycles well  F/u 6 mo psa      Return in about 6 months (around 11/9/2022) for psa. Prescriptions Ordered:  No orders of the defined types were placed in this encounter.     Orders Placed:  Orders Placed This Encounter   Procedures    PSA, Diagnostic Standing Status:   Future     Standing Expiration Date:   5/9/2023           Stephenie Arenas MD    Agree with the ROS entered by the MA.

## 2022-05-09 NOTE — PROGRESS NOTES
Review of Systems   Constitutional: Negative. HENT: Negative. Respiratory: Negative. Gastrointestinal: Positive for diarrhea. Genitourinary: Positive for penile pain. Neurological: Negative.

## 2022-06-11 DIAGNOSIS — R09.82 POST-NASAL DRIP: ICD-10-CM

## 2022-06-13 RX ORDER — LEVOCETIRIZINE DIHYDROCHLORIDE 5 MG/1
TABLET, FILM COATED ORAL
Qty: 30 TABLET | Refills: 3 | Status: SHIPPED | OUTPATIENT
Start: 2022-06-13 | End: 2022-08-29

## 2022-06-16 ENCOUNTER — TELEPHONE (OUTPATIENT)
Dept: PRIMARY CARE CLINIC | Age: 62
End: 2022-06-16

## 2022-06-16 NOTE — TELEPHONE ENCOUNTER
Pt called in and reports that he is not working anymore and is not as stressed out as he was. He states that his BP has been really good lately and he has been getting dizzy and feeling like his BP is \"too low\" pt did not have any current readings to report   He wants to know if he can stop taking his blood pressure medication. He no longer sees cardiology and would like a new referral to a cardiologist. Please advise, thank you.

## 2022-06-23 ENCOUNTER — OFFICE VISIT (OUTPATIENT)
Dept: PRIMARY CARE CLINIC | Age: 62
End: 2022-06-23
Payer: MEDICARE

## 2022-06-23 VITALS — OXYGEN SATURATION: 98 % | DIASTOLIC BLOOD PRESSURE: 80 MMHG | HEART RATE: 78 BPM | SYSTOLIC BLOOD PRESSURE: 128 MMHG

## 2022-06-23 DIAGNOSIS — R06.83 SNORING: ICD-10-CM

## 2022-06-23 DIAGNOSIS — Z86.79 HX OF CORONARY ARTERY DISEASE: ICD-10-CM

## 2022-06-23 DIAGNOSIS — M79.672 PAIN IN BOTH FEET: ICD-10-CM

## 2022-06-23 DIAGNOSIS — G47.10 EXCESSIVE SLEEPINESS: ICD-10-CM

## 2022-06-23 DIAGNOSIS — E11.9 TYPE 2 DIABETES MELLITUS WITHOUT COMPLICATION, WITH LONG-TERM CURRENT USE OF INSULIN (HCC): Primary | ICD-10-CM

## 2022-06-23 DIAGNOSIS — I20.8 OTHER FORMS OF ANGINA PECTORIS (HCC): ICD-10-CM

## 2022-06-23 DIAGNOSIS — E78.5 DYSLIPIDEMIA: ICD-10-CM

## 2022-06-23 DIAGNOSIS — M79.671 PAIN IN BOTH FEET: ICD-10-CM

## 2022-06-23 DIAGNOSIS — J30.9 ALLERGIC RHINITIS, UNSPECIFIED SEASONALITY, UNSPECIFIED TRIGGER: ICD-10-CM

## 2022-06-23 DIAGNOSIS — E55.9 VITAMIN D DEFICIENCY: ICD-10-CM

## 2022-06-23 DIAGNOSIS — Z79.4 TYPE 2 DIABETES MELLITUS WITHOUT COMPLICATION, WITH LONG-TERM CURRENT USE OF INSULIN (HCC): Primary | ICD-10-CM

## 2022-06-23 DIAGNOSIS — R53.83 OTHER FATIGUE: ICD-10-CM

## 2022-06-23 PROCEDURE — 3044F HG A1C LEVEL LT 7.0%: CPT | Performed by: FAMILY MEDICINE

## 2022-06-23 PROCEDURE — 4004F PT TOBACCO SCREEN RCVD TLK: CPT | Performed by: FAMILY MEDICINE

## 2022-06-23 PROCEDURE — 3017F COLORECTAL CA SCREEN DOC REV: CPT | Performed by: FAMILY MEDICINE

## 2022-06-23 PROCEDURE — 99214 OFFICE O/P EST MOD 30 MIN: CPT | Performed by: FAMILY MEDICINE

## 2022-06-23 PROCEDURE — 2022F DILAT RTA XM EVC RTNOPTHY: CPT | Performed by: FAMILY MEDICINE

## 2022-06-23 PROCEDURE — G8417 CALC BMI ABV UP PARAM F/U: HCPCS | Performed by: FAMILY MEDICINE

## 2022-06-23 PROCEDURE — G8427 DOCREV CUR MEDS BY ELIG CLIN: HCPCS | Performed by: FAMILY MEDICINE

## 2022-06-23 ASSESSMENT — ENCOUNTER SYMPTOMS
WHEEZING: 0
RHINORRHEA: 1
VOMITING: 0
COUGH: 1
SHORTNESS OF BREATH: 0

## 2022-06-23 NOTE — PROGRESS NOTES
[facility] PRIMARY CARE  [facility] [address]  [address] [address]  Dept: [phone]  Dept Fax: [phone]    Jannet Whitmore is a 62 y.o. male who presents today for his medical conditions/complaints as noted below. Jannet Whitmore is c/o of  Chief Complaint   Patient presents with    Diabetes     DM foot exam & shoes    Discuss Medications     cavedilol, pt feels fatigue    Fatigue       HPI:     HPI     Pt here for diabetes follow up    Notes some fatigue, more so the past month. Wondering if related to his carvedilol. Has been on carvedilol for years. Pt was walking a lot on recent trip, came back Sunday. States feet hurt a lot due to lots of walking, would like diabetic shoes. Copd-denies any shortness of breath. Has cough from post nasal drip, has tried switching allergy meds, states they will usually work and then stop. He currently is on xyzal and nasonex. Saw allergist in past to get shots but was hard to keep going for shots and costly. Feels it did not help much. He was due to get sleep study last year but did not get it. Has excessive daytime sleepiness and does snore. Feels like he needs to nap . Hemoglobin A1C (%)   Date Value   05/03/2022 5.9   02/01/2022 5.7   11/01/2021 6.3             ( goal A1C is < 7)   Microalb/Crt. Ratio (mcg/mg creat)   Date Value   04/26/2017 17 (H)     LDL Cholesterol (mg/dL)   Date Value   07/28/2021 51     LDL Calculated (mg/dL)   Date Value   11/01/2019 95       (goal LDL is <100)   AST (U/L)   Date Value   08/07/2017 25     ALT (U/L)   Date Value   07/28/2021 18     BUN (mg/dL)   Date Value   11/25/2021 14     BP Readings from Last 3 Encounters:   06/23/22 128/80   05/09/22 122/80   05/03/22 116/78          (goal 120/80)    Past Medical History:   Diagnosis Date    ADHD (attention deficit hyperactivity disorder)     no tx per pt.  Arthritis     knees, hands, back.     Asthma     as child.  pcp manages    Back pain     [facility] .Last seen 10/26/2021. Goes again 11/17/2021    Brain aneurysm     Dr. Lucy Ruano at   [address] [facility]  f/u 12/29/2021. No surgery at this time. Monitoring yearly.  CAD (coronary artery disease)     Dr. Ashley Fisher, [place]  last visit 11/9/2021. Pt. states never had heart cath. MI 2011    Cataract     lt eye   no surgery    Cerebral artery occlusion with cerebral infarction [facility]) 2014    mini stroke went to [facility] and was treated. Dr. Suzi Pablo. Last seen summer 2021    Depression     pcp    Diabetes mellitus ([facility] [address]     on rx   A1C high per pt    Heart attack [facility])     2011   Last visit Dr. Raudel Mccray 11/9/2021  [place], [place]   Received cardiology clearance    Hyperlipidemia     on rx    Hypertension     pcp manages    Kidney stones     Dr. Kym Russell treats. Pt. states he currently has blood in his urine. Urine CS sent to lab today. No pain per pt.  Memory loss, long term     Pt states he has had it for years and follows w/ his neurologist Dr. FUCHS. Mother has Alzheimers.  Mini stroke ([facility] [address]     2014    Osteoarthritis     Seasonal allergies     Sinus problem     drainage from allergy    Snores     has not been tested for sleep apnea. Sees Dr. Vangie Bruce. He provides rx for Trazodone.     Wears eyeglasses     Wellness examination     last visit 11/1/2021      Past Surgical History:   Procedure Laterality Date    CHOLECYSTECTOMY      2016    COLONOSCOPY  2021    had cologuard and pt. states it was negative    CYSTO/URETERO/PYELOSCOPY, CALCULUS TX Right 02/05/2020    CYSTO, URETEROSCOPY, RIGHT URETERAL STENT PLACEMENT, RETROGRADE PYELOGRAM performed by Yovana Gonzalez MD at [facility], rt. inguinal    INGUINAL HERNIA REPAIR Left 08/19/2016    LAPAROSCOPY N/A 01/16/2018    DIAGNOSTIC LAPAROSCOPY performed by Carl Lott MD at [address] [place] [address] topiramate (TOPAMAX) 50 MG tablet take 1 AND 1/2 tablet by mouth twice a day 90 tablet 6    oxyCODONE-acetaminophen (PERCOCET) 5-325 MG per tablet       traZODone (DESYREL) 50 MG tablet take 2 tablets by mouth at bedtime 60 tablet 11    TRINTELLIX 20 MG TABS tablet 20 mg       gabapentin (NEURONTIN) 300 MG capsule Take 300 mg by mouth daily. Takes at [address] [facility], [address] tiZANidine (ZANAFLEX) 2 MG tablet take 1 tablet by mouth at bedtime      mometasone (NASONEX) 50 MCG/ACT nasal spray 2 sprays by Nasal route daily 1 Inhaler 3    fluticasone-salmeterol (ADVAIR) 100-50 MCG/DOSE diskus inhaler Inhale 1 puff into the lungs every 12 hours 60 each 3    diclofenac sodium (VOLTAREN) 1 % GEL Stop 1 week prior to surgery      nitroGLYCERIN (NITROSTAT) 0.3 MG SL tablet place 1 tablet under the tongue if needed every 5 minutes for chest pain for 3 doses IF NO RELIEF AF (Patient taking differently: Hasn't  Taken in years.) 25 tablet 3    aspirin 81 MG tablet Take 81 mg by mouth daily Stop 11/17/2021 per surgeon. On asa per Dr. Kay Zeng. Clearance was obtained. No current facility-administered medications for this visit. Allergies   Allergen Reactions    Lisinopril      States he is allergic to the generic forms of medication, can take lisinopril    Aleve  [Naproxen Sodium] Nausea Only    Amitriptyline Hcl Other (See Comments)     rash    Exelon [Rivastigmine] Other (See Comments)     Patient stated patch left a \"burn kathy\" on his skin     Ibuprofen Other (See Comments)     \"hurts my stomach. \"       Health Maintenance   Topic Date Due    Pneumococcal 0-64 years Vaccine (2 - PCV) 08/02/2017    Diabetic retinal exam  11/18/2017    Prostate Specific Antigen (PSA) Screening or Monitoring  04/26/2018    Shingles vaccine (2 of 2) 01/30/2022    Lipids  07/28/2022    Diabetic microalbuminuria test  11/01/2022    Depression Monitoring  02/01/2023    A1C test (Diabetic or Prediabetic)  05/03/2023    Diabetic foot exam 06/23/2023    Colorectal Cancer Screen  06/27/2024    DTaP/Tdap/Td vaccine (2 - Td or Tdap) 08/02/2026    Flu vaccine  Completed    COVID-19 Vaccine  Completed    Hepatitis C screen  Completed    HIV screen  Completed    Hepatitis A vaccine  Aged Out    Hib vaccine  Aged Out    Meningococcal (ACWY) vaccine  Aged Out       Subjective:      Review of Systems   Constitutional: Negative for chills and fever. HENT: Positive for postnasal drip and rhinorrhea. Respiratory: Positive for cough. Negative for shortness of breath and wheezing. Cardiovascular: Negative for chest pain. Gastrointestinal: Negative for vomiting. Objective:     Physical Exam  Constitutional:       General: He is not in acute distress. Appearance: He is well-developed. He is not diaphoretic. HENT:      Head: Normocephalic and atraumatic. Mouth/Throat:      Pharynx: No oropharyngeal exudate. Eyes:      Pupils: Pupils are equal, round, and reactive to light. Cardiovascular:      Rate and Rhythm: Normal rate and regular rhythm. Heart sounds: Normal heart sounds. No murmur heard. Pulmonary:      Effort: Pulmonary effort is normal. No respiratory distress. Breath sounds: Normal breath sounds. No stridor. Abdominal:      General: Bowel sounds are normal. There is no distension. Palpations: Abdomen is soft. Tenderness: There is no abdominal tenderness. Musculoskeletal:      Cervical back: Neck supple. Right lower leg: No edema. Left lower leg: No edema.       Comments: Visual inspection:  Deformity/amputation: absent  Skin lesions/pre-ulcerative calluses: absent  Edema: right- negative, left- negative    Sensory exam:  Monofilament sensation: normal  (minimum of 5 random plantar locations tested, avoiding callused areas - > 1 area with absence of sensation is + for neuropathy)    Plus at least one of the following:  Pulses: normal,     No ttp behind knee or upper calf area Skin:     General: Skin is warm and dry. Neurological:      Mental Status: He is alert and oriented to person, place, and time. Psychiatric:         Behavior: Behavior normal.       /80   Pulse 78   SpO2 98%     Assessment:      1. Type 2 diabetes mellitus without complication, with long-term current use of insulin ([facility] [address]  - recommend seeing podiatry to see if can get diabetic shoes .   -  DIABETES FOOT EXAM  - [facility], Mehnaz Zavala DPM, Podiatry, [place]    2. Snoring  - Baseline Diagnostic Sleep Study; Future    3. Excessive sleepiness  - Baseline Diagnostic Sleep Study; Future    4. Pain in both feet  - [facility], Mehnaz Zavala DPM, Podiatry, [place]    5. Other fatigue  - will check labs and recommend sleep study.   - TSH With Reflex Ft4; Future  - CBC with Auto Differential; Future  - Comprehensive Metabolic Panel; Future  - Vitamin D 25 Hydroxy; Future    6. Vitamin D deficiency  - Vitamin D 25 Hydroxy; Future    7. Other forms of angina pectoris [facility])  - was following with cardiology-Dr Loria Castleman , last appt in November, however needs new cardiologist as he is retiring. Denies any chest pain or shortness of breath. Has been doing well. 8. Dyslipidemia  - [facility] - Wendy Springer MD, Cardiology, [place]    9. Hx of coronary artery disease  - [facility] - Wendy Springer MD, Cardiology, [place]    10. Allergic rhinitis, unspecified seasonality, unspecified trigger  - recommend switching up allergy meds when one stops working. He can try allegra instead for now. Plan:      Return for diabetes follow up.     Orders Placed This Encounter   Procedures    TSH With Reflex Ft4     Standing Status:   Future     Standing Expiration Date:   6/23/2023    CBC with Auto Differential     Standing Status:   Future     Standing Expiration Date:   6/23/2023    Comprehensive Metabolic Panel     Standing Status:   Future     Standing Expiration Date:   6/23/2023    Vitamin D 25 Hydroxy     Standing Status:   Future     Standing Expiration Date:   6/23/2023   Alan Rubio DPM, Podiatry, [place]     Referral Priority:   Routine     Referral Type:   Eval and Treat     Referral Reason:   Specialty Services Required     Referred to Provider:   Darian Jean DPM     Requested Specialty:   Podiatry     Number of Visits Requested:   Ector Nyhan, MD, Cardiology, [place]     Referral Priority:   Routine     Referral Type:   Eval and Treat     Referral Reason:   Specialty Services Required     Referred to Provider:   Justin Tompkins MD     Requested Specialty:   Cardiology     Number of Visits Requested:   1    HM DIABETES FOOT EXAM    Baseline Diagnostic Sleep Study     Standing Status:   Future     Standing Expiration Date:   6/23/2023     Order Specific Question:   Adult or Pediatric     Answer:   Adult Study (>7 Years)     Order Specific Question:   Location For Sleep Study     Answer: [facility] Specific Question:   Select Sleep Lab Location     Answer:   [facility]     Order Specific Question:   Pre-Study Patient Questions: Answer:   Complains of daytime sleepiness     Order Specific Question:   Pre-Study Patient Questions: Answer:   Snores loudly during sleep     No orders of the defined types were placed in this encounter. Patient given educational materials - see patient instructions. Discussed use, benefit, and side effects of prescribed medications. All patient questions answered. Pt voiced understanding. Reviewed healthmaintenance. Instructed to continue current medications, diet and exercise. Patient agreed with treatment plan. Follow up as directed.      Electronically signed by Jennifer Jacobson DO on 6/23/2022 at 10:32 AM

## 2022-06-25 RX ORDER — METFORMIN HYDROCHLORIDE 500 MG/1
TABLET, EXTENDED RELEASE ORAL
Qty: 30 TABLET | Refills: 5 | Status: SHIPPED | OUTPATIENT
Start: 2022-06-25 | End: 2022-07-26 | Stop reason: SDUPTHER

## 2022-06-30 ENCOUNTER — TELEPHONE (OUTPATIENT)
Dept: PRIMARY CARE CLINIC | Age: 62
End: 2022-06-30

## 2022-06-30 DIAGNOSIS — M25.562 ACUTE PAIN OF BOTH KNEES: Primary | ICD-10-CM

## 2022-06-30 DIAGNOSIS — M25.561 ACUTE PAIN OF BOTH KNEES: Primary | ICD-10-CM

## 2022-06-30 NOTE — TELEPHONE ENCOUNTER
Pt called in and stated that he went on vacation and walked over 5+ miles in one day and thinks he overdid it and now every time he walks/bends down he hears his knees \"craking, or popping and it hurts\" he wants to know if PCP would refer him to ortho for further eval and treatment. Please advise, thank you.

## 2022-06-30 NOTE — TELEPHONE ENCOUNTER
LVM on machine for pt to call office back for referral info. [facility] information and he is welcome to call office back for verbal referral information as well if he wishes. Provided office contact info on vm.

## 2022-07-05 ENCOUNTER — OFFICE VISIT (OUTPATIENT)
Dept: PODIATRY | Age: 62
End: 2022-07-05
Payer: MEDICARE

## 2022-07-05 VITALS — BODY MASS INDEX: 36.4 KG/M2 | HEIGHT: 71 IN | WEIGHT: 260 LBS

## 2022-07-05 DIAGNOSIS — E11.42 DIABETIC POLYNEUROPATHY ASSOCIATED WITH TYPE 2 DIABETES MELLITUS (HCC): Primary | ICD-10-CM

## 2022-07-05 DIAGNOSIS — M79.604 PAIN IN BOTH LOWER EXTREMITIES: ICD-10-CM

## 2022-07-05 DIAGNOSIS — M79.605 PAIN IN BOTH LOWER EXTREMITIES: ICD-10-CM

## 2022-07-05 DIAGNOSIS — B35.1 DERMATOPHYTOSIS OF NAIL: ICD-10-CM

## 2022-07-05 DIAGNOSIS — I73.9 PVD (PERIPHERAL VASCULAR DISEASE) (HCC): ICD-10-CM

## 2022-07-05 PROCEDURE — 4004F PT TOBACCO SCREEN RCVD TLK: CPT | Performed by: PODIATRIST

## 2022-07-05 PROCEDURE — 2022F DILAT RTA XM EVC RTNOPTHY: CPT | Performed by: PODIATRIST

## 2022-07-05 PROCEDURE — 3044F HG A1C LEVEL LT 7.0%: CPT | Performed by: PODIATRIST

## 2022-07-05 PROCEDURE — 3017F COLORECTAL CA SCREEN DOC REV: CPT | Performed by: PODIATRIST

## 2022-07-05 PROCEDURE — 11721 DEBRIDE NAIL 6 OR MORE: CPT | Performed by: PODIATRIST

## 2022-07-05 PROCEDURE — G8427 DOCREV CUR MEDS BY ELIG CLIN: HCPCS | Performed by: PODIATRIST

## 2022-07-05 PROCEDURE — G8417 CALC BMI ABV UP PARAM F/U: HCPCS | Performed by: PODIATRIST

## 2022-07-05 PROCEDURE — 99203 OFFICE O/P NEW LOW 30 MIN: CPT | Performed by: PODIATRIST

## 2022-07-05 RX ORDER — ARIPIPRAZOLE 5 MG/1
TABLET ORAL
COMMUNITY
Start: 2022-06-23

## 2022-07-05 NOTE — PROGRESS NOTES
[address]  Dept: [phone]    NEW PATIENT PROGRESS NOTE  Date of patient's visit: 7/5/2022  Patient's Name:  Chanelle Pascual YOB: 1960            Patient Care Team:  Rufus Sibley DO as PCP - General (Family Medicine)  Rufus Sibley DO as PCP - [facility]        Chief Complaint   Patient presents with    New Patient    Diabetes     would like diabetic shoes/ A1C 5.7    Peripheral Neuropathy         HPI:   Chanelle Pascual is a 62 y.o. male who presents to the office today complaining of diabetic foot care and needing a prescription for diabetic shoes. Symptoms began 2 year(s) ago. Patient relates pain is Present. Pain is rated 4 out of 10 and is described as intermittent. Treatments prior to today's visit include: none. Currently denies F/C/N/V. Pt's primary care physician is Rufus Sibley DO last seen June 23 2022. Patient states he has been a diabetic for 2 years. He states he went to [address] [place] [address] and walked a lot. His feet started hurting and he feels he needs a more supportive shoe. He would like a prescription for diabetic shoes and inserts. Allergies   Allergen Reactions    Lisinopril      States he is allergic to the generic forms of medication, can take lisinopril    Aleve  [Naproxen Sodium] Nausea Only    Amitriptyline Hcl Other (See Comments)     rash    Exelon [Rivastigmine] Other (See Comments)     Patient stated patch left a \"burn kathy\" on his skin     Ibuprofen Other (See Comments)     \"hurts my stomach. \"       Past Medical History:   Diagnosis Date    ADHD (attention deficit hyperactivity disorder)     no tx per pt.  Arthritis     knees, hands, back.  Asthma     as child. pcp manages    Back pain     [address] .Last seen 10/26/2021.  Goes again 11/17/2021    Brain aneurysm     Dr. Syed Feeling at [facility]  f/u 12/29/2021. No surgery at this time. Monitoring yearly.  CAD (coronary artery disease)     Dr. Mahnaz Wyman [facility]  last visit 11/9/2021. Pt. states never had heart cath. MI 2011    Cataract     lt eye   no surgery    Cerebral artery occlusion with cerebral infarction [facility]) 2014    mini stroke went to [facility] and was treated. Dr. Joaquim Mak. Last seen summer 2021    Depression     pcp    Diabetes mellitus ([facility] [address]     on rx   A1C high per pt    Heart attack [facility])     2011   Last visit Dr. Mere Almeida 11/9/2021  [facility]   Received cardiology clearance    Hyperlipidemia     on rx    Hypertension     pcp manages    Kidney stones     Dr. Kayla Ceron treats. Pt. states he currently has blood in his urine. Urine CS sent to lab today. No pain per pt.  Memory loss, long term     Pt states he has had it for years and follows w/ his neurologist Dr. Rachell Saldana. Mother has Alzheimers.  Mini stroke ([facility] [address]     2014    Osteoarthritis     Seasonal allergies     Sinus problem     drainage from allergy    Snores     has not been tested for sleep apnea. Sees Dr. Chava Thomas. He provides rx for Trazodone.  Wears eyeglasses     Wellness examination     last visit 11/1/2021       Prior to Admission medications    Medication Sig Start Date End Date Taking?  Authorizing Provider   ARIPiprazole (ABILIFY) 5 MG tablet take 1/2 tablet by mouth daily for 7 days then 1 tablet daily 6/23/22  Yes Historical Provider, MD   metFORMIN (GLUCOPHAGE-XR) 500 MG extended release tablet take 1 tablet by mouth once daily WITH BREAKFAST 6/25/22  Yes Sandra Medhkour, DO   levocetirizine (XYZAL) 5 MG tablet take 1 tablet by mouth once daily 6/13/22  Yes ASHLEY Burch CNP   diazePAM (VALIUM) 5 MG tablet take 1-2 tablets by mouth 40 MINUTES prior to procedure if needed 4/27/22  Yes Historical Provider, MD   albuterol sulfate  (90 Base) MCG/ACT inhaler inhale 2 puffs by mouth and INTO THE LUNGS every 6 hours if needed for cough shortness of breath or wheezing 4/25/22  Yes Sandra Medhkour, DO   amLODIPine (NORVASC) 10 MG tablet take 1 tablet by mouth once daily 2/28/22  Yes Sandra Medhkour, DO   glucose monitoring (FREESTYLE FREEDOM) kit 1 kit by Does not apply route daily Any brand covered by insurance 2/1/22  Yes [facility], DO   Lancets MISC 1 each by Does not apply route daily 2/1/22  Yes Sandra Medhkour, DO   blood glucose monitor strips Check daily 2/1/22  Yes Sandra Medhkour, DO   memantine (NAMENDA) 10 MG tablet Take 1 tablet by mouth 2 times daily Starting Feb 1st 2022 2/1/22  Yes Jose Ray MD   carvedilol (COREG) 25 MG tablet take 1 tablet by mouth twice a day 1/8/22  Yes Sandra Medhkoalma, DO   atorvastatin (LIPITOR) 10 MG tablet take 1 tablet by mouth once daily 1/3/22  Yes Sandra Stormhkoalma, DO   topiramate (TOPAMAX) 50 MG tablet take 1 AND 1/2 tablet by mouth twice a day 12/13/21  Yes Jose Ray MD   oxyCODONE-acetaminophen (PERCOCET) 5-325 MG per tablet  12/7/21  Yes Historical Provider, MD   traZODone (DESYREL) 50 MG tablet take 2 tablets by mouth at bedtime 7/13/21  Yes Jannet sEtevez MD   TRINTELLIX 20 MG TABS tablet 20 mg  5/10/21  Yes Historical Provider, MD   gabapentin (NEURONTIN) 300 MG capsule Take 300 mg by mouth daily.  Takes at [facility] 5/6/21  Yes Historical Provider, MD   tiZANidine (ZANAFLEX) 2 MG tablet take 1 tablet by mouth at bedtime 5/6/21  Yes Historical Provider, MD   mometasone (NASONEX) 50 MCG/ACT nasal spray 2 sprays by Nasal route daily 4/11/21  Yes Sandra Da, DO   fluticasone-salmeterol (ADVAIR) 100-50 MCG/DOSE diskus inhaler Inhale 1 puff into the lungs every 12 hours 11/23/20  Yes Sandra Medhkoalma,    diclofenac sodium (VOLTAREN) 1 % GEL Stop 1 week prior to surgery 8/18/20  Yes Historical Provider, MD   nitroGLYCERIN (NITROSTAT) 0.3 MG SL tablet place 1 tablet under the tongue if needed every 5 minutes for chest pain for 3 doses IF NO RELIEF AF  Patient taking differently: Hasn't  Taken in years. 11/28/18  Yes Yuridia Rajput MD   aspirin 81 MG tablet Take 81 mg by mouth daily Stop 11/17/2021 per surgeon. On asa per Dr. Yuniel Barnes. Clearance was obtained.    Yes Historical Provider, MD   benzonatate (TESSALON) 200 MG capsule Take 1 capsule by mouth 3 times daily as needed for Cough 4/21/21   Sasha Barajas, APRN - CNP   carvedilol (COREG) 25 MG tablet Take 1 tablet by mouth 2 times daily 12/21/20   Sandra Medhkour, DO   fluticasone [facility]) 50 MCG/ACT nasal spray instill 2 sprays into each nostril once daily 11/6/20   Roberto Block DO       Past Surgical History:   Procedure Laterality Date    CHOLECYSTECTOMY      2016    COLONOSCOPY  2021    had cologuard and pt. states it was negative    CYSTO/URETERO/PYELOSCOPY, CALCULUS TX Right 02/05/2020    CYSTO, URETEROSCOPY, RIGHT URETERAL STENT PLACEMENT, RETROGRADE PYELOGRAM performed by Ralph Damico MD at [facility], rt. inguinal    INGUINAL HERNIA REPAIR Left 08/19/2016    LAPAROSCOPY N/A 01/16/2018    DIAGNOSTIC LAPAROSCOPY performed by Mayte Brown MD at [address] [place] [address]  11/24/2021    PENIS PROSTHESIS INSERTION    PENILE PROSTHESIS PLACEMENT N/A 11/24/2021    PENIS PROSTHESIS INSERTION- AMS INFLATABLE, **SHORT STAY** performed by Ralph Damico MD at . [facility] [address]         Family History   Problem Relation Age of Onset    Cancer Father         throat    Cirrhosis Father     High Blood Pressure Sister     Asthma Sister     Arthritis Sister     High Blood Pressure Mother     Heart Disease Mother     Alzheimer's Disease Mother        Social History     Tobacco Use    Smoking status: Current Every Day Smoker     Packs/day: 0.25     Years: 28.00     Pack years: 7.00     Types: Cigarettes    Smokeless tobacco: Never Used   Substance Use Topics    Alcohol use: Yes     Comment: rarely       Review of Systems    Review of Systems:   History obtained from chart review and the patient  General ROS: negative for - chills, fatigue, fever, night sweats or weight gain  Constitutional: Negative for chills, diaphoresis, fatigue, fever and unexpected weight change. Musculoskeletal: Positive for arthralgias, gait problem and joint swelling. Neurological ROS: negative for - behavioral changes, confusion, headaches or seizures. Negative for weakness and numbness. Dermatological ROS: negative for - mole changes, rash  Cardiovascular: Negative for leg swelling. Gastrointestinal: Negative for constipation, diarrhea, nausea and vomiting. Lower Extremity Physical Examination:   Vitals: There were no vitals filed for this visit. General: AAO x 3 in NAD. Dermatologic Exam:  Skin lesion/ulceration Absent . Skin No rashes or nodules noted. .   Skin is thin, with flaky sloughing skin as well as decreased hair growth to the lower leg  Small red hemosiderin deposits seen dorsal foot   Musculoskeletal:     1st MPJ ROM decreased, Bilateral.  Muscle strength 5/5, Bilateral.  Pain present upon palpation of toenails 1-5, Bilateral. decreased medial longitudinal arch, Bilateral.  Ankle ROM decreased,Bilateral.    Dorsally contracted digits present digits 2, Bilateral.     Vascular: DP pulses 1/4 bilateral.  PT pulses 0/4 bilateral.   CFT <5 seconds, Bilateral.  Hair growth absent to the level of the digits, Bilateral.  Edema present, Bilateral.  Varicosities absent, Bilateral. Erythema absent, Bilateral    Neurological: Sensation diminshed to light touch to level of digits, Bilateral.  Protective sensation intact 6/10 sites via 5.07/10g [place] Monofilament, Bilateral.  negative Tinel's, Bilateral.  negative Valleix sign, Bilateral.      Integument: Warm, dry, supple, Bilateral.  Open lesion absent, Bilateral.  Interdigital maceration absent to web spaces 4, Bilateral. Nails 1-5 left and 1-5 right thickened > 3.0 mm, dystrophic and crumbly, discolored with yellow subungual debris. Fissures absent, Bilateral.       Asessment: Patient is a 62 y.o. male with:    Diagnosis Orders   1. Diabetic polyneuropathy associated with type 2 diabetes mellitus ([facility]  Misc. Devices MISC    44170 - [place] DEBRIDEMENT OF NAILS, 6 OR MORE   2. Dermatophytosis of nail  23784 - [place] DEBRIDEMENT OF NAILS, 6 OR MORE   3. PVD (peripheral vascular disease) ([facility]  58425 - [place] DEBRIDEMENT OF NAILS, 6 OR MORE   4. Pain in both lower extremities  34903 - [place] DEBRIDEMENT OF NAILS, 6 OR MORE         Plan: Patient examined and evaluated. Current condition and treatment options discussed in detail. Discussed conservative and surgical options with the patient. Nails 1,2,3,4,5 Right and 1,2,3,4,5 Left were debrided and ground smooth with a dremmel. The patient tolerated the procedure well without apparent complications. RX: DM shoes with inserts. I feel that these appliances are medically necessary for my patients well being and in my opinion are both reasonable and necessary to the accepted medical practice in the treatment of the above conditions. Diabetic  shoes prevent the frictional forces on the feet and  from the feet being overloaded and decrease plantar presssure to the forefoot Abnormal foot/leg functions demands mechanical, functional protections and control. Without the diabetic shoes and inserts, the patient may develop an ulcer to the forefoot. Later on in life, the patient may develop an ulcer which leads to osteomyelitis and infections. These shoes accommodate the toe deformities    Description of the devices: These devices prevent ulcerations on diabetics  Due to the nature of my patients condition, I feel that this therapy is necessary indefinitely, as this is a form of continuous therapy. This is NOT a convenience item.   It is my opinion that these devices will prevent/    Advised pt to monitor feet daily for infection. Verbal and written instructions given to patient. Contact office with any questions/problems/concerns.   RTC in 2month(s).    7/5/2022    Electronically signed by Renata Galarza DPM on 7/5/2022 at 9:55 AM  7/5/2022

## 2022-07-07 LAB
TSH SERPL DL<=0.05 MIU/L-ACNC: 1.68 UIU/ML
VITAMIN D 25-HYDROXY: 38.1
VITAMIN D2, 25 HYDROXY: NORMAL
VITAMIN D3,25 HYDROXY: NORMAL

## 2022-07-08 ENCOUNTER — OFFICE VISIT (OUTPATIENT)
Dept: ORTHOPEDIC SURGERY | Age: 62
End: 2022-07-08

## 2022-07-08 DIAGNOSIS — M25.562 PAIN IN BOTH KNEES, UNSPECIFIED CHRONICITY: Primary | ICD-10-CM

## 2022-07-08 DIAGNOSIS — R53.83 OTHER FATIGUE: ICD-10-CM

## 2022-07-08 DIAGNOSIS — E55.9 VITAMIN D DEFICIENCY: ICD-10-CM

## 2022-07-08 DIAGNOSIS — M25.561 PAIN IN BOTH KNEES, UNSPECIFIED CHRONICITY: Primary | ICD-10-CM

## 2022-07-08 PROCEDURE — 3017F COLORECTAL CA SCREEN DOC REV: CPT | Performed by: ORTHOPAEDIC SURGERY

## 2022-07-08 PROCEDURE — G8428 CUR MEDS NOT DOCUMENT: HCPCS | Performed by: ORTHOPAEDIC SURGERY

## 2022-07-08 PROCEDURE — 99203 OFFICE O/P NEW LOW 30 MIN: CPT | Performed by: ORTHOPAEDIC SURGERY

## 2022-07-08 PROCEDURE — 4004F PT TOBACCO SCREEN RCVD TLK: CPT | Performed by: ORTHOPAEDIC SURGERY

## 2022-07-08 PROCEDURE — G8417 CALC BMI ABV UP PARAM F/U: HCPCS | Performed by: ORTHOPAEDIC SURGERY

## 2022-07-08 RX ORDER — MELOXICAM 15 MG/1
15 TABLET ORAL DAILY
Qty: 30 TABLET | Refills: 3 | Status: SHIPPED | OUTPATIENT
Start: 2022-07-08 | End: 2022-10-11

## 2022-07-08 NOTE — PROGRESS NOTES
Charity Ferguson M.D.            [address] [facility], [address] [facility],[address], [facility].           Dept Phone: [phone]           Dept Fax:  [address]           Lefty Velzaquez          Dept Phone: [phone]           Dept Fax:  [phone]      Chief Compliant:  Chief Complaint   Patient presents with    Pain     both knees        History of Present Illness: This is a 62 y.o. male who presents to the clinic today for evaluation / follow up of bilateral knee pain. Patient is a 55-year-old gentleman who describes bilateral knee but also leg pain. Denies any injury or trauma. He notes that he is on a recent trip in [address] [place] [address] and he states that after walking a certain amount of time or certain distance he needed to sit down. He does have a history of having low back issues and he is in pain management for this he has had had injections in the past.  He states he has not actually seen an actual orthopedic/back specialist.       Review of Systems   Constitutional: Negative for fever, chills, sweats. Eyes: Negative for changes in vision, or pain. HENT: Negative for ear ache, epistaxis, or sore throat. Respiratory/Cardio: Negative for Chest pain, palpitations, SOB, or cough. Gastrointestinal: Negative for abdominal pain, N/V/D. Genitourinary: Negative for dysuria, frequency, urgency, or hematuria. Neurological: Negative for headache, numbness, or weakness. Integumentary: Negative for rash, itching, laceration, or abrasion. Musculoskeletal: Positive for Pain (both knees)       Physical Exam:  Constitutional: Patient is oriented to person, place, and time. Patient appears well-developed and well nourished.    HENT: Negative otherwise noted  Head: Normocephalic and Atraumatic  Nose: Normal  Eyes: Conjunctivae and EOM are normal  Neck: Normal range of motion Neck supple. Respiratory/Cardio: Effort normal. No respiratory distress. Musculoskeletal: Examination of both knees was extremely benign he has full range of motion 0 to 130 degrees he has nothing on Uri's's collaterals or cruciates are appropriate there are no effusions. Patient has no pain on rotation of his hips. Neurological: Patient is alert and oriented to person, place, and time. Normal strenght. No sensory deficit. Skin: Skin is warm and dry  Psychiatric: Behavior is normal. Thought content normal.  Nursing note and vitals reviewed. Labs and Imaging:     XR taken today:  XR KNEE LEFT (1-2 VIEWS)    Result Date: 7/8/2022  X-rays taken today reviewed by me show standing AP and lateral views the patient's left knee. Patient has very minimal medial joint space narrowing the left knee with some minimal subchondral sclerosis. No other acute or chronic process noted on AP or lateral views    XR KNEE RIGHT (1-2 VIEWS)    Result Date: 7/8/2022  X-rays taken today reviewed by me show standing AP lateral views the patient's right knee. Patient has very minimal medial joint space narrowing and subchondral sclerosis of the right knee otherwise unremarkable for any acute or chronic process on AP or lateral views. Orders Placed This Encounter   Procedures    XR KNEE LEFT (1-2 VIEWS)     Standing Status:   Future     Number of Occurrences:   1     Standing Expiration Date:   7/8/2023    XR KNEE RIGHT (1-2 VIEWS)     Standing Status:   Future     Number of Occurrences:   1     Standing Expiration Date:   7/8/2023       Assessment and Plan:  1. Pain in both knees, unspecified chronicity    2. Probable spinal stenosis        This is a 62 y.o. male who presents to the clinic today for evaluation / follow up of probable spinal stenosis.      Past History:    Current Outpatient Medications:     ARIPiprazole (ABILIFY) 5 MG tablet, take 1/2 tablet by mouth daily for 7 days then 1 tablet daily, Disp: , Rfl:     Misc. Devices MISC, 1 PAIR OF DIABETIC SHOES (1 LEFT/ 1 RIGHT) 1-3 PAIRS OF INSERTS (LEFT/ RIGHT), Disp: 2 each, Rfl: 0    metFORMIN (GLUCOPHAGE-XR) 500 MG extended release tablet, take 1 tablet by mouth once daily WITH BREAKFAST, Disp: 30 tablet, Rfl: 5    levocetirizine (XYZAL) 5 MG tablet, take 1 tablet by mouth once daily, Disp: 30 tablet, Rfl: 3    diazePAM (VALIUM) 5 MG tablet, take 1-2 tablets by mouth 40 MINUTES prior to procedure if needed, Disp: , Rfl:     albuterol sulfate  (90 Base) MCG/ACT inhaler, inhale 2 puffs by mouth and INTO THE LUNGS every 6 hours if needed for cough shortness of breath or wheezing, Disp: 8.5 g, Rfl: 3    amLODIPine (NORVASC) 10 MG tablet, take 1 tablet by mouth once daily, Disp: 90 tablet, Rfl: 1    glucose monitoring (FREESTYLE FREEDOM) kit, 1 kit by Does not apply route daily Any brand covered by insurance, Disp: 1 kit, Rfl: 0    Lancets MISC, 1 each by Does not apply route daily, Disp: 100 each, Rfl: 5    blood glucose monitor strips, Check daily, Disp: 120 strip, Rfl: 3    memantine (NAMENDA) 10 MG tablet, Take 1 tablet by mouth 2 times daily Starting Feb 1st 2022, Disp: 180 tablet, Rfl: 1    carvedilol (COREG) 25 MG tablet, take 1 tablet by mouth twice a day, Disp: 180 tablet, Rfl: 2    atorvastatin (LIPITOR) 10 MG tablet, take 1 tablet by mouth once daily, Disp: 90 tablet, Rfl: 3    topiramate (TOPAMAX) 50 MG tablet, take 1 AND 1/2 tablet by mouth twice a day, Disp: 90 tablet, Rfl: 6    oxyCODONE-acetaminophen (PERCOCET) 5-325 MG per tablet, , Disp: , Rfl:     traZODone (DESYREL) 50 MG tablet, take 2 tablets by mouth at bedtime, Disp: 60 tablet, Rfl: 11    TRINTELLIX 20 MG TABS tablet, 20 mg , Disp: , Rfl:     gabapentin (NEURONTIN) 300 MG capsule, Take 300 mg by mouth daily.  Takes at HS, Disp: , Rfl:     tiZANidine (ZANAFLEX) 2 MG tablet, take 1 tablet by mouth at bedtime, Disp: , Rfl:     mometasone (NASONEX) 50 Year: Never true    Ran Out of Food in the Last Year: Never true   Transportation Needs:     Lack of Transportation (Medical): Not on file    Lack of Transportation (Non-Medical): Not on file   Physical Activity:     Days of Exercise per Week: Not on file    Minutes of Exercise per Session: Not on file   Stress:     Feeling of Stress : Not on file   Social Connections:     Frequency of Communication with Friends and Family: Not on file    Frequency of Social Gatherings with Friends and Family: Not on file    Attends Episcopal Services: Not on file    Active Member of [address] [place] [address] [employer] or Organizations: Not on file    Attends Club or Organization Meetings: Not on file    Marital Status: Not on file   Intimate Partner Violence:     Fear of Current or Ex-Partner: Not on file    Emotionally Abused: Not on file    Physically Abused: Not on file    Sexually Abused: Not on file   Housing Stability:     Unable to Pay for Housing in the Last Year: Not on file    Number of [place] in the Last Year: Not on file    Unstable Housing in the Last Year: Not on file     Past Medical History:   Diagnosis Date    ADHD (attention deficit hyperactivity disorder)     no tx per pt.  Arthritis     knees, hands, back.  Asthma     as child. pcp manages    Back pain     [facility] .Last seen 10/26/2021. Goes again 11/17/2021    Brain aneurysm     Dr. Leesa Stanley at   [facility]  f/u 12/29/2021. No surgery at this time. Monitoring yearly.  CAD (coronary artery disease)     Dr. Soila Muñoz, [place]  last visit 11/9/2021. Pt. states never had heart cath. MI 2011    Cataract     lt eye   no surgery    Cerebral artery occlusion with cerebral infarction [facility]) 2014    mini stroke went to [facility] and was treated. Dr. Phill Remy.  Last seen summer 2021    Depression     pcp    Diabetes mellitus ([facility] [address]     on rx   A1C high per pt    Heart attack [facility])     2011   Last visit Dr. Stefanie Whitfield 11/9/2021 [place], [place]   Received cardiology clearance    Hyperlipidemia     on rx    Hypertension     pcp manages    Kidney stones     Dr. Jordan Salguero treats. Pt. states he currently has blood in his urine. Urine CS sent to lab today. No pain per pt.  Memory loss, long term     Pt states he has had it for years and follows w/ his neurologist Dr. Selam Chand. Mother has Alzheimers.  Mini stroke ([address]     2014    Osteoarthritis     Seasonal allergies     Sinus problem     drainage from allergy    Snores     has not been tested for sleep apnea. Sees Dr. Susi Ortega. He provides rx for Trazodone.     Wears eyeglasses     Wellness examination     last visit 11/1/2021     Past Surgical History:   Procedure Laterality Date    CHOLECYSTECTOMY      2016    COLONOSCOPY  2021    had cologuard and pt. states it was negative    CYSTO/URETERO/PYELOSCOPY, CALCULUS TX Right 02/05/2020    CYSTO, URETEROSCOPY, RIGHT URETERAL STENT PLACEMENT, RETROGRADE PYELOGRAM performed by Paul Mays MD at [facility], rt. inguinal    INGUINAL HERNIA REPAIR Left 08/19/2016    LAPAROSCOPY N/A 01/16/2018    DIAGNOSTIC LAPAROSCOPY performed by Nicanor Krause MD at [address] [place], [place] [address]  11/24/2021    PENIS PROSTHESIS INSERTION    PENILE PROSTHESIS PLACEMENT N/A 11/24/2021    PENIS PROSTHESIS INSERTION- AMS INFLATABLE, **SHORT STAY** performed by Paul Mays MD at . [facility] [address]       Family History   Problem Relation Age of Onset    Cancer Father         throat    Cirrhosis Father     High Blood Pressure Sister     Asthma Sister     Arthritis Sister     High Blood Pressure Mother     Heart Disease Mother     Alzheimer's Disease Mother    Plan  Patient will be given a prescription for Mobic 15 mg p.o. daily in the meantime however I feel the crux of his problem is coming from his back I will refer him to my partner for further evaluation or work-up      Provider Attestation:  Francisco Elder, personally performed the services described in this documentation. All medical record entries made by the scribe were at my direction and in my presence. I have reviewed the chart and discharge instructions and agree that the records reflect my personal performance and is accurate and complete. Idalmis Tan MD. 07/08/22      Please note that this chart was generated using voice recognition Dragon dictation software. Although every effort was made to ensure the accuracy of this automated transcription, some errors in transcription may have occurred.

## 2022-07-11 RX ORDER — CARVEDILOL 25 MG/1
TABLET ORAL
Qty: 180 TABLET | Refills: 2 | Status: SHIPPED | OUTPATIENT
Start: 2022-07-11

## 2022-07-19 ENCOUNTER — HOSPITAL ENCOUNTER (OUTPATIENT)
Dept: SLEEP CENTER | Age: 62
Discharge: HOME | End: 2022-07-21
Payer: MEDICARE

## 2022-07-19 VITALS — HEIGHT: 71 IN | BODY MASS INDEX: 36.4 KG/M2 | WEIGHT: 260 LBS

## 2022-07-19 DIAGNOSIS — R06.83 SNORING: ICD-10-CM

## 2022-07-19 DIAGNOSIS — G47.10 EXCESSIVE SLEEPINESS: ICD-10-CM

## 2022-07-19 PROCEDURE — 95810 POLYSOM 6/> YRS 4/> PARAM: CPT

## 2022-07-20 ENCOUNTER — OFFICE VISIT (OUTPATIENT)
Dept: NEUROLOGY | Age: 62
End: 2022-07-20
Payer: MEDICARE

## 2022-07-20 VITALS
WEIGHT: 258 LBS | SYSTOLIC BLOOD PRESSURE: 97 MMHG | BODY MASS INDEX: 36.12 KG/M2 | DIASTOLIC BLOOD PRESSURE: 67 MMHG | HEIGHT: 71 IN | HEART RATE: 74 BPM

## 2022-07-20 DIAGNOSIS — G89.29 CHRONIC LOW BACK PAIN WITH SCIATICA, SCIATICA LATERALITY UNSPECIFIED, UNSPECIFIED BACK PAIN LATERALITY: ICD-10-CM

## 2022-07-20 DIAGNOSIS — M54.40 CHRONIC LOW BACK PAIN WITH SCIATICA, SCIATICA LATERALITY UNSPECIFIED, UNSPECIFIED BACK PAIN LATERALITY: ICD-10-CM

## 2022-07-20 DIAGNOSIS — G43.009 MIGRAINE WITHOUT AURA AND WITHOUT STATUS MIGRAINOSUS, NOT INTRACTABLE: ICD-10-CM

## 2022-07-20 DIAGNOSIS — G47.33 OBSTRUCTIVE SLEEP APNEA SYNDROME: ICD-10-CM

## 2022-07-20 DIAGNOSIS — R41.3 MEMORY LOSS: Primary | ICD-10-CM

## 2022-07-20 PROCEDURE — 99214 OFFICE O/P EST MOD 30 MIN: CPT | Performed by: PSYCHIATRY & NEUROLOGY

## 2022-07-20 RX ORDER — MEMANTINE HYDROCHLORIDE 10 MG/1
10 TABLET ORAL 2 TIMES DAILY
Qty: 180 TABLET | Refills: 1 | Status: SHIPPED | OUTPATIENT
Start: 2022-07-20

## 2022-07-20 RX ORDER — TRAZODONE HYDROCHLORIDE 50 MG/1
TABLET ORAL
Qty: 180 TABLET | Refills: 2 | Status: SHIPPED | OUTPATIENT
Start: 2022-07-20

## 2022-07-20 RX ORDER — TOPIRAMATE 50 MG/1
TABLET, FILM COATED ORAL
Qty: 180 TABLET | Refills: 1 | Status: SHIPPED | OUTPATIENT
Start: 2022-07-20

## 2022-07-20 RX ORDER — GABAPENTIN 600 MG/1
TABLET, FILM COATED ORAL
COMMUNITY
Start: 2022-07-05

## 2022-07-20 NOTE — PROGRESS NOTES
Subjective:      Patient ID: Rick Durant is a 62 y.o. male. HPI  Active problem initiating insomnia on desyrel to consolidate sleep . There are migraine headaches , depression with memory complaints and sensory neuropathy  . There is also chronic low back pain followed by pain clinic . The condition is he reports that his sleep is doing well with desyrel and OTC sleeping aide . He reports that he is doing better sleeping sounder on desyrel 100 mg po qhs  and melatonin 10 mg to desyrel at night going to bed at 10:30 PM falling asleep within few minutes sleeping to 6 AM having one to two awakenings able to fall back asleep  . He had sleep study last night results pending  There is some sleepiness during day if comfortable There is history snoring having no bed partner . Depression is under good control on trintellix and abilify . He has sensory neuropathy on neurontin 300 mg po bid . His weight is stable at 258 lbs . There is low back pain on percocet through pain clinic . He is having headache twice per month  These are over bilateral frontal head of throbbing of grade 7 to 8 over 10 on topamax 50 mg po tid  . He is on namenda 10 g po bid having trouble recalling things . Significant medications desyrel 100 mg po qhs , topamax 50 mg po tid , cymbalta 30 mg po qhs , neurontin 300 mg po bid , namenda 10 mg po bid . Testing Head CT normal . 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 Arthritis     knees, hands, back. Asthma     as child. pcp manages    Back pain     [address] [facility] .Last seen 10/26/2021. Goes again 11/17/2021    Brain aneurysm     Dr. Quynh Pathak at   [facility]  f/u 12/29/2021. No surgery at this time. Monitoring yearly. CAD (coronary artery disease)     Dr. Gillian Chavez, [place]  last visit 11/9/2021. Pt. states never had heart cath.  MI 2011 Cataract     lt eye   no surgery    Cerebral artery occlusion with cerebral infarction [facility]) 2014    mini stroke went to [facility] and was treated. Dr. Josh Sewell. Last seen summer 2021    Depression     pcp    Diabetes mellitus ([facility] [address]     on rx   A1C high per pt    Heart attack [facility])     2011   Last visit Dr. Brooklyn Nieves 11/9/2021  [place], [place]   Received cardiology clearance    Hyperlipidemia     on rx    Hypertension     pcp manages    Kidney stones     Dr. Eliu Liriano treats. Pt. states he currently has blood in his urine. Urine CS sent to lab today. No pain per pt. Memory loss, long term     Pt states he has had it for years and follows w/ his neurologist Dr. Zuleima Bailey. Mother has Alzheimers. Mini stroke ([facility] [address]     2014    Osteoarthritis     Seasonal allergies     Sinus problem     drainage from allergy    Snores     has not been tested for sleep apnea. Sees Dr. Lisa Fairchild. He provides rx for Trazodone.     Wears eyeglasses     Wellness examination     last visit 11/1/2021       Past Surgical History:   Procedure Laterality Date    CHOLECYSTECTOMY      2016    COLONOSCOPY  2021    had cologuard and pt. states it was negative    CYSTO/URETERO/PYELOSCOPY, CALCULUS TX Right 02/05/2020    CYSTO, URETEROSCOPY, RIGHT URETERAL STENT PLACEMENT, RETROGRADE PYELOGRAM performed by Bertrand Rasmussen MD at [address] [facility], rt. inguinal    INGUINAL HERNIA REPAIR Left 08/19/2016    LAPAROSCOPY N/A 01/16/2018    DIAGNOSTIC LAPAROSCOPY performed by Wilhemina Olszewski, MD at [place] [address]  11/24/2021    PENIS PROSTHESIS INSERTION    PENILE PROSTHESIS PLACEMENT N/A 11/24/2021    PENIS PROSTHESIS INSERTION- AMS INFLATABLE, **SHORT STAY** performed by Bertrand Rasmussen MD at . [facility] [address]         Family History   Problem Relation Age of Onset    Cancer Father         throat    Cirrhosis Father     High Blood Pressure Sister     Asthma Sister     Arthritis Sister     High Blood Pressure Mother     Heart Disease Mother     Alzheimer's Disease Mother        Social History     Socioeconomic History    Marital status: Single     Spouse name: None    Number of children: None    Years of education: None    Highest education level: None   Tobacco Use    Smoking status: Every Day     Packs/day: 0.25     Years: 28.00     Pack years: 7.00     Types: Cigarettes    Smokeless tobacco: Never   Vaping Use    Vaping Use: Never used   Substance and Sexual Activity    Alcohol use: Yes     Comment: rarely    Drug use: No     Social Determinants of Health     Financial Resource Strain: Low Risk     Difficulty of Paying Living Expenses: Not hard at all   Food Insecurity: No Food Insecurity    Worried About Running Out of Food in the Last Year: Never true    Ran Out of Food in the Last Year: Never true       Current Outpatient Medications   Medication Sig Dispense Refill    GRALISE 600 MG TABS       memantine (NAMENDA) 10 MG tablet Take 1 tablet by mouth in the morning and 1 tablet before bedtime. Starting Feb 1st 2022. 180 tablet 1    topiramate (TOPAMAX) 50 MG tablet take 1 po qAM 2 po qhs 180 tablet 1    traZODone (DESYREL) 50 MG tablet take 2 tablets by mouth at bedtime 180 tablet 2    carvedilol (COREG) 25 MG tablet take 1 tablet by mouth twice a day 180 tablet 2    meloxicam (MOBIC) 15 MG tablet Take 1 tablet by mouth daily 30 tablet 3    ARIPiprazole (ABILIFY) 5 MG tablet take 1/2 tablet by mouth daily for 7 days then 1 tablet daily      Misc.  Devices MISC 1 PAIR OF DIABETIC SHOES (1 LEFT/ 1 RIGHT)  1-3 PAIRS OF INSERTS (LEFT/ RIGHT) 2 each 0    metFORMIN (GLUCOPHAGE-XR) 500 MG extended release tablet take 1 tablet by mouth once daily WITH BREAKFAST 30 tablet 5    levocetirizine (XYZAL) 5 MG tablet take 1 tablet by mouth once daily 30 tablet 3    diazePAM (VALIUM) 5 MG tablet take 1-2 tablets by mouth 40 MINUTES prior to procedure if needed albuterol sulfate  (90 Base) MCG/ACT inhaler inhale 2 puffs by mouth and INTO THE LUNGS every 6 hours if needed for cough shortness of breath or wheezing 8.5 g 3    amLODIPine (NORVASC) 10 MG tablet take 1 tablet by mouth once daily 90 tablet 1    glucose monitoring (FREESTYLE FREEDOM) kit 1 kit by Does not apply route daily Any brand covered by insurance 1 kit 0    Lancets MISC 1 each by Does not apply route daily 100 each 5    blood glucose monitor strips Check daily 120 strip 3    atorvastatin (LIPITOR) 10 MG tablet take 1 tablet by mouth once daily 90 tablet 3    oxyCODONE-acetaminophen (PERCOCET) 5-325 MG per tablet       TRINTELLIX 20 MG TABS tablet 20 mg       gabapentin (NEURONTIN) 300 MG capsule Take 300 mg by mouth daily. Takes at HS      tiZANidine (ZANAFLEX) 2 MG tablet take 1 tablet by mouth at bedtime      mometasone (NASONEX) 50 MCG/ACT nasal spray 2 sprays by Nasal route daily 1 Inhaler 3    fluticasone-salmeterol (ADVAIR) 100-50 MCG/DOSE diskus inhaler Inhale 1 puff into the lungs every 12 hours 60 each 3    diclofenac sodium (VOLTAREN) 1 % GEL Stop 1 week prior to surgery      nitroGLYCERIN (NITROSTAT) 0.3 MG SL tablet place 1 tablet under the tongue if needed every 5 minutes for chest pain for 3 doses IF NO RELIEF AF (Patient taking differently: Hasn't  Taken in years.) 25 tablet 3    aspirin 81 MG tablet Take 81 mg by mouth daily Stop 11/17/2021 per surgeon. On asa per Dr. Brooke Cagle. Clearance was obtained. No current facility-administered medications for this visit. Allergies   Allergen Reactions    Lisinopril      States he is allergic to the generic forms of medication, can take lisinopril    Aleve  [Naproxen Sodium] Nausea Only    Amitriptyline Hcl Other (See Comments)     rash    Exelon [Rivastigmine] Other (See Comments)     Patient stated patch left a \"burn kathy\" on his skin     Ibuprofen Other (See Comments)     \"hurts my stomach. \"       Review of Systems    Objective: Physical Exam  Vitals:    07/20/22 1128   BP: 97/67   Pulse: 74     weight: 258 lb (117 kg)    Neurological Examination  Constitutional .General exam well groomed   Head/Ears /Nose/Throat: external ear . Normal exam  Neck and thyroid . Normal size. No bruits  Respiratory . Breathsounds clear bilaterally  Cardiovascular: Auscultation of heart with regular rate and rhythm  Musculoskeletal. Muscle bulk and tone normal                                                           Muscle strength 5/5 strength throughout                                                                                No dysmetria or dysdiadokinesis  No tremor   Normal fine motor  Gait normal   Orientation Alert and oriented x 3 . Wednesday July 19 , 2022 . [place] .  [place] . WORLD able to spall forward and bacwards . Serial 7 to 93   Attention and concentration normal  Short term memory 3 words out of 3 in one minute  Language process and speech normal . No aphasia   Cranial nerve 2 normal acuety and visual fields  Cranial nerve 3, 4 and 6 . Extraocular muscles are intact . Pupils are equal and reactive   Cranial nerve 5 . Intact corneal reflex. Normal facial sensation  Cranial nerve 7 normal exam   Cranial nerve 8. Grossly intact hearing   Cranial nerve 9 and 10. Symmetric palate elevation   Cranial nerve 11 , 5 out of 5 strength   Cranial Nerve 12 midline tongue . No atrophy  Sensation . Decreasedd pinprick and light touch distal lower extremities   Deep Tendon Reflexes normal  Plantar response flexor bilaterally    Assessment:       Diagnosis Orders   1. Memory loss        2. Migraine without aura and without status migrainosus, not intractable        3. Obstructive sleep apnea syndrome        4.  Chronic low back pain with sciatica, sciatica laterality unspecified, unspecified back pain laterality        Will have him change topamax dosing to 50 mg po qAM , 100 mg po qhs awaiting sleep study results otherwise leaving regimen unnchanged      Plan:      As above       Marianna Falk is a 62 y.o. male being evaluated by a Virtual Visit (video visit) encounter to address concerns as mentioned above. A caregiver was present when appropriate. Due to this being a TeleHealth encounter (During JRMRB-01 public health emergency), evaluation of the following organ systems was limited: Vitals/Constitutional/EENT/Resp/CV/GI//MS/Neuro/Skin/Heme-Lymph-Imm. Pursuant to the emergency declaration under the [address] [place] [address] authority and the [employer] Act, this Virtual Visit was conducted with patient's (and/or legal guardian's) consent, to reduce the patient's risk of exposure to COVID-19 and provide necessary medical care. The patient (and/or legal guardian) has also been advised to contact this office for worsening conditions or problems, and seek emergency medical treatment and/or call 911 if deemed necessary. --Kellie Martins MD on 7/21/2022 at 11:54 AM    An electronic signature was used to authenticate this note.   Kellie Martins MD

## 2022-07-26 ENCOUNTER — OFFICE VISIT (OUTPATIENT)
Dept: ORTHOPEDIC SURGERY | Age: 62
End: 2022-07-26
Payer: MEDICARE

## 2022-07-26 VITALS — BODY MASS INDEX: 36.12 KG/M2 | WEIGHT: 258 LBS | RESPIRATION RATE: 14 BRPM | HEIGHT: 71 IN

## 2022-07-26 DIAGNOSIS — M54.50 LOW BACK PAIN, UNSPECIFIED BACK PAIN LATERALITY, UNSPECIFIED CHRONICITY, UNSPECIFIED WHETHER SCIATICA PRESENT: Primary | ICD-10-CM

## 2022-07-26 DIAGNOSIS — M54.16 LUMBAR RADICULAR PAIN: ICD-10-CM

## 2022-07-26 PROCEDURE — 4004F PT TOBACCO SCREEN RCVD TLK: CPT | Performed by: ORTHOPAEDIC SURGERY

## 2022-07-26 PROCEDURE — 3017F COLORECTAL CA SCREEN DOC REV: CPT | Performed by: ORTHOPAEDIC SURGERY

## 2022-07-26 PROCEDURE — 99213 OFFICE O/P EST LOW 20 MIN: CPT | Performed by: ORTHOPAEDIC SURGERY

## 2022-07-26 PROCEDURE — G8427 DOCREV CUR MEDS BY ELIG CLIN: HCPCS | Performed by: ORTHOPAEDIC SURGERY

## 2022-07-26 PROCEDURE — G8417 CALC BMI ABV UP PARAM F/U: HCPCS | Performed by: ORTHOPAEDIC SURGERY

## 2022-07-26 RX ORDER — METFORMIN HYDROCHLORIDE 500 MG/1
TABLET, EXTENDED RELEASE ORAL
Qty: 30 TABLET | Refills: 5 | Status: SHIPPED | OUTPATIENT
Start: 2022-07-26

## 2022-07-26 NOTE — PROGRESS NOTES
Patient ID: Kymberly Medina is a 62 y.o. male    Chief Compliant:  Chief Complaint   Patient presents with    Back Pain          Diagnostic imaging:  MRI lumbar spine 2017 some minor 5 1 degenerative changes    CT scan 2021 lumbar spine normal    AP lateral lumbar spine 2022 age-appropriate normal hips      Assessment and Plan:  1. Low back pain, unspecified back pain laterality, unspecified chronicity, unspecified whether sciatica present      Patient with chronic low back pain and bilateral radicular leg pain      We will see the patient back after the MRI anticipate having the patient subsequently follow-up with pain management      MRI lumbar back     Follow up after MRI    HPI:  This is a 62 y.o. male who presents to the clinic today as a new patient for low back evaluation. Patient reports chronic low back pain along the belt line with bilateral radicular leg pain, greater at the knees. Patient notes paresthesias of bilateral legs when walking long distances. He currently follows with pain management and has underwent LESI without improvement. Previously tried and failed aquatic therapy. Hx type 2 diabetes mellitus, well controlled. Current smoker, smokes 6 cigarettes daily    Review of Systems   All other systems reviewed and are negative. Past History:    Current Outpatient Medications:     metFORMIN (GLUCOPHAGE-XR) 500 MG extended release tablet, take 1 tablet by mouth once daily WITH BREAKFAST, Disp: 30 tablet, Rfl: 5    GRALISE 600 MG TABS, , Disp: , Rfl:     memantine (NAMENDA) 10 MG tablet, Take 1 tablet by mouth in the morning and 1 tablet before bedtime.  Starting Feb 1st 2022., Disp: 180 tablet, Rfl: 1    topiramate (TOPAMAX) 50 MG tablet, take 1 po qAM 2 po qhs, Disp: 180 tablet, Rfl: 1    traZODone (DESYREL) 50 MG tablet, take 2 tablets by mouth at bedtime, Disp: 180 tablet, Rfl: 2    carvedilol (COREG) 25 MG tablet, take 1 tablet by mouth twice a day, Disp: 180 tablet, Rfl: 2    meloxicam (MOBIC) 15 MG tablet, Take 1 tablet by mouth daily, Disp: 30 tablet, Rfl: 3    ARIPiprazole (ABILIFY) 5 MG tablet, take 1/2 tablet by mouth daily for 7 days then 1 tablet daily, Disp: , Rfl:     Misc. Devices MISC, 1 PAIR OF DIABETIC SHOES (1 LEFT/ 1 RIGHT) 1-3 PAIRS OF INSERTS (LEFT/ RIGHT), Disp: 2 each, Rfl: 0    levocetirizine (XYZAL) 5 MG tablet, take 1 tablet by mouth once daily, Disp: 30 tablet, Rfl: 3    diazePAM (VALIUM) 5 MG tablet, take 1-2 tablets by mouth 40 MINUTES prior to procedure if needed, Disp: , Rfl:     albuterol sulfate  (90 Base) MCG/ACT inhaler, inhale 2 puffs by mouth and INTO THE LUNGS every 6 hours if needed for cough shortness of breath or wheezing, Disp: 8.5 g, Rfl: 3    amLODIPine (NORVASC) 10 MG tablet, take 1 tablet by mouth once daily, Disp: 90 tablet, Rfl: 1    glucose monitoring (FREESTYLE FREEDOM) kit, 1 kit by Does not apply route daily Any brand covered by insurance, Disp: 1 kit, Rfl: 0    Lancets MISC, 1 each by Does not apply route daily, Disp: 100 each, Rfl: 5    blood glucose monitor strips, Check daily, Disp: 120 strip, Rfl: 3    atorvastatin (LIPITOR) 10 MG tablet, take 1 tablet by mouth once daily, Disp: 90 tablet, Rfl: 3    oxyCODONE-acetaminophen (PERCOCET) 5-325 MG per tablet, , Disp: , Rfl:     TRINTELLIX 20 MG TABS tablet, 20 mg , Disp: , Rfl:     gabapentin (NEURONTIN) 300 MG capsule, Take 300 mg by mouth daily.  Takes at [address], Disp: , Rfl:     tiZANidine (ZANAFLEX) 2 MG tablet, take 1 tablet by mouth at bedtime, Disp: , Rfl:     mometasone (NASONEX) 50 MCG/ACT nasal spray, 2 sprays by Nasal route daily, Disp: 1 Inhaler, Rfl: 3    fluticasone-salmeterol (ADVAIR) 100-50 MCG/DOSE diskus inhaler, Inhale 1 puff into the lungs every 12 hours, Disp: 60 each, Rfl: 3    diclofenac sodium (VOLTAREN) 1 % GEL, Stop 1 week prior to surgery, Disp: , Rfl:     nitroGLYCERIN (NITROSTAT) 0.3 MG SL tablet, place 1 tablet under the tongue if needed every 5 minutes for chest pain for 3 doses IF NO RELIEF AF (Patient taking differently: Hasn't  Taken in years.), Disp: 25 tablet, Rfl: 3    aspirin 81 MG tablet, Take 81 mg by mouth daily Stop 11/17/2021 per surgeon. On asa per Dr. Rosalinda Crockett. Clearance was obtained., Disp: , Rfl:   Allergies   Allergen Reactions    Lisinopril      States he is allergic to the generic forms of medication, can take lisinopril    Aleve  [Naproxen Sodium] Nausea Only    Amitriptyline Hcl Other (See Comments)     rash    Exelon [Rivastigmine] Other (See Comments)     Patient stated patch left a \"burn kathy\" on his skin     Ibuprofen Other (See Comments)     \"hurts my stomach. \"     Social History     Socioeconomic History    Marital status: Single     Spouse name: Not on file    Number of children: Not on file    Years of education: Not on file    Highest education level: Not on file   Occupational History    Not on file   Tobacco Use    Smoking status: Every Day     Packs/day: 0.25     Years: 28.00     Pack years: 7.00     Types: Cigarettes    Smokeless tobacco: Never   Vaping Use    Vaping Use: Never used   Substance and Sexual Activity    Alcohol use: Yes     Comment: rarely    Drug use: No    Sexual activity: Not on file   Other Topics Concern    Not on file   Social History Narrative    Not on file     Social Determinants of Health     Financial Resource Strain: Low Risk     Difficulty of Paying Living Expenses: Not hard at all   Food Insecurity: No Food Insecurity    Worried About Running Out of Food in the Last Year: Never true    Ran Out of Food in the Last Year: Never true   Transportation Needs: Not on file   Physical Activity: Not on file   Stress: Not on file   Social Connections: Not on file   Intimate Partner Violence: Not on file   Housing Stability: Not on file     Past Medical History:   Diagnosis Date    ADHD (attention deficit hyperactivity disorder)     no tx per pt. Arthritis     knees, hands, back. Asthma     as child. pcp manages    Back pain     [address] [facility] .Last seen 10/26/2021. Goes again 11/17/2021    Brain aneurysm     Dr. Ld Cabezas at   [facility]  f/u 12/29/2021. No surgery at this time. Monitoring yearly. CAD (coronary artery disease)     Dr. Tena Buckner, [place]  last visit 11/9/2021. Pt. states never had heart cath. MI 2011    Cataract     lt eye   no surgery    Cerebral artery occlusion with cerebral infarction [facility]) 2014    mini stroke went to [facility] and was treated. Dr. Anju Carranza. Last seen summer 2021    Depression     pcp    Diabetes mellitus ([facility] [address]     on rx   A1C high per pt    Heart attack [facility])     2011   Last visit Dr. Deanna Molina 11/9/2021  [place], [place]   Received cardiology clearance    Hyperlipidemia     on rx    Hypertension     pcp manages    Kidney stones     Dr. Lorraine Licona treats. Pt. states he currently has blood in his urine. Urine CS sent to lab today. No pain per pt. Memory loss, long term     Pt states he has had it for years and follows w/ his neurologist Dr. Santos Ayala. Mother has Alzheimers. Mini stroke ([facility] [address]     2014    Osteoarthritis     Seasonal allergies     Sinus problem     drainage from allergy    Snores     has not been tested for sleep apnea. Sees Dr. Margarita Doll. He provides rx for Trazodone.     Wears eyeglasses     Wellness examination     last visit 11/1/2021     Past Surgical History:   Procedure Laterality Date    CHOLECYSTECTOMY      2016    COLONOSCOPY  2021    had cologuard and pt. states it was negative    CYSTO/URETERO/PYELOSCOPY, CALCULUS TX Right 02/05/2020    CYSTO, URETEROSCOPY, RIGHT URETERAL STENT PLACEMENT, RETROGRADE PYELOGRAM performed by Roni Blackburn MD at [address] rt[address] inguinal    INGUINAL HERNIA REPAIR Left 08/19/2016    LAPAROSCOPY N/A 01/16/2018    DIAGNOSTIC LAPAROSCOPY performed by Uri Al MD at [place] [address]  11/24/2021    PENIS PROSTHESIS INSERTION PENILE PROSTHESIS PLACEMENT N/A 11/24/2021    PENIS PROSTHESIS INSERTION- AMS INFLATABLE, **SHORT STAY** performed by Christiano Henry MD at . [address]       Family History   Problem Relation Age of Onset    Cancer Father         throat    Cirrhosis Father     High Blood Pressure Sister     Asthma Sister     Arthritis Sister     High Blood Pressure Mother     Heart Disease Mother     Alzheimer's Disease Mother         Physical Exam:  Vitals signs and nursing note reviewed. Constitutional:       Appearance: well-developed. HENT:      Head: Normocephalic and atraumatic. Nose: Nose normal.   Eyes:      Conjunctiva/sclera: Conjunctivae normal.   Neck:      Musculoskeletal: Normal range of motion and neck supple. Pulmonary:      Effort: Pulmonary effort is normal. No respiratory distress. Musculoskeletal:      Comments: Normal gait     Skin:     General: Skin is warm and dry. Neurological:      Mental Status: Alert and oriented to person, place, and time. Sensory: No sensory deficit. Psychiatric:         Behavior: Behavior normal.         Thought Content: Thought content normal.        Provider Attestation:  Lakeshia Canela, personally performed the services described in this documentation. All medical record entries made by the scribe were at my direction and in my presence. I have reviewed the chart and discharge instructions and agree that the records reflect my personal performance and is accurate and complete. Doretha Closs Charissa Cost, MD 7/26/22       Scribe Attestation:  By signing my name below, Gunjankishan Shahab, attest that this documentation has been prepared under the direction and in the presence of Dr. Apollo Shelby. Electronically signed: Jluis Telles, 7/26/22     Please note that this chart was generated using voice recognition Dragon dictation software.   Although every effort was made to ensure the accuracy of this automated

## 2022-08-04 DIAGNOSIS — G47.10 EXCESSIVE SLEEPINESS: Primary | ICD-10-CM

## 2022-08-04 DIAGNOSIS — G47.33 OSA (OBSTRUCTIVE SLEEP APNEA): ICD-10-CM

## 2022-08-04 PROBLEM — R06.83 SNORING: Status: ACTIVE | Noted: 2022-08-04

## 2022-08-04 LAB — STATUS: NORMAL

## 2022-08-04 PROCEDURE — 95810 POLYSOM 6/> YRS 4/> PARAM: CPT | Performed by: STUDENT IN AN ORGANIZED HEALTH CARE EDUCATION/TRAINING PROGRAM

## 2022-08-09 ENCOUNTER — OFFICE VISIT (OUTPATIENT)
Dept: PRIMARY CARE CLINIC | Age: 62
End: 2022-08-09
Payer: MEDICARE

## 2022-08-09 VITALS
WEIGHT: 260 LBS | OXYGEN SATURATION: 96 % | HEART RATE: 75 BPM | BODY MASS INDEX: 36.26 KG/M2 | SYSTOLIC BLOOD PRESSURE: 120 MMHG | DIASTOLIC BLOOD PRESSURE: 82 MMHG

## 2022-08-09 DIAGNOSIS — F48.1 DEPERSONALIZATION (HCC): ICD-10-CM

## 2022-08-09 DIAGNOSIS — G47.33 OSA (OBSTRUCTIVE SLEEP APNEA): ICD-10-CM

## 2022-08-09 DIAGNOSIS — E11.9 TYPE 2 DIABETES MELLITUS WITHOUT COMPLICATION, WITH LONG-TERM CURRENT USE OF INSULIN (HCC): Primary | ICD-10-CM

## 2022-08-09 DIAGNOSIS — Z79.4 TYPE 2 DIABETES MELLITUS WITHOUT COMPLICATION, WITH LONG-TERM CURRENT USE OF INSULIN (HCC): Primary | ICD-10-CM

## 2022-08-09 DIAGNOSIS — E78.5 DYSLIPIDEMIA: ICD-10-CM

## 2022-08-09 LAB — HBA1C MFR BLD: 5.9 %

## 2022-08-09 PROCEDURE — 2022F DILAT RTA XM EVC RTNOPTHY: CPT | Performed by: FAMILY MEDICINE

## 2022-08-09 PROCEDURE — 99214 OFFICE O/P EST MOD 30 MIN: CPT | Performed by: FAMILY MEDICINE

## 2022-08-09 PROCEDURE — G8417 CALC BMI ABV UP PARAM F/U: HCPCS | Performed by: FAMILY MEDICINE

## 2022-08-09 PROCEDURE — 3017F COLORECTAL CA SCREEN DOC REV: CPT | Performed by: FAMILY MEDICINE

## 2022-08-09 PROCEDURE — G8427 DOCREV CUR MEDS BY ELIG CLIN: HCPCS | Performed by: FAMILY MEDICINE

## 2022-08-09 PROCEDURE — 4004F PT TOBACCO SCREEN RCVD TLK: CPT | Performed by: FAMILY MEDICINE

## 2022-08-09 PROCEDURE — 3044F HG A1C LEVEL LT 7.0%: CPT | Performed by: FAMILY MEDICINE

## 2022-08-09 PROCEDURE — 83036 HEMOGLOBIN GLYCOSYLATED A1C: CPT | Performed by: FAMILY MEDICINE

## 2022-08-09 SDOH — ECONOMIC STABILITY: FOOD INSECURITY: WITHIN THE PAST 12 MONTHS, YOU WORRIED THAT YOUR FOOD WOULD RUN OUT BEFORE YOU GOT MONEY TO BUY MORE.: NEVER TRUE

## 2022-08-09 SDOH — ECONOMIC STABILITY: FOOD INSECURITY: WITHIN THE PAST 12 MONTHS, THE FOOD YOU BOUGHT JUST DIDN'T LAST AND YOU DIDN'T HAVE MONEY TO GET MORE.: NEVER TRUE

## 2022-08-09 ASSESSMENT — ENCOUNTER SYMPTOMS
VOMITING: 0
NAUSEA: 0
BACK PAIN: 1

## 2022-08-09 ASSESSMENT — PATIENT HEALTH QUESTIONNAIRE - PHQ9
SUM OF ALL RESPONSES TO PHQ QUESTIONS 1-9: 0
8. MOVING OR SPEAKING SO SLOWLY THAT OTHER PEOPLE COULD HAVE NOTICED. OR THE OPPOSITE, BEING SO FIGETY OR RESTLESS THAT YOU HAVE BEEN MOVING AROUND A LOT MORE THAN USUAL: 0
9. THOUGHTS THAT YOU WOULD BE BETTER OFF DEAD, OR OF HURTING YOURSELF: 0
SUM OF ALL RESPONSES TO PHQ QUESTIONS 1-9: 0
SUM OF ALL RESPONSES TO PHQ9 QUESTIONS 1 & 2: 0
5. POOR APPETITE OR OVEREATING: 0
10. IF YOU CHECKED OFF ANY PROBLEMS, HOW DIFFICULT HAVE THESE PROBLEMS MADE IT FOR YOU TO DO YOUR WORK, TAKE CARE OF THINGS AT HOME, OR GET ALONG WITH OTHER PEOPLE: 0
3. TROUBLE FALLING OR STAYING ASLEEP: 0
4. FEELING TIRED OR HAVING LITTLE ENERGY: 0
2. FEELING DOWN, DEPRESSED OR HOPELESS: 0
SUM OF ALL RESPONSES TO PHQ QUESTIONS 1-9: 0
7. TROUBLE CONCENTRATING ON THINGS, SUCH AS READING THE NEWSPAPER OR WATCHING TELEVISION: 0
1. LITTLE INTEREST OR PLEASURE IN DOING THINGS: 0
SUM OF ALL RESPONSES TO PHQ QUESTIONS 1-9: 0
6. FEELING BAD ABOUT YOURSELF - OR THAT YOU ARE A FAILURE OR HAVE LET YOURSELF OR YOUR FAMILY DOWN: 0

## 2022-08-09 ASSESSMENT — SOCIAL DETERMINANTS OF HEALTH (SDOH): HOW HARD IS IT FOR YOU TO PAY FOR THE VERY BASICS LIKE FOOD, HOUSING, MEDICAL CARE, AND HEATING?: NOT HARD AT ALL

## 2022-08-09 NOTE — PROGRESS NOTES
[address] PRIMARY CARE  [address] [facility] [address]  [address] [address]  Dept: [phone]  Dept Fax: [phone]    Jose Manuel Whiteside is a 62 y.o. male who presents today for his medical conditions/complaints as noted below. Jose Manuel Whiteside is c/o of  Chief Complaint   Patient presents with    Diabetes         HPI:     HPI    Pt here today for his diabetes follow up. A1c 5.9, last eye exam this year. Has cataracts, supposed to see specialist for this. Pt following with - saw him for back pain. Has sciatica, has MRI that was ordered. Knees make cracking sound was seen by orthopedics . States gets occasional pain. Talked to his psyciatrist- feels like his body gets up before his soul gets up at times when he is getting out of bed. States it was called depersonalization by his psychiatrist.   Sometimes hears his sisters ( who passed) voice calling him. Had sleep study that showed mild sleep apnea and titration study was recommended, I have provided him with scheduling number. Hemoglobin A1C (%)   Date Value   2022 5.9   2022 5.9   2022 5.7             ( goal A1C is < 7)   Microalb/Crt. Ratio (mcg/mg creat)   Date Value   2017 17 (H)     LDL Cholesterol (mg/dL)   Date Value   2021 51     LDL Calculated (mg/dL)   Date Value   2019 95       (goal LDL is <100)   AST (U/L)   Date Value   2017 25     ALT (U/L)   Date Value   2021 18     BUN (mg/dL)   Date Value   2021 14     BP Readings from Last 3 Encounters:   22 120/82   22 97/67   22 128/80          (goal 120/80)    Past Medical History:   Diagnosis Date    ADHD (attention deficit hyperactivity disorder)     no tx per pt. Arthritis     knees, hands, back. Asthma     as child. pcp manages    Back pain     [address] .Last seen 10/26/2021.  Goes again 2021    Brain aneurysm     Dr. Cary Camara at   [facility]  f/u 12/29/2021. No surgery at this time. Monitoring yearly. CAD (coronary artery disease)     Dr. Lisa Odom, [facility]  last visit 11/9/2021. Pt. states never had heart cath. MI 2011    Cataract     lt eye   no surgery    Cerebral artery occlusion with cerebral infarction [facility]) 2014    mini stroke went to [facility] and was treated. Dr. Easton Logan. Last seen summer 2021    Depression     pcp    Diabetes mellitus ([facility]     on rx   A1C high per pt    Heart attack [facility])     2011   Last visit Dr. Narciso Ackerman 11/9/2021  [facility]   Received cardiology clearance    Hyperlipidemia     on rx    Hypertension     pcp manages    Kidney stones     Dr. Mylene Thomas treats. Pt. states he currently has blood in his urine. Urine CS sent to lab today. No pain per pt. Memory loss, long term     Pt states he has had it for years and follows w/ his neurologist Dr. Brunilda Duvall. Mother has Alzheimers. Mini stroke ([facility]     2014    Osteoarthritis     Seasonal allergies     Sinus problem     drainage from allergy    Snores     has not been tested for sleep apnea. Sees Dr. Niko Dye. He provides rx for Trazodone.     Wears eyeglasses     Wellness examination     last visit 11/1/2021      Past Surgical History:   Procedure Laterality Date    CHOLECYSTECTOMY      2016    COLONOSCOPY  2021    had cologuard and pt. states it was negative    CYSTO/URETERO/PYELOSCOPY, CALCULUS TX Right 02/05/2020    CYSTO, URETEROSCOPY, RIGHT URETERAL STENT PLACEMENT, RETROGRADE PYELOGRAM performed by Andrea Forrest MD at [address], rt. inguinal    INGUINAL HERNIA REPAIR Left 08/19/2016    LAPAROSCOPY N/A 01/16/2018    DIAGNOSTIC LAPAROSCOPY performed by Martin Hough MD at [place] [id number]  11/24/2021    PENIS PROSTHESIS INSERTION    PENILE PROSTHESIS PLACEMENT N/A 11/24/2021    PENIS PROSTHESIS INSERTION- AMS INFLATABLE, **SHORT STAY** performed by Juan C Cabezas Jordan Salguero MD at . [address]         Family History   Problem Relation Age of Onset    Cancer Father         throat    Cirrhosis Father     High Blood Pressure Sister     Asthma Sister     Arthritis Sister     High Blood Pressure Mother     Heart Disease Mother     Alzheimer's Disease Mother        Social History     Tobacco Use    Smoking status: Every Day     Packs/day: 0.25     Years: 28.00     Pack years: 7.00     Types: Cigarettes    Smokeless tobacco: Never   Substance Use Topics    Alcohol use: Yes     Comment: rarely      Current Outpatient Medications   Medication Sig Dispense Refill    metFORMIN (GLUCOPHAGE-XR) 500 MG extended release tablet take 1 tablet by mouth once daily WITH BREAKFAST 30 tablet 5    GRALISE 600 MG TABS       memantine (NAMENDA) 10 MG tablet Take 1 tablet by mouth in the morning and 1 tablet before bedtime. Starting Feb 1st 2022. 180 tablet 1    topiramate (TOPAMAX) 50 MG tablet take 1 po qAM 2 po qhs 180 tablet 1    traZODone (DESYREL) 50 MG tablet take 2 tablets by mouth at bedtime 180 tablet 2    carvedilol (COREG) 25 MG tablet take 1 tablet by mouth twice a day 180 tablet 2    meloxicam (MOBIC) 15 MG tablet Take 1 tablet by mouth daily 30 tablet 3    ARIPiprazole (ABILIFY) 5 MG tablet take 1/2 tablet by mouth daily for 7 days then 1 tablet daily      Misc.  Devices MISC 1 PAIR OF DIABETIC SHOES (1 LEFT/ 1 RIGHT)  1-3 PAIRS OF INSERTS (LEFT/ RIGHT) 2 each 0    levocetirizine (XYZAL) 5 MG tablet take 1 tablet by mouth once daily 30 tablet 3    diazePAM (VALIUM) 5 MG tablet take 1-2 tablets by mouth 40 MINUTES prior to procedure if needed      albuterol sulfate  (90 Base) MCG/ACT inhaler inhale 2 puffs by mouth and INTO THE LUNGS every 6 hours if needed for cough shortness of breath or wheezing 8.5 g 3    amLODIPine (NORVASC) 10 MG tablet take 1 tablet by mouth once daily 90 tablet 1    glucose monitoring (FREESTYLE FREEDOM) kit 1 kit by Does not apply route daily Any brand covered by insurance 1 kit 0    Lancets MISC 1 each by Does not apply route daily 100 each 5    blood glucose monitor strips Check daily 120 strip 3    atorvastatin (LIPITOR) 10 MG tablet take 1 tablet by mouth once daily 90 tablet 3    oxyCODONE-acetaminophen (PERCOCET) 5-325 MG per tablet       TRINTELLIX 20 MG TABS tablet 20 mg       gabapentin (NEURONTIN) 300 MG capsule Take 300 mg by mouth daily. Takes at HS      tiZANidine (ZANAFLEX) 2 MG tablet take 1 tablet by mouth at bedtime      mometasone (NASONEX) 50 MCG/ACT nasal spray 2 sprays by Nasal route daily 1 Inhaler 3    fluticasone-salmeterol (ADVAIR) 100-50 MCG/DOSE diskus inhaler Inhale 1 puff into the lungs every 12 hours 60 each 3    diclofenac sodium (VOLTAREN) 1 % GEL Stop 1 week prior to surgery      nitroGLYCERIN (NITROSTAT) 0.3 MG SL tablet place 1 tablet under the tongue if needed every 5 minutes for chest pain for 3 doses IF NO RELIEF AF (Patient taking differently: Hasn't  Taken in years.) 25 tablet 3    aspirin 81 MG tablet Take 81 mg by mouth daily Stop 11/17/2021 per surgeon. On asa per Dr. Maynard Sites. Clearance was obtained. No current facility-administered medications for this visit. Allergies   Allergen Reactions    Lisinopril      States he is allergic to the generic forms of medication, can take lisinopril    Aleve  [Naproxen Sodium] Nausea Only    Amitriptyline Hcl Other (See Comments)     rash    Exelon [Rivastigmine] Other (See Comments)     Patient stated patch left a \"burn kathy\" on his skin     Ibuprofen Other (See Comments)     \"hurts my stomach. \"       Health Maintenance   Topic Date Due    Diabetic retinal exam  11/18/2017    Prostate Specific Antigen (PSA) Screening or Monitoring  04/26/2018    Lipids  07/28/2022    Flu vaccine (1) 09/01/2022    Diabetic microalbuminuria test  11/01/2022    Depression Monitoring  02/01/2023    Diabetic foot exam  06/23/2023    A1C test (Diabetic or Prediabetic)  08/09/2023    Colorectal Cancer Screen  06/27/2024    DTaP/Tdap/Td vaccine (2 - Td or Tdap) 08/02/2026    Shingles vaccine  Completed    Pneumococcal 0-64 years Vaccine  Completed    COVID-19 Vaccine  Completed    Hepatitis C screen  Completed    HIV screen  Completed    Hepatitis A vaccine  Aged Out    Hib vaccine  Aged Out    Meningococcal (ACWY) vaccine  Aged Out       Subjective:      Review of Systems   Constitutional:  Negative for chills and fever. Gastrointestinal:  Negative for nausea and vomiting. Musculoskeletal:  Positive for back pain. Psychiatric/Behavioral:          At times hears his sister's voice ( who passed)     Objective:     Physical Exam  Constitutional:       General: He is not in acute distress. Appearance: He is well-developed. He is not diaphoretic. HENT:      Head: Normocephalic and atraumatic. Eyes:      Pupils: Pupils are equal, round, and reactive to light. Cardiovascular:      Rate and Rhythm: Normal rate and regular rhythm. Heart sounds: Normal heart sounds. No murmur heard. Pulmonary:      Effort: Pulmonary effort is normal. No respiratory distress. Breath sounds: Normal breath sounds. No stridor. Musculoskeletal:      Cervical back: Neck supple. Skin:     General: Skin is warm and dry. Neurological:      Mental Status: He is alert and oriented to person, place, and time. Psychiatric:         Mood and Affect: Mood normal.         Behavior: Behavior normal.     /82   Pulse 75   Wt 260 lb (117.9 kg)   SpO2 96%   BMI 36.26 kg/m²     Assessment:      1. Type 2 diabetes mellitus without complication, with long-term current use of insulin ([facility])  -A1c controlled at 5.9. Continue current medication and healthy diet. - POCT glycosylated hemoglobin (Hb A1C)    2. RAJANI (obstructive sleep apnea)  -Number provided for scheduling for sleep titration study    3. Dyslipidemia  -Continue current medication.     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 Patient noted recent symptoms of hearing his sister's voice as well who passed away. I recommend follow up with psychiatrist regarding this. Plan:      Return in about 4 months (around 12/9/2022). Orders Placed This Encounter   Procedures    POCT glycosylated hemoglobin (Hb A1C)     No orders of the defined types were placed in this encounter. Patient given educational materials - see patient instructions. Discussed use, benefit, and side effects of prescribed medications. All patient questions answered. Pt voiced understanding. Reviewed healthmaintenance. Instructed to continue current medications, diet and exercise. Patient agreed with treatment plan. Follow up as directed.      Electronically signed by Wendy Andrew DO on 8/9/2022 at 12:31 PM

## 2022-08-11 RX ORDER — ALBUTEROL SULFATE 90 UG/1
AEROSOL, METERED RESPIRATORY (INHALATION)
Qty: 8.5 G | Refills: 3 | Status: SHIPPED | OUTPATIENT
Start: 2022-08-11

## 2022-08-24 ENCOUNTER — HOSPITAL ENCOUNTER (OUTPATIENT)
Dept: SLEEP CENTER | Age: 62
Discharge: HOME | End: 2022-08-26
Payer: MEDICARE

## 2022-08-24 DIAGNOSIS — G47.33 OSA (OBSTRUCTIVE SLEEP APNEA): ICD-10-CM

## 2022-08-24 DIAGNOSIS — G47.10 EXCESSIVE SLEEPINESS: ICD-10-CM

## 2022-08-24 PROCEDURE — 95811 POLYSOM 6/>YRS CPAP 4/> PARM: CPT

## 2022-08-24 NOTE — PAYOR INFORMATION
Verified Demographics with Patient? No   If no why? Already verified  [facility] Completed? No    If not why? Already completed  Verified Insurance through: Already verified  [employer] Completed? Not required  Auth Verification #:[id number]  Liability Due: $0  Discount Offered: na%       Previous Balance: $ 0   Discount Offered: na%  Site Collect Status: no liability  Patient Response: no liability  Financial Aid Offered?  Yes Pt declined  Cards Scanned: yes

## 2022-08-25 VITALS
HEIGHT: 71 IN | HEART RATE: 61 BPM | BODY MASS INDEX: 36.12 KG/M2 | OXYGEN SATURATION: 92 % | WEIGHT: 258 LBS | RESPIRATION RATE: 14 BRPM

## 2022-08-25 ASSESSMENT — SLEEP AND FATIGUE QUESTIONNAIRES
HOW LIKELY ARE YOU TO NOD OFF OR FALL ASLEEP WHILE SITTING AND TALKING TO SOMEONE: 0
HOW LIKELY ARE YOU TO NOD OFF OR FALL ASLEEP WHILE WATCHING TV: 3
HOW LIKELY ARE YOU TO NOD OFF OR FALL ASLEEP IN A CAR, WHILE STOPPED FOR A FEW MINUTES IN TRAFFIC: 0
HOW LIKELY ARE YOU TO NOD OFF OR FALL ASLEEP WHILE LYING DOWN TO REST IN THE AFTERNOON WHEN CIRCUMSTANCES PERMIT: 3
HOW LIKELY ARE YOU TO NOD OFF OR FALL ASLEEP WHILE SITTING INACTIVE IN A PUBLIC PLACE: 3
ESS TOTAL SCORE: 9
HOW LIKELY ARE YOU TO NOD OFF OR FALL ASLEEP WHEN YOU ARE A PASSENGER IN A CAR FOR AN HOUR WITHOUT A BREAK: 0
HOW LIKELY ARE YOU TO NOD OFF OR FALL ASLEEP WHILE SITTING AND READING: 0
HOW LIKELY ARE YOU TO NOD OFF OR FALL ASLEEP WHILE SITTING QUIETLY AFTER LUNCH WITHOUT ALCOHOL: 0

## 2022-08-29 DIAGNOSIS — R09.82 POST-NASAL DRIP: ICD-10-CM

## 2022-08-29 DIAGNOSIS — I10 ESSENTIAL HYPERTENSION: ICD-10-CM

## 2022-08-29 RX ORDER — AMLODIPINE BESYLATE 10 MG/1
TABLET ORAL
Qty: 90 TABLET | Refills: 1 | Status: SHIPPED | OUTPATIENT
Start: 2022-08-29

## 2022-08-29 RX ORDER — LEVOCETIRIZINE DIHYDROCHLORIDE 5 MG/1
TABLET, FILM COATED ORAL
Qty: 30 TABLET | Refills: 3 | Status: SHIPPED | OUTPATIENT
Start: 2022-08-29

## 2022-09-05 LAB — STATUS: NORMAL

## 2022-09-08 DIAGNOSIS — G47.33 OSA (OBSTRUCTIVE SLEEP APNEA): Primary | ICD-10-CM

## 2022-09-09 NOTE — RESULT ENCOUNTER NOTE
Please fax cpap order to [address] in [facility] and let him know we will be sending it there. Please also fax it with the sleep study result. Thanks.

## 2022-10-11 DIAGNOSIS — M25.562 PAIN IN BOTH KNEES, UNSPECIFIED CHRONICITY: ICD-10-CM

## 2022-10-11 DIAGNOSIS — M25.561 PAIN IN BOTH KNEES, UNSPECIFIED CHRONICITY: ICD-10-CM

## 2022-10-11 RX ORDER — MELOXICAM 15 MG/1
15 TABLET ORAL DAILY
Qty: 30 TABLET | Refills: 3 | Status: SHIPPED | OUTPATIENT
Start: 2022-10-11

## 2022-11-08 ENCOUNTER — TELEPHONE (OUTPATIENT)
Dept: PRIMARY CARE CLINIC | Age: 62
End: 2022-11-08

## 2022-11-08 NOTE — TELEPHONE ENCOUNTER
Patient was hospitalized with a kidney stone from 11/1-11/4/22 in [place], has a stent, and is awaiting an appt to have stent removed and have stone crushed with lithotripsy. He requested a telephone virtual post hospital visit with PCP and was advised that PCP can't do a virtual visit if the patient is in another state due to insurance licensing restrictions (PCP is licensed in [place]). Patient will call us once he is back in the state of [place] for any necessary care.

## 2022-11-22 DIAGNOSIS — I10 ESSENTIAL HYPERTENSION: ICD-10-CM

## 2022-11-22 DIAGNOSIS — R09.82 POST-NASAL DRIP: ICD-10-CM

## 2022-11-22 RX ORDER — METFORMIN HYDROCHLORIDE 500 MG/1
TABLET, EXTENDED RELEASE ORAL
Qty: 90 TABLET | Refills: 3 | Status: SHIPPED | OUTPATIENT
Start: 2022-11-22

## 2022-11-22 RX ORDER — AMLODIPINE BESYLATE 10 MG/1
TABLET ORAL
Qty: 90 TABLET | Refills: 1 | Status: SHIPPED | OUTPATIENT
Start: 2022-11-22

## 2022-11-22 RX ORDER — ATORVASTATIN CALCIUM 10 MG/1
TABLET, FILM COATED ORAL
Qty: 90 TABLET | Refills: 3 | Status: SHIPPED | OUTPATIENT
Start: 2022-11-22

## 2022-11-22 RX ORDER — LEVOCETIRIZINE DIHYDROCHLORIDE 5 MG/1
TABLET, FILM COATED ORAL
Qty: 90 TABLET | Refills: 3 | Status: SHIPPED | OUTPATIENT
Start: 2022-11-22

## 2022-12-12 ENCOUNTER — TELEPHONE (OUTPATIENT)
Dept: NEUROSURGERY | Age: 62
End: 2022-12-12

## 2022-12-12 NOTE — TELEPHONE ENCOUNTER
Patient is calling to schedule an earliest appointment due to pulsation in the area of his aneurism. He states that he is, \"experiencing pain and feels that it is urgent to be seen. \"

## 2022-12-14 ENCOUNTER — TELEPHONE (OUTPATIENT)
Dept: PRIMARY CARE CLINIC | Age: 62
End: 2022-12-14

## 2022-12-14 ENCOUNTER — TELEPHONE (OUTPATIENT)
Dept: UROLOGY | Age: 62
End: 2022-12-14

## 2022-12-14 DIAGNOSIS — R30.9 PAINFUL URINATION: ICD-10-CM

## 2022-12-14 DIAGNOSIS — R30.0 DYSURIA: ICD-10-CM

## 2022-12-14 DIAGNOSIS — Z87.442 HISTORY OF KIDNEY STONES: ICD-10-CM

## 2022-12-14 DIAGNOSIS — R35.0 FREQUENCY OF URINATION: Primary | ICD-10-CM

## 2022-12-14 DIAGNOSIS — R31.0 GROSS HEMATURIA: ICD-10-CM

## 2022-12-14 NOTE — TELEPHONE ENCOUNTER
Patient called in stating that he was scheduled to have surgery in [place] for kidney stone and stent removal but he is now not going to have it, due to the office not wanting to due it due to some medical reasons. Patient would like to know if Dr. Joel Myers would be able to do the procedure. Patient was informed that Dr. Joel Myers will be ou of office until the new year but a message will be sent to MA who will discuss with Dr. Joel Myers and follow up. Patient verbalizes understanding and call was ended.

## 2022-12-14 NOTE — TELEPHONE ENCOUNTER
Pt called to schedule OV for BLE edema, began today. States he will be back in town 12/19. Scheduled at 1000 that day.  Asking if he needs to take any meds differently until then

## 2022-12-15 NOTE — TELEPHONE ENCOUNTER
Would recommend compression socks from pharmacy and elevating legs when he is resting or sitting. If has any worsening shortness of breath or one leg more swollen than the other would recommend evaluation sooner.

## 2022-12-16 NOTE — TELEPHONE ENCOUNTER
Reached out to patient, he expressed understanding of Dr. Melissa Hdz requests. Patient states to call and schedule MRA as soon as possible.

## 2022-12-19 ENCOUNTER — OFFICE VISIT (OUTPATIENT)
Dept: PRIMARY CARE CLINIC | Age: 62
End: 2022-12-19
Payer: MEDICARE

## 2022-12-19 ENCOUNTER — HOSPITAL ENCOUNTER (OUTPATIENT)
Age: 62
Setting detail: SPECIMEN
Discharge: HOME | End: 2022-12-19

## 2022-12-19 VITALS
BODY MASS INDEX: 36.26 KG/M2 | SYSTOLIC BLOOD PRESSURE: 122 MMHG | WEIGHT: 260 LBS | DIASTOLIC BLOOD PRESSURE: 80 MMHG | HEART RATE: 72 BPM | OXYGEN SATURATION: 97 %

## 2022-12-19 DIAGNOSIS — R30.0 DYSURIA: ICD-10-CM

## 2022-12-19 DIAGNOSIS — I10 ESSENTIAL HYPERTENSION: ICD-10-CM

## 2022-12-19 DIAGNOSIS — I67.1 ANEURYSM OF INTRACRANIAL PORTION OF LEFT INTERNAL CAROTID ARTERY: ICD-10-CM

## 2022-12-19 DIAGNOSIS — E11.9 TYPE 2 DIABETES MELLITUS WITHOUT COMPLICATION, WITH LONG-TERM CURRENT USE OF INSULIN (HCC): Primary | ICD-10-CM

## 2022-12-19 DIAGNOSIS — R35.0 FREQUENCY OF URINATION: ICD-10-CM

## 2022-12-19 DIAGNOSIS — Z79.4 TYPE 2 DIABETES MELLITUS WITHOUT COMPLICATION, WITH LONG-TERM CURRENT USE OF INSULIN (HCC): Primary | ICD-10-CM

## 2022-12-19 DIAGNOSIS — N20.0 KIDNEY STONE: ICD-10-CM

## 2022-12-19 PROCEDURE — 3044F HG A1C LEVEL LT 7.0%: CPT | Performed by: FAMILY MEDICINE

## 2022-12-19 PROCEDURE — 3017F COLORECTAL CA SCREEN DOC REV: CPT | Performed by: FAMILY MEDICINE

## 2022-12-19 PROCEDURE — G8417 CALC BMI ABV UP PARAM F/U: HCPCS | Performed by: FAMILY MEDICINE

## 2022-12-19 PROCEDURE — 2022F DILAT RTA XM EVC RTNOPTHY: CPT | Performed by: FAMILY MEDICINE

## 2022-12-19 PROCEDURE — G8484 FLU IMMUNIZE NO ADMIN: HCPCS | Performed by: FAMILY MEDICINE

## 2022-12-19 PROCEDURE — 99214 OFFICE O/P EST MOD 30 MIN: CPT | Performed by: FAMILY MEDICINE

## 2022-12-19 PROCEDURE — G8427 DOCREV CUR MEDS BY ELIG CLIN: HCPCS | Performed by: FAMILY MEDICINE

## 2022-12-19 PROCEDURE — 4004F PT TOBACCO SCREEN RCVD TLK: CPT | Performed by: FAMILY MEDICINE

## 2022-12-19 PROCEDURE — 3078F DIAST BP <80 MM HG: CPT | Performed by: FAMILY MEDICINE

## 2022-12-19 PROCEDURE — 3074F SYST BP LT 130 MM HG: CPT | Performed by: FAMILY MEDICINE

## 2022-12-19 ASSESSMENT — ENCOUNTER SYMPTOMS
VOMITING: 0
SHORTNESS OF BREATH: 0

## 2022-12-19 NOTE — TELEPHONE ENCOUNTER
Called patient and scheduled an appointment for 01/16/23. Patient states that he is having pain when urinating \"feels like razor blades\" and he is having frequency. Patient informed to come into the office to do a urine sample to be sent off for urine culture to be tested. Patient states that he will be in office today to bring in a sample. Patient verbalizes understanding and call was ended.

## 2022-12-19 NOTE — PROGRESS NOTES
[address] PRIMARY CARE  [address] [facility] [address]  [address] [address]  Dept: [phone]  Dept Fax: [phone]    Ermelinda Silva is a 65 y.o. male who presents today for his medical conditions/complaints as noted below. Ermelinda Silva is c/o of  Chief Complaint   Patient presents with    Leg Swelling     Bilateral leg swelling, has gone down    Nephrolithiasis     Has a stent placed in [place], still has it, f/u with urology for this    Palpitations     In brain, f/u with neurosurgeon, has CT scan scheduled for today         HPI:     HPI    Pt here for follow up. Pt had stent placed in [place] for kidney stone left side, still notes hematuria. States he will be doing urine test today for urology and has follow up appointment coming up as well. Denies any fevers or vomiting. States he has been Noticing throbbing sensation/palpitation on side where he had his aneurysm on R side of head. Has MRA that was ordered by Dr Laila Chavez for him to get prior to his next appt. Swelling in BL LE improved. Hemoglobin A1C (%)   Date Value   2022 5.9   2022 5.9   2022 5.7             ( goal A1C is < 7)   Microalb/Crt. Ratio (mcg/mg creat)   Date Value   2017 17 (H)     LDL Cholesterol (mg/dL)   Date Value   2021 51     LDL Calculated (mg/dL)   Date Value   2019 95       (goal LDL is <100)   AST (U/L)   Date Value   2017 25     ALT (U/L)   Date Value   2021 18     BUN (mg/dL)   Date Value   2021 14     BP Readings from Last 3 Encounters:   22 122/80   22 120/82   22 97/67          (goal 120/80)    Past Medical History:   Diagnosis Date    ADHD (attention deficit hyperactivity disorder)     no tx per pt. Arthritis     knees, hands, back. Asthma     as child. pcp manages    Back pain     [address] [facility] .Last seen 10/26/2021.  Goes again 2021    Brain aneurysm      MD at . [address]         Family History   Problem Relation Age of Onset    Cancer Father         throat    Cirrhosis Father     High Blood Pressure Sister     Asthma Sister     Arthritis Sister     High Blood Pressure Mother     Heart Disease Mother     Alzheimer's Disease Mother        Social History     Tobacco Use    Smoking status: Every Day     Packs/day: 0.25     Years: 28.00     Pack years: 7.00     Types: Cigarettes    Smokeless tobacco: Never   Substance Use Topics    Alcohol use: Yes     Comment: rarely      Current Outpatient Medications   Medication Sig Dispense Refill    atorvastatin (LIPITOR) 10 MG tablet TAKE 1 TABLET BY MOUTH EVERY DAY 90 tablet 3    levocetirizine (XYZAL) 5 MG tablet TAKE 1 TABLET BY MOUTH EVERY DAY 90 tablet 3    amLODIPine (NORVASC) 10 MG tablet TAKE 1 TABLET BY MOUTH EVERY DAY 90 tablet 1    metFORMIN (GLUCOPHAGE-XR) 500 MG extended release tablet TAKE 1 TABLET BY MOUTH EVERY DAY WITH BREAKFAST 90 tablet 3    meloxicam (MOBIC) 15 MG tablet TAKE 1 TABLET BY MOUTH DAILY 30 tablet 3    albuterol sulfate HFA (PROVENTIL;VENTOLIN;PROAIR) 108 (90 Base) MCG/ACT inhaler inhale 2 puffs by mouth and INTO THE LUNGS every 6 hours if needed for cough shortness of breath or wheezing 8.5 g 3    GRALISE 600 MG TABS       memantine (NAMENDA) 10 MG tablet Take 1 tablet by mouth in the morning and 1 tablet before bedtime. Starting Feb 1st 2022. 180 tablet 1    topiramate (TOPAMAX) 50 MG tablet take 1 po qAM 2 po qhs 180 tablet 1    traZODone (DESYREL) 50 MG tablet take 2 tablets by mouth at bedtime 180 tablet 2    carvedilol (COREG) 25 MG tablet take 1 tablet by mouth twice a day 180 tablet 2    ARIPiprazole (ABILIFY) 5 MG tablet take 1/2 tablet by mouth daily for 7 days then 1 tablet daily      Misc.  Devices MISC 1 PAIR OF DIABETIC SHOES (1 LEFT/ 1 RIGHT)  1-3 PAIRS OF INSERTS (LEFT/ RIGHT) 2 each 0    diazePAM (VALIUM) 5 MG tablet take 1-2 tablets by mouth 40 MINUTES prior to procedure if needed      glucose monitoring (FREESTYLE FREEDOM) kit 1 kit by Does not apply route daily Any brand covered by insurance 1 kit 0    Lancets MISC 1 each by Does not apply route daily 100 each 5    blood glucose monitor strips Check daily 120 strip 3    oxyCODONE-acetaminophen (PERCOCET) 5-325 MG per tablet       TRINTELLIX 20 MG TABS tablet 20 mg       gabapentin (NEURONTIN) 300 MG capsule Take 300 mg by mouth daily. Takes at HS      tiZANidine (ZANAFLEX) 2 MG tablet take 1 tablet by mouth at bedtime      mometasone (NASONEX) 50 MCG/ACT nasal spray 2 sprays by Nasal route daily 1 Inhaler 3    fluticasone-salmeterol (ADVAIR) 100-50 MCG/DOSE diskus inhaler Inhale 1 puff into the lungs every 12 hours 60 each 3    diclofenac sodium (VOLTAREN) 1 % GEL Stop 1 week prior to surgery      nitroGLYCERIN (NITROSTAT) 0.3 MG SL tablet place 1 tablet under the tongue if needed every 5 minutes for chest pain for 3 doses IF NO RELIEF AF (Patient taking differently: Hasn't  Taken in years.) 25 tablet 3    aspirin 81 MG tablet Take 81 mg by mouth daily Stop 11/17/2021 per surgeon. On asa per Dr. Ciarra Limon. Clearance was obtained. No current facility-administered medications for this visit. Allergies   Allergen Reactions    Lisinopril      States he is allergic to the generic forms of medication, can take lisinopril    Aleve  [Naproxen Sodium] Nausea Only    Amitriptyline Hcl Other (See Comments)     rash    Exelon [Rivastigmine] Other (See Comments)     Patient stated patch left a \"burn kathy\" on his skin     Ibuprofen Other (See Comments)     \"hurts my stomach. \"       Health Maintenance   Topic Date Due    Diabetic retinal exam  11/18/2017    Lipids  07/28/2022    Flu vaccine (1) 08/01/2022    COVID-19 Vaccine (5 - Booster for Moderna series) 09/07/2022    Diabetic microalbuminuria test  11/01/2022    Diabetic foot exam  06/23/2023    A1C test (Diabetic or Prediabetic)  08/09/2023 Depression Monitoring  08/09/2023    Colorectal Cancer Screen  06/27/2024    DTaP/Tdap/Td vaccine (2 - Td or Tdap) 08/02/2026    Shingles vaccine  Completed    Pneumococcal 0-64 years Vaccine  Completed    Hepatitis C screen  Completed    HIV screen  Completed    Hepatitis A vaccine  Aged Out    Hib vaccine  Aged Out    Meningococcal (ACWY) vaccine  Aged Out       Subjective:      Review of Systems   Constitutional:  Negative for fever. Respiratory:  Negative for shortness of breath. Gastrointestinal:  Negative for vomiting. Genitourinary:  Positive for hematuria. Objective:     Physical Exam  Constitutional:       General: He is not in acute distress. Appearance: He is well-developed. He is not diaphoretic. HENT:      Head: Normocephalic and atraumatic. Eyes:      Pupils: Pupils are equal, round, and reactive to light. Cardiovascular:      Rate and Rhythm: Normal rate and regular rhythm. Heart sounds: Normal heart sounds. No murmur heard. Pulmonary:      Effort: Pulmonary effort is normal. No respiratory distress. Breath sounds: Normal breath sounds. No stridor. Musculoskeletal:      Cervical back: Neck supple. Skin:     General: Skin is warm and dry. Neurological:      Mental Status: He is alert and oriented to person, place, and time. Psychiatric:         Mood and Affect: Mood normal.         Behavior: Behavior normal.         Thought Content: Thought content normal.     /80   Pulse 72   Wt 260 lb (117.9 kg)   SpO2 97%   BMI 36.26 kg/m²     Assessment:      1. Type 2 diabetes mellitus without complication, with long-term current use of insulin ([address]  - continue current medications, healthy diet. - Lipid Panel; Future  - Hemoglobin A1C; Future  - Microalbumin, Ur; Future    2. Essential hypertension  - controlled. Continue current medications. Pt notes leg swelling has improved.      3. 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 Tony Denney and provided him with scheduling number . Pt also has follow up with Dr. Tony Denney as well. Discussed if ever gets worst headache ever/most severe  has to make sure to get evaluated in ED. 4. Kidney stone  - had stent placed in [place], has follow up with Dr. Alexy Atwood . Does note still gets hematuria. Has urine test he will be doing for urology today. Plan:      Return in about 4 months (around 4/19/2023) for diabetes follow up. Orders Placed This Encounter   Procedures    Lipid Panel     Standing Status:   Future     Standing Expiration Date:   12/19/2023     Order Specific Question:   Is Patient Fasting?/# of Hours     Answer:   10 hours     Order Specific Question:   Has the patient fasted? Answer:   Yes    Hemoglobin A1C     Standing Status:   Future     Standing Expiration Date:   12/19/2023    Microalbumin, Ur     Standing Status:   Future     Standing Expiration Date:   12/19/2023     No orders of the defined types were placed in this encounter. Patient given educational materials - see patient instructions. Discussed use, benefit, and side effects of prescribed medications. All patient questions answered. Pt voiced understanding. Reviewed healthmaintenance. Instructed to continue current medications, diet and exercise. Patient agreed with treatment plan. Follow up as directed.      Electronically signed by Afsaneh Obrien DO on 12/19/2022 at 9:17 PM

## 2022-12-20 LAB
CULTURE: NORMAL
SPECIMEN DESCRIPTION: NORMAL

## 2022-12-22 NOTE — TELEPHONE ENCOUNTER
Writer spoke with patient he stated \"I am still having burning when I urinate. There is also blood. I do have a history of kidney stones. \"    Writer let patient know that a message would be sent to the nurse practitioner

## 2022-12-23 ENCOUNTER — HOSPITAL ENCOUNTER (OUTPATIENT)
Dept: MRI IMAGING | Age: 62
End: 2022-12-23
Payer: MEDICARE

## 2022-12-23 DIAGNOSIS — I67.1 ANEURYSM OF INTRACRANIAL PORTION OF LEFT INTERNAL CAROTID ARTERY: ICD-10-CM

## 2022-12-23 PROCEDURE — 70544 MR ANGIOGRAPHY HEAD W/O DYE: CPT

## 2022-12-23 NOTE — TELEPHONE ENCOUNTER
CT stone protocol ordered.  Can review those in office with Dr. Srini Sullivan in Jan (already scheduled)

## 2022-12-30 ENCOUNTER — HOSPITAL ENCOUNTER (OUTPATIENT)
Dept: CT IMAGING | Age: 62
End: 2022-12-30
Payer: MEDICARE

## 2022-12-30 DIAGNOSIS — R31.0 GROSS HEMATURIA: ICD-10-CM

## 2022-12-30 DIAGNOSIS — R30.9 PAINFUL URINATION: ICD-10-CM

## 2022-12-30 DIAGNOSIS — Z87.442 HISTORY OF KIDNEY STONES: ICD-10-CM

## 2022-12-30 PROCEDURE — 74176 CT ABD & PELVIS W/O CONTRAST: CPT

## 2023-01-13 ENCOUNTER — TELEPHONE (OUTPATIENT)
Dept: PRIMARY CARE CLINIC | Age: 63
End: 2023-01-13

## 2023-01-13 DIAGNOSIS — R05.1 ACUTE COUGH: Primary | ICD-10-CM

## 2023-01-13 RX ORDER — AZITHROMYCIN 250 MG/1
250 TABLET, FILM COATED ORAL SEE ADMIN INSTRUCTIONS
Qty: 6 TABLET | Refills: 0 | Status: SHIPPED | OUTPATIENT
Start: 2023-01-13 | End: 2023-01-18

## 2023-01-13 NOTE — TELEPHONE ENCOUNTER
Spoke with the patient and informed of PCP's instructions, the patient verbalized understanding of PCP instructions. The patient's states that his symptoms started 3 days ago.

## 2023-01-16 ENCOUNTER — TELEPHONE (OUTPATIENT)
Dept: UROLOGY | Age: 63
End: 2023-01-16

## 2023-01-16 ENCOUNTER — HOSPITAL ENCOUNTER (OUTPATIENT)
Age: 63
Setting detail: SPECIMEN
Discharge: HOME | End: 2023-01-16

## 2023-01-16 ENCOUNTER — OFFICE VISIT (OUTPATIENT)
Dept: UROLOGY | Age: 63
End: 2023-01-16
Payer: MEDICARE

## 2023-01-16 VITALS
HEIGHT: 71 IN | RESPIRATION RATE: 17 BRPM | BODY MASS INDEX: 36.4 KG/M2 | HEART RATE: 73 BPM | DIASTOLIC BLOOD PRESSURE: 82 MMHG | WEIGHT: 260 LBS | SYSTOLIC BLOOD PRESSURE: 136 MMHG | TEMPERATURE: 97.8 F

## 2023-01-16 DIAGNOSIS — R10.9 FLANK PAIN: ICD-10-CM

## 2023-01-16 DIAGNOSIS — N52.9 ERECTILE DYSFUNCTION, UNSPECIFIED ERECTILE DYSFUNCTION TYPE: ICD-10-CM

## 2023-01-16 DIAGNOSIS — Z01.818 PREOP TESTING: ICD-10-CM

## 2023-01-16 DIAGNOSIS — N39.0 URINARY TRACT INFECTION WITHOUT HEMATURIA, SITE UNSPECIFIED: Primary | ICD-10-CM

## 2023-01-16 DIAGNOSIS — N39.0 URINARY TRACT INFECTION WITHOUT HEMATURIA, SITE UNSPECIFIED: ICD-10-CM

## 2023-01-16 DIAGNOSIS — N20.0 KIDNEY STONE: Primary | ICD-10-CM

## 2023-01-16 PROCEDURE — 3079F DIAST BP 80-89 MM HG: CPT | Performed by: UROLOGY

## 2023-01-16 PROCEDURE — 99214 OFFICE O/P EST MOD 30 MIN: CPT | Performed by: UROLOGY

## 2023-01-16 PROCEDURE — 4004F PT TOBACCO SCREEN RCVD TLK: CPT | Performed by: UROLOGY

## 2023-01-16 PROCEDURE — G8484 FLU IMMUNIZE NO ADMIN: HCPCS | Performed by: UROLOGY

## 2023-01-16 PROCEDURE — 3075F SYST BP GE 130 - 139MM HG: CPT | Performed by: UROLOGY

## 2023-01-16 PROCEDURE — 3017F COLORECTAL CA SCREEN DOC REV: CPT | Performed by: UROLOGY

## 2023-01-16 PROCEDURE — G8417 CALC BMI ABV UP PARAM F/U: HCPCS | Performed by: UROLOGY

## 2023-01-16 PROCEDURE — G8428 CUR MEDS NOT DOCUMENT: HCPCS | Performed by: UROLOGY

## 2023-01-16 ASSESSMENT — ENCOUNTER SYMPTOMS
WHEEZING: 0
VOMITING: 0
EYE PAIN: 0
COUGH: 0
ABDOMINAL PAIN: 0
SHORTNESS OF BREATH: 0
DIARRHEA: 0
CONSTIPATION: 0
NAUSEA: 0
BACK PAIN: 0

## 2023-01-16 NOTE — TELEPHONE ENCOUNTER
Cysto, (LT) URS, HLL, (LT) stent exchange @ ST 1/20/23 12:15pm   PAT same day        Spoke with patient in office, procedure info given to patient.

## 2023-01-16 NOTE — PROGRESS NOTES
Review of Systems   Constitutional:  Negative for appetite change, chills, fatigue and fever. Eyes:  Negative for pain and visual disturbance. Respiratory:  Negative for cough, shortness of breath and wheezing. Cardiovascular:  Negative for chest pain and leg swelling. Gastrointestinal:  Negative for abdominal pain, constipation, diarrhea, nausea and vomiting. Genitourinary:  Negative for difficulty urinating, dysuria, frequency, hematuria, penile pain and testicular pain. Musculoskeletal:  Negative for back pain and myalgias. Neurological:  Negative for dizziness, tremors, weakness, light-headedness, numbness and headaches. Hematological:  Negative for adenopathy. Does not bruise/bleed easily.  Patient states symptoms are the same

## 2023-01-16 NOTE — PROGRESS NOTES
[address] [address]  Dept: 92 [facility] Urology Office Note - Established    Patient:  Martín Acosta  YOB: 1960  Date: 1/16/2023    The patient is a 65 y.o. male who presents todayfor evaluation of the following problems:   Chief Complaint   Patient presents with    Erectile Dysfunction     Discuss surgery       HPI  This is a 59-year-old gentleman who was recently in [place]. He did develop left flank pain and had a stent placed on the left side. He was scheduled to have stone surgery but then developed a headache and the surgery had to be canceled. He then came back here and is hoping that we can take care of his stones. He was told he has a small stone on the left side but, unfortunately, I do not have the records for my review. We are trying to track these down. Summary of old records: N/A    Additional History: N/A    Procedures Today: N/A    Urinalysis today:  No results found for this visit on 01/16/23. Last several PSA's:  Lab Results   Component Value Date    PSA 0.59 04/26/2017     Last total testosterone:  No results found for: TESTOSTERONE    AUA Symptom Score (1/16/2023):   INCOMPLETE EMPTYING: How often have you had the sensation of not emptying your bladder?: Not at all  FREQUENCY: How often do you have to urinate less than every two hours?: Not at all  INTERMITTENCY: How often have you found you stopped and started again several times when you urinated?: Not at all  URGENCY: How often have you found it difficult to postpone urination?: Not at all  WEAK STREAM: How often have you had a weak urinary stream?: Not at all  STRAINING: How often have you had to strain to start  urination?: Not at all  NOCTURIA: How many times did you typically get up at night to uriniate?: NONE  TOTAL I-PSS SCORE[de-identified] 0       Last BUN and creatinine:  Lab Results   Component Value Date    BUN 14 11/25/2021     Lab Results   Component Value Date    CREATININE 0.94 11/25/2021       Additional Lab/Culture results: none    Imaging Reviewed during this Office Visit: none  (results were independently reviewed by physician and radiology report verified)    PAST MEDICAL, FAMILY AND SOCIAL HISTORY UPDATE:  Past Medical History:   Diagnosis Date    ADHD (attention deficit hyperactivity disorder)     no tx per pt. Arthritis     knees, hands, back. Asthma     as child. pcp manages    Back pain     [facility] .Last seen 10/26/2021. Goes again 11/17/2021    Brain aneurysm     Dr. Debbie Ruelas at   [facility]  f/u 12/29/2021. No surgery at this time. Monitoring yearly. CAD (coronary artery disease)     Dr. Rhianna Arriaza, [place]  last visit 11/9/2021. Pt. states never had heart cath. MI 2011    Cataract     lt eye   no surgery    Cerebral artery occlusion with cerebral infarction [facility]) 2014    mini stroke went to [facility] and was treated. Dr. Debby Polo. Last seen summer 2021    Depression     pcp    Diabetes mellitus ([facility] [address]     on rx   A1C high per pt    Heart attack [facility])     2011   Last visit Dr. Nicholas Clink 11/9/2021  [place], [place]   Received cardiology clearance    Hyperlipidemia     on rx    Hypertension     pcp manages    Kidney stones     Dr. Edelmira Trejo treats. Pt. states he currently has blood in his urine. Urine CS sent to lab today. No pain per pt. Memory loss, long term     Pt states he has had it for years and follows w/ his neurologist Dr. Mercy Rodrigues. Mother has Alzheimers. Mini stroke     2014    Osteoarthritis     Seasonal allergies     Sinus problem     drainage from allergy    Snores     has not been tested for sleep apnea. Sees Dr. Jessee Barbosa. He provides rx for Trazodone.     Wears eyeglasses     Wellness examination     last visit 11/1/2021     Past Surgical History:   Procedure Laterality Date    CHOLECYSTECTOMY      2016 COLONOSCOPY  2021    had cologuard and pt. states it was negative    CYSTO/URETERO/PYELOSCOPY, CALCULUS TX Right 02/05/2020    CYSTO, URETEROSCOPY, RIGHT URETERAL STENT PLACEMENT, RETROGRADE PYELOGRAM performed by Yovana Gonzalez MD at [address] inguinal    INGUINAL HERNIA REPAIR Left 08/19/2016    LAPAROSCOPY N/A 01/16/2018    DIAGNOSTIC LAPAROSCOPY performed by Carl Lott MD at [place] [address]  11/24/2021    PENIS PROSTHESIS INSERTION    PENILE PROSTHESIS PLACEMENT N/A 11/24/2021    PENIS PROSTHESIS INSERTION- AMS INFLATABLE, **SHORT STAY** performed by Yovana Gonzalez MD at . [facility] [address]       Family History   Problem Relation Age of Onset    Cancer Father         throat    Cirrhosis Father     High Blood Pressure Sister     Asthma Sister     Arthritis Sister     High Blood Pressure Mother     Heart Disease Mother     Alzheimer's Disease Mother      Outpatient Medications Marked as Taking for the 1/16/23 encounter (Office Visit) with Yovana Gonzalez MD   Medication Sig Dispense Refill    azithromycin (ZITHROMAX) 250 MG tablet Take 1 tablet by mouth See Admin Instructions for 5 days 500mg on day 1 followed by 250mg on days 2 - 5 6 tablet 0    atorvastatin (LIPITOR) 10 MG tablet TAKE 1 TABLET BY MOUTH EVERY DAY 90 tablet 3    levocetirizine (XYZAL) 5 MG tablet TAKE 1 TABLET BY MOUTH EVERY DAY 90 tablet 3    amLODIPine (NORVASC) 10 MG tablet TAKE 1 TABLET BY MOUTH EVERY DAY 90 tablet 1    metFORMIN (GLUCOPHAGE-XR) 500 MG extended release tablet TAKE 1 TABLET BY MOUTH EVERY DAY WITH BREAKFAST 90 tablet 3    meloxicam (MOBIC) 15 MG tablet TAKE 1 TABLET BY MOUTH DAILY 30 tablet 3    albuterol sulfate HFA (PROVENTIL;VENTOLIN;PROAIR) 108 (90 Base) MCG/ACT inhaler inhale 2 puffs by mouth and INTO THE LUNGS every 6 hours if needed for cough shortness of breath or wheezing 8.5 g 3    GRALISE 600 MG TABS       memantine (NAMENDA) 10 MG tablet Take 1 tablet by mouth in the morning and 1 tablet before bedtime. Starting Feb 1st 2022. 180 tablet 1    topiramate (TOPAMAX) 50 MG tablet take 1 po qAM 2 po qhs 180 tablet 1    traZODone (DESYREL) 50 MG tablet take 2 tablets by mouth at bedtime 180 tablet 2    carvedilol (COREG) 25 MG tablet take 1 tablet by mouth twice a day 180 tablet 2    ARIPiprazole (ABILIFY) 5 MG tablet take 1/2 tablet by mouth daily for 7 days then 1 tablet daily      Misc. Devices MISC 1 PAIR OF DIABETIC SHOES (1 LEFT/ 1 RIGHT)  1-3 PAIRS OF INSERTS (LEFT/ RIGHT) 2 each 0    diazePAM (VALIUM) 5 MG tablet take 1-2 tablets by mouth 40 MINUTES prior to procedure if needed      glucose monitoring (FREESTYLE FREEDOM) kit 1 kit by Does not apply route daily Any brand covered by insurance 1 kit 0    Lancets MISC 1 each by Does not apply route daily 100 each 5    blood glucose monitor strips Check daily 120 strip 3    oxyCODONE-acetaminophen (PERCOCET) 5-325 MG per tablet       TRINTELLIX 20 MG TABS tablet 20 mg       gabapentin (NEURONTIN) 300 MG capsule Take 300 mg by mouth daily. Takes at HS      tiZANidine (ZANAFLEX) 2 MG tablet take 1 tablet by mouth at bedtime      mometasone (NASONEX) 50 MCG/ACT nasal spray 2 sprays by Nasal route daily 1 Inhaler 3    fluticasone-salmeterol (ADVAIR) 100-50 MCG/DOSE diskus inhaler Inhale 1 puff into the lungs every 12 hours 60 each 3    diclofenac sodium (VOLTAREN) 1 % GEL Stop 1 week prior to surgery      nitroGLYCERIN (NITROSTAT) 0.3 MG SL tablet place 1 tablet under the tongue if needed every 5 minutes for chest pain for 3 doses IF NO RELIEF AF (Patient taking differently: Hasn't  Taken in years.) 25 tablet 3    aspirin 81 MG tablet Take 81 mg by mouth daily Stop 11/17/2021 per surgeon. On asa per Dr. Abby Lara. Clearance was obtained.          Lisinopril, Aleve  [naproxen sodium], Amitriptyline hcl, Exelon [rivastigmine], and Ibuprofen  Social History     Tobacco Use   Smoking Status Every Day    Packs/day: 0.25    Years: 28.00    Pack years: 7.00    Types: Cigarettes   Smokeless Tobacco Never     (Ifpatient a smoker, smoking cessation counseling offered)    Social History     Substance and Sexual Activity   Alcohol Use Yes    Comment: rarely       REVIEW OF SYSTEMS:  Review of Systems    Physical Exam:      Vitals:    01/16/23 1350   BP: 136/82   Pulse: 73   Resp: 17   Temp: 97.8 °F (36.6 °C)     Body mass index is 36.26 kg/m². Patient is a 65 y.o. male in no acute distress and alert and oriented to person, place and time. Physical Exam  Constitutional: Patient in no acute distress. Neuro: Alert and oriented to person, place and time. Psych: Mood normal, affect normal  Skin: No rash noted  HEENT: Head: Normocephalic andatraumatic  Conjunctivae and EOM are normal. Pupils are equal, round  Nose:Normal  Right External Ear: Normal; Left External Ear: Normal      Assessment and Plan      1. Kidney stone    2. Flank pain    3. Erectile dysfunction, unspecified erectile dysfunction type           Plan:   Cysto left urs hll stent       Return for surgery. Prescriptions Ordered:  No orders of the defined types were placed in this encounter. Orders Placed:  No orders of the defined types were placed in this encounter. Sadaf Willis MD    Agree with the ROS entered by the MA.

## 2023-01-17 LAB
CULTURE: NO GROWTH
SPECIMEN DESCRIPTION: NORMAL

## 2023-01-19 ENCOUNTER — TELEPHONE (OUTPATIENT)
Dept: UROLOGY | Age: 63
End: 2023-01-19

## 2023-01-19 NOTE — TELEPHONE ENCOUNTER
Spoke with patients daughter Nani  on patients time change for procedure on 1/20/23. New arrival time 0800  Nani  states she will relay the message.

## 2023-01-19 NOTE — H&P
History and Physical    Patient:  Autumn Barakat  MRN: [id number]  YOB: 1960    CHIEF COMPLAINT:  Left proximal ureteral stone    HISTORY OF PRESENT ILLNESS:   The patient is a 65 y.o. male who presents with left proximal ureteral stone with left hydronephrosis. Had left ureteral stent in place. Past Medical History:    Past Medical History:   Diagnosis Date    ADHD (attention deficit hyperactivity disorder)     no tx per pt. Arthritis     knees, hands, back. Asthma     as child. pcp manages    Back pain     [facility] pain clinic for back and knees. 1/19/2023 visif    Brain aneurysm     Dr. Aaron Garcia at   [facility]  f/u 12/29/2021. No surgery at this time. Monitoring yearly. CAD (coronary artery disease)     Needs to find an new cardiologist. Dr. Vargas Arriaza retired. Dr. Razia Craig, [place]  last visit 11/9/2021. Pt. states never had heart cath. MI 2011    Cataract     lt eye   no surgery    Cerebral artery occlusion with cerebral infarction [facility]) 2014    mini stroke went to [facility] and was treated. Dr. Lennox Duhamel. Last seen summer 2021    Depression     pcp    Diabetes mellitus ([facility] [address]     on rx   A1C high per pt    pcp manages    Heart attack [facility]) 2012    Dr. Vargas Arriaza last visit 2021    Pt. states no hx cath    Hyperlipidemia     on rx    Hypertension     pcp manages    Kidney stones     Dr. Jessi Odonnell treats. Pt. states he currently has blood in his urine. Urine CS sent to lab today. No pain per pt. Memory loss, long term     Pt states he has had it for years and follows w/ his neurologist Dr. Lindon Halsted. Mother has Alzheimers. Mini stroke     2013  denies lasting effects  Sees Dr. Sharla Parsons at [facility]. V's . Sees 1/25/2023    Osteoarthritis     Seasonal allergies     Sinus problem     drainage from allergy    Snores     sleep study 10/2022  results show sleep apnea and need for cpap. Pt. uses at home.     Wears eyeglasses     Wellness examination     Dr. Brendan terry visit 1/2023 [facility]       Past Surgical History:    Past Surgical History:   Procedure Laterality Date    CHOLECYSTECTOMY      2016    COLONOSCOPY  2021    had cologuard and pt. states it was negative    CYSTO/URETERO/PYELOSCOPY, CALCULUS TX Right 02/05/2020    CYSTO, URETEROSCOPY, RIGHT URETERAL STENT PLACEMENT, RETROGRADE PYELOGRAM performed by Wanda Garcia MD at [address] inguinal    INGUINAL HERNIA REPAIR Left 08/19/2016    LAPAROSCOPY N/A 01/16/2018    DIAGNOSTIC LAPAROSCOPY performed by Alda Key MD at [place] [address] N/A 11/24/2021    PENIS PROSTHESIS INSERTION- AMS INFLATABLE, **SHORT STAY** performed by Wanda Garcia MD at . [facility] [address]         Medications Prior to Admission:    Prior to Admission medications    Medication Sig Start Date End Date Taking? Authorizing Provider   atorvastatin (LIPITOR) 10 MG tablet TAKE 1 TABLET BY MOUTH EVERY DAY 11/22/22   Sandra Medhkour, DO   levocetirizine (XYZAL) 5 MG tablet TAKE 1 TABLET BY MOUTH EVERY DAY 11/22/22   Sandra Medhkour, DO   amLODIPine (NORVASC) 10 MG tablet TAKE 1 TABLET BY MOUTH EVERY DAY 11/22/22   Sandra Medhkour, DO   metFORMIN (GLUCOPHAGE-XR) 500 MG extended release tablet TAKE 1 TABLET BY MOUTH EVERY DAY WITH BREAKFAST 11/22/22   Sandra Medhkour, DO   meloxicam (MOBIC) 15 MG tablet TAKE 1 TABLET BY MOUTH DAILY 10/11/22   Abiel Zavala MD   albuterol sulfate HFA (PROVENTIL;VENTOLIN;PROAIR) 108 (90 Base) MCG/ACT inhaler inhale 2 puffs by mouth and INTO THE LUNGS every 6 hours if needed for cough shortness of breath or wheezing 8/11/22   Sandra Medhkour, DO   GRALISE 600 MG TABS  7/5/22   Historical Provider, MD   memantine (NAMENDA) 10 MG tablet Take 1 tablet by mouth in the morning and 1 tablet before bedtime.  Starting Feb 1st 2022. 7/20/22   Argenis Busby MD   topiramate (TOPAMAX) 50 MG tablet take 1 po qAM 2 po qhs 7/20/22 Angy Jaffe MD   traZODone (DESYREL) 50 MG tablet take 2 tablets by mouth at bedtime 7/20/22   Angy Jaffe MD   carvedilol (COREG) 25 MG tablet take 1 tablet by mouth twice a day 7/11/22   Sandra Medhkour, DO   ARIPiprazole (ABILIFY) 5 MG tablet take 1/2 tablet by mouth daily for 7 days then 1 tablet daily 6/23/22   Historical Provider, MD   Misc. Devices MISC 1 PAIR OF DIABETIC SHOES (1 LEFT/ 1 RIGHT)  1-3 PAIRS OF INSERTS (LEFT/ RIGHT) 7/5/22   Lyndle [place], [place]   diazePAM (VALIUM) 5 MG tablet take 1-2 tablets by mouth 40 MINUTES prior to procedure if needed 4/27/22   Historical Provider, MD   glucose monitoring (FREESTYLE FREEDOM) kit 1 kit by Does not apply route daily Any brand covered by insurance 2/1/22   Alexia Shingles, DO   Lancets MISC 1 each by Does not apply route daily 2/1/22   [facility] Medhkour, DO   blood glucose monitor strips Check daily 2/1/22   Sandra Medhkour, DO   oxyCODONE-acetaminophen (PERCOCET) 5-325 MG per tablet every 6 hours as needed. [facility] pain clinic 12/7/21   Historical Provider, MD   TRINTELLIX 20 MG TABS tablet 20 mg  5/10/21   Historical Provider, MD   gabapentin (NEURONTIN) 300 MG capsule Take 300 mg by mouth daily.  Takes at [facility]  Patient not taking: Reported on 1/18/2023 5/6/21   Historical Provider, MD   tiZANidine (ZANAFLEX) 2 MG tablet take 1 tablet by mouth at bedtime 5/6/21   Historical Provider, MD   benzonatate (TESSALON) 200 MG capsule Take 1 capsule by mouth 3 times daily as needed for Cough 4/21/21   Ulysses Stade, APRN - CNP   carvedilol (COREG) 25 MG tablet Take 1 tablet by mouth 2 times daily 12/21/20   Snadra Medkoalma, DO   fluticasone-salmeterol (ADVAIR) 100-50 MCG/DOSE diskus inhaler Inhale 1 puff into the lungs every 12 hours  Patient not taking: Reported on 1/18/2023 11/23/20   Sandra Da,    fluticasone (FLONASE) 50 MCG/ACT nasal spray instill 2 sprays into each nostril once daily 11/6/20   [facility] DO Da   nitroGLYCERIN (NITROSTAT) 0.3 MG SL tablet place 1 tablet under the tongue if needed every 5 minutes for chest pain for 3 doses IF NO RELIEF AF  Patient not taking: Reported on 1/18/2023 11/28/18   Karrie Ibarra MD   aspirin 81 MG tablet Take 81 mg by mouth daily Stopped 1/16/2023 on his own for surgery. Was not instructed to stop.     Historical Provider, MD       Allergies:  Lisinopril, Aleve  [naproxen sodium], Amitriptyline hcl, Exelon [rivastigmine], and Ibuprofen    Social History:    Social History     Socioeconomic History    Marital status: Single     Spouse name: Not on file    Number of children: Not on file    Years of education: Not on file    Highest education level: Not on file   Occupational History    Not on file   Tobacco Use    Smoking status: Every Day     Packs/day: 0.25     Years: 28.00     Pack years: 7.00     Types: Cigarettes    Smokeless tobacco: Never   Vaping Use    Vaping Use: Never used   Substance and Sexual Activity    Alcohol use: Yes     Comment: rarely    Drug use: No    Sexual activity: Not on file   Other Topics Concern    Not on file   Social History Narrative    Not on file     Social Determinants of Health     Financial Resource Strain: Low Risk     Difficulty of Paying Living Expenses: Not hard at all   Food Insecurity: No Food Insecurity    Worried About Running Out of Food in the Last Year: Never true    Ran Out of Food in the Last Year: Never true   Transportation Needs: Not on file   Physical Activity: Not on file   Stress: Not on file   Social Connections: Not on file   Intimate Partner Violence: Not on file   Housing Stability: Not on file       Family History:    Family History   Problem Relation Age of Onset    Cancer Father         throat    Cirrhosis Father     High Blood Pressure Sister     Asthma Sister     Arthritis Sister     High Blood Pressure Mother     Heart Disease Mother     Alzheimer's Disease Mother        REVIEW OF SYSTEMS:  Constitutional: negative  Eyes: negative  Respiratory: negative  Cardiovascular: negative  Gastrointestinal: negative  Genitourinary: see HPI  Musculoskeletal: negative  Skin: negative   Neurological: negative  Hematological/Lymphatic: negative  Psychological: negative      Physical Exam:      No data found. Constitutional: Patient in no acute distress; Neuro: alert and oriented to person place and time. Psych: Mood and affect normal.  Lungs: Respiratory effort normal  Cardiovascular:  Normal peripheral pulses. Regular rate. Abdomen: Soft, non-tender, non-distended        LABS:   No results for input(s): WBC, HGB, HCT, MCV, PLT in the last 72 hours. No results for input(s): NA, K, CL, CO2, PHOS, BUN, CREATININE, CA in the last 72 hours. Lab Results   Component Value Date    PSA 0.59 04/26/2017       Additional Lab/culture results:    Urinalysis: No results for input(s): COLORU, PHUR, LABCAST, WBCUA, RBCUA, MUCUS, TRICHOMONAS, YEAST, BACTERIA, CLARITYU, SPECGRAV, LEUKOCYTESUR, UROBILINOGEN, Nery Retort in the last 72 hours. Invalid input(s): NITRATE, GLUCOSEUKETONESUAMORPHOUS     -----------------------------------------------------------------  Imaging Results:    Assessment and Plan   Impression:    65 y.o. male with left proximal ureteral stone    Plan:   OR today for cystoscopy, left ureteroscopy with holmium laser lithotripsy, left ureteral stent exchange.     Yuliya Cooper MD  5:52 PM 1/19/2023

## 2023-01-20 ENCOUNTER — APPOINTMENT (OUTPATIENT)
Dept: GENERAL RADIOLOGY | Age: 63
End: 2023-01-20
Attending: UROLOGY
Payer: MEDICARE

## 2023-01-20 ENCOUNTER — HOSPITAL ENCOUNTER (OUTPATIENT)
Age: 63
Setting detail: OUTPATIENT SURGERY
Discharge: HOME | End: 2023-01-20
Attending: UROLOGY | Admitting: UROLOGY
Payer: MEDICARE

## 2023-01-20 ENCOUNTER — ANESTHESIA EVENT (OUTPATIENT)
Dept: OPERATING ROOM | Age: 63
End: 2023-01-20
Payer: MEDICARE

## 2023-01-20 ENCOUNTER — ANESTHESIA (OUTPATIENT)
Dept: OPERATING ROOM | Age: 63
End: 2023-01-20
Payer: MEDICARE

## 2023-01-20 VITALS
OXYGEN SATURATION: 97 % | SYSTOLIC BLOOD PRESSURE: 142 MMHG | HEART RATE: 77 BPM | RESPIRATION RATE: 9 BRPM | WEIGHT: 255 LBS | TEMPERATURE: 97.7 F | DIASTOLIC BLOOD PRESSURE: 92 MMHG | HEIGHT: 71 IN | BODY MASS INDEX: 35.7 KG/M2

## 2023-01-20 DIAGNOSIS — G89.18 POST-OP PAIN: Primary | ICD-10-CM

## 2023-01-20 LAB
EGFR, POC: >60 ML/MIN/1.73M2
GLUCOSE BLD-MCNC: 119 MG/DL (ref 74–100)
GLUCOSE BLD-MCNC: 125 MG/DL (ref 75–110)
POC BUN: 21 MG/DL (ref 8–26)
POC CREATININE: 1.18 MG/DL (ref 0.51–1.19)

## 2023-01-20 PROCEDURE — 2500000003 HC RX 250 WO HCPCS

## 2023-01-20 PROCEDURE — 3700000001 HC ADD 15 MINUTES (ANESTHESIA): Performed by: UROLOGY

## 2023-01-20 PROCEDURE — 3600000004 HC SURGERY LEVEL 4 BASE: Performed by: UROLOGY

## 2023-01-20 PROCEDURE — 2709999900 HC NON-CHARGEABLE SUPPLY: Performed by: UROLOGY

## 2023-01-20 PROCEDURE — C2617 STENT, NON-COR, TEM W/O DEL: HCPCS | Performed by: UROLOGY

## 2023-01-20 PROCEDURE — 82565 ASSAY OF CREATININE: CPT

## 2023-01-20 PROCEDURE — 3600000014 HC SURGERY LEVEL 4 ADDTL 15MIN: Performed by: UROLOGY

## 2023-01-20 PROCEDURE — 84520 ASSAY OF UREA NITROGEN: CPT

## 2023-01-20 PROCEDURE — 7100000001 HC PACU RECOVERY - ADDTL 15 MIN: Performed by: UROLOGY

## 2023-01-20 PROCEDURE — C1758 CATHETER, URETERAL: HCPCS | Performed by: UROLOGY

## 2023-01-20 PROCEDURE — C1769 GUIDE WIRE: HCPCS | Performed by: UROLOGY

## 2023-01-20 PROCEDURE — 2720000010 HC SURG SUPPLY STERILE: Performed by: UROLOGY

## 2023-01-20 PROCEDURE — 7100000000 HC PACU RECOVERY - FIRST 15 MIN: Performed by: UROLOGY

## 2023-01-20 PROCEDURE — 7100000010 HC PHASE II RECOVERY - FIRST 15 MIN: Performed by: UROLOGY

## 2023-01-20 PROCEDURE — 6360000002 HC RX W HCPCS: Performed by: STUDENT IN AN ORGANIZED HEALTH CARE EDUCATION/TRAINING PROGRAM

## 2023-01-20 PROCEDURE — 6360000002 HC RX W HCPCS

## 2023-01-20 PROCEDURE — 2580000003 HC RX 258

## 2023-01-20 PROCEDURE — 93005 ELECTROCARDIOGRAM TRACING: CPT | Performed by: STUDENT IN AN ORGANIZED HEALTH CARE EDUCATION/TRAINING PROGRAM

## 2023-01-20 PROCEDURE — 3209999900 FLUORO FOR SURGICAL PROCEDURES

## 2023-01-20 PROCEDURE — 82947 ASSAY GLUCOSE BLOOD QUANT: CPT

## 2023-01-20 PROCEDURE — 2580000003 HC RX 258: Performed by: UROLOGY

## 2023-01-20 PROCEDURE — 7100000011 HC PHASE II RECOVERY - ADDTL 15 MIN: Performed by: UROLOGY

## 2023-01-20 PROCEDURE — 3700000000 HC ANESTHESIA ATTENDED CARE: Performed by: UROLOGY

## 2023-01-20 DEVICE — URETERAL STENT
Type: IMPLANTABLE DEVICE | Site: URETER | Status: FUNCTIONAL
Brand: POLARIS™ ULTRA

## 2023-01-20 RX ORDER — OXYBUTYNIN CHLORIDE 5 MG/1
5 TABLET ORAL 3 TIMES DAILY
Qty: 30 TABLET | Refills: 0 | Status: SHIPPED | OUTPATIENT
Start: 2023-01-20

## 2023-01-20 RX ORDER — OXYBUTYNIN CHLORIDE 5 MG/1
5 TABLET ORAL 3 TIMES DAILY
Qty: 30 TABLET | Refills: 0 | Status: SHIPPED | OUTPATIENT
Start: 2023-01-20 | End: 2023-01-20 | Stop reason: SDUPTHER

## 2023-01-20 RX ORDER — FENTANYL CITRATE 50 UG/ML
INJECTION, SOLUTION INTRAMUSCULAR; INTRAVENOUS PRN
Status: DISCONTINUED | OUTPATIENT
Start: 2023-01-20 | End: 2023-01-20 | Stop reason: SDUPTHER

## 2023-01-20 RX ORDER — SODIUM CHLORIDE, SODIUM LACTATE, POTASSIUM CHLORIDE, CALCIUM CHLORIDE 600; 310; 30; 20 MG/100ML; MG/100ML; MG/100ML; MG/100ML
INJECTION, SOLUTION INTRAVENOUS CONTINUOUS
Status: DISCONTINUED | OUTPATIENT
Start: 2023-01-20 | End: 2023-01-20 | Stop reason: HOSPADM

## 2023-01-20 RX ORDER — ONDANSETRON 2 MG/ML
INJECTION INTRAMUSCULAR; INTRAVENOUS PRN
Status: DISCONTINUED | OUTPATIENT
Start: 2023-01-20 | End: 2023-01-20 | Stop reason: SDUPTHER

## 2023-01-20 RX ORDER — SODIUM CHLORIDE, SODIUM LACTATE, POTASSIUM CHLORIDE, CALCIUM CHLORIDE 600; 310; 30; 20 MG/100ML; MG/100ML; MG/100ML; MG/100ML
INJECTION, SOLUTION INTRAVENOUS CONTINUOUS PRN
Status: DISCONTINUED | OUTPATIENT
Start: 2023-01-20 | End: 2023-01-20 | Stop reason: SDUPTHER

## 2023-01-20 RX ORDER — PROPOFOL 10 MG/ML
INJECTION, EMULSION INTRAVENOUS PRN
Status: DISCONTINUED | OUTPATIENT
Start: 2023-01-20 | End: 2023-01-20 | Stop reason: SDUPTHER

## 2023-01-20 RX ORDER — TAMSULOSIN HYDROCHLORIDE 0.4 MG/1
0.4 CAPSULE ORAL DAILY
Qty: 10 CAPSULE | Refills: 0 | Status: SHIPPED | OUTPATIENT
Start: 2023-01-20 | End: 2023-01-20 | Stop reason: SDUPTHER

## 2023-01-20 RX ORDER — SODIUM CHLORIDE 9 MG/ML
INJECTION, SOLUTION INTRAVENOUS PRN
Status: DISCONTINUED | OUTPATIENT
Start: 2023-01-20 | End: 2023-01-20 | Stop reason: HOSPADM

## 2023-01-20 RX ORDER — FENTANYL CITRATE 50 UG/ML
25 INJECTION, SOLUTION INTRAMUSCULAR; INTRAVENOUS EVERY 5 MIN PRN
Status: DISCONTINUED | OUTPATIENT
Start: 2023-01-20 | End: 2023-01-20 | Stop reason: HOSPADM

## 2023-01-20 RX ORDER — CEFADROXIL 500 MG/1
500 CAPSULE ORAL 2 TIMES DAILY
Qty: 6 CAPSULE | Refills: 0 | Status: SHIPPED | OUTPATIENT
Start: 2023-01-20 | End: 2023-01-20 | Stop reason: SDUPTHER

## 2023-01-20 RX ORDER — SODIUM CHLORIDE 0.9 % (FLUSH) 0.9 %
5-40 SYRINGE (ML) INJECTION EVERY 12 HOURS SCHEDULED
Status: DISCONTINUED | OUTPATIENT
Start: 2023-01-20 | End: 2023-01-20 | Stop reason: HOSPADM

## 2023-01-20 RX ORDER — MAGNESIUM HYDROXIDE 1200 MG/15ML
LIQUID ORAL CONTINUOUS PRN
Status: DISCONTINUED | OUTPATIENT
Start: 2023-01-20 | End: 2023-01-20 | Stop reason: HOSPADM

## 2023-01-20 RX ORDER — HYDROCODONE BITARTRATE AND ACETAMINOPHEN 5; 325 MG/1; MG/1
1 TABLET ORAL EVERY 4 HOURS PRN
Qty: 12 TABLET | Refills: 0 | Status: SHIPPED | OUTPATIENT
Start: 2023-01-20 | End: 2023-01-23

## 2023-01-20 RX ORDER — HYDROCODONE BITARTRATE AND ACETAMINOPHEN 5; 325 MG/1; MG/1
1 TABLET ORAL EVERY 4 HOURS PRN
Qty: 12 TABLET | Refills: 0 | Status: SHIPPED | OUTPATIENT
Start: 2023-01-20 | End: 2023-01-20 | Stop reason: SDUPTHER

## 2023-01-20 RX ORDER — MORPHINE SULFATE 2 MG/ML
2 INJECTION, SOLUTION INTRAMUSCULAR; INTRAVENOUS EVERY 5 MIN PRN
Status: DISCONTINUED | OUTPATIENT
Start: 2023-01-20 | End: 2023-01-20 | Stop reason: HOSPADM

## 2023-01-20 RX ORDER — CEFADROXIL 500 MG/1
500 CAPSULE ORAL 2 TIMES DAILY
Qty: 6 CAPSULE | Refills: 0 | Status: SHIPPED | OUTPATIENT
Start: 2023-01-20 | End: 2023-01-23

## 2023-01-20 RX ORDER — TAMSULOSIN HYDROCHLORIDE 0.4 MG/1
0.4 CAPSULE ORAL DAILY
Qty: 10 CAPSULE | Refills: 0 | Status: SHIPPED | OUTPATIENT
Start: 2023-01-20

## 2023-01-20 RX ORDER — SODIUM CHLORIDE 0.9 % (FLUSH) 0.9 %
5-40 SYRINGE (ML) INJECTION PRN
Status: DISCONTINUED | OUTPATIENT
Start: 2023-01-20 | End: 2023-01-20 | Stop reason: HOSPADM

## 2023-01-20 RX ORDER — MIDAZOLAM HYDROCHLORIDE 1 MG/ML
INJECTION INTRAMUSCULAR; INTRAVENOUS PRN
Status: DISCONTINUED | OUTPATIENT
Start: 2023-01-20 | End: 2023-01-20 | Stop reason: SDUPTHER

## 2023-01-20 RX ORDER — LIDOCAINE HYDROCHLORIDE 10 MG/ML
INJECTION, SOLUTION EPIDURAL; INFILTRATION; INTRACAUDAL; PERINEURAL PRN
Status: DISCONTINUED | OUTPATIENT
Start: 2023-01-20 | End: 2023-01-20 | Stop reason: SDUPTHER

## 2023-01-20 RX ORDER — DEXAMETHASONE SODIUM PHOSPHATE 10 MG/ML
INJECTION, SOLUTION INTRAMUSCULAR; INTRAVENOUS PRN
Status: DISCONTINUED | OUTPATIENT
Start: 2023-01-20 | End: 2023-01-20 | Stop reason: SDUPTHER

## 2023-01-20 RX ADMIN — FENTANYL CITRATE 25 MCG: 50 INJECTION, SOLUTION INTRAMUSCULAR; INTRAVENOUS at 10:47

## 2023-01-20 RX ADMIN — FENTANYL CITRATE 25 MCG: 50 INJECTION, SOLUTION INTRAMUSCULAR; INTRAVENOUS at 10:48

## 2023-01-20 RX ADMIN — Medication 2000 MG: at 10:35

## 2023-01-20 RX ADMIN — FENTANYL CITRATE 25 MCG: 50 INJECTION, SOLUTION INTRAMUSCULAR; INTRAVENOUS at 10:45

## 2023-01-20 RX ADMIN — FENTANYL CITRATE 25 MCG: 50 INJECTION, SOLUTION INTRAMUSCULAR; INTRAVENOUS at 10:44

## 2023-01-20 RX ADMIN — FENTANYL CITRATE 50 MCG: 50 INJECTION, SOLUTION INTRAMUSCULAR; INTRAVENOUS at 10:40

## 2023-01-20 RX ADMIN — SODIUM CHLORIDE, POTASSIUM CHLORIDE, SODIUM LACTATE AND CALCIUM CHLORIDE: 600; 310; 30; 20 INJECTION, SOLUTION INTRAVENOUS at 09:17

## 2023-01-20 RX ADMIN — DEXAMETHASONE SODIUM PHOSPHATE 4 MG: 10 INJECTION INTRAMUSCULAR; INTRAVENOUS at 10:28

## 2023-01-20 RX ADMIN — MIDAZOLAM 2 MG: 1 INJECTION INTRAMUSCULAR; INTRAVENOUS at 10:18

## 2023-01-20 RX ADMIN — PROPOFOL 200 MG: 10 INJECTION, EMULSION INTRAVENOUS at 10:21

## 2023-01-20 RX ADMIN — FENTANYL CITRATE 50 MCG: 50 INJECTION, SOLUTION INTRAMUSCULAR; INTRAVENOUS at 10:21

## 2023-01-20 RX ADMIN — LIDOCAINE HYDROCHLORIDE 50 MG: 10 INJECTION, SOLUTION EPIDURAL; INFILTRATION; INTRACAUDAL; PERINEURAL at 10:20

## 2023-01-20 RX ADMIN — SODIUM CHLORIDE, POTASSIUM CHLORIDE, SODIUM LACTATE AND CALCIUM CHLORIDE: 600; 310; 30; 20 INJECTION, SOLUTION INTRAVENOUS at 10:14

## 2023-01-20 RX ADMIN — ONDANSETRON 4 MG: 2 INJECTION INTRAMUSCULAR; INTRAVENOUS at 10:37

## 2023-01-20 ASSESSMENT — PAIN SCALES - GENERAL
PAINLEVEL_OUTOF10: 0
PAINLEVEL_OUTOF10: 0

## 2023-01-20 ASSESSMENT — PAIN - FUNCTIONAL ASSESSMENT: PAIN_FUNCTIONAL_ASSESSMENT: 0-10

## 2023-01-20 NOTE — DISCHARGE INSTRUCTIONS
Ureteroscopy Discharge Instructions: Take prescriptions as directed, ensuring you take entire course of antibiotic. No driving while taking narcotic pain medication. Wean off narcotics as soon as able. OK to shower after discharge. May resume regular diet. No heavy lifting >10lbs day of procedure, avoid strenuous activity, may walk. You may have a string taped to your pelvis, genitalia, or inner thigh. Please take care while showering/wiping that you do not tug on the string and dislodge your indwelling stent early. OK to remove stent by pulling the string straight out in 4 days. Please call the office if you have difficulty removing your stent  You may see blood in the urine after the procedure and entire time stent is in place. This should resolve over the next couple days. Please stay hydrated. You may experience flank pain, and/or frequency/urgency of urination while the stent is in place. Please use Flomax (Tamsulosin) to help with these symptoms. Please call attending physician or hospital  with questions. Please call or present to ED for fever >101 F, intractable nausea and vomiting, or uncontrolled pain. Follow up with Dr. Severa Minor in 6 weeks with renal ultrasound prior to appointment. Call office to confirm appointment. Pt should pull stent in the morning of 1/24/23. There may be some pain associated with the stent removal, which is usually self limiting. We suggest using the pain medication prescribed for you and a nonsteroidal anti-inflammatory such as Ibuprofen, if you are able to take this medication, to control symptoms. Take Ibuprofen as directed for 24 hrs after stent pull. Please stay hydrated. Please call with questions. No alcoholic beverages, no driving or operating machinery, no making important decisions for 24 hours. Children should maintain quiet play ( games, movies, books ) for 24 hours.   You may have a normal diet but should eat lightly day of surgery. Drink plenty of fluids.   Urinate within 8 hours after surgery, if unable to urinate call your doctor

## 2023-01-20 NOTE — BRIEF OP NOTE
Brief Postoperative Note      Patient: Aldair Medeiros  YOB: 1960  MRN: [id number]    Date of Procedure: 1/20/2023    Pre-Op Diagnosis: LEFT URETERAL STONE    Post-Op Diagnosis: Same       Procedure(s):  HOLMIUM LASER CYSTOSCOPY URETEROSCOPY STENT EXCHANGE    Surgeon(s):  Amber Clemente MD    Assistant:  Resident: Meseret Washington MD    Anesthesia: General    Estimated Blood Loss (mL): Minimal    Complications: None    Specimens:   * No specimens in log *    Implants:  Implant Name Type Inv.  Item Serial No.  Lot No. LRB No. Used Action   STENT URET CONTUR W/O GW [id number] Stent:Urological STENT URET 6FR L26CM PERCFLX HYDR+ TAPR TIP GRAD  [employer]: INTERVENTIONAL CARDIO-PMM [id number] Right 1 Explanted   STENT URET 6FR L26CM PERCFLX HYDR+ DBL PGTL THRD 2 - [id number]  STENT URET 6FR L26CM PERCFLX HYDR+ DBL PGTL THRD 2  [employer]- [id number] Left 1 Implanted         Drains: * No LDAs found *    Findings:   Left ureteroscopy: Left proximal ureteral stone 8 mm    Electronically signed by Meseret Washington MD on 1/20/2023 at 11:03 AM

## 2023-01-20 NOTE — ANESTHESIA PRE PROCEDURE
Department of Anesthesiology  Preprocedure Note       Name:  Alexey Mathur   Age:  65 y.o.  :  1960                                          MRN:  [id number]         Date:  2023      Surgeon: Alda Medeiros):  Margareth Silva MD    Procedure: Procedure(s):  HOLMIUM LASER CYSTOSCOPY URETEROSCOPY STENT EXCHANGE    Medications prior to admission:   Prior to Admission medications    Medication Sig Start Date End Date Taking? Authorizing Provider   atorvastatin (LIPITOR) 10 MG tablet TAKE 1 TABLET BY MOUTH EVERY DAY 22   Sandra Medhkour, DO   levocetirizine (XYZAL) 5 MG tablet TAKE 1 TABLET BY MOUTH EVERY DAY 22   Sandra Medhkour, DO   amLODIPine (NORVASC) 10 MG tablet TAKE 1 TABLET BY MOUTH EVERY DAY 22   Sandra Medhkour, DO   metFORMIN (GLUCOPHAGE-XR) 500 MG extended release tablet TAKE 1 TABLET BY MOUTH EVERY DAY WITH BREAKFAST 22   Sandra Medhkour, DO   meloxicam (MOBIC) 15 MG tablet TAKE 1 TABLET BY MOUTH DAILY 10/11/22   Joan Medrano MD   albuterol sulfate HFA (PROVENTIL;VENTOLIN;PROAIR) 108 (90 Base) MCG/ACT inhaler inhale 2 puffs by mouth and INTO THE LUNGS every 6 hours if needed for cough shortness of breath or wheezing  Patient not taking: Reported on 2023   Sandra Medhkour, DO   GRALISE 600 MG TABS  22   Historical Provider, MD   memantine (NAMENDA) 10 MG tablet Take 1 tablet by mouth in the morning and 1 tablet before bedtime. Starting 2022. 22   Live Mckeon MD   topiramate (TOPAMAX) 50 MG tablet take 1 po qAM 2 po qhs 22   Live Mckeon MD   traZODone (DESYREL) 50 MG tablet take 2 tablets by mouth at bedtime 22   Live Mckeon MD   carvedilol (COREG) 25 MG tablet take 1 tablet by mouth twice a day 22   Sandra Medhkour, DO   ARIPiprazole (ABILIFY) 5 MG tablet take 1/2 tablet by mouth daily for 7 days then 1 tablet daily 22   Historical Provider, MD   Misc.  Devices MISC 1 PAIR OF DIABETIC SHOES (1 LEFT/ 1 RIGHT)  1-3 PAIRS OF INSERTS (LEFT/ RIGHT)  Patient not taking: Reported on 1/20/2023 7/5/22   Jacob Nichole, VINNIE   diazePAM (VALIUM) 5 MG tablet take 1-2 tablets by mouth 40 MINUTES prior to procedure if needed 4/27/22   Historical Provider, MD   glucose monitoring (FREESTYLE FREEDOM) kit 1 kit by Does not apply route daily Any brand covered by insurance  Patient not taking: Reported on 1/20/2023 2/1/22   Tab Gee, DO   Lancets MISC 1 each by Does not apply route daily  Patient not taking: Reported on 1/20/2023 2/1/22   [facility]   blood glucose monitor strips Check daily  Patient not taking: Reported on 1/20/2023 2/1/22   [facility], [facility]   oxyCODONE-acetaminophen (PERCOCET) 5-325 MG per tablet every 6 hours as needed. [facility] pain clinic 12/7/21   Historical Provider, MD   TRINTELLIX 20 MG TABS tablet 20 mg  5/10/21   Historical Provider, MD   gabapentin (NEURONTIN) 300 MG capsule Take 300 mg by mouth daily.  Takes at Fung Done  Patient not taking: Reported on 1/18/2023 5/6/21   Historical Provider, MD   tiZANidine (ZANAFLEX) 2 MG tablet take 1 tablet by mouth at bedtime 5/6/21   Historical Provider, MD   benzonatate (TESSALON) 200 MG capsule Take 1 capsule by mouth 3 times daily as needed for Cough 4/21/21   ASHLEY De Oliveira - CNP   carvedilol (COREG) 25 MG tablet Take 1 tablet by mouth 2 times daily 12/21/20   Sandra Medhkour, DO   fluticasone-salmeterol (ADVAIR) 100-50 MCG/DOSE diskus inhaler Inhale 1 puff into the lungs every 12 hours  Patient not taking: Reported on 1/18/2023 11/23/20   Sandra Medhkour, DO   fluticasone (FLONASE) 50 MCG/ACT nasal spray instill 2 sprays into each nostril once daily 11/6/20   Sandra Medhkour, DO   nitroGLYCERIN (NITROSTAT) 0.3 MG SL tablet place 1 tablet under the tongue if needed every 5 minutes for chest pain for 3 doses IF NO RELIEF AF  Patient not taking: Reported on 1/18/2023 11/28/18   Belen Garrett MD   aspirin 81 MG tablet Take 81 mg by mouth daily Stopped 1/16/2023 on his own for surgery. Was not instructed to stop. Historical Provider, MD       Current medications:    No current facility-administered medications for this visit. No current outpatient medications on file. Facility-Administered Medications Ordered in Other Visits   Medication Dose Route Frequency Provider Last Rate Last Admin    ceFAZolin (ANCEF) 2000 mg in sterile water 20 mL IV syringe  2,000 mg IntraVENous Once Frankey Ross, MD        lactated ringers IV soln infusion   IntraVENous Continuous Vergil MD Ricardo           Allergies: Allergies   Allergen Reactions    Lisinopril      States he is allergic to the generic forms of medication, can take lisinopril    Aleve  [Naproxen Sodium] Nausea Only    Amitriptyline Hcl Other (See Comments)     rash    Exelon [Rivastigmine] Other (See Comments)     Patient stated patch left a \"burn kathy\" on his skin     Ibuprofen Other (See Comments)     \"hurts my stomach. \"       Problem List:    Patient Active Problem List   Diagnosis Code    Hypertension I10    Anxiety and depression F41.9, F32. 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  Arthritis     knees, hands, back.  Asthma     as child.   pcp manages    Back pain     [facility] pain clinic for back and knees. 1/19/2023 visif    Brain aneurysm     Dr. Helena Lorenzo at   [id number] [facility]  f/u 12/29/2021. No surgery at this time. Monitoring yearly.  CAD (coronary artery disease)     Needs to find an new cardiologist. Dr. Pily Hazel retired. Dr. Alcides Capellan [place]  last visit 11/9/2021. Pt. states never had heart cath. MI 2011    Cataract     lt eye   no surgery    Cerebral artery occlusion with cerebral infarction [facility]) 2014    mini stroke went to [facility] and was treated. Dr. Hunter Collier. Last seen summer 2021    Depression     pcp    Diabetes mellitus ([facility] [address]     on rx   A1C high per pt    pcp manages    Heart attack [facility]) 2012    Dr. Pily Hazel last visit 2021    Pt. states no hx cath    Hyperlipidemia     on rx    Hypertension     pcp manages    Kidney stones     Dr. Demar Saavedra treats. Pt. states he currently has blood in his urine. Urine CS sent to lab today. No pain per pt.  Memory loss, long term     Pt states he has had it for years and follows w/ his neurologist Dr. Jovon Aranda. Mother has Alzheimers.  Mini stroke     2013  denies lasting effects  Sees Dr. Isis Barney at [facility]. V's . Sees 1/25/2023    Osteoarthritis     Seasonal allergies     Sinus problem     drainage from allergy    Snores     sleep study 10/2022  results show sleep apnea and need for cpap. Pt. uses at home.     Wears eyeglasses     Wellness examination     Dr. Rufus Tan last visit 1/2023 [facility]       Past Surgical History:        Procedure Laterality Date    CHOLECYSTECTOMY      2016    COLONOSCOPY  2021    had cologuard and pt. states it was negative    CYSTO/URETERO/PYELOSCOPY, CALCULUS TX Right 02/05/2020    CYSTO, URETEROSCOPY, RIGHT URETERAL STENT PLACEMENT, RETROGRADE PYELOGRAM performed by Arti Cullen MD at [facility], rt. inguinal    INGUINAL HERNIA REPAIR Left 08/19/2016    LAPAROSCOPY N/A 01/16/2018    DIAGNOSTIC LAPAROSCOPY performed by Ami Gilliam MD at [address] [place] [address] PENILE PROSTHESIS PLACEMENT N/A 11/24/2021    PENIS PROSTHESIS INSERTION- AMS INFLATABLE, **SHORT STAY** performed by Stephenie Arenas MD at [address]         Social History:    Social History     Tobacco Use    Smoking status: Every Day     Packs/day: 0.25     Years: 28.00     Pack years: 7.00     Types: Cigarettes    Smokeless tobacco: Never   Substance Use Topics    Alcohol use: Yes     Comment: rarely                                Ready to quit: Not Answered  Counseling given: Not Answered      Vital Signs (Current): There were no vitals filed for this visit.                                            BP Readings from Last 3 Encounters:   01/16/23 136/82   12/19/22 122/80   08/09/22 120/82       NPO Status:                                                                                 BMI:   Wt Readings from Last 3 Encounters:   01/18/23 255 lb (115.7 kg)   01/16/23 260 lb (117.9 kg)   12/19/22 260 lb (117.9 kg)     There is no height or weight on file to calculate BMI.    CBC:   Lab Results   Component Value Date/Time    WBC 12.2 11/25/2021 06:53 AM    RBC 4.23 11/25/2021 06:53 AM    HGB 12.9 11/25/2021 06:53 AM    HCT 39.8 11/25/2021 06:53 AM    MCV 94.1 11/25/2021 06:53 AM    RDW 13.0 11/25/2021 06:53 AM     11/25/2021 06:53 AM       CMP:   Lab Results   Component Value Date/Time     11/25/2021 06:53 AM    K 3.6 11/25/2021 06:53 AM     11/25/2021 06:53 AM    CO2 20 11/25/2021 06:53 AM    BUN 14 11/25/2021 06:53 AM    CREATININE 0.94 11/25/2021 06:53 AM    GFRAA >60 11/25/2021 06:53 AM    LABGLOM >60 11/25/2021 06:53 AM    GLUCOSE 156 11/25/2021 06:53 AM    PROT 7.0 08/07/2017 11:50 PM    CALCIUM 8.4 11/25/2021 06:53 AM    BILITOT 0.33 08/07/2017 11:50 PM    ALKPHOS 95 08/07/2017 11:50 PM    AST 25 08/07/2017 11:50 PM    ALT 18 07/28/2021 10:25 AM       POC Tests: No results for input(s): POCGLU, POCNA, POCK, POCCL, POCBUN, POCHEMO, POCHCT in the last 72 hours. Coags:   Lab Results   Component Value Date/Time    PROTIME 10.3 10/07/2016 08:29 PM    INR 1.0 10/07/2016 08:29 PM    APTT 23.7 10/07/2016 08:29 PM       HCG (If Applicable): No results found for: PREGTESTUR, PREGSERUM, HCG, HCGQUANT     ABGs: No results found for: PHART, PO2ART, ZKO4ABF, FUA0SMQ, BEART, J5YHZJZF     Type & Screen (If Applicable):  No results found for: LABABO, LABRH    Drug/Infectious Status (If Applicable):  Lab Results   Component Value Date/Time    HEPCAB NONREACTIVE 04/26/2017 03:08 PM       COVID-19 Screening (If Applicable):   Lab Results   Component Value Date/Time    COVID19 Not Detected 10/21/2020 07:43 AM           Anesthesia Evaluation  Patient summary reviewed and Nursing notes reviewed no history of anesthetic complications:   Airway: Mallampati: III  TM distance: >3 FB   Neck ROM: full  Mouth opening: > = 3 FB   Dental:    (+) poor dentition      Pulmonary:normal exam    (+) asthma:                            Cardiovascular:  Exercise tolerance: good (>4 METS),   (+) hypertension:, past MI: > 6 months and no interval change, CAD:, hyperlipidemia      ECG reviewed      Echocardiogram reviewed  Stress test reviewed       Beta Blocker:  Not on Beta Blocker         Neuro/Psych:   (+) CVA:, psychiatric history:             ROS comment: Cerebral aneurysm. GI/Hepatic/Renal: Neg GI/Hepatic/Renal ROS            Endo/Other:    (+) DiabetesType II DM, using insulin, . Abdominal:             Vascular:   + PVD, aortic or cerebral, . Other Findings:             Anesthesia Plan      general     ASA 3       Induction: intravenous. MIPS: Postoperative opioids intended, Prophylactic antiemetics administered and Postoperative trial extubation. Anesthetic plan and risks discussed with patient. Use of blood products discussed with patient whom. Plan discussed with CRNA.                     Bindu Brooks MD   1/20/2023

## 2023-01-20 NOTE — OP NOTE
Operative Note      Patient: Alexia Ngo  YOB: 1960  MRN: [id number]    Date of Procedure: 1/20/2023    Pre-Op Diagnosis: LEFT URETERAL STONE    Post-Op Diagnosis: Same       Procedure(s):  Cystoscopy  Left ureteroscopy with holmium laser lithotripsy  Left ureteral stent exchange    Surgeon(s):  Nicci Whittaker MD    Assistant:   Resident: Manuel Chan MD    Anesthesia: General    Estimated Blood Loss (mL): Minimal    Complications: None    Specimens:   * No specimens in log *    Implants:  Implant Name Type Inv. Item Serial No.  Lot No. LRB No. Used Action   STENT URET CONTUR W/O GW [id number] Stent:Urological STENT URET 6FR L26CM PERCFLX HYDR+ TAPR TIP GRAD  [employer]: INTERVENTIONAL CARDIO-PMM [id number] Right 1 Explanted   STENT URET 6FR L26CM PERCFLX HYDR+ DBL PGTL THRD 2 - [id number]  STENT URET 6FR L26CM PERCFLX HYDR+ DBL PGTL THRD 2  [employer] [id number] Left 1 Implanted         Drains: * No LDAs found *    Findings:   Left ureteroscopy: Left proximal ureteral stone 8 mm    Indications:  Patient is a 65 y.o. male who presents with left proximal ureteral stone with left hydronephrosis. Had left ureteral stent in place. Patient was explained the risks and benefits of the procedure and he/she elected to proceed. Informed consent was obtained. Narrative of the Procedure:    After informed consent was obtained in the preoperative area, the patient was taken back to the operating room and transferred to the operating table in supine position. EPC cuffs were placed. The machine was turned on. Anesthesia was induced and antibiotics were given. The patient was placed in modified dorsal lithotomy position and sterilely prepped and draped in a standard fashion. A timeout occurred. Two patient identifiers were used. We entered the urethra with a 22 Jordanian cystoscope with 30 degree lens.  The left ureteral orifice has a ureteral stent coming out of it and the stent was grabbed using a stent grasper and brought to the urethral meatus. The ureteral stent was cannulated with a straight 0.035 Glidewire. This was advanced into the kidney with fluoroscopic guidance. A dual lumen ureteral catheter was then used to place a second 0.035 Glidewire into the kidney, also using fluoroscopic guidance. The flexible ureteroscope was assembled, place over the Glidewire, and advanced up the left ureter under direct visualization. The second wire remained in place as a safety. The stone was located in the left proximal ureter and using a 200 micron laser fiber it was fragmented into sub-200 micron size pieces for easy passage. Pan-nephroscopy and ureteroscopy demonstrated no further stone fragments. In addition, there were no papillary lesions within the kidney, or erythematous patches concerning for malignant disease. With the safety wire in place, the ureteroscope was slowly retracted down the ureter. The entire ureter was surveyed. There was no evidence of stricture, stone disease, ureteral trauma, or papillary lesion. To place the stent a 22 Syriac rigid cystoscope was back loaded over the wire. Under direct visualization the stent was advanced until it was in proper location. The Glidewire was then removed. A curl could be seen in the right renal pelvis under using fluoroscopic vision, and in the bladder under direct visualization. The patient's bladder was drained. All instrumentation was removed. A string was  left on the stent. The patient was then awakened, extubated, and discharged back to the PACU in good and stable condition. Dr. Macarena Thrasher was scrubbed and present for the entirety of the surgery. Disposition: Patient is discharged in stable condition from the PACU with oral antibiotics, oral pain medications, flomax, and oxybutynin. Patient is instructed to remove stent using attached strings in 4 days. Sai Mckeon MD  Urology Resident, PGY-3    Electronically signed on 1/20/2023 at 2:36 PM

## 2023-01-20 NOTE — ANESTHESIA POSTPROCEDURE EVALUATION
Department of Anesthesiology  Postprocedure Note    Patient: Corrinne Fairly  MRN: [id number]  YOB: 1960  Date of evaluation: 1/20/2023      Procedure Summary     Date: 01/20/23 Room / Location: [address]    Anesthesia Start: 0237 Anesthesia Stop: 1108    Procedure: HOLMIUM LASER CYSTOSCOPY URETEROSCOPY STENT EXCHANGE (Left) Diagnosis:       Ureteral stone      (URETERAL STONE)    Surgeons: James Galloway MD Responsible Provider: Brenda Patricio MD    Anesthesia Type: general ASA Status: 3          Anesthesia Type: No value filed.     Ale Phase I: Ale Score: 8    Ale Phase II: Ale Score: 10      Anesthesia Post Evaluation    Patient location during evaluation: bedside  Patient participation: complete - patient participated  Level of consciousness: awake  Airway patency: patent  Nausea & Vomiting: no nausea and no vomiting  Complications: no  Cardiovascular status: hemodynamically stable  Respiratory status: acceptable  Hydration status: stable  Comments: BP (!) 142/92   Pulse 77   Temp 97.7 °F (36.5 °C)   Resp (!) 9   Ht 5' 11\" (1.803 m)   Wt 255 lb (115.7 kg)   SpO2 97%   BMI 35.57 kg/m²

## 2023-01-21 LAB
EKG ATRIAL RATE: 68 BPM
EKG P AXIS: 45 DEGREES
EKG P-R INTERVAL: 148 MS
EKG Q-T INTERVAL: 404 MS
EKG QRS DURATION: 94 MS
EKG QTC CALCULATION (BAZETT): 429 MS
EKG R AXIS: 21 DEGREES
EKG T AXIS: 43 DEGREES
EKG VENTRICULAR RATE: 68 BPM

## 2023-01-21 PROCEDURE — 93010 ELECTROCARDIOGRAM REPORT: CPT | Performed by: INTERNAL MEDICINE

## 2023-01-24 DIAGNOSIS — N20.0 KIDNEY STONES: Primary | ICD-10-CM

## 2023-01-25 ENCOUNTER — TELEMEDICINE (OUTPATIENT)
Dept: NEUROLOGY | Age: 63
End: 2023-01-25
Payer: MEDICARE

## 2023-01-25 DIAGNOSIS — I67.1 ANEURYSM OF LEFT INTERNAL CAROTID ARTERY: Primary | ICD-10-CM

## 2023-01-25 PROCEDURE — 3017F COLORECTAL CA SCREEN DOC REV: CPT | Performed by: PSYCHIATRY & NEUROLOGY

## 2023-01-25 PROCEDURE — 99215 OFFICE O/P EST HI 40 MIN: CPT | Performed by: PSYCHIATRY & NEUROLOGY

## 2023-01-25 PROCEDURE — G8428 CUR MEDS NOT DOCUMENT: HCPCS | Performed by: PSYCHIATRY & NEUROLOGY

## 2023-01-25 NOTE — PROGRESS NOTES
[address] Surgery Telehealth Followup Visit    Pt Name: Aye Mckenzie  MRN: [id number]  YOB: 1960  Date of evaluation: 1/25/2023  Primary Care Physician: Cindi Muñoz DO  Reason for Evaluation: TELEHEALTH EVALUATION -- Audio/Visual (During JBXHP-21 public health emergency) regarding left ica aneurysm    Dear Dr. Sidney Zavala is a 65 y.o. 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 He states he does have a family history of aneurysm that has undergone treatment. Cont recommending smoking cessation. Noted increased throbbing frontal headache. Followup with neurology. Off gabapentin since last visit due to weight gain. Allergies   Allergen Reactions    Lisinopril      States he is allergic to the generic forms of medication, can take lisinopril    Aleve  [Naproxen Sodium] Nausea Only    Amitriptyline Hcl Other (See Comments)     rash    Exelon [Rivastigmine] Other (See Comments)     Patient stated patch left a \"burn kathy\" on his skin     Ibuprofen Other (See Comments)     \"hurts my stomach. \"     Current Outpatient Medications   Medication Sig Dispense Refill    oxybutynin (DITROPAN) 5 MG tablet Take 1 tablet by mouth 3 times daily 30 tablet 0    tamsulosin (FLOMAX) 0.4 MG capsule Take 1 capsule by mouth daily 10 capsule 0    atorvastatin (LIPITOR) 10 MG tablet TAKE 1 TABLET BY MOUTH EVERY DAY 90 tablet 3    levocetirizine (XYZAL) 5 MG tablet TAKE 1 TABLET BY MOUTH EVERY DAY 90 tablet 3    amLODIPine (NORVASC) 10 MG tablet TAKE 1 TABLET BY MOUTH EVERY DAY 90 tablet 1    metFORMIN (GLUCOPHAGE-XR) 500 MG extended release tablet TAKE 1 TABLET BY MOUTH EVERY DAY WITH BREAKFAST 90 tablet 3    meloxicam (MOBIC) 15 MG tablet TAKE 1 TABLET BY MOUTH DAILY 30 tablet 3    albuterol sulfate HFA (PROVENTIL;VENTOLIN;PROAIR) 108 (90 Base) MCG/ACT inhaler inhale 2 puffs by mouth and INTO THE LUNGS every 6 hours if needed for cough shortness of breath or wheezing (Patient not taking: Reported on 1/20/2023) 8.5 g 3    GRALISE 600 MG TABS  (Patient not taking: Reported on 1/18/2023)      memantine (NAMENDA) 10 MG tablet Take 1 tablet by mouth in the morning and 1 tablet before bedtime. Starting Feb 1st 2022. 180 tablet 1    topiramate (TOPAMAX) 50 MG tablet take 1 po qAM 2 po qhs 180 tablet 1    traZODone (DESYREL) 50 MG tablet take 2 tablets by mouth at bedtime 180 tablet 2    carvedilol (COREG) 25 MG tablet take 1 tablet by mouth twice a day 180 tablet 2    ARIPiprazole (ABILIFY) 5 MG tablet take 1/2 tablet by mouth daily for 7 days then 1 tablet daily      Misc. Devices MISC 1 PAIR OF DIABETIC SHOES (1 LEFT/ 1 RIGHT)  1-3 PAIRS OF INSERTS (LEFT/ RIGHT) (Patient not taking: Reported on 1/20/2023) 2 each 0    diazePAM (VALIUM) 5 MG tablet take 1-2 tablets by mouth 40 MINUTES prior to procedure if needed      glucose monitoring (FREESTYLE FREEDOM) kit 1 kit by Does not apply route daily Any brand covered by insurance (Patient not taking: Reported on 1/20/2023) 1 kit 0    Lancets MISC 1 each by Does not apply route daily (Patient not taking: Reported on 1/20/2023) 100 each 5    blood glucose monitor strips Check daily (Patient not taking: Reported on 1/20/2023) 120 strip 3    oxyCODONE-acetaminophen (PERCOCET) 5-325 MG per tablet every 6 hours as needed. [facility] pain clinic      TRINTELLIX 20 MG TABS tablet 20 mg       gabapentin (NEURONTIN) 300 MG capsule Take 300 mg by mouth daily.  Takes at [facility] (Patient not taking: Reported on 1/18/2023)      tiZANidine (ZANAFLEX) 2 MG tablet take 1 tablet by mouth at bedtime      fluticasone-salmeterol (ADVAIR) 100-50 MCG/DOSE diskus inhaler Inhale 1 puff into the lungs every 12 hours (Patient not taking: Reported on 1/18/2023) 60 each 3    nitroGLYCERIN (NITROSTAT) 0.3 MG SL tablet place 1 tablet under the tongue if needed every 5 minutes for chest pain for 3 doses IF NO RELIEF AF (Patient not taking: Reported on 1/18/2023) 25 tablet 3    aspirin 81 MG tablet Take 81 mg by mouth daily Stopped 1/16/2023 on his own for surgery. Was not instructed to stop. No current facility-administered medications for this visit. Past Medical History:   Diagnosis Date    ADHD (attention deficit hyperactivity disorder)     no tx per pt. Arthritis     knees, hands, back. Asthma     as child. pcp manages    Back pain     [facility] pain clinic for back and knees. 1/19/2023 visif    Brain aneurysm     Dr. Mark Leal at   [facility]  f/u 12/29/2021. No surgery at this time. Monitoring yearly. CAD (coronary artery disease)     Needs to find an new cardiologist. Dr. Ruthie Lance retired. Dr. Abhilash Figueroa, [place]  last visit 11/9/2021. Pt. states never had heart cath. MI 2011    Cataract     lt eye   no surgery    Cerebral artery occlusion with cerebral infarction [facility]) 2014    mini stroke went to [facility] and was treated. Dr. Hudson Torre. Last seen summer 2021    Depression     pcp    Diabetes mellitus ([facility] [address]     on rx   A1C high per pt    pcp manages    Heart attack [facility]) 2012    Dr. Ruthie Lance last visit 2021    Pt. states no hx cath    Hyperlipidemia     on rx    Hypertension     pcp manages    Kidney stones     Dr. Alexandra Sterling treats. Pt. states he currently has blood in his urine. Urine CS sent to lab today. No pain per pt. Memory loss, long term     Pt states he has had it for years and follows w/ his neurologist Dr. Davian Abbasi. Mother has Alzheimers. Mini stroke     2013  denies lasting effects  Sees Dr. Lenard Hester at [facility]. V's . Sees 1/25/2023    Osteoarthritis     Seasonal allergies     Sinus problem     drainage from allergy    Snores     sleep study 10/2022  results show sleep apnea and need for cpap. Pt. uses at home.     Wears eyeglasses     Wellness examination     Dr. Celia Vee last visit 1/2023 [facility]      Past Surgical History:   Procedure Laterality Date    CHOLECYSTECTOMY      2016    COLONOSCOPY  2021    had cologuard and pt. states it was negative    CYSTO/URETERO/PYELOSCOPY, CALCULUS TX Right 02/05/2020    CYSTO, URETEROSCOPY, RIGHT URETERAL STENT PLACEMENT, RETROGRADE PYELOGRAM performed [facility] Sim [facility] at [facility] [address]  01/20/2023    HOLMIUM LASER CYSTOSCOPY URETEROSCOPY STENT EXCHANGE - Left    HERNIA REPAIR      1995, rt. inguinal    INGUINAL HERNIA REPAIR Left 08/19/2016    LAPAROSCOPY N/A 01/16/2018    DIAGNOSTIC LAPAROSCOPY performed by Ry Gates MD at [place] [address] N/A 11/24/2021    PENIS PROSTHESIS INSERTION- AMS INFLATABLE, **SHORT STAY** performed by Sim Greene MD at [address] [place] [address] Left 1/20/2023    HOLMIUM LASER CYSTOSCOPY URETEROSCOPY STENT EXCHANGE performed by Sim Greene MD at [address] [place] [address]       Family History   Problem Relation Age of Onset    Cancer Father         throat    Cirrhosis Father     High Blood Pressure Sister     Asthma Sister     Arthritis Sister     High Blood Pressure Mother     Heart Disease Mother     Alzheimer's Disease Mother      Social History     Tobacco Use    Smoking status: Every Day     Packs/day: 0.25     Years: 28.00     Pack years: 7.00     Types: Cigarettes    Smokeless tobacco: Never   Substance Use Topics    Alcohol use: Yes     Comment: rarely          Subjective:     Review of Systems      Objective:   Physical Exam  General Appearance:  Alert, cooperative, no signs of distress, appears stated age   Skin: Skin color, texture normal, no rashes, no lesions                                     NEUROLOGIC EXAMINATION      Mental status    Alert and oriented x 3; able to follow commands, speech and language intact;   Cranial nerves    II - grossly intact  III, IV, VI - extra-ocular muscles full: no nystagmus, no ptosis   V - normal facial sensation VII - normal facial symmetry                                                             VIII - intact hearing                                                                             IX, X - symmetrical palate                                                                  XI - symmetrical shoulder shrug                                                       XII - tongue midline without atrophy      Motor function  Normal muscle bulk. Strength at least 5/5 on all 4 extremities, no pronator drift      Sensory function Unable to test      Cerebellar Intact fine motor movement. No involuntary movements or tremors. No ataxia or dysmetria on finger to nose      Reflex function Unable to test      Gait                   not tested       Imagin2022 mra head        Assessment:        Roque Butcher is a 65 y.o. male who has incidental left ica supraclinoid aneurysm stable on annual mra head. Monitoring headaches. Diagnosis Orders   1. Aneurysm of left internal carotid artery  MRA HEAD WO CONTRAST          Recommendations:      Return in about 69 weeks (around 2024) for mra head prior to visit. Orders Placed This Encounter   Procedures    MRA HEAD WO CONTRAST     Standing Status:   Future     Standing Expiration Date:   2024     Scheduling Instructions:      May 2024 followup imaging     Order Specific Question:   Reason for exam:     Answer:   followup left ica terminus aneurysm     1. Recommend followup mra head and visit in May  2024 due to small size and stability of mra compared to four years ago and cont to monitor due to new history of aneurysms in family. 2. Cont to strive for smoking cessation  3. Normotensive sbp  4. Followup with Neurology for headache management      Established Patient Visit Time: 42 minutes  Time Spent in Counselin minutes  Greater than 50% of the time in this visit was spent counseling and coordinating care of this patient. Discussed use, benefit, and side effects of prescribed medications. Personally reviewed imaging with patients and all questions answered. Pt voiced understanding. Patient agreed with treatment plan. Follow up as directed above. If you have any questions, please do not hesitate to call me. I look forward to following Raj Prietokishan      Sincerely,    Jb Warren MD  Electronically signed by Jb Warren MD, MD on 1/25/2023 at 10:46 AM       This is a telehealth visit that was performed with the originating site at Patient Location: Patient Home and Provider Location of [place], [place]. Patient ID verified by me prior to start of this visit. Verbal consent to participate in video visit was obtained. Pursuant to the emergency declaration under the [facility] waiver authority and the [employer] Act, this Virtual Visit was conducted, with patient's consent, to reduce the patient's risk of exposure to COVID-19 and provide continuity of care for an established/new patient. Services were provided through a video synchronous discussion virtually to substitute for in-person clinic visit. I discussed with the patient the nature of our telehealth visits via interactive/real-time audio/video that:  - I would evaluate the patient and recommend diagnostics and treatments based on my assessment  - Our sessions are not being recorded and that personal health information is protected  - Our team would provide follow up care in person if/when the patient needs it. Raj Chang, was evaluated through a synchronous (real-time) audio-video encounter. The patient (or guardian if applicable) is aware that this is a billable service, which includes applicable co-pays. This Virtual Visit was conducted with patient's (and/or legal guardian's) consent.  The visit was conducted pursuant to the emergency declaration under the [address] [place] [address] authority and the [employer] Act. Patient identification was verified, and a caregiver was present when appropriate. The patient was located in a state where the provider was licensed to provide care. Total time spent for this encounter:  48 Min    --Diana Bauer MD on 1/25/2023 at 10:46 AM    An electronic signature was used to authenticate this note.

## 2023-01-27 NOTE — PROGRESS NOTES
Called patient and LM of appointment date and time and left number for scheduling to have US done 1 week prior.

## 2023-01-30 RX ORDER — TOPIRAMATE 50 MG/1
TABLET, FILM COATED ORAL
Qty: 180 TABLET | Refills: 1 | Status: SHIPPED | OUTPATIENT
Start: 2023-01-30

## 2023-01-30 RX ORDER — MEMANTINE HYDROCHLORIDE 10 MG/1
10 TABLET ORAL 2 TIMES DAILY
Qty: 180 TABLET | Refills: 1 | Status: SHIPPED | OUTPATIENT
Start: 2023-01-30

## 2023-01-30 NOTE — TELEPHONE ENCOUNTER
Pharmacy requesting refill of topiramate (TOPAMAX) 50 MG tablet / memantine (NAMENDA) 10 MG tablet       Medication active on med list yes      Date of last Rx: 7/20/2022 with 1 refills          verified by ALBERTO FLORES      Date of last appointment 7/21/2022    Next Visit Date:  3/27/2023        Please fill for Dr. Caleb Olsen as he is currently out of office. Thank you.

## 2023-01-30 NOTE — TELEPHONE ENCOUNTER
Patient calling for refill of Topamax and Namenda. Medication active on med list yes      Date of last fill: 7/20/22 for both  verified on 1/30/2023   verified by [facility] LPN      Date of last appointment 7/20/22    Next Visit Date:  3/27/2023    Please approve for Dr Amadeo Calderón pt as he is out of office, thanks.

## 2023-01-31 RX ORDER — MEMANTINE HYDROCHLORIDE 10 MG/1
10 TABLET ORAL 2 TIMES DAILY
Qty: 180 TABLET | Refills: 0 | Status: SHIPPED | OUTPATIENT
Start: 2023-01-31 | End: 2023-05-01

## 2023-01-31 RX ORDER — TOPIRAMATE 50 MG/1
TABLET, FILM COATED ORAL
Qty: 180 TABLET | Refills: 0 | Status: SHIPPED | OUTPATIENT
Start: 2023-01-31 | End: 2023-04-30

## 2023-02-01 DIAGNOSIS — R09.82 POST-NASAL DRIP: ICD-10-CM

## 2023-02-01 RX ORDER — LEVOCETIRIZINE DIHYDROCHLORIDE 5 MG/1
TABLET, FILM COATED ORAL
Qty: 90 TABLET | Refills: 3 | Status: SHIPPED | OUTPATIENT
Start: 2023-02-01

## 2023-02-15 ENCOUNTER — HOSPITAL ENCOUNTER (OUTPATIENT)
Age: 63
Setting detail: SPECIMEN
Discharge: HOME | End: 2023-02-15

## 2023-02-15 ENCOUNTER — OFFICE VISIT (OUTPATIENT)
Dept: PRIMARY CARE CLINIC | Age: 63
End: 2023-02-15
Payer: MEDICARE

## 2023-02-15 VITALS
SYSTOLIC BLOOD PRESSURE: 122 MMHG | WEIGHT: 264 LBS | BODY MASS INDEX: 36.82 KG/M2 | OXYGEN SATURATION: 97 % | DIASTOLIC BLOOD PRESSURE: 82 MMHG | HEART RATE: 80 BPM

## 2023-02-15 DIAGNOSIS — Z12.5 PROSTATE CANCER SCREENING: ICD-10-CM

## 2023-02-15 DIAGNOSIS — E11.9 TYPE 2 DIABETES MELLITUS WITHOUT COMPLICATION, WITH LONG-TERM CURRENT USE OF INSULIN (HCC): ICD-10-CM

## 2023-02-15 DIAGNOSIS — I20.8 OTHER FORMS OF ANGINA PECTORIS (HCC): ICD-10-CM

## 2023-02-15 DIAGNOSIS — I72.0 ANEURYSM OF CAROTID ARTERY (HCC): ICD-10-CM

## 2023-02-15 DIAGNOSIS — Z00.00 INITIAL MEDICARE ANNUAL WELLNESS VISIT: Primary | ICD-10-CM

## 2023-02-15 DIAGNOSIS — F48.1 DEPERSONALIZATION (HCC): ICD-10-CM

## 2023-02-15 DIAGNOSIS — Z79.4 TYPE 2 DIABETES MELLITUS WITHOUT COMPLICATION, WITH LONG-TERM CURRENT USE OF INSULIN (HCC): ICD-10-CM

## 2023-02-15 DIAGNOSIS — Z71.89 ADVANCED CARE PLANNING/COUNSELING DISCUSSION: ICD-10-CM

## 2023-02-15 LAB
CHOLEST SERPL-MCNC: 156 MG/DL
CHOLESTEROL/HDL RATIO: 4.1
CREATININE URINE: 208.5 MG/DL (ref 39–259)
EST. AVERAGE GLUCOSE BLD GHB EST-MCNC: 117 MG/DL
HBA1C MFR BLD: 5.7 % (ref 4–6)
HDLC SERPL-MCNC: 38 MG/DL
LDLC SERPL CALC-MCNC: 69 MG/DL (ref 0–130)
MICROALBUMIN/CREAT 24H UR: <12 MG/L
MICROALBUMIN/CREAT UR-RTO: NORMAL MCG/MG CREAT
PROSTATE SPECIFIC ANTIGEN: 1.01 NG/ML
TRIGL SERPL-MCNC: 247 MG/DL

## 2023-02-15 PROCEDURE — 3079F DIAST BP 80-89 MM HG: CPT | Performed by: FAMILY MEDICINE

## 2023-02-15 PROCEDURE — 3074F SYST BP LT 130 MM HG: CPT | Performed by: FAMILY MEDICINE

## 2023-02-15 PROCEDURE — G0438 PPPS, INITIAL VISIT: HCPCS | Performed by: FAMILY MEDICINE

## 2023-02-15 PROCEDURE — 3017F COLORECTAL CA SCREEN DOC REV: CPT | Performed by: FAMILY MEDICINE

## 2023-02-15 PROCEDURE — G8484 FLU IMMUNIZE NO ADMIN: HCPCS | Performed by: FAMILY MEDICINE

## 2023-02-15 PROCEDURE — 3046F HEMOGLOBIN A1C LEVEL >9.0%: CPT | Performed by: FAMILY MEDICINE

## 2023-02-15 SDOH — ECONOMIC STABILITY: HOUSING INSECURITY
IN THE LAST 12 MONTHS, WAS THERE A TIME WHEN YOU DID NOT HAVE A STEADY PLACE TO SLEEP OR SLEPT IN A SHELTER (INCLUDING NOW)?: NO

## 2023-02-15 SDOH — ECONOMIC STABILITY: FOOD INSECURITY: WITHIN THE PAST 12 MONTHS, THE FOOD YOU BOUGHT JUST DIDN'T LAST AND YOU DIDN'T HAVE MONEY TO GET MORE.: NEVER TRUE

## 2023-02-15 SDOH — ECONOMIC STABILITY: FOOD INSECURITY: WITHIN THE PAST 12 MONTHS, YOU WORRIED THAT YOUR FOOD WOULD RUN OUT BEFORE YOU GOT MONEY TO BUY MORE.: NEVER TRUE

## 2023-02-15 SDOH — ECONOMIC STABILITY: INCOME INSECURITY: HOW HARD IS IT FOR YOU TO PAY FOR THE VERY BASICS LIKE FOOD, HOUSING, MEDICAL CARE, AND HEATING?: NOT HARD AT ALL

## 2023-02-15 ASSESSMENT — PATIENT HEALTH QUESTIONNAIRE - PHQ9
SUM OF ALL RESPONSES TO PHQ QUESTIONS 1-9: 0
9. THOUGHTS THAT YOU WOULD BE BETTER OFF DEAD, OR OF HURTING YOURSELF: 0
10. IF YOU CHECKED OFF ANY PROBLEMS, HOW DIFFICULT HAVE THESE PROBLEMS MADE IT FOR YOU TO DO YOUR WORK, TAKE CARE OF THINGS AT HOME, OR GET ALONG WITH OTHER PEOPLE: 0
8. MOVING OR SPEAKING SO SLOWLY THAT OTHER PEOPLE COULD HAVE NOTICED. OR THE OPPOSITE, BEING SO FIGETY OR RESTLESS THAT YOU HAVE BEEN MOVING AROUND A LOT MORE THAN USUAL: 0
7. TROUBLE CONCENTRATING ON THINGS, SUCH AS READING THE NEWSPAPER OR WATCHING TELEVISION: 0
SUM OF ALL RESPONSES TO PHQ QUESTIONS 1-9: 0
1. LITTLE INTEREST OR PLEASURE IN DOING THINGS: 0
4. FEELING TIRED OR HAVING LITTLE ENERGY: 0
SUM OF ALL RESPONSES TO PHQ QUESTIONS 1-9: 0
2. FEELING DOWN, DEPRESSED OR HOPELESS: 0
SUM OF ALL RESPONSES TO PHQ9 QUESTIONS 1 & 2: 0
3. TROUBLE FALLING OR STAYING ASLEEP: 0
5. POOR APPETITE OR OVEREATING: 0
6. FEELING BAD ABOUT YOURSELF - OR THAT YOU ARE A FAILURE OR HAVE LET YOURSELF OR YOUR FAMILY DOWN: 0
SUM OF ALL RESPONSES TO PHQ QUESTIONS 1-9: 0

## 2023-02-15 ASSESSMENT — LIFESTYLE VARIABLES
HOW OFTEN DO YOU HAVE A DRINK CONTAINING ALCOHOL: NEVER
HOW MANY STANDARD DRINKS CONTAINING ALCOHOL DO YOU HAVE ON A TYPICAL DAY: PATIENT DOES NOT DRINK

## 2023-02-15 NOTE — PROGRESS NOTES
[facility]    Marce Bailey is here for Medicare Colleen Kennedy   Initial Medicare annual wellness visit  Advanced care planning/counseling discussion  -     [facility] Referral to ACP Clinical Specialist  Depersonalization [facility])- doing well overall  Aneurysm of carotid artery ([facility] [address]- had recent follow up with Dr. Александр Brown  Other forms of angina pectoris [facility])- denies chest pain  Type 2 diabetes mellitus without complication, with long-term current use of insulin ([facility] [address]- labs today    Recommendations for Preventive Services Due: see orders and patient instructions/AVS.  Recommended screening schedule for the next 5-10 years is provided to the patient in written form: see Patient Instructions/AVS.     Return in 3 months (on 5/15/2023) for , diabetes follow up. Subjective       Patient's complete Health Risk Assessment and screening values have been reviewed and are found in Flowsheets. The following problems were reviewed today and where indicated follow up appointments were made and/or referrals ordered. Positive Risk Factor Screenings with Interventions:                Opioid Risk: (Low risk score <55) Opioid risk score: 41    Patient is low risk for opioid use disorder or overdose. Last PDMP Lillie Gomez as Reviewed:  Review User Review Instant Review Result              Last Controlled Substance Monitoring Documentation      [id number]  ED from 8/4/2018 in [facility] ED   Comments States will follow up with his PCP Franki Mccoy for results  filed at 08/04/2018 2020              Weight and Activity:  Physical Activity: Inactive    Days of Exercise per Week: 0 days    Minutes of Exercise per Session: 0 min     On average, how many days per week do you engage in moderate to strenuous exercise (like a brisk walk)?: 0 days  Have you lost any weight without trying in the past 3 months?: No       Inactivity Interventions:  Recommend being more active, walking regularly.    Obesity Interventions:            Vision Screen:  Do you have difficulty driving, watching TV, or doing any of your daily activities because of your eyesight?: (!) Yes (night driving)  Have you had an eye exam within the past year?: Yes  No results found. Interventions:   Last eye exam in January , has new glasses. Advanced Directives:  Do you have a Living Will?: (!) No    Intervention:  Advanced care planning referral placed. Tobacco Use:  Tobacco Use: High Risk    Smoking Tobacco Use: Every Day    Smokeless Tobacco Use: Never    Passive Exposure: Not on file     E-cigarette/Vaping       Questions Responses    E-cigarette/Vaping Use Never User    Start Date     Passive Exposure     Quit Date     Counseling Given     Comments         Interventions:  Smokes minimal, mainly just out of boredom. Recommend  quitting. Objective   Vitals:    02/15/23 1027   BP: 122/82   Pulse: 80   SpO2: 97%   Weight: 264 lb (119.7 kg)      Body mass index is 36.82 kg/m².       General Appearance: alert and oriented to person, place and time, well developed and well- nourished, in no acute distress  Skin: warm and dry, no rash or erythema  Head: normocephalic and atraumatic  Eyes: pupils equal, round, and reactive to light, extraocular eye movements intact, conjunctivae normal  Neck: supple   Pulmonary/Chest: clear to auscultation bilaterally- no wheezes, rales or rhonchi, normal air movement, no respiratory distress  Cardiovascular: normal rate, regular rhythm, normal S1 and S2, no murmurs, rubs, clicks, or gallops, distal pulses intact, no carotid bruits    Neurologic: speech normal       Allergies   Allergen Reactions    Lisinopril      States he is allergic to the generic forms of medication, can take lisinopril    Aleve  [Naproxen Sodium] Nausea Only    Amitriptyline Hcl Other (See Comments)     rash    Exelon [Rivastigmine] Other (See Comments)     Patient stated patch left a \"burn katyh\" on his skin Ibuprofen Other (See Comments)     \"hurts my stomach. \"     Prior to Visit Medications    Medication Sig Taking? Authorizing Provider   levocetirizine (XYZAL) 5 MG tablet TAKE 1 TABLET BY MOUTH EVERY DAY  Sandra Medhkour, DO   topiramate (TOPAMAX) 50 MG tablet take 1 po qAM 2 po qhs  ASHLEY Auguste CNP   memantine (NAMENDA) 10 MG tablet Take 1 tablet by mouth 2 times daily  ASHLEY Auguste CNP   memantine (NAMENDA) 10 MG tablet Take 1 tablet by mouth 2 times daily Starting Feb 1st 2022  Nargis Cates MD   topiramate (TOPAMAX) 50 MG tablet take 1 po qAM 2 po qhs  Nargis Cates MD   oxybutynin (DITROPAN) 5 MG tablet Take 1 tablet by mouth 3 times daily  Shruthi Miller PA-C   tamsulosin (FLOMAX) 0.4 MG capsule Take 1 capsule by mouth daily  Shruthi Miller PA-C   atorvastatin (LIPITOR) 10 MG tablet TAKE 1 TABLET BY MOUTH EVERY DAY  Sandra Medhkour, DO   amLODIPine (NORVASC) 10 MG tablet TAKE 1 TABLET BY MOUTH EVERY DAY  Sandra Medhkour, DO   metFORMIN (GLUCOPHAGE-XR) 500 MG extended release tablet TAKE 1 TABLET BY MOUTH EVERY DAY WITH BREAKFAST  Sandra Medhkour, DO   meloxicam (MOBIC) 15 MG tablet TAKE 1 TABLET BY MOUTH DAILY  Amelia Gann MD   albuterol sulfate HFA (PROVENTIL;VENTOLIN;PROAIR) 108 (90 Base) MCG/ACT inhaler inhale 2 puffs by mouth and INTO THE LUNGS every 6 hours if needed for cough shortness of breath or wheezing  Patient not taking: Reported on 1/20/2023  Sandra Medhsandro DO   GRALISE 600 MG TABS   Historical Provider, MD   traZODone (DESYREL) 50 MG tablet take 2 tablets by mouth at bedtime  Jaelyn Garcia MD   carvedilol (COREG) 25 MG tablet take 1 tablet by mouth twice a day  Hussein Pink DO   ARIPiprazole (ABILIFY) 5 MG tablet take 1/2 tablet by mouth daily for 7 days then 1 tablet daily  Historical Provider, MD [place]  Devices MISC 1 PAIR OF DIABETIC SHOES (1 LEFT/ 1 RIGHT)  1-3 PAIRS OF INSERTS (LEFT/ RIGHT)  Patient not taking: Reported on 1/20/2023  Narcisa Boyer VINNIE Truong   diazePAM (VALIUM) 5 MG tablet take 1-2 tablets by mouth 40 MINUTES prior to procedure if needed  Historical Provider, MD   glucose monitoring (FREESTYLE FREEDOM) kit 1 kit by Does not apply route daily Any brand covered by insurance  Patient not taking: Reported on 1/20/2023  Dayday Laguerre DO   Lancets [address] 1 each by Does not apply route daily  Patient not taking: Reported on 1/20/2023  Dayday Laguerre DO   blood glucose monitor strips Check daily  Patient not taking: Reported on 1/20/2023  [facility] Medkour, DO   oxyCODONE-acetaminophen (PERCOCET) 5-325 MG per tablet every 6 hours as needed. [facility] pain clinic  Historical Provider, MD   TRINTELLIX 20 MG TABS tablet 20 mg   Historical Provider, MD   tiZANidine (ZANAFLEX) 2 MG tablet take 1 tablet by mouth at bedtime  Historical Provider, MD   benzonatate (TESSALON) 200 MG capsule Take 1 capsule by mouth 3 times daily as needed for Cough  ASHLEY Benz - CNP   carvedilol (COREG) 25 MG tablet Take 1 tablet by mouth 2 times daily  Sandra Medhkour, DO   fluticasone-salmeterol (ADVAIR) 100-50 MCG/DOSE diskus inhaler Inhale 1 puff into the lungs every 12 hours  Patient not taking: Reported on 1/18/2023  [facility] Medhkour, DO   fluticasone (FLONASE) 50 MCG/ACT nasal spray instill 2 sprays into each nostril once daily  Sandra Medhkour, DO   nitroGLYCERIN (NITROSTAT) 0.3 MG SL tablet place 1 tablet under the tongue if needed every 5 minutes for chest pain for 3 doses IF NO RELIEF AF  Patient not taking: Reported on 1/18/2023  Shanna Joseph MD   aspirin 81 MG tablet Take 81 mg by mouth daily Stopped 1/16/2023 on his own for surgery. Was not instructed to stop.   Historical Provider, MD [place] (Including outside providers/suppliers regularly involved in providing care):   Patient Care Team:  Dayday Laguerre DO as PCP - General (Family Medicine)  Dayday Ralphs, DO as PCP - Empaneled Provider  Mika Moreno DPM as Surgeon (Podiatry) Reviewed and updated this visit:  Tobacco  Allergies  Meds  Med Hx  Surg Hx  Soc Hx  Fam Hx             Hoke-Lindsay Squibb, DO

## 2023-02-15 NOTE — PATIENT INSTRUCTIONS

## 2023-02-16 ENCOUNTER — CLINICAL DOCUMENTATION (OUTPATIENT)
Dept: SPIRITUAL SERVICES | Age: 63
End: 2023-02-16

## 2023-02-16 NOTE — RESULT ENCOUNTER NOTE
Triglycerides elevated, recommend to continue to cut back on sugary foods/carbohydrates and this will help. Other labs are good. A1c improved as well.

## 2023-02-16 NOTE — ACP (ADVANCE CARE PLANNING)
Advance Care Planning   Ambulatory ACP Specialist Patient Outreach    Date:  2/16/2023  ACP Specialist:  Maria Kocher    Outreach call to patient in follow-up to ACP Specialist referral from: Cindi Muñoz DO    [x] PCP  [] Provider   [] Ambulatory Care Management [] Other for Reason:    [x] Advance Directive Assistance  [] Code Status Discussion  [] Complete Portable DNR Order  [] Discuss Goals of Care  [] Complete POST/MOST  [] Early ACP Decision-Making  [] Other    Date Referral Received: 2/15/23    Today's Outreach:  [x] First   [] Second  [] Third                               First outreach made by [x]  phone  [] email []   On-Q-ityhart     Intervention:  [x] Spoke with Patient  [] Left VM requesting return call      Outcome: Scheduled ACP Conversation Call for Friday February 24th at [address].        Next Step:   [x] ACP scheduled conversation  [] Outreach again in one week               [] Email / Mail [address] [place]  [] Email / Mail Advance Directive            [] Close Referral. Routing closure to referring provider/staff and to ACP Specialist . [] Closure Letter mailed to Patient with Invitation to Contact ACP Specialist if/when ready.     Thank you for this referral.

## 2023-02-23 ENCOUNTER — CLINICAL DOCUMENTATION (OUTPATIENT)
Dept: SPIRITUAL SERVICES | Age: 63
End: 2023-02-23

## 2023-02-23 NOTE — ACP (ADVANCE CARE PLANNING)
Advance Care Planning   Ambulatory ACP Specialist Patient Outreach    Date:  2/23/2023  ACP Specialist:  MARY Bravo    Outreach call to patient in follow-up to ACP Specialist referral from: Mile Elmore DO    [x] PCP  [] Provider   [] Ambulatory Care Management [] Other for Reason:    [x] Advance Directive Assistance  [] Code Status Discussion  [] Complete Portable DNR Order  [] Discuss Goals of Care  [] Complete POST/MOST  [] Early ACP Decision-Making  [] Other    Date Referral Received:02/15/2023    Today's Outreach:  [] First   [] Second  [] Third  [x] [address] outreach made by []  phone  [] email []   [employer]     Intervention:  [x] Spoke with Patient  [] Left VM requesting return call      Martin padilla re: ACP visit set for Fri Feb 24 at [facility] pt ACP packet to review. Pt remains available for this appt. Next Step:   [x] ACP scheduled conversation  [] Outreach again in one week               [x] Email / Mail ACP Info Sheets  [x] Email / Mail Advance Directive            [] Close Referral. Routing closure to referring provider/staff and to ACP Specialist . [] Closure Letter mailed to Patient with Invitation to Contact ACP Specialist if/when ready.     Thank you for this referral.

## 2023-02-24 ENCOUNTER — CLINICAL DOCUMENTATION (OUTPATIENT)
Dept: SPIRITUAL SERVICES | Age: 63
End: 2023-02-24

## 2023-02-24 NOTE — ACP (ADVANCE CARE PLANNING)
Advance Care Planning     Advance Care Planning Clinical Specialist  Conversation Note      Date of ACP Conversation: 2/24/2023    [facility] Conducted with: Patient with Decision Making Capacity    ACP Clinical Specialist: MARY Lopez      Healthcare Decision Maker:     Current Designated Healthcare Decision Maker:     Primary Decision Maker: Bonnie Valdovinos - Brother/Sister - [phone]    Today we reviewed pt's HCDM. Pt shared that he would want his son, Marcio Ferreira, to make medical decisions for him if ever needed. Pt also has listed his additional children as his emergency contacts. We discussed the HCPOA; pt is not ready to complete at this time. Care Preferences    Hospitalization: \"If your health worsens and it becomes clear that your chance of recovery is unlikely, what would your preference be regarding hospitalization? \"    Choice:  [x] The patient wants hospitalization. [] The patient prefers comfort-focused treatment without hospitalization. Ventilation: \"If you were in your present state of health and suddenly became very ill and were unable to breathe on your own, what would your preference be about the use of a ventilator (breathing machine) if it were available to you? \"      If the patient would desire the use of ventilator (breathing machine), answer \"yes\". If not, \"no\": yes    \"If your health worsens and it becomes clear that your chance of recovery is unlikely, what would your preference be about the use of a ventilator (breathing machine) if it were available to you? \"     Would the patient desire the use of ventilator (breathing machine)?: No      Resuscitation  \"CPR works best to restart the heart when there is a sudden event, like a heart attack, in someone who is otherwise healthy. Unfortunately, CPR does not typically restart the heart for people who have serious health conditions or who are very sick. \"    \"In the event your heart stopped as a result of an underlying serious health condition, would you want attempts to be made to restart your heart (answer \"yes\" for attempt to resuscitate) or would you prefer a natural death (answer \"no\" for do not attempt to resuscitate)? \"  Pt shared \"if there is no sustainable progress and condition is irreversible, then would not want life-sustaining treatments to continue. \"       [x] Yes   [] No   Educated Patient / [place] regarding differences between Advance Directives and portable DNR orders. Length of ACP Conversation in minutes:  20    Conversation Outcomes:  ACP discussion completed    Follow-up plan:    [] Schedule follow-up conversation to continue planning  [] Referred individual to Provider for additional questions/concerns   [] Advised patient/agent/surrogate to review completed ACP document and update if needed with changes in condition, patient preferences or care setting    [x] This note routed to one or more involved healthcare providers    Summary:  Met with pt today and offered ACP education and support. Reviewed pt's understanding of the ACP process. Discussed pt's decision around naming a HCDM. Education provided around [employer]. Pt shared he would name his son, Poppy Parkinson, as primary HCDM should he need someone to make serious medical decisions on his behalf. We talked about the importance of sharing pt's wishes with his children, particularly primary HCDM. Pt voiced understanding. Reviewed care preferences as well as the [address] LW. Pt shared his father was recently placed in hospice care. Pt shared that at this time he is unable to have additional conversation around ACP or completing documents at this time as he is focused on his father's care. Offered reassurance and explained ACP team is available to pt at any time and that having the conversation today is the key starting place.  Pt has SW contact and will reach out if/when he would like further ACP support or if/when he would like to review, complete AD documents. Thanked pt for his time today. Referral can be closed.

## 2023-03-06 RX ORDER — FLUTICASONE PROPIONATE 50 MCG
SPRAY, SUSPENSION (ML) NASAL
Qty: 3 EACH | Refills: 1 | Status: SHIPPED | OUTPATIENT
Start: 2023-03-06

## 2023-03-06 RX ORDER — ALBUTEROL SULFATE 90 UG/1
AEROSOL, METERED RESPIRATORY (INHALATION)
Qty: 3 EACH | Refills: 1 | Status: SHIPPED | OUTPATIENT
Start: 2023-03-06

## 2023-03-07 ENCOUNTER — HOSPITAL ENCOUNTER (OUTPATIENT)
Dept: ULTRASOUND IMAGING | Age: 63
Discharge: HOME | End: 2023-03-09
Payer: MEDICARE

## 2023-03-07 DIAGNOSIS — N20.0 KIDNEY STONES: ICD-10-CM

## 2023-03-07 PROCEDURE — 76770 US EXAM ABDO BACK WALL COMP: CPT

## 2023-03-13 ENCOUNTER — OFFICE VISIT (OUTPATIENT)
Dept: UROLOGY | Age: 63
End: 2023-03-13
Payer: MEDICARE

## 2023-03-13 VITALS
BODY MASS INDEX: 36.96 KG/M2 | WEIGHT: 264 LBS | SYSTOLIC BLOOD PRESSURE: 133 MMHG | HEIGHT: 71 IN | HEART RATE: 72 BPM | DIASTOLIC BLOOD PRESSURE: 60 MMHG | TEMPERATURE: 98.6 F

## 2023-03-13 DIAGNOSIS — N20.0 KIDNEY STONE: Primary | ICD-10-CM

## 2023-03-13 DIAGNOSIS — N52.9 ERECTILE DYSFUNCTION, UNSPECIFIED ERECTILE DYSFUNCTION TYPE: ICD-10-CM

## 2023-03-13 PROCEDURE — 4004F PT TOBACCO SCREEN RCVD TLK: CPT | Performed by: UROLOGY

## 2023-03-13 PROCEDURE — G8427 DOCREV CUR MEDS BY ELIG CLIN: HCPCS | Performed by: UROLOGY

## 2023-03-13 PROCEDURE — 3075F SYST BP GE 130 - 139MM HG: CPT | Performed by: UROLOGY

## 2023-03-13 PROCEDURE — G8484 FLU IMMUNIZE NO ADMIN: HCPCS | Performed by: UROLOGY

## 2023-03-13 PROCEDURE — 3078F DIAST BP <80 MM HG: CPT | Performed by: UROLOGY

## 2023-03-13 PROCEDURE — 99213 OFFICE O/P EST LOW 20 MIN: CPT | Performed by: UROLOGY

## 2023-03-13 PROCEDURE — G8417 CALC BMI ABV UP PARAM F/U: HCPCS | Performed by: UROLOGY

## 2023-03-13 PROCEDURE — 3017F COLORECTAL CA SCREEN DOC REV: CPT | Performed by: UROLOGY

## 2023-03-13 ASSESSMENT — ENCOUNTER SYMPTOMS
ABDOMINAL PAIN: 0
COUGH: 0
DIARRHEA: 0
VOMITING: 0
CONSTIPATION: 0
NAUSEA: 0
BACK PAIN: 0
WHEEZING: 0
SHORTNESS OF BREATH: 0
EYE PAIN: 0

## 2023-03-13 NOTE — PROGRESS NOTES
[address] [facility] [id number]  Dept: [address] [place] [address] Urology Office Note - Established    Patient:  Niurka Prabhakar  YOB: 1960  Date: 3/13/2023    The patient is a 65 y.o. male who presents todayfor evaluation of the following problems:   Chief Complaint   Patient presents with    Results     6 weeks w/ US       HPI  This is a 55-year-old gentleman who we saw about 6 weeks ago with a kidney stone. We had done ureteroscopy at that time. His posttreatment ultrasound shows no residual hydronephrosis and no stones. He does have very mild urinary symptoms but they are not bothersome. He does also have some erectile dysfunction but takes nitroglycerin and is not interested in any therapies. He is on Topamax. He has been on this for many years but was getting stones even before he started this. Summary of old records: N/A    Additional History: N/A    Procedures Today: N/A    Urinalysis today:  No results found for this visit on 03/13/23. Last several PSA's:  Lab Results   Component Value Date    PSA 1.01 02/15/2023    PSA 0.59 04/26/2017     Last total testosterone:  No results found for: TESTOSTERONE    AUA Symptom Score (3/13/2023):   INCOMPLETE EMPTYING: How often have you had the sensation of not emptying your bladder?: Not at all  FREQUENCY: How often do you have to urinate less than every two hours?: Not at all  INTERMITTENCY: How often have you found you stopped and started again several times when you urinated?: Not at all  URGENCY: How often have you found it difficult to postpone urination?: Not at all  WEAK STREAM: How often have you had a weak urinary stream?: Not at all  STRAINING: How often have you had to strain to start  urination?: Not at all  NOCTURIA: How many times did you typically get up at night to uriniate?: 3 Times  TOTAL I-PSS SCORE[de-identified] 3       Last BUN and creatinine:  Lab Results   Component Value Date    BUN 14 11/25/2021     Lab Results   Component Value Date    CREATININE 1.18 01/20/2023       Additional Lab/Culture results: none    Imaging Reviewed during this Office Visit: none  (results were independently reviewed by physician and radiology report verified)    PAST MEDICAL, FAMILY AND SOCIAL HISTORY UPDATE:  Past Medical History:   Diagnosis Date    ADHD (attention deficit hyperactivity disorder)     no tx per pt. Arthritis     knees, hands, back. Asthma     as child. pcp manages    Back pain     [facility] pain clinic for back and knees. 1/19/2023 visif    Brain aneurysm     Dr. Colon Nichole at   [facility]  f/u 12/29/2021. No surgery at this time. Monitoring yearly. CAD (coronary artery disease)     Needs to find an new cardiologist. Dr. Mortimer Bruins retired. Dr. Ton Blevins, [place]  last visit 11/9/2021. Pt. states never had heart cath. MI 2011    Cataract     lt eye   no surgery    Cerebral artery occlusion with cerebral infarction [facility]) 2014    mini stroke went to [facility] and was treated. Dr. Crissy Sesay. Last seen summer 2021    Depression     pcp    Diabetes mellitus ([facility] [address]     on rx   A1C high per pt    pcp manages    Heart attack [facility]) 2012    Dr. Mortimer Bruins last visit 2021    Pt. states no hx cath    Hyperlipidemia     on rx    Hypertension     pcp manages    Kidney stones     Dr. Yamilka Gonzalez treats. Pt. states he currently has blood in his urine. Urine CS sent to lab today. No pain per pt. Memory loss, long term     Pt states he has had it for years and follows w/ his neurologist Dr. Vipul Zeng. Mother has Alzheimers. Mini stroke     2013  denies lasting effects  Sees Dr. Kaylan Sanders at [place]. V[facility] . Sees 1/25/2023    Osteoarthritis     Seasonal allergies     Sinus problem     drainage from allergy    Snores     sleep study 10/2022  results show sleep apnea and need for cpap. Pt. uses at home.     Wears eyeglasses     Wellness examination Dr. Nadiya Morales last visit 1/2023 [facility]     Past Surgical History:   Procedure Laterality Date    CHOLECYSTECTOMY      2016    COLONOSCOPY  2021    had cologuard and pt. states it was negative    CYSTO/URETERO/PYELOSCOPY, CALCULUS TX Right 02/05/2020    CYSTO, URETEROSCOPY, RIGHT URETERAL STENT PLACEMENT, RETROGRADE PYELOGRAM performed by Nikolay Burger MD at [id number]  01/20/2023    HOLMIUM LASER CYSTOSCOPY URETEROSCOPY STENT EXCHANGE - Left    [address], rt. inguinal    INGUINAL HERNIA REPAIR Left 08/19/2016    LAPAROSCOPY N/A 01/16/2018    DIAGNOSTIC LAPAROSCOPY performed by Zoraida Roblero MD at [place] [address] N/A 11/24/2021    PENIS PROSTHESIS INSERTION- AMS INFLATABLE, **SHORT STAY** performed by Nikolay Burger MD at [address] [place] [address] Left 1/20/2023    HOLMIUM LASER CYSTOSCOPY URETEROSCOPY STENT EXCHANGE performed by Nikolay Burger MD at [address] [facility]       Family History   Problem Relation Age of Onset    Cancer Father         throat    Cirrhosis Father     High Blood Pressure Sister     Asthma Sister     Arthritis Sister     High Blood Pressure Mother     Heart Disease Mother     Alzheimer's Disease Mother      Outpatient Medications Marked as Taking for the 3/13/23 encounter (Office Visit) with Nikolay Burger MD   Medication Sig Dispense Refill    albuterol sulfate HFA (PROVENTIL;VENTOLIN;PROAIR) 108 (90 Base) MCG/ACT inhaler inhale 2 puffs by mouth and INTO THE LUNGS every 6 hours if needed for cough shortness of breath or wheezing 3 each 1    fluticasone (FLONASE) 50 MCG/ACT nasal spray instill 2 sprays into each nostril once daily 3 each 1    levocetirizine (XYZAL) 5 MG tablet TAKE 1 TABLET BY MOUTH EVERY DAY 90 tablet 3    topiramate (TOPAMAX) 50 MG tablet take 1 po qAM 2 po qhs 180 tablet 0    memantine (NAMENDA) 10 MG tablet Take 1 tablet by mouth 2 times daily 180 tablet 0    memantine (NAMENDA) 10 MG tablet Take 1 tablet by mouth 2 times daily Starting Feb 1st 2022 180 tablet 1    topiramate (TOPAMAX) 50 MG tablet take 1 po qAM 2 po qhs 180 tablet 1    oxybutynin (DITROPAN) 5 MG tablet Take 1 tablet by mouth 3 times daily 30 tablet 0    tamsulosin (FLOMAX) 0.4 MG capsule Take 1 capsule by mouth daily 10 capsule 0    atorvastatin (LIPITOR) 10 MG tablet TAKE 1 TABLET BY MOUTH EVERY DAY 90 tablet 3    amLODIPine (NORVASC) 10 MG tablet TAKE 1 TABLET BY MOUTH EVERY DAY 90 tablet 1    metFORMIN (GLUCOPHAGE-XR) 500 MG extended release tablet TAKE 1 TABLET BY MOUTH EVERY DAY WITH BREAKFAST 90 tablet 3    meloxicam (MOBIC) 15 MG tablet TAKE 1 TABLET BY MOUTH DAILY 30 tablet 3    GRALISE 600 MG TABS       traZODone (DESYREL) 50 MG tablet take 2 tablets by mouth at bedtime 180 tablet 2    carvedilol (COREG) 25 MG tablet take 1 tablet by mouth twice a day 180 tablet 2    ARIPiprazole (ABILIFY) 5 MG tablet take 1/2 tablet by mouth daily for 7 days then 1 tablet daily      Misc. Devices MISC 1 PAIR OF DIABETIC SHOES (1 LEFT/ 1 RIGHT)  1-3 PAIRS OF INSERTS (LEFT/ RIGHT) 2 each 0    diazePAM (VALIUM) 5 MG tablet take 1-2 tablets by mouth 40 MINUTES prior to procedure if needed      glucose monitoring (FREESTYLE FREEDOM) kit 1 kit by Does not apply route daily Any brand covered by insurance 1 kit 0    Lancets MISC 1 each by Does not apply route daily 100 each 5    blood glucose monitor strips Check daily 120 strip 3    oxyCODONE-acetaminophen (PERCOCET) 5-325 MG per tablet every 6 hours as needed. [facility] pain clinic      TRINTELLIX 20 MG TABS tablet 20 mg       tiZANidine (ZANAFLEX) 2 MG tablet take 1 tablet by mouth at bedtime      fluticasone-salmeterol (ADVAIR) 100-50 MCG/DOSE diskus inhaler Inhale 1 puff into the lungs every 12 hours 60 each 3    nitroGLYCERIN (NITROSTAT) 0.3 MG SL tablet place 1 tablet under the tongue if needed every 5 minutes for chest pain for 3 doses IF NO RELIEF AF 25 tablet 3     aspirin 81 MG tablet Take 81 mg by mouth daily Stopped 1/16/2023 on his own for surgery. Was not instructed to stop.         Lisinopril, Aleve  [naproxen sodium], Amitriptyline hcl, Exelon [rivastigmine], and Ibuprofen  Social History     Tobacco Use   Smoking Status Every Day    Packs/day: 0.25    Years: 28.00    Pack years: 7.00    Types: Cigarettes   Smokeless Tobacco Never     (Ifpatient a smoker, smoking cessation counseling offered)    Social History     Substance and Sexual Activity   Alcohol Use Yes    Comment: rarely       REVIEW OF SYSTEMS:  Review of Systems    Physical Exam:      Vitals:    03/13/23 0945   BP: 133/60   Pulse: 72   Temp: 98.6 °F (37 °C)     Body mass index is 36.82 kg/m².  Patient is a 62 y.o. male in no acute distress and alert and oriented to person, place and time.  Physical Exam  Constitutional: Patient in no acute distress.  Neuro: Alert and oriented to person, place and time.  Psych: Mood normal, affect normal  Skin: No rash noted  HEENT: Head: Normocephalic andatraumatic  Conjunctivae and EOM are normal. Pupils are equal, round      Assessment and Plan      1. Kidney stone    2. Erectile dysfunction, unspecified erectile dysfunction type           Plan:   F/u 6 mo kub  Will consider having pt discuss changing Topamax if stones persist      Return in about 6 months (around 9/13/2023) for kub.    Prescriptions Ordered:  No orders of the defined types were placed in this encounter.    Orders Placed:  No orders of the defined types were placed in this encounter.          Eleazar Michael MD    Agree with the ROS entered by the MA.

## 2023-03-13 NOTE — PROGRESS NOTES
Review of Systems   Constitutional:  Negative for appetite change, chills, fatigue and fever. Eyes:  Negative for pain and visual disturbance. Respiratory:  Negative for cough, shortness of breath and wheezing. Cardiovascular:  Negative for chest pain and leg swelling. Gastrointestinal:  Negative for abdominal pain, constipation, diarrhea, nausea and vomiting. Genitourinary:  Negative for difficulty urinating, dysuria, frequency, hematuria, penile pain and testicular pain. Musculoskeletal:  Negative for back pain and myalgias. Neurological:  Negative for dizziness, tremors, weakness, light-headedness, numbness and headaches. Hematological:  Negative for adenopathy. Does not bruise/bleed easily.

## 2023-03-27 ENCOUNTER — TELEMEDICINE (OUTPATIENT)
Dept: NEUROLOGY | Age: 63
End: 2023-03-27
Payer: MEDICARE

## 2023-03-27 DIAGNOSIS — G47.33 OBSTRUCTIVE SLEEP APNEA SYNDROME: ICD-10-CM

## 2023-03-27 DIAGNOSIS — F32.A DEPRESSION, UNSPECIFIED DEPRESSION TYPE: ICD-10-CM

## 2023-03-27 DIAGNOSIS — G43.009 MIGRAINE WITHOUT AURA AND WITHOUT STATUS MIGRAINOSUS, NOT INTRACTABLE: Primary | ICD-10-CM

## 2023-03-27 PROCEDURE — G8428 CUR MEDS NOT DOCUMENT: HCPCS | Performed by: PSYCHIATRY & NEUROLOGY

## 2023-03-27 PROCEDURE — 4004F PT TOBACCO SCREEN RCVD TLK: CPT | Performed by: PSYCHIATRY & NEUROLOGY

## 2023-03-27 PROCEDURE — 3017F COLORECTAL CA SCREEN DOC REV: CPT | Performed by: PSYCHIATRY & NEUROLOGY

## 2023-03-27 PROCEDURE — G8484 FLU IMMUNIZE NO ADMIN: HCPCS | Performed by: PSYCHIATRY & NEUROLOGY

## 2023-03-27 PROCEDURE — G8417 CALC BMI ABV UP PARAM F/U: HCPCS | Performed by: PSYCHIATRY & NEUROLOGY

## 2023-03-27 PROCEDURE — 99214 OFFICE O/P EST MOD 30 MIN: CPT | Performed by: PSYCHIATRY & NEUROLOGY

## 2023-03-27 RX ORDER — TRAZODONE HYDROCHLORIDE 50 MG/1
TABLET ORAL
Qty: 270 TABLET | Refills: 1 | Status: SHIPPED | OUTPATIENT
Start: 2023-03-27

## 2023-03-27 RX ORDER — DIVALPROEX SODIUM 500 MG/1
500 TABLET, EXTENDED RELEASE ORAL DAILY
Qty: 30 TABLET | Refills: 3 | Status: SHIPPED | OUTPATIENT
Start: 2023-03-27

## 2023-03-27 RX ORDER — NAPROXEN 500 MG/1
TABLET ORAL
Qty: 60 TABLET | Refills: 3 | Status: SHIPPED | OUTPATIENT
Start: 2023-03-27

## 2023-03-27 RX ORDER — SUMATRIPTAN 100 MG/1
TABLET, FILM COATED ORAL
Qty: 18 TABLET | Refills: 1 | Status: SHIPPED | OUTPATIENT
Start: 2023-03-27

## 2023-03-27 ASSESSMENT — ENCOUNTER SYMPTOMS
EYES NEGATIVE: 1
RESPIRATORY NEGATIVE: 1
GASTROINTESTINAL NEGATIVE: 1
ALLERGIC/IMMUNOLOGIC NEGATIVE: 1

## 2023-03-27 NOTE — PROGRESS NOTES
/Nose/Throat: external ear . Normal exam  Neck and thyroid . Normal size. No bruits  Respiratory . Breathsounds clear bilaterally  Cardiovascular: Auscultation of heart with regular rate and rhythm  Musculoskeletal. Muscle bulk and tone normal                                                           Muscle strength 5/5 strength throughout                                                                                No dysmetria or dysdiadokinesis  No tremor   Normal fine motor  Gait normal   Orientation Alert and oriented x 3 . Wednesday July 19 , 2022 . Prseidnt Biden .  Hill . WORLD able to spall forward and bacwards . Serial 7 to 93   Attention and concentration normal  Short term memory 3 words out of 3 in one minute  Language process and speech normal . No aphasia   Cranial nerve 2 normal acuety and visual fields  Cranial nerve 3, 4 and 6 . Extraocular muscles are intact . Pupils are equal and reactive   Cranial nerve 5 . Intact corneal reflex. Normal facial sensation  Cranial nerve 7 normal exam   Cranial nerve 8. Grossly intact hearing   Cranial nerve 9 and 10. Symmetric palate elevation   Cranial nerve 11 , 5 out of 5 strength   Cranial Nerve 12 midline tongue . No atrophy  Sensation . Decreasedd pinprick and light touch distal lower extremities   Deep Tendon Reflexes normal  Plantar response flexor bilaterally    Assessment:       Diagnosis Orders   1. Migraine without aura and without status migrainosus, not intractable        2. Obstructive sleep apnea syndrome        3. Depression, unspecified depression type          Will change to depakote  mg po qd as new migraine prophylactic to wean off topamax with kidney stones to use naprosyn 500 mg po bid PRN as initial abortive with imitrex 100 mg as rescue drug  .  He is to continue with nasal CPAP and desyrel regimen      Plan:      As above       Kaylan Lewis is a 65 y.o. male being evaluated by a Virtual Visit (video visit) encounter to

## 2023-03-28 NOTE — PROGRESS NOTES
Division of Vascular Surgery          New Consult      Name: Kareen Turner  MRN: [id number]       Physician Requesting Consult:  Emergency room physician    Reason for Consult:   Right subclavian artery plaque    Chief Complaint:      \"I was feeling dizzy\"    History of Present Illness:      Kareen Turner is a 65 y.o.  male who presents with a recent complaint of lightheadedness and dizziness. He went to the emergency room where he was evaluated for stroke and TIA. Patient was worked up and found to have no signs of stroke however he was appreciated on the CT angiogram that he had an eccentric thrombus of the right subclavian artery and he was sent to me for evaluation. Patient denies any TIAs, strokes, amaurosis fugax at this time but has a hx of these in the past.  Denies any difficulty swallowing talking or eating. Denies any upper extremity claudication. Denies any wounds or nonhealing wounds of the arms or hands. Patient is unaware of any family history of aneurysms or personal history of aneurysm disease. Patient has a history of smoking every day and alcohol use. Denies any lethargy,, musculoskeletal complaints he says that he spells come from him starting to feel very fatigued and he says he starts to fall asleep and he has ahead twitch or 2 and then gets very tired. Hard to tell whether or not he is having narcoleptic attack, focal seizure or even just having poor sleep and having the problems from sleep apnea. Past Medical History:     Past Medical History:   Diagnosis Date    ADHD (attention deficit hyperactivity disorder)     Arthritis     knees, hands, back.  Asthma     as child.  Back pain     CAD (coronary artery disease)     Cataract     Depression     pt. denies.     Diabetes mellitus ([address]     Heart attack ([address]     2011    Hypertension     Kidney stones     Mini stroke ([facility]     2014    Osteoarthritis     Seasonal allergies     Sinus problem         Past Surgical History:     Past Surgical History:   Procedure Laterality Date    CHOLECYSTECTOMY      2016    COLONOSCOPY      HERNIA REPAIR      1995, rt. inguinal    HERNIA REPAIR Left 01/2018    repair    HERNIA REPAIR Right 01/2018    looked at at same time they did left repair    INGUINAL HERNIA REPAIR Left 08/19/2016    LAPAROSCOPY N/A 1/16/2018    DIAGNOSTIC LAPAROSCOPY performed by Jean Paul Sánchez MD at [address] [place] [address],[address]          Medications Prior to Admission:       Prior to Admission medications    Medication Sig Start Date End Date Taking? Authorizing Provider   cloNIDine (CATAPRES) 0.1 MG tablet TAKE 1 TABLET BY MOUTH TWICE DAILY IF BLOOD PRESSURE READING IS GREATER THAN 140/90 9/11/18   ASHLEY Schwarz - CNP   DULoxetine (CYMBALTA) 30 MG extended release capsule TAKE 1 CAPSULE BY MOUTH DAILY 9/10/18   Evelin Best MD   carvedilol (COREG) 25 MG tablet Take 25 mg by mouth 2 times daily (with meals)    Historical Provider, MD   rosuvastatin (CRESTOR) 5 MG tablet Take 1 tablet by mouth nightly 9/7/18   Tung Ramos MD   oxyCODONE-acetaminophen (PERCOCET) 5-325 MG per tablet Take 1 tablet by mouth 3 times daily. Poonam Whitt     Historical Provider, MD   amLODIPine (NORVASC) 5 MG tablet Take 1 tablet by mouth daily 8/10/18   Tung Ramos MD   donepezil (ARICEPT) 10 MG tablet Take 1 tablet by mouth nightly 7/31/18   Balta Sr MD   donepezil (ARICEPT) 5 MG tablet Take 1 tablet by mouth nightly 7/31/18   Balta Sr MD   diphenhydrAMINE (BENADRYL) 25 MG capsule Take 25 mg by mouth every 6 hours as needed for Itching    Historical Provider, MD   sildenafil (VIAGRA) 100 MG tablet Take 100 mg by mouth as needed for Erectile Dysfunction    Historical Provider, MD   albuterol sulfate HFA (PROAIR HFA) 108 (90 Base) MCG/ACT inhaler Inhale 2 puffs into the lungs every 4 hours as needed for Wheezing 2/26/18   Live Contreras distress  Mental status - oriented to person, place and time with normal affect  Head - normocephalic and atraumatic  Eyes - pupils equal and reactive, extraocular eye movements intact, conjunctiva clear  Ears - hearing appears to be intact  Nose - no drainage noted  Mouth - mucous membranes moist  Neck - supple, no carotid bruits, thyroid not palpable, no JVD  Chest - clear to auscultation, normal effort  Heart - normal rate, regular rhythm, no murmurs  Abdomen - soft, non-tender, non-distended, bowel sounds present all four quadrants, no masses   Neurological - normal speech, no focal findings or movement disorder noted, cranial nerves II through XII grossly intact  Extremities - normal strength tone and sensation of the bilateral upper extremities  Skin - no gross lesions, rashes, or induration noted  Foot Exam - grossly normal without masses or lesions      Vascular Exam:  R brachial 2+ L brachial 2+   R radial 2+ L radial 2+   R femoral 2+ L femoral 2+   R popliteal 2+ L popliteal 2+   R posterior tibial 2+ L posterior tibial 2+   R dorsalis pedis 2+ L dorsalis pedis 2+         Imaging:            CTA NECK:       AORTIC ARCH/ARCH VESSELS: Right subclavian artery intramural isodense   thickening measuring 11 mm and results in mild stenosis without evidence of   flow limitation.  Aortic arch is patent.  Left subclavian artery and   brachiocephalic artery are patent.       CAROTID ARTERIES: The common carotid arteries are normal in appearance   without evidence of a flow limiting stenosis. The internal carotid arteries   are normal in appearance without evidence of a flow limiting stenosis by   NASCET criteria. No dissection or arterial injury is seen.            3-D imaging of this lesion under CT scan reconstruction demonstrates the right brachiocephalic trunk with normal right common carotid artery millimeters after the takeoff right brachial artery has a thrombus which is mildly eccentric not flow limiting in nature and does not appear to be an aneurysm. There is mixed calcium with thrombus in it. Assessment:     Antonio Coelho is a 65 y.o.  male who    1. Smoking addiction  2. 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 Based on the imaging and the symptoms there is no indication for repair at this time. 2. Patient should be on best medical management which would include antihypertensives, anticholesterol medication, antiplatelet therapy, continue to keep a BMI less than 30 and complete smoking cessation. 3. Unsure if previous strokes in the past related to this lesion however would not assume that they are.  4. Patient can follow-up as needed this is his lesion does not need to be followed on a yearly basis. 5. We expect resolution of this lesion the patient improved his diet and continue with his anticholesterol medications. Recommend sleep study evaluation and even perhaps a neurologic evaluation for focal seizures or narcolepsy.      ----------------------------------------    Shilpa Crandall M.D., FACS, RVT, [facility] Director of Vascular Services  Medical Director of the Vascular Lab      [address] [place] [address],[address] North: [phone]  C: [phone]  Email: [email or web address]    iLu dictated, not read. How Severe Is Your Growth?: moderate Has Your Growth Been Treated?: not been treated Is This A New Presentation, Or A Follow-Up?: Subcutaneous Growth

## 2023-05-09 DIAGNOSIS — M25.561 PAIN IN BOTH KNEES, UNSPECIFIED CHRONICITY: ICD-10-CM

## 2023-05-09 DIAGNOSIS — M25.562 PAIN IN BOTH KNEES, UNSPECIFIED CHRONICITY: ICD-10-CM

## 2023-05-09 RX ORDER — MELOXICAM 15 MG/1
15 TABLET ORAL DAILY
Qty: 30 TABLET | Refills: 3 | Status: SHIPPED | OUTPATIENT
Start: 2023-05-09

## 2023-05-10 RX ORDER — METFORMIN HYDROCHLORIDE 500 MG/1
TABLET, EXTENDED RELEASE ORAL
Qty: 90 TABLET | Refills: 3 | Status: SHIPPED | OUTPATIENT
Start: 2023-05-10

## 2023-05-15 ENCOUNTER — OFFICE VISIT (OUTPATIENT)
Dept: PRIMARY CARE CLINIC | Age: 63
End: 2023-05-15
Payer: MEDICARE

## 2023-05-15 VITALS
HEART RATE: 75 BPM | DIASTOLIC BLOOD PRESSURE: 80 MMHG | BODY MASS INDEX: 38.77 KG/M2 | WEIGHT: 278 LBS | SYSTOLIC BLOOD PRESSURE: 126 MMHG | OXYGEN SATURATION: 95 %

## 2023-05-15 DIAGNOSIS — E11.9 TYPE 2 DIABETES MELLITUS WITHOUT COMPLICATION, WITH LONG-TERM CURRENT USE OF INSULIN (HCC): Primary | ICD-10-CM

## 2023-05-15 DIAGNOSIS — R05.9 COUGH, UNSPECIFIED TYPE: ICD-10-CM

## 2023-05-15 DIAGNOSIS — I10 ESSENTIAL HYPERTENSION: ICD-10-CM

## 2023-05-15 DIAGNOSIS — J30.9 ALLERGIC RHINITIS, UNSPECIFIED SEASONALITY, UNSPECIFIED TRIGGER: ICD-10-CM

## 2023-05-15 DIAGNOSIS — Z79.4 TYPE 2 DIABETES MELLITUS WITHOUT COMPLICATION, WITH LONG-TERM CURRENT USE OF INSULIN (HCC): Primary | ICD-10-CM

## 2023-05-15 DIAGNOSIS — G43.009 MIGRAINE WITHOUT AURA AND WITHOUT STATUS MIGRAINOSUS, NOT INTRACTABLE: ICD-10-CM

## 2023-05-15 PROCEDURE — 4004F PT TOBACCO SCREEN RCVD TLK: CPT | Performed by: FAMILY MEDICINE

## 2023-05-15 PROCEDURE — 2022F DILAT RTA XM EVC RTNOPTHY: CPT | Performed by: FAMILY MEDICINE

## 2023-05-15 PROCEDURE — 3044F HG A1C LEVEL LT 7.0%: CPT | Performed by: FAMILY MEDICINE

## 2023-05-15 PROCEDURE — G8427 DOCREV CUR MEDS BY ELIG CLIN: HCPCS | Performed by: FAMILY MEDICINE

## 2023-05-15 PROCEDURE — 3074F SYST BP LT 130 MM HG: CPT | Performed by: FAMILY MEDICINE

## 2023-05-15 PROCEDURE — 99214 OFFICE O/P EST MOD 30 MIN: CPT | Performed by: FAMILY MEDICINE

## 2023-05-15 PROCEDURE — 3017F COLORECTAL CA SCREEN DOC REV: CPT | Performed by: FAMILY MEDICINE

## 2023-05-15 PROCEDURE — G8417 CALC BMI ABV UP PARAM F/U: HCPCS | Performed by: FAMILY MEDICINE

## 2023-05-15 PROCEDURE — 3079F DIAST BP 80-89 MM HG: CPT | Performed by: FAMILY MEDICINE

## 2023-05-15 RX ORDER — SEMAGLUTIDE 0.68 MG/ML
0.25 INJECTION, SOLUTION SUBCUTANEOUS
Qty: 3 ML | Refills: 0 | Status: SHIPPED | OUTPATIENT
Start: 2023-05-15 | End: 2023-05-17

## 2023-05-15 ASSESSMENT — ENCOUNTER SYMPTOMS
SHORTNESS OF BREATH: 0
COUGH: 1

## 2023-05-15 NOTE — PROGRESS NOTES
[facility] PRIMARY CARE  [facility] [address] [facility] [address]  [address] [address]  Dept: [phone]  Dept Fax: [phone]    Robert Reynoso is a 65 y.o. male who presents today for his medical conditions/complaints as noted below. Robert Reynoso is c/o of  Chief Complaint   Patient presents with    Diabetes    Weight Management     Discuss diet medication    Other     Pt waking with a dry throat & spitting up blood clots, sometimes painful, x1mo       HPI:     HPI    Pt here for follow up on chronic conditions    Following with psychiatry, taking medications as prescribed. Not sure of any recent changes that could be causing his worsening dry mouth. Notes dry throat when he wakes up- he is mouth breather uses CPAP. States he uses multiple antihistamines in a day. Sometimes in middle of night will have dry throat and feel like he has phlegm in his throat that he has to cough up and at times this has blood in it. Feels like it comes from his throat/not chest.   Notes he has constant nasal drainage. I did refer him to allergist in past and he notes was told needs allergy shots. He also  is in need of medication to help with losing weight. He is on meds from psychiatry and feels may have gained from them . He is diabetic as well. Follows with neurology for migraines. Hemoglobin A1C (%)   Date Value   02/15/2023 5.7   2022 5.9   2022 5.9             ( goal A1C is < 7)   Microalb/Crt.  Ratio (mcg/mg creat)   Date Value   02/15/2023 Can not be calculated     LDL Cholesterol (mg/dL)   Date Value   02/15/2023 69   2021 51     LDL Calculated (mg/dL)   Date Value   2019 95       (goal LDL is <100)   AST (U/L)   Date Value   2017 25     ALT (U/L)   Date Value   2021 18     BUN (mg/dL)   Date Value   2021 14     BP Readings from Last 3 Encounters:   05/15/23 126/80   23 133/60   02/15/23 122/82          (goal

## 2023-05-17 DIAGNOSIS — Z79.4 TYPE 2 DIABETES MELLITUS WITHOUT COMPLICATION, WITH LONG-TERM CURRENT USE OF INSULIN (HCC): ICD-10-CM

## 2023-05-17 DIAGNOSIS — E11.9 TYPE 2 DIABETES MELLITUS WITHOUT COMPLICATION, WITH LONG-TERM CURRENT USE OF INSULIN (HCC): ICD-10-CM

## 2023-05-17 RX ORDER — SEMAGLUTIDE 0.68 MG/ML
0.25 INJECTION, SOLUTION SUBCUTANEOUS
Qty: 3 ML | Refills: 0 | Status: SHIPPED | OUTPATIENT
Start: 2023-05-17

## 2023-05-19 RX ORDER — TOPIRAMATE 50 MG/1
TABLET, FILM COATED ORAL
Qty: 90 TABLET | Refills: 2 | Status: SHIPPED | OUTPATIENT
Start: 2023-05-19

## 2023-05-19 NOTE — TELEPHONE ENCOUNTER
Pharmacy requesting refill of Topamax 50 mg.      Medication active on med list yes      Date of last Rx: 1/30/2023 with 1 refills          verified by ALBERTO MICHAELS      Date of last appointment 3/27/2023    Next Visit Date:  5/25/2023

## 2023-05-25 ENCOUNTER — TELEMEDICINE (OUTPATIENT)
Dept: NEUROLOGY | Age: 63
End: 2023-05-25

## 2023-05-25 DIAGNOSIS — F32.A DEPRESSION, UNSPECIFIED DEPRESSION TYPE: ICD-10-CM

## 2023-05-25 DIAGNOSIS — G43.009 MIGRAINE WITHOUT AURA AND WITHOUT STATUS MIGRAINOSUS, NOT INTRACTABLE: Primary | ICD-10-CM

## 2023-05-25 DIAGNOSIS — G47.33 OBSTRUCTIVE SLEEP APNEA SYNDROME: ICD-10-CM

## 2023-05-25 RX ORDER — MEMANTINE HYDROCHLORIDE 10 MG/1
10 TABLET ORAL 2 TIMES DAILY
Qty: 180 TABLET | Refills: 1 | Status: SHIPPED | OUTPATIENT
Start: 2023-05-25

## 2023-05-25 ASSESSMENT — ENCOUNTER SYMPTOMS
GASTROINTESTINAL NEGATIVE: 1
RESPIRATORY NEGATIVE: 1
EYES NEGATIVE: 1
ALLERGIC/IMMUNOLOGIC NEGATIVE: 1

## 2023-05-25 NOTE — PROGRESS NOTES
Coronavirus Preparedness and Response Supplemental Appropriations Act, this Virtual Visit was conducted with patient's (and/or legal guardian's) consent, to reduce the patient's risk of exposure to COVID-19 and provide necessary medical care. The patient (and/or legal guardian) has also been advised to contact this office for worsening conditions or problems, and seek emergency medical treatment and/or call 911 if deemed necessary. --Yazan Haynes MD on 5/25/2023 at 5:37 PM    An electronic signature was used to authenticate this note.   Yazan Haynes MD

## 2023-06-02 RX ORDER — CARVEDILOL 25 MG/1
TABLET ORAL
Qty: 180 TABLET | Refills: 2 | Status: SHIPPED | OUTPATIENT
Start: 2023-06-02

## 2023-07-03 RX ORDER — SUMATRIPTAN 100 MG/1
TABLET, FILM COATED ORAL
Qty: 18 TABLET | Refills: 2 | Status: SHIPPED | OUTPATIENT
Start: 2023-07-03

## 2023-07-03 NOTE — TELEPHONE ENCOUNTER
Pharmacy requesting refill of SUMAtriptan (IMITREX) 100 MG tablet       Medication active on med list yes      Date of last Rx: 3/27/2023 with 1 refills          verified by ALBERTO FLORES      Date of last appointment 5/25/2023    Next Visit Date:  9/14/2023

## 2023-07-04 NOTE — TELEPHONE ENCOUNTER
I addressed only 1 issue among many of his medical problems . He need to establish care with new provider for any med changes  .  Thanks PROGRESS NOTE     Karine Krishnan is a 63 year old female at [facility] ([facility])    Subjective:   Left sided chest pain      Objective:   VITALS:          Visit Vitals  /78 (BP Location: LUE - Left upper extremity, Patient Position: Sitting)   Pulse 60   Temp 97.9 °F (36.6 °C) (Oral)   Resp 16   Ht 5' 4\" (1.626 m)   Wt 79 kg (174 lb 2.6 oz)   SpO2 98%   BMI 29.90 kg/m²       POCT Glucose readings (if applicable):      No results found for: GLUCOSEPOCT     Physical Exam:   On examination patient is alert oriented x3 she is not in any acute distress  Neck: Supple, No JVP, No Lymphadenopathy    Lungs: CTA BL, no wheezing rales or rhonci    Chest wall: non tender    Heart: RRR normal s1 s2 no s3,s4 or murmur    Abdomen: Soft , non tender, no organomegaly. Bowel sounds present    Extremities: No calf swelling, tenderness or edema present    Neurologic: Awake,alert oriented x 3, cranial nerves are normal,  moves all extremities, Deep tendon reflexes 2 +,          No sensory deficit. Gait is normal       Data Reviewed:    Lab Results   Component Value Date    SODIUM 143 07/03/2023    SODIUM 139 06/28/2023    SODIUM 138 06/26/2023    SODIUM 138 05/29/2020    SODIUM 140 05/10/2019    SODIUM 141 04/18/2019    POTASSIUM 3.9 07/03/2023    POTASSIUM 4.2 06/28/2023    POTASSIUM 4.1 06/26/2023    POTASSIUM 4.5 05/29/2020    POTASSIUM 4.0 05/10/2019    POTASSIUM 4.0 04/18/2019    CHLORIDE 112 (H) 07/03/2023    CHLORIDE 106 05/29/2020    ANIONGAP 7 07/03/2023    ANIONGAP 10 05/29/2020    BUN 15 07/03/2023    BUN 16 05/29/2020    ALBUMIN 3.5 (L) 06/26/2023    ALBUMIN 4.0 05/29/2020    AST 16 06/26/2023    AST 11 05/29/2020     Lab Results   Component Value Date    WBC 4.0 (L) 07/03/2023    WBC 4.2 06/28/2023    WBC 7.9 06/26/2023    WBC 5.7 05/10/2019    WBC 4.5 04/18/2019    WBC 6.4 04/17/2019    MCV 88.2 07/03/2023    MCV 90.0 05/10/2019     No results for input(s): INR, PT in the last 72 hours.  Lab Results   Component Value  Date    CPK 60 12/18/2022     12/17/2022     (H) 12/16/2022     No results found for: TROPONINI  No results found for: BNP  Lab Results   Component Value Date    CHOLESTEROL 228 (H) 12/23/2022    CHOLESTEROL 149 05/29/2020         Assessment:     Hospital Problem List:      Principal Problem:    Weakness  Active Problems:    Type 2 diabetes mellitus with hyperglycemia, with long-term current use of insulin (CMD)    Multinodular goiter (nontoxic)    Stage 3 chronic kidney disease (CMD)    Depression    High cholesterol    Schizophrenia (CMD)    Hypothyroidism         Impression:    · Atypical chest pain  Type 2 diabetes mellitus with hyperglycemia, with long-term current use of insulin (CMD)    • Multinodular goiter (nontoxic)   • YULISA (acute kidney injury) (CMD)   • Stage 3 chronic kidney disease (CMD)   • Acute cystitis without hematuria   • Anemia   • Weakness   • Anxiety   • Depression   • Essential (primary) hypertension   • High cholesterol   • Schizophrenia (CMD)   • Hypothyroidism     ·     Plan:       · If patient is cleared for discharge to psych floor by cardiologist then patient can go to psych floor    Current LOS:  4 days      See Orders for additional details.            Apolonia Osborn MD    7/4/2023, 10:09 AM

## 2023-08-14 ENCOUNTER — HOSPITAL ENCOUNTER (OUTPATIENT)
Dept: GENERAL RADIOLOGY | Age: 63
Discharge: HOME | End: 2023-08-16
Payer: MEDICARE

## 2023-08-14 ENCOUNTER — HOSPITAL ENCOUNTER (OUTPATIENT)
Age: 63
Discharge: HOME | End: 2023-08-16
Payer: MEDICARE

## 2023-08-14 DIAGNOSIS — N20.0 KIDNEY STONE: ICD-10-CM

## 2023-08-14 DIAGNOSIS — R05.9 COUGH, UNSPECIFIED TYPE: ICD-10-CM

## 2023-08-14 PROCEDURE — 71046 X-RAY EXAM CHEST 2 VIEWS: CPT

## 2023-08-14 PROCEDURE — 74018 RADEX ABDOMEN 1 VIEW: CPT

## 2023-08-15 ENCOUNTER — OFFICE VISIT (OUTPATIENT)
Dept: PRIMARY CARE CLINIC | Age: 63
End: 2023-08-15
Payer: MEDICARE

## 2023-08-15 VITALS
WEIGHT: 261.4 LBS | DIASTOLIC BLOOD PRESSURE: 84 MMHG | BODY MASS INDEX: 36.46 KG/M2 | SYSTOLIC BLOOD PRESSURE: 122 MMHG | OXYGEN SATURATION: 97 % | HEART RATE: 75 BPM

## 2023-08-15 DIAGNOSIS — Z79.4 TYPE 2 DIABETES MELLITUS WITHOUT COMPLICATION, WITH LONG-TERM CURRENT USE OF INSULIN (HCC): Primary | ICD-10-CM

## 2023-08-15 DIAGNOSIS — M25.561 PAIN IN BOTH KNEES, UNSPECIFIED CHRONICITY: ICD-10-CM

## 2023-08-15 DIAGNOSIS — F32.A DEPRESSION, UNSPECIFIED DEPRESSION TYPE: ICD-10-CM

## 2023-08-15 DIAGNOSIS — M25.562 PAIN IN BOTH KNEES, UNSPECIFIED CHRONICITY: ICD-10-CM

## 2023-08-15 DIAGNOSIS — I10 ESSENTIAL HYPERTENSION: ICD-10-CM

## 2023-08-15 DIAGNOSIS — E11.9 TYPE 2 DIABETES MELLITUS WITHOUT COMPLICATION, WITH LONG-TERM CURRENT USE OF INSULIN (HCC): Primary | ICD-10-CM

## 2023-08-15 LAB — HBA1C MFR BLD: 5.9 %

## 2023-08-15 PROCEDURE — 3044F HG A1C LEVEL LT 7.0%: CPT | Performed by: FAMILY MEDICINE

## 2023-08-15 PROCEDURE — G8427 DOCREV CUR MEDS BY ELIG CLIN: HCPCS | Performed by: FAMILY MEDICINE

## 2023-08-15 PROCEDURE — 2022F DILAT RTA XM EVC RTNOPTHY: CPT | Performed by: FAMILY MEDICINE

## 2023-08-15 PROCEDURE — 99214 OFFICE O/P EST MOD 30 MIN: CPT | Performed by: FAMILY MEDICINE

## 2023-08-15 PROCEDURE — 3074F SYST BP LT 130 MM HG: CPT | Performed by: FAMILY MEDICINE

## 2023-08-15 PROCEDURE — 4004F PT TOBACCO SCREEN RCVD TLK: CPT | Performed by: FAMILY MEDICINE

## 2023-08-15 PROCEDURE — 83037 HB GLYCOSYLATED A1C HOME DEV: CPT | Performed by: FAMILY MEDICINE

## 2023-08-15 PROCEDURE — 3079F DIAST BP 80-89 MM HG: CPT | Performed by: FAMILY MEDICINE

## 2023-08-15 PROCEDURE — G8417 CALC BMI ABV UP PARAM F/U: HCPCS | Performed by: FAMILY MEDICINE

## 2023-08-15 PROCEDURE — 3017F COLORECTAL CA SCREEN DOC REV: CPT | Performed by: FAMILY MEDICINE

## 2023-08-15 ASSESSMENT — ENCOUNTER SYMPTOMS
SHORTNESS OF BREATH: 0
CONSTIPATION: 0
ABDOMINAL PAIN: 0
DIARRHEA: 0
VOMITING: 0
NAUSEA: 0

## 2023-08-15 NOTE — TELEPHONE ENCOUNTER
Patient is requesting refill on Mobic 15 mg for Pain in both knees. Last script given 5/9/23 #30 with 3 refills. I updated to 90 day supply with 3 refills for a 1 year supply if you agree.     LOV: 7/08/22

## 2023-08-15 NOTE — PROGRESS NOTES
cutting back carbs. -  - POCT glycosylated hemoglobin (Hb A1C)    2. Essential hypertension  - HTN well controlled, continue current medication. 3. Depression, unspecified depression type  -He is following with psychiatry and therapist.  Has an appointment tomorrow with a psychiatrist.  He is currently on Trintellix. Recommend discussing possible consideration of changing around medication given his depression symptoms. Recommend reaching out to his family as well when he needs support. Plan:      Return in about 3 months (around 11/15/2023) for diabetes follow up. Orders Placed This Encounter   Procedures    POCT glycosylated hemoglobin (Hb A1C)     No orders of the defined types were placed in this encounter. Patient given educational materials - see patient instructions. Discussed use, benefit, and side effects of prescribed medications. All patient questions answered. Pt voiced understanding. Reviewed healthmaintenance. Instructed to continue current medications, diet and exercise. Patient agreed with treatment plan. Follow up as directed.      Electronically signed by Marvin Schuler DO on 8/15/2023 at 10:47 AM

## 2023-08-16 DIAGNOSIS — I10 ESSENTIAL HYPERTENSION: ICD-10-CM

## 2023-08-16 RX ORDER — MELOXICAM 15 MG/1
15 TABLET ORAL DAILY
Qty: 90 TABLET | Refills: 3 | Status: SHIPPED | OUTPATIENT
Start: 2023-08-16

## 2023-08-16 RX ORDER — AMLODIPINE BESYLATE 10 MG/1
TABLET ORAL
Qty: 90 TABLET | Refills: 1 | Status: SHIPPED | OUTPATIENT
Start: 2023-08-16

## 2023-08-16 RX ORDER — FLUTICASONE PROPIONATE 50 MCG
SPRAY, SUSPENSION (ML) NASAL
Qty: 3 EACH | Refills: 1 | Status: SHIPPED | OUTPATIENT
Start: 2023-08-16

## 2023-09-13 RX ORDER — TOPIRAMATE 50 MG/1
TABLET, FILM COATED ORAL
Qty: 90 TABLET | Refills: 0 | Status: SHIPPED | OUTPATIENT
Start: 2023-09-13

## 2023-09-13 NOTE — TELEPHONE ENCOUNTER
Pharmacy requesting refill of Topamax 50mg.       Medication active on med list yes      Date of last Rx: 5/19/2023 with 2 refills          verified by Gerson Tolbert LPN      Date of last appointment 5/25/2023    Next Visit Date:  9/14/2023

## 2023-09-14 ENCOUNTER — OFFICE VISIT (OUTPATIENT)
Dept: NEUROLOGY | Age: 63
End: 2023-09-14
Payer: MEDICARE

## 2023-09-14 VITALS
SYSTOLIC BLOOD PRESSURE: 104 MMHG | HEART RATE: 77 BPM | DIASTOLIC BLOOD PRESSURE: 79 MMHG | WEIGHT: 262.6 LBS | HEIGHT: 71 IN | BODY MASS INDEX: 36.76 KG/M2

## 2023-09-14 DIAGNOSIS — R41.3 MEMORY LOSS: ICD-10-CM

## 2023-09-14 DIAGNOSIS — M54.40 CHRONIC LOW BACK PAIN WITH SCIATICA, SCIATICA LATERALITY UNSPECIFIED, UNSPECIFIED BACK PAIN LATERALITY: ICD-10-CM

## 2023-09-14 DIAGNOSIS — G89.29 CHRONIC LOW BACK PAIN WITH SCIATICA, SCIATICA LATERALITY UNSPECIFIED, UNSPECIFIED BACK PAIN LATERALITY: ICD-10-CM

## 2023-09-14 DIAGNOSIS — G47.33 OBSTRUCTIVE SLEEP APNEA SYNDROME: ICD-10-CM

## 2023-09-14 DIAGNOSIS — G43.009 MIGRAINE WITHOUT AURA AND WITHOUT STATUS MIGRAINOSUS, NOT INTRACTABLE: Primary | ICD-10-CM

## 2023-09-14 DIAGNOSIS — F32.A DEPRESSION, UNSPECIFIED DEPRESSION TYPE: ICD-10-CM

## 2023-09-14 PROCEDURE — G8427 DOCREV CUR MEDS BY ELIG CLIN: HCPCS | Performed by: PSYCHIATRY & NEUROLOGY

## 2023-09-14 PROCEDURE — 3078F DIAST BP <80 MM HG: CPT | Performed by: PSYCHIATRY & NEUROLOGY

## 2023-09-14 PROCEDURE — G8417 CALC BMI ABV UP PARAM F/U: HCPCS | Performed by: PSYCHIATRY & NEUROLOGY

## 2023-09-14 PROCEDURE — 3074F SYST BP LT 130 MM HG: CPT | Performed by: PSYCHIATRY & NEUROLOGY

## 2023-09-14 PROCEDURE — 99214 OFFICE O/P EST MOD 30 MIN: CPT | Performed by: PSYCHIATRY & NEUROLOGY

## 2023-09-14 PROCEDURE — 3017F COLORECTAL CA SCREEN DOC REV: CPT | Performed by: PSYCHIATRY & NEUROLOGY

## 2023-09-14 PROCEDURE — 4004F PT TOBACCO SCREEN RCVD TLK: CPT | Performed by: PSYCHIATRY & NEUROLOGY

## 2023-09-14 RX ORDER — MEMANTINE HYDROCHLORIDE 10 MG/1
10 TABLET ORAL 2 TIMES DAILY
Qty: 180 TABLET | Refills: 1 | Status: SHIPPED | OUTPATIENT
Start: 2023-09-14 | End: 2024-03-13

## 2023-09-14 RX ORDER — TRAZODONE HYDROCHLORIDE 100 MG/1
TABLET ORAL
Qty: 180 TABLET | Refills: 1 | Status: SHIPPED | OUTPATIENT
Start: 2023-09-14

## 2023-09-14 RX ORDER — FLUOXETINE HYDROCHLORIDE 20 MG/1
CAPSULE ORAL
COMMUNITY
Start: 2023-08-21

## 2023-09-14 RX ORDER — SUMATRIPTAN 100 MG/1
TABLET, FILM COATED ORAL
Qty: 18 TABLET | Refills: 5 | Status: SHIPPED | OUTPATIENT
Start: 2023-09-14

## 2023-09-14 NOTE — PROGRESS NOTES
Subjective:      Patient ID: Franco Mayers is a 59 y.o. male. HPI  Active problem sleep apnea on nasal CPAP with initiating insomnia on desyrel to consolidate sleep. Migraine headaches . Depression with memory complaints and sensory neuropathy   Chronic low back pain followed by pain clinic . The condition is he is not using nasal CPAP with him not liking putting on any mask and headgear . During waking day he os tired . He is going to bed at 9 PM falling asleep from right away staying up to 2 to 3 AM 2 days per week despite desyrel 150 mg po qhs . He is having headaches 4 to 5 times per month . These are over bilateral frontal head grade 7 over 10 attenuated with imitrex . He reports that there is decrease sense of purpose having no initiative secluding himself in his bedroom . His best friend and sister passed away. Blaze Eldridge He is he is forgetful . He reports depression is under good control on trintellix and prozac seeing psychiatrist . Numbness of his feet has improved with no nerve pain off neurontin . He has diabetes on metformin with sensory neuropathy . He weighs 262 lbs . There is low back pain on percocet through pain clinic . He had kidney stones while in [place] taken off prior topamax  . He is on namenda 10 mg po bid having trouble recalling things . Significant medications desyrel 150 mg po qhs ,  namenda 10 mg po bid , imitrex 100 mg PRN . Previously on Depakote and topamax  . Testing polysomnogram  anea hypopnea index 10 ,4 eusode sper hour with oxyhemoglobin desaturation to 83 %. Autoc CPAP 10 -16 cm H20  Head CT normal . 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 MRA of Head previously described outpouching arising left carotid terminus not well delineated , December 2022     Past Medical History:   Diagnosis Date    ADHD (attention deficit hyperactivity disorder)     no tx per pt. Arthritis     knees, hands, back. Asthma     as child.   pcp manages    Back

## 2023-10-02 ENCOUNTER — OFFICE VISIT (OUTPATIENT)
Dept: UROLOGY | Age: 63
End: 2023-10-02
Payer: MEDICARE

## 2023-10-02 VITALS — WEIGHT: 262 LBS | BODY MASS INDEX: 36.68 KG/M2 | HEIGHT: 71 IN

## 2023-10-02 DIAGNOSIS — R35.0 FREQUENCY OF URINATION: ICD-10-CM

## 2023-10-02 DIAGNOSIS — N52.9 ERECTILE DYSFUNCTION, UNSPECIFIED ERECTILE DYSFUNCTION TYPE: ICD-10-CM

## 2023-10-02 DIAGNOSIS — N20.0 KIDNEY STONE: Primary | ICD-10-CM

## 2023-10-02 DIAGNOSIS — Z12.5 PROSTATE CANCER SCREENING: ICD-10-CM

## 2023-10-02 PROCEDURE — G8427 DOCREV CUR MEDS BY ELIG CLIN: HCPCS | Performed by: UROLOGY

## 2023-10-02 PROCEDURE — 4004F PT TOBACCO SCREEN RCVD TLK: CPT | Performed by: UROLOGY

## 2023-10-02 PROCEDURE — 3017F COLORECTAL CA SCREEN DOC REV: CPT | Performed by: UROLOGY

## 2023-10-02 PROCEDURE — G8484 FLU IMMUNIZE NO ADMIN: HCPCS | Performed by: UROLOGY

## 2023-10-02 PROCEDURE — G8417 CALC BMI ABV UP PARAM F/U: HCPCS | Performed by: UROLOGY

## 2023-10-02 PROCEDURE — 99213 OFFICE O/P EST LOW 20 MIN: CPT | Performed by: UROLOGY

## 2023-10-02 ASSESSMENT — ENCOUNTER SYMPTOMS
EYE REDNESS: 0
NAUSEA: 0
DIARRHEA: 0
EYES NEGATIVE: 1
CONSTIPATION: 0
RESPIRATORY NEGATIVE: 1
GASTROINTESTINAL NEGATIVE: 1
BACK PAIN: 0
EYE PAIN: 0
VOMITING: 0
COUGH: 0
ABDOMINAL PAIN: 0
SHORTNESS OF BREATH: 0
WHEEZING: 0

## 2023-10-02 NOTE — PROGRESS NOTES
[address]  [address] [facility] [address]  Dept: [address] [place] [address] Urology Office Note - Established    Patient:  Robina Jefferson  YOB: 1960  Date: 10/2/2023    The patient is a 59 y.o. male who presents todayfor evaluation of the following problems:   Chief Complaint   Patient presents with    Nephrolithiasis     6 month with KUB       HPI  Is a very pleasant 59-year-old gentleman who has a history of kidney stones and erectile dysfunction. His KUB x-ray shows no stones. He did have a penile implant a few years ago. He has been disappointed with the loss of length that he has had since developing more severe erectile dysfunction. This is actually kept him from using his penile implant because he is embarrassed about the length of his penis. He does have mild urinary symptoms but nothing that is bothering him right now. Summary of old records: N/A    Additional History: N/A    Procedures Today: N/A    Urinalysis today:  No results found for this visit on 10/02/23. Last several PSA's:  Lab Results   Component Value Date    PSA 1.01 02/15/2023    PSA 0.59 04/26/2017     Last total testosterone:  No results found for: \"TESTOSTERONE\"    AUA Symptom Score (10/2/2023): Last BUN and creatinine:  Lab Results   Component Value Date    BUN 14 11/25/2021     Lab Results   Component Value Date    CREATININE 1.18 01/20/2023       Additional Lab/Culture results: none    Imaging Reviewed during this Office Visit: none  (results were independently reviewed by physician and radiology report verified)    PAST MEDICAL, FAMILY AND SOCIAL HISTORY UPDATE:  Past Medical History:   Diagnosis Date    ADHD (attention deficit hyperactivity disorder)     no tx per pt. Arthritis     knees, hands, back. Asthma     as child.   pcp manages    Back pain     [facility] pain clinic for

## 2023-10-10 DIAGNOSIS — G43.009 MIGRAINE WITHOUT AURA AND WITHOUT STATUS MIGRAINOSUS, NOT INTRACTABLE: Primary | ICD-10-CM

## 2023-10-11 RX ORDER — TOPIRAMATE 50 MG/1
TABLET, FILM COATED ORAL
Qty: 270 TABLET | Refills: 1 | Status: SHIPPED | OUTPATIENT
Start: 2023-10-11 | End: 2024-04-08

## 2023-10-11 NOTE — TELEPHONE ENCOUNTER
Pharmacy requesting refill of Topamax.       Medication active on med list yes      Date of last fill: 9/13/23  verified on 10/11/2023   verified by [facility] LPN      Date of last appointment 9/14/23    Next Visit Date:  Visit date not found

## 2023-11-22 ENCOUNTER — OFFICE VISIT (OUTPATIENT)
Dept: PRIMARY CARE CLINIC | Age: 63
End: 2023-11-22
Payer: MEDICARE

## 2023-11-22 VITALS
HEART RATE: 73 BPM | WEIGHT: 245 LBS | DIASTOLIC BLOOD PRESSURE: 78 MMHG | OXYGEN SATURATION: 97 % | BODY MASS INDEX: 34.17 KG/M2 | SYSTOLIC BLOOD PRESSURE: 100 MMHG

## 2023-11-22 DIAGNOSIS — K92.1 BLOOD IN STOOL: ICD-10-CM

## 2023-11-22 DIAGNOSIS — E11.9 TYPE 2 DIABETES MELLITUS WITHOUT COMPLICATION, WITH LONG-TERM CURRENT USE OF INSULIN (HCC): Primary | ICD-10-CM

## 2023-11-22 DIAGNOSIS — R19.7 DIARRHEA, UNSPECIFIED TYPE: ICD-10-CM

## 2023-11-22 DIAGNOSIS — I73.9 CLAUDICATION (HCC): ICD-10-CM

## 2023-11-22 DIAGNOSIS — Z79.4 TYPE 2 DIABETES MELLITUS WITHOUT COMPLICATION, WITH LONG-TERM CURRENT USE OF INSULIN (HCC): Primary | ICD-10-CM

## 2023-11-22 DIAGNOSIS — R63.4 WEIGHT LOSS, UNINTENTIONAL: ICD-10-CM

## 2023-11-22 DIAGNOSIS — R25.2 LEG CRAMPS: ICD-10-CM

## 2023-11-22 LAB — HBA1C MFR BLD: 5.5 %

## 2023-11-22 PROCEDURE — 3017F COLORECTAL CA SCREEN DOC REV: CPT | Performed by: FAMILY MEDICINE

## 2023-11-22 PROCEDURE — 99214 OFFICE O/P EST MOD 30 MIN: CPT | Performed by: FAMILY MEDICINE

## 2023-11-22 PROCEDURE — 3074F SYST BP LT 130 MM HG: CPT | Performed by: FAMILY MEDICINE

## 2023-11-22 PROCEDURE — G8427 DOCREV CUR MEDS BY ELIG CLIN: HCPCS | Performed by: FAMILY MEDICINE

## 2023-11-22 PROCEDURE — 83036 HEMOGLOBIN GLYCOSYLATED A1C: CPT | Performed by: FAMILY MEDICINE

## 2023-11-22 PROCEDURE — 2022F DILAT RTA XM EVC RTNOPTHY: CPT | Performed by: FAMILY MEDICINE

## 2023-11-22 PROCEDURE — 3044F HG A1C LEVEL LT 7.0%: CPT | Performed by: FAMILY MEDICINE

## 2023-11-22 PROCEDURE — 4004F PT TOBACCO SCREEN RCVD TLK: CPT | Performed by: FAMILY MEDICINE

## 2023-11-22 PROCEDURE — 3078F DIAST BP <80 MM HG: CPT | Performed by: FAMILY MEDICINE

## 2023-11-22 PROCEDURE — G8417 CALC BMI ABV UP PARAM F/U: HCPCS | Performed by: FAMILY MEDICINE

## 2023-11-22 PROCEDURE — G8484 FLU IMMUNIZE NO ADMIN: HCPCS | Performed by: FAMILY MEDICINE

## 2023-11-26 ASSESSMENT — ENCOUNTER SYMPTOMS
ABDOMINAL PAIN: 0
VOMITING: 0
BACK PAIN: 1

## 2023-12-01 RX ORDER — ATORVASTATIN CALCIUM 10 MG/1
10 TABLET, FILM COATED ORAL DAILY
Qty: 90 TABLET | Refills: 3 | Status: SHIPPED | OUTPATIENT
Start: 2023-12-01

## 2023-12-04 DIAGNOSIS — R09.82 POST-NASAL DRIP: ICD-10-CM

## 2023-12-04 RX ORDER — METFORMIN HYDROCHLORIDE 500 MG/1
500 TABLET, EXTENDED RELEASE ORAL
Qty: 90 TABLET | Refills: 3 | Status: SHIPPED | OUTPATIENT
Start: 2023-12-04

## 2023-12-04 RX ORDER — MEMANTINE HYDROCHLORIDE 10 MG/1
10 TABLET ORAL 2 TIMES DAILY
Qty: 56 TABLET | OUTPATIENT
Start: 2023-12-04

## 2023-12-04 RX ORDER — LEVOCETIRIZINE DIHYDROCHLORIDE 5 MG/1
TABLET, FILM COATED ORAL
Qty: 90 TABLET | Refills: 3 | Status: SHIPPED | OUTPATIENT
Start: 2023-12-04

## 2023-12-07 ENCOUNTER — HOSPITAL ENCOUNTER (OUTPATIENT)
Dept: VASCULAR LAB | Age: 63
Discharge: HOME | End: 2023-12-09

## 2023-12-07 DIAGNOSIS — I73.9 CLAUDICATION (HCC): ICD-10-CM

## 2023-12-07 NOTE — PROGRESS NOTES
Patient registered across the street and never came to check in with heart and vascular at 11:30 am. Patient was given directions to heart and vascular building and did not show for 11:30 am appt. Patient will need to be rescheduled.

## 2024-01-11 DIAGNOSIS — Z12.11 SPECIAL SCREENING FOR MALIGNANT NEOPLASMS, COLON: Primary | ICD-10-CM

## 2024-01-20 ENCOUNTER — HOSPITAL ENCOUNTER (OUTPATIENT)
Age: 64
Discharge: HOME | End: 2024-01-20
Payer: MEDICARE

## 2024-01-20 DIAGNOSIS — R25.2 LEG CRAMPS: ICD-10-CM

## 2024-01-20 DIAGNOSIS — R63.4 WEIGHT LOSS, UNINTENTIONAL: ICD-10-CM

## 2024-01-20 LAB
ALBUMIN SERPL-MCNC: 3.9 G/DL (ref 3.5–5.2)
ALP SERPL-CCNC: 113 U/L (ref 40–129)
ALT SERPL-CCNC: 8 U/L (ref 5–41)
ANION GAP SERPL CALCULATED.3IONS-SCNC: 11 MMOL/L (ref 9–17)
AST SERPL-CCNC: 10 U/L
BASOPHILS # BLD: 0 K/UL (ref 0–0.2)
BASOPHILS NFR BLD: 0 % (ref 0–2)
BILIRUB SERPL-MCNC: 0.3 MG/DL (ref 0.3–1.2)
BUN SERPL-MCNC: 12 MG/DL (ref 8–23)
CALCIUM SERPL-MCNC: 9.1 MG/DL (ref 8.6–10.4)
CHLORIDE SERPL-SCNC: 110 MMOL/L (ref 98–107)
CK SERPL-CCNC: 70 U/L (ref 39–308)
CO2 SERPL-SCNC: 23 MMOL/L (ref 20–31)
CREAT SERPL-MCNC: 1.3 MG/DL (ref 0.7–1.2)
EOSINOPHIL # BLD: 0 K/UL (ref 0–0.4)
EOSINOPHILS RELATIVE PERCENT: 0 % (ref 0–4)
ERYTHROCYTE [DISTWIDTH] IN BLOOD BY AUTOMATED COUNT: 14.2 % (ref 11.5–14.9)
GFR SERPL CREATININE-BSD FRML MDRD: >60 ML/MIN/1.73M2
GLUCOSE SERPL-MCNC: 130 MG/DL (ref 70–99)
HCT VFR BLD AUTO: 44.8 % (ref 41–53)
HGB BLD-MCNC: 15.2 G/DL (ref 13.5–17.5)
LYMPHOCYTES NFR BLD: 1.7 K/UL (ref 1–4.8)
LYMPHOCYTES RELATIVE PERCENT: 23 % (ref 24–44)
MAGNESIUM SERPL-MCNC: 2 MG/DL (ref 1.6–2.6)
MCH RBC QN AUTO: 31.5 PG (ref 26–34)
MCHC RBC AUTO-ENTMCNC: 34 G/DL (ref 31–37)
MCV RBC AUTO: 92.5 FL (ref 80–100)
MONOCYTES NFR BLD: 0.3 K/UL (ref 0.1–1.3)
MONOCYTES NFR BLD: 5 % (ref 1–7)
NEUTROPHILS NFR BLD: 72 % (ref 36–66)
NEUTS SEG NFR BLD: 5.3 K/UL (ref 1.3–9.1)
PLATELET # BLD AUTO: 167 K/UL (ref 150–450)
PMV BLD AUTO: 8.3 FL (ref 6–12)
POTASSIUM SERPL-SCNC: 3.6 MMOL/L (ref 3.7–5.3)
PROT SERPL-MCNC: 6.5 G/DL (ref 6.4–8.3)
RBC # BLD AUTO: 4.84 M/UL (ref 4.5–5.9)
SODIUM SERPL-SCNC: 144 MMOL/L (ref 135–144)
T4 FREE SERPL-MCNC: 0.9 NG/DL (ref 0.9–1.7)
TSH SERPL DL<=0.05 MIU/L-ACNC: 1.47 UIU/ML (ref 0.3–5)
WBC OTHER # BLD: 7.3 K/UL (ref 3.5–11)

## 2024-01-20 PROCEDURE — 83735 ASSAY OF MAGNESIUM: CPT

## 2024-01-20 PROCEDURE — 82550 ASSAY OF CK (CPK): CPT

## 2024-01-20 PROCEDURE — 85025 COMPLETE CBC W/AUTO DIFF WBC: CPT

## 2024-01-20 PROCEDURE — 36415 COLL VENOUS BLD VENIPUNCTURE: CPT

## 2024-01-20 PROCEDURE — 80053 COMPREHEN METABOLIC PANEL: CPT

## 2024-01-20 PROCEDURE — 84439 ASSAY OF FREE THYROXINE: CPT

## 2024-01-20 PROCEDURE — 84443 ASSAY THYROID STIM HORMONE: CPT

## 2024-01-24 DIAGNOSIS — I10 ESSENTIAL HYPERTENSION: ICD-10-CM

## 2024-01-24 RX ORDER — AMLODIPINE BESYLATE 10 MG/1
TABLET ORAL
Qty: 90 TABLET | Refills: 1 | Status: SHIPPED | OUTPATIENT
Start: 2024-01-24

## 2024-01-24 RX ORDER — CARVEDILOL 25 MG/1
TABLET ORAL
Qty: 180 TABLET | Refills: 2 | Status: SHIPPED | OUTPATIENT
Start: 2024-01-24

## 2024-01-26 ENCOUNTER — HOSPITAL ENCOUNTER (OUTPATIENT)
Dept: VASCULAR LAB | Age: 64
Discharge: HOME | End: 2024-01-26
Payer: MEDICARE

## 2024-01-26 PROCEDURE — 93922 UPR/L XTREMITY ART 2 LEVELS: CPT

## 2024-01-27 LAB
VAS LEFT ABI: 1.23
VAS LEFT ARM BP: 152 MMHG
VAS LEFT DORSALIS PEDIS BP: 170 MMHG
VAS LEFT PTA BP: 187 MMHG
VAS LEFT TBI: 0.58
VAS LEFT TOE PRESSURE: 88 MMHG
VAS RIGHT ABI: 1.24
VAS RIGHT DORSALIS PEDIS BP: 181 MMHG
VAS RIGHT PTA BP: 188 MMHG
VAS RIGHT TBI: 0.86
VAS RIGHT TOE PRESSURE: 131 MMHG

## 2024-01-27 PROCEDURE — 93922 UPR/L XTREMITY ART 2 LEVELS: CPT | Performed by: SURGERY

## 2024-01-30 RX ORDER — FLUTICASONE PROPIONATE 50 MCG
SPRAY, SUSPENSION (ML) NASAL
Qty: 3 EACH | Refills: 1 | Status: SHIPPED | OUTPATIENT
Start: 2024-01-30

## 2024-01-31 ENCOUNTER — OFFICE VISIT (OUTPATIENT)
Dept: PRIMARY CARE CLINIC | Age: 64
End: 2024-01-31
Payer: MEDICARE

## 2024-01-31 VITALS
SYSTOLIC BLOOD PRESSURE: 122 MMHG | DIASTOLIC BLOOD PRESSURE: 82 MMHG | BODY MASS INDEX: 33.89 KG/M2 | OXYGEN SATURATION: 97 % | HEART RATE: 93 BPM | WEIGHT: 243 LBS

## 2024-01-31 DIAGNOSIS — M79.605 PAIN IN BOTH LOWER EXTREMITIES: ICD-10-CM

## 2024-01-31 DIAGNOSIS — M79.604 PAIN IN BOTH LOWER EXTREMITIES: ICD-10-CM

## 2024-01-31 DIAGNOSIS — F32.A DEPRESSION, UNSPECIFIED DEPRESSION TYPE: ICD-10-CM

## 2024-01-31 DIAGNOSIS — Z79.4 TYPE 2 DIABETES MELLITUS WITHOUT COMPLICATION, WITH LONG-TERM CURRENT USE OF INSULIN (HCC): ICD-10-CM

## 2024-01-31 DIAGNOSIS — E11.9 TYPE 2 DIABETES MELLITUS WITHOUT COMPLICATION, WITH LONG-TERM CURRENT USE OF INSULIN (HCC): ICD-10-CM

## 2024-01-31 DIAGNOSIS — I10 ESSENTIAL HYPERTENSION: Primary | ICD-10-CM

## 2024-01-31 PROCEDURE — 2022F DILAT RTA XM EVC RTNOPTHY: CPT | Performed by: FAMILY MEDICINE

## 2024-01-31 PROCEDURE — 4004F PT TOBACCO SCREEN RCVD TLK: CPT | Performed by: FAMILY MEDICINE

## 2024-01-31 PROCEDURE — 3074F SYST BP LT 130 MM HG: CPT | Performed by: FAMILY MEDICINE

## 2024-01-31 PROCEDURE — G8484 FLU IMMUNIZE NO ADMIN: HCPCS | Performed by: FAMILY MEDICINE

## 2024-01-31 PROCEDURE — 99214 OFFICE O/P EST MOD 30 MIN: CPT | Performed by: FAMILY MEDICINE

## 2024-01-31 PROCEDURE — G8417 CALC BMI ABV UP PARAM F/U: HCPCS | Performed by: FAMILY MEDICINE

## 2024-01-31 PROCEDURE — G8427 DOCREV CUR MEDS BY ELIG CLIN: HCPCS | Performed by: FAMILY MEDICINE

## 2024-01-31 PROCEDURE — 3079F DIAST BP 80-89 MM HG: CPT | Performed by: FAMILY MEDICINE

## 2024-01-31 PROCEDURE — 3017F COLORECTAL CA SCREEN DOC REV: CPT | Performed by: FAMILY MEDICINE

## 2024-01-31 PROCEDURE — 3046F HEMOGLOBIN A1C LEVEL >9.0%: CPT | Performed by: FAMILY MEDICINE

## 2024-01-31 ASSESSMENT — PATIENT HEALTH QUESTIONNAIRE - PHQ9
1. LITTLE INTEREST OR PLEASURE IN DOING THINGS: 0
SUM OF ALL RESPONSES TO PHQ QUESTIONS 1-9: 0
2. FEELING DOWN, DEPRESSED OR HOPELESS: 0
9. THOUGHTS THAT YOU WOULD BE BETTER OFF DEAD, OR OF HURTING YOURSELF: 0
10. IF YOU CHECKED OFF ANY PROBLEMS, HOW DIFFICULT HAVE THESE PROBLEMS MADE IT FOR YOU TO DO YOUR WORK, TAKE CARE OF THINGS AT HOME, OR GET ALONG WITH OTHER PEOPLE: 0
3. TROUBLE FALLING OR STAYING ASLEEP: 0
SUM OF ALL RESPONSES TO PHQ9 QUESTIONS 1 & 2: 0
6. FEELING BAD ABOUT YOURSELF - OR THAT YOU ARE A FAILURE OR HAVE LET YOURSELF OR YOUR FAMILY DOWN: 0
SUM OF ALL RESPONSES TO PHQ QUESTIONS 1-9: 0
5. POOR APPETITE OR OVEREATING: 0
SUM OF ALL RESPONSES TO PHQ QUESTIONS 1-9: 0
7. TROUBLE CONCENTRATING ON THINGS, SUCH AS READING THE NEWSPAPER OR WATCHING TELEVISION: 0
SUM OF ALL RESPONSES TO PHQ QUESTIONS 1-9: 0
8. MOVING OR SPEAKING SO SLOWLY THAT OTHER PEOPLE COULD HAVE NOTICED. OR THE OPPOSITE, BEING SO FIGETY OR RESTLESS THAT YOU HAVE BEEN MOVING AROUND A LOT MORE THAN USUAL: 0
4. FEELING TIRED OR HAVING LITTLE ENERGY: 0

## 2024-01-31 ASSESSMENT — ENCOUNTER SYMPTOMS
SHORTNESS OF BREATH: 0
VOMITING: 0

## 2024-01-31 NOTE — PROGRESS NOTES
[facility]  [facility] PRIMARY CARE  [address] [place] [address] [address]  [address]  [facility] [address]  Dept: [phone]  Dept Fax: [phone]    Tash Mora is a 63 y.o. male who presents today for his medical conditions/complaints as noted below.  Tash Mora is c/o of  Chief Complaint   Patient presents with    Leg Pain     Continued leg pain, thinks it may be related to his other pair of shoes, causing back pain as well         HPI:     HPI    Pt here for follow up on chronic conditions.    Feels his leg pain may be from his diabetic shoes. Feels like when he is wearing his current shoes he does not get the leg pain right away like he does when he wears his diabetic shoes.     Pt had sciatic nerve ablation. States it has helped his pain but if he stands long periods of time he gets pain in back and legs.  JESSICA was normal for his leg pain.     Gets neuropathy in his feet- was on gabapentin and thn switched to different medication and it did help by pain management. States eventually the foot pain had improved, he does not recall the name of the medication. He does have follow up in two weeks with pain management so I recommend he ask to see if they could restart what he was on previously that helped.     Migraines have been less frequent . Doing well on his current medications for it.      He also notes he does not see psychiatry anymore and he stopped his prozac and trintellix a while back as he is feeling better. Has a cousin who has been a good support system for him.       Hemoglobin A1C (%)   Date Value   11/22/2023 5.5   08/15/2023 5.9   02/15/2023 5.7             ( goal A1C is < 7)   No components found for: \"LABMICR\"  LDL Cholesterol (mg/dL)   Date Value   02/15/2023 69   07/28/2021 51     LDL Calculated (mg/dL)   Date Value   11/01/2019 95       (goal LDL is <100)   AST (U/L)   Date Value   01/20/2024 10     ALT (U/L)   Date Value   01/20/2024 8     BUN (mg/dL)   Date

## 2024-02-21 RX ORDER — TRAZODONE HYDROCHLORIDE 100 MG/1
TABLET ORAL
Qty: 56 TABLET | Refills: 2 | Status: SHIPPED | OUTPATIENT
Start: 2024-02-21 | End: 2024-05-21

## 2024-02-21 NOTE — TELEPHONE ENCOUNTER
Pharmacy requesting refill of Trazodone.      Medication active on med list yes      Date of last fill: 9/14/23  verified on 2/21/2024   verified by SF LPN      Date of last appointment 9/14/23    Next Visit Date:  3/14/2024

## 2024-03-07 ENCOUNTER — TELEPHONE (OUTPATIENT)
Dept: GASTROENTEROLOGY | Age: 64
End: 2024-03-07

## 2024-03-07 NOTE — TELEPHONE ENCOUNTER
Writer called patient to reschedule appointment due to change in scheudle by 15 minutes changed appointment to 845am

## 2024-03-11 ENCOUNTER — TELEPHONE (OUTPATIENT)
Dept: PRIMARY CARE CLINIC | Age: 64
End: 2024-03-11

## 2024-03-11 DIAGNOSIS — J45.40 MODERATE PERSISTENT ASTHMA WITHOUT COMPLICATION: Primary | ICD-10-CM

## 2024-03-11 RX ORDER — FLUTICASONE PROPIONATE AND SALMETEROL 100; 50 UG/1; UG/1
1 POWDER RESPIRATORY (INHALATION) EVERY 12 HOURS
Qty: 60 EACH | Refills: 1 | Status: SHIPPED | OUTPATIENT
Start: 2024-03-11

## 2024-03-11 NOTE — TELEPHONE ENCOUNTER
Last Visit Date: 1/31/2024   Next Visit Date: 5/1/2024     Pt called to request renewing this medication. Pt states he has not been on it for a while but would like to begin taking again   fluticasone-salmeterol (ADVAIR) 100-50 MCG/DOSE diskus inhaler      Please advise

## 2024-03-12 ENCOUNTER — OFFICE VISIT (OUTPATIENT)
Dept: GASTROENTEROLOGY | Age: 64
End: 2024-03-12
Payer: MEDICARE

## 2024-03-12 ENCOUNTER — TELEPHONE (OUTPATIENT)
Dept: GASTROENTEROLOGY | Age: 64
End: 2024-03-12

## 2024-03-12 VITALS
DIASTOLIC BLOOD PRESSURE: 97 MMHG | BODY MASS INDEX: 35.7 KG/M2 | WEIGHT: 255 LBS | HEART RATE: 71 BPM | HEIGHT: 71 IN | OXYGEN SATURATION: 96 % | TEMPERATURE: 97.3 F | SYSTOLIC BLOOD PRESSURE: 136 MMHG

## 2024-03-12 DIAGNOSIS — R63.4 WEIGHT LOSS: ICD-10-CM

## 2024-03-12 DIAGNOSIS — R19.4 ALTERED BOWEL HABITS: ICD-10-CM

## 2024-03-12 DIAGNOSIS — E66.01 SEVERE OBESITY (BMI 35.0-39.9) WITH COMORBIDITY (HCC): ICD-10-CM

## 2024-03-12 DIAGNOSIS — F10.21 HISTORY OF ALCOHOLISM (HCC): ICD-10-CM

## 2024-03-12 DIAGNOSIS — K92.1 HEMATOCHEZIA: Primary | ICD-10-CM

## 2024-03-12 PROCEDURE — 3017F COLORECTAL CA SCREEN DOC REV: CPT | Performed by: INTERNAL MEDICINE

## 2024-03-12 PROCEDURE — 4004F PT TOBACCO SCREEN RCVD TLK: CPT | Performed by: INTERNAL MEDICINE

## 2024-03-12 PROCEDURE — 3080F DIAST BP >= 90 MM HG: CPT | Performed by: INTERNAL MEDICINE

## 2024-03-12 PROCEDURE — G8484 FLU IMMUNIZE NO ADMIN: HCPCS | Performed by: INTERNAL MEDICINE

## 2024-03-12 PROCEDURE — G8427 DOCREV CUR MEDS BY ELIG CLIN: HCPCS | Performed by: INTERNAL MEDICINE

## 2024-03-12 PROCEDURE — G8417 CALC BMI ABV UP PARAM F/U: HCPCS | Performed by: INTERNAL MEDICINE

## 2024-03-12 PROCEDURE — 3075F SYST BP GE 130 - 139MM HG: CPT | Performed by: INTERNAL MEDICINE

## 2024-03-12 PROCEDURE — 99204 OFFICE O/P NEW MOD 45 MIN: CPT | Performed by: INTERNAL MEDICINE

## 2024-03-12 RX ORDER — SODIUM, POTASSIUM,MAG SULFATES 17.5-3.13G
SOLUTION, RECONSTITUTED, ORAL ORAL
Qty: 1 EACH | Refills: 0 | Status: SHIPPED | OUTPATIENT
Start: 2024-03-12

## 2024-03-12 ASSESSMENT — ENCOUNTER SYMPTOMS
TROUBLE SWALLOWING: 0
BLOOD IN STOOL: 1
NAUSEA: 0
DIARRHEA: 0
VOMITING: 0
ANAL BLEEDING: 1
ABDOMINAL PAIN: 1
CONSTIPATION: 0

## 2024-03-12 NOTE — TELEPHONE ENCOUNTER
Procedure scheduled    Colonoscopy (Diagnostic)/Aziz  Dx: Weight loss; Hematochezia  Monday 04/22/24 at 2:30 pm/Arrive at 12:30 pm  [facility]; Surgery Entrance, back of hospital    PAT Phone Call: Tuesday 04/09/24 at 2:45 pm    SuPrep Split-Bowel Prep given to pt in office. Pt instructed in office. Pt states he takes Aspirin 81 mg tablet daily. Pt instructed to stop Aspirin 1 day prior to procedure.

## 2024-03-21 DIAGNOSIS — G43.009 MIGRAINE WITHOUT AURA AND WITHOUT STATUS MIGRAINOSUS, NOT INTRACTABLE: ICD-10-CM

## 2024-03-21 RX ORDER — TOPIRAMATE 50 MG/1
TABLET, FILM COATED ORAL
Qty: 90 TABLET | Refills: 5 | Status: SHIPPED | OUTPATIENT
Start: 2024-03-21

## 2024-03-21 NOTE — TELEPHONE ENCOUNTER
Pharmacy requesting refill of topiramate (TOPAMAX) 50 MG tablet      Medication active on med list yes      Date of last Rx: 10/11/2023 with 1 refills          verified by ALBERTO FLORES      Date of last appointment 09/14/2023    Next Visit Date:  5/31/2024

## 2024-04-09 ENCOUNTER — HOSPITAL ENCOUNTER (OUTPATIENT)
Dept: PREADMISSION TESTING | Age: 64
Discharge: HOME | End: 2024-04-13

## 2024-04-09 VITALS — HEIGHT: 71 IN | BODY MASS INDEX: 35.7 KG/M2 | WEIGHT: 255 LBS

## 2024-04-09 NOTE — PROGRESS NOTES
Pre-op Instructions For Out-Patient Endoscopy Surgery    Medication Instructions:  Please stop herbs and any supplements now (includes vitamins and minerals).    Please contact your surgeon and prescribing physician for pre-op instructions for any blood thinners. STOP ASPIRIN AND MELOXICAM AS DIRECTED    If you have inhalers/aerosol treatments at home, please use them the morning of your surgery and bring the inhalers with you to the hospital.    Please take the following medications the morning of your surgery with a sip of water:    Inhalers, Amlodipine, and Carvedilol    Surgery Instructions:  After midnight before surgery:  Do not eat or drink anything, including water, mints, gum, and hard candy.  You may brush your teeth without swallowing.  No smoking, chewing tobacco, or street drugs.    Please shower or bathe before surgery.       Please do not wear any cologne, lotion, powder, jewelry, piercings, perfume, makeup, nail polish, hair accessories, or hair spray on the day of surgery.  Wear loose comfortable clothing.    Leave your valuables at home but bring a payment source for any after-surgery prescriptions you plan to fill at [facility].  Bring a storage case for any glasses/contacts.    An adult who is responsible for you MUST drive you home and should be with you for the first 24 hours after surgery.     The Day of Surgery:  Arrive at [facility] Surgery Entrance at the time directed by your surgeon and check in at the desk.     If you have a living will or healthcare power of , please bring a copy.    You will be taken to the pre-op holding area where you will be prepared for surgery.  A physical assessment will be performed by a nurse practitioner or house officer.  Your IV will be started and you will meet your anesthesiologist.    When you go to surgery, your family will be directed to the surgical waiting room, where the doctor should speak with them after your

## 2024-04-18 NOTE — PRE-PROCEDURE INSTRUCTIONS
No answer, left message ?   YES                          Unable to leave message ?  pATIENT CALLED BACK  When were you told to arrive at hospital ?    1230/1430  Do you have a  ? YES    Are you on any blood thinners ?      ASA                If yes when did you stop taking ?  WEEK AGO  Do you have your prep Rx filled and instruction ?    YES  Nothing to eat the day before , only clear liquids.ADVISED    Are you experiencing any covid symptoms ?   NO  Do you have any infections or rash we should be aware of ?  DENIES    Do you have the Hibiclens soap to use the night before and the morning of surgery ?    Nothing to eat or drink after midnight, only a sip of water to take any medication instructed to take the night before.  Wear comfortable clothing, leave any valuables at home, remove any jewelry and body piercing . LEFT A VM WITH REMINDER OF SURGERY AND ARRIVAL TIME-ALSO REMINDED TO HAVE A . ADVISED

## 2024-04-19 ENCOUNTER — ANESTHESIA EVENT (OUTPATIENT)
Dept: ENDOSCOPY | Age: 64
End: 2024-04-19
Payer: MEDICARE

## 2024-04-22 ENCOUNTER — ANESTHESIA (OUTPATIENT)
Dept: ENDOSCOPY | Age: 64
End: 2024-04-22
Payer: MEDICARE

## 2024-04-22 ENCOUNTER — HOSPITAL ENCOUNTER (OUTPATIENT)
Age: 64
Setting detail: OUTPATIENT SURGERY
Discharge: HOME | End: 2024-04-22
Attending: INTERNAL MEDICINE | Admitting: INTERNAL MEDICINE
Payer: MEDICARE

## 2024-04-22 ENCOUNTER — TELEPHONE (OUTPATIENT)
Dept: GASTROENTEROLOGY | Age: 64
End: 2024-04-22

## 2024-04-22 VITALS
SYSTOLIC BLOOD PRESSURE: 133 MMHG | TEMPERATURE: 97 F | HEIGHT: 71 IN | RESPIRATION RATE: 20 BRPM | BODY MASS INDEX: 35.7 KG/M2 | OXYGEN SATURATION: 100 % | DIASTOLIC BLOOD PRESSURE: 99 MMHG | WEIGHT: 255 LBS | HEART RATE: 80 BPM

## 2024-04-22 DIAGNOSIS — K92.1 HEMATOCHEZIA: ICD-10-CM

## 2024-04-22 DIAGNOSIS — Z12.11 SCREENING FOR MALIGNANT NEOPLASM OF COLON: Primary | ICD-10-CM

## 2024-04-22 DIAGNOSIS — R63.4 WEIGHT LOSS: ICD-10-CM

## 2024-04-22 DIAGNOSIS — K63.5 POLYP OF ASCENDING COLON, UNSPECIFIED TYPE: Primary | ICD-10-CM

## 2024-04-22 LAB
GLUCOSE BLD-MCNC: 102 MG/DL (ref 75–110)
GLUCOSE BLD-MCNC: 87 MG/DL (ref 75–110)

## 2024-04-22 PROCEDURE — 3700000001 HC ADD 15 MINUTES (ANESTHESIA): Performed by: INTERNAL MEDICINE

## 2024-04-22 PROCEDURE — 2500000003 HC RX 250 WO HCPCS: Performed by: NURSE ANESTHETIST, CERTIFIED REGISTERED

## 2024-04-22 PROCEDURE — 7100000011 HC PHASE II RECOVERY - ADDTL 15 MIN: Performed by: INTERNAL MEDICINE

## 2024-04-22 PROCEDURE — 6360000002 HC RX W HCPCS: Performed by: NURSE ANESTHETIST, CERTIFIED REGISTERED

## 2024-04-22 PROCEDURE — 2500000003 HC RX 250 WO HCPCS: Performed by: ANESTHESIOLOGY

## 2024-04-22 PROCEDURE — 82947 ASSAY GLUCOSE BLOOD QUANT: CPT

## 2024-04-22 PROCEDURE — 3700000000 HC ANESTHESIA ATTENDED CARE: Performed by: INTERNAL MEDICINE

## 2024-04-22 PROCEDURE — 7100000010 HC PHASE II RECOVERY - FIRST 15 MIN: Performed by: INTERNAL MEDICINE

## 2024-04-22 PROCEDURE — 88305 TISSUE EXAM BY PATHOLOGIST: CPT

## 2024-04-22 PROCEDURE — 2709999900 HC NON-CHARGEABLE SUPPLY: Performed by: INTERNAL MEDICINE

## 2024-04-22 PROCEDURE — 3609010600 HC COLONOSCOPY POLYPECTOMY SNARE/COLD BIOPSY: Performed by: INTERNAL MEDICINE

## 2024-04-22 PROCEDURE — 2580000003 HC RX 258: Performed by: ANESTHESIOLOGY

## 2024-04-22 RX ORDER — LIDOCAINE HYDROCHLORIDE 20 MG/ML
INJECTION, SOLUTION EPIDURAL; INFILTRATION; INTRACAUDAL; PERINEURAL PRN
Status: DISCONTINUED | OUTPATIENT
Start: 2024-04-22 | End: 2024-04-22 | Stop reason: SDUPTHER

## 2024-04-22 RX ORDER — PROPOFOL 10 MG/ML
INJECTION, EMULSION INTRAVENOUS PRN
Status: DISCONTINUED | OUTPATIENT
Start: 2024-04-22 | End: 2024-04-22 | Stop reason: SDUPTHER

## 2024-04-22 RX ORDER — SODIUM CHLORIDE 0.9 % (FLUSH) 0.9 %
5-40 SYRINGE (ML) INJECTION EVERY 12 HOURS SCHEDULED
Status: DISCONTINUED | OUTPATIENT
Start: 2024-04-22 | End: 2024-04-22 | Stop reason: HOSPADM

## 2024-04-22 RX ORDER — LIDOCAINE HYDROCHLORIDE 10 MG/ML
1 INJECTION, SOLUTION EPIDURAL; INFILTRATION; INTRACAUDAL; PERINEURAL
Status: COMPLETED | OUTPATIENT
Start: 2024-04-22 | End: 2024-04-22

## 2024-04-22 RX ORDER — SODIUM CHLORIDE 9 MG/ML
INJECTION, SOLUTION INTRAVENOUS CONTINUOUS
Status: DISCONTINUED | OUTPATIENT
Start: 2024-04-22 | End: 2024-04-22 | Stop reason: HOSPADM

## 2024-04-22 RX ORDER — PROPOFOL 10 MG/ML
INJECTION, EMULSION INTRAVENOUS CONTINUOUS PRN
Status: DISCONTINUED | OUTPATIENT
Start: 2024-04-22 | End: 2024-04-22 | Stop reason: SDUPTHER

## 2024-04-22 RX ORDER — SODIUM CHLORIDE 0.9 % (FLUSH) 0.9 %
5-40 SYRINGE (ML) INJECTION PRN
Status: DISCONTINUED | OUTPATIENT
Start: 2024-04-22 | End: 2024-04-22 | Stop reason: HOSPADM

## 2024-04-22 RX ORDER — SODIUM CHLORIDE 9 MG/ML
INJECTION, SOLUTION INTRAVENOUS PRN
Status: DISCONTINUED | OUTPATIENT
Start: 2024-04-22 | End: 2024-04-22 | Stop reason: HOSPADM

## 2024-04-22 RX ADMIN — LIDOCAINE HYDROCHLORIDE 1 ML: 10 INJECTION, SOLUTION EPIDURAL; INFILTRATION; INTRACAUDAL; PERINEURAL at 12:57

## 2024-04-22 RX ADMIN — LIDOCAINE HYDROCHLORIDE 40 MG: 20 INJECTION, SOLUTION EPIDURAL; INFILTRATION; INTRACAUDAL; PERINEURAL at 14:35

## 2024-04-22 RX ADMIN — PROPOFOL 40 MG: 10 INJECTION, EMULSION INTRAVENOUS at 14:35

## 2024-04-22 RX ADMIN — PROPOFOL 140 MCG/KG/MIN: 10 INJECTION, EMULSION INTRAVENOUS at 14:32

## 2024-04-22 RX ADMIN — SODIUM CHLORIDE: 9 INJECTION, SOLUTION INTRAVENOUS at 12:57

## 2024-04-22 ASSESSMENT — PAIN SCALES - GENERAL
PAINLEVEL_OUTOF10: 0
PAINLEVEL_OUTOF10: 0

## 2024-04-22 ASSESSMENT — ENCOUNTER SYMPTOMS
COUGH: 0
BACK PAIN: 1
SORE THROAT: 0
SHORTNESS OF BREATH: 0

## 2024-04-22 ASSESSMENT — PAIN - FUNCTIONAL ASSESSMENT
PAIN_FUNCTIONAL_ASSESSMENT: 0-10
PAIN_FUNCTIONAL_ASSESSMENT: 0-10

## 2024-04-22 NOTE — DISCHARGE INSTR - DIET
Good nutrition is important when healing from an illness, injury, or surgery.  Follow any nutrition recommendations given to you during your hospital stay.   If you were given an oral nutrition supplement while in the hospital, continue to take this supplement at home.  You can take it with meals, in-between meals, and/or before bedtime. These supplements can be purchased at most local grocery stores, pharmacies, and chain [employer] digital-stores.   If you have any questions about your diet or nutrition, call the hospital and ask for the dietitian.  High-Fiber Diet     What Is Fiber?   Dietary fiber is a form of carbohydrate found in plants that cannot be digested by humans. All plants contain fiber, including fruits, vegetables, grains, and legumes. Fiber is often classified into two categories: soluble and insoluble.   Soluble fiber draws water into the bowel and can help slow digestion. Examples of foods that are high in soluble fiber include oatmeal, oat bran, barley, legumes (eg, beans and peas), apples, and strawberries.   Insoluble fiber speeds digestion and can add bulk to the stool. Examples of foods that are high in insoluble fiber include whole-wheat products, wheat bran, cauliflower, green beans, and potatoes.   Why Follow a High-Fiber Diet?   A high-fiber diet is often recommended to prevent and treat constipation , hemorrhoids , diverticulitis , and irritable bowel syndrome . Eating a high-fiber diet can also help improve your cholesterol levels, lower your risk of coronary heart disease , reduce your risk of type 2 diabetes , and lower your weight. For people with type 1 or 2 diabetes, a high-fiber diet can also help stabilize blood sugar levels.   How Much Fiber Should I Eat?   A high-fiber diet should contain  20-35 grams  of fiber a day. This is actually the amount recommended for the general adult population; however, most Americans eat only 15 grams of fiber per day.   Digestion of Fiber   Eating a higher

## 2024-04-22 NOTE — H&P
normal. No respiratory distress.      Breath sounds: Normal breath sounds. No wheezing, rhonchi or rales.   Abdominal:      General: Bowel sounds are normal. There is no distension.      Palpations: Abdomen is soft.      Tenderness: There is no abdominal tenderness. There is no guarding.   Musculoskeletal:      Cervical back: Neck supple.   Skin:     General: Skin is warm and dry.      Coloration: Skin is not jaundiced.      Findings: No bruising, erythema or rash.   Neurological:      General: No focal deficit present.      Mental Status: He is alert and oriented to person, place, and time.      Cranial Nerves: No cranial nerve deficit.      Gait: Gait normal.   Psychiatric:         Mood and Affect: Mood normal.        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    Other forms of angina pectoris ([facility]) 11/08/2018    Dyslipidemia 09/07/2018    Cerebral aneurysm, nonruptured 09/04/2018    Elevated hemoglobin A1c 07/31/2018    Dysesthesia 07/31/2018    Right inguinal hernia 01/17/2018    Memory difficulties 12/06/2017    Memory loss 09/29/2017    Microalbuminuria 04/27/2017    Fatigue 10/08/2013    Left ventricular hypertrophy 09/20/2013    Low back pain 09/20/2013    Hypertension 06/17/2013    Anxiety and depression 06/17/2013    Attention deficit disorder 06/17/2013           ASHLEY Aguilera - CNP on 4/22/2024 at 12:17 PM

## 2024-04-22 NOTE — OP NOTE
Colonoscopy report    Colonoscopy Procedure Note    Procedure:  Colonoscopy with polypectomy with snare, tattoo placement    Procedure Date: 4/22/2024    Indications: Hematochezia    Sedation:  MAC    Attending Physician:  Dr. Mario Yo MD.     Assistant Physician: None    Consent:   Informed consent was obtained for the procedure after explaining the risks including bleeding, perforation, aspiration, arrhythmia, risks related to sedation, reaction to medications and rarely death, benefits and alternatives to the patient. The patient verbalized understanding and agreed to proceed with the procedure.       Procedure Details:  The patient was placed in the left lateral decubitus position.  Oxygen and cardiac monitoring equipment was attached. The patient's vital signs were monitored continuously  throughout the procedure. After appropriate sedation was achieved, a rectal examination was performed.  The pediatric colonoscope was inserted into the rectum and advanced under direct vision to the cecum which was identified by ileocecal valve and appendiceal orifice.  The quality of the colonic preparation was poor.  A careful inspection was made as the colonoscope was withdrawn, including a retroflexed view of the rectum; findings and interventions are described below.  Appropriate photodocumentation was obtained.           Complications:  None    Estimated blood loss:  Minimal           Disposition:  Home           Condition: stable    Withdrawal Time: 9 minutes    Findings:   The bowel prep was extremely poor with large amount of solid and liquid stool throughout.  At 3 cm semipedunculated polyp noted in the ascending colon.  Due to the amount of stool present, resection not attempted today.  A tattoo was placed right next to the area for identification later.  A 1 cm polyp noted in the sigmoid colon that was removed with hot snare.  Retroflexion examination in the rectum with moderate internal

## 2024-04-22 NOTE — DISCHARGE INSTRUCTIONS
painful or bleed a lot.  Follow-up care is a key part of your treatment and safety. Be sure to make and go to all appointments, and call your doctor if you are having problems. It's also a good idea to know your test results and keep a list of the medicines you take.  How can you care for yourself at home?  Sit in a few inches of warm water (sitz bath) 3 times a day and after bowel movements. The warm water helps with pain and itching.  Put ice on your anal area several times a day for 10 minutes at a time. Put a thin cloth between the ice and your skin. Follow this by placing a warm, wet towel on the area for another 10 to 20 minutes.  Take pain medicines exactly as directed.  If the doctor gave you a prescription medicine for pain, take it as prescribed.  If you are not taking a prescription pain medicine, ask your doctor if you can take an over-the-counter medicine.  Keep the anal area clean, but be gentle. Use water and a fragrance-free soap, or use baby wipes or medicated pads such as Tucks.  Wear cotton underwear and loose clothing to decrease moisture in the anal area.  Eat more fiber. Include foods such as whole-grain breads and cereals, raw vegetables, raw and dried fruits, and beans.  Drink plenty of fluids. If you have kidney, heart, or liver disease and have to limit fluids, talk with your doctor before you increase the amount of fluids you drink.  Use a stool softener that contains bran or psyllium. You can save money by buying bran or psyllium (available in bulk at most health food stores) and sprinkling it on foods or stirring it into fruit juice. Or you can use a product such as Metamucil or Hydrocil.  Practice healthy bowel habits.  Go to the bathroom as soon as you have the urge.  Avoid straining to pass stools. Relax and give yourself time to let things happen naturally.  Do not hold your breath while passing stools.  Do not read while sitting on the toilet. Get off the toilet as soon as you have

## 2024-04-22 NOTE — TELEPHONE ENCOUNTER
----- Message from Mario Yo MD sent at 4/22/2024  3:07 PM EDT -----  Sorry this is the patient for repeat colonoscopy, 2 day prep and adult colonoscope.     Mario Yo MD Cross, Lisa  The patient needs a repeat colonoscopy in 2 to 3 months with 2-day bowel prep and adult colonoscope.  He needs with endoscopic mucosal resection.

## 2024-04-22 NOTE — ANESTHESIA PRE PROCEDURE
Department of Anesthesiology  Preprocedure Note       Name:  Tash Mora   Age:  63 y.o.  :  1960                                          MRN:  [id number]         Date:  2024      Surgeon: Surgeon(s):  Mario Yo MD    Procedure: Procedure(s):  COLONOSCOPY DIAGNOSTIC    Medications prior to admission:   Prior to Admission medications    Medication Sig Start Date End Date Taking? Authorizing Provider   topiramate (TOPAMAX) 50 MG tablet TAKE 1 TABLET BY MOUTH EVERY MORNING & TAKE 2 TABLETS BY MOUTH EVERY NIGHT AT BEDTIME 3/21/24   Octavio Toscano MD   sodium-potassium-mag sulfate (SUPREP BOWEL PREP KIT) 17.5-3.13-1.6 GM/177ML SOLN solution Take as directed for bowel prep. 3/12/24   Mario Yo MD   fluticasone-salmeterol (ADVAIR) 100-50 MCG/ACT AEPB diskus inhaler Inhale 1 puff into the lungs in the morning and 1 puff in the evening. 3/11/24   Sandra Roberson,    traZODone (DESYREL) 100 MG tablet TAKE 2 TABLETS BY MOUTH EVERY NIGHT AT BEDTIME  Patient not taking: Reported on 2024  Octavio Toscano MD   fluticasone (FLONASE) 50 MCG/ACT nasal spray instill 2 sprays into each nostril once daily  Patient not taking: Reported on 2024   Sandra Roberson, DO   amLODIPine (NORVASC) 10 MG tablet TAKE 1 TABLET BY MOUTH EVERY DAY 24   Sandra Roberson, DO   carvedilol (COREG) 25 MG tablet take 1 tablet by mouth twice a day 24   MedSandra baez, DO   metFORMIN (GLUCOPHAGE-XR) 500 MG extended release tablet Take 1 tablet by mouth daily (with breakfast)  Patient not taking: Reported on 3/12/2024 12/4/23   MedChristiano baeziam, DO   levocetirizine (XYZAL) 5 MG tablet TAKE 1 TABLET BY MOUTH EVERY DAY 23   Sandra Roberson, DO   atorvastatin (LIPITOR) 10 MG tablet Take 1 tablet by mouth daily 23   Sandra Roberson DO   FLUoxetine (PROZAC) 20 MG capsule  23   Provider, MD Desmond   memantine (NAMENDA) 10 MG tablet Take 1 tablet by mouth

## 2024-04-25 LAB — SURGICAL PATHOLOGY REPORT: NORMAL

## 2024-04-26 NOTE — TELEPHONE ENCOUNTER
History of Present Illness





71-year-old male came in with shortness of breath progressively worsening along 

with orthopnea proximal nocturnal dyspnea patient therapy given severe 

congestive heart failure with elevated BNP and pulmonary edema on x-ray patient 

was started on IV Lasix patient has been evaluated troponins but stable at 0.8 

and BNP of 2400 patient appears to have some ST-T wave changes on EKG patient 

was then taken to Cath Lab found to have LAD and circumflex severe stenosis.  

Patient will undergo stenting of these blood vessels








REVIEW OF SYSTEMS: 


CONSTITUTIONAL: No fever, no malaise, no fatigue. 


HEENT: No recent visual problems or hearing problems. Denied any sore throat. 


CARDIOVASCULAR: No chest pain, no palpitations, no syncope. 


PULMONARY: , no hemoptysis. 


GASTROINTESTINAL: No diarrhea, no nausea, no vomiting, no abdominal pain. 


NEUROLOGICAL: No headaches, no weakness, no numbness. 


HEMATOLOGICAL: Denies any bleeding or petechiae. 


GENITOURINARY: Denies any burning micturition, frequency, or urgency. 


MUSCULOSKELETAL/RHEUMATOLOGICAL: Denies any joint pain, swelling, or any muscle 

pain. 


ENDOCRINE: Denies any polyuria or polydipsia. 





The rest of the 14-point review of systems is negative.











PHYSICAL EXAMINATION: 





GENERAL: The patient is alert and oriented x3, not in any acute distress. Well 

developed, well nourished. 


HEENT: Pupils are round and equally reacting to light. EOMI. No scleral icterus.

No conjunctival pallor. Normocephalic, atraumatic. No pharyngeal erythema. No 

thyromegaly. 


CARDIOVASCULAR: S1 and S2 present. No murmurs, rubs, or gallops. 


PULMONARY: Chest is clear to auscultation, no wheezing or crackles. 


ABDOMEN: Soft, nontender, nondistended, normoactive bowel sounds. No palpable 

organomegaly. 


MUSCULOSKELETAL: No joint swelling or deformity.


EXTREMITIES: No cyanosis, clubbing, or pedal edema. 


NEUROLOGICAL: Gross neurological examination did not reveal any focal deficits. 


SKIN: No rashes. 


Assessment and plan


Congestive heart failure patient appears to have acute systolic dysfunction with

pulm edema and acute exacerbation patient will be started on IV Lasix.


-Ischemic cardiomyopathy


-Acute non-ST elevation MI for which patient undergo cardiac catheterization


-Hypertension


DVT prophylaxis: Patient is presently on IV heparin





Past Medical History


Past Medical History: Hyperlipidemia, Hypertension


History of Any Multi-Drug Resistant Organisms: None Reported


Additional Past Surgical History / Comment(s): rt kidney removal


Past Psychological History: No Psychological Hx Reported


Smoking Status: Never smoker


Past Alcohol Use History: Occasional


Past Drug Use History: None Reported





Medications and Allergies


                                Home Medications











 Medication  Instructions  Recorded  Confirmed  Type


 


Cholecalciferol [Vitamin D3 (25 25 mcg PO DAILY 04/25/24 04/25/24 History





Mcg = 1000 Iu)]    


 


Simvastatin [Zocor] 20 mg PO DAILY 04/25/24 04/25/24 History


 


Vit C/E/Zn/Coppr/Lutein/Zeaxan 1 cap PO DAILY 04/25/24 04/25/24 History





[Preservision Areds 2 Softgel]    


 


amLODIPine [Norvasc] 10 mg PO DAILY 04/25/24 04/25/24 History


 


lisinopriL [Zestril] 20 mg PO DAILY 04/25/24 04/25/24 History








                                    Allergies











Allergy/AdvReac Type Severity Reaction Status Date / Time


 


No Known Allergies Allergy   Verified 04/25/24 19:53














Physical Exam


Vitals: 


                                   Vital Signs











  Temp Pulse Pulse Resp BP BP Pulse Ox


 


 04/26/24 12:47    64  16   133/81  95


 


 04/26/24 12:18    69  16   132/83  96


 


 04/26/24 12:03    68  16   139/61  95


 


 04/26/24 11:48    71  16   146/86  95


 


 04/26/24 11:33    62  16   136/65  96


 


 04/26/24 09:10  98.2 F  80   18  146/96   98


 


 04/26/24 08:21   77   18  138/95   95


 


 04/26/24 06:00   97   19  139/97   96


 


 04/26/24 05:00   61   16  141/93   97


 


 04/26/24 04:00   61   17  128/93   96


 


 04/26/24 03:00   60   18  140/98   97


 


 04/26/24 02:00   67   18  132/84   97


 


 04/26/24 01:00   70   18  144/91   95


 


 04/26/24 00:00   86   19  157/97   97


 


 04/25/24 23:00   84   18  156/101   96


 


 04/25/24 22:00   68   18  156/107   98


 


 04/25/24 21:00   95   18  171/116   97


 


 04/25/24 19:56   80   18  169/120   97


 


 04/25/24 18:34   87   18  181/120   98


 


 04/25/24 16:42  97.9 F  75   16  179/117   99








                                Intake and Output











 04/25/24 04/26/24 04/26/24





 22:59 06:59 14:59


 


Intake Total  84.193 100


 


Output Total   800


 


Balance  84.193 -700


 


Intake:   


 


  IV   100


 


  Intake, IV Titration  84.193 





  Amount   


 


    Heparin Sod,Pork in 0.45%  84.193 





    NaCl 25,000 unit In 0.45   





    % NaCl 1 250ml.bag @ 11.   





    63 UNITS/KG/HR 10.023 mls   





    /hr IV .Q24H [facility] Rx#:   





    [id number]   


 


Output:   


 


  Urine   800


 


Other:   


 


  Weight 86.183 kg  














Results


CBC & Chem 7: 


                                 04/26/24 03:49





                                 04/26/24 03:49


Labs: 


                  Abnormal Lab Results - Last 24 Hours (Table)











  04/25/24 04/25/24 04/25/24 Range/Units





  19:20 19:20 23:19 


 


APTT     (22.0-30.0)  sec


 


Chloride  108 H    ()  mmol/L


 


BUN     (9-20)  mg/dL


 


Total Bilirubin  2.0 H    (0.2-1.3)  mg/dL


 


Troponin I   0.823 H*  0.832 H*  (0.000-0.034)  ng/mL


 


Total Protein     (6.3-8.2)  g/dL














  04/26/24 04/26/24 04/26/24 Range/Units





  03:49 03:49 03:49 


 


APTT    33.9 H  (22.0-30.0)  sec


 


Chloride     ()  mmol/L


 


BUN   23 H   (9-20)  mg/dL


 


Total Bilirubin   1.6 H   (0.2-1.3)  mg/dL


 


Troponin I  0.820 H*    (0.000-0.034)  ng/mL


 


Total Protein   6.2 L   (6.3-8.2)  g/dL Last OV 11/27/2018    Health Maintenance   Topic Date Due    Shingles Vaccine (1 of 2) 09/14/2011    Colon cancer screen colonoscopy  09/14/2011    A1C test (Diabetic or Prediabetic)  09/14/2019    Lipid screen  12/07/2022    DTaP/Tdap/Td vaccine (2 - Td) 08/02/2026    Flu vaccine  Completed    Pneumococcal 0-64 years Vaccine  Completed    Hepatitis C screen  Completed    HIV screen  Completed             (applicable per patient's age: Cancer Screenings, Depression Screening, Fall Risk Screening, Immunizations)    Hemoglobin A1C (%)   Date Value   09/14/2018 5.7   04/26/2017 6.1 (H)   10/18/2016 6.8     Microalb/Crt.  Ratio (mcg/mg creat)   Date Value   04/26/2017 17 (H)     AST (U/L)   Date Value   08/07/2017 25     ALT (U/L)   Date Value   08/07/2017 37     BUN (mg/dL)   Date Value   11/08/2018 13      (goal A1C is < 7)   (goal LDL is <100) need 30-50% reduction from baseline     BP Readings from Last 3 Encounters:   12/03/18 (!) 138/91   11/27/18 128/84   11/21/18 110/84    (goal /80)      All Future Testing planned in [facility]:  Lab Frequency Next Occurrence   Comprehensive Metabolic Panel Once 01/35/0880   Lipid Panel Once 08/10/2018   Hepatic Function Panel Once 08/10/2018   Magnesium Once 08/10/2018   CBC Auto Differential Once 08/10/2018   Lipid Panel Once 09/07/2018   Magnesium Once 09/07/2018   ALT Once 09/07/2018   CBC Auto Differential Once 09/07/2018   MRA Head WO Contrast Once 10/09/2019   Lipid Panel Once 11/21/2018   ALT Once 83/97/3468   Basic Metabolic Panel Once 30/64/6519   CBC Auto Differential Once 11/21/2018   Magnesium Once 11/21/2018       Next Visit Date:  Future Appointments   Date Time Provider Colleen Popei   6/19/2019 12:40 Luis Lacy MD [email or web address]   6/26/2019  9:40 AM Diane Coleman MD Neuro Spec [email or web address]   10/10/2019  1:00 PM Reji Miller MD Neuro Endo [facility]            Patient Active Problem List:     Hypertension     Anxiety and depression     Attention deficit disorder     Left ventricular hypertrophy     Low back pain     Fatigue     Microalbuminuria     Memory loss     Memory difficulties     Right inguinal hernia     Elevated hemoglobin A1c     Dysesthesia     Cerebral aneurysm, nonruptured     Dyslipidemia     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

## 2024-05-07 ENCOUNTER — HOSPITAL ENCOUNTER (OUTPATIENT)
Dept: CT IMAGING | Age: 64
Discharge: HOME | End: 2024-05-09
Attending: INTERNAL MEDICINE
Payer: MEDICARE

## 2024-05-07 ENCOUNTER — HOSPITAL ENCOUNTER (OUTPATIENT)
Dept: MRI IMAGING | Age: 64
Discharge: HOME | End: 2024-05-09
Attending: PSYCHIATRY & NEUROLOGY
Payer: MEDICARE

## 2024-05-07 DIAGNOSIS — R63.4 WEIGHT LOSS: ICD-10-CM

## 2024-05-07 DIAGNOSIS — I67.1 ANEURYSM OF LEFT INTERNAL CAROTID ARTERY: ICD-10-CM

## 2024-05-07 LAB
EGFR, POC: 85 ML/MIN/1.73M2
POC CREATININE: 1 MG/DL (ref 0.51–1.19)

## 2024-05-07 PROCEDURE — 74177 CT ABD & PELVIS W/CONTRAST: CPT

## 2024-05-07 PROCEDURE — 2580000003 HC RX 258: Performed by: INTERNAL MEDICINE

## 2024-05-07 PROCEDURE — 70544 MR ANGIOGRAPHY HEAD W/O DYE: CPT

## 2024-05-07 PROCEDURE — 82565 ASSAY OF CREATININE: CPT

## 2024-05-07 PROCEDURE — 2500000003 HC RX 250 WO HCPCS: Performed by: INTERNAL MEDICINE

## 2024-05-07 PROCEDURE — 6360000004 HC RX CONTRAST MEDICATION: Performed by: INTERNAL MEDICINE

## 2024-05-07 RX ORDER — SODIUM CHLORIDE 0.9 % (FLUSH) 0.9 %
10 SYRINGE (ML) INJECTION PRN
Status: DISCONTINUED | OUTPATIENT
Start: 2024-05-07 | End: 2024-05-10 | Stop reason: HOSPADM

## 2024-05-07 RX ORDER — 0.9 % SODIUM CHLORIDE 0.9 %
100 INTRAVENOUS SOLUTION INTRAVENOUS ONCE
Status: COMPLETED | OUTPATIENT
Start: 2024-05-07 | End: 2024-05-07

## 2024-05-07 RX ADMIN — BARIUM SULFATE 450 ML: 20 SUSPENSION ORAL at 19:22

## 2024-05-07 RX ADMIN — SODIUM CHLORIDE 100 ML: 9 INJECTION, SOLUTION INTRAVENOUS at 19:22

## 2024-05-07 RX ADMIN — IOPAMIDOL 75 ML: 755 INJECTION, SOLUTION INTRAVENOUS at 19:22

## 2024-05-07 RX ADMIN — SODIUM CHLORIDE, PRESERVATIVE FREE 10 ML: 5 INJECTION INTRAVENOUS at 19:22

## 2024-05-07 NOTE — TELEPHONE ENCOUNTER
Pharmacy requesting refill of traZODone (DESYREL) 100 MG tablet      Medication active on med list yes      Date of last Rx: 2/21/2024 with 2 refills          verified by ALBERTO FLORES      Date of last appointment 09/14/2023    Next Visit Date:  5/31/2024      Pharmacy requesting refill of memantine (NAMENDA) 10 MG tablet      Medication active on med list yes      Date of last Rx: 09/14/2023 with 1 refills          verified by ALBERTO FLORES      Date of last appointment 09/14/2023    Next Visit Date:  5/31/2024

## 2024-05-08 RX ORDER — TRAZODONE HYDROCHLORIDE 100 MG/1
TABLET ORAL
Qty: 60 TABLET | Refills: 5 | Status: SHIPPED | OUTPATIENT
Start: 2024-05-08

## 2024-05-08 RX ORDER — MEMANTINE HYDROCHLORIDE 10 MG/1
10 TABLET ORAL 2 TIMES DAILY
Qty: 60 TABLET | Refills: 5 | Status: SHIPPED | OUTPATIENT
Start: 2024-05-08

## 2024-05-17 NOTE — TELEPHONE ENCOUNTER
Writer spoke with pt to schedule repeat colonoscopy. Pt said he is OK to go to [facility] again and is OK with any day of the week.    Pt completed previous procedure with SuPrep Split-Bowel Prep. Pt takes Aspirin daily; instructed to stop 1 day prior to procedure.     Procedure scheduled/Dr Yo  Procedure: Colonoscopy (Diagnostic/EMR)  Dx: Polyp of ascending colon, unspecified type  Date: Friday 07/19/24  Time: 9:30 am/Arrive at 7:30 am  Hospital: [facility]; Surgery Entrance, back of hospital  PAT Phone Call: Monday 07/08/24 at 11:00 am  Bowel Prep instructions given: SuPrep 2-Day Bowel Prep  In office/via phone: via phone  Clearance needed: N/A    [facility] notified procedure is with adult scope and EMR.    Pt said he just sold is house. His home address on file will be until 07/01/24. Writer told pt she will send prep instructions out next week so he has them prior to his move.

## 2024-05-20 RX ORDER — SODIUM, POTASSIUM,MAG SULFATES 17.5-3.13G
SOLUTION, RECONSTITUTED, ORAL ORAL
Qty: 2 EACH | Refills: 0 | Status: SHIPPED | OUTPATIENT
Start: 2024-05-20

## 2024-05-31 ENCOUNTER — OFFICE VISIT (OUTPATIENT)
Dept: NEUROLOGY | Age: 64
End: 2024-05-31
Payer: MEDICARE

## 2024-05-31 VITALS
BODY MASS INDEX: 33.68 KG/M2 | HEART RATE: 77 BPM | DIASTOLIC BLOOD PRESSURE: 83 MMHG | SYSTOLIC BLOOD PRESSURE: 116 MMHG | HEIGHT: 71 IN | WEIGHT: 240.6 LBS

## 2024-05-31 DIAGNOSIS — M54.40 CHRONIC LOW BACK PAIN WITH SCIATICA, SCIATICA LATERALITY UNSPECIFIED, UNSPECIFIED BACK PAIN LATERALITY: ICD-10-CM

## 2024-05-31 DIAGNOSIS — R41.3 MEMORY LOSS: ICD-10-CM

## 2024-05-31 DIAGNOSIS — G43.009 MIGRAINE WITHOUT AURA AND WITHOUT STATUS MIGRAINOSUS, NOT INTRACTABLE: Primary | ICD-10-CM

## 2024-05-31 DIAGNOSIS — G89.29 CHRONIC LOW BACK PAIN WITH SCIATICA, SCIATICA LATERALITY UNSPECIFIED, UNSPECIFIED BACK PAIN LATERALITY: ICD-10-CM

## 2024-05-31 DIAGNOSIS — F32.A DEPRESSION, UNSPECIFIED DEPRESSION TYPE: ICD-10-CM

## 2024-05-31 DIAGNOSIS — G47.33 OBSTRUCTIVE SLEEP APNEA SYNDROME: ICD-10-CM

## 2024-05-31 PROCEDURE — 3074F SYST BP LT 130 MM HG: CPT | Performed by: PSYCHIATRY & NEUROLOGY

## 2024-05-31 PROCEDURE — 3079F DIAST BP 80-89 MM HG: CPT | Performed by: PSYCHIATRY & NEUROLOGY

## 2024-05-31 PROCEDURE — G8427 DOCREV CUR MEDS BY ELIG CLIN: HCPCS | Performed by: PSYCHIATRY & NEUROLOGY

## 2024-05-31 PROCEDURE — 4004F PT TOBACCO SCREEN RCVD TLK: CPT | Performed by: PSYCHIATRY & NEUROLOGY

## 2024-05-31 PROCEDURE — G8417 CALC BMI ABV UP PARAM F/U: HCPCS | Performed by: PSYCHIATRY & NEUROLOGY

## 2024-05-31 PROCEDURE — 99214 OFFICE O/P EST MOD 30 MIN: CPT | Performed by: PSYCHIATRY & NEUROLOGY

## 2024-05-31 PROCEDURE — 3017F COLORECTAL CA SCREEN DOC REV: CPT | Performed by: PSYCHIATRY & NEUROLOGY

## 2024-05-31 RX ORDER — METHOCARBAMOL 500 MG/1
500 TABLET, FILM COATED ORAL 2 TIMES DAILY PRN
COMMUNITY
Start: 2024-05-08

## 2024-05-31 RX ORDER — SUMATRIPTAN 100 MG/1
TABLET, FILM COATED ORAL
Qty: 18 TABLET | Refills: 5 | Status: SHIPPED | OUTPATIENT
Start: 2024-05-31

## 2024-05-31 ASSESSMENT — ENCOUNTER SYMPTOMS
EYES NEGATIVE: 1
ALLERGIC/IMMUNOLOGIC NEGATIVE: 1
RESPIRATORY NEGATIVE: 1
GASTROINTESTINAL NEGATIVE: 1

## 2024-05-31 NOTE — PROGRESS NOTES
speech normal . No aphasia   Cranial nerve 2 normal acuety and visual fields  Cranial nerve 3, 4 and 6 .Extraocular muscles are intact . Pupils are equal and reactive   Cranial nerve 5 . Intact corneal reflex. Normal facial sensation  Cranial nerve 7 normal exam   Cranial nerve 8. Grossly intact hearing   Cranial nerve 9 and 10. Symmetric palate elevation   Cranial nerve 11 , 5 out of 5 strength   Cranial Nerve 12 midline tongue . No atrophy  Sensation .Decreasedd pinprick and light touch distal lower extremities   Deep Tendon Reflexes normal  Plantar response flexor bilaterally    Assessment:       Diagnosis Orders   1. Migraine without aura and without status migrainosus, not intractable        2. Obstructive sleep apnea syndrome        3. Depression, unspecified depression type        4. Memory loss        5. Chronic low back pain with sciatica, sciatica laterality unspecified, unspecified back pain laterality          He is to remain current regimen      Plan:     As above

## 2024-06-18 ENCOUNTER — OFFICE VISIT (OUTPATIENT)
Dept: GASTROENTEROLOGY | Age: 64
End: 2024-06-18
Payer: MEDICARE

## 2024-06-18 VITALS
TEMPERATURE: 98.1 F | HEIGHT: 71 IN | SYSTOLIC BLOOD PRESSURE: 146 MMHG | HEART RATE: 74 BPM | DIASTOLIC BLOOD PRESSURE: 84 MMHG | RESPIRATION RATE: 20 BRPM | BODY MASS INDEX: 33.18 KG/M2 | WEIGHT: 237 LBS

## 2024-06-18 DIAGNOSIS — F11.90 OPIOID USE: ICD-10-CM

## 2024-06-18 DIAGNOSIS — K59.00 CONSTIPATION, UNSPECIFIED CONSTIPATION TYPE: Primary | ICD-10-CM

## 2024-06-18 DIAGNOSIS — D12.6 COLON ADENOMA: ICD-10-CM

## 2024-06-18 PROCEDURE — G8417 CALC BMI ABV UP PARAM F/U: HCPCS | Performed by: INTERNAL MEDICINE

## 2024-06-18 PROCEDURE — G8427 DOCREV CUR MEDS BY ELIG CLIN: HCPCS | Performed by: INTERNAL MEDICINE

## 2024-06-18 PROCEDURE — 99214 OFFICE O/P EST MOD 30 MIN: CPT | Performed by: INTERNAL MEDICINE

## 2024-06-18 PROCEDURE — 3079F DIAST BP 80-89 MM HG: CPT | Performed by: INTERNAL MEDICINE

## 2024-06-18 PROCEDURE — 3017F COLORECTAL CA SCREEN DOC REV: CPT | Performed by: INTERNAL MEDICINE

## 2024-06-18 PROCEDURE — 3077F SYST BP >= 140 MM HG: CPT | Performed by: INTERNAL MEDICINE

## 2024-06-18 PROCEDURE — 4004F PT TOBACCO SCREEN RCVD TLK: CPT | Performed by: INTERNAL MEDICINE

## 2024-06-18 PROCEDURE — G2211 COMPLEX E/M VISIT ADD ON: HCPCS | Performed by: INTERNAL MEDICINE

## 2024-06-18 RX ORDER — POLYETHYLENE GLYCOL 3350 17 G/17G
17 POWDER, FOR SOLUTION ORAL 2 TIMES DAILY
Qty: 1530 G | Refills: 1 | Status: SHIPPED | OUTPATIENT
Start: 2024-06-18 | End: 2024-07-18

## 2024-06-18 ASSESSMENT — ENCOUNTER SYMPTOMS
ABDOMINAL DISTENTION: 0
VOMITING: 0
BLOOD IN STOOL: 0
COLOR CHANGE: 0
VOICE CHANGE: 0
CHOKING: 0
ANAL BLEEDING: 0
RECTAL PAIN: 0
DIARRHEA: 0
SORE THROAT: 0
COUGH: 0
WHEEZING: 0
BACK PAIN: 1
ABDOMINAL PAIN: 1
NAUSEA: 0
CONSTIPATION: 0
TROUBLE SWALLOWING: 0

## 2024-06-18 NOTE — PROGRESS NOTES
PND.  Chest:   No cough, phlegm or wheezing.  Abdomen:      Detailed by MA   Neuro:  No focal weakness, abnormal movements or seizure like activity.  Skin:   No rashes, no itching.  :   No hematuria, no pyuria, no dysuria, no flank pain.  Extremities:  No swelling or joint pains.  ROS was otherwise negative    Review of Systems   Constitutional:  Positive for appetite change, fatigue and unexpected weight change.   HENT:  Negative for sore throat, trouble swallowing and voice change.    Respiratory:  Negative for cough, choking and wheezing.    Cardiovascular:  Negative for chest pain, palpitations and leg swelling.   Gastrointestinal:  Positive for abdominal pain (LLQ close to groin worse with laying down or setting long periods jabbing pain intermittently). Negative for abdominal distention, anal bleeding, blood in stool (dark blood intermittently), constipation, diarrhea, nausea, rectal pain and vomiting.        Excess gas   Musculoskeletal:  Positive for back pain.   Skin:  Negative for color change.   Allergic/Immunologic: Negative for food allergies.   Neurological:  Negative for dizziness, light-headedness and headaches.   Hematological:  Does not bruise/bleed easily.       PHYSICAL EXAMINATION: Vital signs reviewed per the nursing documentation.     Ht 1.791 m (5' 10.51\")   BMI 34.02 kg/m²   Body mass index is 34.02 kg/m².   Physical Exam    Physical Exam   Constitutional: Patient is oriented to person, place, and time. Patient appears well-developed and well-nourished.   HENT:   Head: Normocephalic and atraumatic.   Eyes: Pupils are equal, round, and reactive to light. EOM are normal.   Neck: Normal range of motion. Neck supple. No JVD present. No tracheal deviation present. No thyromegaly present.   Cardiovascular: Normal rate, regular rhythm, normal heart sounds and intact distal pulses.   Pulmonary/Chest: Effort normal and breath sounds normal. No stridor. No respiratory distress. He has no wheezes.

## 2024-07-03 ENCOUNTER — TELEPHONE (OUTPATIENT)
Dept: GASTROENTEROLOGY | Age: 64
End: 2024-07-03

## 2024-07-03 ENCOUNTER — OFFICE VISIT (OUTPATIENT)
Dept: PRIMARY CARE CLINIC | Age: 64
End: 2024-07-03
Payer: COMMERCIAL

## 2024-07-03 VITALS
WEIGHT: 232 LBS | DIASTOLIC BLOOD PRESSURE: 80 MMHG | SYSTOLIC BLOOD PRESSURE: 118 MMHG | OXYGEN SATURATION: 98 % | BODY MASS INDEX: 32.81 KG/M2 | HEART RATE: 71 BPM

## 2024-07-03 DIAGNOSIS — Z00.00 MEDICARE ANNUAL WELLNESS VISIT, SUBSEQUENT: Primary | ICD-10-CM

## 2024-07-03 DIAGNOSIS — M54.42 CHRONIC BILATERAL LOW BACK PAIN WITH BILATERAL SCIATICA: ICD-10-CM

## 2024-07-03 DIAGNOSIS — Z79.4 TYPE 2 DIABETES MELLITUS WITHOUT COMPLICATION, WITH LONG-TERM CURRENT USE OF INSULIN (HCC): ICD-10-CM

## 2024-07-03 DIAGNOSIS — E11.9 TYPE 2 DIABETES MELLITUS WITHOUT COMPLICATION, WITH LONG-TERM CURRENT USE OF INSULIN (HCC): ICD-10-CM

## 2024-07-03 DIAGNOSIS — I73.9 CLAUDICATION (HCC): ICD-10-CM

## 2024-07-03 DIAGNOSIS — M54.41 CHRONIC BILATERAL LOW BACK PAIN WITH BILATERAL SCIATICA: ICD-10-CM

## 2024-07-03 DIAGNOSIS — I20.89 OTHER FORMS OF ANGINA PECTORIS (HCC): ICD-10-CM

## 2024-07-03 DIAGNOSIS — G89.29 CHRONIC BILATERAL LOW BACK PAIN WITH BILATERAL SCIATICA: ICD-10-CM

## 2024-07-03 DIAGNOSIS — F48.1 DEPERSONALIZATION (HCC): ICD-10-CM

## 2024-07-03 LAB
CREATININE URINE POCT: 300
HBA1C MFR BLD: 5.5 %
MICROALBUMIN/CREAT 24H UR: 30 MG/DL
MICROALBUMIN/CREAT UR-RTO: <30 MG/G

## 2024-07-03 PROCEDURE — 3079F DIAST BP 80-89 MM HG: CPT | Performed by: FAMILY MEDICINE

## 2024-07-03 PROCEDURE — 3017F COLORECTAL CA SCREEN DOC REV: CPT | Performed by: FAMILY MEDICINE

## 2024-07-03 PROCEDURE — 3044F HG A1C LEVEL LT 7.0%: CPT | Performed by: FAMILY MEDICINE

## 2024-07-03 PROCEDURE — 3074F SYST BP LT 130 MM HG: CPT | Performed by: FAMILY MEDICINE

## 2024-07-03 PROCEDURE — 82044 UR ALBUMIN SEMIQUANTITATIVE: CPT | Performed by: FAMILY MEDICINE

## 2024-07-03 PROCEDURE — G0439 PPPS, SUBSEQ VISIT: HCPCS | Performed by: FAMILY MEDICINE

## 2024-07-03 PROCEDURE — 83036 HEMOGLOBIN GLYCOSYLATED A1C: CPT | Performed by: FAMILY MEDICINE

## 2024-07-03 SDOH — ECONOMIC STABILITY: FOOD INSECURITY: WITHIN THE PAST 12 MONTHS, YOU WORRIED THAT YOUR FOOD WOULD RUN OUT BEFORE YOU GOT MONEY TO BUY MORE.: NEVER TRUE

## 2024-07-03 SDOH — ECONOMIC STABILITY: FOOD INSECURITY: WITHIN THE PAST 12 MONTHS, THE FOOD YOU BOUGHT JUST DIDN'T LAST AND YOU DIDN'T HAVE MONEY TO GET MORE.: NEVER TRUE

## 2024-07-03 SDOH — ECONOMIC STABILITY: INCOME INSECURITY: HOW HARD IS IT FOR YOU TO PAY FOR THE VERY BASICS LIKE FOOD, HOUSING, MEDICAL CARE, AND HEATING?: NOT HARD AT ALL

## 2024-07-03 ASSESSMENT — PATIENT HEALTH QUESTIONNAIRE - PHQ9
3. TROUBLE FALLING OR STAYING ASLEEP: NOT AT ALL
9. THOUGHTS THAT YOU WOULD BE BETTER OFF DEAD, OR OF HURTING YOURSELF: NOT AT ALL
10. IF YOU CHECKED OFF ANY PROBLEMS, HOW DIFFICULT HAVE THESE PROBLEMS MADE IT FOR YOU TO DO YOUR WORK, TAKE CARE OF THINGS AT HOME, OR GET ALONG WITH OTHER PEOPLE: NOT DIFFICULT AT ALL
SUM OF ALL RESPONSES TO PHQ9 QUESTIONS 1 & 2: 2
8. MOVING OR SPEAKING SO SLOWLY THAT OTHER PEOPLE COULD HAVE NOTICED. OR THE OPPOSITE, BEING SO FIGETY OR RESTLESS THAT YOU HAVE BEEN MOVING AROUND A LOT MORE THAN USUAL: NOT AT ALL
6. FEELING BAD ABOUT YOURSELF - OR THAT YOU ARE A FAILURE OR HAVE LET YOURSELF OR YOUR FAMILY DOWN: NOT AT ALL
SUM OF ALL RESPONSES TO PHQ QUESTIONS 1-9: 2
SUM OF ALL RESPONSES TO PHQ QUESTIONS 1-9: 2
7. TROUBLE CONCENTRATING ON THINGS, SUCH AS READING THE NEWSPAPER OR WATCHING TELEVISION: NOT AT ALL
1. LITTLE INTEREST OR PLEASURE IN DOING THINGS: SEVERAL DAYS
2. FEELING DOWN, DEPRESSED OR HOPELESS: SEVERAL DAYS
4. FEELING TIRED OR HAVING LITTLE ENERGY: NOT AT ALL
SUM OF ALL RESPONSES TO PHQ QUESTIONS 1-9: 2
SUM OF ALL RESPONSES TO PHQ QUESTIONS 1-9: 2
5. POOR APPETITE OR OVEREATING: NOT AT ALL

## 2024-07-03 ASSESSMENT — LIFESTYLE VARIABLES
HOW MANY STANDARD DRINKS CONTAINING ALCOHOL DO YOU HAVE ON A TYPICAL DAY: PATIENT DOES NOT DRINK
HOW OFTEN DO YOU HAVE A DRINK CONTAINING ALCOHOL: NEVER

## 2024-07-03 NOTE — PATIENT INSTRUCTIONS
arms.     Lightheadedness or sudden weakness.     A fast or irregular heartbeat.   After you call 911, the  may tell you to chew 1 adult-strength or 2 to 4 low-dose aspirin. Wait for an ambulance. Do not try to drive yourself.  Watch closely for changes in your health, and be sure to contact your doctor if you have any problems.  Where can you learn more?  Go to https://www.[employer].net/patientEd and enter F075 to learn more about \"A Healthy Heart: Care Instructions.\"  Current as of: June 24, 2023  Content Version: 14.1  © 8263-2425 [employer].   Care instructions adapted under license by [employer]. If you have questions about a medical condition or this instruction, always ask your healthcare professional. [employer] disclaims any warranty or liability for your use of this information.      Personalized Preventive Plan for Tash Mora - 7/3/2024  Medicare offers a range of preventive health benefits. Some of the tests and screenings are paid in full while other may be subject to a deductible, co-insurance, and/or copay.    Some of these benefits include a comprehensive review of your medical history including lifestyle, illnesses that may run in your family, and various assessments and screenings as appropriate.    After reviewing your medical record and screening and assessments performed today your provider may have ordered immunizations, labs, imaging, and/or referrals for you.  A list of these orders (if applicable) as well as your Preventive Care list are included within your After Visit Summary for your review.    Other Preventive Recommendations:    A preventive eye exam performed by an eye specialist is recommended every 1-2 years to screen for glaucoma; cataracts, macular degeneration, and other eye disorders.  A preventive dental visit is recommended every 6 months.  Try to get at least 150 minutes of exercise per week or 10,000 steps per day on a

## 2024-07-03 NOTE — PROGRESS NOTES
Medicare Annual Wellness Visit    Tash Mora is here for Diabetes (A1C, dm foot, dm micro), Medicare AWV (Needs handicap placard), Health Maintenance (HCC), and Abdominal Pain (Pain on left side of abd, ongoing for awhile, spoke with Dr Yo about it but was not discussed, is a sharp jabbing pain that goes through to back)    Assessment & Plan   Medicare annual wellness visit, subsequent  Type 2 diabetes mellitus without complication, with long-term current use of insulin ([facility])- A1c 5.5, continue healthy diet.   -     POCT glycosylated hemoglobin (Hb A1C)  -      DIABETES FOOT EXAM  -     POCT microalbumin  Chronic bilateral low back pain with bilateral sciatica- following with pain management.  -     Handicap Placard MISC; Starting Wed 7/3/2024, Disp-1 each, R-0, Print5 years  Depersonalization (HCC)- hx of this years ago, has been doing well .   Claudication (HCC)- follows with pain management  Other forms of angina pectoris (HCC)- stable.    Recommendations for Preventive Services Due: see orders and patient instructions/AVS.  Recommended screening schedule for the next 5-10 years is provided to the patient in written form: see Patient Instructions/AVS.     Return in about 3 months (around 10/3/2024) for diabetes follow up.     Subjective       Patient's complete Health Risk Assessment and screening values have been reviewed and are found in Flowsheets. The following problems were reviewed today and where indicated follow up appointments were made and/or referrals ordered.    Has cut back on carbs, eating healthy.  Had colonoscopy and will be getting repeat on coming up.   Chronic back pain, follows with pain management.     Positive Risk Factor Screenings with Interventions:       Cognitive:   Clock Drawing Test (CDT): Normal  Words recalled: 0 Words Recalled  Total Score: (!) 2  Total Score Interpretation: Abnormal Mini-Cog  Interventions:  Following with neurology.        Controlled Medication

## 2024-07-08 ENCOUNTER — HOSPITAL ENCOUNTER (OUTPATIENT)
Dept: PREADMISSION TESTING | Age: 64
Discharge: HOME | End: 2024-07-12

## 2024-07-08 VITALS — WEIGHT: 236 LBS | BODY MASS INDEX: 33.04 KG/M2 | HEIGHT: 71 IN

## 2024-07-08 NOTE — PROGRESS NOTES
surgery.    After surgery, you will be taken to the recovery area.  When you are alert and stable, you will receive instructions and be prepared for discharge.     INSTRUCTIONS REVIEWED WITH SHEILA, UNDERSTANDING VERBALIZED.  COLONOSCOPY W/ENDOSCOPIC MUCOSAL RESECTION, ADULT SCOPE- 07/19/2024

## 2024-07-09 DIAGNOSIS — M25.562 PAIN IN BOTH KNEES, UNSPECIFIED CHRONICITY: ICD-10-CM

## 2024-07-09 DIAGNOSIS — I10 ESSENTIAL HYPERTENSION: ICD-10-CM

## 2024-07-09 DIAGNOSIS — M25.561 PAIN IN BOTH KNEES, UNSPECIFIED CHRONICITY: ICD-10-CM

## 2024-07-09 RX ORDER — AMLODIPINE BESYLATE 10 MG/1
TABLET ORAL
Qty: 90 TABLET | Refills: 1 | Status: SHIPPED | OUTPATIENT
Start: 2024-07-09

## 2024-07-09 RX ORDER — MELOXICAM 15 MG/1
15 TABLET ORAL DAILY
Qty: 28 TABLET | Refills: 4 | Status: SHIPPED | OUTPATIENT
Start: 2024-07-09

## 2024-07-09 NOTE — TELEPHONE ENCOUNTER
Last fill 8/16/23 #90 3 refills  Last seen 7/08/22  new patient appointment    Would you like her to schedule an appointment

## 2024-07-13 RX ORDER — FLUTICASONE PROPIONATE 50 MCG
SPRAY, SUSPENSION (ML) NASAL
Qty: 3 EACH | Refills: 1 | Status: SHIPPED | OUTPATIENT
Start: 2024-07-13

## 2024-07-17 NOTE — PRE-PROCEDURE INSTRUCTIONS
No answer, left message ? -YES                            Unable to leave message ?    When were you told to arrive at hospital ?  -0730    Do you have a  ?    Are you on any blood thinners ?                     If yes when did you stop taking ?    Do you have your prep Rx filled and instruction ?      Nothing to eat the day before , only clear liquids.    Are you experiencing any covid symptoms ?     Do you have any infections or rash we should be aware of ?      Do you have the Hibiclens soap to use the night before and the morning of surgery ?    Nothing to eat or drink after midnight, only a sip of water to take any medication instructed to take the night before.  Wear comfortable clothing, leave any valuables at home, remove any jewelry and body piercing .

## 2024-07-18 ENCOUNTER — ANESTHESIA EVENT (OUTPATIENT)
Dept: ENDOSCOPY | Age: 64
End: 2024-07-18
Payer: COMMERCIAL

## 2024-07-19 ENCOUNTER — HOSPITAL ENCOUNTER (OUTPATIENT)
Age: 64
Setting detail: OUTPATIENT SURGERY
Discharge: HOME | End: 2024-07-19
Attending: INTERNAL MEDICINE | Admitting: INTERNAL MEDICINE
Payer: COMMERCIAL

## 2024-07-19 ENCOUNTER — ANESTHESIA (OUTPATIENT)
Dept: ENDOSCOPY | Age: 64
End: 2024-07-19
Payer: COMMERCIAL

## 2024-07-19 VITALS
SYSTOLIC BLOOD PRESSURE: 117 MMHG | DIASTOLIC BLOOD PRESSURE: 86 MMHG | HEIGHT: 71 IN | RESPIRATION RATE: 21 BRPM | BODY MASS INDEX: 33.04 KG/M2 | HEART RATE: 62 BPM | WEIGHT: 236 LBS | TEMPERATURE: 98.4 F | OXYGEN SATURATION: 93 %

## 2024-07-19 DIAGNOSIS — K63.5 POLYP OF ASCENDING COLON, UNSPECIFIED TYPE: ICD-10-CM

## 2024-07-19 PROCEDURE — 2500000003 HC RX 250 WO HCPCS

## 2024-07-19 PROCEDURE — 2500000003 HC RX 250 WO HCPCS: Performed by: ANESTHESIOLOGY

## 2024-07-19 PROCEDURE — 2580000003 HC RX 258: Performed by: ANESTHESIOLOGY

## 2024-07-19 PROCEDURE — 2709999900 HC NON-CHARGEABLE SUPPLY: Performed by: INTERNAL MEDICINE

## 2024-07-19 PROCEDURE — 88342 IMHCHEM/IMCYTCHM 1ST ANTB: CPT

## 2024-07-19 PROCEDURE — 3700000001 HC ADD 15 MINUTES (ANESTHESIA): Performed by: INTERNAL MEDICINE

## 2024-07-19 PROCEDURE — 88341 IMHCHEM/IMCYTCHM EA ADD ANTB: CPT

## 2024-07-19 PROCEDURE — 6360000002 HC RX W HCPCS

## 2024-07-19 PROCEDURE — 2720000010 HC SURG SUPPLY STERILE: Performed by: INTERNAL MEDICINE

## 2024-07-19 PROCEDURE — 7100000010 HC PHASE II RECOVERY - FIRST 15 MIN: Performed by: INTERNAL MEDICINE

## 2024-07-19 PROCEDURE — 88305 TISSUE EXAM BY PATHOLOGIST: CPT

## 2024-07-19 PROCEDURE — 7100000011 HC PHASE II RECOVERY - ADDTL 15 MIN: Performed by: INTERNAL MEDICINE

## 2024-07-19 PROCEDURE — 3609010200 HC COLONOSCOPY ABLATION TUMOR POLYP/OTHER LES: Performed by: INTERNAL MEDICINE

## 2024-07-19 PROCEDURE — 3700000000 HC ANESTHESIA ATTENDED CARE: Performed by: INTERNAL MEDICINE

## 2024-07-19 DEVICE — WORKING LENGTH 235CM, WORKING CHANNEL 2.8MM
Type: IMPLANTABLE DEVICE | Site: RECTUM | Status: FUNCTIONAL
Brand: RESOLUTION 360 CLIP

## 2024-07-19 DEVICE — CLIP
Type: IMPLANTABLE DEVICE | Site: RECTUM | Status: FUNCTIONAL
Brand: MANTIS™ CLIP

## 2024-07-19 RX ORDER — SODIUM CHLORIDE 0.9 % (FLUSH) 0.9 %
5-40 SYRINGE (ML) INJECTION EVERY 12 HOURS SCHEDULED
Status: DISCONTINUED | OUTPATIENT
Start: 2024-07-19 | End: 2024-07-19 | Stop reason: HOSPADM

## 2024-07-19 RX ORDER — LIDOCAINE HYDROCHLORIDE 10 MG/ML
1 INJECTION, SOLUTION EPIDURAL; INFILTRATION; INTRACAUDAL; PERINEURAL
Status: COMPLETED | OUTPATIENT
Start: 2024-07-19 | End: 2024-07-19

## 2024-07-19 RX ORDER — SODIUM CHLORIDE 0.9 % (FLUSH) 0.9 %
5-40 SYRINGE (ML) INJECTION PRN
Status: DISCONTINUED | OUTPATIENT
Start: 2024-07-19 | End: 2024-07-19 | Stop reason: HOSPADM

## 2024-07-19 RX ORDER — LIDOCAINE HYDROCHLORIDE 10 MG/ML
INJECTION, SOLUTION EPIDURAL; INFILTRATION; INTRACAUDAL; PERINEURAL PRN
Status: DISCONTINUED | OUTPATIENT
Start: 2024-07-19 | End: 2024-07-19 | Stop reason: SDUPTHER

## 2024-07-19 RX ORDER — SODIUM CHLORIDE 9 MG/ML
INJECTION, SOLUTION INTRAVENOUS PRN
Status: DISCONTINUED | OUTPATIENT
Start: 2024-07-19 | End: 2024-07-19 | Stop reason: HOSPADM

## 2024-07-19 RX ORDER — PROPOFOL 10 MG/ML
INJECTION, EMULSION INTRAVENOUS PRN
Status: DISCONTINUED | OUTPATIENT
Start: 2024-07-19 | End: 2024-07-19 | Stop reason: SDUPTHER

## 2024-07-19 RX ORDER — SODIUM CHLORIDE, SODIUM LACTATE, POTASSIUM CHLORIDE, CALCIUM CHLORIDE 600; 310; 30; 20 MG/100ML; MG/100ML; MG/100ML; MG/100ML
INJECTION, SOLUTION INTRAVENOUS CONTINUOUS
Status: DISCONTINUED | OUTPATIENT
Start: 2024-07-19 | End: 2024-07-19 | Stop reason: HOSPADM

## 2024-07-19 RX ADMIN — SODIUM CHLORIDE, POTASSIUM CHLORIDE, SODIUM LACTATE AND CALCIUM CHLORIDE: 600; 310; 30; 20 INJECTION, SOLUTION INTRAVENOUS at 07:51

## 2024-07-19 RX ADMIN — LIDOCAINE HYDROCHLORIDE 40 MG: 10 INJECTION, SOLUTION EPIDURAL; INFILTRATION; INTRACAUDAL; PERINEURAL at 09:33

## 2024-07-19 RX ADMIN — PROPOFOL 50 MG: 10 INJECTION, EMULSION INTRAVENOUS at 09:33

## 2024-07-19 RX ADMIN — LIDOCAINE HYDROCHLORIDE 1 ML: 10 INJECTION, SOLUTION EPIDURAL; INFILTRATION; INTRACAUDAL; PERINEURAL at 07:51

## 2024-07-19 RX ADMIN — PROPOFOL 125 MCG/KG/MIN: 10 INJECTION, EMULSION INTRAVENOUS at 09:34

## 2024-07-19 ASSESSMENT — ENCOUNTER SYMPTOMS
GASTROINTESTINAL NEGATIVE: 1
RESPIRATORY NEGATIVE: 1

## 2024-07-19 ASSESSMENT — PAIN - FUNCTIONAL ASSESSMENT
PAIN_FUNCTIONAL_ASSESSMENT: 0-10
PAIN_FUNCTIONAL_ASSESSMENT: NONE - DENIES PAIN

## 2024-07-19 NOTE — H&P
HISTORY and PHYSICAL  [facility]       NAME:  Tash Mora  MRN: [id number]   YOB: 1960   Date: 7/19/2024   Age: 63 y.o.  Gender: male       COMPLAINT AND PRESENT HISTORY:     Tash Mora is 63 y.o.,  male, presents for COLONOSCOPY DIAGNOSTIC W/ENDOSCOPIC MUCOSAL RESECTION, ADULT SCOPE     Primary dx: Polyp of ascending colon, unspecified type [K63.5].  HPI:  Tash Mora is 63 y.o.,   male, having a Diagnostic Colonoscopy.  Prior Colonoscopy was done  4/22/24 with polyp removed.   Patient has hx of Colon Polyps    Patient has positive FH of Colon Cancer in his father and sister  Patient reports no changes in bowel habits. No GI /Rectal bleeding, experiencing red/ black/ BRBPR stools.    Patient has intermittent RLQ stabbing pain with sitting for long time. No nausea or vomiting. He lost 15 pounds over the last two months due to loss of appetite. .  Patient denies any Dysphagia.  Pt has no hx of GERD .  Pt has no fever/chills, chest pain or SOB.  Prep fully completed: yes . Pt reports his last BM is clear liquid     Test completed r/t condition :  Colonoscopy  4/22/24  Findings:   The bowel prep was extremely poor with large amount of solid and liquid stool throughout.  At 3 cm semipedunculated polyp noted in the ascending colon.  Due to the amount of stool present, resection not attempted today.  A tattoo was placed right next to the area for identification later.  A 1 cm polyp noted in the sigmoid colon that was removed with hot snare.  Retroflexion examination in the rectum with moderate internal hemorrhoids.     Specimen Removed: Sigmoid colon polyp     Endoscopic Impression:    Poor bowel prep.  3 cm semipedunculated polyp that was not removed due to extremely poor prep.  1 cm polyp in sigmoid colon that was removed with hot snare.  Moderate internal hemorrhoids     Recommendations:  Follow pathology results.  The patient will need a repeat colonoscopy  09/20/2013    Low back pain 09/20/2013    Hypertension 06/17/2013    Anxiety and depression 06/17/2013    Attention deficit disorder 06/17/2013           ASHLEY Regan CNP on 7/19/2024 at 7:20 AM

## 2024-07-19 NOTE — OP NOTE
Colonoscopy report    Colonoscopy Procedure Note    Procedure:  Colonoscopy with endoscopic mucosal resection    Procedure Date: 7/19/2024    Indications:  Large colon polyp necessitating EMR    Sedation:  MAC    Attending Physician:  Dr. Mario Yo MD.     Assistant Physician: None    Consent:   Informed consent was obtained for the procedure after explaining the risks including bleeding, perforation, aspiration, arrhythmia, risks related to sedation, reaction to medications and rarely death, benefits and alternatives to the patient. The patient verbalized understanding and agreed to proceed with the procedure.       Procedure Details:  The patient was placed in the left lateral decubitus position.  Oxygen and cardiac monitoring equipment was attached. The patient's vital signs were monitored continuously  throughout the procedure. After appropriate sedation was achieved, a rectal examination was performed.  The colonoscope was inserted into the rectum and advanced under direct vision to the cecum, which was identified by the ileocecal valve and appendiceal orifice.  The quality of the colonic preparation was poor.  A careful inspection was made as the colonoscope was withdrawn, including a retroflexed view of the rectum; findings and interventions are described below.  Appropriate photodocumentation was obtained.           Complications:  None    Estimated blood loss:  Minimal           Disposition:  Home           Condition: stable    Withdrawal Time: 19 minutes    Findings:   The bowel prep was fair to poor.  The previously noted Semipedunculated polyp that measured 3 cm was identified along with a tattoo close to the area. Preparations were made for endoscopic mucosal resection.  The polyp was lifted with Eleview and adequate lift was achieved.  The surrounding mucosa was demarcated.  Using a large stiff hot snare, the polyp was resected en bloc. Post resection, the margins were ablated with soft tissue  coag using snare tip. There was questionable residual tissue at the base measuring 1 mm. Biopsy forceps was used and the area started bleeding profusely suggesting this was likely a vessel. Hemostasis was achieved with placement of 2 mantis clips. Additional 3 resolution clips were placed to close the defect.   A tattoo was placed for identification later.  The polyp pieces were retrieved using Castillo net.  The remainder of the colonic exam was limited due to presence of liquid stool.  Retroflexion exam with moderate internal hemorrhoids.     Specimen Removed: Transverse colon polyp EMR    Endoscopic Impression:    Bowel prep fair to poor.  Semipedunculated 3 cm polyp that was resected en bloc with EMR technique. Bleeding encountered that was controlled with clip applications. Total 5 clips placed (2 mantis and 3 resolution) for hemostasis as well as defect closure. Tattoo placed.  Moderate internal hemorrhoids.    Recommendations:  Follow pathology results  Repeat colonoscopy in 6 months to assess the polypectomy site  Use 2-3 day prep and adult scope.    Attending Attestation: I performed the procedure.    Mario Yo MD

## 2024-07-19 NOTE — DISCHARGE INSTRUCTIONS
Repeat colonoscopy in 6 months to assess the polypectomy site  Use 2-3 day prep and adult scope.     Colonoscopy: What to Expect at Home  Your Recovery  After you have a colonoscopy, you will stay at the clinic for 1 to 2 hours until the medicines wear off. Then you can go home, but you will need to arrange for a ride. Your doctor will tell you when you can eat and do your other usual activities.  Your doctor will talk to you about when you will need your next colonoscopy. The results of your test and your risk for colorectal cancer will help your doctor decide how often you need to be checked.  After the test, you may be bloated or have gas pains. You may need to pass gas. If a biopsy was done or a polyp was removed, you may have streaks of blood in your stool (feces) for a few days.  This care sheet gives you a general idea about how long it will take for you to recover. But each person recovers at a different pace. Follow the steps below to get better as quickly as possible.  How can you care for yourself at home?  Activity  Rest as much as you need to after you go home.  You should be able to go back to your usual activities the day after the test.  Diet  Follow your doctor’s directions for eating.  Drink plenty of fluids (unless your doctor has told you not to) to replace the fluids that were lost during the colon prep.  Do not drink alcohol.  Medicines  If polyps were removed or a biopsy was done during the test, your doctor may tell you not to take aspirin or other anti-inflammatory medicines, such as ibuprofen (Advil, Motrin) and naproxen (Aleve), for a few days.  Other instructions  For your safety, you should not drive or operate machinery until the medicine effects are gone and you can think clearly. Your doctor may tell you not to drive or operate machinery until the day after your test.  Do not sign legal documents or make major decisions until the medicine effects are gone and you can think clearly.  The anesthesia medicine can make it hard for you to fully understand what you are agreeing to.  Follow-up care is a key part of your treatment and safety. Be sure to make and go to all appointments, and call your doctor if you are having problems. It's also a good idea to know your test results and keep a list of the medicines you take.    Call your Doctor if you have any of the following:             Passing blood rectally or vomiting blood (it may be red or black).      Persistent nausea or vomiting.      Severe abdominal or chest pain, not relieved by passing gas.      Fever of 100 or more, chills or excessive sweating.      Redness or swelling at the IV site.     If you experience shortness of breath or severe chest pain, call 911.           Where can you learn more?   Go to [email or web address] and sign in to your [employer] account. Enter E264 in the Search Health Information box to learn more about “Colonoscopy: What to Expect at Home.”    If you do not have an account, please click on the “Sign Up Now” link.     © 4864-9544 [employer]. Care instructions adapted under license by [employer]. This care instruction is for use with your licensed healthcare professional. If you have questions about a medical condition or this instruction, always ask your healthcare professional. [employer] disclaims any warranty or liability for your use of this information.  Content Version: 9.9.860245; Last Revised: February 20, 2013    Sedation or General Anesthesia, Adult  Care After  Refer to this sheet in the next 24 hours. These instructions provide you with information on caring for yourself after your procedure. Your caregiver may also give you more specific instructions. Your treatment has been planned according to current medical practices, but problems sometimes occur. Call your caregiver if you have any problems or questions after your procedure.   HOME CARE

## 2024-07-19 NOTE — ANESTHESIA POSTPROCEDURE EVALUATION
Department of Anesthesiology  Postprocedure Note    Patient: Tash Mora  MRN: [id number]  YOB: 1960  Date of evaluation: 7/19/2024    Procedure Summary       Date: 07/19/24 Room / Location: [place] [address] / [facility]    Anesthesia Start: 0929 Anesthesia Stop: 1018    Procedure: COLONOSCOPY POLYPECTOMY REMOVAL SNARE/BIOPSY WITH MANTUS CLIP APPLICATION X2 AT TRANSVERSE COLON RESOLUTION CLIP APPLICATION X3 AND TATTOOING AT TRANSVERSE COLON (Rectum) Diagnosis:       Polyp of ascending colon, unspecified type      (Polyp of ascending colon, unspecified type [K63.5])    Surgeons: Mario Yo MD Responsible Provider: Aly Ambrosio MD    Anesthesia Type: general, TIVA ASA Status: 3            Anesthesia Type: No value filed.    Ale Phase I:      Ale Phase II: Ale Score: 10    Anesthesia Post Evaluation    Comments: POST- ANESTHESIA EVALUATION       Pt Name: Tash Mora  MRN: [id number]  YOB: 1960  Date of evaluation: 7/19/2024  Time:  1:51 PM      /86   Pulse 62   Temp 98.4 °F (36.9 °C)   Resp 21   Ht 1.803 m (5' 11\")   Wt 107 kg (236 lb)   SpO2 93%   BMI 32.92 kg/m²      Consciousness Level  Awake  Cardiopulmonary Status  Stable  Pain Adequately Treated YES  Nausea / Vomiting  NO  Adequate Hydration  YES  Anesthesia Related Complications NONE      Electronically signed by Aly Ambrosio MD on 7/19/2024 at 1:51 PM           No notable events documented.

## 2024-07-19 NOTE — ANESTHESIA PRE PROCEDURE
Department of Anesthesiology  Preprocedure Note       Name:  Tash Mora   Age:  63 y.o.  :  1960                                          MRN:  [id number]         Date:  2024      Surgeon: Surgeon(s):  Mario Yo MD    Procedure: Procedure(s):  COLONOSCOPY DIAGNOSTIC W/ENDOSCOPIC MUCOSAL RESECTION, ADULT SCOPE    Medications prior to admission:   Prior to Admission medications    Medication Sig Start Date End Date Taking? Authorizing Provider   fluticasone (FLONASE) 50 MCG/ACT nasal spray instill 2 sprays into each nostril once daily 24   MedSandra baez DO   meloxicam (MOBIC) 15 MG tablet TAKE 1 TABLET BY MOUTH DAILY 24   Osbaldo Franklin MD   amLODIPine (NORVASC) 10 MG tablet TAKE 1 TABLET BY MOUTH EVERY DAY 24   MedSandra baez DO   Handicap Placard MISC by Does not apply route 5 years 7/3/24   MedSandra baez DO   methocarbamol (ROBAXIN) 500 MG tablet Take 1 tablet by mouth 2 times daily as needed 24   Provider, MD Desmond   SUMAtriptan (IMITREX) 100 MG tablet Take one at HA onset . May repeat 1 hour . No more 2 per day 4 days out of the week 24   Octavio Toscano MD   memantine (NAMENDA) 10 MG tablet TAKE 1 TABLET BY MOUTH TWICE A DAY 24   Octavio Toscano MD   traZODone (DESYREL) 100 MG tablet TAKE 2 TABLETS BY MOUTH EVERY NIGHT AT BEDTIME 24   Octavio Toscano MD   topiramate (TOPAMAX) 50 MG tablet TAKE 1 TABLET BY MOUTH EVERY MORNING & TAKE 2 TABLETS BY MOUTH EVERY NIGHT AT BEDTIME 3/21/24   Octavio Toscano MD   fluticasone-salmeterol (ADVAIR) 100-50 MCG/ACT AEPB diskus inhaler Inhale 1 puff into the lungs in the morning and 1 puff in the evening. 3/11/24   Sandra Roberson DO   carvedilol (COREG) 25 MG tablet take 1 tablet by mouth twice a day 24   Sandra Roberson DO   levocetirizine (XYZAL) 5 MG tablet TAKE 1 TABLET BY MOUTH EVERY DAY 23   Sandra Roberson DO   atorvastatin (LIPITOR) 10 MG tablet Take 1 tablet

## 2024-07-23 LAB — SURGICAL PATHOLOGY REPORT: NORMAL

## 2024-07-24 ENCOUNTER — TELEPHONE (OUTPATIENT)
Dept: GASTROENTEROLOGY | Age: 64
End: 2024-07-24

## 2024-07-24 NOTE — TELEPHONE ENCOUNTER
Writer called pt, no answer. Left voicemail message to call office back.    Pt has a 3 month f/u on Tuesday 09/17/24 at 9:30. Pt's f/u needs to be moved up for a colonoscopy f/u. Pt had his procedure on 07/19/24.     When pt returns call, need to see if pt can come in to [address] on Friday 07/26/24.

## 2024-07-25 NOTE — TELEPHONE ENCOUNTER
Writer spoke with pt and scheduled a colonoscopy f/u for tomorrow, Friday 07/26/24 at 9:45 am, [address].

## 2024-07-26 ENCOUNTER — OFFICE VISIT (OUTPATIENT)
Dept: GASTROENTEROLOGY | Age: 64
End: 2024-07-26
Payer: COMMERCIAL

## 2024-07-26 VITALS
OXYGEN SATURATION: 99 % | SYSTOLIC BLOOD PRESSURE: 111 MMHG | WEIGHT: 234 LBS | TEMPERATURE: 98 F | RESPIRATION RATE: 19 BRPM | HEIGHT: 71 IN | BODY MASS INDEX: 32.76 KG/M2 | HEART RATE: 76 BPM | DIASTOLIC BLOOD PRESSURE: 79 MMHG

## 2024-07-26 DIAGNOSIS — C18.9 COLON ADENOCARCINOMA (HCC): Primary | ICD-10-CM

## 2024-07-26 DIAGNOSIS — D12.6 COLON ADENOMA: ICD-10-CM

## 2024-07-26 DIAGNOSIS — K59.00 CONSTIPATION, UNSPECIFIED CONSTIPATION TYPE: ICD-10-CM

## 2024-07-26 PROCEDURE — 4004F PT TOBACCO SCREEN RCVD TLK: CPT | Performed by: INTERNAL MEDICINE

## 2024-07-26 PROCEDURE — G2211 COMPLEX E/M VISIT ADD ON: HCPCS | Performed by: INTERNAL MEDICINE

## 2024-07-26 PROCEDURE — 99214 OFFICE O/P EST MOD 30 MIN: CPT | Performed by: INTERNAL MEDICINE

## 2024-07-26 PROCEDURE — G8427 DOCREV CUR MEDS BY ELIG CLIN: HCPCS | Performed by: INTERNAL MEDICINE

## 2024-07-26 PROCEDURE — 3078F DIAST BP <80 MM HG: CPT | Performed by: INTERNAL MEDICINE

## 2024-07-26 PROCEDURE — 3017F COLORECTAL CA SCREEN DOC REV: CPT | Performed by: INTERNAL MEDICINE

## 2024-07-26 PROCEDURE — G8417 CALC BMI ABV UP PARAM F/U: HCPCS | Performed by: INTERNAL MEDICINE

## 2024-07-26 PROCEDURE — 3074F SYST BP LT 130 MM HG: CPT | Performed by: INTERNAL MEDICINE

## 2024-07-26 ASSESSMENT — ENCOUNTER SYMPTOMS
ANAL BLEEDING: 0
BACK PAIN: 1
NAUSEA: 0
WHEEZING: 0
DIARRHEA: 0
COUGH: 0
ABDOMINAL PAIN: 1
VOMITING: 0
VOICE CHANGE: 0
BLOOD IN STOOL: 0
ABDOMINAL DISTENTION: 0
TROUBLE SWALLOWING: 0
RECTAL PAIN: 0
SORE THROAT: 0
CONSTIPATION: 0
CHOKING: 0
COLOR CHANGE: 0

## 2024-07-26 NOTE — PROGRESS NOTES
Reason for Referral:   Blood in stool    No referring provider defined for this encounter.    Chief Complaint   Patient presents with    Follow-up     Colonoscopy           HISTORY OF PRESENT ILLNESS: Mr.Guadalupe JODI Mora is a 63 y.o. male with a past history remarkable for ADHD, arthritis, asthma, coronary artery disease, depression, diabetes mellitus, hyperlipidemia, hypertension, sleep apnea, referred for evaluation of blood in stool.  The patient reports that back in October he had multiple episodes of dark maroon-colored stool ongoing for several days.  He was also losing significant weight.  He used to be 290 pounds and then he went down to 240 pounds.  He has gained some of it back and is currently 255 pounds.  He does report that he tried to cut down on meals to achieve some weight loss.  He was also a significant alcohol drinker before.  He is not actively drinking currently.  He has alternating bowel habits.    Interval history 6/18/2024:  Had a recent colonoscopy that was poor prep.  Noted to have a large polyp that was not resected due to suboptimal prep.  Does take Percocet for back pain 3 times a day and has constipation.  Describes this as hard stool and goes every 3 days.    Interval history 7/26/2024:  Presents after repeat colonoscopy.  The large polyp was removed with EMR.  The biopsy came back as colon adenocarcinoma.  The patient denies any active symptoms today.      Previous Endoscopies    Colonoscopy 7/19/2024:  Endoscopic Impression:    Bowel prep fair to poor.  Semipedunculated 3 cm polyp that was resected en bloc with EMR technique. Bleeding encountered that was controlled with clip applications. Total 5 clips placed (2 mantis and 3 resolution) for hemostasis as well as defect closure. Tattoo placed.  Moderate internal hemorrhoids.  Transverse colon, polyp, EMR:   -Adenocarcinoma arising in a tubulovillous adenoma.   -Carcinoma invades submucosa of the stalk.   -Negative for

## 2024-07-29 ENCOUNTER — TELEPHONE (OUTPATIENT)
Dept: GASTROENTEROLOGY | Age: 64
End: 2024-07-29

## 2024-07-29 ENCOUNTER — TELEPHONE (OUTPATIENT)
Dept: PRIMARY CARE CLINIC | Age: 64
End: 2024-07-29

## 2024-07-29 NOTE — TELEPHONE ENCOUNTER
Patient called into office stating he had an appointment on Friday with Dr. Yo and was diagnosed with colon cancer. Patient states he was in complete shock and did not get the chance to ask any of the questions he would like to ask. Patient is wondering if PCP knows what stage cancer he has? Writer reviewed pt chart and seen that patient is also waiting to hear back from GI to discuss his questions. Patient states he already has the referrals to start managing. Writer informed patient that a message would be sent to PCP.

## 2024-07-29 NOTE — TELEPHONE ENCOUNTER
Per Dr Yo message : It would be an early stage and the good news was it was removed. The oncologist and colorectal surgeon will determine if further treatment is needed for this

## 2024-07-29 NOTE — TELEPHONE ENCOUNTER
Pt called in requesting a call back regarding his recent diagnosis. He advised he went into shock and could not process everything, he also advised he is still experiencing pain in his side. Please call back to advise.

## 2024-08-11 ENCOUNTER — APPOINTMENT (OUTPATIENT)
Dept: CT IMAGING | Age: 64
End: 2024-08-11
Payer: COMMERCIAL

## 2024-08-11 ENCOUNTER — HOSPITAL ENCOUNTER (EMERGENCY)
Age: 64
Discharge: HOME | End: 2024-08-11
Attending: EMERGENCY MEDICINE
Payer: COMMERCIAL

## 2024-08-11 VITALS
TEMPERATURE: 97.8 F | DIASTOLIC BLOOD PRESSURE: 99 MMHG | HEIGHT: 70 IN | BODY MASS INDEX: 32.5 KG/M2 | OXYGEN SATURATION: 97 % | SYSTOLIC BLOOD PRESSURE: 117 MMHG | WEIGHT: 227 LBS | HEART RATE: 68 BPM | RESPIRATION RATE: 14 BRPM

## 2024-08-11 DIAGNOSIS — G89.3 CANCER ASSOCIATED PAIN: Primary | ICD-10-CM

## 2024-08-11 DIAGNOSIS — R10.9 CHRONIC ABDOMINAL PAIN: ICD-10-CM

## 2024-08-11 DIAGNOSIS — G89.29 CHRONIC ABDOMINAL PAIN: ICD-10-CM

## 2024-08-11 LAB
ALBUMIN SERPL-MCNC: 4 G/DL (ref 3.5–5.2)
ALP SERPL-CCNC: 98 U/L (ref 40–129)
ALT SERPL-CCNC: 8 U/L (ref 5–41)
ANION GAP SERPL CALCULATED.3IONS-SCNC: 11 MMOL/L (ref 9–17)
AST SERPL-CCNC: 9 U/L
BACTERIA URNS QL MICRO: ABNORMAL
BASOPHILS # BLD: 0 K/UL (ref 0–0.2)
BASOPHILS NFR BLD: 1 % (ref 0–2)
BILIRUB SERPL-MCNC: 0.6 MG/DL (ref 0.3–1.2)
BILIRUB UR QL STRIP: NEGATIVE
BUN SERPL-MCNC: 13 MG/DL (ref 8–23)
CALCIUM SERPL-MCNC: 9.5 MG/DL (ref 8.6–10.4)
CASTS #/AREA URNS LPF: ABNORMAL /LPF
CHLORIDE SERPL-SCNC: 110 MMOL/L (ref 98–107)
CLARITY UR: CLEAR
CO2 SERPL-SCNC: 21 MMOL/L (ref 20–31)
COLOR UR: YELLOW
CREAT SERPL-MCNC: 1 MG/DL (ref 0.7–1.2)
EOSINOPHIL # BLD: 0.2 K/UL (ref 0–0.4)
EOSINOPHILS RELATIVE PERCENT: 4 % (ref 0–4)
EPI CELLS #/AREA URNS HPF: ABNORMAL /HPF
ERYTHROCYTE [DISTWIDTH] IN BLOOD BY AUTOMATED COUNT: 14.1 % (ref 11.5–14.9)
GFR, ESTIMATED: 85 ML/MIN/1.73M2
GLUCOSE SERPL-MCNC: 105 MG/DL (ref 70–99)
GLUCOSE UR STRIP-MCNC: NEGATIVE MG/DL
HCT VFR BLD AUTO: 45.3 % (ref 41–53)
HGB BLD-MCNC: 15.3 G/DL (ref 13.5–17.5)
HGB UR QL STRIP.AUTO: NEGATIVE
INR PPP: 1.1
KETONES UR STRIP-MCNC: ABNORMAL MG/DL
LEUKOCYTE ESTERASE UR QL STRIP: NEGATIVE
LYMPHOCYTES NFR BLD: 1.9 K/UL (ref 1–4.8)
LYMPHOCYTES RELATIVE PERCENT: 33 % (ref 24–44)
MAGNESIUM SERPL-MCNC: 2 MG/DL (ref 1.6–2.6)
MCH RBC QN AUTO: 31.1 PG (ref 26–34)
MCHC RBC AUTO-ENTMCNC: 33.7 G/DL (ref 31–37)
MCV RBC AUTO: 92.3 FL (ref 80–100)
MONOCYTES NFR BLD: 0.4 K/UL (ref 0.1–1.3)
MONOCYTES NFR BLD: 7 % (ref 1–7)
NEUTROPHILS NFR BLD: 55 % (ref 36–66)
NEUTS SEG NFR BLD: 3.1 K/UL (ref 1.3–9.1)
NITRITE UR QL STRIP: NEGATIVE
PARTIAL THROMBOPLASTIN TIME: 26.7 SEC (ref 24–36)
PH UR STRIP: 6 [PH] (ref 5–8)
PLATELET # BLD AUTO: 153 K/UL (ref 150–450)
PMV BLD AUTO: 8.7 FL (ref 6–12)
POTASSIUM SERPL-SCNC: 3.7 MMOL/L (ref 3.7–5.3)
PROT SERPL-MCNC: 7 G/DL (ref 6.4–8.3)
PROT UR STRIP-MCNC: ABNORMAL MG/DL
PROTHROMBIN TIME: 14.3 SEC (ref 11.8–14.6)
RBC # BLD AUTO: 4.91 M/UL (ref 4.5–5.9)
RBC #/AREA URNS HPF: ABNORMAL /HPF
SODIUM SERPL-SCNC: 142 MMOL/L (ref 135–144)
SP GR UR STRIP: 1.02 (ref 1–1.03)
SPECIMEN SOURCE: NORMAL
STREP A, MOLECULAR: NEGATIVE
UROBILINOGEN UR STRIP-ACNC: NORMAL EU/DL (ref 0–1)
WBC #/AREA URNS HPF: ABNORMAL /HPF
WBC OTHER # BLD: 5.7 K/UL (ref 3.5–11)

## 2024-08-11 PROCEDURE — 6360000002 HC RX W HCPCS: Performed by: EMERGENCY MEDICINE

## 2024-08-11 PROCEDURE — 2580000003 HC RX 258: Performed by: EMERGENCY MEDICINE

## 2024-08-11 PROCEDURE — 74177 CT ABD & PELVIS W/CONTRAST: CPT

## 2024-08-11 PROCEDURE — 99285 EMERGENCY DEPT VISIT HI MDM: CPT

## 2024-08-11 PROCEDURE — 83735 ASSAY OF MAGNESIUM: CPT

## 2024-08-11 PROCEDURE — 87651 STREP A DNA AMP PROBE: CPT

## 2024-08-11 PROCEDURE — 81001 URINALYSIS AUTO W/SCOPE: CPT

## 2024-08-11 PROCEDURE — 80053 COMPREHEN METABOLIC PANEL: CPT

## 2024-08-11 PROCEDURE — 85610 PROTHROMBIN TIME: CPT

## 2024-08-11 PROCEDURE — 96374 THER/PROPH/DIAG INJ IV PUSH: CPT

## 2024-08-11 PROCEDURE — 85025 COMPLETE CBC W/AUTO DIFF WBC: CPT

## 2024-08-11 PROCEDURE — 6360000004 HC RX CONTRAST MEDICATION: Performed by: EMERGENCY MEDICINE

## 2024-08-11 PROCEDURE — 85730 THROMBOPLASTIN TIME PARTIAL: CPT

## 2024-08-11 PROCEDURE — 36415 COLL VENOUS BLD VENIPUNCTURE: CPT

## 2024-08-11 RX ORDER — SODIUM CHLORIDE 0.9 % (FLUSH) 0.9 %
10 SYRINGE (ML) INJECTION PRN
Status: DISCONTINUED | OUTPATIENT
Start: 2024-08-11 | End: 2024-08-11 | Stop reason: HOSPADM

## 2024-08-11 RX ORDER — MORPHINE SULFATE 4 MG/ML
4 INJECTION, SOLUTION INTRAMUSCULAR; INTRAVENOUS ONCE
Status: COMPLETED | OUTPATIENT
Start: 2024-08-11 | End: 2024-08-11

## 2024-08-11 RX ORDER — 0.9 % SODIUM CHLORIDE 0.9 %
100 INTRAVENOUS SOLUTION INTRAVENOUS ONCE
Status: COMPLETED | OUTPATIENT
Start: 2024-08-11 | End: 2024-08-11

## 2024-08-11 RX ADMIN — SODIUM CHLORIDE, PRESERVATIVE FREE 10 ML: 5 INJECTION INTRAVENOUS at 15:31

## 2024-08-11 RX ADMIN — MORPHINE SULFATE 4 MG: 4 INJECTION, SOLUTION INTRAMUSCULAR; INTRAVENOUS at 14:20

## 2024-08-11 RX ADMIN — IOPAMIDOL 75 ML: 755 INJECTION, SOLUTION INTRAVENOUS at 15:30

## 2024-08-11 RX ADMIN — SODIUM CHLORIDE 100 ML: 9 INJECTION, SOLUTION INTRAVENOUS at 15:31

## 2024-08-11 ASSESSMENT — PAIN DESCRIPTION - LOCATION
LOCATION: ABDOMEN
LOCATION_2: THROAT

## 2024-08-11 ASSESSMENT — PAIN SCALES - GENERAL
PAINLEVEL_OUTOF10: 10
PAINLEVEL_OUTOF10: 8
PAINLEVEL_OUTOF10: 0

## 2024-08-11 ASSESSMENT — PAIN DESCRIPTION - DESCRIPTORS
DESCRIPTORS: SHARP
DESCRIPTORS_2: SORE

## 2024-08-11 ASSESSMENT — PAIN DESCRIPTION - PAIN TYPE: TYPE: ACUTE PAIN

## 2024-08-11 ASSESSMENT — PAIN - FUNCTIONAL ASSESSMENT: PAIN_FUNCTIONAL_ASSESSMENT: 0-10

## 2024-08-11 ASSESSMENT — PAIN DESCRIPTION - INTENSITY: RATING_2: 6

## 2024-08-11 ASSESSMENT — PAIN DESCRIPTION - ORIENTATION: ORIENTATION: LEFT

## 2024-08-11 NOTE — ED PROVIDER NOTES
THIS ENCOUNTER:  Orders Placed This Encounter   Medications    morphine injection 4 mg    iopamidol (ISOVUE-370) 76 % injection 75 mL    sodium chloride 0.9 % bolus 100 mL    sodium chloride flush 0.9 % injection 10 mL     DISCHARGE PRESCRIPTIONS:  Discharge Medication List as of 8/11/2024  4:03 PM        PHYSICIAN CONSULTS ORDERED THIS ENCOUNTER:  None  FINAL IMPRESSION      1. Cancer associated pain    2. Chronic abdominal pain          DISPOSITION/PLAN   DISPOSITION Decision To Discharge 08/11/2024 04:03:29 PM      OUTPATIENT FOLLOW UP THE PATIENT:  Sandra Roberson DO  [address] [place], [place] [id number]  [phone]    Schedule an appointment as soon as possible for a visit       [facility] ED  [address] [address]  [phone]  Go to   As needed      DO Fawn Baptiste Mansour Mohamed I, DO  08/11/24 1043

## 2024-08-12 ENCOUNTER — CARE COORDINATION (OUTPATIENT)
Dept: CARE COORDINATION | Age: 64
End: 2024-08-12

## 2024-08-12 NOTE — CARE COORDINATION
Ambulatory Care Coordination Note      8/11/2024  [facility] ED visit; c/o of sore throat and abd pain    Talked to patient, he feels ok. No questions about the discharge summary. Aware if his upcoming provider appointments.    No further needs, concerns or questions for ACM       Future Appointments   Date Time Provider Department Center   8/15/2024  1:00 PM Rajinder Conte MD [facility]   8/26/2024 12:15 PM Sabino Brown MD [facility]   9/17/2024  9:30 AM Mario Yo MD [facility]   10/3/2024 10:15 AM Sandra Roberson DO [facility]   10/9/2024 10:00 AM Benjamín You MD [facility]   10/29/2024  9:15 AM Mario Yo MD [place] [facility]   11/12/2024  9:00 AM Octavio Toscano MD Neuro Spec Neurology -

## 2024-08-15 ENCOUNTER — HOSPITAL ENCOUNTER (OUTPATIENT)
Age: 64
Setting detail: SPECIMEN
Discharge: HOME | End: 2024-08-15

## 2024-08-15 ENCOUNTER — HOSPITAL ENCOUNTER (OUTPATIENT)
Dept: CT IMAGING | Age: 64
Discharge: HOME | End: 2024-08-17
Attending: COLON & RECTAL SURGERY
Payer: COMMERCIAL

## 2024-08-15 ENCOUNTER — OFFICE VISIT (OUTPATIENT)
Dept: SURGERY | Age: 64
End: 2024-08-15
Payer: COMMERCIAL

## 2024-08-15 VITALS — WEIGHT: 223 LBS | HEIGHT: 70 IN | RESPIRATION RATE: 16 BRPM | OXYGEN SATURATION: 100 % | BODY MASS INDEX: 31.92 KG/M2

## 2024-08-15 DIAGNOSIS — C18.2 MALIGNANT NEOPLASM OF ASCENDING COLON (HCC): ICD-10-CM

## 2024-08-15 DIAGNOSIS — C18.2 MALIGNANT NEOPLASM OF ASCENDING COLON (HCC): Primary | ICD-10-CM

## 2024-08-15 LAB — CEA SERPL-MCNC: 1 NG/ML (ref 0–3.8)

## 2024-08-15 PROCEDURE — 6360000004 HC RX CONTRAST MEDICATION: Performed by: COLON & RECTAL SURGERY

## 2024-08-15 PROCEDURE — 71260 CT THORAX DX C+: CPT

## 2024-08-15 PROCEDURE — 4004F PT TOBACCO SCREEN RCVD TLK: CPT | Performed by: COLON & RECTAL SURGERY

## 2024-08-15 PROCEDURE — 3017F COLORECTAL CA SCREEN DOC REV: CPT | Performed by: COLON & RECTAL SURGERY

## 2024-08-15 PROCEDURE — G8417 CALC BMI ABV UP PARAM F/U: HCPCS | Performed by: COLON & RECTAL SURGERY

## 2024-08-15 PROCEDURE — G8427 DOCREV CUR MEDS BY ELIG CLIN: HCPCS | Performed by: COLON & RECTAL SURGERY

## 2024-08-15 PROCEDURE — 99204 OFFICE O/P NEW MOD 45 MIN: CPT | Performed by: COLON & RECTAL SURGERY

## 2024-08-15 PROCEDURE — 2580000003 HC RX 258: Performed by: COLON & RECTAL SURGERY

## 2024-08-15 RX ORDER — 0.9 % SODIUM CHLORIDE 0.9 %
100 INTRAVENOUS SOLUTION INTRAVENOUS ONCE
Status: COMPLETED | OUTPATIENT
Start: 2024-08-15 | End: 2024-08-15

## 2024-08-15 RX ORDER — SODIUM CHLORIDE 0.9 % (FLUSH) 0.9 %
10 SYRINGE (ML) INJECTION PRN
Status: DISCONTINUED | OUTPATIENT
Start: 2024-08-15 | End: 2024-08-18 | Stop reason: HOSPADM

## 2024-08-15 RX ADMIN — IOPAMIDOL 75 ML: 755 INJECTION, SOLUTION INTRAVENOUS at 15:51

## 2024-08-15 RX ADMIN — SODIUM CHLORIDE, PRESERVATIVE FREE 10 ML: 5 INJECTION INTRAVENOUS at 15:51

## 2024-08-15 RX ADMIN — SODIUM CHLORIDE 100 ML: 9 INJECTION, SOLUTION INTRAVENOUS at 15:51

## 2024-08-15 ASSESSMENT — ENCOUNTER SYMPTOMS
APNEA: 0
CONSTIPATION: 0
NAUSEA: 0
DIARRHEA: 1
VOMITING: 0
ABDOMINAL DISTENTION: 1
BACK PAIN: 0
ABDOMINAL PAIN: 1
ANAL BLEEDING: 0
COLOR CHANGE: 0
WHEEZING: 0
BLOOD IN STOOL: 0
COUGH: 1
CHEST TIGHTNESS: 1
RECTAL PAIN: 0
SHORTNESS OF BREATH: 1

## 2024-08-15 NOTE — PROGRESS NOTES
[facility] PHYSICIANS [facility] SURGICAL SPECIALISTS  [address] [facility] [address]  Dept: [phone]    Patient:  Tash Mora  YOB: 1960  Date: 8/15/2024     The patient is a 63 y.o. male who presents today for consult of the following problems:     Chief Complaint: New Patient (Colon adenocarcinoma colonoscopy 7/19/2024 Margins are free )       HPI:   This pleasant 63-year-old gentleman presents today for opinion regarding treatment for a malignant polyp removed at colonoscopy on 7/19/2024.  The margins were free of tumor although the width of the margin has not been specified.  However the tumor had involve the submucosa which usually indicates the risk of spread to lymph nodes is best treated with oncologic colon resection.  Patient complains of left flank pain radiating to the groin for several months which is not possible to correlate clinically with a malignant polyp in the right colon that has been removed.  History:     Past Medical History:   Diagnosis Date    ADHD (attention deficit hyperactivity disorder)     no tx per pt.     Arthritis     knees, hands, back.    Asthma     as child.  pcp manages    Back pain     [facility] pain clinic for back and knees. 1/19/2023 visif    Brain aneurysm     Dr. You at   [facility]  f/u 12/29/2021. No surgery at this time. Monitoring yearly.    CAD (coronary artery disease)     Needs to find an new cardiologist. Dr. Butcher retired. Dr. Butcher [place], [place]  last visit 11/9/2021. Pt. states never had heart cath. MI 2011    Cataract     lt eye   no surgery    Cerebral artery occlusion with cerebral infarction (HCC) 2014    mini stroke went to [facility] and was treated. Dr. Matos [facility]. Last seen summer 2021    Colon cancer (HCC)     Depression     pcp    Diabetes mellitus (HCC)     on rx   A1C high per pt    pcp manages    Headache     Heart attack (HCC) 2012

## 2024-08-26 ENCOUNTER — HOSPITAL ENCOUNTER (EMERGENCY)
Age: 64
Discharge: LEFT BEFORE DISCHARGE | End: 2024-08-26
Payer: COMMERCIAL

## 2024-08-26 ENCOUNTER — TELEPHONE (OUTPATIENT)
Dept: ONCOLOGY | Age: 64
End: 2024-08-26

## 2024-08-26 ENCOUNTER — INITIAL CONSULT (OUTPATIENT)
Dept: ONCOLOGY | Age: 64
End: 2024-08-26
Payer: COMMERCIAL

## 2024-08-26 VITALS
RESPIRATION RATE: 16 BRPM | HEART RATE: 74 BPM | WEIGHT: 233.3 LBS | SYSTOLIC BLOOD PRESSURE: 124 MMHG | DIASTOLIC BLOOD PRESSURE: 80 MMHG | BODY MASS INDEX: 32.66 KG/M2 | HEIGHT: 71 IN | TEMPERATURE: 97 F

## 2024-08-26 VITALS
RESPIRATION RATE: 18 BRPM | TEMPERATURE: 97.7 F | SYSTOLIC BLOOD PRESSURE: 112 MMHG | OXYGEN SATURATION: 97 % | HEART RATE: 69 BPM | DIASTOLIC BLOOD PRESSURE: 91 MMHG

## 2024-08-26 DIAGNOSIS — C18.9 MALIGNANT NEOPLASM OF COLON, UNSPECIFIED PART OF COLON (HCC): Primary | ICD-10-CM

## 2024-08-26 DIAGNOSIS — R89.7 HIGH MICROSATELLITE INSTABILITY IN TISSUE OF NEOPLASM: ICD-10-CM

## 2024-08-26 PROCEDURE — 3079F DIAST BP 80-89 MM HG: CPT | Performed by: INTERNAL MEDICINE

## 2024-08-26 PROCEDURE — 99204 OFFICE O/P NEW MOD 45 MIN: CPT | Performed by: INTERNAL MEDICINE

## 2024-08-26 PROCEDURE — 99202 OFFICE O/P NEW SF 15 MIN: CPT | Performed by: INTERNAL MEDICINE

## 2024-08-26 PROCEDURE — G8417 CALC BMI ABV UP PARAM F/U: HCPCS | Performed by: INTERNAL MEDICINE

## 2024-08-26 PROCEDURE — 3074F SYST BP LT 130 MM HG: CPT | Performed by: INTERNAL MEDICINE

## 2024-08-26 PROCEDURE — 93005 ELECTROCARDIOGRAM TRACING: CPT | Performed by: EMERGENCY MEDICINE

## 2024-08-26 PROCEDURE — G8427 DOCREV CUR MEDS BY ELIG CLIN: HCPCS | Performed by: INTERNAL MEDICINE

## 2024-08-26 PROCEDURE — 4004F PT TOBACCO SCREEN RCVD TLK: CPT | Performed by: INTERNAL MEDICINE

## 2024-08-26 PROCEDURE — 3017F COLORECTAL CA SCREEN DOC REV: CPT | Performed by: INTERNAL MEDICINE

## 2024-08-26 ASSESSMENT — PAIN SCALES - GENERAL: PAINLEVEL_OUTOF10: 3

## 2024-08-26 ASSESSMENT — PAIN - FUNCTIONAL ASSESSMENT: PAIN_FUNCTIONAL_ASSESSMENT: 0-10

## 2024-08-26 NOTE — TELEPHONE ENCOUNTER
SHEILA HERE FOR CONSULT   Refer to cardiology   Rv in 2 months with labs   LABS ORDERED: CBC CMP   CARDIOLOGY: 9/12/24 @ 9:30AM   MD VISIT: 10/21/24 @ 12PM   LABS RPINTED AND GIVEN ON EXIT   AVS PRINTED AND GIVEN ON EXIT

## 2024-08-26 NOTE — PROGRESS NOTES
sided abd and flank pain Additional signs and symptoms: Pt c/o abd pain Relevant Medical/Surgical History: Hx penile implant FINDINGS: Lower Chest: Lung bases are normal.The heart is normal size. Organs: *Liver: Small cysts and too small to characterize low attenuating lesions unchanged. *Spleen: Normal *Kidneys: Left upper pole cyst is present.  No hydronephrosis. *Adrenal Glands: Normal *Pancreas: Normal *Gallbladder and bile ducts: Surgical clips in the gallbladder fossa from prior cholecystectomy.  Mild extrahepatic duct dilation is unchanged compared to May 2024.  Mild intrahepatic duct dilation is new.  Mild pneumobilia in the left lobe is new. *GI/Bowel: No dilated small or large bowel. *Genitourinary: Implantable penile pump is present.  Prostate gland is mildly enlarged. *Urinary Bladder: Normal Vessels: Normal Peritoneum: Normal Retroperitoneum: Normal Lymph nodes: Normal Abdominal wall: Normal Bones: Normal     Mild intrahepatic bile duct dilation is new since the prior study, nonspecific after cholecystectomy.  Correlate with liver function tests. Otherwise, no acute abnormality identified in the abdomen or pelvis.      Impression:  Invasive adenocarcinoma of transverse colon arising in tubulovillous adenoma-7/19/2024  Close resection margin, approximately 1 mm   Mismatch repair deficient tumor  Medical comorbidities: Coronary artery disease hypertension dyslipidemia diabetes mellitus type 2    Plan:  I had a detailed discussion with the patient and personally went over results of lab work-up imaging studies and other relevant clinical data.  Reviewed previous medical records.  Reviewed colonoscopy report.  Reviewed path report.  Explained patient diagnosis in layman's terms.  Patient has a close surgical margin.  He has seen surgery.  Patient wants to proceed with surgical resection which is very appropriate given patient's diagnosis of early-stage colon cancer.  Imaging studies does not show any  evidence of regional or distant metastasis.  CEA within range.  Will see patient back in office after surgical resection to review surgical path report and confirm patient's pathological stage.  Discussed natural history of colon cancer.  Patient has mismatch repair deficient disease.  Will need referral to genetic counselor.  Patient is having mild chest pain on the left side.  Given history of coronary artery disease I think he would benefit from evaluation in the ER.  Patient currently does not have establish care with cardiology.  I will refer patient to cardiology as well.  Refer patient to GI cancer navigator  Return to clinic to review surgical path report and discuss further treatment plan  NCCN guidelines were reviewed and discussed with the patient.  The diagnosis and care plan were discussed with the patient in detail. I discussed the natural history of the disease, prognosis, risks and goals of therapy and answered all the patients questions to the best of my ability.  Patient expressed understanding and was in agreement.    WANDA NAVA MD    This note is created with the assistance of a speech recognition program.  While intending to generate a document that actually reflects the content of the visit, the document can still have some errors including those of syntax and sound a like substitutions which may escape proof reading.  It such instances, actual meaning can be extrapolated by contextual diversion.

## 2024-08-27 ENCOUNTER — TELEPHONE (OUTPATIENT)
Dept: ONCOLOGY | Age: 64
End: 2024-08-27

## 2024-08-27 NOTE — TELEPHONE ENCOUNTER
Left detailed message informing patient that [employer] will be contacting him to schedule genetic counseling/testing. Referral sent to [employer] 8/27/24.

## 2024-08-28 LAB
EKG ATRIAL RATE: 70 BPM
EKG P AXIS: 43 DEGREES
EKG P-R INTERVAL: 156 MS
EKG Q-T INTERVAL: 398 MS
EKG QRS DURATION: 86 MS
EKG QTC CALCULATION (BAZETT): 429 MS
EKG R AXIS: 36 DEGREES
EKG T AXIS: 52 DEGREES
EKG VENTRICULAR RATE: 70 BPM

## 2024-08-29 ENCOUNTER — OFFICE VISIT (OUTPATIENT)
Dept: SURGERY | Age: 64
End: 2024-08-29
Payer: COMMERCIAL

## 2024-08-29 VITALS
SYSTOLIC BLOOD PRESSURE: 104 MMHG | WEIGHT: 235 LBS | BODY MASS INDEX: 32.9 KG/M2 | HEART RATE: 79 BPM | OXYGEN SATURATION: 96 % | HEIGHT: 71 IN | DIASTOLIC BLOOD PRESSURE: 78 MMHG

## 2024-08-29 DIAGNOSIS — C18.2 MALIGNANT NEOPLASM OF ASCENDING COLON (HCC): Primary | ICD-10-CM

## 2024-08-29 PROCEDURE — 3074F SYST BP LT 130 MM HG: CPT | Performed by: COLON & RECTAL SURGERY

## 2024-08-29 PROCEDURE — G8417 CALC BMI ABV UP PARAM F/U: HCPCS | Performed by: COLON & RECTAL SURGERY

## 2024-08-29 PROCEDURE — 3078F DIAST BP <80 MM HG: CPT | Performed by: COLON & RECTAL SURGERY

## 2024-08-29 PROCEDURE — 3017F COLORECTAL CA SCREEN DOC REV: CPT | Performed by: COLON & RECTAL SURGERY

## 2024-08-29 PROCEDURE — 4004F PT TOBACCO SCREEN RCVD TLK: CPT | Performed by: COLON & RECTAL SURGERY

## 2024-08-29 PROCEDURE — 99212 OFFICE O/P EST SF 10 MIN: CPT | Performed by: COLON & RECTAL SURGERY

## 2024-08-29 PROCEDURE — G8427 DOCREV CUR MEDS BY ELIG CLIN: HCPCS | Performed by: COLON & RECTAL SURGERY

## 2024-08-29 ASSESSMENT — ENCOUNTER SYMPTOMS
NAUSEA: 0
SHORTNESS OF BREATH: 0
APNEA: 0
ABDOMINAL DISTENTION: 0
RECTAL PAIN: 0
STRIDOR: 0
COLOR CHANGE: 0
CONSTIPATION: 0
CHEST TIGHTNESS: 0
BACK PAIN: 0
ANAL BLEEDING: 0
ABDOMINAL PAIN: 0
VOMITING: 0
BLOOD IN STOOL: 0
COUGH: 0
DIARRHEA: 0
WHEEZING: 0

## 2024-08-29 NOTE — PATIENT INSTRUCTIONS
1) Complete stress test  2) Schedule another appointment with Dr. Brown    Please make sure you write down any questions/ or concerns so everything can be addressed at the time of appointment.

## 2024-08-29 NOTE — PROGRESS NOTES
[facility] PHYSICIANS [facility] SURGICAL SPECIALISTS  [address] [id number]  Dept: [phone]    Patient:  Tash Mora  YOB: 1960  Date: 8/29/2024     The patient is a 63 y.o. male who presents today for consult of the following problems:     Chief Complaint: Follow-up (Mr. Mora returns to clinic with invasive adenocarcinoma of transverse colon arising in tubulovillous adenoma with close surgical margins)       HPI:     Last Colonoscopy: 07/19/2024 with Dr. Yo  Last Imaging:           CT - 08/11/2024  CEA: 1.0 on 08/15/2024    History:     Past Medical History:   Diagnosis Date    ADHD (attention deficit hyperactivity disorder)     no tx per pt.     Arthritis     knees, hands, back.    Asthma     as child.  pcp manages    Back pain     [facility] pain clinic for back and knees. 1/19/2023 visif    Brain aneurysm     Dr. You at   [facility]  f/u 12/29/2021. No surgery at this time. Monitoring yearly.    CAD (coronary artery disease)     Needs to find an new cardiologist. Dr. Butcher retired. Dr. Butcher [place], [place]  last visit 11/9/2021. Pt. states never had heart cath. MI 2011    Cataract     lt eye   no surgery    Cerebral artery occlusion with cerebral infarction (HCC) 2014    mini stroke went to [facility] and was treated. Dr. Matos [facility]. Last seen summer 2021    Colon cancer (HCC)     Depression     pcp    Diabetes mellitus (HCC)     on rx   A1C high per pt    pcp manages    Headache     Heart attack (HCC) 2012    Dr. Butcher last visit 2021    Pt. states no hx cath    Hyperlipidemia     on rx    Hypertension     pcp manages    Kidney stones     Dr. Michael treats. Pt. states he currently has blood in his urine. Urine CS sent to lab today. No pain per pt.     Memory loss, long term     Pt states he has had it for years and follows w/ his neurologist Dr. Donnelly. Mother has Alzheimers.

## 2024-08-30 ENCOUNTER — TELEPHONE (OUTPATIENT)
Age: 64
End: 2024-08-30

## 2024-08-30 DIAGNOSIS — C18.2 MALIGNANT NEOPLASM OF ASCENDING COLON (HCC): Primary | ICD-10-CM

## 2024-08-30 NOTE — TELEPHONE ENCOUNTER
Dr. Conte asked for patient to have cardiac clearance. Did try calling patient get him in September. Patient did not answer. Did leave a message asking him to call back to schedule his appointment.

## 2024-09-04 ENCOUNTER — TELEPHONE (OUTPATIENT)
Dept: ONCOLOGY | Age: 64
End: 2024-09-04

## 2024-09-04 DIAGNOSIS — G43.009 MIGRAINE WITHOUT AURA AND WITHOUT STATUS MIGRAINOSUS, NOT INTRACTABLE: ICD-10-CM

## 2024-09-04 RX ORDER — TOPIRAMATE 50 MG/1
TABLET, FILM COATED ORAL
Qty: 90 TABLET | Refills: 2 | Status: SHIPPED | OUTPATIENT
Start: 2024-09-04 | End: 2024-12-03

## 2024-09-04 NOTE — TELEPHONE ENCOUNTER
Pharmacy requesting refill of Topamax.      Medication active on med list yes      Date of last fill: 3/21/24  verified on 9/4/2024   verified by SF LPN      Date of last appointment 5/31/24    Next Visit Date:  11/12/2024

## 2024-09-16 ENCOUNTER — TELEPHONE (OUTPATIENT)
Dept: ONCOLOGY | Age: 64
End: 2024-09-16

## 2024-09-20 ENCOUNTER — OFFICE VISIT (OUTPATIENT)
Dept: ONCOLOGY | Age: 64
End: 2024-09-20
Payer: COMMERCIAL

## 2024-09-20 VITALS
BODY MASS INDEX: 33.75 KG/M2 | RESPIRATION RATE: 18 BRPM | WEIGHT: 242 LBS | HEART RATE: 81 BPM | SYSTOLIC BLOOD PRESSURE: 132 MMHG | TEMPERATURE: 98.2 F | DIASTOLIC BLOOD PRESSURE: 88 MMHG

## 2024-09-20 DIAGNOSIS — C18.9 MALIGNANT NEOPLASM OF COLON, UNSPECIFIED PART OF COLON (HCC): Primary | ICD-10-CM

## 2024-09-20 DIAGNOSIS — R89.7 HIGH MICROSATELLITE INSTABILITY IN TISSUE OF NEOPLASM: ICD-10-CM

## 2024-09-20 PROCEDURE — 3075F SYST BP GE 130 - 139MM HG: CPT | Performed by: INTERNAL MEDICINE

## 2024-09-20 PROCEDURE — G8427 DOCREV CUR MEDS BY ELIG CLIN: HCPCS | Performed by: INTERNAL MEDICINE

## 2024-09-20 PROCEDURE — G8417 CALC BMI ABV UP PARAM F/U: HCPCS | Performed by: INTERNAL MEDICINE

## 2024-09-20 PROCEDURE — 4004F PT TOBACCO SCREEN RCVD TLK: CPT | Performed by: INTERNAL MEDICINE

## 2024-09-20 PROCEDURE — 99215 OFFICE O/P EST HI 40 MIN: CPT | Performed by: INTERNAL MEDICINE

## 2024-09-20 PROCEDURE — 99211 OFF/OP EST MAY X REQ PHY/QHP: CPT | Performed by: INTERNAL MEDICINE

## 2024-09-20 PROCEDURE — 3017F COLORECTAL CA SCREEN DOC REV: CPT | Performed by: INTERNAL MEDICINE

## 2024-09-20 PROCEDURE — 3079F DIAST BP 80-89 MM HG: CPT | Performed by: INTERNAL MEDICINE

## 2024-09-23 ENCOUNTER — TELEPHONE (OUTPATIENT)
Dept: ONCOLOGY | Age: 64
End: 2024-09-23

## 2024-09-23 DIAGNOSIS — F17.200 SMOKER: ICD-10-CM

## 2024-09-23 DIAGNOSIS — C18.9 MALIGNANT NEOPLASM OF COLON, UNSPECIFIED PART OF COLON (HCC): Primary | ICD-10-CM

## 2024-09-25 ENCOUNTER — TELEPHONE (OUTPATIENT)
Dept: ONCOLOGY | Age: 64
End: 2024-09-25

## 2024-10-01 RX ORDER — CARVEDILOL 25 MG/1
TABLET ORAL
Qty: 180 TABLET | Refills: 2 | Status: SHIPPED | OUTPATIENT
Start: 2024-10-01

## 2024-10-09 ENCOUNTER — OFFICE VISIT (OUTPATIENT)
Dept: NEUROLOGY | Age: 64
End: 2024-10-09

## 2024-10-09 VITALS
SYSTOLIC BLOOD PRESSURE: 118 MMHG | HEART RATE: 75 BPM | HEIGHT: 71 IN | DIASTOLIC BLOOD PRESSURE: 81 MMHG | BODY MASS INDEX: 34.44 KG/M2 | WEIGHT: 246 LBS

## 2024-10-09 DIAGNOSIS — G43.009 MIGRAINE WITHOUT AURA AND WITHOUT STATUS MIGRAINOSUS, NOT INTRACTABLE: Primary | ICD-10-CM

## 2024-10-09 DIAGNOSIS — I67.1 ANEURYSM OF LEFT INTERNAL CAROTID ARTERY: ICD-10-CM

## 2024-10-09 ASSESSMENT — ENCOUNTER SYMPTOMS
NAUSEA: 0
PHOTOPHOBIA: 0
VOICE CHANGE: 0
COUGH: 0
VOMITING: 0
COLOR CHANGE: 0
SHORTNESS OF BREATH: 0

## 2024-10-09 NOTE — PROGRESS NOTES
Endovascular Neurosurgery - [id number]  [place] [address]  P: [phone]  F: [phone]      Dear Dr. Aguirre    HPI:     NATHALY Mora is a 64 y.o. male with a family history of aneurysm with a  eft ica 1x3mm broad based aneurysm. Cont to recommend smoking cessation. No new headaches.      Allergies   Allergen Reactions    Lisinopril      States he is allergic to the generic forms of medication, can take lisinopril    Aleve  [Naproxen Sodium] Nausea Only    Amitriptyline Hcl Other (See Comments)     rash    Exelon [Rivastigmine] Other (See Comments)     Patient stated patch left a \"burn kathy\" on his skin     Ibuprofen Other (See Comments)     \"hurts my stomach.\"    Pregabalin      Other Reaction(s): weight gain     Current Outpatient Medications   Medication Sig Dispense Refill    carvedilol (COREG) 25 MG tablet take 1 tablet by mouth twice a day 180 tablet 2    topiramate (TOPAMAX) 50 MG tablet TAKE 1 TABLET BY MOUTH EVERY MORNING & TAKE 2 TABLETS BY MOUTH EVERY NIGHT AT BEDTIME 90 tablet 2    diclofenac sodium (VOLTAREN) 1 % GEL       meloxicam (MOBIC) 15 MG tablet TAKE 1 TABLET BY MOUTH DAILY 28 tablet 4    amLODIPine (NORVASC) 10 MG tablet TAKE 1 TABLET BY MOUTH EVERY DAY 90 tablet 1    Handicap Placard MISC by Does not apply route 5 years 1 each 0    methocarbamol (ROBAXIN) 500 MG tablet Take 1 tablet by mouth 2 times daily as needed      SUMAtriptan (IMITREX) 100 MG tablet Take one at HA onset . May repeat 1 hour . No more 2 per day 4 days out of the week 18 tablet 5    memantine (NAMENDA) 10 MG tablet TAKE 1 TABLET BY MOUTH TWICE A DAY 60 tablet 5    traZODone (DESYREL) 100 MG tablet TAKE 2 TABLETS BY MOUTH EVERY NIGHT AT BEDTIME 60 tablet 5    fluticasone-salmeterol (ADVAIR) 100-50 MCG/ACT AEPB diskus inhaler Inhale 1 puff into the lungs in the morning and 1 puff in the evening. 60 each 1    levocetirizine (XYZAL) 5 MG tablet TAKE 1 TABLET BY

## 2024-10-10 ENCOUNTER — OFFICE VISIT (OUTPATIENT)
Dept: PRIMARY CARE CLINIC | Age: 64
End: 2024-10-10
Payer: COMMERCIAL

## 2024-10-10 ENCOUNTER — HOSPITAL ENCOUNTER (OUTPATIENT)
Age: 64
Setting detail: SPECIMEN
Discharge: HOME | End: 2024-10-10

## 2024-10-10 VITALS
BODY MASS INDEX: 33.89 KG/M2 | SYSTOLIC BLOOD PRESSURE: 116 MMHG | WEIGHT: 243 LBS | OXYGEN SATURATION: 97 % | HEART RATE: 76 BPM | DIASTOLIC BLOOD PRESSURE: 80 MMHG

## 2024-10-10 DIAGNOSIS — Z79.4 TYPE 2 DIABETES MELLITUS WITHOUT COMPLICATION, WITH LONG-TERM CURRENT USE OF INSULIN (HCC): ICD-10-CM

## 2024-10-10 DIAGNOSIS — E11.9 TYPE 2 DIABETES MELLITUS WITHOUT COMPLICATION, WITH LONG-TERM CURRENT USE OF INSULIN (HCC): Primary | ICD-10-CM

## 2024-10-10 DIAGNOSIS — I10 ESSENTIAL HYPERTENSION: ICD-10-CM

## 2024-10-10 DIAGNOSIS — E11.9 TYPE 2 DIABETES MELLITUS WITHOUT COMPLICATION, WITH LONG-TERM CURRENT USE OF INSULIN (HCC): ICD-10-CM

## 2024-10-10 DIAGNOSIS — C18.9 MALIGNANT NEOPLASM OF COLON, UNSPECIFIED PART OF COLON (HCC): ICD-10-CM

## 2024-10-10 DIAGNOSIS — Z79.4 TYPE 2 DIABETES MELLITUS WITHOUT COMPLICATION, WITH LONG-TERM CURRENT USE OF INSULIN (HCC): Primary | ICD-10-CM

## 2024-10-10 LAB
CHOLEST SERPL-MCNC: 128 MG/DL (ref 0–199)
CHOLESTEROL/HDL RATIO: 3
HBA1C MFR BLD: 5.4 %
HDLC SERPL-MCNC: 39 MG/DL
LDLC SERPL CALC-MCNC: 70 MG/DL (ref 0–100)
TRIGL SERPL-MCNC: 91 MG/DL
VLDLC SERPL CALC-MCNC: 18 MG/DL

## 2024-10-10 PROCEDURE — G8417 CALC BMI ABV UP PARAM F/U: HCPCS | Performed by: FAMILY MEDICINE

## 2024-10-10 PROCEDURE — 3017F COLORECTAL CA SCREEN DOC REV: CPT | Performed by: FAMILY MEDICINE

## 2024-10-10 PROCEDURE — 83036 HEMOGLOBIN GLYCOSYLATED A1C: CPT | Performed by: FAMILY MEDICINE

## 2024-10-10 PROCEDURE — 3079F DIAST BP 80-89 MM HG: CPT | Performed by: FAMILY MEDICINE

## 2024-10-10 PROCEDURE — G8484 FLU IMMUNIZE NO ADMIN: HCPCS | Performed by: FAMILY MEDICINE

## 2024-10-10 PROCEDURE — 2022F DILAT RTA XM EVC RTNOPTHY: CPT | Performed by: FAMILY MEDICINE

## 2024-10-10 PROCEDURE — G8427 DOCREV CUR MEDS BY ELIG CLIN: HCPCS | Performed by: FAMILY MEDICINE

## 2024-10-10 PROCEDURE — 3044F HG A1C LEVEL LT 7.0%: CPT | Performed by: FAMILY MEDICINE

## 2024-10-10 PROCEDURE — 3074F SYST BP LT 130 MM HG: CPT | Performed by: FAMILY MEDICINE

## 2024-10-10 PROCEDURE — 4004F PT TOBACCO SCREEN RCVD TLK: CPT | Performed by: FAMILY MEDICINE

## 2024-10-10 PROCEDURE — 99214 OFFICE O/P EST MOD 30 MIN: CPT | Performed by: FAMILY MEDICINE

## 2024-10-10 ASSESSMENT — ENCOUNTER SYMPTOMS
VOMITING: 0
SHORTNESS OF BREATH: 0

## 2024-10-10 NOTE — PROGRESS NOTES
Encounter   Procedures    Lipid Panel     Standing Status:   Future     Number of Occurrences:   1     Standing Expiration Date:   10/10/2025    POCT glycosylated hemoglobin (Hb A1C)     No orders of the defined types were placed in this encounter.       Patient given educational materials - see patient instructions.  Discussed use, benefit, and side effects of prescribed medications.  All patient questions answered. Pt voiced understanding. Reviewed healthmaintenance.  Instructed to continue current medications, diet and exercise.  Patient agreed with treatment plan. Follow up as directed.     Electronically signed by Sandra Roberson DO on 10/12/2024 at 11:48 AM

## 2024-10-21 ENCOUNTER — TELEPHONE (OUTPATIENT)
Dept: ONCOLOGY | Age: 64
End: 2024-10-21

## 2024-10-21 ENCOUNTER — HOSPITAL ENCOUNTER (OUTPATIENT)
Age: 64
Setting detail: SPECIMEN
Discharge: HOME | End: 2024-10-21
Payer: COMMERCIAL

## 2024-10-21 ENCOUNTER — OFFICE VISIT (OUTPATIENT)
Dept: ONCOLOGY | Age: 64
End: 2024-10-21
Payer: COMMERCIAL

## 2024-10-21 VITALS
DIASTOLIC BLOOD PRESSURE: 83 MMHG | BODY MASS INDEX: 30.68 KG/M2 | TEMPERATURE: 98 F | WEIGHT: 220 LBS | SYSTOLIC BLOOD PRESSURE: 122 MMHG | RESPIRATION RATE: 18 BRPM | HEART RATE: 66 BPM

## 2024-10-21 DIAGNOSIS — R89.7 HIGH MICROSATELLITE INSTABILITY IN TISSUE OF NEOPLASM: ICD-10-CM

## 2024-10-21 DIAGNOSIS — C18.9 MALIGNANT NEOPLASM OF COLON, UNSPECIFIED PART OF COLON (HCC): Primary | ICD-10-CM

## 2024-10-21 DIAGNOSIS — C18.9 MALIGNANT NEOPLASM OF COLON, UNSPECIFIED PART OF COLON (HCC): ICD-10-CM

## 2024-10-21 DIAGNOSIS — F17.200 SMOKER: ICD-10-CM

## 2024-10-21 LAB
ALBUMIN SERPL-MCNC: 4 G/DL (ref 3.5–5.2)
ALP SERPL-CCNC: 102 U/L (ref 40–129)
ALT SERPL-CCNC: 11 U/L (ref 5–41)
ANION GAP SERPL CALCULATED.3IONS-SCNC: 11 MMOL/L (ref 9–17)
AST SERPL-CCNC: 11 U/L
BASOPHILS # BLD: 0.03 K/UL (ref 0–0.2)
BASOPHILS NFR BLD: 1 % (ref 0–2)
BILIRUB SERPL-MCNC: 0.3 MG/DL (ref 0.3–1.2)
BUN SERPL-MCNC: 13 MG/DL (ref 8–23)
BUN/CREAT SERPL: 13 (ref 9–20)
CALCIUM SERPL-MCNC: 8.9 MG/DL (ref 8.6–10.4)
CHLORIDE SERPL-SCNC: 110 MMOL/L (ref 98–107)
CO2 SERPL-SCNC: 21 MMOL/L (ref 20–31)
CREAT SERPL-MCNC: 1 MG/DL (ref 0.7–1.2)
EOSINOPHIL # BLD: 0.22 K/UL (ref 0–0.44)
EOSINOPHILS RELATIVE PERCENT: 4 % (ref 1–4)
ERYTHROCYTE [DISTWIDTH] IN BLOOD BY AUTOMATED COUNT: 12.5 % (ref 11.8–14.4)
GFR, ESTIMATED: 85 ML/MIN/1.73M2
GLUCOSE SERPL-MCNC: 129 MG/DL (ref 70–99)
HCT VFR BLD AUTO: 44.7 % (ref 40.7–50.3)
HGB BLD-MCNC: 15.3 G/DL (ref 13–17)
IMM GRANULOCYTES # BLD AUTO: 0.02 K/UL (ref 0–0.3)
IMM GRANULOCYTES NFR BLD: 0 %
LYMPHOCYTES NFR BLD: 1.81 K/UL (ref 1.1–3.7)
LYMPHOCYTES RELATIVE PERCENT: 35 % (ref 24–43)
MCH RBC QN AUTO: 31.7 PG (ref 25.2–33.5)
MCHC RBC AUTO-ENTMCNC: 34.2 G/DL (ref 28.4–34.8)
MCV RBC AUTO: 92.7 FL (ref 82.6–102.9)
MONOCYTES NFR BLD: 0.37 K/UL (ref 0.1–1.2)
MONOCYTES NFR BLD: 7 % (ref 3–12)
NEUTROPHILS NFR BLD: 53 % (ref 36–65)
NEUTS SEG NFR BLD: 2.68 K/UL (ref 1.5–8.1)
NRBC BLD-RTO: 0 PER 100 WBC
PLATELET # BLD AUTO: 166 K/UL (ref 138–453)
PMV BLD AUTO: 10.4 FL (ref 8.1–13.5)
POTASSIUM SERPL-SCNC: 3.9 MMOL/L (ref 3.7–5.3)
PROT SERPL-MCNC: 6.6 G/DL (ref 6.4–8.3)
RBC # BLD AUTO: 4.82 M/UL (ref 4.21–5.77)
SODIUM SERPL-SCNC: 142 MMOL/L (ref 135–144)
WBC OTHER # BLD: 5.1 K/UL (ref 3.5–11.3)

## 2024-10-21 PROCEDURE — 3079F DIAST BP 80-89 MM HG: CPT | Performed by: INTERNAL MEDICINE

## 2024-10-21 PROCEDURE — 3074F SYST BP LT 130 MM HG: CPT | Performed by: INTERNAL MEDICINE

## 2024-10-21 PROCEDURE — G8484 FLU IMMUNIZE NO ADMIN: HCPCS | Performed by: INTERNAL MEDICINE

## 2024-10-21 PROCEDURE — 99211 OFF/OP EST MAY X REQ PHY/QHP: CPT

## 2024-10-21 PROCEDURE — 99213 OFFICE O/P EST LOW 20 MIN: CPT | Performed by: INTERNAL MEDICINE

## 2024-10-21 PROCEDURE — G8417 CALC BMI ABV UP PARAM F/U: HCPCS | Performed by: INTERNAL MEDICINE

## 2024-10-21 PROCEDURE — 80053 COMPREHEN METABOLIC PANEL: CPT

## 2024-10-21 PROCEDURE — 85025 COMPLETE CBC W/AUTO DIFF WBC: CPT

## 2024-10-21 PROCEDURE — 4004F PT TOBACCO SCREEN RCVD TLK: CPT | Performed by: INTERNAL MEDICINE

## 2024-10-21 PROCEDURE — 3017F COLORECTAL CA SCREEN DOC REV: CPT | Performed by: INTERNAL MEDICINE

## 2024-10-21 PROCEDURE — 36415 COLL VENOUS BLD VENIPUNCTURE: CPT

## 2024-10-21 PROCEDURE — G8427 DOCREV CUR MEDS BY ELIG CLIN: HCPCS | Performed by: INTERNAL MEDICINE

## 2024-10-21 NOTE — PROGRESS NOTES
Tash Mora                                                                                                                  10/21/2024  MRN:   [id number]  YOB: 1960  PCP:                           Sandra Roberson DO  Referring Physician: No ref. 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  Patient underwent evaluation by genetic counselor.  Awaiting results.  Presents to the clinic to discuss treatment plan.      During this visit patient's allergy, social, medical, surgical history and medications were reviewed and updated.    Summary of Case/History:  Tash Mora a 64 y.o.male is a patient with invasive adenocarcinoma of transverse colon arising in tubulovillous adenoma with close surgical margins, approximately 1 mm presents to the clinic to establish care for further workup and evaluation.  Back in October 2023 patient had multiple episodes of dark maroon-colored bowel movements.  Also complains of weight loss.  Complains of left lower quadrant abdominal pain.  Subsequently referred to GI.  Had CT abdomen pelvis without contrast which showed possible 1 mm right proximal kidney stone in the ureter.  Patient subsequently underwent Colonoscopy which showed 3 cm semipedunculated polyp in the ascending colon

## 2024-10-21 NOTE — TELEPHONE ENCOUNTER
SHEILA HERE FOR FOLLOW UP   Rv 1 month after surgery   PT DOES NOT HAVE SURGERY SCHEDULED YET, & HE WILL CALL OFFICE ONCE IT IS MADE   MD VISIT; LINDA   AVS PRINTED AND GIVEN ON EXIT

## 2024-10-25 DIAGNOSIS — R09.82 POST-NASAL DRIP: ICD-10-CM

## 2024-10-25 RX ORDER — LEVOCETIRIZINE DIHYDROCHLORIDE 5 MG/1
TABLET, FILM COATED ORAL
Qty: 90 TABLET | Refills: 3 | Status: SHIPPED | OUTPATIENT
Start: 2024-10-25

## 2024-10-25 RX ORDER — ATORVASTATIN CALCIUM 10 MG/1
10 TABLET, FILM COATED ORAL DAILY
Qty: 90 TABLET | Refills: 3 | Status: SHIPPED | OUTPATIENT
Start: 2024-10-25

## 2024-10-25 NOTE — TELEPHONE ENCOUNTER
Pharmacy requesting refill of trazodone 100 mg.      Medication active on med list yes      Date of last Rx: 5/8/2024 with 5 refills          verified by ALBERTO MICHAELS      Date of last appointment 5/31/2024    Next Visit Date:  11/12/2024

## 2024-10-28 RX ORDER — TRAZODONE HYDROCHLORIDE 100 MG/1
TABLET ORAL
Qty: 60 TABLET | Refills: 5 | Status: SHIPPED | OUTPATIENT
Start: 2024-10-28

## 2024-10-29 RX ORDER — MEMANTINE HYDROCHLORIDE 10 MG/1
10 TABLET ORAL 2 TIMES DAILY
Qty: 60 TABLET | Refills: 5 | Status: SHIPPED | OUTPATIENT
Start: 2024-10-29

## 2024-10-29 NOTE — TELEPHONE ENCOUNTER
Pharmacy requesting refill of memantine 10 mg.      Medication active on med list yes      Date of last Rx: 5/8/2024 with 5 refills          verified by ALBERTO MICHAELS      Date of last appointment 5/31/2024    Next Visit Date:  11/12/2024

## 2024-10-30 ENCOUNTER — TELEPHONE (OUTPATIENT)
Dept: PRIMARY CARE CLINIC | Age: 64
End: 2024-10-30

## 2024-10-30 DIAGNOSIS — L70.0 ACNE VULGARIS: Primary | ICD-10-CM

## 2024-10-30 NOTE — TELEPHONE ENCOUNTER
Patient called into office requesting PCP to refill Retin-A 10% ointment to [facility] on [address]. Writer did not see rx in patient's med list, confirmed with patient that PCP has not previously ordered for him. Patient does not follow with derm. Writer informed that a message would be sent.     Last Visit Date: 10/10/2024   Next Visit Date: 1/13/2025

## 2024-10-31 RX ORDER — TRETINOIN 0.1 MG/G
GEL TOPICAL
Qty: 45 G | Refills: 2 | Status: SHIPPED | OUTPATIENT
Start: 2024-10-31

## 2024-11-11 ENCOUNTER — OFFICE VISIT (OUTPATIENT)
Dept: GASTROENTEROLOGY | Age: 64
End: 2024-11-11
Payer: COMMERCIAL

## 2024-11-11 ENCOUNTER — TELEPHONE (OUTPATIENT)
Dept: ONCOLOGY | Age: 64
End: 2024-11-11

## 2024-11-11 VITALS
SYSTOLIC BLOOD PRESSURE: 150 MMHG | HEART RATE: 74 BPM | WEIGHT: 253 LBS | RESPIRATION RATE: 20 BRPM | HEIGHT: 71 IN | DIASTOLIC BLOOD PRESSURE: 83 MMHG | TEMPERATURE: 97.9 F | BODY MASS INDEX: 35.42 KG/M2

## 2024-11-11 DIAGNOSIS — C18.9 COLON ADENOCARCINOMA (HCC): Primary | ICD-10-CM

## 2024-11-11 DIAGNOSIS — K59.00 CONSTIPATION, UNSPECIFIED CONSTIPATION TYPE: ICD-10-CM

## 2024-11-11 PROCEDURE — G8427 DOCREV CUR MEDS BY ELIG CLIN: HCPCS | Performed by: INTERNAL MEDICINE

## 2024-11-11 PROCEDURE — 3017F COLORECTAL CA SCREEN DOC REV: CPT | Performed by: INTERNAL MEDICINE

## 2024-11-11 PROCEDURE — 99214 OFFICE O/P EST MOD 30 MIN: CPT | Performed by: INTERNAL MEDICINE

## 2024-11-11 PROCEDURE — 4004F PT TOBACCO SCREEN RCVD TLK: CPT | Performed by: INTERNAL MEDICINE

## 2024-11-11 PROCEDURE — G8484 FLU IMMUNIZE NO ADMIN: HCPCS | Performed by: INTERNAL MEDICINE

## 2024-11-11 PROCEDURE — 3077F SYST BP >= 140 MM HG: CPT | Performed by: INTERNAL MEDICINE

## 2024-11-11 PROCEDURE — G8417 CALC BMI ABV UP PARAM F/U: HCPCS | Performed by: INTERNAL MEDICINE

## 2024-11-11 PROCEDURE — 3079F DIAST BP 80-89 MM HG: CPT | Performed by: INTERNAL MEDICINE

## 2024-11-11 PROCEDURE — G2211 COMPLEX E/M VISIT ADD ON: HCPCS | Performed by: INTERNAL MEDICINE

## 2024-11-11 ASSESSMENT — ENCOUNTER SYMPTOMS
BACK PAIN: 1
CHOKING: 0
SORE THROAT: 0
WHEEZING: 0
ABDOMINAL DISTENTION: 0
NAUSEA: 0
TROUBLE SWALLOWING: 0
COLOR CHANGE: 0
COUGH: 0
RECTAL PAIN: 0
ANAL BLEEDING: 0
CONSTIPATION: 0
ABDOMINAL PAIN: 1
VOICE CHANGE: 0
BLOOD IN STOOL: 0
VOMITING: 0
DIARRHEA: 0

## 2024-11-11 NOTE — PROGRESS NOTES
Tattoo placed.  Moderate internal hemorrhoids.  Transverse colon, polyp, EMR:   -Adenocarcinoma arising in a tubulovillous adenoma.   -Carcinoma invades submucosa of the stalk.   -Negative for lymph-vascular invasion.   -Margins are free of carcinoma.     Had a colonoscopy 5 years ago in [facility].  No formal report available.  Did report he had polyps removed    Colonoscopy 4/22/2024:  Endoscopic Impression:    Poor bowel prep.  3 cm semipedunculated polyp that was not removed due to extremely poor prep.  1 cm polyp in sigmoid colon that was removed with hot snare.  Moderate internal hemorrhoids    -- Diagnosis --   Colon, descending, colonoscopic polypectomy:   Tubular adenoma.     Previous GI workup   CT A?P 5/7/2024:  IMPRESSION:  1. No acute intra-abdominal or pelvic process.  No suspicious abdominopelvic  mass.  2. Stable prostatomegaly.  Recommend correlation with recent PSA values.  3. Other chronic findings as described.      Past Medical,Family, and Social History reviewed and does not contribute to the patient presentingcondition.    Patient's PMH/PSH,SH,PSYCH Hx, MEDs, ALLERGIES, and ROS were all reviewed and updated in the appropriate sections.    PAST MEDICAL HISTORY:  Past Medical History:   Diagnosis Date    ADHD (attention deficit hyperactivity disorder)     no tx per pt.     Arthritis     knees, hands, back.    Asthma     as child.  pcp manages    Back pain     [facility] pain clinic for back and knees. 1/19/2023 visif    Brain aneurysm     Dr. You at   [facility]  f/u 12/29/2021. No surgery at this time. Monitoring yearly.    CAD (coronary artery disease)     Needs to find an new cardiologist. Dr. Butcher retired. Dr. Butcher [place], [place]  last visit 11/9/2021. Pt. states never had heart cath. MI 2011    Cataract     lt eye   no surgery    Cerebral artery occlusion with cerebral infarction (HCC) 2014    mini stroke went to [facility] and was treated. Dr. Carlo [address]. Last

## 2024-11-11 NOTE — TELEPHONE ENCOUNTER
Genetic test results disclosed to patient by [facility] genetic counselor. Progress note and test results attached in Media. Results routed to  & Leah RUBIN Mailed results to patient.

## 2024-11-11 NOTE — TELEPHONE ENCOUNTER
Per Dr. Brown, genetic test results are needed before patient's surgery. Writer sent email to [employer] asking for patient's results. Will route to Dr. Brown when they are received.

## 2024-11-11 NOTE — TELEPHONE ENCOUNTER
Genetic testing for Bueno syndrome was negative.    Okay to proceed with surgery from medical oncology standpoint    Thanks    Sabino

## 2024-11-12 ENCOUNTER — OFFICE VISIT (OUTPATIENT)
Dept: NEUROLOGY | Age: 64
End: 2024-11-12
Payer: COMMERCIAL

## 2024-11-12 VITALS
HEIGHT: 71 IN | HEART RATE: 76 BPM | BODY MASS INDEX: 35.56 KG/M2 | SYSTOLIC BLOOD PRESSURE: 116 MMHG | WEIGHT: 254 LBS | DIASTOLIC BLOOD PRESSURE: 79 MMHG

## 2024-11-12 DIAGNOSIS — M54.40 CHRONIC LOW BACK PAIN WITH SCIATICA, SCIATICA LATERALITY UNSPECIFIED, UNSPECIFIED BACK PAIN LATERALITY: ICD-10-CM

## 2024-11-12 DIAGNOSIS — F32.A DEPRESSION, UNSPECIFIED DEPRESSION TYPE: ICD-10-CM

## 2024-11-12 DIAGNOSIS — G89.29 CHRONIC LOW BACK PAIN WITH SCIATICA, SCIATICA LATERALITY UNSPECIFIED, UNSPECIFIED BACK PAIN LATERALITY: ICD-10-CM

## 2024-11-12 DIAGNOSIS — G47.33 OBSTRUCTIVE SLEEP APNEA SYNDROME: ICD-10-CM

## 2024-11-12 DIAGNOSIS — G43.009 MIGRAINE WITHOUT AURA AND WITHOUT STATUS MIGRAINOSUS, NOT INTRACTABLE: Primary | ICD-10-CM

## 2024-11-12 DIAGNOSIS — I67.1 ANEURYSM OF LEFT INTERNAL CAROTID ARTERY: ICD-10-CM

## 2024-11-12 DIAGNOSIS — R41.3 MEMORY LOSS: ICD-10-CM

## 2024-11-12 PROCEDURE — G8484 FLU IMMUNIZE NO ADMIN: HCPCS | Performed by: PSYCHIATRY & NEUROLOGY

## 2024-11-12 PROCEDURE — 4004F PT TOBACCO SCREEN RCVD TLK: CPT | Performed by: PSYCHIATRY & NEUROLOGY

## 2024-11-12 PROCEDURE — 3074F SYST BP LT 130 MM HG: CPT | Performed by: PSYCHIATRY & NEUROLOGY

## 2024-11-12 PROCEDURE — G8417 CALC BMI ABV UP PARAM F/U: HCPCS | Performed by: PSYCHIATRY & NEUROLOGY

## 2024-11-12 PROCEDURE — 3017F COLORECTAL CA SCREEN DOC REV: CPT | Performed by: PSYCHIATRY & NEUROLOGY

## 2024-11-12 PROCEDURE — G8427 DOCREV CUR MEDS BY ELIG CLIN: HCPCS | Performed by: PSYCHIATRY & NEUROLOGY

## 2024-11-12 PROCEDURE — 99214 OFFICE O/P EST MOD 30 MIN: CPT | Performed by: PSYCHIATRY & NEUROLOGY

## 2024-11-12 PROCEDURE — 3078F DIAST BP <80 MM HG: CPT | Performed by: PSYCHIATRY & NEUROLOGY

## 2024-11-12 RX ORDER — TOPIRAMATE 100 MG/1
TABLET, FILM COATED ORAL
Qty: 30 TABLET | Refills: 5 | Status: SHIPPED | OUTPATIENT
Start: 2024-11-12

## 2024-11-12 ASSESSMENT — ENCOUNTER SYMPTOMS
RESPIRATORY NEGATIVE: 1
ALLERGIC/IMMUNOLOGIC NEGATIVE: 1
GASTROINTESTINAL NEGATIVE: 1
EYES NEGATIVE: 1

## 2024-11-12 NOTE — PROGRESS NOTES
throughout                                                                                No dysmetria or dysdiadokinesis  No tremor   Normal fine motor  Gait normal   Orientation Alert and oriented x 3 . Thursday November 12, 2024 . President [place]al  . ADOLFO [place] . WORLD able to spell forward and backwards . Serial 7 to 79  Attention and concentration normal  Short term memory 3 words out of 3 in one minute  Language process and speech normal . No aphasia   Cranial nerve 2 normal acuety and visual fields  Cranial nerve 3, 4 and 6 .Extraocular muscles are intact . Pupils are equal and reactive   Cranial nerve 5 . Intact corneal reflex. Normal facial sensation  Cranial nerve 7 normal exam   Cranial nerve 8. Grossly intact hearing   Cranial nerve 9 and 10. Symmetric palate elevation   Cranial nerve 11 , 5 out of 5 strength   Cranial Nerve 12 midline tongue . No atrophy  Sensation .Decreasedd pinprick and light touch distal lower extremities   Deep Tendon Reflexes normal  Plantar response flexor bilaterally    Assessment:       Diagnosis Orders   1. Migraine without aura and without status migrainosus, not intractable        2. Aneurysm of left internal carotid artery        3. Obstructive sleep apnea syndrome        4. Depression, unspecified depression type        5. Memory loss        6. Chronic low back pain with sciatica, sciatica laterality unspecified, unspecified back pain laterality            He is to remain current regimen      Plan:     As above

## 2024-11-15 ENCOUNTER — TELEPHONE (OUTPATIENT)
Dept: SURGERY | Age: 64
End: 2024-11-15

## 2024-11-15 NOTE — TELEPHONE ENCOUNTER
I spoke with patient and since he has received genetic testing results back, he is now a candidate for surgery with dr macias. I will look for a time in December. For now, patient will come in for a pre surg appt on 12/20. Patient expressed verbal understanding and appreciation.

## 2024-11-22 DIAGNOSIS — M25.561 PAIN IN BOTH KNEES, UNSPECIFIED CHRONICITY: ICD-10-CM

## 2024-11-22 DIAGNOSIS — M25.562 PAIN IN BOTH KNEES, UNSPECIFIED CHRONICITY: ICD-10-CM

## 2024-11-22 RX ORDER — MELOXICAM 15 MG/1
15 TABLET ORAL DAILY
Qty: 28 TABLET | Refills: 4 | Status: SHIPPED | OUTPATIENT
Start: 2024-11-22

## 2024-11-23 NOTE — TELEPHONE ENCOUNTER
Patient states he never requested refill when writer made call to let him know it was ready for pick. He asked for it to be cancelled

## 2024-12-18 ENCOUNTER — PREP FOR PROCEDURE (OUTPATIENT)
Dept: SURGERY | Age: 64
End: 2024-12-18

## 2024-12-18 PROBLEM — C18.9 NEOPLASM OF COLON, MALIGNANT (HCC): Status: ACTIVE | Noted: 2024-12-18

## 2024-12-18 RX ORDER — SODIUM, POTASSIUM,MAG SULFATES 17.5-3.13G
SOLUTION, RECONSTITUTED, ORAL ORAL
Qty: 2 EACH | Refills: 0 | Status: SHIPPED | OUTPATIENT
Start: 2024-12-18

## 2024-12-18 RX ORDER — ERYTHROMYCIN 500 MG/1
TABLET, COATED ORAL
Qty: 6 TABLET | Refills: 0 | Status: SHIPPED | OUTPATIENT
Start: 2024-12-18

## 2024-12-18 RX ORDER — NEOMYCIN SULFATE 500 MG/1
TABLET ORAL
Qty: 6 TABLET | Refills: 0 | Status: SHIPPED | OUTPATIENT
Start: 2024-12-18

## 2024-12-20 ENCOUNTER — HOSPITAL ENCOUNTER (OUTPATIENT)
Age: 64
Setting detail: SPECIMEN
Discharge: HOME | End: 2024-12-20

## 2024-12-20 ENCOUNTER — OFFICE VISIT (OUTPATIENT)
Dept: SURGERY | Age: 64
End: 2024-12-20
Payer: MEDICARE

## 2024-12-20 ENCOUNTER — ANESTHESIA EVENT (OUTPATIENT)
Dept: OPERATING ROOM | Age: 64
End: 2024-12-20
Payer: MEDICARE

## 2024-12-20 VITALS
DIASTOLIC BLOOD PRESSURE: 83 MMHG | RESPIRATION RATE: 18 BRPM | BODY MASS INDEX: 33.6 KG/M2 | SYSTOLIC BLOOD PRESSURE: 115 MMHG | WEIGHT: 240 LBS | HEART RATE: 65 BPM | HEIGHT: 71 IN

## 2024-12-20 DIAGNOSIS — I10 ESSENTIAL HYPERTENSION: ICD-10-CM

## 2024-12-20 DIAGNOSIS — C18.2 MALIGNANT NEOPLASM OF ASCENDING COLON (HCC): ICD-10-CM

## 2024-12-20 DIAGNOSIS — C18.2 MALIGNANT NEOPLASM OF ASCENDING COLON (HCC): Primary | ICD-10-CM

## 2024-12-20 LAB
BASOPHILS # BLD: 0.04 K/UL (ref 0–0.2)
BASOPHILS NFR BLD: 1 % (ref 0–2)
EOSINOPHIL # BLD: 0.2 K/UL (ref 0–0.44)
EOSINOPHILS RELATIVE PERCENT: 3 % (ref 1–4)
ERYTHROCYTE [DISTWIDTH] IN BLOOD BY AUTOMATED COUNT: 12.6 % (ref 11.8–14.4)
HCT VFR BLD AUTO: 47.9 % (ref 40.7–50.3)
HGB BLD-MCNC: 15.7 G/DL (ref 13–17)
IMM GRANULOCYTES # BLD AUTO: <0.03 K/UL (ref 0–0.3)
IMM GRANULOCYTES NFR BLD: 0 %
LYMPHOCYTES NFR BLD: 2.32 K/UL (ref 1.1–3.7)
LYMPHOCYTES RELATIVE PERCENT: 36 % (ref 24–43)
MCH RBC QN AUTO: 30.3 PG (ref 25.2–33.5)
MCHC RBC AUTO-ENTMCNC: 32.8 G/DL (ref 28.4–34.8)
MCV RBC AUTO: 92.3 FL (ref 82.6–102.9)
MONOCYTES NFR BLD: 0.41 K/UL (ref 0.1–1.2)
MONOCYTES NFR BLD: 6 % (ref 3–12)
NEUTROPHILS NFR BLD: 54 % (ref 36–65)
NEUTS SEG NFR BLD: 3.52 K/UL (ref 1.5–8.1)
NRBC BLD-RTO: 0 PER 100 WBC
PLATELET # BLD AUTO: 184 K/UL (ref 138–453)
PMV BLD AUTO: 11.1 FL (ref 8.1–13.5)
RBC # BLD AUTO: 5.19 M/UL (ref 4.21–5.77)
WBC OTHER # BLD: 6.5 K/UL (ref 3.5–11.3)

## 2024-12-20 PROCEDURE — 3017F COLORECTAL CA SCREEN DOC REV: CPT | Performed by: COLON & RECTAL SURGERY

## 2024-12-20 PROCEDURE — G8417 CALC BMI ABV UP PARAM F/U: HCPCS | Performed by: COLON & RECTAL SURGERY

## 2024-12-20 PROCEDURE — 3079F DIAST BP 80-89 MM HG: CPT | Performed by: COLON & RECTAL SURGERY

## 2024-12-20 PROCEDURE — 4004F PT TOBACCO SCREEN RCVD TLK: CPT | Performed by: COLON & RECTAL SURGERY

## 2024-12-20 PROCEDURE — 3074F SYST BP LT 130 MM HG: CPT | Performed by: COLON & RECTAL SURGERY

## 2024-12-20 PROCEDURE — 99213 OFFICE O/P EST LOW 20 MIN: CPT | Performed by: COLON & RECTAL SURGERY

## 2024-12-20 PROCEDURE — G8427 DOCREV CUR MEDS BY ELIG CLIN: HCPCS | Performed by: COLON & RECTAL SURGERY

## 2024-12-20 PROCEDURE — G8484 FLU IMMUNIZE NO ADMIN: HCPCS | Performed by: COLON & RECTAL SURGERY

## 2024-12-20 ASSESSMENT — ENCOUNTER SYMPTOMS
ABDOMINAL DISTENTION: 0
COLOR CHANGE: 0
CHEST TIGHTNESS: 0
ANAL BLEEDING: 0
CONSTIPATION: 0
NAUSEA: 0
STRIDOR: 0
SHORTNESS OF BREATH: 0
COUGH: 0
BLOOD IN STOOL: 0
BACK PAIN: 0
DIARRHEA: 0
RECTAL PAIN: 0
ABDOMINAL PAIN: 0
APNEA: 0
WHEEZING: 0
VOMITING: 0

## 2024-12-20 NOTE — H&P (VIEW-ONLY)
Negative for back pain.   Skin:  Negative for color change and pallor.   Allergic/Immunologic: Negative for food allergies and immunocompromised state.   Neurological:  Negative for syncope, speech difficulty, weakness, light-headedness, numbness and headaches.   Hematological:  Negative for adenopathy. Does not bruise/bleed easily.   Psychiatric/Behavioral:  The patient is not nervous/anxious.        Physical Exam:      /83 (Site: Left Upper Arm, Position: Sitting, Cuff Size: Large Adult)   Pulse 65   Resp 18   Ht 1.803 m (5' 11\")   Wt 108.9 kg (240 lb)   BMI 33.47 kg/m²     HEENT: WNL  Lymph nodes: Normal cervical lymph nodes  Heart: regular rate and rhythm  Lungs: Bilaterally clear to auscultation.  Abdomen: soft, nontender, nondistended, no masses or organomegaly.  Healed trocar scars in upper abdomen.  Extremities: Normal with no palpable edema.      Studies Review:     LABS:  CBC:   Lab Results   Component Value Date    WBC 5.1 10/21/2024    HGB 15.3 10/21/2024    HCT 44.7 10/21/2024    MCV 92.7 10/21/2024     10/21/2024        BMP:   Lab Results   Component Value Date/Time     10/21/2024 12:05 PM    K 3.9 10/21/2024 12:05 PM     10/21/2024 12:05 PM    CO2 21 10/21/2024 12:05 PM    BUN 13 10/21/2024 12:05 PM    CREATININE 1.0 10/21/2024 12:05 PM    GLUCOSE 129 10/21/2024 12:05 PM    CALCIUM 8.9 10/21/2024 12:05 PM    LABGLOM 85 10/21/2024 12:05 PM    LABGLOM >60 01/20/2024 10:13 AM         Assessment and Plan:       Tash was seen today for follow-up.    Diagnoses and all orders for this visit:    Malignant neoplasm of ascending colon (HCC)         1. Malignant neoplasm of ascending colon (HCC)    Assessment:  Polyp with focus of invasive cancer removed with negative margin but involving submucosa at the stalk.  Completely asymptomatic with respect to GI tract at this time.  Site of polypectomy marked with tattoo by gastroenterologist on 4/22/2024.    Plan:   Proceed to  planned surgery involving segmental resection of colon which has been stated to be either an ascending or transverse segments.  Identification expected to be possible with previous tattoo kathy.  Intraoperative colonoscopy may be required.    Followup: Return if symptoms worsen or fail to improve.

## 2024-12-20 NOTE — PROGRESS NOTES
[facility] PHYSICIANS [facility], [facility]  [id number] [id number]  Dept: [phone]    Patient:  Tash Mora  YOB: 1960  Date: 12/20/2024     Chief Complaint: Follow-up (PRE-SURG CONSULT)     HPI:     Tash Mora a 64 y.o. male is here for a pre-surgery visit.   He is undergoing an transverse colon resection with curative intent due to diagnosis for colon cancer.    Since last appointment patient states that he has been doing good and he has no new symptoms.   Patient denies abdominal pain, rectal bleeding, rectal pain, constipation, diarrhea.   His BM are 1-3 formed BM daily without straining.     Last Colonoscopy: 07/19/2024 with Dr. Yo  Last Imaging:           CT - 08/11/2024  CEA: 1.0 on 08/15/2024 which showed no acute abnormality identified in the abdomen or pelvis     History:     Past Medical History:   Diagnosis Date    ADHD (attention deficit hyperactivity disorder)     no tx per pt.     Arthritis     knees, hands, back.    Asthma     as child.  pcp manages    Back pain     [facility] pain clinic for back and knees. 1/19/2023 visif    Brain aneurysm     Dr. You at   [facility]  f/u 12/29/2021. No surgery at this time. Monitoring yearly.    CAD (coronary artery disease)     Needs to find an new cardiologist. Dr. Butcher retired. Dr. Butcher [place], [place]  last visit 11/9/2021. Pt. states never had heart cath. MI 2011    Cataract     lt eye   no surgery    Cerebral artery occlusion with cerebral infarction (HCC) 2014    mini stroke went to [facility] and was treated. Dr. Matos [facility]. Last seen summer 2021    Colon cancer (HCC)     Depression     pcp    Diabetes mellitus (HCC)     on rx   A1C high per pt    pcp manages    Headache     Heart attack (HCC) 2012    Dr. Butcher last visit 2021    Pt. states no hx cath    Hyperlipidemia     on rx    Hypertension     pcp manages

## 2024-12-20 NOTE — PATIENT INSTRUCTIONS
Pre-Op Surgery Drinks:    1) Immunonutrition shake (ENSURE SURGERY) twice daily starting 7 days before surgery.  2) Clear Carbohydrate Drink (ENSURE PRE-SURGERY)  the evening before surgery, and continue the immunonutrition shake.    The drinks are available in a package at the [facility] at [facility] for $60.    The box will have more than the number of drinks stated above, but they can be continued post operatively and are highly encourage for post op recovery.    Pre-Op Antibiotics: Erythromycin and Neomycin    The Day Before Surgery:     Erythromycin and Neomycin taking each antibiotic at 1 pm, 3 pm and 7 pm. Also, drink TWO (Carbohydrate Drink - Ensure Pre-Surgery)     Lastly, take the bowel prep as directed.     Start fasting after midnight         1) Stop Meloxicam and Asprin 81 mg for 7 days  2) Try to cut down on smoking before the surgery

## 2024-12-21 LAB
ALBUMIN SERPL-MCNC: 4.6 G/DL (ref 3.5–5.2)
ALBUMIN/GLOB SERPL: 1.8 {RATIO} (ref 1–2.5)
ALP SERPL-CCNC: 97 U/L (ref 40–129)
ALT SERPL-CCNC: 9 U/L (ref 10–50)
ANION GAP SERPL CALCULATED.3IONS-SCNC: 11 MMOL/L (ref 9–16)
AST SERPL-CCNC: 17 U/L (ref 10–50)
BILIRUB SERPL-MCNC: 0.5 MG/DL (ref 0–1.2)
BUN SERPL-MCNC: 14 MG/DL (ref 8–23)
CALCIUM SERPL-MCNC: 9.5 MG/DL (ref 8.6–10.4)
CEA SERPL-MCNC: 1 NG/ML (ref 0–3.8)
CHLORIDE SERPL-SCNC: 110 MMOL/L (ref 98–107)
CO2 SERPL-SCNC: 22 MMOL/L (ref 20–31)
CREAT SERPL-MCNC: 1.2 MG/DL (ref 0.7–1.2)
GFR, ESTIMATED: 68 ML/MIN/1.73M2
GLUCOSE SERPL-MCNC: 103 MG/DL (ref 74–99)
POTASSIUM SERPL-SCNC: 4.2 MMOL/L (ref 3.7–5.3)
PROT SERPL-MCNC: 7.1 G/DL (ref 6.6–8.7)
SODIUM SERPL-SCNC: 143 MMOL/L (ref 136–145)

## 2024-12-21 RX ORDER — AMLODIPINE BESYLATE 10 MG/1
TABLET ORAL
Qty: 90 TABLET | Refills: 1 | Status: SHIPPED | OUTPATIENT
Start: 2024-12-21

## 2024-12-23 ENCOUNTER — HOSPITAL ENCOUNTER (OUTPATIENT)
Age: 64
Discharge: HOME | End: 2024-12-27
Payer: MEDICARE

## 2024-12-23 VITALS
BODY MASS INDEX: 35.42 KG/M2 | RESPIRATION RATE: 16 BRPM | HEIGHT: 71 IN | OXYGEN SATURATION: 99 % | SYSTOLIC BLOOD PRESSURE: 132 MMHG | DIASTOLIC BLOOD PRESSURE: 82 MMHG | WEIGHT: 253 LBS | TEMPERATURE: 98.2 F

## 2024-12-23 DIAGNOSIS — Z01.818 PRE-OP TESTING: Primary | ICD-10-CM

## 2024-12-23 LAB
ABO + RH BLD: NORMAL
ARM BAND NUMBER: NORMAL
BLOOD BANK SAMPLE EXPIRATION: NORMAL
BLOOD GROUP ANTIBODIES SERPL: NEGATIVE

## 2024-12-23 PROCEDURE — 86901 BLOOD TYPING SEROLOGIC RH(D): CPT

## 2024-12-23 PROCEDURE — 86900 BLOOD TYPING SEROLOGIC ABO: CPT

## 2024-12-23 PROCEDURE — 86850 RBC ANTIBODY SCREEN: CPT

## 2024-12-23 PROCEDURE — 36415 COLL VENOUS BLD VENIPUNCTURE: CPT

## 2024-12-23 ASSESSMENT — PAIN DESCRIPTION - LOCATION: LOCATION: BACK

## 2024-12-23 NOTE — DISCHARGE INSTRUCTIONS
DAY OF SURGERY/PROCEDURE  GUIDELINES    As a patient at the [facility], you can expect quality medical and nursing care that is centered on your individual needs. It is our goal to make your surgical experience as comfortable and excellent as possible.  ________________________________________________________________________    The following instructions are general guidelines, if any information on this sheet is different from what your doctor has instructed you to do, please follow your doctor's instructions.    Please arrive on 12/30/2024 @ 0630      Enter through entrance C. Check in at registration     Upon arrival you will be taken to the pre-operative area to get ready for surgery, your family will stay in the waiting room and visit with you once you are ready for surgery. Due to special limitations please limit visitation to 1-2 members of your family at a time. When it is time for surgery your family will return to the waiting room.    Nothing to eat, drink, smoke, suck or chew after midnight (no water, gum, mints, cigarettes, cigars, pipes, snuff, chewing tobacco, etc.) or your surgery may be canceled.     Take a shower or bath on night before the morning of your surgery/procedure (Hibiclens if directed) Do not apply any lotions.    Brush your teeth, but do not swallow any water    IN CASE OF ILLNESS - If you have a cold or flu symptoms (high fever, runny nose, sore throat, cough, etc.) rash, nausea, vomiting, loose stools, and/or recent contact with someone who has a contagious disease (chick pox, measles, etc.) please call your doctor before coming to the surgery center    Take a small sip of water with  AMLODIPINE, CARVEDILOL, ADVAIR   BRING RESCUE INHALER WITH YOU    If applicable bring your:  Inhaler (s)  Hearing aid(s)  Eyeglasses and Case (If you wear contacts they have to be removed before surgery, bring case and solution)  CPAP     DO NOT take anticoagulants (blood thinners,

## 2024-12-30 ENCOUNTER — ANESTHESIA (OUTPATIENT)
Dept: OPERATING ROOM | Age: 64
End: 2024-12-30
Payer: MEDICARE

## 2024-12-30 ENCOUNTER — HOSPITAL ENCOUNTER (INPATIENT)
Age: 64
LOS: 5 days | Discharge: HOME HEALTH | End: 2025-01-04
Attending: COLON & RECTAL SURGERY | Admitting: COLON & RECTAL SURGERY
Payer: MEDICARE

## 2024-12-30 DIAGNOSIS — C18.2 MALIGNANT NEOPLASM OF ASCENDING COLON (HCC): Primary | ICD-10-CM

## 2024-12-30 DIAGNOSIS — C18.9: ICD-10-CM

## 2024-12-30 LAB
GLUCOSE BLD-MCNC: 170 MG/DL (ref 75–110)
GLUCOSE BLD-MCNC: 180 MG/DL (ref 75–110)
GLUCOSE BLD-MCNC: 192 MG/DL (ref 75–110)

## 2024-12-30 PROCEDURE — 88307 TISSUE EXAM BY PATHOLOGIST: CPT

## 2024-12-30 PROCEDURE — 2580000003 HC RX 258: Performed by: STUDENT IN AN ORGANIZED HEALTH CARE EDUCATION/TRAINING PROGRAM

## 2024-12-30 PROCEDURE — 6370000000 HC RX 637 (ALT 250 FOR IP): Performed by: NURSE ANESTHETIST, CERTIFIED REGISTERED

## 2024-12-30 PROCEDURE — 6370000000 HC RX 637 (ALT 250 FOR IP): Performed by: COLON & RECTAL SURGERY

## 2024-12-30 PROCEDURE — 44140 PARTIAL REMOVAL OF COLON: CPT | Performed by: COLON & RECTAL SURGERY

## 2024-12-30 PROCEDURE — 6360000002 HC RX W HCPCS: Performed by: COLON & RECTAL SURGERY

## 2024-12-30 PROCEDURE — 82947 ASSAY GLUCOSE BLOOD QUANT: CPT

## 2024-12-30 PROCEDURE — C1765 ADHESION BARRIER: HCPCS | Performed by: COLON & RECTAL SURGERY

## 2024-12-30 PROCEDURE — 6370000000 HC RX 637 (ALT 250 FOR IP)

## 2024-12-30 PROCEDURE — 6370000000 HC RX 637 (ALT 250 FOR IP): Performed by: STUDENT IN AN ORGANIZED HEALTH CARE EDUCATION/TRAINING PROGRAM

## 2024-12-30 PROCEDURE — C1713 ANCHOR/SCREW BN/BN,TIS/BN: HCPCS | Performed by: COLON & RECTAL SURGERY

## 2024-12-30 PROCEDURE — 7100000000 HC PACU RECOVERY - FIRST 15 MIN: Performed by: COLON & RECTAL SURGERY

## 2024-12-30 PROCEDURE — 2500000003 HC RX 250 WO HCPCS: Performed by: COLON & RECTAL SURGERY

## 2024-12-30 PROCEDURE — 6360000002 HC RX W HCPCS: Performed by: NURSE ANESTHETIST, CERTIFIED REGISTERED

## 2024-12-30 PROCEDURE — 2500000003 HC RX 250 WO HCPCS: Performed by: NURSE ANESTHETIST, CERTIFIED REGISTERED

## 2024-12-30 PROCEDURE — 3600000014 HC SURGERY LEVEL 4 ADDTL 15MIN: Performed by: COLON & RECTAL SURGERY

## 2024-12-30 PROCEDURE — 87086 URINE CULTURE/COLONY COUNT: CPT

## 2024-12-30 PROCEDURE — 3600000004 HC SURGERY LEVEL 4 BASE: Performed by: COLON & RECTAL SURGERY

## 2024-12-30 PROCEDURE — 1200000000 HC SEMI PRIVATE

## 2024-12-30 PROCEDURE — 3700000000 HC ANESTHESIA ATTENDED CARE: Performed by: COLON & RECTAL SURGERY

## 2024-12-30 PROCEDURE — 3700000001 HC ADD 15 MINUTES (ANESTHESIA): Performed by: COLON & RECTAL SURGERY

## 2024-12-30 PROCEDURE — 6360000002 HC RX W HCPCS: Performed by: ANESTHESIOLOGY

## 2024-12-30 PROCEDURE — 2500000003 HC RX 250 WO HCPCS: Performed by: STUDENT IN AN ORGANIZED HEALTH CARE EDUCATION/TRAINING PROGRAM

## 2024-12-30 PROCEDURE — 94640 AIRWAY INHALATION TREATMENT: CPT

## 2024-12-30 PROCEDURE — 6370000000 HC RX 637 (ALT 250 FOR IP): Performed by: ANESTHESIOLOGY

## 2024-12-30 PROCEDURE — 2720000010 HC SURG SUPPLY STERILE: Performed by: COLON & RECTAL SURGERY

## 2024-12-30 PROCEDURE — 99222 1ST HOSP IP/OBS MODERATE 55: CPT | Performed by: HOSPITALIST

## 2024-12-30 PROCEDURE — 64488 TAP BLOCK BI INJECTION: CPT | Performed by: ANESTHESIOLOGY

## 2024-12-30 PROCEDURE — 7100000001 HC PACU RECOVERY - ADDTL 15 MIN: Performed by: COLON & RECTAL SURGERY

## 2024-12-30 PROCEDURE — 2709999900 HC NON-CHARGEABLE SUPPLY: Performed by: COLON & RECTAL SURGERY

## 2024-12-30 PROCEDURE — 6360000002 HC RX W HCPCS: Performed by: STUDENT IN AN ORGANIZED HEALTH CARE EDUCATION/TRAINING PROGRAM

## 2024-12-30 PROCEDURE — 0DBL0ZZ EXCISION OF TRANSVERSE COLON, OPEN APPROACH: ICD-10-PCS | Performed by: COLON & RECTAL SURGERY

## 2024-12-30 DEVICE — SEPRAFILM ADHESION BARRIER (MEMBRANE) IS A STERILE, BIORESORBABLE, TRANSLUCENT ADHESION BARRIER COMPOSED OF TWO ANIONIC POLYSACCHARIDES, SODIUM HYALURONATE (HA) AND CARBOXYMETHYLCELLULOSE (CMC).
Type: IMPLANTABLE DEVICE | Status: FUNCTIONAL
Brand: SEPRAFILM

## 2024-12-30 RX ORDER — ALBUTEROL SULFATE 90 UG/1
2 INHALANT RESPIRATORY (INHALATION)
Status: DISCONTINUED | OUTPATIENT
Start: 2024-12-30 | End: 2025-01-04 | Stop reason: HOSPADM

## 2024-12-30 RX ORDER — LABETALOL HYDROCHLORIDE 5 MG/ML
10 INJECTION, SOLUTION INTRAVENOUS
Status: DISCONTINUED | OUTPATIENT
Start: 2024-12-30 | End: 2024-12-30 | Stop reason: HOSPADM

## 2024-12-30 RX ORDER — METOCLOPRAMIDE HYDROCHLORIDE 5 MG/ML
10 INJECTION INTRAMUSCULAR; INTRAVENOUS
Status: DISCONTINUED | OUTPATIENT
Start: 2024-12-30 | End: 2024-12-30 | Stop reason: HOSPADM

## 2024-12-30 RX ORDER — LIDOCAINE HYDROCHLORIDE 10 MG/ML
1 INJECTION, SOLUTION EPIDURAL; INFILTRATION; INTRACAUDAL; PERINEURAL
Status: DISCONTINUED | OUTPATIENT
Start: 2024-12-30 | End: 2024-12-30 | Stop reason: HOSPADM

## 2024-12-30 RX ORDER — ONDANSETRON 2 MG/ML
4 INJECTION INTRAMUSCULAR; INTRAVENOUS
Status: DISCONTINUED | OUTPATIENT
Start: 2024-12-30 | End: 2024-12-30 | Stop reason: HOSPADM

## 2024-12-30 RX ORDER — IPRATROPIUM BROMIDE AND ALBUTEROL SULFATE 2.5; .5 MG/3ML; MG/3ML
1 SOLUTION RESPIRATORY (INHALATION)
Status: DISCONTINUED | OUTPATIENT
Start: 2024-12-30 | End: 2024-12-30 | Stop reason: HOSPADM

## 2024-12-30 RX ORDER — SODIUM CHLORIDE 0.9 % (FLUSH) 0.9 %
5-40 SYRINGE (ML) INJECTION EVERY 12 HOURS SCHEDULED
Status: DISCONTINUED | OUTPATIENT
Start: 2024-12-30 | End: 2024-12-30 | Stop reason: HOSPADM

## 2024-12-30 RX ORDER — SODIUM CHLORIDE 0.9 % (FLUSH) 0.9 %
5-40 SYRINGE (ML) INJECTION PRN
Status: DISCONTINUED | OUTPATIENT
Start: 2024-12-30 | End: 2024-12-30 | Stop reason: HOSPADM

## 2024-12-30 RX ORDER — ONDANSETRON 4 MG/1
4 TABLET, ORALLY DISINTEGRATING ORAL ONCE
Status: COMPLETED | OUTPATIENT
Start: 2024-12-30 | End: 2024-12-30

## 2024-12-30 RX ORDER — ALBUTEROL SULFATE 90 UG/1
1 INHALANT RESPIRATORY (INHALATION) EVERY 6 HOURS PRN
Status: DISCONTINUED | OUTPATIENT
Start: 2024-12-30 | End: 2025-01-04 | Stop reason: HOSPADM

## 2024-12-30 RX ORDER — CARVEDILOL 12.5 MG/1
25 TABLET ORAL 2 TIMES DAILY
Status: DISCONTINUED | OUTPATIENT
Start: 2024-12-30 | End: 2024-12-31

## 2024-12-30 RX ORDER — GLYCOPYRROLATE 0.2 MG/ML
0.4 INJECTION INTRAMUSCULAR; INTRAVENOUS ONCE
Status: DISCONTINUED | OUTPATIENT
Start: 2024-12-30 | End: 2024-12-30 | Stop reason: HOSPADM

## 2024-12-30 RX ORDER — GABAPENTIN 300 MG/1
300 CAPSULE ORAL 3 TIMES DAILY
Status: DISCONTINUED | OUTPATIENT
Start: 2024-12-30 | End: 2024-12-31

## 2024-12-30 RX ORDER — ALBUTEROL SULFATE 90 UG/1
INHALANT RESPIRATORY (INHALATION)
Status: DISCONTINUED | OUTPATIENT
Start: 2024-12-30 | End: 2024-12-30 | Stop reason: SDUPTHER

## 2024-12-30 RX ORDER — MIDAZOLAM HYDROCHLORIDE 2 MG/2ML
2 INJECTION, SOLUTION INTRAMUSCULAR; INTRAVENOUS
Status: DISCONTINUED | OUTPATIENT
Start: 2024-12-30 | End: 2024-12-30 | Stop reason: HOSPADM

## 2024-12-30 RX ORDER — ONDANSETRON 4 MG/1
4 TABLET, ORALLY DISINTEGRATING ORAL EVERY 6 HOURS PRN
Status: DISCONTINUED | OUTPATIENT
Start: 2024-12-30 | End: 2025-01-04 | Stop reason: HOSPADM

## 2024-12-30 RX ORDER — KETOROLAC TROMETHAMINE 30 MG/ML
INJECTION, SOLUTION INTRAMUSCULAR; INTRAVENOUS
Status: DISCONTINUED | OUTPATIENT
Start: 2024-12-30 | End: 2024-12-30 | Stop reason: SDUPTHER

## 2024-12-30 RX ORDER — DEXAMETHASONE SODIUM PHOSPHATE 10 MG/ML
INJECTION, SOLUTION INTRAMUSCULAR; INTRAVENOUS
Status: DISCONTINUED | OUTPATIENT
Start: 2024-12-30 | End: 2024-12-30 | Stop reason: SDUPTHER

## 2024-12-30 RX ORDER — AMLODIPINE BESYLATE 5 MG/1
10 TABLET ORAL DAILY
Status: DISCONTINUED | OUTPATIENT
Start: 2024-12-30 | End: 2025-01-04 | Stop reason: HOSPADM

## 2024-12-30 RX ORDER — LIDOCAINE HYDROCHLORIDE 10 MG/ML
INJECTION, SOLUTION EPIDURAL; INFILTRATION; INTRACAUDAL; PERINEURAL
Status: DISCONTINUED | OUTPATIENT
Start: 2024-12-30 | End: 2024-12-30 | Stop reason: SDUPTHER

## 2024-12-30 RX ORDER — SODIUM CHLORIDE 0.9 % (FLUSH) 0.9 %
5-40 SYRINGE (ML) INJECTION PRN
Status: DISCONTINUED | OUTPATIENT
Start: 2024-12-30 | End: 2025-01-04 | Stop reason: HOSPADM

## 2024-12-30 RX ORDER — DEXTROSE MONOHYDRATE 100 MG/ML
INJECTION, SOLUTION INTRAVENOUS CONTINUOUS PRN
Status: DISCONTINUED | OUTPATIENT
Start: 2024-12-30 | End: 2025-01-04 | Stop reason: HOSPADM

## 2024-12-30 RX ORDER — SEVOFLURANE 250 ML/250ML
LIQUID RESPIRATORY (INHALATION)
Status: DISPENSED
Start: 2024-12-30 | End: 2024-12-31

## 2024-12-30 RX ORDER — PROPOFOL 10 MG/ML
INJECTION, EMULSION INTRAVENOUS
Status: DISCONTINUED | OUTPATIENT
Start: 2024-12-30 | End: 2024-12-30 | Stop reason: SDUPTHER

## 2024-12-30 RX ORDER — VASOPRESSIN 20 [USP'U]/ML
INJECTION, SOLUTION INTRAVENOUS
Status: DISPENSED
Start: 2024-12-30 | End: 2024-12-30

## 2024-12-30 RX ORDER — MORPHINE SULFATE 2 MG/ML
2 INJECTION, SOLUTION INTRAMUSCULAR; INTRAVENOUS EVERY 5 MIN PRN
Status: DISCONTINUED | OUTPATIENT
Start: 2024-12-30 | End: 2024-12-30 | Stop reason: HOSPADM

## 2024-12-30 RX ORDER — NALOXONE HYDROCHLORIDE 0.4 MG/ML
INJECTION, SOLUTION INTRAMUSCULAR; INTRAVENOUS; SUBCUTANEOUS PRN
Status: DISCONTINUED | OUTPATIENT
Start: 2024-12-30 | End: 2024-12-30 | Stop reason: HOSPADM

## 2024-12-30 RX ORDER — INSULIN LISPRO 100 [IU]/ML
0-16 INJECTION, SOLUTION INTRAVENOUS; SUBCUTANEOUS EVERY 4 HOURS
Status: DISCONTINUED | OUTPATIENT
Start: 2024-12-30 | End: 2025-01-01

## 2024-12-30 RX ORDER — ONDANSETRON 2 MG/ML
INJECTION INTRAMUSCULAR; INTRAVENOUS
Status: DISCONTINUED | OUTPATIENT
Start: 2024-12-30 | End: 2024-12-30 | Stop reason: SDUPTHER

## 2024-12-30 RX ORDER — SODIUM CHLORIDE 9 MG/ML
INJECTION, SOLUTION INTRAVENOUS PRN
Status: DISCONTINUED | OUTPATIENT
Start: 2024-12-30 | End: 2024-12-30 | Stop reason: HOSPADM

## 2024-12-30 RX ORDER — SCOLOPAMINE TRANSDERMAL SYSTEM 1 MG/1
1 PATCH, EXTENDED RELEASE TRANSDERMAL
Status: DISCONTINUED | OUTPATIENT
Start: 2024-12-30 | End: 2024-12-30 | Stop reason: HOSPADM

## 2024-12-30 RX ORDER — MEMANTINE HYDROCHLORIDE 5 MG/1
10 TABLET ORAL 2 TIMES DAILY
Status: DISCONTINUED | OUTPATIENT
Start: 2024-12-30 | End: 2025-01-04 | Stop reason: HOSPADM

## 2024-12-30 RX ORDER — MIDAZOLAM HYDROCHLORIDE 2 MG/2ML
2 INJECTION, SOLUTION INTRAMUSCULAR; INTRAVENOUS
Status: COMPLETED | OUTPATIENT
Start: 2024-12-30 | End: 2024-12-30

## 2024-12-30 RX ORDER — SODIUM CHLORIDE, SODIUM LACTATE, POTASSIUM CHLORIDE, CALCIUM CHLORIDE 600; 310; 30; 20 MG/100ML; MG/100ML; MG/100ML; MG/100ML
INJECTION, SOLUTION INTRAVENOUS CONTINUOUS
Status: DISCONTINUED | OUTPATIENT
Start: 2024-12-30 | End: 2024-12-30 | Stop reason: HOSPADM

## 2024-12-30 RX ORDER — DEXTROSE MONOHYDRATE, SODIUM CHLORIDE, AND POTASSIUM CHLORIDE 50; 1.49; 4.5 G/1000ML; G/1000ML; G/1000ML
INJECTION, SOLUTION INTRAVENOUS CONTINUOUS
Status: DISCONTINUED | OUTPATIENT
Start: 2024-12-30 | End: 2025-01-02

## 2024-12-30 RX ORDER — DEXAMETHASONE SODIUM PHOSPHATE 4 MG/ML
4 INJECTION, SOLUTION INTRA-ARTICULAR; INTRALESIONAL; INTRAMUSCULAR; INTRAVENOUS; SOFT TISSUE ONCE
Status: DISCONTINUED | OUTPATIENT
Start: 2024-12-30 | End: 2024-12-30 | Stop reason: HOSPADM

## 2024-12-30 RX ORDER — FENTANYL CITRATE 50 UG/ML
100 INJECTION, SOLUTION INTRAMUSCULAR; INTRAVENOUS
Status: DISCONTINUED | OUTPATIENT
Start: 2024-12-30 | End: 2024-12-30 | Stop reason: HOSPADM

## 2024-12-30 RX ORDER — OXYCODONE AND ACETAMINOPHEN 5; 325 MG/1; MG/1
1 TABLET ORAL
Status: COMPLETED | OUTPATIENT
Start: 2024-12-30 | End: 2024-12-30

## 2024-12-30 RX ORDER — IPRATROPIUM BROMIDE AND ALBUTEROL SULFATE 2.5; .5 MG/3ML; MG/3ML
SOLUTION RESPIRATORY (INHALATION)
Status: COMPLETED
Start: 2024-12-30 | End: 2024-12-30

## 2024-12-30 RX ORDER — HYDRALAZINE HYDROCHLORIDE 20 MG/ML
10 INJECTION INTRAMUSCULAR; INTRAVENOUS
Status: DISCONTINUED | OUTPATIENT
Start: 2024-12-30 | End: 2024-12-30 | Stop reason: HOSPADM

## 2024-12-30 RX ORDER — SODIUM CHLORIDE 0.9 % (FLUSH) 0.9 %
5-40 SYRINGE (ML) INJECTION EVERY 12 HOURS SCHEDULED
Status: DISCONTINUED | OUTPATIENT
Start: 2024-12-30 | End: 2025-01-04 | Stop reason: HOSPADM

## 2024-12-30 RX ORDER — FENTANYL CITRATE 50 UG/ML
INJECTION, SOLUTION INTRAMUSCULAR; INTRAVENOUS
Status: DISCONTINUED | OUTPATIENT
Start: 2024-12-30 | End: 2024-12-30 | Stop reason: SDUPTHER

## 2024-12-30 RX ORDER — TRAMADOL HYDROCHLORIDE 50 MG/1
50 TABLET ORAL EVERY 4 HOURS PRN
Status: DISCONTINUED | OUTPATIENT
Start: 2024-12-30 | End: 2025-01-04 | Stop reason: HOSPADM

## 2024-12-30 RX ORDER — ROCURONIUM BROMIDE 10 MG/ML
INJECTION, SOLUTION INTRAVENOUS
Status: DISCONTINUED | OUTPATIENT
Start: 2024-12-30 | End: 2024-12-30 | Stop reason: SDUPTHER

## 2024-12-30 RX ORDER — APREPITANT 40 MG/1
40 CAPSULE ORAL ONCE
Status: COMPLETED | OUTPATIENT
Start: 2024-12-30 | End: 2024-12-30

## 2024-12-30 RX ORDER — ATORVASTATIN CALCIUM 10 MG/1
10 TABLET, FILM COATED ORAL DAILY
Status: DISCONTINUED | OUTPATIENT
Start: 2024-12-31 | End: 2025-01-04 | Stop reason: HOSPADM

## 2024-12-30 RX ORDER — GABAPENTIN 300 MG/1
600 CAPSULE ORAL ONCE
Status: COMPLETED | OUTPATIENT
Start: 2024-12-30 | End: 2024-12-30

## 2024-12-30 RX ORDER — GLUCAGON 1 MG/ML
1 KIT INJECTION PRN
Status: DISCONTINUED | OUTPATIENT
Start: 2024-12-30 | End: 2025-01-04 | Stop reason: HOSPADM

## 2024-12-30 RX ORDER — SEVOFLURANE 250 ML/250ML
LIQUID RESPIRATORY (INHALATION)
Status: DISPENSED
Start: 2024-12-30 | End: 2024-12-30

## 2024-12-30 RX ORDER — MAGNESIUM SULFATE 1 G/100ML
INJECTION INTRAVENOUS
Status: COMPLETED
Start: 2024-12-30 | End: 2024-12-30

## 2024-12-30 RX ORDER — ACETAMINOPHEN 500 MG
1000 TABLET ORAL ONCE
Status: COMPLETED | OUTPATIENT
Start: 2024-12-30 | End: 2024-12-30

## 2024-12-30 RX ORDER — ASPIRIN 81 MG/1
81 TABLET ORAL DAILY
Status: DISCONTINUED | OUTPATIENT
Start: 2024-12-30 | End: 2025-01-04 | Stop reason: HOSPADM

## 2024-12-30 RX ORDER — DIPHENHYDRAMINE HYDROCHLORIDE 50 MG/ML
12.5 INJECTION INTRAMUSCULAR; INTRAVENOUS
Status: DISCONTINUED | OUTPATIENT
Start: 2024-12-30 | End: 2024-12-30 | Stop reason: HOSPADM

## 2024-12-30 RX ORDER — DEXAMETHASONE SODIUM PHOSPHATE 10 MG/ML
INJECTION, SOLUTION INTRAMUSCULAR; INTRAVENOUS
Status: COMPLETED | OUTPATIENT
Start: 2024-12-30 | End: 2024-12-30

## 2024-12-30 RX ORDER — MIDAZOLAM HYDROCHLORIDE 2 MG/2ML
2 INJECTION, SOLUTION INTRAMUSCULAR; INTRAVENOUS ONCE
Status: DISCONTINUED | OUTPATIENT
Start: 2024-12-30 | End: 2024-12-30 | Stop reason: HOSPADM

## 2024-12-30 RX ORDER — ONDANSETRON 2 MG/ML
4 INJECTION INTRAMUSCULAR; INTRAVENOUS EVERY 6 HOURS PRN
Status: DISCONTINUED | OUTPATIENT
Start: 2024-12-30 | End: 2025-01-04 | Stop reason: HOSPADM

## 2024-12-30 RX ORDER — OXYCODONE AND ACETAMINOPHEN 5; 325 MG/1; MG/1
2 TABLET ORAL
Status: DISCONTINUED | OUTPATIENT
Start: 2024-12-30 | End: 2024-12-30 | Stop reason: HOSPADM

## 2024-12-30 RX ORDER — GLYCOPYRROLATE 0.2 MG/ML
INJECTION INTRAMUSCULAR; INTRAVENOUS
Status: DISCONTINUED | OUTPATIENT
Start: 2024-12-30 | End: 2024-12-30 | Stop reason: SDUPTHER

## 2024-12-30 RX ORDER — MEMANTINE HYDROCHLORIDE 5 MG/1
10 TABLET ORAL 2 TIMES DAILY
Status: DISCONTINUED | OUTPATIENT
Start: 2024-12-30 | End: 2024-12-30

## 2024-12-30 RX ORDER — TRAZODONE HYDROCHLORIDE 100 MG/1
200 TABLET ORAL NIGHTLY
Status: DISCONTINUED | OUTPATIENT
Start: 2024-12-30 | End: 2025-01-04 | Stop reason: HOSPADM

## 2024-12-30 RX ORDER — METRONIDAZOLE 500 MG/100ML
500 INJECTION, SOLUTION INTRAVENOUS
Status: COMPLETED | OUTPATIENT
Start: 2024-12-30 | End: 2024-12-30

## 2024-12-30 RX ORDER — BUDESONIDE AND FORMOTEROL FUMARATE DIHYDRATE 80; 4.5 UG/1; UG/1
2 AEROSOL RESPIRATORY (INHALATION)
Status: DISCONTINUED | OUTPATIENT
Start: 2024-12-30 | End: 2025-01-04 | Stop reason: HOSPADM

## 2024-12-30 RX ORDER — EPHEDRINE SULFATE/0.9% NACL/PF 25 MG/5 ML
SYRINGE (ML) INTRAVENOUS
Status: DISCONTINUED | OUTPATIENT
Start: 2024-12-30 | End: 2024-12-30 | Stop reason: SDUPTHER

## 2024-12-30 RX ORDER — MEPERIDINE HYDROCHLORIDE 50 MG/ML
12.5 INJECTION INTRAMUSCULAR; INTRAVENOUS; SUBCUTANEOUS EVERY 5 MIN PRN
Status: DISCONTINUED | OUTPATIENT
Start: 2024-12-30 | End: 2024-12-30 | Stop reason: HOSPADM

## 2024-12-30 RX ORDER — TOPIRAMATE 100 MG/1
200 TABLET, FILM COATED ORAL NIGHTLY
Status: DISCONTINUED | OUTPATIENT
Start: 2024-12-30 | End: 2025-01-02

## 2024-12-30 RX ORDER — ENOXAPARIN SODIUM 100 MG/ML
30 INJECTION SUBCUTANEOUS 2 TIMES DAILY
Status: DISCONTINUED | OUTPATIENT
Start: 2024-12-30 | End: 2025-01-04 | Stop reason: HOSPADM

## 2024-12-30 RX ORDER — SODIUM CHLORIDE 9 MG/ML
INJECTION, SOLUTION INTRAVENOUS PRN
Status: DISCONTINUED | OUTPATIENT
Start: 2024-12-30 | End: 2025-01-04 | Stop reason: HOSPADM

## 2024-12-30 RX ORDER — BUPIVACAINE HYDROCHLORIDE 2.5 MG/ML
INJECTION, SOLUTION EPIDURAL; INFILTRATION; INTRACAUDAL
Status: COMPLETED | OUTPATIENT
Start: 2024-12-30 | End: 2024-12-30

## 2024-12-30 RX ORDER — IPRATROPIUM BROMIDE AND ALBUTEROL SULFATE 2.5; .5 MG/3ML; MG/3ML
1 SOLUTION RESPIRATORY (INHALATION) ONCE
Status: COMPLETED | OUTPATIENT
Start: 2024-12-30 | End: 2024-12-30

## 2024-12-30 RX ORDER — MAGNESIUM SULFATE 1 G/100ML
INJECTION INTRAVENOUS
Status: DISCONTINUED | OUTPATIENT
Start: 2024-12-30 | End: 2024-12-30 | Stop reason: SDUPTHER

## 2024-12-30 RX ORDER — FENTANYL CITRATE 50 UG/ML
100 INJECTION, SOLUTION INTRAMUSCULAR; INTRAVENOUS ONCE
Status: COMPLETED | OUTPATIENT
Start: 2024-12-30 | End: 2024-12-30

## 2024-12-30 RX ORDER — LIDOCAINE 4 G/G
1 PATCH TOPICAL DAILY
Status: DISCONTINUED | OUTPATIENT
Start: 2024-12-30 | End: 2025-01-04 | Stop reason: HOSPADM

## 2024-12-30 RX ADMIN — ONDANSETRON 4 MG: 4 TABLET, ORALLY DISINTEGRATING ORAL at 07:07

## 2024-12-30 RX ADMIN — ROCURONIUM BROMIDE 20 MG: 10 INJECTION, SOLUTION INTRAVENOUS at 09:35

## 2024-12-30 RX ADMIN — KETOROLAC TROMETHAMINE 30 MG: 30 INJECTION, SOLUTION INTRAMUSCULAR at 10:13

## 2024-12-30 RX ADMIN — CARVEDILOL 25 MG: 12.5 TABLET, FILM COATED ORAL at 21:17

## 2024-12-30 RX ADMIN — ROCURONIUM BROMIDE 20 MG: 10 INJECTION, SOLUTION INTRAVENOUS at 08:37

## 2024-12-30 RX ADMIN — FENTANYL CITRATE 50 MCG: 50 INJECTION, SOLUTION INTRAMUSCULAR; INTRAVENOUS at 08:04

## 2024-12-30 RX ADMIN — GLYCOPYRROLATE 0.2 MG: 0.2 INJECTION INTRAMUSCULAR; INTRAVENOUS at 08:56

## 2024-12-30 RX ADMIN — BUDESONIDE AND FORMOTEROL FUMARATE DIHYDRATE 2 PUFF: 80; 4.5 AEROSOL RESPIRATORY (INHALATION) at 21:23

## 2024-12-30 RX ADMIN — ROCURONIUM BROMIDE 50 MG: 10 INJECTION, SOLUTION INTRAVENOUS at 08:04

## 2024-12-30 RX ADMIN — TRAMADOL HYDROCHLORIDE 50 MG: 50 TABLET, COATED ORAL at 16:24

## 2024-12-30 RX ADMIN — EPHEDRINE SULFATE 10 MG: 5 INJECTION INTRAVENOUS at 09:08

## 2024-12-30 RX ADMIN — IPRATROPIUM BROMIDE AND ALBUTEROL SULFATE 1 DOSE: .5; 2.5 SOLUTION RESPIRATORY (INHALATION) at 07:41

## 2024-12-30 RX ADMIN — GABAPENTIN 600 MG: 300 CAPSULE ORAL at 07:08

## 2024-12-30 RX ADMIN — MAGNESIUM SULFATE HEPTAHYDRATE 1000 MG: 1 INJECTION, SOLUTION INTRAVENOUS at 08:39

## 2024-12-30 RX ADMIN — TOPIRAMATE 200 MG: 100 TABLET, FILM COATED ORAL at 21:17

## 2024-12-30 RX ADMIN — FENTANYL CITRATE 100 MCG: 50 INJECTION, SOLUTION INTRAMUSCULAR; INTRAVENOUS at 07:46

## 2024-12-30 RX ADMIN — PROPOFOL 150 MG: 10 INJECTION, EMULSION INTRAVENOUS at 08:04

## 2024-12-30 RX ADMIN — ACETAMINOPHEN 1000 MG: 500 TABLET ORAL at 07:08

## 2024-12-30 RX ADMIN — LIDOCAINE HYDROCHLORIDE 50 MG: 10 INJECTION, SOLUTION EPIDURAL; INFILTRATION; INTRACAUDAL; PERINEURAL at 08:04

## 2024-12-30 RX ADMIN — TRAZODONE HYDROCHLORIDE 200 MG: 100 TABLET ORAL at 22:31

## 2024-12-30 RX ADMIN — GLYCOPYRROLATE 0.2 MG: 0.2 INJECTION INTRAMUSCULAR; INTRAVENOUS at 09:00

## 2024-12-30 RX ADMIN — ALBUTEROL SULFATE 2 PUFF: 90 AEROSOL, METERED RESPIRATORY (INHALATION) at 08:52

## 2024-12-30 RX ADMIN — FENTANYL CITRATE 50 MCG: 50 INJECTION, SOLUTION INTRAMUSCULAR; INTRAVENOUS at 09:35

## 2024-12-30 RX ADMIN — DEXAMETHASONE SODIUM PHOSPHATE 10 MG: 10 INJECTION, SOLUTION INTRAMUSCULAR; INTRAVENOUS at 08:10

## 2024-12-30 RX ADMIN — MAGNESIUM SULFATE HEPTAHYDRATE 1000 MG: 1 INJECTION, SOLUTION INTRAVENOUS at 08:28

## 2024-12-30 RX ADMIN — HYDROMORPHONE HYDROCHLORIDE 0.5 MG: 1 INJECTION, SOLUTION INTRAMUSCULAR; INTRAVENOUS; SUBCUTANEOUS at 12:34

## 2024-12-30 RX ADMIN — ONDANSETRON 4 MG: 2 INJECTION INTRAMUSCULAR; INTRAVENOUS at 10:13

## 2024-12-30 RX ADMIN — POTASSIUM CHLORIDE, DEXTROSE MONOHYDRATE AND SODIUM CHLORIDE: 150; 5; 450 INJECTION, SOLUTION INTRAVENOUS at 16:57

## 2024-12-30 RX ADMIN — MEMANTINE 10 MG: 5 TABLET ORAL at 21:17

## 2024-12-30 RX ADMIN — SODIUM CHLORIDE: 9 INJECTION, SOLUTION INTRAVENOUS at 09:04

## 2024-12-30 RX ADMIN — GABAPENTIN 300 MG: 300 CAPSULE ORAL at 21:17

## 2024-12-30 RX ADMIN — EPHEDRINE SULFATE 10 MG: 5 INJECTION INTRAVENOUS at 09:02

## 2024-12-30 RX ADMIN — DEXAMETHASONE SODIUM PHOSPHATE 10 MG: 10 INJECTION INTRAMUSCULAR; INTRAVENOUS at 07:53

## 2024-12-30 RX ADMIN — ALBUTEROL SULFATE 2 PUFF: 90 AEROSOL, METERED RESPIRATORY (INHALATION) at 08:56

## 2024-12-30 RX ADMIN — NALOXEGOL OXALATE 25 MG: 12.5 TABLET, FILM COATED ORAL at 07:08

## 2024-12-30 RX ADMIN — TRAMADOL HYDROCHLORIDE 50 MG: 50 TABLET, COATED ORAL at 21:17

## 2024-12-30 RX ADMIN — INSULIN LISPRO 4 UNITS: 100 INJECTION, SOLUTION INTRAVENOUS; SUBCUTANEOUS at 22:31

## 2024-12-30 RX ADMIN — EPHEDRINE SULFATE 5 MG: 5 INJECTION INTRAVENOUS at 10:13

## 2024-12-30 RX ADMIN — METRONIDAZOLE 500 MG: 500 SOLUTION INTRAVENOUS at 08:16

## 2024-12-30 RX ADMIN — SODIUM CHLORIDE: 9 INJECTION, SOLUTION INTRAVENOUS at 10:55

## 2024-12-30 RX ADMIN — APREPITANT 40 MG: 40 CAPSULE ORAL at 07:08

## 2024-12-30 RX ADMIN — HYDROMORPHONE HYDROCHLORIDE 0.5 MG: 1 INJECTION, SOLUTION INTRAMUSCULAR; INTRAVENOUS; SUBCUTANEOUS at 12:05

## 2024-12-30 RX ADMIN — Medication 2000 MG: at 08:10

## 2024-12-30 RX ADMIN — MIDAZOLAM 2 MG: 1 INJECTION INTRAMUSCULAR; INTRAVENOUS at 07:46

## 2024-12-30 RX ADMIN — HYDROMORPHONE HYDROCHLORIDE 0.5 MG: 1 INJECTION, SOLUTION INTRAMUSCULAR; INTRAVENOUS; SUBCUTANEOUS at 10:54

## 2024-12-30 RX ADMIN — GABAPENTIN 300 MG: 300 CAPSULE ORAL at 16:31

## 2024-12-30 RX ADMIN — SODIUM CHLORIDE: 9 INJECTION, SOLUTION INTRAVENOUS at 07:04

## 2024-12-30 RX ADMIN — BUPIVACAINE HYDROCHLORIDE 80 ML: 2.5 INJECTION, SOLUTION EPIDURAL; INFILTRATION; INTRACAUDAL; PERINEURAL at 07:53

## 2024-12-30 RX ADMIN — SUGAMMADEX 200 MG: 100 INJECTION, SOLUTION INTRAVENOUS at 10:51

## 2024-12-30 RX ADMIN — ENOXAPARIN SODIUM 30 MG: 100 INJECTION SUBCUTANEOUS at 21:17

## 2024-12-30 RX ADMIN — OXYCODONE HYDROCHLORIDE AND ACETAMINOPHEN 1 TABLET: 5; 325 TABLET ORAL at 14:05

## 2024-12-30 RX ADMIN — SODIUM CHLORIDE, PRESERVATIVE FREE 20 MG: 5 INJECTION INTRAVENOUS at 07:13

## 2024-12-30 RX ADMIN — HYDROMORPHONE HYDROCHLORIDE 0.5 MG: 1 INJECTION, SOLUTION INTRAMUSCULAR; INTRAVENOUS; SUBCUTANEOUS at 10:27

## 2024-12-30 RX ADMIN — IPRATROPIUM BROMIDE AND ALBUTEROL SULFATE 1 DOSE: 2.5; .5 SOLUTION RESPIRATORY (INHALATION) at 07:41

## 2024-12-30 RX ADMIN — SODIUM CHLORIDE: 9 INJECTION, SOLUTION INTRAVENOUS at 11:17

## 2024-12-30 ASSESSMENT — PAIN DESCRIPTION - LOCATION
LOCATION: ABDOMEN
LOCATION: GENERALIZED

## 2024-12-30 ASSESSMENT — PAIN SCALES - GENERAL
PAINLEVEL_OUTOF10: 9
PAINLEVEL_OUTOF10: 10
PAINLEVEL_OUTOF10: 10
PAINLEVEL_OUTOF10: 7
PAINLEVEL_OUTOF10: 9
PAINLEVEL_OUTOF10: 5
PAINLEVEL_OUTOF10: 8
PAINLEVEL_OUTOF10: 4
PAINLEVEL_OUTOF10: 10

## 2024-12-30 ASSESSMENT — PAIN DESCRIPTION - ORIENTATION
ORIENTATION: MID
ORIENTATION: MID

## 2024-12-30 ASSESSMENT — PAIN DESCRIPTION - DESCRIPTORS
DESCRIPTORS: STABBING;THROBBING
DESCRIPTORS: ACHING

## 2024-12-30 ASSESSMENT — LIFESTYLE VARIABLES: SMOKING_STATUS: 1

## 2024-12-30 ASSESSMENT — PAIN - FUNCTIONAL ASSESSMENT
PAIN_FUNCTIONAL_ASSESSMENT: 0-10
PAIN_FUNCTIONAL_ASSESSMENT: PREVENTS OR INTERFERES SOME ACTIVE ACTIVITIES AND ADLS

## 2024-12-30 ASSESSMENT — PAIN DESCRIPTION - PAIN TYPE: TYPE: SURGICAL PAIN

## 2024-12-30 NOTE — ANESTHESIA POSTPROCEDURE EVALUATION
Department of Anesthesiology  Postprocedure Note    Patient: Tash Mora  MRN: [id number]  YOB: 1960  Date of evaluation: 12/30/2024    Procedure Summary       Date: 12/30/24 Room / Location: [address]    Anesthesia Start: 0800 Anesthesia Stop: 1114    Procedure: OPEN BOWEL SEGMENTAL RESECTION OF TRANSVERSE COLON WITH TAP BLOCK Diagnosis:       Neoplasm of colon, malignant (HCC)      (Neoplasm of colon, malignant (HCC) [C18.9])    Surgeons: Rajinder Conte MD Responsible Provider: Hao Caba MD    Anesthesia Type: general, regional ASA Status: 3            Anesthesia Type: No value filed.    Ale Phase I: Ale Score: 8    Ale Phase II:      Anesthesia Post Evaluation    Patient location during evaluation: PACU  Patient participation: complete - patient participated  Level of consciousness: awake and alert  Airway patency: patent  Nausea & Vomiting: no nausea and no vomiting  Cardiovascular status: hemodynamically stable  Respiratory status: nasal cannula and spontaneous ventilation  Hydration status: euvolemic  Multimodal analgesia pain management approach  Pain management: adequate    No notable events documented.

## 2024-12-30 NOTE — OP NOTE
Operative Note      Patient: Tash Mora  YOB: 1960  MRN: [id number]    Date of Procedure: 12/30/2024    Pre-Op Diagnosis Codes:      * Neoplasm of colon, malignant (HCC) [C18.9]    Post-Op Diagnosis:  S/P removal of malignant polyp of colon with close margins       Procedure: Segmental colectomy with anastomosis    Surgeon: LANEY Conte MD    Assistant:  Isabel Miller DO  Resident: Henny Timmons MD    Anesthesia: General    Estimated Blood Loss (mL): 150 mL    Complications: None    Specimens:   ID Type Source Tests Collected by Time Destination   1 : Urine culture Urine Urine, straight catheter CULTURE, URINE Rajinder Conte MD 12/30/2024 0814    A : Transverse Colon with Tattooed Cancer Tissue Tissue SURGICAL PATHOLOGY Rajinder Conte MD 12/30/2024 0921        Implants:  Implant Name Type Inv. Item Serial No.  Lot No. LRB No. Used Action   BARRIER ADH SHT 6X5 IN SODIUM HYALURONATE CMC SEPRAFILM - [id number]  BARRIER ADH SHT 6X5 IN SODIUM HYALURONATE CMC SEPRAFILM  [employer] [id number] N/A 3 Implanted         Drains:   Closed/Suction Drain Lateral LLQ Bulb (Active)   Site Description Clean, dry & intact 12/30/24 1052   Dressing Status New dressing applied 12/30/24 1052       NG/OG/NJ/NE Tube Orogastric 16 fr Center mouth (Active)       Urinary Catheter 12/30/24 2 Way (Active)       Findings:  Infection Present At Time Of Surgery (PATOS) (choose all levels that have infection present):  No infection present  Other Findings: Tattoo noted at the site of endoscopic removal of colon polyp which had shown adenocarcinoma with close margin and invasion into submucosa.  Colon Resection  Operation performed with curative intent Yes   Tumor Location (select all that apply) Ascending colon and Transverse colon   Extent of colon and vascular resection (select all that apply) Other segmental resection of ascending and transverse colon, area being previously marked with tattoo

## 2024-12-30 NOTE — ANESTHESIA PRE PROCEDURE
Weight: 112 kg (247 lb)   Height: 1.803 m (5' 11\")                                              BP Readings from Last 3 Encounters:   12/30/24 (!) 120/94   12/23/24 132/82   12/20/24 115/83       NPO Status: Time of last liquid consumption: 0500 (sip of water with meds)                        Time of last solid consumption: 1800                        Date of last liquid consumption: 12/30/24                        Date of last solid food consumption: 12/28/24    BMI:   Wt Readings from Last 3 Encounters:   12/30/24 112 kg (247 lb)   12/23/24 114.8 kg (253 lb)   12/20/24 108.9 kg (240 lb)     Body mass index is 34.45 kg/m².    CBC:   Lab Results   Component Value Date/Time    WBC 6.5 12/20/2024 03:15 PM    RBC 5.19 12/20/2024 03:15 PM    HGB 15.7 12/20/2024 03:15 PM    HCT 47.9 12/20/2024 03:15 PM    MCV 92.3 12/20/2024 03:15 PM    RDW 12.6 12/20/2024 03:15 PM     12/20/2024 03:15 PM       CMP:   Lab Results   Component Value Date/Time     12/20/2024 03:15 PM    K 4.2 12/20/2024 03:15 PM     12/20/2024 03:15 PM    CO2 22 12/20/2024 03:15 PM    BUN 14 12/20/2024 03:15 PM    CREATININE 1.2 12/20/2024 03:15 PM    GFRAA >60 11/25/2021 06:53 AM    LABGLOM 68 12/20/2024 03:15 PM    LABGLOM >60 01/20/2024 10:13 AM    GLUCOSE 103 12/20/2024 03:15 PM    CALCIUM 9.5 12/20/2024 03:15 PM    BILITOT 0.5 12/20/2024 03:15 PM    ALKPHOS 97 12/20/2024 03:15 PM    AST 17 12/20/2024 03:15 PM    ALT 9 12/20/2024 03:15 PM       POC Tests: No results for input(s): \"POCGLU\", \"POCNA\", \"POCK\", \"POCCL\", \"POCBUN\", \"POCHEMO\", \"POCHCT\" in the last 72 hours.    Coags:   Lab Results   Component Value Date/Time    PROTIME 14.3 08/11/2024 02:10 PM    INR 1.1 08/11/2024 02:10 PM    APTT 26.7 08/11/2024 02:10 PM       HCG (If Applicable): No results found for: \"PREGTESTUR\", \"PREGSERUM\", \"HCG\", \"HCGQUANT\"     ABGs: No results found for: \"PHART\", \"PO2ART\", \"CNK4YXX\", \"WUZ1QKP\", \"BEART\", \"V3QBMKDN\"     Type & Screen (If

## 2024-12-30 NOTE — PROGRESS NOTES
Pharmacist Review and Automatic Dose Adjustment of Prophylactic Enoxaparin    The reviewing pharmacist has made an adjustment to the ordered enoxaparin dose or converted to UFH per the approved [facility] protocol and table as identified below.      Tsah Mora is a 64 y.o. male.     Estimated Creatinine Clearance: 80 mL/min (based on SCr of 1.2 mg/dL).    Height:   Ht Readings from Last 1 Encounters:   12/30/24 1.803 m (5' 11\")     Weight:  Wt Readings from Last 1 Encounters:   12/30/24 112 kg (247 lb)             Plan: Based upon the patient's weight and renal function    Ordered: Enoxaparin 40mg SUBQ Daily    Changed/converted to    New Order: Enoxaparin 30mg SUBQ BID      Thank you,  Gerri Bran [facility]  12/30/2024, 3:03 PM

## 2024-12-30 NOTE — ANESTHESIA PROCEDURE NOTES
Peripheral Block    Patient location during procedure: pre-op  Reason for block: post-op pain management and at surgeon's request  Start time: 12/30/2024 7:48 AM  End time: 12/30/2024 7:53 AM  Staffing  Performed: anesthesiologist   Anesthesiologist: Hao Caba MD  Performed by: Hao Caba MD  Authorized by: Hao Caba MD    Preanesthetic Checklist  Completed: patient identified, IV checked, site marked, risks and benefits discussed, surgical/procedural consents, equipment checked, pre-op evaluation, timeout performed, anesthesia consent given, oxygen available, monitors applied/VS acknowledged, fire risk safety assessment completed and verbalized and blood product R/B/A discussed and consented  Peripheral Block   Patient position: supine  Prep: ChloraPrep  Provider prep: mask and sterile gloves  Patient monitoring: cardiac monitor, continuous pulse ox, frequent blood pressure checks, IV access, oxygen and responsive to questions  Block type: TAP and Rectus sheath  Laterality: bilateral  Injection technique: single-shot  Guidance: ultrasound guided    Needle   Needle type: insulated echogenic nerve stimulator needle   Needle gauge: 20 G  Needle localization: anatomical landmarks and ultrasound guidance  Needle length: 4 IN.  Assessment   Injection assessment: negative aspiration for heme, no paresthesia on injection, local visualized surrounding nerve on ultrasound and no intravascular symptoms  Paresthesia pain: none  Slow fractionated injection: yes  Hemodynamics: stable  Outcomes: uncomplicated and patient tolerated procedure well    Medications Administered  BUPivacaine (MARCAINE) PF injection 0.25% - Perineural   80 mL - 12/30/2024 7:53:00 AM  dexAMETHasone (DECADRON) (PF) 10 mg/mL injection - Other   10 mg - 12/30/2024 7:53:00 AM

## 2024-12-30 NOTE — H&P
medications for this visit.      Allergies   Allergen Reactions    Lisinopril      States he is allergic to the generic forms of medication, can take lisinopril    Aleve  [Naproxen Sodium] Nausea Only    Amitriptyline Hcl Other (See Comments)     rash    Exelon [Rivastigmine] Other (See Comments)     Patient stated patch left a \"burn kathy\" on his skin     Ibuprofen Other (See Comments)     \"hurts my stomach.\"    Pregabalin      Other Reaction(s): weight gain       Review of Systems   Constitutional:  Negative for activity change, appetite change, chills, diaphoresis, fatigue, fever and unexpected weight change.   Respiratory:  Negative for apnea, cough, chest tightness, shortness of breath, wheezing and stridor.    Cardiovascular:  Negative for chest pain and leg swelling.   Gastrointestinal:  Negative for abdominal distention, abdominal pain, anal bleeding, blood in stool, constipation, diarrhea, nausea, rectal pain and vomiting.   Genitourinary:  Negative for difficulty urinating, dysuria, enuresis, flank pain, frequency, hematuria and urgency.   Musculoskeletal:  Negative for back pain.   Skin:  Negative for color change and pallor.   Allergic/Immunologic: Negative for food allergies and immunocompromised state.   Neurological:  Negative for syncope, speech difficulty, weakness, light-headedness, numbness and headaches.   Hematological:  Negative for adenopathy. Does not bruise/bleed easily.   Psychiatric/Behavioral:  The patient is not nervous/anxious.        Physical Exam:      /83 (Site: Left Upper Arm, Position: Sitting, Cuff Size: Large Adult)   Pulse 65   Resp 18   Ht 1.803 m (5' 11\")   Wt 108.9 kg (240 lb)   BMI 33.47 kg/m²     HEENT: WNL  Lymph nodes: Normal cervical lymph nodes  Heart: regular rate and rhythm  Lungs: Bilaterally clear to auscultation.  Abdomen: soft, nontender, nondistended, no masses or organomegaly.  Healed trocar scars in upper abdomen.  Extremities: Normal with no palpable  edema.      Studies Review:     LABS:  CBC:   Lab Results   Component Value Date    WBC 5.1 10/21/2024    HGB 15.3 10/21/2024    HCT 44.7 10/21/2024    MCV 92.7 10/21/2024     10/21/2024        BMP:   Lab Results   Component Value Date/Time     10/21/2024 12:05 PM    K 3.9 10/21/2024 12:05 PM     10/21/2024 12:05 PM    CO2 21 10/21/2024 12:05 PM    BUN 13 10/21/2024 12:05 PM    CREATININE 1.0 10/21/2024 12:05 PM    GLUCOSE 129 10/21/2024 12:05 PM    CALCIUM 8.9 10/21/2024 12:05 PM    LABGLOM 85 10/21/2024 12:05 PM    LABGLOM >60 01/20/2024 10:13 AM         Assessment and Plan:       Tash was seen today for follow-up.    Diagnoses and all orders for this visit:    Malignant neoplasm of ascending colon (HCC)         1. Malignant neoplasm of ascending colon (HCC)    Assessment:  Polyp with focus of invasive cancer removed with negative margin but involving submucosa at the stalk.  Completely asymptomatic with respect to GI tract at this time.  Site of polypectomy marked with tattoo by gastroenterologist on 4/22/2024.    Plan:   Proceed to planned surgery involving segmental resection of colon which has been stated to be either an ascending or transverse segments.  Identification expected to be possible with previous tattoo kathy.  Intraoperative colonoscopy may be required.    Followup: Return if symptoms worsen or fail to improve.

## 2024-12-30 NOTE — CONSULTS
[facility]  Office: [phone]  Topher Botello DO, Manohar John DO, Les Smith DO, Erik Turner DO, Lupillo Diaz MD, Maeve Figueroa MD, Jennifer Altamirano MD, Aylin Levi MD,  Kemal Newby MD, Manuelito Shaffer MD, Josiah Cox MD,  Adam Finch DO, Batsheva Moralez MD, Aleks Shaver MD, Quan Botello DO, Lola Casper MD,  Vikram Sunshine DO, Dionna Egan MD, Siobhan Cleary MD, Jennifer Titus MD, Perfecto Lomas MD,  Alex Robert MD, Checo Rose MD, Darío Rodriguez MD, Pato Betancourt MD, Av Breaux MD, Rj Hernandez MD, Roger London DO, Alok Couch MD, Adam Marcus MD, Mohsin Reza, MD, Shirley Waterhouse, CNP,  Fatemeh Saldana CNP, Roger Zavala, FANNIE,  Marta Suarez, SKIP, Rosmery Luke, CNP, Estella Araujo, CNP, Martha Zamarripa, CNP, Flaquita Reed, CNP, Justina Todd, PA-C, Kayla Villarreal PA-C, Mecca Pacheco, CNP, Nilsa Delgado, CNP,  Danielle Montes, CNP, Beverley Winslow, CNP, Karen Lofton, CNP,  Keerthi Hinojosa CNP, Susie Crump, CNP         [facility]   IN-PATIENT SERVICE   [facility]    CONSULTATION / HISTORY AND PHYSICAL EXAMINATION            Date:   12/30/2024  Patient name:  Tash Mora  Date of admission:  12/30/2024  6:05 AM  MRN:   [id number]  Account:  [id number]  YOB: 1960  PCP:    Sandra Roberson DO  Room:   [facility]/NONE  Code Status:    Full Code    Physician Requesting Consult: Rajinder Conte MD    Reason for Consult: Postoperative medical management    History Obtained From:     patient, electronic medical record    History of Present Illness:     Tash Mora is a 64 y.o.  /  male who presents with No chief complaint on file.   and is admitted to the hospital for the management of Neoplasm of colon, malignant (HCC).    This very pleasant 64-year-old male has been found to have colon cancer involving his transverse colon during outpatient screening.  He underwent  no abnormal sensation, normal speech, cranial nerves II through XII grossly intact  Skin: No gross lesions, rashes, bruising or bleeding on exposed skin area  Extremities:  peripheral pulses palpable, no pedal edema or calf pain with palpation  Psych: normal affect     Investigations:      Laboratory Testing:  Recent Results (from the past 24 hour(s))   POC Glucose Fingerstick    Collection Time: 12/30/24 11:19 AM   Result Value Ref Range    POC Glucose 170 (H) 75 - 110 mg/dL       Imaging/Diagnostics:  No results found.    Assessment :      Hospital Problems             Last Modified POA    * (Principal) Neoplasm of colon, malignant (HCC) 12/18/2024 Unknown    Malignant tumor of ascending colon (HCC) 12/30/2024 Yes       Plan:     Colon cancer  Status post colectomy  Postoperative pain control  Diet per surgery  Essential hypertension  Continue Norvasc, Coreg  Monitor and titrate accordingly  Dyslipidemia  Continue atorvastatin  History of diabetes mellitus  Most recent A1c 5.4, patient has been off Glucophage for quite some time  No intervention required at this point in time    Patient is admitted as inpatient status because of co-morbidities listed above, severity of signs and symptoms as outlined, requirement for current medical therapies and most importantly because of direct risk to patient if care not provided in a hospital setting.  Expected length of stay > 48 hours. Patient is admitted in the Med/Surge    Medical Decision Making: Bharat Botello DO  12/30/2024  1:30 PM    Copy sent to Sandra Elizalde DO

## 2024-12-30 NOTE — INTERVAL H&P NOTE
Update History & Physical    The patient's History and Physical of December 20, 2024 was reviewed with the patient and I examined the patient. There was no change. The surgical site was confirmed by the patient and me.     Plan: The risks, benefits, expected outcome, and alternative to the recommended procedure have been discussed with the patient. Patient understands and wants to proceed with the procedure.     Electronically signed by Rajinder Conte MD on 12/30/2024 at 7:42 AM

## 2024-12-31 LAB
ANION GAP SERPL CALCULATED.3IONS-SCNC: 10 MMOL/L (ref 9–17)
BASOPHILS # BLD: 0.01 K/UL (ref 0–0.2)
BASOPHILS NFR BLD: 0 % (ref 0–2)
BUN SERPL-MCNC: 20 MG/DL (ref 8–23)
CALCIUM SERPL-MCNC: 8.5 MG/DL (ref 8.6–10.4)
CHLORIDE SERPL-SCNC: 109 MMOL/L (ref 98–107)
CO2 SERPL-SCNC: 18 MMOL/L (ref 20–31)
CREAT SERPL-MCNC: 1.1 MG/DL (ref 0.7–1.2)
EOSINOPHIL # BLD: 0 K/UL (ref 0–0.4)
EOSINOPHILS RELATIVE PERCENT: 0 % (ref 1–4)
ERYTHROCYTE [DISTWIDTH] IN BLOOD BY AUTOMATED COUNT: 12.7 % (ref 12.5–15.4)
GFR, ESTIMATED: 75 ML/MIN/1.73M2
GLUCOSE BLD-MCNC: 129 MG/DL (ref 75–110)
GLUCOSE BLD-MCNC: 136 MG/DL (ref 75–110)
GLUCOSE BLD-MCNC: 152 MG/DL (ref 75–110)
GLUCOSE BLD-MCNC: 158 MG/DL (ref 75–110)
GLUCOSE BLD-MCNC: 162 MG/DL (ref 75–110)
GLUCOSE BLD-MCNC: 168 MG/DL (ref 75–110)
GLUCOSE SERPL-MCNC: 166 MG/DL (ref 70–99)
HCT VFR BLD AUTO: 42.9 % (ref 41–53)
HGB BLD-MCNC: 14.8 G/DL (ref 13.5–17.5)
LYMPHOCYTES NFR BLD: 1.09 K/UL (ref 1–4.8)
LYMPHOCYTES RELATIVE PERCENT: 9 % (ref 24–44)
MAGNESIUM SERPL-MCNC: 2 MG/DL (ref 1.6–2.6)
MCH RBC QN AUTO: 30.8 PG (ref 26–34)
MCHC RBC AUTO-ENTMCNC: 34.5 G/DL (ref 31–37)
MCV RBC AUTO: 89.4 FL (ref 80–100)
MICROORGANISM SPEC CULT: NO GROWTH
MONOCYTES NFR BLD: 0.82 K/UL (ref 0.1–1.2)
MONOCYTES NFR BLD: 7 % (ref 2–11)
NEUTROPHILS NFR BLD: 84 % (ref 36–66)
NEUTS SEG NFR BLD: 10.05 K/UL (ref 1.8–7.7)
PLATELET # BLD AUTO: 163 K/UL (ref 140–450)
PMV BLD AUTO: 11.1 FL (ref 8–14)
POTASSIUM SERPL-SCNC: 4.4 MMOL/L (ref 3.7–5.3)
RBC # BLD AUTO: 4.8 M/UL (ref 4.5–5.9)
SERVICE CMNT-IMP: NORMAL
SODIUM SERPL-SCNC: 137 MMOL/L (ref 135–144)
SPECIMEN DESCRIPTION: NORMAL
WBC OTHER # BLD: 12 K/UL (ref 3.5–11)

## 2024-12-31 PROCEDURE — 99232 SBSQ HOSP IP/OBS MODERATE 35: CPT | Performed by: HOSPITALIST

## 2024-12-31 PROCEDURE — 94640 AIRWAY INHALATION TREATMENT: CPT

## 2024-12-31 PROCEDURE — 6370000000 HC RX 637 (ALT 250 FOR IP): Performed by: STUDENT IN AN ORGANIZED HEALTH CARE EDUCATION/TRAINING PROGRAM

## 2024-12-31 PROCEDURE — 83735 ASSAY OF MAGNESIUM: CPT

## 2024-12-31 PROCEDURE — 36415 COLL VENOUS BLD VENIPUNCTURE: CPT

## 2024-12-31 PROCEDURE — 6360000002 HC RX W HCPCS: Performed by: COLON & RECTAL SURGERY

## 2024-12-31 PROCEDURE — 82947 ASSAY GLUCOSE BLOOD QUANT: CPT

## 2024-12-31 PROCEDURE — 85025 COMPLETE CBC W/AUTO DIFF WBC: CPT

## 2024-12-31 PROCEDURE — 97116 GAIT TRAINING THERAPY: CPT

## 2024-12-31 PROCEDURE — 1200000000 HC SEMI PRIVATE

## 2024-12-31 PROCEDURE — 6370000000 HC RX 637 (ALT 250 FOR IP): Performed by: COLON & RECTAL SURGERY

## 2024-12-31 PROCEDURE — 80048 BASIC METABOLIC PNL TOTAL CA: CPT

## 2024-12-31 PROCEDURE — 2500000003 HC RX 250 WO HCPCS: Performed by: COLON & RECTAL SURGERY

## 2024-12-31 PROCEDURE — 6370000000 HC RX 637 (ALT 250 FOR IP): Performed by: HOSPITALIST

## 2024-12-31 PROCEDURE — 6370000000 HC RX 637 (ALT 250 FOR IP): Performed by: NURSE PRACTITIONER

## 2024-12-31 PROCEDURE — 2500000003 HC RX 250 WO HCPCS: Performed by: STUDENT IN AN ORGANIZED HEALTH CARE EDUCATION/TRAINING PROGRAM

## 2024-12-31 PROCEDURE — 97162 PT EVAL MOD COMPLEX 30 MIN: CPT

## 2024-12-31 RX ORDER — CALCIUM CARBONATE 500 MG/1
1000 TABLET, CHEWABLE ORAL 3 TIMES DAILY PRN
Status: DISCONTINUED | OUTPATIENT
Start: 2024-12-31 | End: 2025-01-04 | Stop reason: HOSPADM

## 2024-12-31 RX ORDER — CARVEDILOL 12.5 MG/1
12.5 TABLET ORAL 2 TIMES DAILY
Status: DISCONTINUED | OUTPATIENT
Start: 2024-12-31 | End: 2025-01-04 | Stop reason: HOSPADM

## 2024-12-31 RX ORDER — ACETAMINOPHEN 500 MG
1000 TABLET ORAL EVERY 8 HOURS SCHEDULED
Status: DISCONTINUED | OUTPATIENT
Start: 2024-12-31 | End: 2024-12-31

## 2024-12-31 RX ORDER — ACETAMINOPHEN 500 MG
1000 TABLET ORAL EVERY 6 HOURS SCHEDULED
Status: DISCONTINUED | OUTPATIENT
Start: 2024-12-31 | End: 2025-01-04 | Stop reason: HOSPADM

## 2024-12-31 RX ORDER — GABAPENTIN 300 MG/1
300 CAPSULE ORAL EVERY 8 HOURS SCHEDULED
Status: DISCONTINUED | OUTPATIENT
Start: 2024-12-31 | End: 2025-01-04 | Stop reason: HOSPADM

## 2024-12-31 RX ADMIN — ACETAMINOPHEN 1000 MG: 500 TABLET ORAL at 22:56

## 2024-12-31 RX ADMIN — ATORVASTATIN CALCIUM 10 MG: 10 TABLET, FILM COATED ORAL at 09:28

## 2024-12-31 RX ADMIN — MEMANTINE 10 MG: 5 TABLET ORAL at 21:46

## 2024-12-31 RX ADMIN — POTASSIUM CHLORIDE, DEXTROSE MONOHYDRATE AND SODIUM CHLORIDE: 150; 5; 450 INJECTION, SOLUTION INTRAVENOUS at 07:01

## 2024-12-31 RX ADMIN — GABAPENTIN 300 MG: 300 CAPSULE ORAL at 14:29

## 2024-12-31 RX ADMIN — TRAMADOL HYDROCHLORIDE 50 MG: 50 TABLET, COATED ORAL at 18:34

## 2024-12-31 RX ADMIN — GABAPENTIN 300 MG: 300 CAPSULE ORAL at 22:56

## 2024-12-31 RX ADMIN — GABAPENTIN 300 MG: 300 CAPSULE ORAL at 06:54

## 2024-12-31 RX ADMIN — ENOXAPARIN SODIUM 30 MG: 100 INJECTION SUBCUTANEOUS at 09:28

## 2024-12-31 RX ADMIN — ACETAMINOPHEN 1000 MG: 500 TABLET ORAL at 18:34

## 2024-12-31 RX ADMIN — ENOXAPARIN SODIUM 30 MG: 100 INJECTION SUBCUTANEOUS at 21:47

## 2024-12-31 RX ADMIN — ACETAMINOPHEN 1000 MG: 500 TABLET ORAL at 06:49

## 2024-12-31 RX ADMIN — ACETAMINOPHEN 1000 MG: 500 TABLET ORAL at 11:55

## 2024-12-31 RX ADMIN — ALBUTEROL SULFATE 2 PUFF: 90 AEROSOL, METERED RESPIRATORY (INHALATION) at 20:18

## 2024-12-31 RX ADMIN — CALCIUM CARBONATE (ANTACID) CHEW TAB 500 MG 1000 MG: 500 CHEW TAB at 23:03

## 2024-12-31 RX ADMIN — CARVEDILOL 12.5 MG: 12.5 TABLET, FILM COATED ORAL at 21:47

## 2024-12-31 RX ADMIN — TRAMADOL HYDROCHLORIDE 50 MG: 50 TABLET, COATED ORAL at 14:29

## 2024-12-31 RX ADMIN — TOPIRAMATE 200 MG: 100 TABLET, FILM COATED ORAL at 21:46

## 2024-12-31 RX ADMIN — NALOXEGOL OXALATE 25 MG: 12.5 TABLET, FILM COATED ORAL at 09:28

## 2024-12-31 RX ADMIN — BUDESONIDE AND FORMOTEROL FUMARATE DIHYDRATE 2 PUFF: 80; 4.5 AEROSOL RESPIRATORY (INHALATION) at 20:21

## 2024-12-31 RX ADMIN — CARVEDILOL 12.5 MG: 12.5 TABLET, FILM COATED ORAL at 09:28

## 2024-12-31 RX ADMIN — MEMANTINE 10 MG: 5 TABLET ORAL at 09:28

## 2024-12-31 RX ADMIN — BUDESONIDE AND FORMOTEROL FUMARATE DIHYDRATE 2 PUFF: 80; 4.5 AEROSOL RESPIRATORY (INHALATION) at 09:50

## 2024-12-31 RX ADMIN — POTASSIUM CHLORIDE, DEXTROSE MONOHYDRATE AND SODIUM CHLORIDE: 150; 5; 450 INJECTION, SOLUTION INTRAVENOUS at 18:42

## 2024-12-31 RX ADMIN — TRAZODONE HYDROCHLORIDE 200 MG: 100 TABLET ORAL at 21:47

## 2024-12-31 RX ADMIN — TRAMADOL HYDROCHLORIDE 50 MG: 50 TABLET, COATED ORAL at 09:37

## 2024-12-31 RX ADMIN — ONDANSETRON 4 MG: 4 TABLET, ORALLY DISINTEGRATING ORAL at 21:53

## 2024-12-31 RX ADMIN — TRAMADOL HYDROCHLORIDE 50 MG: 50 TABLET, COATED ORAL at 04:48

## 2024-12-31 ASSESSMENT — PAIN DESCRIPTION - LOCATION
LOCATION: ABDOMEN;SHOULDER
LOCATION: ABDOMEN
LOCATION: ABDOMEN
LOCATION: ABDOMEN;SHOULDER
LOCATION: ABDOMEN
LOCATION: SHOULDER
LOCATION: ABDOMEN

## 2024-12-31 ASSESSMENT — PAIN DESCRIPTION - DESCRIPTORS
DESCRIPTORS: THROBBING;STABBING
DESCRIPTORS: JABBING
DESCRIPTORS: THROBBING

## 2024-12-31 ASSESSMENT — PAIN DESCRIPTION - ORIENTATION
ORIENTATION: MID
ORIENTATION: RIGHT;LEFT;MID
ORIENTATION: MID
ORIENTATION: ANTERIOR;RIGHT
ORIENTATION: MID;LOWER
ORIENTATION: RIGHT;LEFT
ORIENTATION: MID

## 2024-12-31 ASSESSMENT — PAIN SCALES - GENERAL
PAINLEVEL_OUTOF10: 10
PAINLEVEL_OUTOF10: 8
PAINLEVEL_OUTOF10: 8
PAINLEVEL_OUTOF10: 6
PAINLEVEL_OUTOF10: 7
PAINLEVEL_OUTOF10: 7
PAINLEVEL_OUTOF10: 3
PAINLEVEL_OUTOF10: 6
PAINLEVEL_OUTOF10: 6

## 2024-12-31 ASSESSMENT — PAIN - FUNCTIONAL ASSESSMENT: PAIN_FUNCTIONAL_ASSESSMENT: PREVENTS OR INTERFERES SOME ACTIVE ACTIVITIES AND ADLS

## 2024-12-31 NOTE — PROGRESS NOTES
Physical Therapy  Facility/Department: [address]   Physical Therapy Initial Evaluation    Patient Name: Tash Mora        MRN: [id number]    : 1960    Date of Service: 2024    No chief complaint on file.    Past Medical History:  has a past medical history of ADHD (attention deficit hyperactivity disorder), Arthritis, Asthma, Back pain, Back pain, Brain aneurysm, CAD (coronary artery disease), Cataract, Cerebral artery occlusion with cerebral infarction (HCC), Colon cancer (HCC), Depression, Diabetes mellitus (HCC), Headache, Heart attack (HCC), Hyperlipidemia, Hypertension, Kidney stones, Memory loss, long term, Migraine, Mini stroke, Osteoarthritis, Seasonal allergies, Sinus problem, Sleep apnea, Sleep difficulties, Snores, Wears eyeglasses, and Wellness examination.  Past Surgical History:  has a past surgical history that includes Cholecystectomy; Vasectomy; Upper gastrointestinal endoscopy; laparoscopy (N/A, 2018); hernia repair; Inguinal hernia repair (Left, 2016); cysto/uretero/pyeloscopy, calculus tx (Right, 2020); Colonoscopy (); Penile prosthesis placement (N/A, 2021); Cystocopy (2023); Ureter surgery (Left, 2023); Colonoscopy (N/A, 2024); Colonoscopy (N/A, 2024); and Sigmoid Colectomy (N/A, 2024).    Discharge Recommendations  Discharge Recommendations: No therapy recommended at discharge  PT Equipment Recommendations  Equipment Needed: No    Assessment       Assessment: Pt presents with colon CA and underwent transverse colon resection. At baseline, pt lives alone, ind gait no device, ind ADL's, drives, shops. Pt currently demonstrating modified independence with bed mobility, ind transfers, pt ambulated 400ft no device independently. Pt negotiated 2 steps with one railing and supervision. Pt demonstrates adequate safety for return to prior living situation. Pt does not require skill of continued PT due to pt's  2  Stairs Height: 6\"  Rails:  (one railing)  Assistance: Supervision          Balance   Balance  Posture: Good  Sitting - Static: Good, +  Sitting - Dynamic: Good, +  Standing - Static: Good  Standing - Dynamic: Good, -        Patient Education  Patient Education  Education Given To: Patient  Education Provided: Role of Therapy;Plan of Care;Mobility Training  Education Provided Comments: Log rolling, importance of mobility  Education Method: Demonstration;Verbal  Barriers to Learning: None  Education Outcome: Verbalized understanding;Demonstrated understanding    Plan  Physical Therapy Plan  General Plan: Discharge with evaluation only           Minutes  PT Individual Minutes  Time In: 1115  Time Out: 1132  Minutes: 17  Time Code Minutes  Timed Code Treatment Minutes: 10 Minutes    Electronically signed by MARCO MARIE PT on 12/31/24 at 12:25 PM EST

## 2024-12-31 NOTE — PROGRESS NOTES
Spiritual Health History and Assessment/Progress Note  [facility]    (P) Initial Encounter, Spiritual/Emotional Needs,  ,  ,      Name: Tash Mora MRN: [id number]    Age: 64 y.o.     Sex: male   Language: English   Hindu: None   Neoplasm of colon, malignant (HCC)     Date: 12/31/2024            Total Time Calculated: (P) 10 min              Spiritual Assessment began in [address]        Referral/Consult From: (P) Rounding   Encounter Overview/Reason: (P) Initial Encounter, Spiritual/Emotional Needs  Service Provided For: (P) Patient    Joann, Belief, Meaning:   Patient identifies as spiritual, is connected with a joann tradition or spiritual practice, and has beliefs or practices that help with coping during difficult times  Family/Friends No family/friends present      Importance and Influence:  Patient has spiritual/personal beliefs that influence decisions regarding their health  Family/Friends No family/friends present    Community:  Patient is connected with a spiritual community and feels well-supported. Support system includes: Children and Joann Community  Family/Friends No family/friends present    Assessment and Plan of Care:     Patient Interventions include: Facilitated expression of thoughts and feelings, Affirmed coping skills/support systems, and Provided sacramental/Jainism ritual  Family/Friends Interventions include: No family/friends present    Patient Plan of Care: Spiritual Care available upon further referral  Family/Friends Plan of Care: Spiritual Care available upon further referral    Electronically signed by Chaplain Leatha on 12/31/2024 at 11:07 AM       12/31/24 1101   Encounter Summary   Encounter Overview/Reason Initial Encounter;Spiritual/Emotional Needs   Service Provided For Patient   Referral/Consult From [facility]   Support System Children;Family members   Last Encounter  12/31/24   Complexity of Encounter Low   Begin Time 1050   End Time   1100   Total Time Calculated 10 min   Spiritual/Emotional needs   Type Spiritual Support   Assessment/Intervention/Outcome   Assessment Calm;Coping;Hopeful;Peaceful   Intervention Active listening;Discussed belief system/Taoist practices/katie;Discussed illness injury and it’s impact;Discussed relationship with God;Explored/Affirmed feelings, thoughts, concerns;Sustaining Presence/Ministry of presence;Prayer (assurance of)/Dequincy   Outcome Connection/Belonging;Comfort;Engaged in conversation;Expressed Gratitude;Expressed feelings, needs, and concerns   Plan and Referrals   Plan/Referrals Continue Support (comment)

## 2024-12-31 NOTE — PROGRESS NOTES
[facility]  Office: [phone]  Tophre Botello DO, Manohar John DO, Les Smith DO, Erik Turner DO, Lupillo Diaz MD, Maeve Figueroa MD, Jennifer Altamirano MD, Aylin Levi MD,  Kemal Newby MD, Manuelito Shaffer MD, Josiah Cox MD,  Adam Finch DO, Batsheva Moralez MD, Aleks Shaver MD, Quan Botello DO, Lola Casper MD,  Vikram Sunshine DO, Dionna Egan MD, Siobhan Cleary MD, Jennifer Titus MD, Perfecto Lomas MD,  Alex Robert MD, Checo Rose MD, Darío Rodriguez MD, Pato Betancourt MD, Av Breaux MD, Rj Hernandez MD, Roger London DO, Alok Couch MD, Adam Marcus MD, Mohsin Reza, MD, Shirley Waterhouse, CNP,  Fatemeh Saldana CNP, Roger Zavala, FANNIE,  Marta Suarez, SKIP, Rosmery Luke, FANNIE, Estella Araujo, FANNIE, Martha Zamarripa CNP, Flaquita Reed CNP, Justina Todd, PA-C, Kayla Villarreal PA-C, Mecca Pacheco, FANNIE, Nilsa Delgado, CNP,  Danielle Montes, FANNIE, Beverley Winslow, CNP, Karen Lofton, FANNIE,  Keerthi Hinojosa CNP, Susie Crump, FANNIE         [facility]   IN-PATIENT SERVICE   [facility]    Progress Note    12/31/2024    10:32 AM    Name:   Tash Mora  MRN:     [id number]     Acct:      [id number]   Room:   [address] [place] [address] Day:  1  Admit Date:  12/30/2024  6:05 AM    PCP:   Sandra Roberson DO  Code Status:  Full Code    Subjective:     Patient seen in follow-up for postoperative medical management following colon resection for colon cancer.  Patient states \"I am sore but I am feeling better\"    Patient's pain has improved since yesterday.  He still having the anticipated postoperative pain but has improved dramatically since postop.  Family is at the bedside.  Laboratory data reviewed with the patient as well as family.  He did have concerns regarding his leukocytosis which I explained to them is reactive etiology.  We also discussed elevated cortisol levels leading to the mildly elevated blood sugars.  As mentioned the patient has a  dextrose    Data:     Vitals:  /86   Pulse 73   Temp 99.1 °F (37.3 °C) (Oral)   Resp 18   Ht 1.803 m (5' 11\")   Wt 112 kg (247 lb)   SpO2 93%   BMI 34.45 kg/m²   Temp (24hrs), Av.9 °F (36.6 °C), Min:97.2 °F (36.2 °C), Max:99.1 °F (37.3 °C)    Recent Labs     24  1557 24  2139 24  0236 24  0637   POCGLU 180* 192* 168* 158*       I/O (24Hr):    Intake/Output Summary (Last 24 hours) at 2024 1032  Last data filed at 2024 0646  Gross per 24 hour   Intake 2000 ml   Output 1005 ml   Net 995 ml       Labs:  Hematology:  Recent Labs     24  0609   WBC 12.0*   RBC 4.80   HGB 14.8   HCT 42.9   MCV 89.4   MCH 30.8   MCHC 34.5   RDW 12.7      MPV 11.1     Chemistry:  Recent Labs     24  0609      K 4.4   *   CO2 18*   GLUCOSE 166*   BUN 20   CREATININE 1.1   MG 2.0   ANIONGAP 10   LABGLOM 75   CALCIUM 8.5*     Recent Labs     24  1119 24  1557 24  2139 24  0236 24  0637   POCGLU 170* 180* 192* 168* 158*     ABG:No results found for: \"POCPH\", \"PHART\", \"PH\", \"POCPCO2\", \"JQE1JDZ\", \"PCO2\", \"POCPO2\", \"PO2ART\", \"PO2\", \"POCHCO3\", \"QIX8UZK\", \"HCO3\", \"NBEA\", \"PBEA\", \"BEART\", \"BE\", \"THGBART\", \"THB\", \"CYG4NKZ\", \"PWSS2LYU\", \"V6WTSXFE\", \"O2SAT\", \"FIO2\"  Lab Results   Component Value Date/Time    SPECIAL NOT REPORTED 2021 12:51 PM     Lab Results   Component Value Date/Time    CULTURE NO GROWTH 2023 09:48 PM       Radiology:  No results found.    Physical Examination:     General appearance:  alert, cooperative and no distress  Mental Status:  oriented to person, place and time and normal affect  Lungs:  clear to auscultation bilaterally, normal effort  Heart:  regular rate and rhythm, no murmur  Abdomen:  soft, mild tenderness, nondistended, normal bowel sounds, no masses, hepatomegaly, splenomegaly  Extremities:  no edema, redness, tenderness in the calves  Skin:  no gross lesions, rashes, induration    Assessment:

## 2024-12-31 NOTE — CARE COORDINATION
Case Management Assessment  Initial Evaluation    Date/Time of Evaluation: 12/31/2024 12:44 PM  Assessment Completed by: Leigh Ann Acosta RN    If patient is discharged prior to next notation, then this note serves as note for discharge by case management.    Patient Name: Tash Mora                   YOB: 1960  Diagnosis: Neoplasm of colon, malignant (HCC) [C18.9]  Malignant tumor of ascending colon (HCC) [C18.2]                   Date / Time: 12/30/2024  6:05 AM    Patient Admission Status: Inpatient   Readmission Risk (Low < 19, Mod (19-27), High > 27): Readmission Risk Score: 8.9    Current PCP: Sandra Roberson, DO  PCP verified by CM? Yes    Chart Reviewed: Yes      History Provided by: Patient  Patient Orientation: Alert and Oriented    Patient Cognition: Alert    Hospitalization in the last 30 days (Readmission):  No    If yes, Readmission Assessment in CM Navigator will be completed.    Advance Directives:      Code Status: Full Code   Patient's Primary Decision Maker is: Legal Next of Kin    Primary Decision Maker: Lio Mora - Brother/Sister - [phone]    Discharge Planning:    Patient lives with: Alone Type of Home: Apartment  Primary Care Giver: Self  Patient Support Systems include: Children, Family Members   Current Financial resources: Medicare, Medicaid  Current community resources: None  Current services prior to admission: None            Current DME:              Type of Home Care services:  None    ADLS  Prior functional level: Independent in ADLs/IADLs  Current functional level: Independent in ADLs/IADLs    PT AM-PAC: 22 /24  OT AM-PAC: /24    Family can provide assistance at DC: Yes  Would you like Case Management to discuss the discharge plan with any other family members/significant others, and if so, who? No  Plans to Return to Present Housing: Yes  Other Identified Issues/Barriers to RETURNING to current housing: clinical status  Potential Assistance  choice of provider and agrees with the discharge plan. Freedom of choice list with basic dialogue that supports the patient's individualized plan of care/goals and shares the quality data associated with the providers was provided to: Patient   Patient Representative Name:       The Patient and/or Patient Representative Agree with the Discharge Plan? Yes    Leigh Ann Acosta RN  Case Management Department

## 2024-12-31 NOTE — PLAN OF CARE
Problem: Pain  Goal: Verbalizes/displays adequate comfort level or baseline comfort level  Outcome: Progressing     Problem: Safety - Adult  Goal: Free from fall injury  Outcome: Progressing     Problem: Chronic Conditions and Co-morbidities  Goal: Patient's chronic conditions and co-morbidity symptoms are monitored and maintained or improved  Outcome: Progressing  Flowsheets (Taken 12/30/2024 2000)  Care Plan - Patient's Chronic Conditions and Co-Morbidity Symptoms are Monitored and Maintained or Improved: Monitor and assess patient's chronic conditions and comorbid symptoms for stability, deterioration, or improvement     Problem: Discharge Planning  Goal: Discharge to home or other facility with appropriate resources  Outcome: Progressing  Flowsheets (Taken 12/30/2024 2000)  Discharge to home or other facility with appropriate resources: Identify barriers to discharge with patient and caregiver     Problem: Respiratory - Adult  Goal: Achieves optimal ventilation and oxygenation  12/31/2024 0329 by Lalit Hamm LPN  Outcome: Progressing

## 2024-12-31 NOTE — RT PROTOCOL NOTE
RT Nebulizer Bronchodilator Protocol Note    There is a bronchodilator order in the chart from a provider indicating to follow the RT Bronchodilator Protocol and there is an “Initiate RT Bronchodilator Protocol” order as well (see protocol at bottom of note).    CXR Findings:  No results found.    The findings from the last RT Protocol Assessment were as follows:  Smoking: Chronic pulmonary disease  Respiratory Pattern: Dyspnea on exertion or RR 21-25 bpm  Breath Sounds: Slightly diminished and/or crackles  Cough: Strong, spontaneous, non-productive  Indication for Bronchodilator Therapy:    Bronchodilator Assessment Score: 6    Aerosolized bronchodilator medication orders have been revised according to the RT Nebulizer Bronchodilator Protocol below.    Respiratory Therapist to perform RT Therapy Protocol Assessment initially then follow the protocol.  Repeat RT Therapy Protocol Assessment PRN for score 0-3 or on second treatment, BID, and PRN for scores above 3.    No Indications - adjust the frequency to every 6 hours PRN wheezing or bronchospasm, if no treatments needed after 48 hours then discontinue using Per Protocol order mode.     If indication present, adjust the RT bronchodilator orders based on the Bronchodilator Assessment Score as indicated below.  If a patient is on this medication at home then do not decrease Frequency below that used at home.    0-3 - enter or revise RT bronchodilator order(s) to equivalent RT Bronchodilator order with Frequency of every 4 hours PRN for wheezing or increased work of breathing using Per Protocol order mode.       4-6 - enter or revise RT Bronchodilator order(s) to two equivalent RT bronchodilator orders with one order with BID Frequency and one order with Frequency of every 4 hours PRN wheezing or increased work of breathing using Per Protocol order mode.         7-10 - enter or revise RT Bronchodilator order(s) to two equivalent RT bronchodilator orders with one  order with TID Frequency and one order with Frequency of every 4 hours PRN wheezing or increased work of breathing using Per Protocol order mode.       11-13 - enter or revise RT Bronchodilator order(s) to one equivalent RT bronchodilator order with QID Frequency and an Albuterol order with Frequency of every 4 hours PRN wheezing or increased work of breathing using Per Protocol order mode.      Greater than 13 - enter or revise RT Bronchodilator order(s) to one equivalent RT bronchodilator order with every 4 hours Frequency and an Albuterol order with Frequency of every 2 hours PRN wheezing or increased work of breathing using Per Protocol order mode.     RT to enter RT Home Evaluation for COPD & MDI Assessment order using Per Protocol order mode.    Electronically signed by Perlita Concepcion RCP on 12/30/2024 at 9:37 PM

## 2024-12-31 NOTE — PLAN OF CARE
Problem: Respiratory - Adult  Goal: Achieves optimal ventilation and oxygenation  Outcome: Progressing  Flowsheets (Taken 12/30/2024 2137)  Achieves optimal ventilation and oxygenation:   Assess for changes in respiratory status   Position to facilitate oxygenation and minimize respiratory effort   Assess the need for suctioning and aspirate as needed   Respiratory therapy support as indicated   Assess and instruct to report shortness of breath or any respiratory difficulty   Encourage broncho-pulmonary hygiene including cough, deep breathe, incentive spirometry   Oxygen supplementation based on oxygen saturation or arterial blood gases   Assess for changes in mentation and behavior

## 2024-12-31 NOTE — PROGRESS NOTES
Occupational Therapy    [facility]  Occupational Therapy Not Seen Note    DATE: 2024    NAME: Tash Mora  MRN: [id number]   : 1960      Patient not seen this date for Occupational Therapy due to:    Per PT patient independent in room and yadav. RN confirms. Patient states no concerns with ADL completion. Educated on proper body mechanics for LB dressing to avoid increasing pain due to abdominal surgery. Patient verbalized understanding. D/C acute OT.    Electronically signed by JANELLE VIDES OTR/L on 2024 at 11:54 AM

## 2024-12-31 NOTE — PLAN OF CARE
Problem: Pain  Goal: Verbalizes/displays adequate comfort level or baseline comfort level  Outcome: Progressing     Problem: Safety - Adult  Goal: Free from fall injury  Outcome: Progressing     Problem: Chronic Conditions and Co-morbidities  Goal: Patient's chronic conditions and co-morbidity symptoms are monitored and maintained or improved  Outcome: Progressing  Flowsheets (Taken 12/31/2024 0745)  Care Plan - Patient's Chronic Conditions and Co-Morbidity Symptoms are Monitored and Maintained or Improved:   Monitor and assess patient's chronic conditions and comorbid symptoms for stability, deterioration, or improvement   Collaborate with multidisciplinary team to address chronic and comorbid conditions and prevent exacerbation or deterioration   Update acute care plan with appropriate goals if chronic or comorbid symptoms are exacerbated and prevent overall improvement and discharge     Problem: Discharge Planning  Goal: Discharge to home or other facility with appropriate resources  Outcome: Progressing  Flowsheets (Taken 12/31/2024 0745)  Discharge to home or other facility with appropriate resources:   Identify barriers to discharge with patient and caregiver   Arrange for needed discharge resources and transportation as appropriate   Identify discharge learning needs (meds, wound care, etc)   Refer to discharge planning if patient needs post-hospital services based on physician order or complex needs related to functional status, cognitive ability or social support system     Problem: Respiratory - Adult  Goal: Achieves optimal ventilation and oxygenation  Outcome: Progressing  Flowsheets (Taken 12/31/2024 0745)  Achieves optimal ventilation and oxygenation:   Assess for changes in respiratory status   Assess for changes in mentation and behavior   Position to facilitate oxygenation and minimize respiratory effort

## 2024-12-31 NOTE — PROGRESS NOTES
General Surgery:  Daily Progress Note          POD # 1 s/p segmental transverse colectomy with anastomosis          PATIENT NAME: Tash Mora     TODAY'S DATE: 12/31/2024, 6:07 AM  CC: Abdominal discomfort    SUBJECTIVE:     Pt seen and examined at bedside.  Afebrile, vital signs within normal limits.  Adequate urine output 0.4 cc/kg/h.  Subcutaneous CIRA drain with 5 cc of sanguinous output.  Did ambulate overnight.    OBJECTIVE:   VITALS:  /81   Pulse 83   Temp 98.6 °F (37 °C) (Oral)   Resp 18   Ht 1.803 m (5' 11\")   Wt 112 kg (247 lb)   SpO2 91%   BMI 34.45 kg/m²      INTAKE/OUTPUT:      Intake/Output Summary (Last 24 hours) at 12/31/2024 0607  Last data filed at 12/30/2024 2129  Gross per 24 hour   Intake 3100 ml   Output 805 ml   Net 2295 ml       PHYSICAL EXAM:  General Appearance: Awake, alert and resting in bed comfortably  HEENT:  Normocephalic, atraumatic, mucus membranes moist   Heart: Regular rate and rhythm  Lungs: Symmetrical chest rise, normal effort with respirations  Abdomen: Soft, tenderness to palpation near incision, subcutaneous CIRA drain with minimal sanguinous output  Incision: Midline incision dressing clean dry and intact  Extremities: No cyanosis, pitting edema, rashes noted.    Skin: Skin color, texture, turgor normal. No rashes or lesions.    IV Access: Peripheral IV   Bond: In place    Data:  CBC with Differential:    Lab Results   Component Value Date/Time    WBC 6.5 12/20/2024 03:15 PM    RBC 5.19 12/20/2024 03:15 PM    HGB 15.7 12/20/2024 03:15 PM    HCT 47.9 12/20/2024 03:15 PM     12/20/2024 03:15 PM    MCV 92.3 12/20/2024 03:15 PM    MCH 30.3 12/20/2024 03:15 PM    MCHC 32.8 12/20/2024 03:15 PM    RDW 12.6 12/20/2024 03:15 PM    LYMPHOPCT 36 12/20/2024 03:15 PM    MONOPCT 6 12/20/2024 03:15 PM    EOSPCT 3 12/20/2024 03:15 PM    BASOPCT 1 12/20/2024 03:15 PM    MONOSABS 0.41 12/20/2024 03:15 PM    LYMPHSABS 2.32 12/20/2024 03:15 PM    EOSABS 0.20

## 2025-01-01 ENCOUNTER — APPOINTMENT (OUTPATIENT)
Dept: GENERAL RADIOLOGY | Age: 65
End: 2025-01-01
Attending: COLON & RECTAL SURGERY
Payer: MEDICARE

## 2025-01-01 LAB
ANION GAP SERPL CALCULATED.3IONS-SCNC: 10 MMOL/L (ref 9–17)
BASOPHILS # BLD: 0 K/UL (ref 0–0.2)
BASOPHILS NFR BLD: 0 % (ref 0–2)
BUN SERPL-MCNC: 20 MG/DL (ref 8–23)
CALCIUM SERPL-MCNC: 8.7 MG/DL (ref 8.6–10.4)
CHLORIDE SERPL-SCNC: 106 MMOL/L (ref 98–107)
CO2 SERPL-SCNC: 23 MMOL/L (ref 20–31)
CREAT SERPL-MCNC: 1.1 MG/DL (ref 0.7–1.2)
EOSINOPHIL # BLD: 0 K/UL (ref 0–0.4)
EOSINOPHILS RELATIVE PERCENT: 0 % (ref 1–4)
ERYTHROCYTE [DISTWIDTH] IN BLOOD BY AUTOMATED COUNT: 13.2 % (ref 12.5–15.4)
GFR, ESTIMATED: 75 ML/MIN/1.73M2
GLUCOSE BLD-MCNC: 138 MG/DL (ref 75–110)
GLUCOSE BLD-MCNC: 147 MG/DL (ref 75–110)
GLUCOSE BLD-MCNC: 147 MG/DL (ref 75–110)
GLUCOSE BLD-MCNC: 156 MG/DL (ref 75–110)
GLUCOSE BLD-MCNC: 164 MG/DL (ref 75–110)
GLUCOSE BLD-MCNC: 173 MG/DL (ref 75–110)
GLUCOSE SERPL-MCNC: 149 MG/DL (ref 70–99)
HCT VFR BLD AUTO: 38.2 % (ref 41–53)
HGB BLD-MCNC: 13.4 G/DL (ref 13.5–17.5)
LYMPHOCYTES NFR BLD: 1.4 K/UL (ref 1–4.8)
LYMPHOCYTES RELATIVE PERCENT: 12 % (ref 24–44)
MAGNESIUM SERPL-MCNC: 1.9 MG/DL (ref 1.6–2.6)
MCH RBC QN AUTO: 31.2 PG (ref 26–34)
MCHC RBC AUTO-ENTMCNC: 35.1 G/DL (ref 31–37)
MCV RBC AUTO: 89 FL (ref 80–100)
MONOCYTES NFR BLD: 0.7 K/UL (ref 0.1–1.2)
MONOCYTES NFR BLD: 6 % (ref 2–11)
NEUTROPHILS NFR BLD: 82 % (ref 36–66)
NEUTS SEG NFR BLD: 9.4 K/UL (ref 1.8–7.7)
PLATELET # BLD AUTO: 156 K/UL (ref 140–450)
PMV BLD AUTO: 9.3 FL (ref 6–12)
POTASSIUM SERPL-SCNC: 4 MMOL/L (ref 3.7–5.3)
RBC # BLD AUTO: 4.29 M/UL (ref 4.5–5.9)
SODIUM SERPL-SCNC: 139 MMOL/L (ref 135–144)
WBC OTHER # BLD: 11.6 K/UL (ref 3.5–11)

## 2025-01-01 PROCEDURE — 1200000000 HC SEMI PRIVATE

## 2025-01-01 PROCEDURE — 6370000000 HC RX 637 (ALT 250 FOR IP): Performed by: STUDENT IN AN ORGANIZED HEALTH CARE EDUCATION/TRAINING PROGRAM

## 2025-01-01 PROCEDURE — 82947 ASSAY GLUCOSE BLOOD QUANT: CPT

## 2025-01-01 PROCEDURE — 6360000002 HC RX W HCPCS: Performed by: COLON & RECTAL SURGERY

## 2025-01-01 PROCEDURE — 6370000000 HC RX 637 (ALT 250 FOR IP): Performed by: HOSPITALIST

## 2025-01-01 PROCEDURE — 80048 BASIC METABOLIC PNL TOTAL CA: CPT

## 2025-01-01 PROCEDURE — 6360000002 HC RX W HCPCS: Performed by: STUDENT IN AN ORGANIZED HEALTH CARE EDUCATION/TRAINING PROGRAM

## 2025-01-01 PROCEDURE — 51798 US URINE CAPACITY MEASURE: CPT

## 2025-01-01 PROCEDURE — 99232 SBSQ HOSP IP/OBS MODERATE 35: CPT | Performed by: HOSPITALIST

## 2025-01-01 PROCEDURE — 85025 COMPLETE CBC W/AUTO DIFF WBC: CPT

## 2025-01-01 PROCEDURE — 36415 COLL VENOUS BLD VENIPUNCTURE: CPT

## 2025-01-01 PROCEDURE — 2500000003 HC RX 250 WO HCPCS: Performed by: COLON & RECTAL SURGERY

## 2025-01-01 PROCEDURE — 74018 RADEX ABDOMEN 1 VIEW: CPT

## 2025-01-01 PROCEDURE — 83735 ASSAY OF MAGNESIUM: CPT

## 2025-01-01 PROCEDURE — 2500000003 HC RX 250 WO HCPCS: Performed by: STUDENT IN AN ORGANIZED HEALTH CARE EDUCATION/TRAINING PROGRAM

## 2025-01-01 PROCEDURE — 6370000000 HC RX 637 (ALT 250 FOR IP): Performed by: COLON & RECTAL SURGERY

## 2025-01-01 PROCEDURE — 6370000000 HC RX 637 (ALT 250 FOR IP): Performed by: NURSE PRACTITIONER

## 2025-01-01 PROCEDURE — 6360000002 HC RX W HCPCS: Performed by: HOSPITALIST

## 2025-01-01 PROCEDURE — 71045 X-RAY EXAM CHEST 1 VIEW: CPT

## 2025-01-01 RX ORDER — METOCLOPRAMIDE HYDROCHLORIDE 5 MG/ML
5 INJECTION INTRAMUSCULAR; INTRAVENOUS EVERY 6 HOURS
Status: DISCONTINUED | OUTPATIENT
Start: 2025-01-01 | End: 2025-01-03

## 2025-01-01 RX ORDER — FAMOTIDINE 20 MG/1
20 TABLET, FILM COATED ORAL 2 TIMES DAILY
Status: DISCONTINUED | OUTPATIENT
Start: 2025-01-01 | End: 2025-01-02

## 2025-01-01 RX ORDER — ONDANSETRON 2 MG/ML
4 INJECTION INTRAMUSCULAR; INTRAVENOUS ONCE
Status: COMPLETED | OUTPATIENT
Start: 2025-01-01 | End: 2025-01-01

## 2025-01-01 RX ORDER — ALBUTEROL SULFATE 0.83 MG/ML
2.5 SOLUTION RESPIRATORY (INHALATION) EVERY 4 HOURS PRN
Status: DISCONTINUED | OUTPATIENT
Start: 2025-01-01 | End: 2025-01-04 | Stop reason: HOSPADM

## 2025-01-01 RX ORDER — KETOROLAC TROMETHAMINE 15 MG/ML
15 INJECTION, SOLUTION INTRAMUSCULAR; INTRAVENOUS EVERY 6 HOURS PRN
Status: DISCONTINUED | OUTPATIENT
Start: 2025-01-01 | End: 2025-01-02

## 2025-01-01 RX ORDER — MAGNESIUM HYDROXIDE/ALUMINUM HYDROXICE/SIMETHICONE 120; 1200; 1200 MG/30ML; MG/30ML; MG/30ML
30 SUSPENSION ORAL EVERY 6 HOURS PRN
Status: DISCONTINUED | OUTPATIENT
Start: 2025-01-01 | End: 2025-01-04 | Stop reason: HOSPADM

## 2025-01-01 RX ORDER — PROCHLORPERAZINE EDISYLATE 5 MG/ML
10 INJECTION INTRAMUSCULAR; INTRAVENOUS EVERY 6 HOURS PRN
Status: DISCONTINUED | OUTPATIENT
Start: 2025-01-01 | End: 2025-01-04 | Stop reason: HOSPADM

## 2025-01-01 RX ADMIN — CARVEDILOL 12.5 MG: 12.5 TABLET, FILM COATED ORAL at 22:50

## 2025-01-01 RX ADMIN — PROCHLORPERAZINE EDISYLATE 10 MG: 5 INJECTION INTRAMUSCULAR; INTRAVENOUS at 13:05

## 2025-01-01 RX ADMIN — MEMANTINE 10 MG: 5 TABLET ORAL at 08:54

## 2025-01-01 RX ADMIN — GABAPENTIN 300 MG: 300 CAPSULE ORAL at 06:09

## 2025-01-01 RX ADMIN — CARVEDILOL 12.5 MG: 12.5 TABLET, FILM COATED ORAL at 08:54

## 2025-01-01 RX ADMIN — TOPIRAMATE 200 MG: 100 TABLET, FILM COATED ORAL at 22:33

## 2025-01-01 RX ADMIN — ENOXAPARIN SODIUM 30 MG: 100 INJECTION SUBCUTANEOUS at 22:32

## 2025-01-01 RX ADMIN — ACETAMINOPHEN 1000 MG: 500 TABLET ORAL at 13:05

## 2025-01-01 RX ADMIN — GABAPENTIN 300 MG: 300 CAPSULE ORAL at 22:33

## 2025-01-01 RX ADMIN — POTASSIUM CHLORIDE, DEXTROSE MONOHYDRATE AND SODIUM CHLORIDE: 150; 5; 450 INJECTION, SOLUTION INTRAVENOUS at 18:45

## 2025-01-01 RX ADMIN — TRAMADOL HYDROCHLORIDE 50 MG: 50 TABLET, COATED ORAL at 06:09

## 2025-01-01 RX ADMIN — KETOROLAC TROMETHAMINE 15 MG: 15 INJECTION, SOLUTION INTRAMUSCULAR; INTRAVENOUS at 14:43

## 2025-01-01 RX ADMIN — ALUMINUM HYDROXIDE, MAGNESIUM HYDROXIDE, AND SIMETHICONE 30 ML: 1200; 120; 1200 SUSPENSION ORAL at 02:54

## 2025-01-01 RX ADMIN — TRAZODONE HYDROCHLORIDE 200 MG: 100 TABLET ORAL at 22:33

## 2025-01-01 RX ADMIN — SODIUM CHLORIDE, PRESERVATIVE FREE 10 ML: 5 INJECTION INTRAVENOUS at 20:11

## 2025-01-01 RX ADMIN — GABAPENTIN 300 MG: 300 CAPSULE ORAL at 14:42

## 2025-01-01 RX ADMIN — TRAMADOL HYDROCHLORIDE 50 MG: 50 TABLET, COATED ORAL at 01:06

## 2025-01-01 RX ADMIN — ACETAMINOPHEN 1000 MG: 500 TABLET ORAL at 22:33

## 2025-01-01 RX ADMIN — ATORVASTATIN CALCIUM 10 MG: 10 TABLET, FILM COATED ORAL at 08:54

## 2025-01-01 RX ADMIN — MEMANTINE 10 MG: 5 TABLET ORAL at 22:33

## 2025-01-01 RX ADMIN — ENOXAPARIN SODIUM 30 MG: 100 INJECTION SUBCUTANEOUS at 08:55

## 2025-01-01 RX ADMIN — ONDANSETRON 4 MG: 2 INJECTION INTRAMUSCULAR; INTRAVENOUS at 07:04

## 2025-01-01 RX ADMIN — POTASSIUM CHLORIDE, DEXTROSE MONOHYDRATE AND SODIUM CHLORIDE: 150; 5; 450 INJECTION, SOLUTION INTRAVENOUS at 03:49

## 2025-01-01 RX ADMIN — ONDANSETRON 4 MG: 2 INJECTION INTRAMUSCULAR; INTRAVENOUS at 06:11

## 2025-01-01 RX ADMIN — ONDANSETRON 4 MG: 2 INJECTION INTRAMUSCULAR; INTRAVENOUS at 20:10

## 2025-01-01 RX ADMIN — FAMOTIDINE 20 MG: 20 TABLET ORAL at 22:33

## 2025-01-01 RX ADMIN — NALOXEGOL OXALATE 25 MG: 12.5 TABLET, FILM COATED ORAL at 08:54

## 2025-01-01 RX ADMIN — METOCLOPRAMIDE 5 MG: 5 INJECTION, SOLUTION INTRAMUSCULAR; INTRAVENOUS at 18:46

## 2025-01-01 ASSESSMENT — PAIN DESCRIPTION - LOCATION
LOCATION: ABDOMEN

## 2025-01-01 ASSESSMENT — PAIN SCALES - GENERAL
PAINLEVEL_OUTOF10: 3
PAINLEVEL_OUTOF10: 5
PAINLEVEL_OUTOF10: 6
PAINLEVEL_OUTOF10: 6
PAINLEVEL_OUTOF10: 4
PAINLEVEL_OUTOF10: 1
PAINLEVEL_OUTOF10: 6
PAINLEVEL_OUTOF10: 8
PAINLEVEL_OUTOF10: 4
PAINLEVEL_OUTOF10: 4

## 2025-01-01 ASSESSMENT — PAIN DESCRIPTION - DESCRIPTORS
DESCRIPTORS: PRESSURE
DESCRIPTORS: BURNING;ACHING
DESCRIPTORS: ACHING;DISCOMFORT;SORE
DESCRIPTORS: ACHING;DISCOMFORT
DESCRIPTORS: ACHING;DISCOMFORT

## 2025-01-01 ASSESSMENT — PAIN DESCRIPTION - ORIENTATION
ORIENTATION: ANTERIOR;UPPER;MID
ORIENTATION: ANTERIOR;UPPER;MID
ORIENTATION: MID;LEFT

## 2025-01-01 ASSESSMENT — PAIN SCALES - WONG BAKER: WONGBAKER_NUMERICALRESPONSE: NO HURT

## 2025-01-01 ASSESSMENT — PAIN DESCRIPTION - PAIN TYPE
TYPE: SURGICAL PAIN
TYPE: SURGICAL PAIN

## 2025-01-01 NOTE — PROGRESS NOTES
[facility]  Office: [phone]  Topher Botello DO, Manohar John DO, Les Smith DO, Erik Turner DO, Lupillo Diaz MD, Maeve Figueroa MD, Jennifer Altamirano MD, Aylin Levi MD,  Kemal Newby MD, Manuelito Shaffer MD, Josiah Cox MD,  Adam Finch DO, Batsheva Moralez MD, Aleks Shaver MD, Quan Botello DO, Lola Casper MD,  Vikram Sunshine DO, Dionna Egan MD, Siobhan Cleary MD, Jennifer Titus MD, Perfecto Lomas MD,  Alex Robert MD, Checo Rose MD, Darío Rodriguez MD, Pato Betancourt MD, Av Breaux MD, Rj Hernandez MD, Roger London DO, Alok Couch MD, Adam Marcus MD, Mohsin Reza, MD, Shirley Waterhouse, CNP,  Fatemeh Saldana CNP, Roger Zavala, FANNIE,  Marta Suarez, SKIP, Rosmery Luke, CNP, Estella Araujo, CNP, Martha Zamarripa, FANNIE, Flaquita Reed, CNP, Justina Todd, PA-C, Kayla Villarreal PA-C, Mecca Pacheco, CNP, Nilsa Delgado, CNP,  Danielle Montes, CNP, Beverley Winslow, CNP, Karen Lofton, FANNIE,  Keerthi Hinojosa CNP, Susie Crump, FANNIE         [facility]   IN-PATIENT SERVICE   [facility]    Progress Note    1/1/2025    11:10 AM    Name:   Tash Mora  MRN:     [id number]     Acct:      [id number]   Room:   [address] [place] [address] Day:  2  Admit Date:  12/30/2024  6:05 AM    PCP:   Sandra Roberson DO  Code Status:  Full Code    Subjective:     Patient seen in follow-up for postoperative medical management following colon resection for colon cancer.  Patient states \"I am more nauseated today\"    Patient is much more nauseated today.  Zofran has been ineffective in controlling his symptomology.  I am going to trial him on Compazine.  I did explain to the patient that he may have a postoperative ileus.  We discussed an ileus and its pathogenesis and the fact that it is treated conservatively.  He appreciates the information and denies any questions or concerns at this point in time.  His pain is under reasonable control.  Surgery input noted and

## 2025-01-01 NOTE — RT PROTOCOL NOTE
RT Inhaler-Nebulizer Bronchodilator Protocol Note    There is a bronchodilator order in the chart from a provider indicating to follow the RT Bronchodilator Protocol and there is an “Initiate RT Inhaler-Nebulizer Bronchodilator Protocol” order as well (see protocol at bottom of note).    CXR Findings:  No results found.    The findings from the last RT Protocol Assessment were as follows:   History Pulmonary Disease: Chronic pulmonary disease  Respiratory Pattern: Regular pattern and RR 12-20 bpm  Breath Sounds: Slightly diminished and/or crackles  Cough: Strong, spontaneous, non-productive  Indication for Bronchodilator Therapy:    Bronchodilator Assessment Score: 4    Aerosolized bronchodilator medication orders have been revised according to the RT Inhaler-Nebulizer Bronchodilator Protocol below.    Respiratory Therapist to perform RT Therapy Protocol Assessment initially then follow the protocol.  Repeat RT Therapy Protocol Assessment PRN for score 0-3 or on second treatment, BID, and PRN for scores above 3.    No Indications - adjust the frequency to every 6 hours PRN wheezing or bronchospasm, if no treatments needed after 48 hours then discontinue using Per Protocol order mode.     If indication present, adjust the RT bronchodilator orders based on the Bronchodilator Assessment Score as indicated below.  Use Inhaler orders unless patient has one or more of the following: on home nebulizer, not able to hold breath for 10 seconds, is not alert and oriented, cannot activate and use MDI correctly, or respiratory rate 25 breaths per minute or more, then use the equivalent nebulizer order(s) with same Frequency and PRN reasons based on the score.  If a patient is on this medication at home then do not decrease Frequency below that used at home.    0-3 - enter or revise RT bronchodilator order(s) to equivalent RT Bronchodilator order with Frequency of every 4 hours PRN for wheezing or increased work of breathing  using Per Protocol order mode.        4-6 - enter or revise RT Bronchodilator order(s) to two equivalent RT bronchodilator orders with one order with BID Frequency and one order with Frequency of every 4 hours PRN wheezing or increased work of breathing using Per Protocol order mode.        7-10 - enter or revise RT Bronchodilator order(s) to two equivalent RT bronchodilator orders with one order with TID Frequency and one order with Frequency of every 4 hours PRN wheezing or increased work of breathing using Per Protocol order mode.       11-13 - enter or revise RT Bronchodilator order(s) to one equivalent RT bronchodilator order with QID Frequency and an Albuterol order with Frequency of every 4 hours PRN wheezing or increased work of breathing using Per Protocol order mode.      Greater than 13 - enter or revise RT Bronchodilator order(s) to one equivalent RT bronchodilator order with every 4 hours Frequency and an Albuterol order with Frequency of every 2 hours PRN wheezing or increased work of breathing using Per Protocol order mode.     RT to enter RT Home Evaluation for COPD & MDI Assessment order using Per Protocol order mode.    Electronically signed by Che Allison RCP on 1/1/2025 at 12:31 AM

## 2025-01-01 NOTE — PROGRESS NOTES
General Surgery:  Daily Progress Note          POD # 2 s/p segmental transverse colectomy with anastomosis          PATIENT NAME: Tash Mora     TODAY'S DATE: 1/1/2025, 7:51 AM  CC: Abdominal discomfort    SUBJECTIVE:     Patient evaluated at bedside. Afebrile. Complains of abdominal discomfort and nausea overnight. Mild alleviation with zofran. No flatus or BM. IS 1500    OBJECTIVE:   VITALS:  /73   Pulse 81   Temp 99.5 °F (37.5 °C) (Oral)   Resp 17   Ht 1.803 m (5' 11\")   Wt 112 kg (247 lb)   SpO2 90%   BMI 34.45 kg/m²      INTAKE/OUTPUT:      Intake/Output Summary (Last 24 hours) at 1/1/2025 0751  Last data filed at 1/1/2025 0731  Gross per 24 hour   Intake 500 ml   Output 1925 ml   Net -1425 ml       PHYSICAL EXAM:  General Appearance: Awake, alert and resting in bed comfortably  HEENT:  Normocephalic, atraumatic, mucus membranes moist   Heart: Regular rate and rhythm  Lungs: Symmetrical chest rise, normal effort with respirations  Abdomen: softly distended, TTP   Incision: Midline incision dressing clean dry and intact  Extremities: No cyanosis, pitting edema, rashes noted.    Skin: Skin color, texture, turgor normal. No rashes or lesions.    IV Access: Peripheral IV   Bond: In place    Data:  CBC with Differential:    Lab Results   Component Value Date/Time    WBC 11.6 01/01/2025 05:23 AM    RBC 4.29 01/01/2025 05:23 AM    HGB 13.4 01/01/2025 05:23 AM    HCT 38.2 01/01/2025 05:23 AM     01/01/2025 05:23 AM    MCV 89.0 01/01/2025 05:23 AM    MCH 31.2 01/01/2025 05:23 AM    MCHC 35.1 01/01/2025 05:23 AM    RDW 13.2 01/01/2025 05:23 AM    LYMPHOPCT 12 01/01/2025 05:23 AM    MONOPCT 6 01/01/2025 05:23 AM    EOSPCT 0 01/01/2025 05:23 AM    BASOPCT 0 01/01/2025 05:23 AM    MONOSABS 0.70 01/01/2025 05:23 AM    LYMPHSABS 1.40 01/01/2025 05:23 AM    EOSABS 0.00 01/01/2025 05:23 AM    BASOSABS 0.00 01/01/2025 05:23 AM    DIFFTYPE NOT REPORTED 07/28/2021 10:25 AM     BMP:    Lab Results

## 2025-01-01 NOTE — PLAN OF CARE
Problem: Respiratory - Adult  Goal: Achieves optimal ventilation and oxygenation  1/1/2025 0027 by Che Allison RCP  Flowsheets (Taken 1/1/2025 0027)  Achieves optimal ventilation and oxygenation:   Assess for changes in respiratory status   Respiratory therapy support as indicated   Encourage broncho-pulmonary hygiene including cough, deep breathe, incentive spirometry   Assess and instruct to report shortness of breath or any respiratory difficulty

## 2025-01-02 ENCOUNTER — APPOINTMENT (OUTPATIENT)
Dept: GENERAL RADIOLOGY | Age: 65
End: 2025-01-02
Attending: COLON & RECTAL SURGERY
Payer: MEDICARE

## 2025-01-02 LAB
ANION GAP SERPL CALCULATED.3IONS-SCNC: 9 MMOL/L (ref 9–17)
BASOPHILS # BLD: 0 K/UL (ref 0–0.2)
BASOPHILS NFR BLD: 0 % (ref 0–2)
BUN SERPL-MCNC: 33 MG/DL (ref 8–23)
CALCIUM SERPL-MCNC: 8.8 MG/DL (ref 8.6–10.4)
CHLORIDE SERPL-SCNC: 102 MMOL/L (ref 98–107)
CO2 SERPL-SCNC: 28 MMOL/L (ref 20–31)
CREAT SERPL-MCNC: 1.5 MG/DL (ref 0.7–1.2)
EOSINOPHIL # BLD: 0 K/UL (ref 0–0.4)
EOSINOPHILS RELATIVE PERCENT: 0 % (ref 1–4)
ERYTHROCYTE [DISTWIDTH] IN BLOOD BY AUTOMATED COUNT: 13.2 % (ref 12.5–15.4)
GFR, ESTIMATED: 52 ML/MIN/1.73M2
GLUCOSE BLD-MCNC: 123 MG/DL (ref 75–110)
GLUCOSE BLD-MCNC: 123 MG/DL (ref 75–110)
GLUCOSE BLD-MCNC: 133 MG/DL (ref 75–110)
GLUCOSE BLD-MCNC: 141 MG/DL (ref 75–110)
GLUCOSE SERPL-MCNC: 151 MG/DL (ref 70–99)
HCT VFR BLD AUTO: 38 % (ref 41–53)
HGB BLD-MCNC: 13.3 G/DL (ref 13.5–17.5)
LYMPHOCYTES NFR BLD: 1.5 K/UL (ref 1–4.8)
LYMPHOCYTES RELATIVE PERCENT: 12 % (ref 24–44)
MAGNESIUM SERPL-MCNC: 2.1 MG/DL (ref 1.6–2.6)
MCH RBC QN AUTO: 31.1 PG (ref 26–34)
MCHC RBC AUTO-ENTMCNC: 34.9 G/DL (ref 31–37)
MCV RBC AUTO: 89.2 FL (ref 80–100)
MONOCYTES NFR BLD: 0.6 K/UL (ref 0.1–1.2)
MONOCYTES NFR BLD: 5 % (ref 2–11)
NEUTROPHILS NFR BLD: 83 % (ref 36–66)
NEUTS SEG NFR BLD: 9.9 K/UL (ref 1.8–7.7)
PLATELET # BLD AUTO: 163 K/UL (ref 140–450)
PMV BLD AUTO: 8.6 FL (ref 6–12)
POTASSIUM SERPL-SCNC: 3.7 MMOL/L (ref 3.7–5.3)
RBC # BLD AUTO: 4.26 M/UL (ref 4.5–5.9)
SODIUM SERPL-SCNC: 139 MMOL/L (ref 135–144)
SURGICAL PATHOLOGY REPORT: NORMAL
WBC OTHER # BLD: 12 K/UL (ref 3.5–11)

## 2025-01-02 PROCEDURE — 6370000000 HC RX 637 (ALT 250 FOR IP): Performed by: STUDENT IN AN ORGANIZED HEALTH CARE EDUCATION/TRAINING PROGRAM

## 2025-01-02 PROCEDURE — 74018 RADEX ABDOMEN 1 VIEW: CPT

## 2025-01-02 PROCEDURE — 1200000000 HC SEMI PRIVATE

## 2025-01-02 PROCEDURE — 2500000003 HC RX 250 WO HCPCS: Performed by: COLON & RECTAL SURGERY

## 2025-01-02 PROCEDURE — 99232 SBSQ HOSP IP/OBS MODERATE 35: CPT | Performed by: HOSPITALIST

## 2025-01-02 PROCEDURE — 6360000002 HC RX W HCPCS: Performed by: COLON & RECTAL SURGERY

## 2025-01-02 PROCEDURE — 2500000003 HC RX 250 WO HCPCS: Performed by: STUDENT IN AN ORGANIZED HEALTH CARE EDUCATION/TRAINING PROGRAM

## 2025-01-02 PROCEDURE — 6370000000 HC RX 637 (ALT 250 FOR IP): Performed by: HOSPITALIST

## 2025-01-02 PROCEDURE — 6360000002 HC RX W HCPCS: Performed by: HOSPITALIST

## 2025-01-02 PROCEDURE — 94760 N-INVAS EAR/PLS OXIMETRY 1: CPT

## 2025-01-02 PROCEDURE — 6370000000 HC RX 637 (ALT 250 FOR IP): Performed by: COLON & RECTAL SURGERY

## 2025-01-02 PROCEDURE — 80048 BASIC METABOLIC PNL TOTAL CA: CPT

## 2025-01-02 PROCEDURE — 36415 COLL VENOUS BLD VENIPUNCTURE: CPT

## 2025-01-02 PROCEDURE — 94640 AIRWAY INHALATION TREATMENT: CPT

## 2025-01-02 PROCEDURE — 2700000000 HC OXYGEN THERAPY PER DAY

## 2025-01-02 PROCEDURE — 82947 ASSAY GLUCOSE BLOOD QUANT: CPT

## 2025-01-02 PROCEDURE — 85025 COMPLETE CBC W/AUTO DIFF WBC: CPT

## 2025-01-02 PROCEDURE — 6370000000 HC RX 637 (ALT 250 FOR IP): Performed by: NURSE PRACTITIONER

## 2025-01-02 PROCEDURE — 83735 ASSAY OF MAGNESIUM: CPT

## 2025-01-02 PROCEDURE — 2580000003 HC RX 258: Performed by: STUDENT IN AN ORGANIZED HEALTH CARE EDUCATION/TRAINING PROGRAM

## 2025-01-02 PROCEDURE — 94761 N-INVAS EAR/PLS OXIMETRY MLT: CPT

## 2025-01-02 RX ORDER — SODIUM CHLORIDE, SODIUM LACTATE, POTASSIUM CHLORIDE, CALCIUM CHLORIDE 600; 310; 30; 20 MG/100ML; MG/100ML; MG/100ML; MG/100ML
INJECTION, SOLUTION INTRAVENOUS CONTINUOUS
Status: DISCONTINUED | OUTPATIENT
Start: 2025-01-02 | End: 2025-01-04 | Stop reason: HOSPADM

## 2025-01-02 RX ORDER — TOPIRAMATE 100 MG/1
100 TABLET, FILM COATED ORAL NIGHTLY
Status: DISCONTINUED | OUTPATIENT
Start: 2025-01-03 | End: 2025-01-04 | Stop reason: HOSPADM

## 2025-01-02 RX ADMIN — FAMOTIDINE 20 MG: 10 INJECTION, SOLUTION INTRAVENOUS at 21:53

## 2025-01-02 RX ADMIN — ACETAMINOPHEN 1000 MG: 500 TABLET ORAL at 12:22

## 2025-01-02 RX ADMIN — FAMOTIDINE 20 MG: 10 INJECTION, SOLUTION INTRAVENOUS at 09:18

## 2025-01-02 RX ADMIN — GABAPENTIN 300 MG: 300 CAPSULE ORAL at 15:37

## 2025-01-02 RX ADMIN — NALOXEGOL OXALATE 25 MG: 12.5 TABLET, FILM COATED ORAL at 09:26

## 2025-01-02 RX ADMIN — BUDESONIDE AND FORMOTEROL FUMARATE DIHYDRATE 2 PUFF: 80; 4.5 AEROSOL RESPIRATORY (INHALATION) at 20:07

## 2025-01-02 RX ADMIN — GABAPENTIN 300 MG: 300 CAPSULE ORAL at 22:07

## 2025-01-02 RX ADMIN — ENOXAPARIN SODIUM 30 MG: 100 INJECTION SUBCUTANEOUS at 09:26

## 2025-01-02 RX ADMIN — METOCLOPRAMIDE 5 MG: 5 INJECTION, SOLUTION INTRAMUSCULAR; INTRAVENOUS at 00:38

## 2025-01-02 RX ADMIN — ENOXAPARIN SODIUM 30 MG: 100 INJECTION SUBCUTANEOUS at 22:09

## 2025-01-02 RX ADMIN — METOCLOPRAMIDE 5 MG: 5 INJECTION, SOLUTION INTRAMUSCULAR; INTRAVENOUS at 18:37

## 2025-01-02 RX ADMIN — TOPIRAMATE 100 MG: 100 TABLET, FILM COATED ORAL at 23:38

## 2025-01-02 RX ADMIN — SODIUM CHLORIDE, PRESERVATIVE FREE 10 ML: 5 INJECTION INTRAVENOUS at 06:49

## 2025-01-02 RX ADMIN — ALBUTEROL SULFATE 2.5 MG: 2.5 SOLUTION RESPIRATORY (INHALATION) at 20:06

## 2025-01-02 RX ADMIN — GABAPENTIN 300 MG: 300 CAPSULE ORAL at 05:39

## 2025-01-02 RX ADMIN — ACETAMINOPHEN 1000 MG: 500 TABLET ORAL at 18:37

## 2025-01-02 RX ADMIN — TRAMADOL HYDROCHLORIDE 50 MG: 50 TABLET, COATED ORAL at 20:23

## 2025-01-02 RX ADMIN — SODIUM CHLORIDE, POTASSIUM CHLORIDE, SODIUM LACTATE AND CALCIUM CHLORIDE: 600; 310; 30; 20 INJECTION, SOLUTION INTRAVENOUS at 07:04

## 2025-01-02 RX ADMIN — ALBUTEROL SULFATE 2.5 MG: 2.5 SOLUTION RESPIRATORY (INHALATION) at 04:54

## 2025-01-02 RX ADMIN — MEMANTINE 10 MG: 5 TABLET ORAL at 09:27

## 2025-01-02 RX ADMIN — SODIUM CHLORIDE, POTASSIUM CHLORIDE, SODIUM LACTATE AND CALCIUM CHLORIDE: 600; 310; 30; 20 INJECTION, SOLUTION INTRAVENOUS at 15:37

## 2025-01-02 RX ADMIN — MEMANTINE 10 MG: 5 TABLET ORAL at 22:08

## 2025-01-02 RX ADMIN — ACETAMINOPHEN 1000 MG: 500 TABLET ORAL at 05:39

## 2025-01-02 RX ADMIN — METOCLOPRAMIDE 5 MG: 5 INJECTION, SOLUTION INTRAMUSCULAR; INTRAVENOUS at 12:23

## 2025-01-02 RX ADMIN — METOCLOPRAMIDE 5 MG: 5 INJECTION, SOLUTION INTRAMUSCULAR; INTRAVENOUS at 06:49

## 2025-01-02 RX ADMIN — CARVEDILOL 12.5 MG: 12.5 TABLET, FILM COATED ORAL at 22:09

## 2025-01-02 RX ADMIN — ATORVASTATIN CALCIUM 10 MG: 10 TABLET, FILM COATED ORAL at 09:27

## 2025-01-02 RX ADMIN — PHENOL 1 SPRAY: 1.5 LIQUID ORAL at 20:23

## 2025-01-02 RX ADMIN — ACETAMINOPHEN 1000 MG: 500 TABLET ORAL at 23:30

## 2025-01-02 RX ADMIN — TRAZODONE HYDROCHLORIDE 200 MG: 100 TABLET ORAL at 22:07

## 2025-01-02 ASSESSMENT — PAIN SCALES - WONG BAKER: WONGBAKER_NUMERICALRESPONSE: NO HURT

## 2025-01-02 ASSESSMENT — PAIN DESCRIPTION - ORIENTATION
ORIENTATION: MID

## 2025-01-02 ASSESSMENT — PAIN DESCRIPTION - LOCATION
LOCATION: THROAT
LOCATION: ABDOMEN

## 2025-01-02 ASSESSMENT — PAIN SCALES - GENERAL
PAINLEVEL_OUTOF10: 0
PAINLEVEL_OUTOF10: 5
PAINLEVEL_OUTOF10: 7
PAINLEVEL_OUTOF10: 7
PAINLEVEL_OUTOF10: 4
PAINLEVEL_OUTOF10: 5

## 2025-01-02 ASSESSMENT — PAIN DESCRIPTION - DESCRIPTORS
DESCRIPTORS: SORE
DESCRIPTORS: ACHING;DISCOMFORT
DESCRIPTORS: THROBBING
DESCRIPTORS: ACHING;DISCOMFORT

## 2025-01-02 ASSESSMENT — PAIN DESCRIPTION - FREQUENCY: FREQUENCY: CONTINUOUS

## 2025-01-02 ASSESSMENT — PAIN - FUNCTIONAL ASSESSMENT: PAIN_FUNCTIONAL_ASSESSMENT: PREVENTS OR INTERFERES SOME ACTIVE ACTIVITIES AND ADLS

## 2025-01-02 ASSESSMENT — PAIN DESCRIPTION - PAIN TYPE: TYPE: SURGICAL PAIN

## 2025-01-02 ASSESSMENT — PAIN DESCRIPTION - ONSET: ONSET: ON-GOING

## 2025-01-02 NOTE — CARE COORDINATION
[facility] Quality Flow/Interdisciplinary Rounds Progress Note    Quality Flow Rounds held on January 2, 2025 at 0930    Disciplines Attending:  Bedside Nurse, , and Nursing Unit Leadership    Barriers to Discharge: Clinical status    Anticipated Discharge Date:   1/4/24    Anticipated Discharge Disposition: Home independent    [facility] RISK OF UNPLANNED READMISSION 2.0             12 Total Score        Discussed patient goal for the day, patient clinical progression, and barriers to discharge.    NG to LIWS. Await return of bowel function. Plan to return home independent at discharge.    Leigh Ann Acosta RN  January 2, 2025

## 2025-01-02 NOTE — PLAN OF CARE
Problem: Pain  Goal: Verbalizes/displays adequate comfort level or baseline comfort level  Outcome: Progressing  Flowsheets  Taken 1/1/2025 1513  Verbalizes/displays adequate comfort level or baseline comfort level:   Encourage patient to monitor pain and request assistance   Assess pain using appropriate pain scale   Administer analgesics based on type and severity of pain and evaluate response   Implement non-pharmacological measures as appropriate and evaluate response   Consider cultural and social influences on pain and pain management   Notify Licensed Independent Practitioner if interventions unsuccessful or patient reports new pain  Taken 1/1/2025 1200  Verbalizes/displays adequate comfort level or baseline comfort level:   Encourage patient to monitor pain and request assistance   Assess pain using appropriate pain scale   Administer analgesics based on type and severity of pain and evaluate response   Implement non-pharmacological measures as appropriate and evaluate response   Consider cultural and social influences on pain and pain management   Notify Licensed Independent Practitioner if interventions unsuccessful or patient reports new pain  Taken 1/1/2025 0700  Verbalizes/displays adequate comfort level or baseline comfort level:   Encourage patient to monitor pain and request assistance   Assess pain using appropriate pain scale   Administer analgesics based on type and severity of pain and evaluate response   Implement non-pharmacological measures as appropriate and evaluate response   Consider cultural and social influences on pain and pain management   Notify Licensed Independent Practitioner if interventions unsuccessful or patient reports new pain     Problem: Safety - Adult  Goal: Free from fall injury  Outcome: Progressing     Problem: Chronic Conditions and Co-morbidities  Goal: Patient's chronic conditions and co-morbidity symptoms are monitored and maintained or improved  Outcome:  Progressing  Flowsheets (Taken 1/1/2025 0830)  Care Plan - Patient's Chronic Conditions and Co-Morbidity Symptoms are Monitored and Maintained or Improved: Monitor and assess patient's chronic conditions and comorbid symptoms for stability, deterioration, or improvement     Problem: Discharge Planning  Goal: Discharge to home or other facility with appropriate resources  Outcome: Progressing  Flowsheets (Taken 1/1/2025 0830)  Discharge to home or other facility with appropriate resources:   Identify barriers to discharge with patient and caregiver   Arrange for needed discharge resources and transportation as appropriate   Identify discharge learning needs (meds, wound care, etc)     Problem: Respiratory - Adult  Goal: Achieves optimal ventilation and oxygenation  Outcome: Progressing  Flowsheets (Taken 1/1/2025 0830)  Achieves optimal ventilation and oxygenation:   Assess for changes in respiratory status   Assess for changes in mentation and behavior   Position to facilitate oxygenation and minimize respiratory effort   Oxygen supplementation based on oxygen saturation or arterial blood gases   Respiratory therapy support as indicated     Problem: Nutrition Deficit:  Goal: Optimize nutritional status  Outcome: Progressing     Problem: Skin/Tissue Integrity  Goal: Absence of new skin breakdown  Description: 1.  Monitor for areas of redness and/or skin breakdown  2.  Assess vascular access sites hourly  3.  Every 4-6 hours minimum:  Change oxygen saturation probe site  4.  Every 4-6 hours:  If on nasal continuous positive airway pressure, respiratory therapy assess nares and determine need for appliance change or resting period.  Outcome: Progressing

## 2025-01-02 NOTE — PLAN OF CARE
Problem: Pain  Goal: Verbalizes/displays adequate comfort level or baseline comfort level  Outcome: Progressing  Flowsheets  Taken 1/2/2025 1513  Verbalizes/displays adequate comfort level or baseline comfort level:   Encourage patient to monitor pain and request assistance   Assess pain using appropriate pain scale   Administer analgesics based on type and severity of pain and evaluate response   Implement non-pharmacological measures as appropriate and evaluate response   Notify Licensed Independent Practitioner if interventions unsuccessful or patient reports new pain   Consider cultural and social influences on pain and pain management  Taken 1/2/2025 1208  Verbalizes/displays adequate comfort level or baseline comfort level:   Assess pain using appropriate pain scale   Encourage patient to monitor pain and request assistance   Administer analgesics based on type and severity of pain and evaluate response   Implement non-pharmacological measures as appropriate and evaluate response   Consider cultural and social influences on pain and pain management   Notify Licensed Independent Practitioner if interventions unsuccessful or patient reports new pain  Taken 1/2/2025 0812  Verbalizes/displays adequate comfort level or baseline comfort level:   Encourage patient to monitor pain and request assistance   Assess pain using appropriate pain scale   Administer analgesics based on type and severity of pain and evaluate response   Implement non-pharmacological measures as appropriate and evaluate response   Consider cultural and social influences on pain and pain management   Notify Licensed Independent Practitioner if interventions unsuccessful or patient reports new pain     Problem: Safety - Adult  Goal: Free from fall injury  Outcome: Progressing     Problem: Chronic Conditions and Co-morbidities  Goal: Patient's chronic conditions and co-morbidity symptoms are monitored and maintained or improved  Outcome:  Progressing  Flowsheets (Taken 1/2/2025 0800)  Care Plan - Patient's Chronic Conditions and Co-Morbidity Symptoms are Monitored and Maintained or Improved: Monitor and assess patient's chronic conditions and comorbid symptoms for stability, deterioration, or improvement     Problem: Discharge Planning  Goal: Discharge to home or other facility with appropriate resources  Outcome: Progressing  Flowsheets (Taken 1/2/2025 0800)  Discharge to home or other facility with appropriate resources:   Identify barriers to discharge with patient and caregiver   Arrange for needed discharge resources and transportation as appropriate   Identify discharge learning needs (meds, wound care, etc)     Problem: Respiratory - Adult  Goal: Achieves optimal ventilation and oxygenation  Outcome: Progressing  Flowsheets (Taken 1/2/2025 0800)  Achieves optimal ventilation and oxygenation:   Assess for changes in respiratory status   Assess for changes in mentation and behavior   Position to facilitate oxygenation and minimize respiratory effort   Oxygen supplementation based on oxygen saturation or arterial blood gases   Respiratory therapy support as indicated     Problem: Nutrition Deficit:  Goal: Optimize nutritional status  Outcome: Progressing     Problem: Skin/Tissue Integrity  Goal: Absence of new skin breakdown  Description: 1.  Monitor for areas of redness and/or skin breakdown  2.  Assess vascular access sites hourly  3.  Every 4-6 hours minimum:  Change oxygen saturation probe site  4.  Every 4-6 hours:  If on nasal continuous positive airway pressure, respiratory therapy assess nares and determine need for appliance change or resting period.  Outcome: Progressing

## 2025-01-02 NOTE — PROGRESS NOTES
General Surgery:  Daily Progress Note          POD # 3 s/p segmental transverse colectomy with anastomosis          PATIENT NAME: Tash Mora     TODAY'S DATE: 1/2/2025, 6:51 AM  CC: Abdominal discomfort    SUBJECTIVE:     Patient was evaluated at bedside.  Tachycardic overnight.  NG tube with 3.4 L out.  Patient's abdominal pain and nausea significantly improved this morning.  No flatus or bowel function.    OBJECTIVE:   VITALS:  BP (!) 118/90   Pulse (!) 105   Temp 98.5 °F (36.9 °C) (Oral)   Resp 22   Ht 1.803 m (5' 10.98\")   Wt 112 kg (247 lb)   SpO2 (S) (!) 86% Comment: pt placed on 2L NC  BMI 34.47 kg/m²      INTAKE/OUTPUT:      Intake/Output Summary (Last 24 hours) at 1/2/2025 0651  Last data filed at 1/2/2025 0644  Gross per 24 hour   Intake 580 ml   Output 3435 ml   Net -2855 ml       PHYSICAL EXAM:  General Appearance: Awake, alert and resting in bed comfortably  HEENT:  Normocephalic, atraumatic, mucus membranes moist, nasogastric tube in place  Heart: Regular rate and rhythm  Lungs: Symmetrical chest rise, normal effort with respirations  Abdomen: Soft, mild distention, minimal tenderness to palpation  Incision: Midline incision dressing clean dry and intact  Extremities: No cyanosis, pitting edema, rashes noted.    Skin: Skin color, texture, turgor normal. No rashes or lesions.    IV Access: Peripheral IV    Data:  CBC with Differential:    Lab Results   Component Value Date/Time    WBC 12.0 01/02/2025 06:09 AM    RBC 4.26 01/02/2025 06:09 AM    HGB 13.3 01/02/2025 06:09 AM    HCT 38.0 01/02/2025 06:09 AM     01/02/2025 06:09 AM    MCV 89.2 01/02/2025 06:09 AM    MCH 31.1 01/02/2025 06:09 AM    MCHC 34.9 01/02/2025 06:09 AM    RDW 13.2 01/02/2025 06:09 AM    LYMPHOPCT 12 01/02/2025 06:09 AM    MONOPCT 5 01/02/2025 06:09 AM    EOSPCT 0 01/02/2025 06:09 AM    BASOPCT 0 01/02/2025 06:09 AM    MONOSABS 0.60 01/02/2025 06:09 AM    LYMPHSABS 1.50 01/02/2025 06:09 AM    EOSABS 0.00

## 2025-01-02 NOTE — PROGRESS NOTES
[facility]  Office: [phone]  Topher Botello DO, Manohar John DO, Les Smith DO, Erik Turner DO, Lupillo Diaz MD, Maeve Figueroa MD, Jennifer Altamirano MD, Aylin Levi MD,  Kemal Newby MD, Manuelito Shaffer MD, Josiah Cox MD,  Adam Finch DO, Batsheva Moralez MD, Aleks Shaver MD, Quan Botello DO, Lola Casper MD,  Vikram Sunshine DO, Dionna Egan MD, Siobhan Cleary MD, Jennifer Titus MD, Perfecto Lomas MD,  Alex Robert MD, Checo Rose MD, Darío Rodriguez MD, Pato Betancourt MD, Av Breaux MD, Rj Hernandez MD, Roger London DO, Alok Couch MD, Adam Marcus MD, Mohsin Reza, MD, Shirley Waterhouse, CNP,  Fatemeh Saldana CNP, Roger Zavala, FANNIE,  Marta Suarez, SKIP, Rosmery Luke, CNP, Estella Araujo, CNP, Martha Zamarripa CNP, Flaquita Reed, CNP, Justina Todd, PA-C, Kayla Villarreal PA-C, Mecca Pacheco, CNP, Nilsa Delgado, CNP,  Danielle Montes, CNP, Beverley Winslow, CNP, Karen Lofton, CNP,  Keerthi Hinojosa CNP, Susie Crump, CNP         [facility]   IN-PATIENT SERVICE   [facility]    Progress Note    1/2/2025    11:26 AM    Name:   Tash Mora  MRN:     [id number]     Acct:      [id number]   Room:   [address] [place] [address] Day:  3  Admit Date:  12/30/2024  6:05 AM    PCP:   Sandra Roberson DO  Code Status:  Full Code    Subjective:     Patient seen in follow-up for postoperative medical management, patient states \"I am feeling better\"    Yesterday patient started developing nausea and vomiting earlier in the day.  He did develop some shortness of breath and I did obtain chest x-ray and KUB which is demonstrated ileus.  Since that time he developed significant nausea and vomiting.  NG was placed with approximately 3.4 L of output.  The patient's breathing has improved traumatically.  I did explain to the patient that with the ileus and increased abdominal distention causes a compression of his lungs leading to the shortness of breath.   DO  1/2/2025  11:26 AM

## 2025-01-03 LAB
ANION GAP SERPL CALCULATED.3IONS-SCNC: 10 MMOL/L (ref 9–17)
BASOPHILS # BLD: 0 K/UL (ref 0–0.2)
BASOPHILS NFR BLD: 0 % (ref 0–2)
BUN SERPL-MCNC: 26 MG/DL (ref 8–23)
CALCIUM SERPL-MCNC: 8.4 MG/DL (ref 8.6–10.4)
CHLORIDE SERPL-SCNC: 105 MMOL/L (ref 98–107)
CO2 SERPL-SCNC: 28 MMOL/L (ref 20–31)
CREAT SERPL-MCNC: 1.1 MG/DL (ref 0.7–1.2)
EKG ATRIAL RATE: 78 BPM
EKG P AXIS: 44 DEGREES
EKG P-R INTERVAL: 144 MS
EKG Q-T INTERVAL: 410 MS
EKG QRS DURATION: 98 MS
EKG QTC CALCULATION (BAZETT): 467 MS
EKG R AXIS: 13 DEGREES
EKG T AXIS: 25 DEGREES
EKG VENTRICULAR RATE: 78 BPM
EOSINOPHIL # BLD: 0.3 K/UL (ref 0–0.4)
EOSINOPHILS RELATIVE PERCENT: 4 % (ref 1–4)
ERYTHROCYTE [DISTWIDTH] IN BLOOD BY AUTOMATED COUNT: 13.2 % (ref 12.5–15.4)
GFR, ESTIMATED: 75 ML/MIN/1.73M2
GLUCOSE SERPL-MCNC: 101 MG/DL (ref 70–99)
HCT VFR BLD AUTO: 33.4 % (ref 41–53)
HGB BLD-MCNC: 11.7 G/DL (ref 13.5–17.5)
LYMPHOCYTES NFR BLD: 1.5 K/UL (ref 1–4.8)
LYMPHOCYTES RELATIVE PERCENT: 18 % (ref 24–44)
MAGNESIUM SERPL-MCNC: 1.9 MG/DL (ref 1.6–2.6)
MCH RBC QN AUTO: 31.5 PG (ref 26–34)
MCHC RBC AUTO-ENTMCNC: 35.1 G/DL (ref 31–37)
MCV RBC AUTO: 89.8 FL (ref 80–100)
MONOCYTES NFR BLD: 0.5 K/UL (ref 0.1–1.2)
MONOCYTES NFR BLD: 7 % (ref 2–11)
NEUTROPHILS NFR BLD: 71 % (ref 36–66)
NEUTS SEG NFR BLD: 6 K/UL (ref 1.8–7.7)
PLATELET # BLD AUTO: 147 K/UL (ref 140–450)
PMV BLD AUTO: 8.4 FL (ref 6–12)
POTASSIUM SERPL-SCNC: 3.3 MMOL/L (ref 3.7–5.3)
RBC # BLD AUTO: 3.72 M/UL (ref 4.5–5.9)
SODIUM SERPL-SCNC: 143 MMOL/L (ref 135–144)
TROPONIN I SERPL HS-MCNC: 10 NG/L (ref 0–22)
WBC OTHER # BLD: 8.3 K/UL (ref 3.5–11)

## 2025-01-03 PROCEDURE — 2500000003 HC RX 250 WO HCPCS: Performed by: COLON & RECTAL SURGERY

## 2025-01-03 PROCEDURE — 6360000002 HC RX W HCPCS: Performed by: HOSPITALIST

## 2025-01-03 PROCEDURE — 36415 COLL VENOUS BLD VENIPUNCTURE: CPT

## 2025-01-03 PROCEDURE — 6370000000 HC RX 637 (ALT 250 FOR IP): Performed by: NURSE PRACTITIONER

## 2025-01-03 PROCEDURE — 6360000002 HC RX W HCPCS: Performed by: COLON & RECTAL SURGERY

## 2025-01-03 PROCEDURE — 1200000000 HC SEMI PRIVATE

## 2025-01-03 PROCEDURE — 99232 SBSQ HOSP IP/OBS MODERATE 35: CPT | Performed by: HOSPITALIST

## 2025-01-03 PROCEDURE — 2700000000 HC OXYGEN THERAPY PER DAY

## 2025-01-03 PROCEDURE — 85025 COMPLETE CBC W/AUTO DIFF WBC: CPT

## 2025-01-03 PROCEDURE — 2500000003 HC RX 250 WO HCPCS: Performed by: STUDENT IN AN ORGANIZED HEALTH CARE EDUCATION/TRAINING PROGRAM

## 2025-01-03 PROCEDURE — 83735 ASSAY OF MAGNESIUM: CPT

## 2025-01-03 PROCEDURE — 6370000000 HC RX 637 (ALT 250 FOR IP): Performed by: HOSPITALIST

## 2025-01-03 PROCEDURE — 2580000003 HC RX 258: Performed by: STUDENT IN AN ORGANIZED HEALTH CARE EDUCATION/TRAINING PROGRAM

## 2025-01-03 PROCEDURE — 94640 AIRWAY INHALATION TREATMENT: CPT

## 2025-01-03 PROCEDURE — 80048 BASIC METABOLIC PNL TOTAL CA: CPT

## 2025-01-03 PROCEDURE — 6370000000 HC RX 637 (ALT 250 FOR IP): Performed by: STUDENT IN AN ORGANIZED HEALTH CARE EDUCATION/TRAINING PROGRAM

## 2025-01-03 PROCEDURE — 94761 N-INVAS EAR/PLS OXIMETRY MLT: CPT

## 2025-01-03 PROCEDURE — 84484 ASSAY OF TROPONIN QUANT: CPT

## 2025-01-03 PROCEDURE — 93005 ELECTROCARDIOGRAM TRACING: CPT | Performed by: HOSPITALIST

## 2025-01-03 PROCEDURE — 6370000000 HC RX 637 (ALT 250 FOR IP): Performed by: COLON & RECTAL SURGERY

## 2025-01-03 RX ORDER — POTASSIUM CHLORIDE 7.45 MG/ML
10 INJECTION INTRAVENOUS ONCE
Status: DISCONTINUED | OUTPATIENT
Start: 2025-01-03 | End: 2025-01-03

## 2025-01-03 RX ORDER — POTASSIUM CHLORIDE 7.45 MG/ML
10 INJECTION INTRAVENOUS ONCE
Status: COMPLETED | OUTPATIENT
Start: 2025-01-03 | End: 2025-01-03

## 2025-01-03 RX ORDER — POTASSIUM CHLORIDE 1500 MG/1
20 TABLET, EXTENDED RELEASE ORAL ONCE
Status: COMPLETED | OUTPATIENT
Start: 2025-01-03 | End: 2025-01-03

## 2025-01-03 RX ADMIN — METOCLOPRAMIDE 5 MG: 5 INJECTION, SOLUTION INTRAMUSCULAR; INTRAVENOUS at 06:33

## 2025-01-03 RX ADMIN — GABAPENTIN 300 MG: 300 CAPSULE ORAL at 06:00

## 2025-01-03 RX ADMIN — PHENOL 1 SPRAY: 1.5 LIQUID ORAL at 09:04

## 2025-01-03 RX ADMIN — FAMOTIDINE 20 MG: 10 INJECTION, SOLUTION INTRAVENOUS at 08:46

## 2025-01-03 RX ADMIN — ACETAMINOPHEN 1000 MG: 500 TABLET ORAL at 11:46

## 2025-01-03 RX ADMIN — POTASSIUM CHLORIDE 10 MEQ: 7.46 INJECTION, SOLUTION INTRAVENOUS at 10:45

## 2025-01-03 RX ADMIN — ALBUTEROL SULFATE 2.5 MG: 2.5 SOLUTION RESPIRATORY (INHALATION) at 08:06

## 2025-01-03 RX ADMIN — CARVEDILOL 12.5 MG: 12.5 TABLET, FILM COATED ORAL at 09:04

## 2025-01-03 RX ADMIN — SODIUM CHLORIDE, POTASSIUM CHLORIDE, SODIUM LACTATE AND CALCIUM CHLORIDE: 600; 310; 30; 20 INJECTION, SOLUTION INTRAVENOUS at 00:04

## 2025-01-03 RX ADMIN — TOPIRAMATE 100 MG: 100 TABLET, FILM COATED ORAL at 22:35

## 2025-01-03 RX ADMIN — POTASSIUM CHLORIDE 10 MEQ: 7.46 INJECTION, SOLUTION INTRAVENOUS at 08:54

## 2025-01-03 RX ADMIN — ENOXAPARIN SODIUM 30 MG: 100 INJECTION SUBCUTANEOUS at 22:35

## 2025-01-03 RX ADMIN — ACETAMINOPHEN 1000 MG: 500 TABLET ORAL at 18:18

## 2025-01-03 RX ADMIN — ENOXAPARIN SODIUM 30 MG: 100 INJECTION SUBCUTANEOUS at 09:03

## 2025-01-03 RX ADMIN — TRAMADOL HYDROCHLORIDE 50 MG: 50 TABLET, COATED ORAL at 09:04

## 2025-01-03 RX ADMIN — ATORVASTATIN CALCIUM 10 MG: 10 TABLET, FILM COATED ORAL at 09:04

## 2025-01-03 RX ADMIN — TRAZODONE HYDROCHLORIDE 200 MG: 100 TABLET ORAL at 22:35

## 2025-01-03 RX ADMIN — ACETAMINOPHEN 1000 MG: 500 TABLET ORAL at 06:00

## 2025-01-03 RX ADMIN — NALOXEGOL OXALATE 25 MG: 12.5 TABLET, FILM COATED ORAL at 09:03

## 2025-01-03 RX ADMIN — FAMOTIDINE 20 MG: 10 INJECTION, SOLUTION INTRAVENOUS at 22:44

## 2025-01-03 RX ADMIN — MEMANTINE 10 MG: 5 TABLET ORAL at 09:03

## 2025-01-03 RX ADMIN — TRAMADOL HYDROCHLORIDE 50 MG: 50 TABLET, COATED ORAL at 04:24

## 2025-01-03 RX ADMIN — ALBUTEROL SULFATE 2.5 MG: 2.5 SOLUTION RESPIRATORY (INHALATION) at 19:48

## 2025-01-03 RX ADMIN — SODIUM CHLORIDE, PRESERVATIVE FREE 10 ML: 5 INJECTION INTRAVENOUS at 22:45

## 2025-01-03 RX ADMIN — CARVEDILOL 12.5 MG: 12.5 TABLET, FILM COATED ORAL at 22:34

## 2025-01-03 RX ADMIN — GABAPENTIN 300 MG: 300 CAPSULE ORAL at 22:35

## 2025-01-03 RX ADMIN — METOCLOPRAMIDE 5 MG: 5 INJECTION, SOLUTION INTRAMUSCULAR; INTRAVENOUS at 01:11

## 2025-01-03 RX ADMIN — POTASSIUM CHLORIDE 20 MEQ: 1500 TABLET, EXTENDED RELEASE ORAL at 13:41

## 2025-01-03 RX ADMIN — ONDANSETRON 4 MG: 4 TABLET, ORALLY DISINTEGRATING ORAL at 04:20

## 2025-01-03 RX ADMIN — GABAPENTIN 300 MG: 300 CAPSULE ORAL at 13:40

## 2025-01-03 RX ADMIN — MEMANTINE 10 MG: 5 TABLET ORAL at 22:35

## 2025-01-03 ASSESSMENT — PAIN DESCRIPTION - LOCATION
LOCATION: ABDOMEN;CHEST
LOCATION: ABDOMEN
LOCATION: ABDOMEN

## 2025-01-03 ASSESSMENT — PAIN DESCRIPTION - ORIENTATION
ORIENTATION: MID
ORIENTATION: MID

## 2025-01-03 ASSESSMENT — PAIN SCALES - GENERAL
PAINLEVEL_OUTOF10: 7
PAINLEVEL_OUTOF10: 6
PAINLEVEL_OUTOF10: 4
PAINLEVEL_OUTOF10: 8
PAINLEVEL_OUTOF10: 5

## 2025-01-03 ASSESSMENT — PAIN DESCRIPTION - DESCRIPTORS
DESCRIPTORS: THROBBING
DESCRIPTORS: THROBBING

## 2025-01-03 NOTE — PROGRESS NOTES
[facility]  Office: [phone]  Topher Botello DO, Manohar John DO, Les Smith DO, Erik Turner DO, Lupillo Diaz MD, Maeve Figueroa MD, Jennifer Altamirano MD, Aylin Levi MD,  Kemal Newby MD, Manuelito Shaffer MD, Josiah Cox MD,  Adam Finch DO, Batsheva Moralez MD, Aleks Shaver MD, Quan Botello DO, Lola Casper MD,  Vikram Sunshine DO, Dionna Egan MD, Siobhan Cleary MD, Jennifer Titus MD, Perfecto Lomas MD,  Alex Robert MD, Checo Rose MD, Darío Rodriguez MD, Pato Betancourt MD, Av Breaux MD, Rj Hernandez MD, Roger London DO, Alok Couch MD, Adam Marcus MD, Mohsin Reza, MD, Shirley Waterhouse, CNP,  Fatemeh Saldana CNP, Roger Zavala, FANNIE,  Marta Suarez, SKIP, Rosmery Luke, FANNIE, Estella Araujo, FANNIE, Martha Zamarripa CNP, Flaquita Reed, FANNEI, Justina Todd, PA-C, Kayla Villarreal PA-C, Mecca Pacheco, CNP, Nilsa Delgado, CNP,  Danielle Montes, CNP, Beverley Winslow, CNP, Karen Lofton, FANNIE,  Keerthi Hinojosa CNP, Suise Crump, FANNIE         [facility]   IN-PATIENT SERVICE   [facility]    Progress Note    1/3/2025    10:42 AM    Name:   Tash Mora  MRN:     [id number]     Acct:      [id number]   Room:   [address] [place] [address] Day:  4  Admit Date:  12/30/2024  6:05 AM    PCP:   Sandra Roberson DO  Code Status:  Full Code    Subjective:     Patient seen in follow-up for postoperative medical management following colon resection for colon cancer, patient states \"my chest feels heavy\"    Patient complains of heaviness.  Clinically it appears to be postoperative in nature.  I did obtain EKG and troponin.  Troponin is 10 and unremarkable.  EKG is unremarkable as well.  The patient's symptomology is consistent with his recent surgical interventions.    Regarding his postoperative ileus he has had multiple loose bowel movements.  I will discontinue Reglan at this point in time.  We will continue with antiemetics.  NG is to be removed per surgery.

## 2025-01-03 NOTE — PLAN OF CARE
Problem: Pain  Goal: Verbalizes/displays adequate comfort level or baseline comfort level  1/3/2025 0133 by Lalit Hamm LPN  Outcome: Progressing     Problem: Safety - Adult  Goal: Free from fall injury  1/3/2025 0133 by Lalit Hamm LPN  Outcome: Progressing     Problem: Chronic Conditions and Co-morbidities  Goal: Patient's chronic conditions and co-morbidity symptoms are monitored and maintained or improved  1/3/2025 0133 by Lalit Hamm LPN  Outcome: Progressing  Flowsheets (Taken 1/2/2025 2000)  Care Plan - Patient's Chronic Conditions and Co-Morbidity Symptoms are Monitored and Maintained or Improved: Monitor and assess patient's chronic conditions and comorbid symptoms for stability, deterioration, or improvement     Problem: Discharge Planning  Goal: Discharge to home or other facility with appropriate resources  1/3/2025 0133 by Lalit Hamm LPN  Outcome: Progressing  Flowsheets (Taken 1/2/2025 2000)  Discharge to home or other facility with appropriate resources: Identify barriers to discharge with patient and caregiver     Problem: Respiratory - Adult  Goal: Achieves optimal ventilation and oxygenation  1/3/2025 0133 by Lalit Hamm LPN  Outcome: Progressing  Flowsheets (Taken 1/2/2025 2000)  Achieves optimal ventilation and oxygenation:   Assess for changes in respiratory status   Assess for changes in mentation and behavior     Problem: Nutrition Deficit:  Goal: Optimize nutritional status  1/3/2025 0133 by Lalit Hamm LPN  Outcome: Progressing    Problem: Skin/Tissue Integrity  Goal: Absence of new skin breakdown  Description: 1.  Monitor for areas of redness and/or skin breakdown  2.  Assess vascular access sites hourly  3.  Every 4-6 hours minimum:  Change oxygen saturation probe site  4.  Every 4-6 hours:  If on nasal continuous positive airway pressure, respiratory therapy assess nares and determine need for appliance change or resting period.  1/3/2025 0133 by  Lalit Hamm, LPN  Outcome: Progressing

## 2025-01-03 NOTE — RT PROTOCOL NOTE
RT Nebulizer Bronchodilator Protocol Note    There is a bronchodilator order in the chart from a provider indicating to follow the RT Bronchodilator Protocol and there is an “Initiate RT Bronchodilator Protocol” order as well (see protocol at bottom of note).    CXR Findings:  XR CHEST PORTABLE    Result Date: 1/1/2025  No acute cardiopulmonary process.       The findings from the last RT Protocol Assessment were as follows:  Smoking: Chronic pulmonary disease  Respiratory Pattern: Regular pattern and RR 12-20 bpm  Breath Sounds: Slightly diminished and/or crackles  Cough: Strong, spontaneous, non-productive  Indication for Bronchodilator Therapy:    Bronchodilator Assessment Score: 4    Aerosolized bronchodilator medication orders have been revised according to the RT Nebulizer Bronchodilator Protocol below.    Respiratory Therapist to perform RT Therapy Protocol Assessment initially then follow the protocol.  Repeat RT Therapy Protocol Assessment PRN for score 0-3 or on second treatment, BID, and PRN for scores above 3.    No Indications - adjust the frequency to every 6 hours PRN wheezing or bronchospasm, if no treatments needed after 48 hours then discontinue using Per Protocol order mode.     If indication present, adjust the RT bronchodilator orders based on the Bronchodilator Assessment Score as indicated below.  If a patient is on this medication at home then do not decrease Frequency below that used at home.    0-3 - enter or revise RT bronchodilator order(s) to equivalent RT Bronchodilator order with Frequency of every 4 hours PRN for wheezing or increased work of breathing using Per Protocol order mode.       4-6 - enter or revise RT Bronchodilator order(s) to two equivalent RT bronchodilator orders with one order with BID Frequency and one order with Frequency of every 4 hours PRN wheezing or increased work of breathing using Per Protocol order mode.         7-10 - enter or revise RT Bronchodilator  order(s) to two equivalent RT bronchodilator orders with one order with TID Frequency and one order with Frequency of every 4 hours PRN wheezing or increased work of breathing using Per Protocol order mode.       11-13 - enter or revise RT Bronchodilator order(s) to one equivalent RT bronchodilator order with QID Frequency and an Albuterol order with Frequency of every 4 hours PRN wheezing or increased work of breathing using Per Protocol order mode.      Greater than 13 - enter or revise RT Bronchodilator order(s) to one equivalent RT bronchodilator order with every 4 hours Frequency and an Albuterol order with Frequency of every 2 hours PRN wheezing or increased work of breathing using Per Protocol order mode.     RT to enter RT Home Evaluation for COPD & MDI Assessment order using Per Protocol order mode.    Electronically signed by Perlita Concepcion RCP on 1/3/2025 at 8:11 AM

## 2025-01-03 NOTE — PLAN OF CARE
Problem: Pain  Goal: Verbalizes/displays adequate comfort level or baseline comfort level  1/3/2025 1240 by Madalyn Bustillos LPN  Outcome: Progressing  Flowsheets (Taken 1/3/2025 0745)  Verbalizes/displays adequate comfort level or baseline comfort level:   Encourage patient to monitor pain and request assistance   Assess pain using appropriate pain scale   Administer analgesics based on type and severity of pain and evaluate response   Implement non-pharmacological measures as appropriate and evaluate response  1/3/2025 0133 by Lalit Hamm LPN  Outcome: Progressing     Problem: Safety - Adult  Goal: Free from fall injury  1/3/2025 1240 by Mdaalyn Bustillos LPN  Outcome: Progressing  1/3/2025 0133 by Lalit Hamm LPN  Outcome: Progressing     Problem: Chronic Conditions and Co-morbidities  Goal: Patient's chronic conditions and co-morbidity symptoms are monitored and maintained or improved  1/3/2025 1240 by Madalyn Bustillos LPN  Outcome: Progressing  Flowsheets (Taken 1/3/2025 0745)  Care Plan - Patient's Chronic Conditions and Co-Morbidity Symptoms are Monitored and Maintained or Improved:   Monitor and assess patient's chronic conditions and comorbid symptoms for stability, deterioration, or improvement   Collaborate with multidisciplinary team to address chronic and comorbid conditions and prevent exacerbation or deterioration   Update acute care plan with appropriate goals if chronic or comorbid symptoms are exacerbated and prevent overall improvement and discharge  1/3/2025 0133 by Lalit Hamm LPN  Outcome: Progressing  Flowsheets (Taken 1/2/2025 2000)  Care Plan - Patient's Chronic Conditions and Co-Morbidity Symptoms are Monitored and Maintained or Improved: Monitor and assess patient's chronic conditions and comorbid symptoms for stability, deterioration, or improvement     Problem: Discharge Planning  Goal: Discharge to home or other facility with appropriate resources  1/3/2025 1240 by Madalyn Bustillos    Problem: Skin/Tissue Integrity  Goal: Absence of new skin breakdown  Description: 1.  Monitor for areas of redness and/or skin breakdown  2.  Assess vascular access sites hourly  3.  Every 4-6 hours minimum:  Change oxygen saturation probe site  4.  Every 4-6 hours:  If on nasal continuous positive airway pressure, respiratory therapy assess nares and determine need for appliance change or resting period.  1/3/2025 1240 by Madalyn Bustillos LPN  Outcome: Progressing  1/3/2025 0133 by Lalit Hamm LPN  Outcome: Progressing

## 2025-01-03 NOTE — PROGRESS NOTES
General Surgery:  Daily Progress Note          POD # 4 s/p segmental transverse colectomy with anastomosis          PATIENT NAME: Tash Mora     TODAY'S DATE: 1/3/2025, 7:45 AM  CC: Abdominal discomfort    SUBJECTIVE:     Patient evaluated today. Nausea improved NGT with 800cc drainage 24 hr. Passing flatus and had small bowel movement.     OBJECTIVE:   VITALS:  /66   Pulse 81   Temp 98 °F (36.7 °C) (Oral)   Resp 16   Ht 1.803 m (5' 10.98\")   Wt 112 kg (247 lb)   SpO2 91%   BMI 34.47 kg/m²      INTAKE/OUTPUT:      Intake/Output Summary (Last 24 hours) at 1/3/2025 0745  Last data filed at 1/3/2025 0602  Gross per 24 hour   Intake --   Output 2110 ml   Net -2110 ml       PHYSICAL EXAM:  General Appearance: Awake, alert and resting in bed comfortably  HEENT:  Normocephalic, atraumatic, mucus membranes moist, nasogastric tube in place  Heart: Regular rate and rhythm  Lungs: Symmetrical chest rise, normal effort with respirations  Abdomen: Soft, mild distention, minimal tenderness to palpation near incision  Incision: Midline incision dressing clean dry and intact, CIRA with minimal output  Extremities: No cyanosis, pitting edema, rashes noted.    Skin: Skin color, texture, turgor normal. No rashes or lesions.    IV Access: Peripheral IV    Data:  CBC with Differential:    Lab Results   Component Value Date/Time    WBC 8.3 01/03/2025 06:28 AM    RBC 3.72 01/03/2025 06:28 AM    HGB 11.7 01/03/2025 06:28 AM    HCT 33.4 01/03/2025 06:28 AM     01/03/2025 06:28 AM    MCV 89.8 01/03/2025 06:28 AM    MCH 31.5 01/03/2025 06:28 AM    MCHC 35.1 01/03/2025 06:28 AM    RDW 13.2 01/03/2025 06:28 AM    LYMPHOPCT 18 01/03/2025 06:28 AM    MONOPCT 7 01/03/2025 06:28 AM    EOSPCT 4 01/03/2025 06:28 AM    BASOPCT 0 01/03/2025 06:28 AM    MONOSABS 0.50 01/03/2025 06:28 AM    LYMPHSABS 1.50 01/03/2025 06:28 AM    EOSABS 0.30 01/03/2025 06:28 AM    BASOSABS 0.00 01/03/2025 06:28 AM    DIFFTYPE NOT REPORTED  07/28/2021 10:25 AM     BMP:    Lab Results   Component Value Date/Time     01/03/2025 06:28 AM    K 3.3 01/03/2025 06:28 AM     01/03/2025 06:28 AM    CO2 28 01/03/2025 06:28 AM    BUN 26 01/03/2025 06:28 AM    CREATININE 1.1 01/03/2025 06:28 AM    CALCIUM 8.4 01/03/2025 06:28 AM    GFRAA >60 11/25/2021 06:53 AM    LABGLOM 75 01/03/2025 06:28 AM    LABGLOM >60 01/20/2024 10:13 AM    GLUCOSE 101 01/03/2025 06:28 AM       Radiology Review:    XR ABDOMEN FOR NG/OG/NE TUBE PLACEMENT    Result Date: 1/2/2025  EXAMINATION: ONE SUPINE XRAY VIEW(S) OF THE ABDOMEN 1/2/2025 12:54 pm COMPARISON: Same date at 917 hours HISTORY: ORDERING SYSTEM PROVIDED HISTORY: Confirmation of course of NG/OG/NE tube and location of tip of tube, NGT repositioned TECHNOLOGIST PROVIDED HISTORY: Confirmation of course of NG/OG/NE tube and location of tip of tube, NGT repositioned Portable?->Yes Reason for Exam: Confirmation of course of NG/OG/NE tube and location of tip of tube, NGT repositioned FINDINGS: Enteric device extends below the diaphragm with tip terminating in the mid stomach region. No significant lung abnormality.  Elevated right hemidiaphragm unchanged.     Satisfactory position of the enteric device terminating in the mid stomach.     XR ABDOMEN FOR NG/OG/NE TUBE PLACEMENT    Result Date: 1/2/2025  EXAMINATION: ONE SUPINE XRAY VIEW(S) OF THE ABDOMEN 1/2/2025 9:35 am COMPARISON: January 1, 2025 HISTORY: ORDERING SYSTEM PROVIDED HISTORY: Confirmation of course of NG/OG/NE tube and location of tip of tube TECHNOLOGIST PROVIDED HISTORY: Confirmation of course of NG/OG/NE tube and location of tip of tube Portable?->Yes Reason for Exam: NG placement FINDINGS: Enteric device terminates at the level of the right hemidiaphragm. Prominent loops of small bowel especially in the epigastric region. The heart is not enlarged.  No pulmonary venous congestion or edema. Elevation of the right hemidiaphragm.  No convincing lung

## 2025-01-03 NOTE — PLAN OF CARE
Problem: Respiratory - Adult  Goal: Achieves optimal ventilation and oxygenation  1/3/2025 0812 by Perlita Concepcion RCP  Outcome: Progressing  Flowsheets (Taken 1/3/2025 0812)  Achieves optimal ventilation and oxygenation:   Assess for changes in respiratory status   Position to facilitate oxygenation and minimize respiratory effort   Assess the need for suctioning and aspirate as needed   Respiratory therapy support as indicated   Assess and instruct to report shortness of breath or any respiratory difficulty   Encourage broncho-pulmonary hygiene including cough, deep breathe, incentive spirometry   Oxygen supplementation based on oxygen saturation or arterial blood gases   Assess for changes in mentation and behavior

## 2025-01-03 NOTE — CARE COORDINATION
[facility]    Quality Flow Rounds held on January 3, 2025 at 0930    Disciplines Attending:  Bedside Nurse, , and Nursing Unit Leadership    Barriers to Discharge: clinical status    Anticipated Discharge Date:   1/5/25    Anticipated Discharge Disposition: Home    [facility] RISK OF UNPLANNED READMISSION 2.0             11.9 Total Score        Discussed patient goal for the day, patient clinical progression, and barriers to discharge. POD 4. NG removed.  Await return of bowel function. Plan to return home independently with family at discharge.      1600- Patient now requesting [facility] services at discharge. Lists provided and patient requests referral to [address]. Referral sent.                       Post Acute Facility/Agency List     Provided patient with the following list, the list includes the overall star ratings obtained from CMS per the Medicare Web site (www.Medicare.gov):     [] Long Term Acute Care Facilities  [] Acute Inpatient Rehabilitation Facilities  [] Skilled Nursing Facilities  [] Hospice Facilities  [x] Home Care    Provided verbal instructions on how to utilize the QR Code to obtain additional detailed star ratings from www.Medicare.gov     offered to print and provide the detailed list:    []Accepted   [x]Declined

## 2025-01-04 VITALS
DIASTOLIC BLOOD PRESSURE: 91 MMHG | TEMPERATURE: 98.1 F | RESPIRATION RATE: 16 BRPM | SYSTOLIC BLOOD PRESSURE: 137 MMHG | WEIGHT: 257.94 LBS | HEART RATE: 76 BPM | BODY MASS INDEX: 36.11 KG/M2 | OXYGEN SATURATION: 92 % | HEIGHT: 71 IN

## 2025-01-04 LAB
ANION GAP SERPL CALCULATED.3IONS-SCNC: 9 MMOL/L (ref 9–17)
BASOPHILS # BLD: 0 K/UL (ref 0–0.2)
BASOPHILS NFR BLD: 1 % (ref 0–2)
BUN SERPL-MCNC: 16 MG/DL (ref 8–23)
CALCIUM SERPL-MCNC: 8.5 MG/DL (ref 8.6–10.4)
CHLORIDE SERPL-SCNC: 106 MMOL/L (ref 98–107)
CO2 SERPL-SCNC: 26 MMOL/L (ref 20–31)
CREAT SERPL-MCNC: 1 MG/DL (ref 0.7–1.2)
EOSINOPHIL # BLD: 0.4 K/UL (ref 0–0.4)
EOSINOPHILS RELATIVE PERCENT: 5 % (ref 1–4)
ERYTHROCYTE [DISTWIDTH] IN BLOOD BY AUTOMATED COUNT: 13.3 % (ref 12.5–15.4)
GFR, ESTIMATED: 85 ML/MIN/1.73M2
GLUCOSE SERPL-MCNC: 89 MG/DL (ref 70–99)
HCT VFR BLD AUTO: 32 % (ref 41–53)
HGB BLD-MCNC: 11.2 G/DL (ref 13.5–17.5)
LYMPHOCYTES NFR BLD: 1.6 K/UL (ref 1–4.8)
LYMPHOCYTES RELATIVE PERCENT: 23 % (ref 24–44)
MAGNESIUM SERPL-MCNC: 1.8 MG/DL (ref 1.6–2.6)
MCH RBC QN AUTO: 31.8 PG (ref 26–34)
MCHC RBC AUTO-ENTMCNC: 35.1 G/DL (ref 31–37)
MCV RBC AUTO: 90.7 FL (ref 80–100)
MONOCYTES NFR BLD: 0.5 K/UL (ref 0.1–1.2)
MONOCYTES NFR BLD: 7 % (ref 2–11)
NEUTROPHILS NFR BLD: 64 % (ref 36–66)
NEUTS SEG NFR BLD: 4.5 K/UL (ref 1.8–7.7)
PLATELET # BLD AUTO: 165 K/UL (ref 140–450)
PMV BLD AUTO: 8.2 FL (ref 6–12)
POTASSIUM SERPL-SCNC: 3.6 MMOL/L (ref 3.7–5.3)
RBC # BLD AUTO: 3.53 M/UL (ref 4.5–5.9)
SODIUM SERPL-SCNC: 141 MMOL/L (ref 135–144)
WBC OTHER # BLD: 7.1 K/UL (ref 3.5–11)

## 2025-01-04 PROCEDURE — 6360000002 HC RX W HCPCS: Performed by: HOSPITALIST

## 2025-01-04 PROCEDURE — 2500000003 HC RX 250 WO HCPCS: Performed by: STUDENT IN AN ORGANIZED HEALTH CARE EDUCATION/TRAINING PROGRAM

## 2025-01-04 PROCEDURE — 6370000000 HC RX 637 (ALT 250 FOR IP): Performed by: COLON & RECTAL SURGERY

## 2025-01-04 PROCEDURE — 83735 ASSAY OF MAGNESIUM: CPT

## 2025-01-04 PROCEDURE — 6360000002 HC RX W HCPCS: Performed by: COLON & RECTAL SURGERY

## 2025-01-04 PROCEDURE — 99232 SBSQ HOSP IP/OBS MODERATE 35: CPT | Performed by: HOSPITALIST

## 2025-01-04 PROCEDURE — 85025 COMPLETE CBC W/AUTO DIFF WBC: CPT

## 2025-01-04 PROCEDURE — 6370000000 HC RX 637 (ALT 250 FOR IP): Performed by: STUDENT IN AN ORGANIZED HEALTH CARE EDUCATION/TRAINING PROGRAM

## 2025-01-04 PROCEDURE — 99024 POSTOP FOLLOW-UP VISIT: CPT | Performed by: COLON & RECTAL SURGERY

## 2025-01-04 PROCEDURE — 94760 N-INVAS EAR/PLS OXIMETRY 1: CPT

## 2025-01-04 PROCEDURE — 2500000003 HC RX 250 WO HCPCS: Performed by: COLON & RECTAL SURGERY

## 2025-01-04 PROCEDURE — 6370000000 HC RX 637 (ALT 250 FOR IP): Performed by: HOSPITALIST

## 2025-01-04 PROCEDURE — 80048 BASIC METABOLIC PNL TOTAL CA: CPT

## 2025-01-04 PROCEDURE — 94640 AIRWAY INHALATION TREATMENT: CPT

## 2025-01-04 PROCEDURE — 36415 COLL VENOUS BLD VENIPUNCTURE: CPT

## 2025-01-04 PROCEDURE — 2580000003 HC RX 258: Performed by: STUDENT IN AN ORGANIZED HEALTH CARE EDUCATION/TRAINING PROGRAM

## 2025-01-04 RX ORDER — CARVEDILOL 12.5 MG/1
12.5 TABLET ORAL 2 TIMES DAILY
Qty: 60 TABLET | Refills: 3 | Status: SHIPPED | OUTPATIENT
Start: 2025-01-04

## 2025-01-04 RX ORDER — POTASSIUM CHLORIDE 1500 MG/1
20 TABLET, EXTENDED RELEASE ORAL ONCE
Status: COMPLETED | OUTPATIENT
Start: 2025-01-04 | End: 2025-01-04

## 2025-01-04 RX ORDER — TRAMADOL HYDROCHLORIDE 50 MG/1
50 TABLET ORAL EVERY 6 HOURS PRN
Qty: 28 TABLET | Refills: 0 | Status: SHIPPED | OUTPATIENT
Start: 2025-01-04 | End: 2025-01-11

## 2025-01-04 RX ORDER — CALCIUM CARBONATE 500 MG/1
500 TABLET, CHEWABLE ORAL ONCE
Status: COMPLETED | OUTPATIENT
Start: 2025-01-04 | End: 2025-01-04

## 2025-01-04 RX ADMIN — PHENOL 1 SPRAY: 1.5 LIQUID ORAL at 05:47

## 2025-01-04 RX ADMIN — POTASSIUM CHLORIDE 20 MEQ: 1500 TABLET, EXTENDED RELEASE ORAL at 11:30

## 2025-01-04 RX ADMIN — FAMOTIDINE 20 MG: 10 INJECTION, SOLUTION INTRAVENOUS at 09:17

## 2025-01-04 RX ADMIN — ENOXAPARIN SODIUM 30 MG: 100 INJECTION SUBCUTANEOUS at 09:17

## 2025-01-04 RX ADMIN — SODIUM CHLORIDE, POTASSIUM CHLORIDE, SODIUM LACTATE AND CALCIUM CHLORIDE: 600; 310; 30; 20 INJECTION, SOLUTION INTRAVENOUS at 07:59

## 2025-01-04 RX ADMIN — SODIUM CHLORIDE, PRESERVATIVE FREE 10 ML: 5 INJECTION INTRAVENOUS at 09:18

## 2025-01-04 RX ADMIN — ALBUTEROL SULFATE 2.5 MG: 2.5 SOLUTION RESPIRATORY (INHALATION) at 08:45

## 2025-01-04 RX ADMIN — CARVEDILOL 12.5 MG: 12.5 TABLET, FILM COATED ORAL at 09:17

## 2025-01-04 RX ADMIN — GABAPENTIN 300 MG: 300 CAPSULE ORAL at 05:41

## 2025-01-04 RX ADMIN — PHENOL 1 SPRAY: 1.5 LIQUID ORAL at 09:18

## 2025-01-04 RX ADMIN — ACETAMINOPHEN 1000 MG: 500 TABLET ORAL at 05:40

## 2025-01-04 RX ADMIN — NALOXEGOL OXALATE 25 MG: 12.5 TABLET, FILM COATED ORAL at 09:17

## 2025-01-04 RX ADMIN — TRAMADOL HYDROCHLORIDE 50 MG: 50 TABLET, COATED ORAL at 02:44

## 2025-01-04 RX ADMIN — ATORVASTATIN CALCIUM 10 MG: 10 TABLET, FILM COATED ORAL at 09:17

## 2025-01-04 RX ADMIN — CALCIUM CARBONATE (ANTACID) CHEW TAB 500 MG 500 MG: 500 CHEW TAB at 11:29

## 2025-01-04 RX ADMIN — MEMANTINE 10 MG: 5 TABLET ORAL at 09:17

## 2025-01-04 ASSESSMENT — PAIN DESCRIPTION - DESCRIPTORS
DESCRIPTORS: DISCOMFORT
DESCRIPTORS: SORE
DESCRIPTORS: SHARP

## 2025-01-04 ASSESSMENT — PAIN SCALES - GENERAL
PAINLEVEL_OUTOF10: 8
PAINLEVEL_OUTOF10: 1
PAINLEVEL_OUTOF10: 6
PAINLEVEL_OUTOF10: 3
PAINLEVEL_OUTOF10: 4

## 2025-01-04 ASSESSMENT — PAIN DESCRIPTION - ORIENTATION
ORIENTATION: MID
ORIENTATION: MID

## 2025-01-04 ASSESSMENT — PAIN DESCRIPTION - LOCATION
LOCATION: THROAT
LOCATION: ABDOMEN
LOCATION: ABDOMEN
LOCATION: NECK;THROAT

## 2025-01-04 ASSESSMENT — PAIN - FUNCTIONAL ASSESSMENT
PAIN_FUNCTIONAL_ASSESSMENT: PREVENTS OR INTERFERES SOME ACTIVE ACTIVITIES AND ADLS
PAIN_FUNCTIONAL_ASSESSMENT: PREVENTS OR INTERFERES SOME ACTIVE ACTIVITIES AND ADLS

## 2025-01-04 NOTE — PROGRESS NOTES
Spiritual Health History and Assessment/Progress Note  [facility]    (P) Follow-up, Spiritual/Emotional Needs, (P) Emotional distress, (P) Life Adjustments, Anticipatory Grief, Adjustment to illness,      Name: Tash Mora MRN: [id number]    Age: 64 y.o.     Sex: male   Language: English   Mandaen: None   Neoplasm of colon, malignant (HCC)     Date: 1/3/2025            Total Time Calculated: (P) 15 min              Spiritual Assessment continued in [address]        Referral/Consult From: (P) Rounding   Encounter Overview/Reason: (P) Follow-up, Spiritual/Emotional Needs  Service Provided For: (P) Patient        followed up on Pt. In room. Pt was very welcoming and open to conversation about health and spiritual  matters. Pt was open to conversation and welcoming. Pt immediately wanted  to prayer a special blessing upon him and his health.  provided support and care to Pt. Active listening was offered and encouragement was given to Pt. Health issues and coping. Good visit.    Joann, Belief, Meaning:   Patient has beliefs or practices that help with coping during difficult times  Family/Friends No family/friends present      Importance and Influence:  Patient has spiritual/personal beliefs that influence decisions regarding their health  Family/Friends No family/friends present    Community:  Patient expresses feelings of isolation: disconnected from family/friends, feeling there is no one to turn to for help, and feeling no one understands  Family/Friends No family/friends present    Assessment and Plan of Care:     Patient Interventions include: Facilitated expression of thoughts and feelings and Explored spiritual coping/struggle/distress  Family/Friends Interventions include: No family/friends present    Patient Plan of Care: Spiritual Care available upon further referral  Family/Friends Plan of Care: No family/friends present    Electronically signed by RANGER  Chaplain ERIBERTO on 1/3/2025 at 8:28 PM    01/03/25 2025   Encounter Summary   Encounter Overview/Reason Follow-up;Spiritual/Emotional Needs   Service Provided For Patient   Referral/Consult From [facility]   Support System Children   Last Encounter  01/03/25   Complexity of Encounter Moderate   Begin Time 1750   End Time  1805   Total Time Calculated 15 min   Crisis   Type Emotional distress   Spiritual/Emotional needs   Type Spiritual Support;Emotional Distress   Grief, Loss, and Adjustments   Type Life Adjustments;Anticipatory Grief;Adjustment to illness   Assessment/Intervention/Outcome   Assessment Anxious;Coping;Hopeful;Fearful   Intervention Prayer (assurance of)/[place];Sustaining Presence/Ministry of presence;Explored/Affirmed feelings, thoughts, concerns;Discussed meaning/purpose;Discussed belief system/Yazdanism practices/katie;Active listening   Outcome Comfort;Coping;Encouraged;Engaged in conversation;Expressed feelings, needs, and concerns;Grieving;Receptive   Plan and Referrals   Plan/Referrals Continue to visit, (comment);Continue Support (comment)

## 2025-01-04 NOTE — DISCHARGE SUMMARY
Date of admission 12/30/2024.    Admission diagnosis: Malignant colonic polyp removed with close margins, and requiring surgical resection.    Condition on admission: Stable.    Intrahospital course: Patient underwent elective planned segmental colon resection on the day of admission.  Surgery was completed without any unplanned events.  Postoperative course was overall stable except for slight left prolonged ileus.  Patient required NG tube insertion on postop day 2 which was removed and therefore without any problems.  Patient is currently noted to be tolerating food and passing feces and flatus on postop day 5.  He has no complaints and pain control is adequate.  Labs are within acceptable range and surgical incision looks healthy.  Patient is deemed ready for discharge with as needed oral pain meds prescription.    Date of discharge: 1/4/2025.    Discharge diagnosis: S/p segmental resection.  Pathology showed no residual cancer in the removed colon segment.    Condition at discharge: Stable.    Discharge disposition: Home with family support.    Follow-up: 7 days at surgeons office.

## 2025-01-04 NOTE — PLAN OF CARE
Problem: Respiratory - Adult  Goal: Achieves optimal ventilation and oxygenation  1/4/2025 0849 by Daylin Saez RCP  Outcome: Progressing

## 2025-01-04 NOTE — PROGRESS NOTES
[facility]  Office: [phone]  Topher Botello DO, Manohar John DO, Les Smith DO, Erik Turner DO, Lupillo Diaz MD, Maeve Figueroa MD, Jennifer Altamirano MD, Aylin Levi MD,  Kemal Newby MD, Manuelito Shaffer MD, Josiah Cox MD,  Adam Finch DO, Batsheva Moralez MD, Aleks Shaver MD, Quan Botello DO, Lola Casper MD,  Vikram Sunshine DO, Dionna Egan MD, Siobhan Cleary MD, Jennifer Titus MD, Perfecto Lomas MD,  Alex Robert MD, Checo Rose MD, Darío Rodriguez MD, Pato Betancourt MD, Av Breaux MD, Rj Hernandez MD, Roger London DO, Alok Couch MD, Adam Marcus MD, Mohsin Reza, MD, Shirley Waterhouse, CNP,  Fatemeh Saldana CNP, Roger Zavala, FANNIE,  Marta Suarez, SKIP, Rosmery Luke, CNP, Estella Araujo, CNP, Martha Zamarripa CNP, Flaquita Reed, CNP, Justina Todd, PA-C, Kayla Villarreal PA-C, Mecca Pacheco, CNP, Nilsa Delgado, CNP,  Danielle Montes, CNP, Beverley Winslow, CNP, Karen Lofton, FANNIE,  Keerthi Hinojosa CNP, Susie Crump, CNP         [facility]   IN-PATIENT SERVICE   [facility]    Progress Note    1/4/2025    9:53 AM    Name:   Tash Mora  MRN:     [id number]     Acct:      [id number]   Room:   [address] [place] [address] Day:  5  Admit Date:  12/30/2024  6:05 AM    PCP:   Sandra Roberson DO  Code Status:  Full Code    Subjective:     Patient seen in follow-up for postoperative medical management following colon resection for colon cancer, patient states \"I want to eat, I am starting\"    Patient voices frustration regarding his full liquid diet.  I did explain to him that diet is slowly advanced especially after a postoperative ileus.  He misunderstood what he was told.  He was under the impression that he would be given a low fiber diet this morning.  I did explain to him that he needed to show that he could tolerate a full liquid diet prior to advancing to a low fiber diet.  The patient has been on a full liquid diet and he has

## 2025-01-04 NOTE — DISCHARGE INSTRUCTIONS
Rajinder Conte M.D.   Discharge Instructions following Colon Surgery    Procedure Done: Segmental colon resection on 12/30/2024.    Post Colon Resection Discharge Instructions:     Do not lift weights exceeding 10 lbs (approximately a gallon of milk) for 6 weeks following the date of surgery.    If you have no open wound, you nay shower as you wish. If you have an open wound this needs to be dressed regularly. In most instances you will be set up with a visiting nurse to take care of this for a period of time. Please ask your nurse for details at time of discharge.    Avoid heavy vegetables such as broccoli, salads, bran, fiber, fruits and stay on low-fiber diet per instructions provided to for 1 week following discharge.    Problems to watch for are listed below. Contact Dr. Conte at [phone]. This number is available 24/7.  fever of 101 degrees Fahrenheit or above,   significant abdominal pain,   rectal bleeding, severe diarrhea,   wound related issues  stoma related problems, if you have one  Contact your primary physician for medical problems like high blood pressure, diabetes control etc.    Please call Dr. Conte's office to schedule an appointment in 1 weeks' time for routine follow-up.  Please call your primary care physician and schedule a follow-up with him/her for evaluation of medical issues within 7 to 10 days following discharge.  If you have any questions after your discharge, please call your PHYSICIAN at [phone].     Low fiber diet following colon resection.    White bread without nuts and seeds.  White rice, plain white pasta, and crackers.  Cheese, macaroni  Mashed potatoes  Limited amounts of cream of wheat  Pancakes or waffles made from white refined flour.  Fish, eggs, and Tofu.  Tender meat, well boiled poultry (when pain is gone).  Milk and foods made from milk - such as yogurt, pudding, ice-cream, cheeses and sour cream if tolerated.  Butter, margarine, oils and salad

## 2025-01-04 NOTE — PLAN OF CARE
Problem: Pain  Goal: Verbalizes/displays adequate comfort level or baseline comfort level  Outcome: Adequate for Discharge     Problem: Safety - Adult  Goal: Free from fall injury  Outcome: Adequate for Discharge     Problem: Chronic Conditions and Co-morbidities  Goal: Patient's chronic conditions and co-morbidity symptoms are monitored and maintained or improved  Outcome: Adequate for Discharge     Problem: Discharge Planning  Goal: Discharge to home or other facility with appropriate resources  Outcome: Adequate for Discharge     Problem: Respiratory - Adult  Goal: Achieves optimal ventilation and oxygenation  1/4/2025 1435 by Regina Pugh RN  Outcome: Adequate for Discharge  1/4/2025 0849 by Daylin Saez RCP  Outcome: Progressing     Problem: Nutrition Deficit:  Goal: Optimize nutritional status  Outcome: Adequate for Discharge     Problem: Skin/Tissue Integrity  Goal: Absence of new skin breakdown  Description: 1.  Monitor for areas of redness and/or skin breakdown  2.  Assess vascular access sites hourly  3.  Every 4-6 hours minimum:  Change oxygen saturation probe site  4.  Every 4-6 hours:  If on nasal continuous positive airway pressure, respiratory therapy assess nares and determine need for appliance change or resting period.  Outcome: Adequate for Discharge      Statement Selected

## 2025-01-04 NOTE — DISCHARGE INSTR - COC
Continuity of Care Form    Patient Name: Tash Mora   :  1960  MRN:  [id number]    Admit date:  2024  Discharge date:  2025    Code Status Order: Full Code   Advance Directives:   Advance Care Flowsheet Documentation             Admitting Physician:  Rajinder Conte MD  PCP: Sandra Roberson DO    Discharging Nurse: Regina RN  Discharging Hospital Unit/Room#: 308/308-01  Discharging Unit Phone Number: [phone]    Emergency Contact:   Extended Emergency Contact Information  Primary Emergency Contact: Chely Mora   [facility]  Home Phone: [phone]  Relation: Child  Hearing or visual needs: None  Other needs: None  Preferred language: English   needed? No  Secondary Emergency Contact: BENTLEY MORA  Mobile Phone: [phone]  Relation: Child   needed? No    Past Surgical History:  Past Surgical History:   Procedure Laterality Date    CHOLECYSTECTOMY          COLONOSCOPY      had cologuard and pt. states it was negative    COLONOSCOPY N/A 2024    COLONOSCOPY POLYPECTOMY SNARE BIOPSY TATTOO SPOT MARKING performed by Mario Yo MD at [facility] ENDO    COLONOSCOPY N/A 2024    COLONOSCOPY POLYPECTOMY REMOVAL SNARE/BIOPSY WITH MANTUS CLIP APPLICATION X2 AT TRANSVERSE COLON RESOLUTION CLIP APPLICATION X3 AND TATTOOING AT TRANSVERSE COLON performed by Mario Yo MD at [facility] ENDO    CYSTO/URETERO/PYELOSCOPY, CALCULUS TX Right 2020    CYSTO, URETEROSCOPY, RIGHT URETERAL STENT PLACEMENT, RETROGRADE PYELOGRAM performed by Eleazar Michael MD at [facility] OR    CYSTOSCOPY  2023    HOLMIUM LASER CYSTOSCOPY URETEROSCOPY STENT EXCHANGE - Left    HERNIA REPAIR      , rt. inguinal    INGUINAL HERNIA REPAIR Left 2016    LAPAROSCOPY N/A 2018    DIAGNOSTIC LAPAROSCOPY performed by Brandon Arzola MD at [facility] OR    PENILE PROSTHESIS PLACEMENT N/A 2021    PENIS PROSTHESIS INSERTION- AMS INFLATABLE, **SHORT STAY**

## 2025-01-04 NOTE — RT PROTOCOL NOTE
RT Nebulizer Bronchodilator Protocol Note    There is a bronchodilator order in the chart from a provider indicating to follow the RT Bronchodilator Protocol and there is an “Initiate RT Bronchodilator Protocol” order as well (see protocol at bottom of note).    CXR Findings:  No results found.    The findings from the last RT Protocol Assessment were as follows:  Smoking: Chronic pulmonary disease  Respiratory Pattern: Regular pattern and RR 12-20 bpm  Breath Sounds: Slightly diminished and/or crackles  Cough: Strong, spontaneous, non-productive  Indication for Bronchodilator Therapy:    Bronchodilator Assessment Score: 4    Aerosolized bronchodilator medication orders have been revised according to the RT Nebulizer Bronchodilator Protocol below.    Respiratory Therapist to perform RT Therapy Protocol Assessment initially then follow the protocol.  Repeat RT Therapy Protocol Assessment PRN for score 0-3 or on second treatment, BID, and PRN for scores above 3.    No Indications - adjust the frequency to every 6 hours PRN wheezing or bronchospasm, if no treatments needed after 48 hours then discontinue using Per Protocol order mode.     If indication present, adjust the RT bronchodilator orders based on the Bronchodilator Assessment Score as indicated below.  If a patient is on this medication at home then do not decrease Frequency below that used at home.    0-3 - enter or revise RT bronchodilator order(s) to equivalent RT Bronchodilator order with Frequency of every 4 hours PRN for wheezing or increased work of breathing using Per Protocol order mode.       4-6 - enter or revise RT Bronchodilator order(s) to two equivalent RT bronchodilator orders with one order with BID Frequency and one order with Frequency of every 4 hours PRN wheezing or increased work of breathing using Per Protocol order mode.         7-10 - enter or revise RT Bronchodilator order(s) to two equivalent RT bronchodilator orders with one order  with TID Frequency and one order with Frequency of every 4 hours PRN wheezing or increased work of breathing using Per Protocol order mode.       11-13 - enter or revise RT Bronchodilator order(s) to one equivalent RT bronchodilator order with QID Frequency and an Albuterol order with Frequency of every 4 hours PRN wheezing or increased work of breathing using Per Protocol order mode.      Greater than 13 - enter or revise RT Bronchodilator order(s) to one equivalent RT bronchodilator order with every 4 hours Frequency and an Albuterol order with Frequency of every 2 hours PRN wheezing or increased work of breathing using Per Protocol order mode.     RT to enter RT Home Evaluation for COPD & MDI Assessment order using Per Protocol order mode.    Electronically signed by Carmel Hickey RCP on 1/3/2025 at 7:55 PM

## 2025-01-04 NOTE — PROGRESS NOTES
Progress Note    Date:2025       Room:[address] [place] [address]  Patient Name:Tash Mora     YOB: 1960     Age:64 y.o.    Subjective   Interval History Status: significantly improved.     Resting comfortably in hospital bed.  Has no complaints.  Passing feces and flatus.  Tolerating full liquid diet.  Ambulating well  Medications   Scheduled Meds:    potassium chloride  20 mEq Oral Once    calcium carbonate  500 mg Oral Once    famotidine (PEPCID) injection  20 mg IntraVENous BID    topiramate  100 mg Oral Nightly    acetaminophen  1,000 mg Oral 4 times per day    gabapentin  300 mg Oral 3 times per day    carvedilol  12.5 mg Oral BID    lidocaine  1 patch TransDERmal Daily    sodium chloride flush  5-40 mL IntraVENous 2 times per day    enoxaparin  30 mg SubCUTAneous BID    naloxegol  25 mg Oral Daily    [Held by provider] amLODIPine  10 mg Oral Daily    [Held by provider] aspirin  81 mg Oral Daily    atorvastatin  10 mg Oral Daily    budesonide-formoterol  2 puff Inhalation BID RT    traZODone  200 mg Oral Nightly    memantine  10 mg Oral BID    albuterol sulfate HFA  2 puff Inhalation BID RT     Continuous Infusions:    lactated ringers 75 mL/hr at 25 0759    sodium chloride      dextrose       PRN Meds: phenol, aluminum & magnesium hydroxide-simethicone, prochlorperazine, albuterol, calcium carbonate, sodium chloride flush, sodium chloride, traMADol, ondansetron **OR** ondansetron, albuterol sulfate HFA, dextrose bolus **OR** dextrose bolus, glucagon (rDNA), dextrose  Physical Examination      Vitals:  BP (!) 138/90   Pulse 87   Temp 99 °F (37.2 °C) (Oral)   Resp 16   Ht 1.803 m (5' 10.98\")   Wt 117 kg (257 lb 15 oz)   SpO2 91%   BMI 35.99 kg/m²   Temp (24hrs), Av.9 °F (37.2 °C), Min:98.6 °F (37 °C), Max:99.2 °F (37.3 °C)      I/O (24Hr):    Intake/Output Summary (Last 24 hours) at 2025 1051  Last data filed at 2025 0545  Gross per 24 hour   Intake --   Output 1315 ml   Net

## 2025-01-04 NOTE — PLAN OF CARE
Problem: Respiratory - Adult  Goal: Achieves optimal ventilation and oxygenation  1/3/2025 1954 by Carmel Hickey, ALFRED  Outcome: Progressing  Flowsheets (Taken 1/3/2025 1954)  Achieves optimal ventilation and oxygenation:   Assess for changes in respiratory status   Position to facilitate oxygenation and minimize respiratory effort   Respiratory therapy support as indicated   Encourage broncho-pulmonary hygiene including cough, deep breathe, incentive spirometry   Assess and instruct to report shortness of breath or any respiratory difficulty

## 2025-01-05 ENCOUNTER — HOSPITAL ENCOUNTER (EMERGENCY)
Age: 65
Discharge: HOME | End: 2025-01-05
Attending: EMERGENCY MEDICINE
Payer: MEDICARE

## 2025-01-05 VITALS
HEIGHT: 71 IN | DIASTOLIC BLOOD PRESSURE: 91 MMHG | TEMPERATURE: 98.6 F | OXYGEN SATURATION: 95 % | BODY MASS INDEX: 35.98 KG/M2 | SYSTOLIC BLOOD PRESSURE: 137 MMHG | RESPIRATION RATE: 18 BRPM | HEART RATE: 97 BPM

## 2025-01-05 DIAGNOSIS — R19.7 NAUSEA VOMITING AND DIARRHEA: Primary | ICD-10-CM

## 2025-01-05 DIAGNOSIS — R11.2 NAUSEA VOMITING AND DIARRHEA: Primary | ICD-10-CM

## 2025-01-05 LAB
ALBUMIN SERPL-MCNC: 3.8 G/DL (ref 3.5–5.2)
ALBUMIN/GLOB SERPL: 1.3 {RATIO} (ref 1–2.5)
ALP SERPL-CCNC: 80 U/L (ref 40–129)
ALT SERPL-CCNC: 38 U/L (ref 5–41)
ANION GAP SERPL CALCULATED.3IONS-SCNC: 13 MMOL/L (ref 9–17)
AST SERPL-CCNC: 46 U/L
BASOPHILS # BLD: 0 K/UL (ref 0–0.2)
BASOPHILS NFR BLD: 0 % (ref 0–2)
BILIRUB SERPL-MCNC: 0.5 MG/DL (ref 0.3–1.2)
BUN SERPL-MCNC: 13 MG/DL (ref 8–23)
CALCIUM SERPL-MCNC: 9.1 MG/DL (ref 8.6–10.4)
CHLORIDE SERPL-SCNC: 107 MMOL/L (ref 98–107)
CO2 SERPL-SCNC: 22 MMOL/L (ref 20–31)
CREAT SERPL-MCNC: 1 MG/DL (ref 0.7–1.2)
EOSINOPHIL # BLD: 0.2 K/UL (ref 0–0.4)
EOSINOPHILS RELATIVE PERCENT: 2 % (ref 1–4)
ERYTHROCYTE [DISTWIDTH] IN BLOOD BY AUTOMATED COUNT: 13.1 % (ref 12.5–15.4)
GFR, ESTIMATED: 85 ML/MIN/1.73M2
GLUCOSE SERPL-MCNC: 95 MG/DL (ref 70–99)
HCT VFR BLD AUTO: 37.8 % (ref 41–53)
HGB BLD-MCNC: 12.8 G/DL (ref 13.5–17.5)
LIPASE SERPL-CCNC: 28 U/L (ref 13–60)
LYMPHOCYTES NFR BLD: 1.4 K/UL (ref 1–4.8)
LYMPHOCYTES RELATIVE PERCENT: 18 % (ref 24–44)
MAGNESIUM SERPL-MCNC: 2 MG/DL (ref 1.6–2.6)
MCH RBC QN AUTO: 30.7 PG (ref 26–34)
MCHC RBC AUTO-ENTMCNC: 33.9 G/DL (ref 31–37)
MCV RBC AUTO: 90.5 FL (ref 80–100)
MONOCYTES NFR BLD: 0.7 K/UL (ref 0.1–1.2)
MONOCYTES NFR BLD: 8 % (ref 2–11)
NEUTROPHILS NFR BLD: 72 % (ref 36–66)
NEUTS SEG NFR BLD: 5.6 K/UL (ref 1.8–7.7)
PLATELET # BLD AUTO: 219 K/UL (ref 140–450)
PMV BLD AUTO: 7.9 FL (ref 6–12)
POTASSIUM SERPL-SCNC: 4 MMOL/L (ref 3.7–5.3)
PROT SERPL-MCNC: 6.7 G/DL (ref 6.4–8.3)
RBC # BLD AUTO: 4.17 M/UL (ref 4.5–5.9)
SODIUM SERPL-SCNC: 142 MMOL/L (ref 135–144)
WBC OTHER # BLD: 7.9 K/UL (ref 3.5–11)

## 2025-01-05 PROCEDURE — 93005 ELECTROCARDIOGRAM TRACING: CPT | Performed by: EMERGENCY MEDICINE

## 2025-01-05 PROCEDURE — 2500000003 HC RX 250 WO HCPCS: Performed by: EMERGENCY MEDICINE

## 2025-01-05 PROCEDURE — 83735 ASSAY OF MAGNESIUM: CPT

## 2025-01-05 PROCEDURE — 2580000003 HC RX 258: Performed by: EMERGENCY MEDICINE

## 2025-01-05 PROCEDURE — 96374 THER/PROPH/DIAG INJ IV PUSH: CPT

## 2025-01-05 PROCEDURE — 80053 COMPREHEN METABOLIC PANEL: CPT

## 2025-01-05 PROCEDURE — 85025 COMPLETE CBC W/AUTO DIFF WBC: CPT

## 2025-01-05 PROCEDURE — 96375 TX/PRO/DX INJ NEW DRUG ADDON: CPT

## 2025-01-05 PROCEDURE — 99284 EMERGENCY DEPT VISIT MOD MDM: CPT

## 2025-01-05 PROCEDURE — 6360000002 HC RX W HCPCS: Performed by: EMERGENCY MEDICINE

## 2025-01-05 PROCEDURE — 83690 ASSAY OF LIPASE: CPT

## 2025-01-05 RX ORDER — ONDANSETRON 2 MG/ML
4 INJECTION INTRAMUSCULAR; INTRAVENOUS ONCE
Status: COMPLETED | OUTPATIENT
Start: 2025-01-05 | End: 2025-01-05

## 2025-01-05 RX ORDER — ONDANSETRON 4 MG/1
4 TABLET, ORALLY DISINTEGRATING ORAL 3 TIMES DAILY PRN
Qty: 21 TABLET | Refills: 0 | Status: SHIPPED | OUTPATIENT
Start: 2025-01-05

## 2025-01-05 RX ORDER — 0.9 % SODIUM CHLORIDE 0.9 %
1000 INTRAVENOUS SOLUTION INTRAVENOUS ONCE
Status: COMPLETED | OUTPATIENT
Start: 2025-01-05 | End: 2025-01-05

## 2025-01-05 RX ADMIN — ONDANSETRON 4 MG: 2 INJECTION INTRAMUSCULAR; INTRAVENOUS at 15:05

## 2025-01-05 RX ADMIN — FAMOTIDINE 20 MG: 10 INJECTION, SOLUTION INTRAVENOUS at 15:05

## 2025-01-05 RX ADMIN — SODIUM CHLORIDE 1000 ML: 9 INJECTION, SOLUTION INTRAVENOUS at 15:04

## 2025-01-05 ASSESSMENT — PAIN - FUNCTIONAL ASSESSMENT: PAIN_FUNCTIONAL_ASSESSMENT: 0-10

## 2025-01-05 ASSESSMENT — PAIN SCALES - GENERAL: PAINLEVEL_OUTOF10: 6

## 2025-01-05 NOTE — ED PROVIDER NOTES
disintegrating tablet     Sig: Take 1 tablet by mouth 3 times daily as needed for Nausea or Vomiting     Dispense:  21 tablet     Refill:  0        Vitals:    Vitals:    01/05/25 1410   BP: (!) 137/91   Pulse: 97   Resp: 18   Temp: 98.6 °F (37 °C)   TempSrc: Oral   SpO2: 95%   Height: 1.803 m (5' 11\")     -------------------------  BP: (!) 137/91, Temp: 98.6 °F (37 °C), Pulse: 97, Respirations: 18      Re-evaluation Notes    Patient very well on reevaluation.  Labs unremarkable.  I do not feel he requires any imaging.  I feel this is most likely a viral syndrome.  He was advised to follow-up with his PCP return right away if worsening or for new or concerning symptoms.  They are comfortable with the plan.    Evaluation of the abdomen and back is benign. No guarding, peritoneal signs, sepsis, toxicity, significant tenderness, life threatening or serious etiology was noted.   The patient is tolerating PO intake.  The patient appears stable for discharge and has been instructed to return immediately if the symptoms worsen in any way, or in 1-2 days if not improved for re-evaluation.  We also discussed returning to the Emergency Department immediately if new or worsening symptoms occur. We have discussed the symptoms which are most concerning (e.g., abdominal or back pain, bloody stools, fever, a feeling of passing out, light headed, dizziness, chest pain, shortness of breath, persistent nausea and/or vomiting, numbness or weakness to the arms or legs, coolness or color change of the arms or legs) that necessitate immediate return.     The patient understands that at this time there is no evidence for a more malignant underlying process, but the patient also understands that early in the process of an illness or injury, an emergency department workup can be falsely reassuring.  Routine discharge counseling was given, and the patient understands that worsening, changing or persistent symptoms should prompt an immediate

## 2025-01-05 NOTE — PROGRESS NOTES
Spiritual Health History and Assessment/Progress Note  [facility]    (P) Spiritual/Emotional Needs, Crisis, (P) Emotional distress, (P) Life Adjustments, Adjustment to illness,      Name: Tash Mora MRN: [id number]    Age: 64 y.o.     Sex: male   Language: English   Anabaptism: None   <principal problem not specified>     Date: 1/5/2025            Total Time Calculated: (P) 15 min              Spiritual Assessment continued in [facility] EMERGENCY DEPARTMENT        Referral/Consult From: (P) Rounding   Encounter Overview/Reason: (P) Spiritual/Emotional Needs, Crisis  Service Provided For: (P) Patient        visited Pt. Seen other day upstairs on floor. Pt was in distress and visibly in pain. Pt was open to conversation and welcoming.  offered support and care. Pt. Requested prayer and chapalin provided active listening and encouragement Will follow up..    Joann, Belief, Meaning:   Patient has beliefs or practices that help with coping during difficult times  Family/Friends No family/friends present      Importance and Influence:  Patient has spiritual/personal beliefs that influence decisions regarding their health  Family/Friends No family/friends present    Community:  Patient feels well-supported. Support system includes: Children  Family/Friends No family/friends present    Assessment and Plan of Care:     Patient Interventions include: Facilitated expression of thoughts and feelings and Explored spiritual coping/struggle/distress  Family/Friends Interventions include: No family/friends present    Patient Plan of Care: Spiritual Care available upon further referral  Family/Friends Plan of Care: No family/friends present    Electronically signed by Chaplain CAMILO on 1/5/2025 at 3:37 PM    01/05/25 1534   Encounter Summary   Encounter Overview/Reason Spiritual/Emotional Needs;Crisis   Service Provided For Patient   Referral/Consult From Rounding

## 2025-01-05 NOTE — DISCHARGE INSTRUCTIONS
PLEASE RETURN TO THE EMERGENCY DEPARTMENT IMMEDIATELY if your symptoms worsen in anyway or in 24 hours if not improved for re-evaluation.  You should immediately return to the ER for symptoms such as new or worsening abdominal pain, bloody stool, fever, a feeling of passing out, chest pain, shortness of breath, persistent nausea and/or vomiting, numbness or weakness to the arms or legs, coolness or color change of the arms or legs.      Take your medication as indicated and prescribed.  If you are given an antibiotic then, make sure you get the prescription filled and take the antibiotics until finished.      Please understand that at this time there is no evidence for a more serious underlying process, but that early in the process of an illness or injury, an emergency department workup can be falsely reassuring.  You should contact your family doctor within the next 48 hours for a follow up appointment    THANK YOU!!!    From [facility] and [facility]    On behalf of the Emergency Department staff at [facility], I would like to thank you for giving us the opportunity to address your health care needs and concerns.    We hope that during your visit, our service was delivered in a professional and caring manner. Please keep [facility] in mind as we walk with you down the path to your own personal wellness.     Please expect an automated text message or email from us so we can ask a few questions about your health and progress. Based on your answers, a clinician may call you back to offer help and instructions.    Please understand that early in the process of an illness or injury, an emergency department workup can be falsely reassuring.  If you notice any worsening, changing or persistent symptoms please call your family doctor or return to the ER immediately.     Tell us how we did during your visit at [email or web address]   and let us know about your experience

## 2025-01-06 ENCOUNTER — CARE COORDINATION (OUTPATIENT)
Dept: CARE COORDINATION | Age: 65
End: 2025-01-06

## 2025-01-06 ENCOUNTER — TELEPHONE (OUTPATIENT)
Dept: SURGERY | Age: 65
End: 2025-01-06

## 2025-01-06 NOTE — CARE COORDINATION
Care Transitions Note    Initial Call - Call within 2 business days of discharge: Yes    Attempted to reach patient for transitions of care follow up. Unable to reach patient.    Outreach Attempts:   HIPAA compliant voicemail left for patient.     Patient: Tash Mora    Patient : 1960   MRN: [id number]    Reason for Admission: Neoplasm of colon, malignant, S/P OPEN BOWEL SEGMENTAL RESECTION OF TRANSVERSE COLON   Discharge Date: 25  RURS: Readmission Risk Score: 10.6    Last Discharge Facility       Date Complaint Diagnosis Description Type Department Provider    25 Post-op Problem; Nausea; Vomiting; Diarrhea Nausea vomiting and diarrhea ED (DISCHARGE) [facility] ED Roger Pérez, DO            Was this an external facility discharge? No    Follow Up Appointment:   Patient has hospital follow up appointment scheduled within 7 days of discharge.    Future Appointments         Provider Specialty Dept Phone    1/10/2025 1:45 PM Rajinedr Conte MD General Surgery [phone]    2025 10:15 AM Sandra Roberson DO Primary Care [phone]    2025 11:15 AM Mario Yo MD Gastroenterology [phone]            Plan for follow-up on next business day.      Cheryl Palmer LPN

## 2025-01-06 NOTE — TELEPHONE ENCOUNTER
Patient called in with complaints of not being able to keep anything in him. Patient states since being discharged he is unable to take in any solid foods due to his throat hurting. He is drinking Gatorades and water. He can only tolerate sips of Ensure. He states as soon as he takes a sip of anything he has diarrhea. He denies blood in the stool but states it is yellow in color.     He was in the ER on 01/05/2025 with complaints of nausea, vomiting and diarrhea. He was given Zofran and that has helped with the nausea and vomiting.

## 2025-01-07 ENCOUNTER — OFFICE VISIT (OUTPATIENT)
Dept: SURGERY | Age: 65
End: 2025-01-07

## 2025-01-07 ENCOUNTER — CARE COORDINATION (OUTPATIENT)
Dept: CARE COORDINATION | Age: 65
End: 2025-01-07

## 2025-01-07 ENCOUNTER — HOSPITAL ENCOUNTER (OUTPATIENT)
Age: 65
Setting detail: SPECIMEN
Discharge: HOME | End: 2025-01-07

## 2025-01-07 VITALS
SYSTOLIC BLOOD PRESSURE: 120 MMHG | OXYGEN SATURATION: 98 % | DIASTOLIC BLOOD PRESSURE: 85 MMHG | BODY MASS INDEX: 34.86 KG/M2 | HEART RATE: 83 BPM | TEMPERATURE: 99.1 F | HEIGHT: 71 IN | WEIGHT: 249 LBS

## 2025-01-07 DIAGNOSIS — Z09 POSTOP CHECK: ICD-10-CM

## 2025-01-07 DIAGNOSIS — K21.9 GASTROESOPHAGEAL REFLUX DISEASE, UNSPECIFIED WHETHER ESOPHAGITIS PRESENT: ICD-10-CM

## 2025-01-07 DIAGNOSIS — R19.7 DIARRHEA, UNSPECIFIED TYPE: ICD-10-CM

## 2025-01-07 DIAGNOSIS — R19.7 DIARRHEA, UNSPECIFIED TYPE: Primary | ICD-10-CM

## 2025-01-07 DIAGNOSIS — Z85.038 HISTORY OF COLON CANCER IN ADULTHOOD: ICD-10-CM

## 2025-01-07 LAB
ANION GAP SERPL CALCULATED.3IONS-SCNC: 9 MMOL/L (ref 9–16)
BASOPHILS # BLD: 0.04 K/UL (ref 0–0.2)
BASOPHILS NFR BLD: 1 % (ref 0–2)
BUN SERPL-MCNC: 12 MG/DL (ref 8–23)
CALCIUM SERPL-MCNC: 9 MG/DL (ref 8.6–10.4)
CHLORIDE SERPL-SCNC: 111 MMOL/L (ref 98–107)
CO2 SERPL-SCNC: 22 MMOL/L (ref 20–31)
CREAT SERPL-MCNC: 1.1 MG/DL (ref 0.7–1.2)
EOSINOPHIL # BLD: 0.26 K/UL (ref 0–0.44)
EOSINOPHILS RELATIVE PERCENT: 4 % (ref 1–4)
ERYTHROCYTE [DISTWIDTH] IN BLOOD BY AUTOMATED COUNT: 12.9 % (ref 11.8–14.4)
GFR, ESTIMATED: 75 ML/MIN/1.73M2
GLUCOSE SERPL-MCNC: 118 MG/DL (ref 74–99)
HCT VFR BLD AUTO: 39.5 % (ref 40.7–50.3)
HGB BLD-MCNC: 12.9 G/DL (ref 13–17)
IMM GRANULOCYTES # BLD AUTO: 0.05 K/UL (ref 0–0.3)
IMM GRANULOCYTES NFR BLD: 1 %
LYMPHOCYTES NFR BLD: 1.7 K/UL (ref 1.1–3.7)
LYMPHOCYTES RELATIVE PERCENT: 26 % (ref 24–43)
MCH RBC QN AUTO: 29.7 PG (ref 25.2–33.5)
MCHC RBC AUTO-ENTMCNC: 32.7 G/DL (ref 28.4–34.8)
MCV RBC AUTO: 90.8 FL (ref 82.6–102.9)
MONOCYTES NFR BLD: 0.59 K/UL (ref 0.1–1.2)
MONOCYTES NFR BLD: 9 % (ref 3–12)
NEUTROPHILS NFR BLD: 59 % (ref 36–65)
NEUTS SEG NFR BLD: 3.84 K/UL (ref 1.5–8.1)
NRBC BLD-RTO: 0 PER 100 WBC
PLATELET # BLD AUTO: 271 K/UL (ref 138–453)
PMV BLD AUTO: 10 FL (ref 8.1–13.5)
POTASSIUM SERPL-SCNC: 3.5 MMOL/L (ref 3.7–5.3)
RBC # BLD AUTO: 4.35 M/UL (ref 4.21–5.77)
SODIUM SERPL-SCNC: 142 MMOL/L (ref 136–145)
WBC OTHER # BLD: 6.5 K/UL (ref 3.5–11.3)

## 2025-01-07 PROCEDURE — 99024 POSTOP FOLLOW-UP VISIT: CPT | Performed by: COLON & RECTAL SURGERY

## 2025-01-07 RX ORDER — LIDOCAINE HYDROCHLORIDE 20 MG/ML
15 SOLUTION OROPHARYNGEAL PRN
Qty: 100 ML | Refills: 1 | Status: SHIPPED | OUTPATIENT
Start: 2025-01-07

## 2025-01-07 ASSESSMENT — ENCOUNTER SYMPTOMS
VOMITING: 0
STRIDOR: 0
BACK PAIN: 0
COLOR CHANGE: 0
ABDOMINAL PAIN: 0
WHEEZING: 0
ANAL BLEEDING: 0
NAUSEA: 0
APNEA: 0
CONSTIPATION: 0
COUGH: 0
DIARRHEA: 1
CHEST TIGHTNESS: 0
SHORTNESS OF BREATH: 0
BLOOD IN STOOL: 0
RECTAL PAIN: 0
ABDOMINAL DISTENTION: 0

## 2025-01-07 NOTE — PROGRESS NOTES
topiramate (TOPAMAX) 100 MG tablet Take 1 po qhs 30 tablet 5    tretinoin (RETIN-A) 0.01 % gel Apply topically nightly. 45 g 2    memantine (NAMENDA) 10 MG tablet Take 1 tablet by mouth 2 times daily 60 tablet 5    traZODone (DESYREL) 100 MG tablet TAKE 2 TABLETS BY MOUTH EVERY NIGHT AT BEDTIME 60 tablet 5    atorvastatin (LIPITOR) 10 MG tablet Take 1 tablet by mouth daily (Patient taking differently: Take 1 tablet by mouth at bedtime) 90 tablet 3    levocetirizine (XYZAL) 5 MG tablet TAKE 1 TABLET BY MOUTH EVERY DAY 90 tablet 3    Handicap Placard MISC by Does not apply route 5 years 1 each 0    methocarbamol (ROBAXIN) 500 MG tablet Take 1 tablet by mouth 2 times daily as needed      fluticasone-salmeterol (ADVAIR) 100-50 MCG/ACT AEPB diskus inhaler Inhale 1 puff into the lungs in the morning and 1 puff in the evening. 60 each 1    albuterol sulfate HFA (PROVENTIL;VENTOLIN;PROAIR) 108 (90 Base) MCG/ACT inhaler inhale 2 puffs by mouth and INTO THE LUNGS every 6 hours if needed for cough shortness of breath or wheezing 3 each 1     No current facility-administered medications for this visit.      Allergies   Allergen Reactions    Lisinopril      States he is allergic to the generic forms of medication, can take lisinopril    Aleve  [Naproxen Sodium] Nausea Only    Amitriptyline Hcl Other (See Comments)     rash    Exelon [Rivastigmine] Other (See Comments)     Patient stated patch left a \"burn kathy\" on his skin     Ibuprofen Other (See Comments)     \"hurts my stomach.\"    Pregabalin      Other Reaction(s): weight gain       Review of Systems   Constitutional:  Negative for activity change, appetite change, chills, diaphoresis, fatigue, fever and unexpected weight change.   Respiratory:  Negative for apnea, cough, chest tightness, shortness of breath, wheezing and stridor.    Cardiovascular:  Negative for chest pain and leg swelling.   Gastrointestinal:  Positive for diarrhea. Negative for abdominal distention,

## 2025-01-07 NOTE — PATIENT INSTRUCTIONS
We encourage soft food intake-  Mashed potatoes with butter  Soup  Macaroni and cheese  Yogurt  Ice cream

## 2025-01-07 NOTE — CARE COORDINATION
Care Transitions Note    Initial Call - Call within 2 business days of discharge: Yes    Attempted to reach patient for transitions of care follow up. Unable to reach patient.    Outreach Attempts:   Multiple attempts to contact patient at phone numbers on file.   HIPAA compliant voicemail left for patient.   2nd attempt-noted that patient has post op appt this afternoon.     Patient: Tash Mora    Patient : 1960   MRN: [id number]    Reason for Admission: Neoplasm of colon, malignant, S/P OPEN BOWEL SEGMENTAL RESECTION OF TRANSVERSE COLON   Discharge Date: 25  RURS: Readmission Risk Score: 10.6    Last Discharge Facility       Date Complaint Diagnosis Description Type Department Provider    25 Post-op Problem; Nausea; Vomiting; Diarrhea Nausea vomiting and diarrhea ED (DISCHARGE) [facility] ED Roger Pérez, DO            Was this an external facility discharge? No    Follow Up Appointment:   Patient has hospital follow up appointment scheduled within 7 days of discharge.    Future Appointments         Provider Specialty Dept Phone    2025 2:15 PM Rajinder Conte MD General Surgery [phone]    1/10/2025 1:45 PM Rajinder Conte MD General Surgery [phone]    2025 10:15 AM Sandra Roberson DO Primary Care [phone]    2025 11:15 AM Mario Yo MD Gastroenterology [phone]            No further follow-up call indicated     Cheryl Palmer LPN

## 2025-01-08 ENCOUNTER — HOSPITAL ENCOUNTER (OUTPATIENT)
Age: 65
Discharge: HOME | End: 2025-01-10
Payer: MEDICARE

## 2025-01-08 ENCOUNTER — HOSPITAL ENCOUNTER (OUTPATIENT)
Dept: GENERAL RADIOLOGY | Age: 65
Discharge: HOME | End: 2025-01-10
Payer: MEDICARE

## 2025-01-08 ENCOUNTER — HOSPITAL ENCOUNTER (OUTPATIENT)
Dept: GENERAL RADIOLOGY | Age: 65
Discharge: HOME | End: 2025-01-10
Attending: COLON & RECTAL SURGERY
Payer: MEDICARE

## 2025-01-08 DIAGNOSIS — R19.7 DIARRHEA, UNSPECIFIED TYPE: Primary | ICD-10-CM

## 2025-01-08 DIAGNOSIS — R19.7 DIARRHEA, UNSPECIFIED TYPE: ICD-10-CM

## 2025-01-08 LAB
EKG ATRIAL RATE: 95 BPM
EKG P AXIS: 63 DEGREES
EKG P-R INTERVAL: 130 MS
EKG Q-T INTERVAL: 360 MS
EKG QRS DURATION: 86 MS
EKG QTC CALCULATION (BAZETT): 452 MS
EKG R AXIS: 54 DEGREES
EKG T AXIS: 64 DEGREES
EKG VENTRICULAR RATE: 95 BPM

## 2025-01-08 PROCEDURE — 93010 ELECTROCARDIOGRAM REPORT: CPT | Performed by: INTERNAL MEDICINE

## 2025-01-08 PROCEDURE — 74018 RADEX ABDOMEN 1 VIEW: CPT

## 2025-01-08 RX ORDER — MAGNESIUM HYDROXIDE/ALUMINUM HYDROXICE/SIMETHICONE 120; 1200; 1200 MG/30ML; MG/30ML; MG/30ML
30 SUSPENSION ORAL EVERY 6 HOURS PRN
Status: SHIPPED | OUTPATIENT
Start: 2025-01-08

## 2025-01-08 RX ORDER — MAGNESIUM HYDROXIDE/ALUMINUM HYDROXICE/SIMETHICONE 120; 1200; 1200 MG/30ML; MG/30ML; MG/30ML
30 SUSPENSION ORAL EVERY 6 HOURS PRN
Status: CANCELLED | OUTPATIENT
Start: 2025-01-08

## 2025-01-08 RX ORDER — POTASSIUM CHLORIDE 1500 MG/1
20 TABLET, EXTENDED RELEASE ORAL DAILY
Status: SHIPPED | OUTPATIENT
Start: 2025-01-08 | End: 2025-01-10

## 2025-01-09 ENCOUNTER — TELEPHONE (OUTPATIENT)
Dept: SURGERY | Age: 65
End: 2025-01-09

## 2025-01-09 NOTE — TELEPHONE ENCOUNTER
Called Mr. Bianchi to inquire on the status of the stool test that was entered on 01/07/2025. He states that he cannot pass a stool for a sample. Writer informed Dr. Conte and he has been made aware. Writer also confirmed appointment on 01/10/25 at 1:45PM.    Kiko Mcintyre MA

## 2025-01-10 ENCOUNTER — OFFICE VISIT (OUTPATIENT)
Dept: SURGERY | Age: 65
End: 2025-01-10

## 2025-01-10 VITALS
HEIGHT: 71 IN | RESPIRATION RATE: 16 BRPM | HEART RATE: 81 BPM | WEIGHT: 238 LBS | SYSTOLIC BLOOD PRESSURE: 115 MMHG | DIASTOLIC BLOOD PRESSURE: 84 MMHG | BODY MASS INDEX: 33.32 KG/M2

## 2025-01-10 DIAGNOSIS — Z09 POSTOP CHECK: Primary | ICD-10-CM

## 2025-01-10 PROCEDURE — 99024 POSTOP FOLLOW-UP VISIT: CPT | Performed by: COLON & RECTAL SURGERY

## 2025-01-10 ASSESSMENT — ENCOUNTER SYMPTOMS
ABDOMINAL PAIN: 0
BACK PAIN: 0
COUGH: 0
NAUSEA: 0
WHEEZING: 0
COLOR CHANGE: 0
SHORTNESS OF BREATH: 0
DIARRHEA: 1
BLOOD IN STOOL: 0
ABDOMINAL DISTENTION: 0
RECTAL PAIN: 0
VOMITING: 0
CHEST TIGHTNESS: 0
APNEA: 0
ANAL BLEEDING: 0
CONSTIPATION: 0
STRIDOR: 0

## 2025-01-13 ENCOUNTER — TELEMEDICINE (OUTPATIENT)
Dept: PRIMARY CARE CLINIC | Age: 65
End: 2025-01-13

## 2025-01-13 DIAGNOSIS — Z79.4 TYPE 2 DIABETES MELLITUS WITHOUT COMPLICATION, WITH LONG-TERM CURRENT USE OF INSULIN (HCC): ICD-10-CM

## 2025-01-13 DIAGNOSIS — E87.6 HYPOKALEMIA: Primary | ICD-10-CM

## 2025-01-13 DIAGNOSIS — E11.9 TYPE 2 DIABETES MELLITUS WITHOUT COMPLICATION, WITH LONG-TERM CURRENT USE OF INSULIN (HCC): ICD-10-CM

## 2025-01-13 DIAGNOSIS — K13.0 DRY LIPS: ICD-10-CM

## 2025-01-13 DIAGNOSIS — C18.9 MALIGNANT NEOPLASM OF COLON, UNSPECIFIED PART OF COLON (HCC): ICD-10-CM

## 2025-01-13 ASSESSMENT — ENCOUNTER SYMPTOMS
VOMITING: 0
ROS SKIN COMMENTS: DRY LIPS

## 2025-01-13 NOTE — PROGRESS NOTES
2025    TELEHEALTH EVALUATION -- Audio/Visual    HPI:    Tash Mora (:  1960) has requested an audio/video evaluation for the following concern(s):    Pt here for follow up- had open bowel resection/transverse colon on . Few days after went to ER due to nausea, vomiting, diarrhea. Has followed up with Dr. Conte a few times post op.    Does have some dryness of his lips as well. Has been using vaseline.     Labs from last week show slightly low potassium so will recheck later this week.       Review of Systems   Constitutional:  Negative for chills and fever.   Gastrointestinal:  Negative for vomiting.   Skin:         Dry lips       Prior to Visit Medications    Medication Sig Taking? Authorizing Provider   esomeprazole (NEXIUM) 20 MG delayed release capsule Take 1 capsule by mouth every morning (before breakfast)  Rajinder Conte MD   lidocaine viscous hcl (XYLOCAINE) 2 % SOLN solution Take 15 mLs by mouth as needed for Irritation  Rajinder Conte MD   ondansetron (ZOFRAN-ODT) 4 MG disintegrating tablet Take 1 tablet by mouth 3 times daily as needed for Nausea or Vomiting  Roger Pérez,    carvedilol (COREG) 12.5 MG tablet Take 1 tablet by mouth 2 times daily  Quan Botelol,    meloxicam (MOBIC) 15 MG tablet TAKE 1 TABLET BY MOUTH DAILY  Osbaldo Franklin MD   topiramate (TOPAMAX) 100 MG tablet Take 1 po qhs  Octavio Toscano MD   tretinoin (RETIN-A) 0.01 % gel Apply topically nightly.  Sandra Roberson DO   memantine (NAMENDA) 10 MG tablet Take 1 tablet by mouth 2 times daily  Octavio Toscano MD   traZODone (DESYREL) 100 MG tablet TAKE 2 TABLETS BY MOUTH EVERY NIGHT AT BEDTIME  Octavio Toscano MD   atorvastatin (LIPITOR) 10 MG tablet Take 1 tablet by mouth daily  Patient taking differently: Take 1 tablet by mouth at bedtime  Sandra Roberson DO   levocetirizine (XYZAL) 5 MG tablet TAKE 1 TABLET BY MOUTH EVERY DAY  Sandra Roberson DO   Handicap

## 2025-01-17 ENCOUNTER — HOSPITAL ENCOUNTER (OUTPATIENT)
Age: 65
Setting detail: SPECIMEN
Discharge: HOME | End: 2025-01-17

## 2025-01-17 ENCOUNTER — TELEPHONE (OUTPATIENT)
Dept: ONCOLOGY | Age: 65
End: 2025-01-17

## 2025-01-17 ENCOUNTER — OFFICE VISIT (OUTPATIENT)
Dept: SURGERY | Age: 65
End: 2025-01-17

## 2025-01-17 VITALS
WEIGHT: 233 LBS | HEIGHT: 71 IN | SYSTOLIC BLOOD PRESSURE: 118 MMHG | HEART RATE: 87 BPM | DIASTOLIC BLOOD PRESSURE: 89 MMHG | OXYGEN SATURATION: 97 % | BODY MASS INDEX: 32.62 KG/M2

## 2025-01-17 DIAGNOSIS — Z79.4 TYPE 2 DIABETES MELLITUS WITHOUT COMPLICATION, WITH LONG-TERM CURRENT USE OF INSULIN (HCC): ICD-10-CM

## 2025-01-17 DIAGNOSIS — E11.9 TYPE 2 DIABETES MELLITUS WITHOUT COMPLICATION, WITH LONG-TERM CURRENT USE OF INSULIN (HCC): ICD-10-CM

## 2025-01-17 DIAGNOSIS — E87.6 HYPOKALEMIA: ICD-10-CM

## 2025-01-17 DIAGNOSIS — Z09 POSTOP CHECK: Primary | ICD-10-CM

## 2025-01-17 LAB
ANION GAP SERPL CALCULATED.3IONS-SCNC: 11 MMOL/L (ref 9–16)
BUN SERPL-MCNC: 12 MG/DL (ref 8–23)
CALCIUM SERPL-MCNC: 9.4 MG/DL (ref 8.6–10.4)
CHLORIDE SERPL-SCNC: 110 MMOL/L (ref 98–107)
CO2 SERPL-SCNC: 22 MMOL/L (ref 20–31)
CREAT SERPL-MCNC: 1.2 MG/DL (ref 0.7–1.2)
EST. AVERAGE GLUCOSE BLD GHB EST-MCNC: 105 MG/DL
GFR, ESTIMATED: 68 ML/MIN/1.73M2
GLUCOSE SERPL-MCNC: 120 MG/DL (ref 74–99)
HBA1C MFR BLD: 5.3 % (ref 4–6)
POTASSIUM SERPL-SCNC: 4 MMOL/L (ref 3.7–5.3)
SODIUM SERPL-SCNC: 143 MMOL/L (ref 136–145)

## 2025-01-17 PROCEDURE — 99024 POSTOP FOLLOW-UP VISIT: CPT | Performed by: COLON & RECTAL SURGERY

## 2025-01-17 ASSESSMENT — ENCOUNTER SYMPTOMS
STRIDOR: 0
BACK PAIN: 0
COLOR CHANGE: 0
DIARRHEA: 0
NAUSEA: 0
ANAL BLEEDING: 0
VOMITING: 0
COUGH: 0
RECTAL PAIN: 0
APNEA: 0
ABDOMINAL DISTENTION: 0
CHEST TIGHTNESS: 0
ABDOMINAL PAIN: 0
SHORTNESS OF BREATH: 0
WHEEZING: 0
CONSTIPATION: 1
BLOOD IN STOOL: 0

## 2025-01-17 NOTE — PROGRESS NOTES
[facility] PHYSICIANS [facility] SURGICAL SPECIALISTS  [address] [facility] [address]  Dept: [phone]    Patient:  Tash Mora  YOB: 1960  Date: 1/17/2025     Chief Complaint: Suture / Staple Removal       HPI:     Tash Mora a 64 y.o. male is here for a post op to get his remaining staples removed.   Diarrhea has resolved since last visit.    He reports he is having a little of a hard time as he does not have an appetite.   He states he is still drinking the protein shakes.   Patient is also drinking a lot of liquids, like Gatorade.   Reports he could be very hungry, but no urge to eat.    He states he can only tolerate small portions.    He's tried tuna fish sandwiches, and breakfast sandwich.   Regarding his bowel movements, he hasn't had a bowel movement for two days.   Patient reports his last bowel movement to be soft, although he had to strain to evacuate stools. No metamucil.     Name of surgery: Open Bowel Resection, Transverse Colon  Surgery date: 12/30/2024  Last colonoscopy: 07/19/2024 with Dr. Yo  Last imaging:               CT - 08/11/2024  CEA:  1.0 on 12/20/2024     History:     Past Medical History:   Diagnosis Date    ADHD (attention deficit hyperactivity disorder)     no tx per pt.     Arthritis     knees, hands, back.    Asthma     as child.  pcp manages    Back pain     [facility] pain clinic for back and knees. 1/19/2023 visif    Back pain     down both legs    Brain aneurysm     Dr. You at   [facility]  f/u 12/29/2021. No surgery at this time. Monitoring yearly.    CAD (coronary artery disease)     Needs to find an new cardiologist. Dr. Butcher retired. Dr. Butcher [place], [place]  last visit 11/9/2021. Pt. states never had heart cath. MI 2011    Cataract     lt eye   no surgery    Cerebral artery occlusion with cerebral infarction (HCC) 2014    mini stroke went to [facility] and was

## 2025-01-17 NOTE — TELEPHONE ENCOUNTER
Name: Tash Mora  : 1960  MRN: [id number]    Oncology Navigation Follow-Up Note    Contact Type:  Telephone    Notes: Writer will end navigation as pt hasn't returned writers call and no indication per Dr. Benz last note that pt needs oncology services.      Electronically signed by Leah Williamson RN on 2025 at 1:25 PM

## 2025-02-10 ENCOUNTER — TELEPHONE (OUTPATIENT)
Dept: GASTROENTEROLOGY | Age: 65
End: 2025-02-10

## 2025-02-10 NOTE — TELEPHONE ENCOUNTER
Writer called pt, no answer. Left voicemail message asking if pt could come in at 7:45 am, 8:00 am or 9:45 am for tomorrow's appointment. Writer requested for pt to call office back and let us know, if not, we will continue to see pt at currently scheduled time of 11:15 am.

## 2025-02-11 NOTE — TELEPHONE ENCOUNTER
Left a voicemail message asking patient if he could come in earlier for his appointment today - asked him to call the office back if that is possible.  If not, we will still see him at his scheduled appointment time of 11:15 am today.

## 2025-02-12 ENCOUNTER — TELEPHONE (OUTPATIENT)
Dept: PRIMARY CARE CLINIC | Age: 65
End: 2025-02-12

## 2025-02-12 DIAGNOSIS — M54.42 CHRONIC BILATERAL LOW BACK PAIN WITH BILATERAL SCIATICA: Primary | ICD-10-CM

## 2025-02-12 DIAGNOSIS — M54.41 CHRONIC BILATERAL LOW BACK PAIN WITH BILATERAL SCIATICA: Primary | ICD-10-CM

## 2025-02-12 DIAGNOSIS — G89.29 CHRONIC BILATERAL LOW BACK PAIN WITH BILATERAL SCIATICA: Primary | ICD-10-CM

## 2025-02-12 NOTE — TELEPHONE ENCOUNTER
Last Visit Date: 1/13/2025   Next Visit Date: Visit date not found   Pt called requesting referral to PT for BLE and back pain. Pt would like to go to [facility] in [place].    Please    *Fax to [phone] please specify [place] location on cover sheet.

## 2025-02-13 RX ORDER — ALBUTEROL SULFATE 90 UG/1
INHALANT RESPIRATORY (INHALATION)
Qty: 3 EACH | Refills: 1 | Status: SHIPPED | OUTPATIENT
Start: 2025-02-13

## 2025-03-04 ENCOUNTER — APPOINTMENT (OUTPATIENT)
Dept: CT IMAGING | Age: 65
End: 2025-03-04
Payer: MEDICARE

## 2025-03-04 ENCOUNTER — HOSPITAL ENCOUNTER (EMERGENCY)
Age: 65
Discharge: HOME | End: 2025-03-05
Attending: EMERGENCY MEDICINE
Payer: MEDICARE

## 2025-03-04 DIAGNOSIS — K56.2: ICD-10-CM

## 2025-03-04 DIAGNOSIS — R10.31 ABDOMINAL PAIN, RIGHT LOWER QUADRANT: Primary | ICD-10-CM

## 2025-03-04 LAB
ALBUMIN SERPL-MCNC: 4.3 G/DL (ref 3.5–5.2)
ALP SERPL-CCNC: 101 U/L (ref 40–129)
ALT SERPL-CCNC: 17 U/L (ref 10–50)
ANION GAP SERPL CALCULATED.3IONS-SCNC: 10 MMOL/L (ref 9–16)
AST SERPL-CCNC: 23 U/L (ref 10–50)
BACTERIA URNS QL MICRO: ABNORMAL
BASOPHILS # BLD: 0 K/UL (ref 0–0.2)
BASOPHILS NFR BLD: 1 % (ref 0–2)
BILIRUB SERPL-MCNC: 0.3 MG/DL (ref 0–1.2)
BILIRUB UR QL STRIP: NEGATIVE
BUN SERPL-MCNC: 10 MG/DL (ref 8–23)
CALCIUM SERPL-MCNC: 9.2 MG/DL (ref 8.6–10.4)
CASTS #/AREA URNS LPF: ABNORMAL /LPF
CASTS #/AREA URNS LPF: ABNORMAL /LPF
CHLORIDE SERPL-SCNC: 110 MMOL/L (ref 98–107)
CLARITY UR: CLEAR
CO2 SERPL-SCNC: 22 MMOL/L (ref 20–31)
COLOR UR: ABNORMAL
CREAT SERPL-MCNC: 1.2 MG/DL (ref 0.7–1.2)
EOSINOPHIL # BLD: 0.2 K/UL (ref 0–0.4)
EOSINOPHILS RELATIVE PERCENT: 3 % (ref 0–4)
EPI CELLS #/AREA URNS HPF: ABNORMAL /HPF
ERYTHROCYTE [DISTWIDTH] IN BLOOD BY AUTOMATED COUNT: 13.7 % (ref 11.5–14.9)
GFR, ESTIMATED: 68 ML/MIN/1.73M2
GLUCOSE SERPL-MCNC: 108 MG/DL (ref 74–99)
GLUCOSE UR STRIP-MCNC: NEGATIVE MG/DL
HCT VFR BLD AUTO: 44.3 % (ref 41–53)
HGB BLD-MCNC: 14.6 G/DL (ref 13.5–17.5)
HGB UR QL STRIP.AUTO: NEGATIVE
KETONES UR STRIP-MCNC: ABNORMAL MG/DL
LEUKOCYTE ESTERASE UR QL STRIP: NEGATIVE
LIPASE SERPL-CCNC: 19 U/L (ref 13–60)
LYMPHOCYTES NFR BLD: 2.1 K/UL (ref 1–4.8)
LYMPHOCYTES RELATIVE PERCENT: 31 % (ref 24–44)
MCH RBC QN AUTO: 29 PG (ref 26–34)
MCHC RBC AUTO-ENTMCNC: 32.9 G/DL (ref 31–37)
MCV RBC AUTO: 88.1 FL (ref 80–100)
MONOCYTES NFR BLD: 0.5 K/UL (ref 0.1–1.3)
MONOCYTES NFR BLD: 7 % (ref 1–7)
MUCOUS THREADS URNS QL MICRO: ABNORMAL
NEUTROPHILS NFR BLD: 58 % (ref 36–66)
NEUTS SEG NFR BLD: 4 K/UL (ref 1.3–9.1)
NITRITE UR QL STRIP: NEGATIVE
PH UR STRIP: 5 [PH] (ref 5–8)
PLATELET # BLD AUTO: 169 K/UL (ref 150–450)
PMV BLD AUTO: 9 FL (ref 6–12)
POTASSIUM SERPL-SCNC: 3.6 MMOL/L (ref 3.7–5.3)
PROT SERPL-MCNC: 7.1 G/DL (ref 6.6–8.7)
PROT UR STRIP-MCNC: NEGATIVE MG/DL
RBC # BLD AUTO: 5.02 M/UL (ref 4.5–5.9)
RBC #/AREA URNS HPF: ABNORMAL /HPF
SODIUM SERPL-SCNC: 142 MMOL/L (ref 136–145)
SP GR UR STRIP: 1.03 (ref 1–1.03)
UROBILINOGEN UR STRIP-ACNC: NORMAL EU/DL (ref 0–1)
WBC #/AREA URNS HPF: ABNORMAL /HPF
WBC OTHER # BLD: 6.8 K/UL (ref 3.5–11)

## 2025-03-04 PROCEDURE — 99285 EMERGENCY DEPT VISIT HI MDM: CPT

## 2025-03-04 PROCEDURE — 85025 COMPLETE CBC W/AUTO DIFF WBC: CPT

## 2025-03-04 PROCEDURE — 80053 COMPREHEN METABOLIC PANEL: CPT

## 2025-03-04 PROCEDURE — 2580000003 HC RX 258

## 2025-03-04 PROCEDURE — 6370000000 HC RX 637 (ALT 250 FOR IP)

## 2025-03-04 PROCEDURE — 74177 CT ABD & PELVIS W/CONTRAST: CPT

## 2025-03-04 PROCEDURE — 83690 ASSAY OF LIPASE: CPT

## 2025-03-04 PROCEDURE — 81001 URINALYSIS AUTO W/SCOPE: CPT

## 2025-03-04 PROCEDURE — 6360000004 HC RX CONTRAST MEDICATION

## 2025-03-04 PROCEDURE — 2500000003 HC RX 250 WO HCPCS

## 2025-03-04 PROCEDURE — 36415 COLL VENOUS BLD VENIPUNCTURE: CPT

## 2025-03-04 RX ORDER — IOPAMIDOL 755 MG/ML
75 INJECTION, SOLUTION INTRAVASCULAR
Status: COMPLETED | OUTPATIENT
Start: 2025-03-04 | End: 2025-03-04

## 2025-03-04 RX ORDER — ACETAMINOPHEN 500 MG
1000 TABLET ORAL ONCE
Status: COMPLETED | OUTPATIENT
Start: 2025-03-04 | End: 2025-03-04

## 2025-03-04 RX ORDER — ONDANSETRON 2 MG/ML
4 INJECTION INTRAMUSCULAR; INTRAVENOUS ONCE
Status: DISCONTINUED | OUTPATIENT
Start: 2025-03-04 | End: 2025-03-05 | Stop reason: HOSPADM

## 2025-03-04 RX ORDER — SODIUM CHLORIDE 0.9 % (FLUSH) 0.9 %
10 SYRINGE (ML) INJECTION ONCE
Status: COMPLETED | OUTPATIENT
Start: 2025-03-04 | End: 2025-03-04

## 2025-03-04 RX ORDER — 0.9 % SODIUM CHLORIDE 0.9 %
100 INTRAVENOUS SOLUTION INTRAVENOUS ONCE
Status: COMPLETED | OUTPATIENT
Start: 2025-03-04 | End: 2025-03-05

## 2025-03-04 RX ADMIN — IOPAMIDOL 75 ML: 755 INJECTION, SOLUTION INTRAVENOUS at 22:30

## 2025-03-04 RX ADMIN — SODIUM CHLORIDE, PRESERVATIVE FREE 10 ML: 5 INJECTION INTRAVENOUS at 22:30

## 2025-03-04 RX ADMIN — SODIUM CHLORIDE 100 ML: 9 INJECTION, SOLUTION INTRAVENOUS at 22:30

## 2025-03-04 RX ADMIN — ACETAMINOPHEN 1000 MG: 500 TABLET ORAL at 22:03

## 2025-03-04 ASSESSMENT — LIFESTYLE VARIABLES
HOW OFTEN DO YOU HAVE A DRINK CONTAINING ALCOHOL: MONTHLY OR LESS
HOW MANY STANDARD DRINKS CONTAINING ALCOHOL DO YOU HAVE ON A TYPICAL DAY: 1 OR 2

## 2025-03-04 ASSESSMENT — PAIN SCALES - GENERAL
PAINLEVEL_OUTOF10: 8
PAINLEVEL_OUTOF10: 7

## 2025-03-04 ASSESSMENT — PAIN - FUNCTIONAL ASSESSMENT: PAIN_FUNCTIONAL_ASSESSMENT: 0-10

## 2025-03-04 ASSESSMENT — ENCOUNTER SYMPTOMS: ABDOMINAL PAIN: 1

## 2025-03-04 ASSESSMENT — PAIN DESCRIPTION - LOCATION: LOCATION: ABDOMEN

## 2025-03-05 ENCOUNTER — TELEPHONE (OUTPATIENT)
Dept: SURGERY | Age: 65
End: 2025-03-05

## 2025-03-05 ENCOUNTER — OFFICE VISIT (OUTPATIENT)
Dept: SURGERY | Age: 65
End: 2025-03-05

## 2025-03-05 VITALS
DIASTOLIC BLOOD PRESSURE: 92 MMHG | SYSTOLIC BLOOD PRESSURE: 127 MMHG | HEIGHT: 71 IN | BODY MASS INDEX: 33.88 KG/M2 | WEIGHT: 242 LBS | HEART RATE: 73 BPM

## 2025-03-05 VITALS
HEART RATE: 63 BPM | BODY MASS INDEX: 34.3 KG/M2 | RESPIRATION RATE: 10 BRPM | WEIGHT: 245 LBS | DIASTOLIC BLOOD PRESSURE: 91 MMHG | SYSTOLIC BLOOD PRESSURE: 143 MMHG | OXYGEN SATURATION: 96 % | HEIGHT: 71 IN | TEMPERATURE: 98.2 F

## 2025-03-05 DIAGNOSIS — Z85.038 HISTORY OF COLON CANCER IN ADULTHOOD: ICD-10-CM

## 2025-03-05 ASSESSMENT — ENCOUNTER SYMPTOMS
COLOR CHANGE: 0
APNEA: 0
ABDOMINAL PAIN: 1
CHEST TIGHTNESS: 0
BLOOD IN STOOL: 0
STRIDOR: 0
ABDOMINAL DISTENTION: 0
CONSTIPATION: 1
RECTAL PAIN: 0
BACK PAIN: 0
VOMITING: 0
ANAL BLEEDING: 0
COUGH: 0
NAUSEA: 0
DIARRHEA: 0
SHORTNESS OF BREATH: 0
WHEEZING: 0

## 2025-03-05 ASSESSMENT — PAIN DESCRIPTION - LOCATION: LOCATION: ABDOMEN

## 2025-03-05 ASSESSMENT — PAIN DESCRIPTION - ORIENTATION: ORIENTATION: RIGHT;LEFT;LOWER

## 2025-03-05 ASSESSMENT — PAIN SCALES - GENERAL: PAINLEVEL_OUTOF10: 7

## 2025-03-05 NOTE — DISCHARGE INSTRUCTIONS
You are seen in the ED for abdominal pain.  CT imaging showed possible postsurgical scarring versus omental torsion.    Please follow-up with Dr. Conte in outpatient setting today at 1 PM.  His office will call you in the morning to confirm this appointment.  Information regarding his office has been provided.    Please return to the ED if experience any worsening of abdominal pain, emesis, blood in stool or worsening of symptoms.

## 2025-03-05 NOTE — ED PROVIDER NOTES
[facility] EMERGENCY DEPARTMENT  eMERGENCY dEPARTMENT eNCOUnter   Attending Attestation     Pt Name: Tash Mora  MRN: [id number]  Birthdate 1960  Date of evaluation: 3/4/25       Tash Mora is a 64 y.o. male who presents with Abdominal Pain (Abd x 1 month. Pt states mostly on right side. Pt does have hx of colon cancer. Pt denies vomiting and diarrhea but states he is constipated at times. ) and Dysuria (X1 month. Also states hard to urinate sometimes, hard to get going)      History:   64-year-old male presenting to the ER complaining of abdominal pain as well as dysuria.    Exam: Vitals:   Vitals:    03/04/25 2114 03/04/25 2130   BP: (!) 120/94 115/88   Pulse: (!) 103 87   Resp: 18 13   Temp: 98.2 °F (36.8 °C)    TempSrc: Oral    SpO2: 96% 96%   Weight: 111.1 kg (245 lb)    Height: 1.803 m (5' 11\")      Patient presenting complaining of abdominal pain as well as dysuria.  CT abdomen pelvis did display evidence of omentum torsion. Surgery was consulted and they did recommend the patient be seen at the clinic tomorrow.  Patient was advised to contact the clinic tomorrow morning and to return to the ER immediately if symptoms worsen or change.  Patient understands and agrees with the plan.    I performed a history and physical examination of the patient and discussed management with the resident. I reviewed the resident’s note and agree with the documented findings and plan of care. Any areas of disagreement are noted on the chart. I was personally present for the key portions of any procedures. I have documented in the chart those procedures where I was not present during the key portions. I have personally reviewed all images and agree with the resident's interpretation. I have reviewed the emergency nurses triage note. I agree with the chief complaint, past medical history, past surgical history, allergies, medications, social and family history as documented unless otherwise noted  below. Documentation of the HPI, Physical Exam and Medical Decision Making performed by medical students or scribes is based on my personal performance of the HPI, PE and MDM. I personally evaluated and examined the patient in conjunction with the APC and agree with the assessment, treatment plan, and disposition of the patient as recorded by the APC. Additional findings are as noted.    Cheikh Pace DO  Attending Emergency  Physician              Cheikh Pace DO  03/05/25 0058

## 2025-03-05 NOTE — TELEPHONE ENCOUNTER
Patient states he is afraid he will need surgery again. Patient states he is not fully aware of what was going on with him and thinks he may have an obstruction. I explained, I am not clinical and cannot answer his questions, but since he is having an office visit with dr macias on 3/7, he should write down his concerns and go over them at the time of the visit. Patient agreed and voice appreciation.

## 2025-03-05 NOTE — ED PROVIDER NOTES
[facility] EMERGENCY DEPARTMENT  Emergency Department Encounter  Emergency Medicine Resident     Pt Name:Tash Mora  MRN: [id number]  Birthdate 1960  Date of evaluation: 3/4/25  PCP:  Sandra Roberson DO  Note Started: 9:53 PM EST      CHIEF COMPLAINT       Chief Complaint   Patient presents with    Abdominal Pain     Abd x 1 month. Pt states mostly on right side. Pt does have hx of colon cancer. Pt denies vomiting and diarrhea but states he is constipated at times.     Dysuria     X1 month. Also states hard to urinate sometimes, hard to get going       HISTORY OF PRESENT ILLNESS  (Location/Symptom, Timing/Onset, Context/Setting, Quality, Duration, Modifying Factors, Severity.)      Tash Mora is a 64 y.o. male who presents with pain X 1 month.  Patient states that he had a colectomy done on December 30, 2024 for colon cancer.  Patient subsequently has been having intermittent abdominal pain however he decided to come today for further evaluation due to concerns of possible liver injury.  Patient states his urine has been yellowish in color.  Denies any episodes of fever or chills.  Patient states he takes memantine which puts him at an increased risk for kidney stones.    PAST MEDICAL / SURGICAL / SOCIAL / FAMILY HISTORY      has a past medical history of ADHD (attention deficit hyperactivity disorder), Arthritis, Asthma, Back pain, Back pain, Brain aneurysm, CAD (coronary artery disease), Cataract, Cerebral artery occlusion with cerebral infarction (HCC), Colon cancer (HCC), Depression, Diabetes mellitus (HCC), Headache, Heart attack (HCC), Hyperlipidemia, Hypertension, Kidney stones, Memory loss, long term, Migraine, Mini stroke, Osteoarthritis, Seasonal allergies, Sinus problem, Sleep apnea, Sleep difficulties, Snores, Wears eyeglasses, and Wellness examination.       has a past surgical history that includes Cholecystectomy; Vasectomy; Upper gastrointestinal endoscopy;

## 2025-03-05 NOTE — TELEPHONE ENCOUNTER
Dr. Conte was made aware of patient being in the ER. Writer called patient to offer him an appointment for today or tomorrow. Patient states he is exhausted and in pain and wants to stay in bed today. Writer offered an appointment for patient tomorrow but he has physical therapy and an appointment with dermatology. Writer put in a reminder to call and check on the patient tomorrow. Writer confirmed his post op visit on 03/07 at 2:30. Dr. Conte made aware.

## 2025-03-05 NOTE — PROGRESS NOTES
[facility] PHYSICIANS [facility] SUR[facility]ICAL SPECIALISTS  [address] [facility] [address]  Dept: [phone]    Patient:  Tahs Mora  YOB: 1960  Date: 3/5/2025     The patient is a 64 y.o. male who presents today for consult of the following problems:     Chief Complaint: Post-Op Check (Open Bowel Resection, Transverse Colon DOS 12/30/2024)       HPI:     Tash Mora a 64 y.o. male is here to follow up from his open bowel resection done on 12/30/2024.   Since the last visit he reports he is doing not bad, but has consistent pain in his abdomen.   His appetite has improved, he eventually got it back in Feb. He has not followed up with Dr. Brown.   He was recently in the ER on 03/05/2024 and dx with omentum torsion.  He  reports constipation, occasional abdominal cramping, smelling feces on body and breath,   He denies diarrhea, rectal bleeding, .   He describes bowel movements as 2x a week with occasional straining.    ROS-    HPI  Name of surgery: Open Bowel Resection, Transverse Colon  Surgery date: 12/30/2024  Last colonoscopy: 07/19/2024 with Dr. Yo  Last imaging:               CT - 03/04/2025  CEA:  1.0 on 12/20/202      History:     Past Medical History:   Diagnosis Date    ADHD (attention deficit hyperactivity disorder)     no tx per pt.     Arthritis     knees, hands, back.    Asthma     as child.  pcp manages    Back pain     [facility] pain clinic for back and knees. 1/19/2023 visif    Back pain     down both legs    Brain aneurysm     Dr. You at   [facility]  f/u 12/29/2021. No surgery at this time. Monitoring yearly.    CAD (coronary artery disease)     Needs to find an new cardiologist. Dr. Butcher retired. [place]  last visit 11/9/2021. Pt. states never had heart cath. MI 2011    Cataract     lt eye   no surgery    Cerebral artery occlusion with cerebral infarction (HCC) 2014    mini stroke

## 2025-03-28 ENCOUNTER — OFFICE VISIT (OUTPATIENT)
Dept: ONCOLOGY | Age: 65
End: 2025-03-28
Payer: MEDICARE

## 2025-03-28 ENCOUNTER — HOSPITAL ENCOUNTER (OUTPATIENT)
Age: 65
Setting detail: SPECIMEN
Discharge: HOME | End: 2025-03-28

## 2025-03-28 ENCOUNTER — OFFICE VISIT (OUTPATIENT)
Dept: SURGERY | Age: 65
End: 2025-03-28

## 2025-03-28 VITALS
SYSTOLIC BLOOD PRESSURE: 130 MMHG | DIASTOLIC BLOOD PRESSURE: 93 MMHG | HEART RATE: 76 BPM | BODY MASS INDEX: 33.88 KG/M2 | TEMPERATURE: 97.1 F | WEIGHT: 242.9 LBS | OXYGEN SATURATION: 97 % | RESPIRATION RATE: 18 BRPM

## 2025-03-28 VITALS
HEIGHT: 71 IN | DIASTOLIC BLOOD PRESSURE: 96 MMHG | WEIGHT: 242 LBS | HEART RATE: 83 BPM | SYSTOLIC BLOOD PRESSURE: 134 MMHG | BODY MASS INDEX: 33.88 KG/M2

## 2025-03-28 DIAGNOSIS — R89.7 HIGH MICROSATELLITE INSTABILITY IN TISSUE OF NEOPLASM: ICD-10-CM

## 2025-03-28 DIAGNOSIS — F17.200 SMOKER: ICD-10-CM

## 2025-03-28 DIAGNOSIS — Z85.038 HISTORY OF COLON CANCER IN ADULTHOOD: Primary | ICD-10-CM

## 2025-03-28 DIAGNOSIS — C18.9 MALIGNANT NEOPLASM OF COLON, UNSPECIFIED PART OF COLON (HCC): Primary | ICD-10-CM

## 2025-03-28 DIAGNOSIS — C18.2 MALIGNANT NEOPLASM OF ASCENDING COLON (HCC): ICD-10-CM

## 2025-03-28 LAB — CEA SERPL-MCNC: 0.8 NG/ML (ref 0–3.8)

## 2025-03-28 PROCEDURE — 99211 OFF/OP EST MAY X REQ PHY/QHP: CPT | Performed by: INTERNAL MEDICINE

## 2025-03-28 PROCEDURE — 3017F COLORECTAL CA SCREEN DOC REV: CPT | Performed by: INTERNAL MEDICINE

## 2025-03-28 PROCEDURE — G8427 DOCREV CUR MEDS BY ELIG CLIN: HCPCS | Performed by: INTERNAL MEDICINE

## 2025-03-28 PROCEDURE — G8417 CALC BMI ABV UP PARAM F/U: HCPCS | Performed by: INTERNAL MEDICINE

## 2025-03-28 PROCEDURE — 4004F PT TOBACCO SCREEN RCVD TLK: CPT | Performed by: INTERNAL MEDICINE

## 2025-03-28 PROCEDURE — 99214 OFFICE O/P EST MOD 30 MIN: CPT | Performed by: INTERNAL MEDICINE

## 2025-03-28 PROCEDURE — 99024 POSTOP FOLLOW-UP VISIT: CPT | Performed by: COLON & RECTAL SURGERY

## 2025-03-28 PROCEDURE — 3080F DIAST BP >= 90 MM HG: CPT | Performed by: INTERNAL MEDICINE

## 2025-03-28 PROCEDURE — 3075F SYST BP GE 130 - 139MM HG: CPT | Performed by: INTERNAL MEDICINE

## 2025-03-28 ASSESSMENT — ENCOUNTER SYMPTOMS
BACK PAIN: 0
CONSTIPATION: 0
SHORTNESS OF BREATH: 0
VOMITING: 0
CHEST TIGHTNESS: 0
WHEEZING: 0
RECTAL PAIN: 0
DIARRHEA: 0
BLOOD IN STOOL: 0
STRIDOR: 0
ABDOMINAL DISTENTION: 0
ANAL BLEEDING: 0
COLOR CHANGE: 0
ABDOMINAL PAIN: 0
APNEA: 0
NAUSEA: 0
COUGH: 0

## 2025-03-28 NOTE — PROGRESS NOTES
time. Monitoring yearly.    CAD (coronary artery disease)     Needs to find an new cardiologist. Dr. Butcher retired. Dr. Butcher [place], [place]  last visit 11/9/2021. Pt. states never had heart cath. MI 2011    Cataract     lt eye   no surgery    Cerebral artery occlusion with cerebral infarction (HCC) 2014    mini stroke went to [facility] and was treated. Dr. Matos [facility]. Last seen summer 2021    Colon cancer (HCC)     Depression     pcp    Diabetes mellitus (HCC)     on rx   A1C high per pt    pcp manages    Headache     Heart attack (HCC) 2012    Dr. Butcher last visit 2021    Pt. states no hx cath    Hyperlipidemia     on rx    Hypertension     pcp manages    Kidney stones     Dr. Michael treats. Pt. states he currently has blood in his urine. Urine CS sent to lab today. No pain per pt.     Memory loss, long term     Pt states he has had it for years and follows w/ his neurologist Dr. Donnelly. Mother has Alzheimers.     Migraine     Mini stroke     2013  denies lasting effects  Sees Dr. Randhawa at [facility] . Sees 1/25/2023    Osteoarthritis     Seasonal allergies     Sinus problem     drainage from allergy    Sleep apnea     Sleep difficulties     Snores     sleep study 10/2022  results show sleep apnea and need for cpap. Pt. uses at home.    Wears eyeglasses     Wellness examination     Dr. Roberson last visit 1/2023 [facility]       Past Surgical History:     Past Surgical History:   Procedure Laterality Date    CHOLECYSTECTOMY      2016    COLONOSCOPY  2021    had cologuard and pt. states it was negative    COLONOSCOPY N/A 4/22/2024    COLONOSCOPY POLYPECTOMY SNARE BIOPSY TATTOO SPOT MARKING performed by Mario Yo MD at [facility]    COLONOSCOPY N/A 7/19/2024    COLONOSCOPY POLYPECTOMY REMOVAL SNARE/BIOPSY WITH MANTUS CLIP APPLICATION X2 AT TRANSVERSE COLON RESOLUTION CLIP APPLICATION X3 AND TATTOOING AT TRANSVERSE COLON performed by Mario Yo MD at [facility]

## 2025-03-28 NOTE — PROGRESS NOTES
[facility] PHYSICIANS [facility] SURGICAL SPECIALISTS  [address] [address] [address]  Dept: [phone]    Patient:  Tash Mora  YOB: 1960  Date: 3/28/2025     The patient is a 64 y.o. male who presents today for consult of the following problems:     Chief Complaint: Follow-up (Order CEA. Open Bowel Resection, Transverse Colon DOS 12/30/2024)       HPI:     Tash Mora a 64 y.o. male is here for a post op appointment following a Open Bowel Resection/order CEA. He reports that \"everything is working properly\". He still does not have much of an appetite and reports eating every other day.     He describes BM as every other day without straining.    Name of surgery: Open Bowel Resection, Transverse Colon  Surgery date: 12/30/2024  Last colonoscopy: 07/19/2024 with Dr. Yo  Last imaging:               CT - 03/04/2025              CEA:  1.0-12/20/2024    History:     Past Medical History:   Diagnosis Date    ADHD (attention deficit hyperactivity disorder)     no tx per pt.     Arthritis     knees, hands, back.    Asthma     as child.  pcp manages    Back pain     [facility] pain clinic for back and knees. 1/19/2023 visif    Back pain     down both legs    Brain aneurysm     Dr. You at   [facility]  f/u 12/29/2021. No surgery at this time. Monitoring yearly.    CAD (coronary artery disease)     Needs to find an new cardiologist. Dr. Butcher retired. Dr. Butcher [place]  last visit 11/9/2021. Pt. states never had heart cath. MI 2011    Cataract     lt eye   no surgery    Cerebral artery occlusion with cerebral infarction (HCC) 2014    mini stroke went to [facility] and was treated. Dr. Matos [facility]. Last seen summer 2021    Colon cancer (HCC)     Depression     pcp    Diabetes mellitus (HCC)     on rx   A1C high per pt    pcp manages    Headache     Heart attack (HCC) 2012    Dr. Butcher last visit

## 2025-03-31 ENCOUNTER — RESULTS FOLLOW-UP (OUTPATIENT)
Dept: SURGERY | Age: 65
End: 2025-03-31

## 2025-04-08 DIAGNOSIS — M25.561 PAIN IN BOTH KNEES, UNSPECIFIED CHRONICITY: ICD-10-CM

## 2025-04-08 DIAGNOSIS — M25.562 PAIN IN BOTH KNEES, UNSPECIFIED CHRONICITY: ICD-10-CM

## 2025-04-08 RX ORDER — TRAZODONE HYDROCHLORIDE 100 MG/1
TABLET ORAL
Qty: 60 TABLET | Refills: 5 | Status: SHIPPED | OUTPATIENT
Start: 2025-04-08

## 2025-04-08 RX ORDER — MELOXICAM 15 MG/1
15 TABLET ORAL DAILY
Qty: 28 TABLET | Refills: 4 | OUTPATIENT
Start: 2025-04-08

## 2025-04-08 NOTE — TELEPHONE ENCOUNTER
Pharmacy requesting refill of trazodone 100 mg.      Medication active on med list yes      Date of last Rx: 10/28/2024 with 5 refills          verified by ALBERTO MICHAELS      Date of last appointment 11/12/2024    Next Visit Date:  Visit date not found

## 2025-04-09 DIAGNOSIS — M25.562 PAIN IN BOTH KNEES, UNSPECIFIED CHRONICITY: ICD-10-CM

## 2025-04-09 DIAGNOSIS — M25.561 PAIN IN BOTH KNEES, UNSPECIFIED CHRONICITY: ICD-10-CM

## 2025-04-09 RX ORDER — MELOXICAM 15 MG/1
15 TABLET ORAL DAILY
Qty: 28 TABLET | Refills: 1 | Status: SHIPPED | OUTPATIENT
Start: 2025-04-09

## 2025-04-17 RX ORDER — MEMANTINE HYDROCHLORIDE 10 MG/1
10 TABLET ORAL 2 TIMES DAILY
Qty: 60 TABLET | Refills: 5 | Status: SHIPPED | OUTPATIENT
Start: 2025-04-17

## 2025-04-22 RX ORDER — CARVEDILOL 12.5 MG/1
12.5 TABLET ORAL 2 TIMES DAILY
Qty: 60 TABLET | Refills: 3 | Status: SHIPPED | OUTPATIENT
Start: 2025-04-22

## 2025-04-22 NOTE — TELEPHONE ENCOUNTER
Last Visit Date: 1/13/2025   Next Visit Date: Visit date not found     Medication was prescribed by cardiology but patient states he no longer has a cardiologist, that his retired.

## 2025-05-06 RX ORDER — TOPIRAMATE 100 MG/1
TABLET, FILM COATED ORAL
Qty: 30 TABLET | Refills: 2 | Status: SHIPPED | OUTPATIENT
Start: 2025-05-06

## 2025-05-06 NOTE — TELEPHONE ENCOUNTER
Please fill for Dr. Toscano as he is out of the office;     Pharmacy requesting refill of topiramate 100mg.      Medication active on med list yes      Date of last Rx: 11/12/2024 with 5 refills          verified by BRIANNE ERICKSON      Date of last appointment 11/12/2024    Next Visit Date:  Visit date not found

## 2025-05-13 ENCOUNTER — HOSPITAL ENCOUNTER (OUTPATIENT)
Age: 65
Setting detail: SPECIMEN
Discharge: HOME | End: 2025-05-13
Payer: MEDICARE

## 2025-05-13 LAB
CHOLEST SERPL-MCNC: 137 MG/DL (ref 0–199)
CHOLESTEROL/HDL RATIO: 4.6
HDLC SERPL-MCNC: 30 MG/DL
LDLC SERPL CALC-MCNC: 67 MG/DL (ref 0–100)
TRIGL SERPL-MCNC: 198 MG/DL (ref 0–149)

## 2025-05-13 PROCEDURE — 80061 LIPID PANEL: CPT

## 2025-05-13 PROCEDURE — 36415 COLL VENOUS BLD VENIPUNCTURE: CPT

## 2025-05-27 ENCOUNTER — OFFICE VISIT (OUTPATIENT)
Dept: GASTROENTEROLOGY | Age: 65
End: 2025-05-27
Payer: MEDICARE

## 2025-05-27 VITALS
HEIGHT: 71 IN | SYSTOLIC BLOOD PRESSURE: 166 MMHG | BODY MASS INDEX: 33.88 KG/M2 | WEIGHT: 242 LBS | DIASTOLIC BLOOD PRESSURE: 98 MMHG | TEMPERATURE: 98.1 F | HEART RATE: 76 BPM | RESPIRATION RATE: 19 BRPM

## 2025-05-27 DIAGNOSIS — F10.21 HISTORY OF ALCOHOLISM (HCC): ICD-10-CM

## 2025-05-27 DIAGNOSIS — C18.9 COLON ADENOCARCINOMA (HCC): Primary | ICD-10-CM

## 2025-05-27 PROCEDURE — G2211 COMPLEX E/M VISIT ADD ON: HCPCS | Performed by: INTERNAL MEDICINE

## 2025-05-27 PROCEDURE — G8427 DOCREV CUR MEDS BY ELIG CLIN: HCPCS | Performed by: INTERNAL MEDICINE

## 2025-05-27 PROCEDURE — 99213 OFFICE O/P EST LOW 20 MIN: CPT | Performed by: INTERNAL MEDICINE

## 2025-05-27 PROCEDURE — 3077F SYST BP >= 140 MM HG: CPT | Performed by: INTERNAL MEDICINE

## 2025-05-27 PROCEDURE — 3080F DIAST BP >= 90 MM HG: CPT | Performed by: INTERNAL MEDICINE

## 2025-05-27 PROCEDURE — G8417 CALC BMI ABV UP PARAM F/U: HCPCS | Performed by: INTERNAL MEDICINE

## 2025-05-27 PROCEDURE — 4004F PT TOBACCO SCREEN RCVD TLK: CPT | Performed by: INTERNAL MEDICINE

## 2025-05-27 PROCEDURE — 3017F COLORECTAL CA SCREEN DOC REV: CPT | Performed by: INTERNAL MEDICINE

## 2025-05-27 ASSESSMENT — ENCOUNTER SYMPTOMS
COLOR CHANGE: 0
WHEEZING: 0
ABDOMINAL PAIN: 1
CHOKING: 0
BLOOD IN STOOL: 0
BACK PAIN: 1
VOICE CHANGE: 0
NAUSEA: 0
VOMITING: 0
COUGH: 0
SORE THROAT: 0
CONSTIPATION: 0
DIARRHEA: 0
TROUBLE SWALLOWING: 0
RECTAL PAIN: 0
ANAL BLEEDING: 0
ABDOMINAL DISTENTION: 0

## 2025-05-27 NOTE — PROGRESS NOTES
Reason for Referral:   Blood in stool    No referring provider defined for this encounter.    Chief Complaint   Patient presents with    Constipation    Follow-up     Colon Adenocarcinoma            HISTORY OF PRESENT ILLNESS: Mr.Guadalupe JODI Mora is a 64 y.o. male with a past history remarkable for ADHD, arthritis, asthma, coronary artery disease, depression, diabetes mellitus, hyperlipidemia, hypertension, sleep apnea, referred for evaluation of blood in stool.  The patient reports that back in October he had multiple episodes of dark maroon-colored stool ongoing for several days.  He was also losing significant weight.  He used to be 290 pounds and then he went down to 240 pounds.  He has gained some of it back and is currently 255 pounds.  He does report that he tried to cut down on meals to achieve some weight loss.  He was also a significant alcohol drinker before.  He is not actively drinking currently.  He has alternating bowel habits.    Interval history 6/18/2024:  Had a recent colonoscopy that was poor prep.  Noted to have a large polyp that was not resected due to suboptimal prep.  Does take Percocet for back pain 3 times a day and has constipation.  Describes this as hard stool and goes every 3 days.    Interval history 7/26/2024:  Presents after repeat colonoscopy.  The large polyp was removed with EMR.  The biopsy came back as colon adenocarcinoma.  The patient denies any active symptoms today.    Interval history 11/11/2024:  No new symptoms reported today.  Had recent genetic testing done to rule out syndromes associated with colon cancer.  Results pending.    Interval history 5/27/2025:  The patient underwent transverse colon resection per surgery on 12/30/2024.  The biopsies was negative for carcinoma.  No new symptoms reported today except for left lower extremity pain.      Previous Endoscopies    Colonoscopy 7/19/2024:  Endoscopic Impression:    Bowel prep fair to

## 2025-06-13 ENCOUNTER — TELEPHONE (OUTPATIENT)
Dept: PRIMARY CARE CLINIC | Age: 65
End: 2025-06-13

## 2025-06-21 ENCOUNTER — HOSPITAL ENCOUNTER (OUTPATIENT)
Age: 65
Discharge: HOME | End: 2025-06-21
Payer: MEDICARE

## 2025-06-21 LAB
ALBUMIN SERPL-MCNC: 4.1 G/DL (ref 3.5–5.2)
ALP SERPL-CCNC: 94 U/L (ref 40–129)
ALT SERPL-CCNC: 12 U/L (ref 10–50)
ANION GAP SERPL CALCULATED.3IONS-SCNC: 11 MMOL/L (ref 9–16)
AST SERPL-CCNC: 17 U/L (ref 10–50)
BILIRUB SERPL-MCNC: 0.4 MG/DL (ref 0–1.2)
BUN SERPL-MCNC: 13 MG/DL (ref 8–23)
CALCIUM SERPL-MCNC: 9.1 MG/DL (ref 8.6–10.4)
CHLORIDE SERPL-SCNC: 111 MMOL/L (ref 98–107)
CHOLEST SERPL-MCNC: 151 MG/DL (ref 0–199)
CHOLESTEROL/HDL RATIO: 4.9
CO2 SERPL-SCNC: 21 MMOL/L (ref 20–31)
CREAT SERPL-MCNC: 1.2 MG/DL (ref 0.7–1.2)
GFR, ESTIMATED: 68 ML/MIN/1.73M2
GLUCOSE SERPL-MCNC: 124 MG/DL (ref 74–99)
HDLC SERPL-MCNC: 31 MG/DL
LDLC SERPL CALC-MCNC: 87 MG/DL (ref 0–100)
POTASSIUM SERPL-SCNC: 3.9 MMOL/L (ref 3.7–5.3)
PROT SERPL-MCNC: 6.9 G/DL (ref 6.6–8.7)
SODIUM SERPL-SCNC: 143 MMOL/L (ref 136–145)
TRIGL SERPL-MCNC: 167 MG/DL (ref 0–149)

## 2025-06-21 PROCEDURE — 36415 COLL VENOUS BLD VENIPUNCTURE: CPT

## 2025-06-21 PROCEDURE — 80053 COMPREHEN METABOLIC PANEL: CPT

## 2025-06-21 PROCEDURE — 80061 LIPID PANEL: CPT

## 2025-07-04 ENCOUNTER — HOSPITAL ENCOUNTER (EMERGENCY)
Age: 65
Discharge: HOME | End: 2025-07-04
Attending: EMERGENCY MEDICINE
Payer: MEDICARE

## 2025-07-04 VITALS
HEIGHT: 71 IN | DIASTOLIC BLOOD PRESSURE: 100 MMHG | TEMPERATURE: 98.1 F | WEIGHT: 240 LBS | OXYGEN SATURATION: 100 % | BODY MASS INDEX: 33.6 KG/M2 | SYSTOLIC BLOOD PRESSURE: 129 MMHG | RESPIRATION RATE: 18 BRPM | HEART RATE: 92 BPM

## 2025-07-04 DIAGNOSIS — T30.0 BURN: Primary | ICD-10-CM

## 2025-07-04 PROCEDURE — 99282 EMERGENCY DEPT VISIT SF MDM: CPT

## 2025-07-04 ASSESSMENT — ENCOUNTER SYMPTOMS
NAUSEA: 0
SHORTNESS OF BREATH: 0
DIARRHEA: 0
COLOR CHANGE: 0
ABDOMINAL PAIN: 0
PHOTOPHOBIA: 0
COUGH: 0
TROUBLE SWALLOWING: 0
VOMITING: 0
BURN: 1

## 2025-07-04 ASSESSMENT — PAIN - FUNCTIONAL ASSESSMENT
PAIN_FUNCTIONAL_ASSESSMENT: 0-10
PAIN_FUNCTIONAL_ASSESSMENT: 0-10

## 2025-07-04 ASSESSMENT — PAIN SCALES - GENERAL
PAINLEVEL_OUTOF10: 3
PAINLEVEL_OUTOF10: 3

## 2025-07-04 ASSESSMENT — LIFESTYLE VARIABLES
HOW MANY STANDARD DRINKS CONTAINING ALCOHOL DO YOU HAVE ON A TYPICAL DAY: 1 OR 2
HOW OFTEN DO YOU HAVE A DRINK CONTAINING ALCOHOL: MONTHLY OR LESS

## 2025-07-05 NOTE — ED PROVIDER NOTES
Rajinder Conte MD at [facility] OR    UPPER GASTROINTESTINAL ENDOSCOPY      URETER SURGERY Left 1/20/2023    HOLMIUM LASER CYSTOSCOPY URETEROSCOPY STENT EXCHANGE performed by Eleazar Michael MD at [facility] OR    VASECTOMY       CURRENT MEDICATIONS       Previous Medications    ALBUTEROL SULFATE HFA (PROVENTIL;VENTOLIN;PROAIR) 108 (90 BASE) MCG/ACT INHALER    inhale 2 puffs by mouth and INTO THE LUNGS every 6 hours if needed for cough shortness of breath or wheezing    ATORVASTATIN (LIPITOR) 10 MG TABLET    Take 1 tablet by mouth daily    CARVEDILOL (COREG) 12.5 MG TABLET    Take 1 tablet by mouth 2 times daily    ESOMEPRAZOLE (NEXIUM) 20 MG DELAYED RELEASE CAPSULE    Take 1 capsule by mouth every morning (before breakfast)    FLUTICASONE-SALMETEROL (ADVAIR) 100-50 MCG/ACT [facility] DISKUS INHALER    Inhale 1 puff into the lungs in the morning and 1 puff in the evening.    HANDICAP PLACARD MISC    by Does not apply route 5 years    LEVOCETIRIZINE (XYZAL) 5 MG TABLET    TAKE 1 TABLET BY MOUTH EVERY DAY    LIDOCAINE VISCOUS HCL (XYLOCAINE) 2 % SOLN SOLUTION    Take 15 mLs by mouth as needed for Irritation    MELOXICAM (MOBIC) 15 MG TABLET    Take 1 tablet by mouth daily    MEMANTINE (NAMENDA) 10 MG TABLET    Take 1 tablet by mouth 2 times daily    METHOCARBAMOL (ROBAXIN) 500 MG TABLET    Take 1 tablet by mouth 2 times daily as needed    ONDANSETRON (ZOFRAN-ODT) 4 MG DISINTEGRATING TABLET    Take 1 tablet by mouth 3 times daily as needed for Nausea or Vomiting    PERCOCET 5-325 MG PER TABLET    Take 5.325 mg/kg by mouth every 4 hours as needed for Pain.    TOPIRAMATE (TOPAMAX) 100 MG TABLET    Take 1 po qhs    TRAZODONE (DESYREL) 100 MG TABLET    TAKE 2 TABLETS BY MOUTH EVERY NIGHT AT BEDTIME    TRETINOIN (RETIN-A) 0.01 % GEL    Apply topically nightly.     ALLERGIES     is allergic to lisinopril, aleve  [naproxen sodium], amitriptyline hcl, exelon [rivastigmine], ibuprofen, and pregabalin.  FAMILY HISTORY     He

## 2025-07-10 ENCOUNTER — TELEPHONE (OUTPATIENT)
Dept: PRIMARY CARE CLINIC | Age: 65
End: 2025-07-10

## 2025-07-10 RX ORDER — CEPHALEXIN 500 MG/1
500 CAPSULE ORAL 2 TIMES DAILY
Qty: 14 CAPSULE | Refills: 0 | Status: SHIPPED | OUTPATIENT
Start: 2025-07-10 | End: 2025-07-17
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
## 2025-07-24 ENCOUNTER — HOSPITAL ENCOUNTER (OUTPATIENT)
Age: 65
Discharge: HOME | End: 2025-07-24
Payer: MEDICARE

## 2025-07-24 LAB
ALBUMIN SERPL-MCNC: 4.1 G/DL (ref 3.5–5.2)
ALP SERPL-CCNC: 108 U/L (ref 40–129)
ALT SERPL-CCNC: 16 U/L (ref 10–50)
ANION GAP SERPL CALCULATED.3IONS-SCNC: 12 MMOL/L (ref 9–16)
AST SERPL-CCNC: 18 U/L (ref 10–50)
BILIRUB SERPL-MCNC: 0.3 MG/DL (ref 0–1.2)
BUN SERPL-MCNC: 13 MG/DL (ref 8–23)
CALCIUM SERPL-MCNC: 9.2 MG/DL (ref 8.6–10.4)
CHLORIDE SERPL-SCNC: 110 MMOL/L (ref 98–107)
CHOLEST SERPL-MCNC: 155 MG/DL (ref 0–199)
CHOLESTEROL/HDL RATIO: 4.8
CO2 SERPL-SCNC: 20 MMOL/L (ref 20–31)
CREAT SERPL-MCNC: 1 MG/DL (ref 0.7–1.2)
GFR, ESTIMATED: 85 ML/MIN/1.73M2
GLUCOSE SERPL-MCNC: 112 MG/DL (ref 74–99)
HDLC SERPL-MCNC: 32 MG/DL
LDLC SERPL CALC-MCNC: 64 MG/DL (ref 0–100)
POTASSIUM SERPL-SCNC: 4 MMOL/L (ref 3.7–5.3)
PROT SERPL-MCNC: 6.9 G/DL (ref 6.6–8.7)
SODIUM SERPL-SCNC: 142 MMOL/L (ref 136–145)
TRIGL SERPL-MCNC: 297 MG/DL (ref 0–149)

## 2025-07-24 PROCEDURE — 80053 COMPREHEN METABOLIC PANEL: CPT

## 2025-07-24 PROCEDURE — 36415 COLL VENOUS BLD VENIPUNCTURE: CPT

## 2025-07-24 PROCEDURE — 80061 LIPID PANEL: CPT

## 2025-07-25 ENCOUNTER — TELEPHONE (OUTPATIENT)
Dept: PRIMARY CARE CLINIC | Age: 65
End: 2025-07-25

## 2025-07-25 DIAGNOSIS — E11.9 TYPE 2 DIABETES MELLITUS WITHOUT COMPLICATION, WITH LONG-TERM CURRENT USE OF INSULIN (HCC): Primary | ICD-10-CM

## 2025-07-25 DIAGNOSIS — Z79.4 TYPE 2 DIABETES MELLITUS WITHOUT COMPLICATION, WITH LONG-TERM CURRENT USE OF INSULIN (HCC): Primary | ICD-10-CM

## 2025-07-25 NOTE — TELEPHONE ENCOUNTER
Last Visit Date: 1/13/2025   Next Visit Date: 9/9/2025   Pt reported he had labs completed that were ordered by dermatologist and is concerned with results. Pt would like PCP to review.    Please advise

## 2025-07-28 NOTE — TELEPHONE ENCOUNTER
Ok I have ordered an A1c if he can get this done we can see if his sugars are better controlled. He does not need to fast.

## 2025-07-28 NOTE — TELEPHONE ENCOUNTER
Patient informed, states that he was been fasting and he is working on diet as well. He states that he only eats 1-2x a day and stopped drinking soda, so isn't sure why these levels

## 2025-07-28 NOTE — TELEPHONE ENCOUNTER
Which labs? I just see a cholesterol panel and a complete metabolic panel. Triglycerides  a little high and fasting glucose a little elevated. Was he fasting for his labs? If so I would recommend increase his cholesterol medication and cutting back sugary foods.

## 2025-07-31 RX ORDER — TOPIRAMATE 100 MG/1
100 TABLET, FILM COATED ORAL NIGHTLY
Qty: 28 TABLET | Refills: 5 | Status: SHIPPED | OUTPATIENT
Start: 2025-07-31

## 2025-07-31 RX ORDER — CARVEDILOL 12.5 MG/1
12.5 TABLET ORAL 2 TIMES DAILY
Qty: 60 TABLET | Refills: 3 | Status: SHIPPED | OUTPATIENT
Start: 2025-07-31

## 2025-07-31 RX ORDER — ALBUTEROL SULFATE 90 UG/1
INHALANT RESPIRATORY (INHALATION)
Qty: 3 EACH | Refills: 1 | Status: SHIPPED | OUTPATIENT
Start: 2025-07-31

## 2025-07-31 NOTE — TELEPHONE ENCOUNTER
Pharmacy requesting refill of Topamax 100mg.      Medication active on med list yes      Date of last fill: 5/6/25 #30 R-2  verified on 7/31/2025   verified by VS LPN      Date of last appointment 11/12/24    Next Visit Date:  9/9/2025

## 2025-08-29 ENCOUNTER — HOSPITAL ENCOUNTER (OUTPATIENT)
Age: 65
Setting detail: SPECIMEN
Discharge: HOME | End: 2025-08-29

## 2025-08-29 LAB
ALBUMIN SERPL-MCNC: 4.3 G/DL (ref 3.5–5.2)
ALBUMIN/GLOB SERPL: 1.6 {RATIO} (ref 1–2.5)
ALP SERPL-CCNC: 105 U/L (ref 40–129)
ALT SERPL-CCNC: 14 U/L (ref 10–50)
AST SERPL-CCNC: 22 U/L (ref 10–50)
BILIRUB DIRECT SERPL-MCNC: 0.2 MG/DL (ref 0–0.2)
BILIRUB INDIRECT SERPL-MCNC: 0.2 MG/DL (ref 0–1)
BILIRUB SERPL-MCNC: 0.4 MG/DL (ref 0–1.2)
CHOLEST SERPL-MCNC: 146 MG/DL (ref 0–199)
CHOLESTEROL/HDL RATIO: 4.7
GLOBULIN SER CALC-MCNC: 2.7 G/DL
HDLC SERPL-MCNC: 31 MG/DL
LDLC SERPL CALC-MCNC: 78 MG/DL (ref 0–100)
PROT SERPL-MCNC: 7 G/DL (ref 6.6–8.7)
TRIGL SERPL-MCNC: 184 MG/DL
VLDLC SERPL CALC-MCNC: 37 MG/DL (ref 1–30)

## (undated) DEVICE — GUIDEWIRE URO L150CM DIA0.035IN STIFF NIT HYDRPHLC STR TIP

## (undated) DEVICE — ERBE NESSY® OMEGA PLATE USA (85+23)CM² , WITH CABLE 3 M: Brand: ERBE

## (undated) DEVICE — 450 ML BOTTLE OF 0.05% CHLORHEXIDINE GLUCONATE IN 99.95% STERILE WATER FOR IRRIGATION, USP AND APPLICATOR.: Brand: IRRISEPT ANTIMICROBIAL WOUND LAVAGE

## (undated) DEVICE — TROCARS: Brand: KII® BALLOON BLUNT TIP SYSTEM

## (undated) DEVICE — CONTROL SYRINGE LUER-LOCK TIP: Brand: MONOJECT

## (undated) DEVICE — SOLUTION ANTIFOG VIS SYS CLEARIFY LAPSCP

## (undated) DEVICE — Z DISCONTINUED GLOVE SURG SZ 7.5 L12IN FNGR THK13MIL WHT ISOLEX

## (undated) DEVICE — STAPLER INT L60MM REG TISS BLU B FRM 8 FIRING 2 ROW AUTO

## (undated) DEVICE — STAPLER INT L75MM CUT LN L73MM STPL LN L77MM BLU B FRM 8

## (undated) DEVICE — 3M™ WARMING BLANKET, UPPER BODY, 10 PER CASE, 42268: Brand: BAIR HUGGER™

## (undated) DEVICE — FORCEPS BX L240CM WRK CHN 2.8MM STD CAP W/ NDL MIC MESH

## (undated) DEVICE — NEEDLE HYPO 25GA L1.5IN BLU POLYPR HUB S STL REG BVL STR

## (undated) DEVICE — SUTURE VCRL SZ 3-0 L27IN ABSRB UD L26MM SH 1/2 CIR J416H

## (undated) DEVICE — SUTURE PDS II SZ 0 L27IN ABSRB VLT UR-6 L26MM 1/2 CIR D7185

## (undated) DEVICE — STRIP,CLOSURE,WOUND,MEDI-STRIP,1/2X4: Brand: MEDLINE

## (undated) DEVICE — LIGASURE IMPACT FT10 COMPATIBLE: Brand: MEDLINE

## (undated) DEVICE — PENCIL ES L3M BTTN SWCH HOLSTER W/ BLDE ELECTRD EDGE

## (undated) DEVICE — INTENDED FOR TISSUE SEPARATION, AND OTHER PROCEDURES THAT REQUIRE A SHARP SURGICAL BLADE TO PUNCTURE OR CUT.: Brand: BARD-PARKER ® CARBON RIB-BACK BLADES

## (undated) DEVICE — PROTECTOR ULN NRV PUR FOAM HK LOOP STRP ANATOMICALLY

## (undated) DEVICE — GARMENT,MEDLINE,DVT,INT,CALF,MED, GEN2: Brand: MEDLINE

## (undated) DEVICE — TRAY URIN CATH 16FR DRNGE BG STATLOK STBL DEV F SURSTP

## (undated) DEVICE — COVER,TABLE,60X90,STERILE: Brand: MEDLINE

## (undated) DEVICE — RAZOR SHV S STL PREP DBL SIDE FIX HD CNTOUR HNDL

## (undated) DEVICE — GLOVE SURG SZ 65 THK91MIL LTX FREE SYN POLYISOPRENE

## (undated) DEVICE — SUTURE MCRYL SZ 4-0 L18IN ABSRB UD L16MM PC-3 3/8 CIR PRIM Y845G

## (undated) DEVICE — SUTURE VCRL SZ 0 L36IN ABSRB UD L36MM CT-1 1/2 CIR J946H

## (undated) DEVICE — PACK SURG ABD SVMMC

## (undated) DEVICE — CONTAINER,SPECIMEN,4OZ,OR STRL: Brand: MEDLINE

## (undated) DEVICE — DUAL LUMEN URETERAL CATHETER

## (undated) DEVICE — SOLUTION IRRIG 1000ML 0.9% SOD CHL USP POUR PLAS BTL

## (undated) DEVICE — MHPB CONVERSION PACK: Brand: MEDLINE INDUSTRIES, INC.

## (undated) DEVICE — SUTURE ABSORBABLE MONOFILAMENT 0 CTX 60 IN VIO PDS + PDP990G

## (undated) DEVICE — MHPB GEN MINOR PACK: Brand: MEDLINE INDUSTRIES, INC.

## (undated) DEVICE — SOLUTION PREP PAINT POV IOD FOR SKIN MUCOUS MEM

## (undated) DEVICE — COVER,MAYO STAND,STERILE: Brand: MEDLINE

## (undated) DEVICE — SYRINGE,PISTON,IRRIGATION,60ML,STERILE: Brand: MEDLINE

## (undated) DEVICE — SEALANT TISS 4ML FIBRIN PREFIL SYR PRIMA FRZN W/ 1 DUPLOJET

## (undated) DEVICE — SUTURE ETHILON SZ 3-0 L18IN NONABSORBABLE BLK L24MM PS-1 3/8 1663G

## (undated) DEVICE — SUTURE VICRYL + SZ 3-0 L27IN ABSRB UD L26MM SH 1/2 CIR VCP416H

## (undated) DEVICE — SPONGE DRN W4XL4IN RAYON/POLYESTER 6 PLY NONWOVEN PRECUT 2 PER PK

## (undated) DEVICE — ELECTRODE ES L 6.5IN BLDE MPLR OPN APPRCH EZ TO CLN

## (undated) DEVICE — ELEVIEW SUBMUCOSAL INJECTABLE COMPOSITION 10ML

## (undated) DEVICE — POLYP TRAP: Brand: TRAPEASE®

## (undated) DEVICE — RESERVOIR,SUCTION,100CC,SILICONE: Brand: MEDLINE

## (undated) DEVICE — FILTER CLP DISP FOR 5513E CLIPVAC

## (undated) DEVICE — DRAPE,LAP,CHOLE,W/TROUGHS,STERILE: Brand: MEDLINE

## (undated) DEVICE — DRAIN SURG FLAT W7MMXL20CM FULL PERF

## (undated) DEVICE — BLADE CLIPPER GEN PURP NS

## (undated) DEVICE — SINGLE ACTION PUMPING SYSTEM

## (undated) DEVICE — DEFENDO AIR WATER SUCTION AND BIOPSY VALVE KIT FOR  OLYMPUS: Brand: DEFENDO AIR/WATER/SUCTION AND BIOPSY VALVE

## (undated) DEVICE — SOLUTION SCRB 4OZ 4% CHG H2O AIDED FOR PREOPERATIVE SKIN

## (undated) DEVICE — SYRINGE MED 50ML LUERLOCK TIP

## (undated) DEVICE — Device: Brand: SPOT EX ENDOSCOPIC TATTOO

## (undated) DEVICE — DRAPE,REIN 53X77,STERILE: Brand: MEDLINE

## (undated) DEVICE — SNARE ENDOSCP L240CM LOOP W13MM DIA2.4MM SHT THROW SM OVL

## (undated) DEVICE — NEEDLE SCLERO 25GA L240CM OD0.51MM ID0.24MM EXTN L4MM SHTH

## (undated) DEVICE — ADAPTER URO SCP UROLOK LL

## (undated) DEVICE — SOLUTION IV IRRIG POUR BRL 0.9% SODIUM CHL 2F7124

## (undated) DEVICE — DRESSING BORDERED ADH GZ UNIV GEN USE 4INX10IN AND 2INX8IN

## (undated) DEVICE — ELECTRODE PT RET AD L9FT HI MOIST COND ADH HYDRGEL CORDED

## (undated) DEVICE — SUTURE PDS II SZ 2-0 L27IN ABSRB VLT SH L26MM 1/2 CIR Z317H

## (undated) DEVICE — GLOVE SURG SZ 6 THK91MIL LTX FREE SYN POLYISOPRENE ANTI

## (undated) DEVICE — GLOVE SURG SZ 8 L12IN THK75MIL DK GRN LTX FREE

## (undated) DEVICE — GLOVE ORTHO 7   MSG9470

## (undated) DEVICE — PREMIUM DRY TRAY LF: Brand: MEDLINE INDUSTRIES, INC.

## (undated) DEVICE — SYRINGE, LUER LOCK, 10ML: Brand: MEDLINE

## (undated) DEVICE — 3M™ STERI-STRIP™ WOUND CLOSURE SYSTEMS 5 EACH/PACK 25 PACKS/CARTON 4 CARTONS/CASE W8516: Brand: 3M™ STERI-STRIP™

## (undated) DEVICE — ST CHARLES GEN LAPAROSCOPY PK: Brand: MEDLINE INDUSTRIES, INC.

## (undated) DEVICE — FIBER LASER HOLM DISP SU200RT] LEONI FIBER OPTICS INC]

## (undated) DEVICE — ENDO KIT W/SYRINGE: Brand: MEDLINE INDUSTRIES, INC.

## (undated) DEVICE — SUTURE PERMAHAND SZ 0 L30IN NONABSORBABLE BLK L26MM SH 1/2 K834H

## (undated) DEVICE — GLOVE ORANGE PI 7 1/2   MSG9075

## (undated) DEVICE — STAPLER INT CUT LN 51MM STPL 51MM BLU CRV HD B FRM

## (undated) DEVICE — DRAPE,UNDRBUT,WHT GRAD PCH,CAPPORT,20/CS: Brand: MEDLINE

## (undated) DEVICE — SUTURE MCRYL + SZ 4-0 L27IN ABSRB UD L19MM PS-2 3/8 CIR MCP426H

## (undated) DEVICE — RETRACTOR SYS DP SCROT

## (undated) DEVICE — GARMENT COMPR STD FOR 17IN CALF UNIF THER FLOTRN

## (undated) DEVICE — LEGGINGS, PAIR, 33X51 XL, STERILE: Brand: MEDLINE

## (undated) DEVICE — TOWEL,OR,DSP,ST,NATURAL,DLX,4/PK,20PK/CS: Brand: MEDLINE

## (undated) DEVICE — SPONGE LAP W18XL18IN WHT ENHANCEDXRAY VISIBILITY DATA MSTR

## (undated) DEVICE — YANKAUER,POOLE TIP,STERILE,50/CS: Brand: MEDLINE

## (undated) DEVICE — GAUZE,SPONGE,FLUFF,6"X6.75",STRL,5/TRAY: Brand: MEDLINE

## (undated) DEVICE — CATHETER URETH 16FR BLLN 5CC SIL ALLY W/ SIL HYDRGEL 2 W F

## (undated) DEVICE — Z DISCONTINUED BY MEDLINE USE 2711682 TRAY SKIN PREP DRY W/ PREM GLV

## (undated) DEVICE — PACK PROCEDURE SURG CYSTO SVMMC LF

## (undated) DEVICE — ADHESIVE SKIN CLOSURE TOP 36 CC HI VISC DERMBND MINI

## (undated) DEVICE — DRAIN SURG 10FR L1 8IN DIA32MM SIL RND HUBLESS FULL FLUT CHN

## (undated) DEVICE — SNARE ENDOSCP L240CM OD24MM LOOP W10MM RND INSUL STIFF BRAID

## (undated) DEVICE — 3M™ STERI-STRIP™ COMPOUND BENZOIN TINCTURE 40 BAGS/CARTON 4 CARTONS/CASE C1544: Brand: 3M™ STERI-STRIP™

## (undated) DEVICE — NEPTUNE E-SEP SMOKE EVACUATION PENCIL, COATED, 70MM BLADE, PUSH BUTTON SWITCH: Brand: NEPTUNE E-SEP

## (undated) DEVICE — MITT SURG PREP L ADH DISPOSABLE

## (undated) DEVICE — GOWN,AURORA,NONREINFORCED,LARGE: Brand: MEDLINE

## (undated) DEVICE — GUIDEWIRE URO L150CM DIA .035IN STIFF NIT HYDRPHL STR TIP

## (undated) DEVICE — DRAPE IRRIG FLD WRM W44XL44IN W/ AORN STD PRTBL INTRATEMP

## (undated) DEVICE — PLUG,CATHETER,DRAINAGE PROTECTOR,TUBE: Brand: MEDLINE

## (undated) DEVICE — SYRINGE 20ML LL S/C 50

## (undated) DEVICE — SUTURE PERMA-HAND SZ 2-0 L30IN NONABSORBABLE BLK L26MM SH K833H

## (undated) DEVICE — SYRINGE IRRIG 60ML SFT PLIABLE BLB EZ TO GRP 1 HND USE W/

## (undated) DEVICE — 3M™ IOBAN™ 2 ANTIMICROBIAL INCISE DRAPE 6651EZ: Brand: IOBAN™ 2

## (undated) DEVICE — TOTAL TRAY, 16FR 10ML SIL FOLEY, URN: Brand: MEDLINE

## (undated) DEVICE — AGENT HEMSTAT 3GM OXIDIZED REGENERATED CELOS ABSRB FOR CONT (ORDER MULTIPLES OF 5EA)

## (undated) DEVICE — COVER,LIGHT HANDLE,FLX,2/PK: Brand: MEDLINE INDUSTRIES, INC.

## (undated) DEVICE — GOWN,SIRUS,NONRNF,SETINSLV,XL,20/CS: Brand: MEDLINE

## (undated) DEVICE — RELOAD STPL L75MM OPN H3.8MM CLS 1.5MM WIRE DIA0.2MM REG

## (undated) DEVICE — METER,URINE,400ML,DRAIN BAG,L/F,LL: Brand: MEDLINE